# Patient Record
Sex: MALE | Race: WHITE | NOT HISPANIC OR LATINO | Employment: OTHER | URBAN - METROPOLITAN AREA
[De-identification: names, ages, dates, MRNs, and addresses within clinical notes are randomized per-mention and may not be internally consistent; named-entity substitution may affect disease eponyms.]

---

## 2017-01-02 ENCOUNTER — GENERIC CONVERSION - ENCOUNTER (OUTPATIENT)
Dept: OTHER | Facility: OTHER | Age: 72
End: 2017-01-02

## 2017-01-03 ENCOUNTER — ALLSCRIPTS OFFICE VISIT (OUTPATIENT)
Dept: OTHER | Facility: OTHER | Age: 72
End: 2017-01-03

## 2017-01-03 ENCOUNTER — GENERIC CONVERSION - ENCOUNTER (OUTPATIENT)
Dept: OTHER | Facility: OTHER | Age: 72
End: 2017-01-03

## 2017-01-05 ENCOUNTER — APPOINTMENT (OUTPATIENT)
Dept: PHYSICAL THERAPY | Facility: CLINIC | Age: 72
End: 2017-01-05
Payer: MEDICARE

## 2017-01-05 ENCOUNTER — TRANSCRIBE ORDERS (OUTPATIENT)
Dept: LAB | Facility: CLINIC | Age: 72
End: 2017-01-05

## 2017-01-05 ENCOUNTER — APPOINTMENT (OUTPATIENT)
Dept: LAB | Facility: CLINIC | Age: 72
End: 2017-01-05
Payer: MEDICARE

## 2017-01-05 ENCOUNTER — ALLSCRIPTS OFFICE VISIT (OUTPATIENT)
Dept: OTHER | Facility: OTHER | Age: 72
End: 2017-01-05

## 2017-01-05 DIAGNOSIS — E08.41 DIABETIC MONONEUROPATHY ASSOCIATED WITH DIABETES MELLITUS DUE TO UNDERLYING CONDITION (HCC): Primary | ICD-10-CM

## 2017-01-05 DIAGNOSIS — E08.41 DIABETIC MONONEUROPATHY ASSOCIATED WITH DIABETES MELLITUS DUE TO UNDERLYING CONDITION (HCC): ICD-10-CM

## 2017-01-05 LAB
ANION GAP SERPL CALCULATED.3IONS-SCNC: 7 MMOL/L (ref 4–13)
BUN SERPL-MCNC: 19 MG/DL (ref 5–25)
CALCIUM SERPL-MCNC: 9 MG/DL (ref 8.3–10.1)
CHLORIDE SERPL-SCNC: 104 MMOL/L (ref 100–108)
CO2 SERPL-SCNC: 30 MMOL/L (ref 21–32)
CREAT SERPL-MCNC: 1.03 MG/DL (ref 0.6–1.3)
CREAT UR-MCNC: 153 MG/DL
ERYTHROCYTE [DISTWIDTH] IN BLOOD BY AUTOMATED COUNT: 14.5 % (ref 11.6–15.1)
GFR SERPL CREATININE-BSD FRML MDRD: >60 ML/MIN/1.73SQ M
GLUCOSE SERPL-MCNC: 145 MG/DL (ref 65–140)
HCT VFR BLD AUTO: 38.3 % (ref 36.5–49.3)
HGB BLD-MCNC: 12.3 G/DL (ref 12–17)
MCH RBC QN AUTO: 28.4 PG (ref 26.8–34.3)
MCHC RBC AUTO-ENTMCNC: 32.1 G/DL (ref 31.4–37.4)
MCV RBC AUTO: 89 FL (ref 82–98)
MICROALBUMIN UR-MCNC: 18.3 MG/L (ref 0–20)
MICROALBUMIN/CREAT 24H UR: 12 MG/G CREATININE (ref 0–30)
PLATELET # BLD AUTO: 196 THOUSANDS/UL (ref 149–390)
PMV BLD AUTO: 11.1 FL (ref 8.9–12.7)
POTASSIUM SERPL-SCNC: 3.9 MMOL/L (ref 3.5–5.3)
RBC # BLD AUTO: 4.33 MILLION/UL (ref 3.88–5.62)
SODIUM SERPL-SCNC: 141 MMOL/L (ref 136–145)
T4 FREE SERPL-MCNC: 1.2 NG/DL (ref 0.76–1.46)
TSH SERPL DL<=0.05 MIU/L-ACNC: 2.7 UIU/ML (ref 0.36–3.74)
WBC # BLD AUTO: 9.38 THOUSAND/UL (ref 4.31–10.16)

## 2017-01-05 PROCEDURE — 84439 ASSAY OF FREE THYROXINE: CPT

## 2017-01-05 PROCEDURE — 36415 COLL VENOUS BLD VENIPUNCTURE: CPT

## 2017-01-05 PROCEDURE — 85027 COMPLETE CBC AUTOMATED: CPT

## 2017-01-05 PROCEDURE — 82570 ASSAY OF URINE CREATININE: CPT

## 2017-01-05 PROCEDURE — 80048 BASIC METABOLIC PNL TOTAL CA: CPT

## 2017-01-05 PROCEDURE — 84443 ASSAY THYROID STIM HORMONE: CPT

## 2017-01-05 PROCEDURE — 82043 UR ALBUMIN QUANTITATIVE: CPT

## 2017-01-06 ENCOUNTER — APPOINTMENT (OUTPATIENT)
Dept: PHYSICAL THERAPY | Facility: CLINIC | Age: 72
End: 2017-01-06
Payer: MEDICARE

## 2017-01-06 PROCEDURE — 97110 THERAPEUTIC EXERCISES: CPT

## 2017-01-06 PROCEDURE — 97140 MANUAL THERAPY 1/> REGIONS: CPT

## 2017-01-10 ENCOUNTER — APPOINTMENT (OUTPATIENT)
Dept: PHYSICAL THERAPY | Facility: CLINIC | Age: 72
End: 2017-01-10
Payer: MEDICARE

## 2017-01-10 PROCEDURE — 97140 MANUAL THERAPY 1/> REGIONS: CPT

## 2017-01-10 PROCEDURE — 97110 THERAPEUTIC EXERCISES: CPT

## 2017-01-12 ENCOUNTER — APPOINTMENT (OUTPATIENT)
Dept: PHYSICAL THERAPY | Facility: CLINIC | Age: 72
End: 2017-01-12
Payer: MEDICARE

## 2017-01-12 PROCEDURE — 97140 MANUAL THERAPY 1/> REGIONS: CPT

## 2017-01-12 PROCEDURE — 97110 THERAPEUTIC EXERCISES: CPT

## 2017-01-13 ENCOUNTER — APPOINTMENT (OUTPATIENT)
Dept: PHYSICAL THERAPY | Facility: CLINIC | Age: 72
End: 2017-01-13
Payer: MEDICARE

## 2017-01-16 ENCOUNTER — APPOINTMENT (OUTPATIENT)
Dept: PHYSICAL THERAPY | Facility: CLINIC | Age: 72
End: 2017-01-16
Payer: MEDICARE

## 2017-01-16 PROCEDURE — 97140 MANUAL THERAPY 1/> REGIONS: CPT

## 2017-01-16 PROCEDURE — 97110 THERAPEUTIC EXERCISES: CPT

## 2017-01-18 ENCOUNTER — APPOINTMENT (OUTPATIENT)
Dept: PHYSICAL THERAPY | Facility: CLINIC | Age: 72
End: 2017-01-18
Payer: MEDICARE

## 2017-01-18 PROCEDURE — 97140 MANUAL THERAPY 1/> REGIONS: CPT

## 2017-01-23 ENCOUNTER — APPOINTMENT (OUTPATIENT)
Dept: PHYSICAL THERAPY | Facility: CLINIC | Age: 72
End: 2017-01-23
Payer: MEDICARE

## 2017-01-23 PROCEDURE — G8985 CARRY GOAL STATUS: HCPCS

## 2017-01-23 PROCEDURE — 97110 THERAPEUTIC EXERCISES: CPT

## 2017-01-23 PROCEDURE — G8984 CARRY CURRENT STATUS: HCPCS

## 2017-01-23 PROCEDURE — 97140 MANUAL THERAPY 1/> REGIONS: CPT

## 2017-01-25 ENCOUNTER — ANESTHESIA EVENT (OUTPATIENT)
Dept: GASTROENTEROLOGY | Facility: AMBULARY SURGERY CENTER | Age: 72
End: 2017-01-25
Payer: MEDICARE

## 2017-01-25 RX ORDER — AMIODARONE HYDROCHLORIDE 100 MG/1
100 TABLET ORAL EVERY MORNING
Status: ON HOLD | COMMUNITY
End: 2017-03-24

## 2017-01-25 RX ORDER — ATORVASTATIN CALCIUM 20 MG/1
20 TABLET, FILM COATED ORAL EVERY EVENING
COMMUNITY
End: 2018-03-15 | Stop reason: SDUPTHER

## 2017-01-25 RX ORDER — AMLODIPINE BESYLATE 10 MG/1
10 TABLET ORAL EVERY MORNING
COMMUNITY
End: 2019-06-28 | Stop reason: ALTCHOICE

## 2017-01-25 RX ORDER — DIPHENOXYLATE HYDROCHLORIDE AND ATROPINE SULFATE 2.5; .025 MG/1; MG/1
1 TABLET ORAL EVERY MORNING
COMMUNITY
End: 2022-02-21

## 2017-01-25 RX ORDER — TAMSULOSIN HYDROCHLORIDE 0.4 MG/1
0.4 CAPSULE ORAL DAILY
COMMUNITY
End: 2021-10-02

## 2017-01-25 RX ORDER — LOSARTAN POTASSIUM 50 MG/1
50 TABLET ORAL EVERY MORNING
COMMUNITY
End: 2018-10-31 | Stop reason: SDUPTHER

## 2017-01-25 RX ORDER — CARVEDILOL 25 MG/1
12.5 TABLET ORAL 2 TIMES DAILY
COMMUNITY

## 2017-01-25 RX ORDER — HYDROCHLOROTHIAZIDE 25 MG/1
25 TABLET ORAL EVERY MORNING
Status: ON HOLD | COMMUNITY
End: 2017-03-24

## 2017-01-26 ENCOUNTER — ANESTHESIA (OUTPATIENT)
Dept: GASTROENTEROLOGY | Facility: AMBULARY SURGERY CENTER | Age: 72
End: 2017-01-26
Payer: MEDICARE

## 2017-01-26 ENCOUNTER — HOSPITAL ENCOUNTER (OUTPATIENT)
Facility: AMBULARY SURGERY CENTER | Age: 72
Setting detail: OUTPATIENT SURGERY
Discharge: HOME | End: 2017-01-26
Attending: INTERNAL MEDICINE | Admitting: INTERNAL MEDICINE
Payer: MEDICARE

## 2017-01-26 VITALS
HEIGHT: 70 IN | DIASTOLIC BLOOD PRESSURE: 57 MMHG | TEMPERATURE: 99.2 F | WEIGHT: 235 LBS | BODY MASS INDEX: 33.64 KG/M2 | RESPIRATION RATE: 18 BRPM | SYSTOLIC BLOOD PRESSURE: 122 MMHG | HEART RATE: 68 BPM | OXYGEN SATURATION: 98 %

## 2017-01-26 DIAGNOSIS — R19.7 DIARRHEA: ICD-10-CM

## 2017-01-26 DIAGNOSIS — K21.9 GASTRO-ESOPHAGEAL REFLUX DISEASE WITHOUT ESOPHAGITIS: ICD-10-CM

## 2017-01-26 DIAGNOSIS — R10.13 EPIGASTRIC PAIN: ICD-10-CM

## 2017-01-26 LAB — GLUCOSE SERPL-MCNC: 156 MG/DL (ref 65–140)

## 2017-01-26 PROCEDURE — 82948 REAGENT STRIP/BLOOD GLUCOSE: CPT

## 2017-01-26 PROCEDURE — 88305 TISSUE EXAM BY PATHOLOGIST: CPT | Performed by: INTERNAL MEDICINE

## 2017-01-26 RX ORDER — PROPOFOL 10 MG/ML
INJECTION, EMULSION INTRAVENOUS AS NEEDED
Status: DISCONTINUED | OUTPATIENT
Start: 2017-01-26 | End: 2017-01-26 | Stop reason: SURG

## 2017-01-26 RX ORDER — SODIUM CHLORIDE, SODIUM LACTATE, POTASSIUM CHLORIDE, CALCIUM CHLORIDE 600; 310; 30; 20 MG/100ML; MG/100ML; MG/100ML; MG/100ML
125 INJECTION, SOLUTION INTRAVENOUS CONTINUOUS
Status: DISCONTINUED | OUTPATIENT
Start: 2017-01-26 | End: 2017-01-26 | Stop reason: HOSPADM

## 2017-01-26 RX ADMIN — LIDOCAINE HYDROCHLORIDE 60 MG: 20 INJECTION, SOLUTION INTRAVENOUS at 10:24

## 2017-01-26 RX ADMIN — SODIUM CHLORIDE, SODIUM LACTATE, POTASSIUM CHLORIDE, AND CALCIUM CHLORIDE 125 ML/HR: .6; .31; .03; .02 INJECTION, SOLUTION INTRAVENOUS at 09:58

## 2017-01-26 RX ADMIN — PROPOFOL 100 MG: 10 INJECTION, EMULSION INTRAVENOUS at 10:30

## 2017-01-26 RX ADMIN — PROPOFOL 180 MG: 10 INJECTION, EMULSION INTRAVENOUS at 10:24

## 2017-01-26 RX ADMIN — PROPOFOL 100 MG: 10 INJECTION, EMULSION INTRAVENOUS at 10:36

## 2017-01-30 ENCOUNTER — ALLSCRIPTS OFFICE VISIT (OUTPATIENT)
Dept: OTHER | Facility: OTHER | Age: 72
End: 2017-01-30

## 2017-02-16 ENCOUNTER — GENERIC CONVERSION - ENCOUNTER (OUTPATIENT)
Dept: OTHER | Facility: OTHER | Age: 72
End: 2017-02-16

## 2017-02-20 ENCOUNTER — ALLSCRIPTS OFFICE VISIT (OUTPATIENT)
Dept: OTHER | Facility: OTHER | Age: 72
End: 2017-02-20

## 2017-02-23 ENCOUNTER — ALLSCRIPTS OFFICE VISIT (OUTPATIENT)
Dept: OTHER | Facility: OTHER | Age: 72
End: 2017-02-23

## 2017-02-23 DIAGNOSIS — K43.0 INCISIONAL HERNIA, WITH OBSTRUCTION, WITHOUT GANGRENE: ICD-10-CM

## 2017-02-23 DIAGNOSIS — E11.40 TYPE 2 DIABETES MELLITUS WITH DIABETIC NEUROPATHY (HCC): ICD-10-CM

## 2017-02-28 ENCOUNTER — ALLSCRIPTS OFFICE VISIT (OUTPATIENT)
Dept: OTHER | Facility: OTHER | Age: 72
End: 2017-02-28

## 2017-03-07 ENCOUNTER — APPOINTMENT (OUTPATIENT)
Dept: LAB | Facility: CLINIC | Age: 72
End: 2017-03-07
Payer: MEDICARE

## 2017-03-07 ENCOUNTER — TRANSCRIBE ORDERS (OUTPATIENT)
Dept: LAB | Facility: CLINIC | Age: 72
End: 2017-03-07

## 2017-03-07 DIAGNOSIS — I48.0 PAROXYSMAL ATRIAL FIBRILLATION (HCC): Primary | ICD-10-CM

## 2017-03-07 LAB
ALBUMIN SERPL BCP-MCNC: 3.7 G/DL (ref 3.5–5)
ALP SERPL-CCNC: 64 U/L (ref 46–116)
ALT SERPL W P-5'-P-CCNC: 46 U/L (ref 12–78)
ANION GAP SERPL CALCULATED.3IONS-SCNC: 8 MMOL/L (ref 4–13)
AST SERPL W P-5'-P-CCNC: 11 U/L (ref 5–45)
BASOPHILS # BLD AUTO: 0.03 THOUSANDS/ΜL (ref 0–0.1)
BASOPHILS NFR BLD AUTO: 0 % (ref 0–1)
BILIRUB SERPL-MCNC: 0.44 MG/DL (ref 0.2–1)
BUN SERPL-MCNC: 23 MG/DL (ref 5–25)
CALCIUM SERPL-MCNC: 9 MG/DL (ref 8.3–10.1)
CHLORIDE SERPL-SCNC: 102 MMOL/L (ref 100–108)
CO2 SERPL-SCNC: 28 MMOL/L (ref 21–32)
CREAT SERPL-MCNC: 1.05 MG/DL (ref 0.6–1.3)
EOSINOPHIL # BLD AUTO: 0.29 THOUSAND/ΜL (ref 0–0.61)
EOSINOPHIL NFR BLD AUTO: 4 % (ref 0–6)
ERYTHROCYTE [DISTWIDTH] IN BLOOD BY AUTOMATED COUNT: 14.4 % (ref 11.6–15.1)
GFR SERPL CREATININE-BSD FRML MDRD: >60 ML/MIN/1.73SQ M
GLUCOSE SERPL-MCNC: 204 MG/DL (ref 65–140)
HCT VFR BLD AUTO: 37.8 % (ref 36.5–49.3)
HGB BLD-MCNC: 12.4 G/DL (ref 12–17)
INR PPP: 1.08 (ref 0.86–1.16)
LYMPHOCYTES # BLD AUTO: 1.62 THOUSANDS/ΜL (ref 0.6–4.47)
LYMPHOCYTES NFR BLD AUTO: 22 % (ref 14–44)
MAGNESIUM SERPL-MCNC: 2.3 MG/DL (ref 1.6–2.6)
MCH RBC QN AUTO: 28.9 PG (ref 26.8–34.3)
MCHC RBC AUTO-ENTMCNC: 32.8 G/DL (ref 31.4–37.4)
MCV RBC AUTO: 88 FL (ref 82–98)
MONOCYTES # BLD AUTO: 0.54 THOUSAND/ΜL (ref 0.17–1.22)
MONOCYTES NFR BLD AUTO: 7 % (ref 4–12)
NEUTROPHILS # BLD AUTO: 4.99 THOUSANDS/ΜL (ref 1.85–7.62)
NEUTS SEG NFR BLD AUTO: 67 % (ref 43–75)
NRBC BLD AUTO-RTO: 0 /100 WBCS
PLATELET # BLD AUTO: 213 THOUSANDS/UL (ref 149–390)
PMV BLD AUTO: 10.8 FL (ref 8.9–12.7)
POTASSIUM SERPL-SCNC: 4 MMOL/L (ref 3.5–5.3)
PROT SERPL-MCNC: 7.5 G/DL (ref 6.4–8.2)
PROTHROMBIN TIME: 14.1 SECONDS (ref 12–14.3)
RBC # BLD AUTO: 4.29 MILLION/UL (ref 3.88–5.62)
SODIUM SERPL-SCNC: 138 MMOL/L (ref 136–145)
TSH SERPL DL<=0.05 MIU/L-ACNC: 2.07 UIU/ML (ref 0.36–3.74)
WBC # BLD AUTO: 7.49 THOUSAND/UL (ref 4.31–10.16)

## 2017-03-07 PROCEDURE — 80053 COMPREHEN METABOLIC PANEL: CPT

## 2017-03-07 PROCEDURE — 83735 ASSAY OF MAGNESIUM: CPT

## 2017-03-07 PROCEDURE — 84443 ASSAY THYROID STIM HORMONE: CPT

## 2017-03-07 PROCEDURE — 36415 COLL VENOUS BLD VENIPUNCTURE: CPT

## 2017-03-07 PROCEDURE — 85025 COMPLETE CBC W/AUTO DIFF WBC: CPT

## 2017-03-07 PROCEDURE — 85610 PROTHROMBIN TIME: CPT

## 2017-03-16 ENCOUNTER — ALLSCRIPTS OFFICE VISIT (OUTPATIENT)
Dept: OTHER | Facility: OTHER | Age: 72
End: 2017-03-16

## 2017-03-20 ENCOUNTER — ALLSCRIPTS OFFICE VISIT (OUTPATIENT)
Dept: OTHER | Facility: OTHER | Age: 72
End: 2017-03-20

## 2017-03-20 RX ORDER — DOFETILIDE 0.5 MG/1
500 CAPSULE ORAL 2 TIMES DAILY
COMMUNITY
End: 2018-12-20 | Stop reason: ALTCHOICE

## 2017-03-20 RX ORDER — FLUTICASONE PROPIONATE 50 MCG
1 SPRAY, SUSPENSION (ML) NASAL 2 TIMES DAILY
COMMUNITY
End: 2018-07-02 | Stop reason: SDUPTHER

## 2017-03-21 ENCOUNTER — GENERIC CONVERSION - ENCOUNTER (OUTPATIENT)
Dept: OTHER | Facility: OTHER | Age: 72
End: 2017-03-21

## 2017-03-23 ENCOUNTER — ANESTHESIA EVENT (OUTPATIENT)
Dept: PERIOP | Facility: HOSPITAL | Age: 72
End: 2017-03-23
Payer: MEDICARE

## 2017-03-24 ENCOUNTER — ANESTHESIA (OUTPATIENT)
Dept: PERIOP | Facility: HOSPITAL | Age: 72
End: 2017-03-24
Payer: MEDICARE

## 2017-03-24 ENCOUNTER — HOSPITAL ENCOUNTER (OUTPATIENT)
Facility: HOSPITAL | Age: 72
Setting detail: OUTPATIENT SURGERY
Discharge: HOME | End: 2017-03-24
Attending: SPECIALIST | Admitting: SPECIALIST
Payer: MEDICARE

## 2017-03-24 VITALS
SYSTOLIC BLOOD PRESSURE: 135 MMHG | WEIGHT: 238 LBS | TEMPERATURE: 98.3 F | RESPIRATION RATE: 18 BRPM | OXYGEN SATURATION: 95 % | DIASTOLIC BLOOD PRESSURE: 61 MMHG | HEART RATE: 58 BPM | HEIGHT: 71 IN | BODY MASS INDEX: 33.32 KG/M2

## 2017-03-24 DIAGNOSIS — K43.0 INCISIONAL HERNIA, WITH OBSTRUCTION, WITHOUT GANGRENE: ICD-10-CM

## 2017-03-24 LAB — GLUCOSE SERPL-MCNC: 151 MG/DL (ref 65–140)

## 2017-03-24 PROCEDURE — 82948 REAGENT STRIP/BLOOD GLUCOSE: CPT

## 2017-03-24 PROCEDURE — 88302 TISSUE EXAM BY PATHOLOGIST: CPT | Performed by: SPECIALIST

## 2017-03-24 PROCEDURE — C1781 MESH (IMPLANTABLE): HCPCS | Performed by: SPECIALIST

## 2017-03-24 DEVICE — BARD VENTRALEX HERNIA PATCH, 4.3 CM (1.7"), SMALL CIRCLE WITH STRAP
Type: IMPLANTABLE DEVICE | Site: UMBILICAL | Status: FUNCTIONAL
Brand: VENTRALEX

## 2017-03-24 RX ORDER — KETOROLAC TROMETHAMINE 30 MG/ML
INJECTION, SOLUTION INTRAMUSCULAR; INTRAVENOUS AS NEEDED
Status: DISCONTINUED | OUTPATIENT
Start: 2017-03-24 | End: 2017-03-24 | Stop reason: SURG

## 2017-03-24 RX ORDER — FENTANYL CITRATE/PF 50 MCG/ML
50 SYRINGE (ML) INJECTION
Status: DISCONTINUED | OUTPATIENT
Start: 2017-03-24 | End: 2017-03-24 | Stop reason: HOSPADM

## 2017-03-24 RX ORDER — MIDAZOLAM HYDROCHLORIDE 1 MG/ML
INJECTION INTRAMUSCULAR; INTRAVENOUS AS NEEDED
Status: DISCONTINUED | OUTPATIENT
Start: 2017-03-24 | End: 2017-03-24 | Stop reason: SURG

## 2017-03-24 RX ORDER — BUPIVACAINE HYDROCHLORIDE 5 MG/ML
INJECTION, SOLUTION PERINEURAL AS NEEDED
Status: DISCONTINUED | OUTPATIENT
Start: 2017-03-24 | End: 2017-03-24 | Stop reason: HOSPADM

## 2017-03-24 RX ORDER — OXYCODONE HYDROCHLORIDE AND ACETAMINOPHEN 5; 325 MG/1; MG/1
1 TABLET ORAL EVERY 4 HOURS PRN
Status: DISCONTINUED | OUTPATIENT
Start: 2017-03-24 | End: 2017-03-24 | Stop reason: HOSPADM

## 2017-03-24 RX ORDER — MAGNESIUM HYDROXIDE 1200 MG/15ML
LIQUID ORAL AS NEEDED
Status: DISCONTINUED | OUTPATIENT
Start: 2017-03-24 | End: 2017-03-24 | Stop reason: HOSPADM

## 2017-03-24 RX ORDER — PROPOFOL 10 MG/ML
INJECTION, EMULSION INTRAVENOUS AS NEEDED
Status: DISCONTINUED | OUTPATIENT
Start: 2017-03-24 | End: 2017-03-24 | Stop reason: SURG

## 2017-03-24 RX ORDER — SODIUM CHLORIDE, SODIUM LACTATE, POTASSIUM CHLORIDE, CALCIUM CHLORIDE 600; 310; 30; 20 MG/100ML; MG/100ML; MG/100ML; MG/100ML
50 INJECTION, SOLUTION INTRAVENOUS CONTINUOUS
Status: DISCONTINUED | OUTPATIENT
Start: 2017-03-24 | End: 2017-03-24 | Stop reason: HOSPADM

## 2017-03-24 RX ORDER — FENTANYL CITRATE 50 UG/ML
INJECTION, SOLUTION INTRAMUSCULAR; INTRAVENOUS AS NEEDED
Status: DISCONTINUED | OUTPATIENT
Start: 2017-03-24 | End: 2017-03-24 | Stop reason: SURG

## 2017-03-24 RX ORDER — HEPARIN SODIUM 5000 [USP'U]/ML
5000 INJECTION, SOLUTION INTRAVENOUS; SUBCUTANEOUS
Status: DISCONTINUED | OUTPATIENT
Start: 2017-03-24 | End: 2017-03-24 | Stop reason: HOSPADM

## 2017-03-24 RX ORDER — ONDANSETRON 2 MG/ML
INJECTION INTRAMUSCULAR; INTRAVENOUS AS NEEDED
Status: DISCONTINUED | OUTPATIENT
Start: 2017-03-24 | End: 2017-03-24 | Stop reason: SURG

## 2017-03-24 RX ORDER — EPHEDRINE SULFATE 50 MG/ML
INJECTION, SOLUTION INTRAVENOUS AS NEEDED
Status: DISCONTINUED | OUTPATIENT
Start: 2017-03-24 | End: 2017-03-24 | Stop reason: SURG

## 2017-03-24 RX ORDER — OXYCODONE HYDROCHLORIDE AND ACETAMINOPHEN 5; 325 MG/1; MG/1
1 TABLET ORAL EVERY 4 HOURS PRN
Qty: 40 TABLET | Refills: 0 | Status: SHIPPED | OUTPATIENT
Start: 2017-03-24 | End: 2017-04-03

## 2017-03-24 RX ORDER — METOCLOPRAMIDE HYDROCHLORIDE 5 MG/ML
INJECTION INTRAMUSCULAR; INTRAVENOUS AS NEEDED
Status: DISCONTINUED | OUTPATIENT
Start: 2017-03-24 | End: 2017-03-24 | Stop reason: SURG

## 2017-03-24 RX ADMIN — DEXAMETHASONE SODIUM PHOSPHATE 4 MG: 10 INJECTION INTRAMUSCULAR; INTRAVENOUS at 08:13

## 2017-03-24 RX ADMIN — ONDANSETRON 4 MG: 2 INJECTION INTRAMUSCULAR; INTRAVENOUS at 08:42

## 2017-03-24 RX ADMIN — KETOROLAC TROMETHAMINE 30 MG: 30 INJECTION, SOLUTION INTRAMUSCULAR at 08:42

## 2017-03-24 RX ADMIN — LIDOCAINE HYDROCHLORIDE 100 MG: 20 INJECTION, SOLUTION INTRAVENOUS at 08:08

## 2017-03-24 RX ADMIN — FENTANYL CITRATE 50 MCG: 50 INJECTION, SOLUTION INTRAMUSCULAR; INTRAVENOUS at 08:20

## 2017-03-24 RX ADMIN — SODIUM CHLORIDE, SODIUM LACTATE, POTASSIUM CHLORIDE, AND CALCIUM CHLORIDE 50 ML/HR: .6; .31; .03; .02 INJECTION, SOLUTION INTRAVENOUS at 07:22

## 2017-03-24 RX ADMIN — HEPARIN SODIUM 5000 UNITS: 5000 INJECTION, SOLUTION INTRAVENOUS; SUBCUTANEOUS at 08:00

## 2017-03-24 RX ADMIN — METOCLOPRAMIDE HYDROCHLORIDE 10 MG: 5 INJECTION INTRAMUSCULAR; INTRAVENOUS at 08:12

## 2017-03-24 RX ADMIN — FENTANYL CITRATE 50 MCG: 50 INJECTION, SOLUTION INTRAMUSCULAR; INTRAVENOUS at 08:12

## 2017-03-24 RX ADMIN — CEFAZOLIN SODIUM 1000 MG: 1 SOLUTION INTRAVENOUS at 08:09

## 2017-03-24 RX ADMIN — MIDAZOLAM HYDROCHLORIDE 2 MG: 1 INJECTION, SOLUTION INTRAMUSCULAR; INTRAVENOUS at 07:58

## 2017-03-24 RX ADMIN — PROPOFOL 200 MG: 10 INJECTION, EMULSION INTRAVENOUS at 08:08

## 2017-03-24 RX ADMIN — EPHEDRINE SULFATE 5 MG: 50 INJECTION, SOLUTION INTRAMUSCULAR; INTRAVENOUS; SUBCUTANEOUS at 08:33

## 2017-04-05 ENCOUNTER — ALLSCRIPTS OFFICE VISIT (OUTPATIENT)
Dept: OTHER | Facility: OTHER | Age: 72
End: 2017-04-05

## 2017-04-06 ENCOUNTER — ALLSCRIPTS OFFICE VISIT (OUTPATIENT)
Dept: OTHER | Facility: OTHER | Age: 72
End: 2017-04-06

## 2017-05-18 ENCOUNTER — HOSPITAL ENCOUNTER (EMERGENCY)
Facility: HOSPITAL | Age: 72
Discharge: HOME | End: 2017-05-18
Admitting: EMERGENCY MEDICINE
Payer: MEDICARE

## 2017-05-18 ENCOUNTER — APPOINTMENT (EMERGENCY)
Dept: RADIOLOGY | Facility: HOSPITAL | Age: 72
End: 2017-05-18
Payer: MEDICARE

## 2017-05-18 VITALS
HEART RATE: 68 BPM | SYSTOLIC BLOOD PRESSURE: 144 MMHG | DIASTOLIC BLOOD PRESSURE: 81 MMHG | HEIGHT: 71 IN | RESPIRATION RATE: 18 BRPM | WEIGHT: 235 LBS | TEMPERATURE: 98.4 F | OXYGEN SATURATION: 96 % | BODY MASS INDEX: 32.9 KG/M2

## 2017-05-18 DIAGNOSIS — R19.7 DIARRHEA: Primary | ICD-10-CM

## 2017-05-18 LAB
ALBUMIN SERPL BCP-MCNC: 3.7 G/DL (ref 3.5–5)
ALP SERPL-CCNC: 94 U/L (ref 46–116)
ALT SERPL W P-5'-P-CCNC: 45 U/L (ref 12–78)
ANION GAP SERPL CALCULATED.3IONS-SCNC: 9 MMOL/L (ref 4–13)
APTT PPP: 31 SECONDS (ref 23–35)
AST SERPL W P-5'-P-CCNC: 30 U/L (ref 5–45)
BASOPHILS # BLD AUTO: 0 THOUSANDS/ΜL (ref 0–0.1)
BASOPHILS NFR BLD AUTO: 0 % (ref 0–1)
BILIRUB SERPL-MCNC: 0.6 MG/DL (ref 0.2–1)
BUN SERPL-MCNC: 18 MG/DL (ref 5–25)
CALCIUM SERPL-MCNC: 8.7 MG/DL (ref 8.3–10.1)
CHLORIDE SERPL-SCNC: 104 MMOL/L (ref 100–108)
CO2 SERPL-SCNC: 28 MMOL/L (ref 21–32)
CREAT SERPL-MCNC: 1.01 MG/DL (ref 0.6–1.3)
EOSINOPHIL # BLD AUTO: 1.4 THOUSAND/ΜL (ref 0–0.61)
EOSINOPHIL NFR BLD AUTO: 11 % (ref 0–6)
ERYTHROCYTE [DISTWIDTH] IN BLOOD BY AUTOMATED COUNT: 14 % (ref 11.6–15.1)
GFR SERPL CREATININE-BSD FRML MDRD: >60 ML/MIN/1.73SQ M
GLUCOSE SERPL-MCNC: 119 MG/DL (ref 65–140)
HCT VFR BLD AUTO: 40 % (ref 42–52)
HGB BLD-MCNC: 13 G/DL (ref 14–18)
INR PPP: 1.01 (ref 0.86–1.16)
LIPASE SERPL-CCNC: 97 U/L (ref 73–393)
LYMPHOCYTES # BLD AUTO: 1.3 THOUSANDS/ΜL (ref 0.6–4.47)
LYMPHOCYTES NFR BLD AUTO: 10 % (ref 14–44)
MCH RBC QN AUTO: 28.2 PG (ref 27–31)
MCHC RBC AUTO-ENTMCNC: 32.5 G/DL (ref 31.4–37.4)
MCV RBC AUTO: 87 FL (ref 82–98)
MONOCYTES # BLD AUTO: 0.6 THOUSAND/ΜL (ref 0.17–1.22)
MONOCYTES NFR BLD AUTO: 4 % (ref 4–12)
NEUTROPHILS # BLD AUTO: 9.4 THOUSANDS/ΜL (ref 1.85–7.62)
NEUTS SEG NFR BLD AUTO: 74 % (ref 43–75)
NRBC BLD AUTO-RTO: 0 /100 WBCS
PLATELET # BLD AUTO: 215 THOUSANDS/UL (ref 130–400)
PLATELET BLD QL SMEAR: ADEQUATE
PMV BLD AUTO: 8.4 FL (ref 8.9–12.7)
POTASSIUM SERPL-SCNC: 4.1 MMOL/L (ref 3.5–5.3)
PROT SERPL-MCNC: 7.5 G/DL (ref 6.4–8.2)
PROTHROMBIN TIME: 10.6 SECONDS (ref 9.4–11.7)
RBC # BLD AUTO: 4.61 MILLION/UL (ref 4.7–6.1)
SODIUM SERPL-SCNC: 141 MMOL/L (ref 136–145)
WBC # BLD AUTO: 12.7 THOUSAND/UL (ref 4.8–10.8)

## 2017-05-18 PROCEDURE — 80053 COMPREHEN METABOLIC PANEL: CPT | Performed by: PHYSICIAN ASSISTANT

## 2017-05-18 PROCEDURE — 36415 COLL VENOUS BLD VENIPUNCTURE: CPT | Performed by: PHYSICIAN ASSISTANT

## 2017-05-18 PROCEDURE — 85610 PROTHROMBIN TIME: CPT | Performed by: PHYSICIAN ASSISTANT

## 2017-05-18 PROCEDURE — 99284 EMERGENCY DEPT VISIT MOD MDM: CPT

## 2017-05-18 PROCEDURE — 96361 HYDRATE IV INFUSION ADD-ON: CPT

## 2017-05-18 PROCEDURE — 96374 THER/PROPH/DIAG INJ IV PUSH: CPT

## 2017-05-18 PROCEDURE — 85730 THROMBOPLASTIN TIME PARTIAL: CPT | Performed by: PHYSICIAN ASSISTANT

## 2017-05-18 PROCEDURE — 74177 CT ABD & PELVIS W/CONTRAST: CPT

## 2017-05-18 PROCEDURE — 83690 ASSAY OF LIPASE: CPT | Performed by: PHYSICIAN ASSISTANT

## 2017-05-18 PROCEDURE — 85025 COMPLETE CBC W/AUTO DIFF WBC: CPT | Performed by: PHYSICIAN ASSISTANT

## 2017-05-18 RX ORDER — ONDANSETRON 2 MG/ML
4 INJECTION INTRAMUSCULAR; INTRAVENOUS ONCE
Status: COMPLETED | OUTPATIENT
Start: 2017-05-18 | End: 2017-05-18

## 2017-05-18 RX ORDER — METOCLOPRAMIDE 10 MG/1
10 TABLET ORAL 4 TIMES DAILY
Qty: 20 TABLET | Refills: 0 | Status: SHIPPED | OUTPATIENT
Start: 2017-05-18 | End: 2017-05-23

## 2017-05-18 RX ADMIN — ONDANSETRON 4 MG: 2 INJECTION INTRAMUSCULAR; INTRAVENOUS at 17:14

## 2017-05-18 RX ADMIN — SODIUM CHLORIDE 1000 ML: 0.9 INJECTION, SOLUTION INTRAVENOUS at 17:14

## 2017-05-18 RX ADMIN — IOHEXOL 100 ML: 350 INJECTION, SOLUTION INTRAVENOUS at 18:16

## 2017-05-19 ENCOUNTER — GENERIC CONVERSION - ENCOUNTER (OUTPATIENT)
Dept: OTHER | Facility: OTHER | Age: 72
End: 2017-05-19

## 2017-05-19 ENCOUNTER — HOSPITAL ENCOUNTER (EMERGENCY)
Facility: HOSPITAL | Age: 72
Discharge: HOME | End: 2017-05-19
Attending: EMERGENCY MEDICINE | Admitting: EMERGENCY MEDICINE
Payer: MEDICARE

## 2017-05-19 VITALS
SYSTOLIC BLOOD PRESSURE: 150 MMHG | WEIGHT: 235 LBS | BODY MASS INDEX: 32.78 KG/M2 | RESPIRATION RATE: 18 BRPM | OXYGEN SATURATION: 96 % | DIASTOLIC BLOOD PRESSURE: 79 MMHG | TEMPERATURE: 98.4 F | HEART RATE: 70 BPM

## 2017-05-19 DIAGNOSIS — R10.9 ABDOMINAL DISCOMFORT: ICD-10-CM

## 2017-05-19 DIAGNOSIS — R19.7 DIARRHEA: Primary | ICD-10-CM

## 2017-05-19 PROCEDURE — 99284 EMERGENCY DEPT VISIT MOD MDM: CPT

## 2017-05-19 PROCEDURE — 87493 C DIFF AMPLIFIED PROBE: CPT | Performed by: EMERGENCY MEDICINE

## 2017-05-19 RX ORDER — SIMETHICONE 80 MG
160 TABLET,CHEWABLE ORAL 2 TIMES DAILY
Qty: 15 TABLET | Refills: 0 | Status: SHIPPED | OUTPATIENT
Start: 2017-05-19 | End: 2017-06-25

## 2017-05-19 RX ORDER — SIMETHICONE 80 MG
160 TABLET,CHEWABLE ORAL ONCE
Status: COMPLETED | OUTPATIENT
Start: 2017-05-19 | End: 2017-05-19

## 2017-05-19 RX ADMIN — SIMETHICONE CHEW TAB 80 MG 160 MG: 80 TABLET ORAL at 00:53

## 2017-05-20 LAB — C DIFF TOX GENS STL QL NAA+PROBE: NORMAL

## 2017-05-26 ENCOUNTER — ALLSCRIPTS OFFICE VISIT (OUTPATIENT)
Dept: OTHER | Facility: OTHER | Age: 72
End: 2017-05-26

## 2017-05-30 ENCOUNTER — GENERIC CONVERSION - ENCOUNTER (OUTPATIENT)
Dept: OTHER | Facility: OTHER | Age: 72
End: 2017-05-30

## 2017-05-31 ENCOUNTER — ALLSCRIPTS OFFICE VISIT (OUTPATIENT)
Dept: OTHER | Facility: OTHER | Age: 72
End: 2017-05-31

## 2017-06-25 ENCOUNTER — HOSPITAL ENCOUNTER (EMERGENCY)
Facility: HOSPITAL | Age: 72
Discharge: HOME | End: 2017-06-25
Attending: EMERGENCY MEDICINE | Admitting: EMERGENCY MEDICINE
Payer: MEDICARE

## 2017-06-25 VITALS
HEART RATE: 69 BPM | WEIGHT: 238 LBS | SYSTOLIC BLOOD PRESSURE: 159 MMHG | TEMPERATURE: 97.6 F | DIASTOLIC BLOOD PRESSURE: 91 MMHG | OXYGEN SATURATION: 97 % | BODY MASS INDEX: 33.32 KG/M2 | HEIGHT: 71 IN | RESPIRATION RATE: 18 BRPM

## 2017-06-25 DIAGNOSIS — B02.9 SHINGLES: Primary | ICD-10-CM

## 2017-06-25 DIAGNOSIS — H60.90 OTITIS EXTERNA: ICD-10-CM

## 2017-06-25 PROCEDURE — 99282 EMERGENCY DEPT VISIT SF MDM: CPT

## 2017-06-25 RX ORDER — VALACYCLOVIR HYDROCHLORIDE 1 G/1
1000 TABLET, FILM COATED ORAL 3 TIMES DAILY
Qty: 21 TABLET | Refills: 0 | Status: SHIPPED | OUTPATIENT
Start: 2017-06-25 | End: 2019-01-30 | Stop reason: ALTCHOICE

## 2017-06-25 RX ORDER — OXYCODONE HYDROCHLORIDE AND ACETAMINOPHEN 5; 325 MG/1; MG/1
1 TABLET ORAL ONCE
Status: DISCONTINUED | OUTPATIENT
Start: 2017-06-25 | End: 2017-06-25 | Stop reason: HOSPADM

## 2017-06-25 RX ORDER — AMOXICILLIN AND CLAVULANATE POTASSIUM 875; 125 MG/1; MG/1
1 TABLET, FILM COATED ORAL EVERY 12 HOURS
Qty: 14 TABLET | Refills: 0 | Status: SHIPPED | OUTPATIENT
Start: 2017-06-25 | End: 2017-07-02

## 2017-06-25 RX ORDER — OXYCODONE HYDROCHLORIDE AND ACETAMINOPHEN 5; 325 MG/1; MG/1
1 TABLET ORAL EVERY 4 HOURS PRN
Qty: 20 TABLET | Refills: 0 | Status: SHIPPED | OUTPATIENT
Start: 2017-06-25 | End: 2017-06-30

## 2017-06-28 ENCOUNTER — ALLSCRIPTS OFFICE VISIT (OUTPATIENT)
Dept: OTHER | Facility: OTHER | Age: 72
End: 2017-06-28

## 2017-07-03 ENCOUNTER — HOSPITAL ENCOUNTER (OUTPATIENT)
Dept: RADIOLOGY | Facility: HOSPITAL | Age: 72
Discharge: HOME | End: 2017-07-03
Attending: INTERNAL MEDICINE
Payer: MEDICARE

## 2017-07-03 ENCOUNTER — TRANSCRIBE ORDERS (OUTPATIENT)
Dept: ADMINISTRATIVE | Facility: HOSPITAL | Age: 72
End: 2017-07-03

## 2017-07-03 ENCOUNTER — ALLSCRIPTS OFFICE VISIT (OUTPATIENT)
Dept: OTHER | Facility: OTHER | Age: 72
End: 2017-07-03

## 2017-07-03 DIAGNOSIS — B02.8 OTITIS EXTERNA DUE TO HERPES ZOSTER: ICD-10-CM

## 2017-07-03 DIAGNOSIS — R51.9 FACIAL PAIN: ICD-10-CM

## 2017-07-03 DIAGNOSIS — G54.6 PHANTOM PAIN (HCC): Primary | ICD-10-CM

## 2017-07-03 DIAGNOSIS — R42 DIZZINESS AND GIDDINESS: ICD-10-CM

## 2017-07-03 PROCEDURE — 70450 CT HEAD/BRAIN W/O DYE: CPT

## 2017-07-05 ENCOUNTER — GENERIC CONVERSION - ENCOUNTER (OUTPATIENT)
Dept: OTHER | Facility: OTHER | Age: 72
End: 2017-07-05

## 2017-07-24 ENCOUNTER — ALLSCRIPTS OFFICE VISIT (OUTPATIENT)
Dept: OTHER | Facility: OTHER | Age: 72
End: 2017-07-24

## 2017-07-28 ENCOUNTER — APPOINTMENT (OUTPATIENT)
Dept: AUDIOLOGY | Facility: CLINIC | Age: 72
End: 2017-07-28
Payer: MEDICARE

## 2017-07-28 PROCEDURE — 92567 TYMPANOMETRY: CPT | Performed by: AUDIOLOGIST

## 2017-07-28 PROCEDURE — 92557 COMPREHENSIVE HEARING TEST: CPT | Performed by: AUDIOLOGIST

## 2017-08-03 ENCOUNTER — APPOINTMENT (OUTPATIENT)
Dept: LAB | Facility: CLINIC | Age: 72
End: 2017-08-03
Payer: MEDICARE

## 2017-08-03 ENCOUNTER — TRANSCRIBE ORDERS (OUTPATIENT)
Dept: LAB | Facility: CLINIC | Age: 72
End: 2017-08-03

## 2017-08-03 DIAGNOSIS — E13.40 DIABETIC NEUROPATHY ASSOCIATED WITH OTHER SPECIFIED DIABETES MELLITUS: Primary | ICD-10-CM

## 2017-08-03 LAB
ANION GAP SERPL CALCULATED.3IONS-SCNC: 5 MMOL/L (ref 4–13)
BUN SERPL-MCNC: 17 MG/DL (ref 5–25)
CALCIUM SERPL-MCNC: 9.2 MG/DL (ref 8.3–10.1)
CHLORIDE SERPL-SCNC: 102 MMOL/L (ref 100–108)
CO2 SERPL-SCNC: 30 MMOL/L (ref 21–32)
CREAT SERPL-MCNC: 1 MG/DL (ref 0.6–1.3)
EST. AVERAGE GLUCOSE BLD GHB EST-MCNC: 157 MG/DL
GFR SERPL CREATININE-BSD FRML MDRD: 75 ML/MIN/1.73SQ M
GLUCOSE SERPL-MCNC: 194 MG/DL (ref 65–140)
HBA1C MFR BLD: 7.1 % (ref 4.2–6.3)
POTASSIUM SERPL-SCNC: 4.2 MMOL/L (ref 3.5–5.3)
SODIUM SERPL-SCNC: 137 MMOL/L (ref 136–145)
T4 FREE SERPL-MCNC: 1.15 NG/DL (ref 0.76–1.46)
TSH SERPL DL<=0.05 MIU/L-ACNC: 1.55 UIU/ML (ref 0.36–3.74)

## 2017-08-03 PROCEDURE — 36415 COLL VENOUS BLD VENIPUNCTURE: CPT

## 2017-08-03 PROCEDURE — 80048 BASIC METABOLIC PNL TOTAL CA: CPT

## 2017-08-03 PROCEDURE — 84443 ASSAY THYROID STIM HORMONE: CPT

## 2017-08-03 PROCEDURE — 83036 HEMOGLOBIN GLYCOSYLATED A1C: CPT

## 2017-08-03 PROCEDURE — 84439 ASSAY OF FREE THYROXINE: CPT

## 2017-08-07 ENCOUNTER — ALLSCRIPTS OFFICE VISIT (OUTPATIENT)
Dept: OTHER | Facility: OTHER | Age: 72
End: 2017-08-07

## 2017-12-07 ENCOUNTER — ALLSCRIPTS OFFICE VISIT (OUTPATIENT)
Dept: OTHER | Facility: OTHER | Age: 72
End: 2017-12-07

## 2017-12-08 NOTE — PROGRESS NOTES
Assessment    1  Encounter for preventive health examination (V70 0) (Z00 00)   2  Body mass index 35 0-35 9, adult (V85 35) (Z68 35)   3  Headache (784 0) (R51)    Plan  Benign essential hypertension    · From  Losartan Potassium 50 MG Oral Tablet Take 1 tablet daily To LosartanPotassium 50 MG Oral Tablet Take 1 and 1/2 tablet daily  Encounter for special screening examination for genitourinary disorder    · *VB - Urinary Incontinence Screen (Dx Z13 89 Screen for UI); Status:Resulted - RequiresVerification,Retrospective By Protocol Authorization;   Done: [id number] 08:46AM    Discussion/Summary    Suspect headache may be musculoskeletal vs  due to salt intake  will increase his losartan to 1 1/2 tablets daily and call with any side effects of dizziness, etc local measures to neck  up if not improving  Chief Complaint  Pt c/o head aches for the past days  er/cma  History of Present Illness  HPI: Has been told in the past by endocrinologist that he is salt sensitive  he feels when he eats salt his bp goes up and he gets a headache has been as high as 150's  does have a history of neck DJD and this is up and down  Trying to stretch and use his ice  Review of Systems   Constitutional: no fever or chills, feels well, no tiredness, no recent weight loss or weight gain  Musculoskeletal: as noted in HPI  Neurological: no complaints of headache, no confusion, no numbness or tingling, no dizziness or fainting  Active Problems    1  Allergic rhinitis (477 9) (J30 9)   2  Anemia (285 9) (D64 9)   3  Atherosclerosis of arteries of extremities (440 20) (I70 209)   4  Atrial fibrillation (427 31) (I48 91)   5  Benign essential hypertension (401 1) (I10)   6  Benign prostatic hypertrophy without urinary obstruction (600 00) (N40 0)   7  Body mass index 35 0-35 9, adult (V85 35) (Z68 35)   8  Cervical spondylosis (721 0) (M47 812)   9  Cervical strain (847 0) (S16 1XXA)   10   Chronic kidney disease (CKD), stage II (mild) (585 2) (N18 2)   11  Chronic pain syndrome (338 4) (G89 4)   12  Chronic rhinitis (472 0) (J31 0)   13  Colon, diverticulosis (562 10) (K57 30)   14  Cystic Kidney Disease Bilaterally (753 10)   15  Diabetes mellitus with chronic kidney disease (250 40,585 9) (E11 22)   16  Diabetes mellitus with neuropathy (250 60,357 2) (E11 40)   17  Diabetic neuropathy (250 60,357 2) (E11 40)   18  Diaphragmatic hernia (553 3) (K44 9)   19  Encounter for special screening examination for genitourinary disorder (V81 6) (Z13 89)   20  Esophageal reflux (530 81) (K21 9)   21  Headache (784 0) (R51)   22  Hyperlipidemia (272 4) (E78 5)   23  Meralgia paresthetica (355 1) (G57 10)   24  Need for vaccination for H flu type B with pedvaxHIB (V03 81) (Z23)   25  Obstructive sleep apnea (327 23) (G47 33)   26  Organic impotence (607 84) (N52 9)   27  Osteoarthritis of neck (721 0) (M47 812)   28  Overweight (278 02) (E66 3)   29  Screening for thyroid disorder (V77 0) (Z13 29)   30  Shoulder bursitis, left (726 10) (M75 52)   31  Solitary pulmonary nodule (793 11) (R91 1)   32  Special screening examination for neoplasm of prostate (V76 44) (Z12 5)   33  Strain of right trapezius muscle (840 8) (S46 811A)   34  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)   35  Urge incontinence of urine (788 31) (N39 41)    Past Medical History  1  History of Acute low back pain (724 2) (M54 5)   2  Acute upper respiratory infection (465 9) (J06 9)   3  Anemia (285 9) (D64 9)   4  Cough (786 2) (R05)   5  History of Cough (786 2) (R05)   6  History of Flu vaccine need (V04 81) (Z23)   7  H/O ventral hernia (V12 79) (Z87 19)   8  History of Herpes zoster with complication (972 1) (L01 0)   9  History of acute bronchitis (V12 69) (Z87 09)   10  History of diverticulitis of colon (V12 79) (Z87 19)   11  History of Incisional hernia, incarcerated (552 21) (K43 0)   12  Pyogenic granuloma (686 1) (L98 0)   13   Subconjunctival hemorrhage, unspecified laterality (372 80) (H11 30)  Active Problems And Past Medical History Reviewed: The active problems and past medical history were reviewed and updated today  Family History  Mother    1  Family history of Colon cancer  Father    2  Family history of epilepsy (V17 2) (Z82 0)  Sister    3  Family history of disseminated malignant neoplasm (V16 9) (Z80 9)  Family History Reviewed: The family history was reviewed and updated today  Social History   · Denied: History of Alcohol use   · Denied: History of Drug use   · Former smoker (V15 82) (O69 283)   · Never a smoker   · Single   · Denied: History of Tobacco use  The social history was reviewed and updated today  The social history was reviewed and is unchanged  Surgical History    1  History of Catheter Ablation Atrial Fibrillation   2  History of Cholecystectomy Laparoscopic   3  History of Hernia Repair  Surgical History Reviewed: The surgical history was reviewed and updated today  Current Meds   1  AmLODIPine Besylate 10 MG Oral Tablet; TAKE 1 TABLET DAILY; Therapy: (Recorded:19Vsl2808) to Recorded   2  Atorvastatin Calcium 20 MG Oral Tablet; Take 1 tablet daily; Therapy: 07Apr2006 to (Last Rx:20Mar2017)  Requested for: 20Mar2017 Ordered   3  Carvedilol 25 MG Oral Tablet; Take 1 tablet twice daily; Therapy: (Recorded:92Ktb4795) to Recorded   4  Eliquis 5 MG Oral Tablet; 1 PO BID Recorded   5  Flomax 0 4 MG Oral Capsule; 1 Every Day; Therapy: 31VCL2592 to  Requested for: 22XGP8196 Recorded   6  Janumet  MG Oral Tablet; 2 tablets at night; Therapy: (Recorded:11Tec7601) to Recorded   7  Losartan Potassium 50 MG Oral Tablet; Take 1 tablet daily; Therapy: (Recorded:65Aqa3360) to Recorded   8  Multivitamins TABS; 1 pack Every Day; Therapy: 44EBI4983 to  Requested for: 60MYS4691 Recorded   9  NexIUM 40 MG Oral Capsule Delayed Release; Take 1 capsule daily as needed Recorded   10  Tikosyn 500 MCG Oral Capsule; TAKE 1 CAPSULE TWICE DAILY;   Therapy: (Recorded:87Dzd4290) to Recorded    The medication list was reviewed and updated today  Allergies  1  DEMEROL   2  Morphine Sulfate TABS   3  Codeine Sulfate TABS   4  Morphine Derivatives    Vitals   Recorded: 16WBW4348 08:38AM   Temperature 98 1 F   Heart Rate 72   Respiration 20   Systolic 572   Diastolic 62   Height 5 ft 10 in   Weight 244 lb    BMI Calculated 35 01   BSA Calculated 2 27       Physical Exam   Constitutional  General appearance: No acute distress, well appearing and well nourished  Ears, Nose, Mouth, and Throat  External inspection of ears and nose: Normal    Otoscopic examination: Tympanic membrance translucent with normal light reflex  Canals patent without erythema  Nasal mucosa, septum, and turbinates: Normal without edema or erythema  Oropharynx: Normal with no erythema, edema, exudate or lesions  Pulmonary  Auscultation of lungs: Clear to auscultation, equal breath sounds bilaterally, no wheezes, no rales, no rhonci  Cardiovascular  Auscultation of heart: Normal rate and rhythm, normal S1 and S2, without murmurs  Musculoskeletal  Gait and station: Normal    Digits and nails: Normal without clubbing or cyanosis  Inspection/palpation of joints, bones, and muscles: Abnormal  -- (neck musculature with spasm bilaterally, decreased ROM all planes  Chilo Julian )  Neurologic  Cranial nerves: Cranial nerves 2-12 intact  Reflexes: 2+ and symmetric  Sensation: No sensory loss  Results/Data  PHQ-2 Adult Depression Screening 62Mke3204 08:46AM User, [employer]     Test Name Result Flag Reference   PHQ-2 Adult Depression Score 0       Over the last two weeks, how often have you been bothered by any of the following problems?  Little interest or pleasure in doing things: Not at all - 0 Feeling down, depressed, or hopeless: Not at all - 0   PHQ-2 Adult Depression Screening Negative       Falls Risk Assessment (Dx Z13 89 Screen for Neurologic Disorder) 51TQA3749 08:46AM User, [employer]     Test Name Result Flag Reference   Falls Risk      Falls with injury in the past year     *VB - Urinary Incontinence Screen (Dx Z13 89 Screen for UI) 70QVQ9358 08:46AM Lugene Budd     Test Name Result Flag Reference   Urinary Incontinence Assessment 07IUD4398                       Signatures   Electronically signed by : JOEY Sams ; Dec  7 2017  9:14AM EST                       (Author)

## 2018-01-09 NOTE — RESULT NOTES
Verified Results  (1) CBC/PLT/DIFF 25Oct2016 10:23AM Efrain Avila Order Number: [id number]     Test Name Result Flag Reference   WBC COUNT 8 36 Thousand/uL  4 31-10 16   RBC COUNT 4 45 Million/uL  3 88-5 62   HEMOGLOBIN 12 7 g/dL  12 0-17 0   HEMATOCRIT 38 7 %  36 5-49 3   MCV 87 fL  82-98   MCH 28 5 pg  26 8-34 3   MCHC 32 8 g/dL  31 4-37 4   RDW 14 5 %  11 6-15 1   MPV 11 1 fL  8 9-12 7   PLATELET COUNT 274 Thousands/uL  149-390   nRBC AUTOMATED 0 /100 WBCs     NEUTROPHILS RELATIVE PERCENT 71 %  43-75   LYMPHOCYTES RELATIVE PERCENT 19 %  14-44   MONOCYTES RELATIVE PERCENT 8 %  4-12   EOSINOPHILS RELATIVE PERCENT 2 %  0-6   BASOPHILS RELATIVE PERCENT 0 %  0-1   NEUTROPHILS ABSOLUTE COUNT 5 83 Thousands/?L  1 85-7 62   LYMPHOCYTES ABSOLUTE COUNT 1 61 Thousands/?L  0 60-4 47   MONOCYTES ABSOLUTE COUNT 0 67 Thousand/?L  0 17-1 22   EOSINOPHILS ABSOLUTE COUNT 0 20 Thousand/?L  0 00-0 61   BASOPHILS ABSOLUTE COUNT 0 03 Thousands/?L  0 00-0 10   - Patient Instructions: This bloodwork is non-fasting  Please drink two glasses of water morning of bloodwork  - Patient Instructions: This bloodwork is non-fasting  Please drink two glasses of water morning of bloodwork  (1) COMPREHENSIVE METABOLIC PANEL 58TKW0225 11:90BR Efrain Avila Order Number: [id number]     Test Name Result Flag Reference   GLUCOSE,RANDM 139 mg/dL     If the patient is fasting, the ADA then defines impaired fasting glucose as > 100 mg/dL and diabetes as > or equal to 123 mg/dL     SODIUM 139 mmol/L  136-145   POTASSIUM 4 0 mmol/L  3 5-5 3   CHLORIDE 103 mmol/L  100-108   CARBON DIOXIDE 30 mmol/L  21-32   ANION GAP (CALC) 6 mmol/L  4-13   BLOOD UREA NITROGEN 19 mg/dL  5-25   CREATININE 1 07 mg/dL  0 60-1 30   Standardized to IDMS reference method   CALCIUM 8 7 mg/dL  8 3-10 1   BILI, TOTAL 0 57 mg/dL  0 20-1 00   ALK PHOSPHATAS 64 U/L     ALT (SGPT) 45 U/L  12-78   AST(SGOT) 18 U/L  5-45   ALBUMIN 3 9 g/dL  3 5-5 0   TOTAL PROTEIN 7 7 g/dL  6 4-8 2   eGFR Non-African American      >60 0 ml/min/1 73sq m   - Patient Instructions: This is a fasting blood test  Water,black tea or black  coffee only after 9:00pm the night before test Drink 2 glasses of water the morning of test   National Kidney Disease Education Program recommendations are as follows:  GFR calculation is accurate only with a steady state creatinine  Chronic Kidney disease less than 60 ml/min/1 73 sq  meters  Kidney failure less than 15 ml/min/1 73 sq  meters  (1) LIPID PANEL, FASTING 25Oct2016 10:23AM mobli Order Number: [id number]     Test Name Result Flag Reference   CHOLESTEROL 107 mg/dL     HDL,DIRECT 45 mg/dL  40-60   Specimen collection should occur prior to Metamizole administration due to the potential for falsely depressed results  LDL CHOLESTEROL CALCULATED 47 mg/dL  0-100   - Patient Instructions: This is a fasting blood test  Water,black tea or black  coffee only after 9:00pm the night before test   Drink 2 glasses of water the morning of test     - Patient Instructions: This is a fasting blood test  Water,black tea or black  coffee only after 9:00pm the night before test Drink 2 glasses of water the morning of test   Triglyceride:         Normal              <150 mg/dl       Borderline High    150-199 mg/dl       High               200-499 mg/dl       Very High          >499 mg/dl  Cholesterol:         Desirable        <200 mg/dl      Borderline High  200-239 mg/dl      High             >239 mg/dl  HDL Cholesterol:        High    >59 mg/dL      Low     <41 mg/dL  LDL CALCULATED:    This screening LDL is a calculated result  It does not have the accuracy of the Direct Measured LDL in the monitoring of patients with hyperlipidemia and/or statin therapy  Direct Measure LDL (QKD338) must be ordered separately in these patients     TRIGLYCERIDES 77 mg/dL  <=150   Specimen collection should occur prior to N-Acetylcysteine or Metamizole administration due to the potential for falsely depressed results  (1) HEMOGLOBIN A1C 25Oct2016 10:23AM [facility] Order Number: [id number]     Test Name Result Flag Reference   HEMOGLOBIN A1C 7 1 % H 4 2-6 3   EST  AVG   GLUCOSE 157 mg/dl         Discussion/Summary   Your bloodwork looks good  - Dr Norman Rein

## 2018-01-10 NOTE — MISCELLANEOUS
Message   Recorded as Task   Date: 05/18/2017 01:56 PM, Created By: Elder Edwards   Task Name: Follow Up   Assigned To: Chandana Delgado   Regarding Patient: Rama James, Status: Active   CommentVicrichelle Wells - 18 May 2017 1:56 PM     TASK CREATED  Caller: Self; Other; [phone] (Home)  Mili Warner is calling to See Dr Tracey Coelho  He woke up with gas, diahrrea, his stomach feels twisted  We are booked  I am having Levora Harish triage   ANDREW COOK  [facility] - 18 May 2017 2:02 PM     TASK REASSIGNED: [facility]  spoke with pt, he states he went to the bathroom 6 times today and it was diahreea, he has gas, feels nauseous, and his stomach is hard  He does not report a fever or feeling warm, and does not report blood in his stool   Per Dr Vani Mendes because pt has hx of colon problems to go to the ER  ac/cma   Chandana Delgado - 19 May 2017 12:36 AM     TASK EDITED  agree        Signatures   Electronically signed by : Lata Fong DO; May 19 2017 12:36AM EST                       (Author)

## 2018-01-11 NOTE — MISCELLANEOUS
Provider Comments  Provider Comments:   called patient about no show appointment for the flu clinic  Spoke to his wife, she stated that he called to cancel this morning  She rescheduled him for another upcoming date for the flu shot        Signatures   Electronically signed by : JOEY Pyle ; Oct 12 2016  9:52AM EST                       (Author)

## 2018-01-11 NOTE — RESULT NOTES
Verified Results  * CT HEAD WO CONTRAST 81PKP2466 02:50PM Lukas Benoit     Test Name Result Flag Reference   CT HEAD WO CONTRAST (Report)     CT BRAIN - WITHOUT CONTRAST     INDICATION: Dizziness  Otitis externa on the right  COMPARISON: None  TECHNIQUE: CT examination of the brain was performed  In addition to axial images, coronal reformatted images were created and submitted for interpretation  Radiation dose length product (DLP) for this visit: 1258  35 mGy-cm   This examination, like all CT scans performed in the [facility], was performed utilizing techniques to minimize radiation dose exposure, including the use of    iterative reconstruction and automated exposure control  IMAGE QUALITY: Diagnostic  FINDINGS:      PARENCHYMA: No intracranial mass, mass effect or midline shift  No CT signs of acute infarction  There is no parenchymal hemorrhage  Microangiopathic changes  VENTRICLES AND EXTRA-AXIAL SPACES: Prominent consistent with atrophy  Bilateral colloid cysts in the occipital horns  VISUALIZED ORBITS AND PARANASAL SINUSES: Ethmoid sinusitis  CALVARIUM AND EXTRACRANIAL SOFT TISSUES: Soft tissue swelling about the external ear on the right  IMPRESSION:     No acute intracranial abnormality         Workstation performed: [id number]     Signed by:   Ashley Jolly MD   7/5/17

## 2018-01-13 VITALS
HEART RATE: 78 BPM | HEIGHT: 70 IN | WEIGHT: 236 LBS | SYSTOLIC BLOOD PRESSURE: 130 MMHG | TEMPERATURE: 98.7 F | DIASTOLIC BLOOD PRESSURE: 78 MMHG | RESPIRATION RATE: 18 BRPM | BODY MASS INDEX: 33.79 KG/M2

## 2018-01-13 VITALS
SYSTOLIC BLOOD PRESSURE: 122 MMHG | RESPIRATION RATE: 18 BRPM | BODY MASS INDEX: 34.07 KG/M2 | DIASTOLIC BLOOD PRESSURE: 72 MMHG | WEIGHT: 238 LBS | HEART RATE: 72 BPM | TEMPERATURE: 98 F | HEIGHT: 70 IN

## 2018-01-13 VITALS
HEART RATE: 64 BPM | WEIGHT: 249 LBS | OXYGEN SATURATION: 97 % | BODY MASS INDEX: 35.65 KG/M2 | DIASTOLIC BLOOD PRESSURE: 70 MMHG | SYSTOLIC BLOOD PRESSURE: 131 MMHG | HEIGHT: 70 IN | TEMPERATURE: 98 F

## 2018-01-13 VITALS
HEART RATE: 74 BPM | RESPIRATION RATE: 17 BRPM | DIASTOLIC BLOOD PRESSURE: 80 MMHG | BODY MASS INDEX: 33.64 KG/M2 | SYSTOLIC BLOOD PRESSURE: 126 MMHG | HEIGHT: 70 IN | WEIGHT: 235 LBS

## 2018-01-13 VITALS
BODY MASS INDEX: 35.07 KG/M2 | WEIGHT: 245 LBS | HEIGHT: 70 IN | RESPIRATION RATE: 20 BRPM | DIASTOLIC BLOOD PRESSURE: 76 MMHG | SYSTOLIC BLOOD PRESSURE: 112 MMHG | TEMPERATURE: 97.8 F | HEART RATE: 72 BPM

## 2018-01-13 VITALS
WEIGHT: 245 LBS | HEIGHT: 70 IN | RESPIRATION RATE: 16 BRPM | HEART RATE: 71 BPM | DIASTOLIC BLOOD PRESSURE: 71 MMHG | SYSTOLIC BLOOD PRESSURE: 118 MMHG | BODY MASS INDEX: 35.07 KG/M2

## 2018-01-13 VITALS
SYSTOLIC BLOOD PRESSURE: 126 MMHG | BODY MASS INDEX: 33.5 KG/M2 | RESPIRATION RATE: 16 BRPM | WEIGHT: 234 LBS | HEART RATE: 84 BPM | DIASTOLIC BLOOD PRESSURE: 78 MMHG | HEIGHT: 70 IN | TEMPERATURE: 98.2 F

## 2018-01-13 VITALS — BODY MASS INDEX: 34.65 KG/M2 | HEIGHT: 70 IN | WEIGHT: 242 LBS

## 2018-01-13 VITALS
BODY MASS INDEX: 34.68 KG/M2 | WEIGHT: 242.25 LBS | SYSTOLIC BLOOD PRESSURE: 118 MMHG | TEMPERATURE: 98.1 F | DIASTOLIC BLOOD PRESSURE: 68 MMHG | OXYGEN SATURATION: 97 % | HEART RATE: 63 BPM | HEIGHT: 70 IN

## 2018-01-14 VITALS
TEMPERATURE: 97.5 F | RESPIRATION RATE: 15 BRPM | HEART RATE: 60 BPM | WEIGHT: 245.5 LBS | BODY MASS INDEX: 35.15 KG/M2 | SYSTOLIC BLOOD PRESSURE: 114 MMHG | HEIGHT: 70 IN | DIASTOLIC BLOOD PRESSURE: 64 MMHG

## 2018-01-14 VITALS
RESPIRATION RATE: 16 BRPM | DIASTOLIC BLOOD PRESSURE: 73 MMHG | HEART RATE: 62 BPM | BODY MASS INDEX: 35.5 KG/M2 | WEIGHT: 248 LBS | SYSTOLIC BLOOD PRESSURE: 134 MMHG | HEIGHT: 70 IN

## 2018-01-14 VITALS
BODY MASS INDEX: 33.64 KG/M2 | SYSTOLIC BLOOD PRESSURE: 128 MMHG | HEIGHT: 70 IN | HEART RATE: 80 BPM | WEIGHT: 235 LBS | DIASTOLIC BLOOD PRESSURE: 80 MMHG

## 2018-01-14 VITALS
WEIGHT: 242 LBS | BODY MASS INDEX: 34.65 KG/M2 | HEART RATE: 72 BPM | RESPIRATION RATE: 16 BRPM | HEIGHT: 70 IN | SYSTOLIC BLOOD PRESSURE: 116 MMHG | DIASTOLIC BLOOD PRESSURE: 74 MMHG | TEMPERATURE: 98.2 F

## 2018-01-14 NOTE — CONSULTS
Chief Complaint  Chief Complaint Free Text Note Form: New patient here for MWM consult; patient has know sleep apnea, uses a C-PAP nightly for approximately 15 years  History of Present Illness  Free Text HPI: Obesity-  Severity: Christian  Onset: 20+ years  Modifiers: Has tried overeaters anonymous in the past but did not find this helpful  Most she's been able to lose is 10-12 pounds with diet modification  Sweets are his weakness-and he feels"addicted" to these-? Transfer addiction  Associated Symptoms: Comorbid conditions  Past Medical History    1  Acquired flat foot (734) (M21 40)   2  Acute upper respiratory infection (465 9) (J06 9)   3  Anemia (285 9) (D64 9)   4  Cough (786 2) (R05)   5  History of Cough (786 2) (R05)   6  History of Flu vaccine need (V04 81) (Z23)   7  History of acute bronchitis (V12 69) (Z87 09)   8  Pyogenic granuloma (686 1) (L98 0)   9  Subconjunctival hemorrhage, unspecified laterality (372 72) (H11 30)  Active Problems And Past Medical History Reviewed: The active problems and past medical history were reviewed and updated today  Assessment    1  Weight gain (783 1) (R63 5)   2  Diabetes mellitus with chronic kidney disease (250 40,585 9) (E11 22)   3  Benign essential hypertension (401 1) (I10)   4  Esophageal reflux (530 81) (K21 9)   5  Hyperlipidemia (272 4) (E78 5)   6  Obstructive sleep apnea (327 23) (G47 33)    Discussion/Summary  Discussion Summary:   69 yo male w/ diabetes, hyperlipidemia, hypertension, atrial fibrillation, ROBER, GERD and obesity here to pursue medical weight management to improve weight and health      Obesity class 1:  -discussed options of conservative vs SHALA program +/- meal replacement vs bariatric surgery-given extensive abdominal surgery included perforated diverticulitis, hernia repairs with mesh may be better candidate for sleeve gastrectomy  -initial focus of 5-10% weight loss over 3-6 mos for improved health  -screening labs-completed and reviewed  Within acceptable limits    Hypertension: Stable  -Continue with carvedilol, hydrochlorothiazide, losartan    Atrial fibrillation:  -On amiodarone and Eliquis  -Status post catheter ablation  -Due for inpatient adjustment of antiarrhythmic in February    Diabetes: Improved  -A1c 7 1% at goal given his age and comorbid conditions as risks of hypoglycemia outweigh risk of intensive glycemic control  -Continue with janumet    GERD:  -On PPI  -May improve with weight loss and dietary changes    Hyperlipidemia: Stable  -Continue with statin    ROBER:  -Encouraged continued use of C Pap    Patient is unsure on how he would like to proceed  We'll discuss his options with his wife as well as attend our bariatric surgery informational seminar  He will then call our office to see how he would like to proceed  Patient's Capacity to Self-Care: Patient is able to Self-Care  Bariatric Medicine Diagnostic Constellation:   Patient Discussion: 45 minute visit, greater than half of the time was spent on counseling  Counseling spent on different weight loss options offered at [place] [address] [facility] including bariatric surgery and nonsurgical programs  Discussed the benefits of bariatric surgery with regards to weight loss and improvement in comorbid conditions compared to nonsurgical interventions  Counseled patient on our intensive lifestyle intervention program versus conservative program    Understands and agrees with treatment plan: The treatment plan was reviewed with the patient/guardian  The patient/guardian understands and agrees with the treatment plan   Self Referrals:   Self Referrals: No      Review of Systems  Focused-Male:   Constitutional: no fever  ENT: no sore throat  Cardiovascular: no chest pain  Respiratory: no shortness of breath  Gastrointestinal: no abdominal pain  Genitourinary: no dysuria  Musculoskeletal: arthralgias  Integumentary: no rashes  Neurological: no headache  Other Symptoms: Psych: Denies depression/anxiety  Active Problems    1  Acquired flat foot (734) (M21 40)   2  Acute bronchitis (466 0) (J20 9)   3  Acute upper respiratory infection (465 9) (J06 9)   4  Adult body mass index 35 0-35 9 (V85 35) (Z68 35)   5  Allergic dermatitis (692 9) (L23 9)   6  Allergic rhinitis (477 9) (J30 9)   7  Anemia (285 9) (D64 9)   8  Atherosclerosis of arteries of extremities (440 20) (I70 209)   9  Atrial fibrillation (427 31) (I48 91)   10  Benign essential hypertension (401 1) (I10)   11  Benign prostatic hypertrophy without urinary obstruction (600 00) (N40 0)   12  Callus (700) (L84)   13  Chronic kidney disease (CKD), stage II (mild) (585 2) (N18 2)   14  Chronic rhinitis (472 0) (J31 0)   15  Colon, diverticulosis (562 10) (K57 30)   16  Cystic Kidney Disease Bilaterally (753 10)   17  Diabetes mellitus with chronic kidney disease (250 40,585 9) (E11 22)   18  Diabetes mellitus with neuropathy (250 60,357 2) (E11 40)   19  Diabetic neuropathy (250 60,357 2) (E11 40)   20  Diaphragmatic hernia (553 3) (K44 9)   21  Diverticulitis of colon (562 11) (K57 32)   22  Esophageal reflux (530 81) (K21 9)   23  Flu vaccine need (V04 81) (Z23)   24  History of esophagogastroduodenoscopy (V15 29) (Z98 89)   25  Hyperlipidemia (272 4) (E78 5)   26  Incisional hernia with obstruction but no gangrene (552 21) (K43 0)   27  Incisional hernia, incarcerated (552 21) (K43 0)   28  Insect bite, initial encounter (919 4,E906 4) (W57 XXXA)   29  Meralgia paresthetica (355 1) (G57 10)   30  Neck pain (723 1) (M54 2)   31  Need for pneumococcal vaccine (V03 82) (Z23)   32  Need for prophylactic measure (V07 9) (Z41 8)   33  Need for vaccination for H flu type B with pedvaxHIB (V03 81) (Z23)   34  Obstructive sleep apnea (327 23) (G47 33)   35  Organic impotence (607 84) (N52 9)   36  Overweight (278 02) (E66 3)   37   Pain of foot, unspecified laterality (729 5) (M79 673)   38  Screening for thyroid disorder (V77 0) (Z13 29)   39  Shoulder bursitis, left (726 10) (M75 52)   40  Solitary pulmonary nodule (793 11) (R91 1)   41  Special screening examination for neoplasm of prostate (V76 44) (Z12 5)   42  Strain of right trapezius muscle (840 8) (S46 811A)   43  Tick bite (919 4,E906 4) (W57 XXXA)   44  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)   45  Urge incontinence of urine (788 31) (N39 41)    Surgical History    1  History of Catheter Ablation Atrial Fibrillation   2  History of Cholecystectomy Laparoscopic   3  History of Hernia Repair  Surgical History Reviewed: The surgical history was reviewed and updated today  Family History  Mother    1  Family history of Colon cancer  Father    2  Family history of epilepsy (V17 2) (Z82 0)  Sister    3  Family history of disseminated malignant neoplasm (V16 9) (Z80 9)  Family History Reviewed: The family history was reviewed and updated today  Social History    · Denied: History of Alcohol use   · Denied: History of Drug use   · Former smoker (V15 82) (G38 053)   · Never a smoker   · Single   · Denied: History of Tobacco use  Social History Reviewed: The social history was reviewed and updated today  Current Meds   1  Amiodarone HCl - 100 MG Oral Tablet; TAKE 1 TABLET DAILY AS DIRECTED; Therapy: 83GZW9165 to (Evaluate:67Uac9460) Recorded   2  AmLODIPine Besylate 10 MG Oral Tablet; Therapy: (SNAHEUCV:75WTZ3289) to Recorded   3  Atorvastatin Calcium 20 MG Oral Tablet; Take 1 tablet daily; Therapy: 43BXS4411 to (Last Rx:27Mar2016)  Requested for: 27Mar2016 Ordered   4  Carvedilol 25 MG Oral Tablet; Take 1 tablet twice daily; Therapy: (Recorded:54Qge3354) to Recorded   5  Cialis TABS; Therapy: (WANTOMJL:42TVL5194) to Recorded   6  Eliquis 5 MG Oral Tablet; 1 PO BID Recorded   7  Flomax 0 4 MG Oral Capsule; 1 Every Day; Therapy: 24USS7550 to  Requested for: 22ZLF6158 Recorded   8   HydroCHLOROthiazide 25 MG Oral Tablet; Take 1 tablet daily; Therapy: (Recorded:38Ulq2086) to Recorded   9  HydrOXYzine HCl - 25 MG Oral Tablet; TAKE 1 TABLET THREE TIMES A DAY AS NEEDED; Therapy: 89TAZ6164 to (Last Rx:16Mar2016)  Requested for: 85GGW2134 Ordered   10  Janumet  MG Oral Tablet; 2 tablets at night; Therapy: (Recorded:05Vfs0910) to Recorded   11  Losartan Potassium 50 MG Oral Tablet; Take 1 tablet daily; Therapy: (Recorded:56Yxv2522) to Recorded   12  Multivitamins TABS; 1 pack Every Day; Therapy: 54IPS9438 to  Requested for: 97JCL0634 Recorded   13  NexIUM 40 MG Oral Capsule Delayed Release; Therapy: (SQIKKISL:43JSN8199) to Recorded   14  Zostavax 60287 UNT/0 65ML Subcutaneous Solution Reconstituted; INJECT 0 65  ML    Subcutaneous; Therapy: 56WPZ4017 to (Last Rx:55Jhf2722) Ordered   15  Zostavax 86552 UNT/0 65ML Subcutaneous Solution Reconstituted; INJECT 0 65  ML    Subcutaneous; Therapy: 79HXE6468 to (Last Rx:31Oct2016)  Requested for: 31Oct2016 Ordered  Medication List Reviewed: The medication list was reviewed and updated today  Allergies    1  DEMEROL   2  Morphine Sulfate TABS   3  Codeine Sulfate TABS   4   Morphine Derivatives    Vitals  Vital Signs    Recorded: 94RSO8167 10:22AM   Temperature 97 5 F    Heart Rate 60    Respiration 15    Systolic 512    Diastolic 64    Height 5 ft 10 in    Weight 245 lb 8 0 oz    BMI Calculated 35 23    BSA Calculated 2 28    Waist Circumference  48   Neck Circumference  19 75     Future Appointments    Date/Time Provider Specialty Site   01/05/2017 09:45 AM Janett Crouch DPM Podiat[facility]     Signatures   Electronically signed by : JOEY Hannah ; Jhonatan  3 2017 12:56PM EST                       (Author)

## 2018-01-15 VITALS
HEART RATE: 68 BPM | HEIGHT: 70 IN | BODY MASS INDEX: 35.07 KG/M2 | DIASTOLIC BLOOD PRESSURE: 75 MMHG | SYSTOLIC BLOOD PRESSURE: 125 MMHG | RESPIRATION RATE: 17 BRPM | WEIGHT: 245 LBS

## 2018-01-15 VITALS
HEART RATE: 56 BPM | DIASTOLIC BLOOD PRESSURE: 68 MMHG | OXYGEN SATURATION: 96 % | WEIGHT: 248.25 LBS | TEMPERATURE: 97.9 F | SYSTOLIC BLOOD PRESSURE: 126 MMHG | BODY MASS INDEX: 35.54 KG/M2 | HEIGHT: 70 IN

## 2018-01-22 VITALS
SYSTOLIC BLOOD PRESSURE: 130 MMHG | TEMPERATURE: 98.1 F | WEIGHT: 244 LBS | RESPIRATION RATE: 20 BRPM | DIASTOLIC BLOOD PRESSURE: 62 MMHG | HEART RATE: 72 BPM | HEIGHT: 70 IN | BODY MASS INDEX: 34.93 KG/M2

## 2018-01-23 NOTE — PROGRESS NOTES
Assessment    1  Encounter for preventive health examination (V70 0) (Z00 00)   2  Body mass index 35 0-35 9, adult (V85 35) (Z68 35)   3  Headache (784 0) (R51)    Plan  Benign essential hypertension    · From  Losartan Potassium 50 MG Oral Tablet Take 1 tablet daily To Losartan  Potassium 50 MG Oral Tablet Take 1 and 1/2 tablet daily  Encounter for special screening examination for genitourinary disorder    · *VB - Urinary Incontinence Screen (Dx Z13 89 Screen for UI); Status:Resulted - Requires  Verification,Retrospective By Protocol Authorization;   Done: 45QNC6412 08:46AM    Discussion/Summary    PPP given  Impression: Initial Annual Wellness Visit, with preventive exam as well as age and risk appropriate counseling completed  Cardiovascular screening and counseling: the risks and benefits of screening were discussed, screening is current, counseling was given on maintaining a healthy diet, counseling was given on maintaining a healthy weight, counseling was given on ways to improve cholesterol and counseling was given on ways to improve blood pressure  Diabetes screening and counseling: the risks and benefits of screening were discussed and screening is current  Colorectal cancer screening and counseling: screening is current and counseling was given on ways to eat a high fiber diet  Prostate cancer screening and counseling: screening not indicated  Osteoporosis screening and counseling: screening not indicated  Abdominal aortic aneurysm screening and counseling: screening not indicated  Glaucoma screening and counseling: screening is current  HIV screening and counseling: screening not indicated   Immunizations: influenza vaccine is up to date this year, the lifetime pneumococcal vaccine has been completed, hepatitis B vaccination series is not indicated at this time due to the patient's low risk of navin the disease, the patient declines the Zostavax vaccine and the patient declines the Td vaccine  Advance Directive Planning: not complete, declines information  Patient Discussion: plan discussed with the patient, follow-up visit needed in one year  Advance Directives  Advance Directive [facility]:   The patient is not in agreement to receive the Advance Care Planning service    NO - Patient does not have an advance health care directive  History of Present Illness  The patient is being seen for the initial annual wellness visit  Medicare Screening and Risk Factors   Hospitalizations: no previous hospitalizations  Medicare Screening Tests Risk Questions   Drug and Alcohol Use: The patient is a former cigarette smoker  The patient reports never drinking alcohol  Alcohol concern:   The patient has no concerns about alcohol abuse  He has never used illicit drugs  Diet and Physical Activity: Current diet includes unhealthy food choices and frequent junk food  He exercises infrequently  Exercise: walking 20 minutes per day  Mood Disorder and Cognitive Impairment Screening: PHQ-9 Depression Scale   Over the past 2 weeks, how often have you been bothered by the following problems? 1 ) Little interest or pleasure in doing things? Not at all    2 ) Feeling down, depressed or hopeless? Not at all  TOTAL SCORE: 0    How difficult have these problems made it for you to do your work, take care of things at home, or get along with people? Not at all  He denies feeling down, depressed, or hopeless over the past two weeks  He denies feeling little interest or pleasure in doing things over the past two weeks  Cognitive impairment screening: denies difficulty learning/retaining new information, difficulty handling complex tasks, difficulty with reasoning, denies difficulty with spatial ability and orientation, denies difficulty with language and denies difficulty with behavior  Functional Ability/Level of Safety: Hearing is slightly decreased  He reports hearing difficulties   He does not use a hearing aid  The patient is currently able to drive without limitations  Activities of daily living details: does not need help using the phone, no transportation help needed, does not need help shopping, no meal preparation help needed, does not need help doing housework, does not need help doing laundry, does not need help managing medications and does not need help managing money  Fall risk factors: The patient fell 0 times in the past 12 months  Injury History: no alcohol use, no mobility impairment, no sedative use, no urinary incontinence and no previous fall  Home safety risk factors:  household clutter, but no unfamiliar surroundings, no loose rugs, no poor household lighting, no uneven floors, grab bars in the bathroom and handrails on the stairs  Advance Directives: Advance directives: no living will, no durable power of  for health care directives and no advance directives  Co-Managers and Medical Equipment/Suppliers: See Patient Care Team     The patient currently has no urinary incontinence symptoms  Patient Care Team    Care Team Member Role Specialty Office Number   Carlos Devoid M JOSE RAFAEL  Referring Family Medicine [phone]   Jeb Major MD Specialist Endocrinology [phone]   Andre SALCIDO DPM Specialist Podiatry [phone]   Elvira Borja MD Specialist Gastroenterology Adult [phone]   [facility] Specialist Internal Medicine [phone]   [facility] Specialist Allergy/Immunology [phone]   Carlee MANTILLA  Specialist Bariatric Medicine [phone]   Terrel Sandifer MD Specialist General Surgery [phone]   Janice Petros  M D  Specialist Pain Management [phone]     Active Problems    1  Allergic rhinitis (477 9) (J30 9)   2  Anemia (285 9) (D64 9)   3  Atherosclerosis of arteries of extremities (440 20) (I70 209)   4  Atrial fibrillation (427 31) (I48 91)   5  Benign essential hypertension (401 1) (I10)   6  Benign prostatic hypertrophy without urinary obstruction (600 00) (N40 0)   7  Body mass index 35 0-35 9, adult (V85 35) (Z68 35)   8  Cervical spondylosis (721 0) (M47 812)   9  Cervical strain (847 0) (S16 1XXA)   10  Chronic kidney disease (CKD), stage II (mild) (585 2) (N18 2)   11  Chronic pain syndrome (338 4) (G89 4)   12  Chronic rhinitis (472 0) (J31 0)   13  Colon, diverticulosis (562 10) (K57 30)   14  Cystic Kidney Disease Bilaterally (753 10)   15  Diabetes mellitus with chronic kidney disease (250 40,585 9) (E11 22)   16  Diabetes mellitus with neuropathy (250 60,357 2) (E11 40)   17  Diabetic neuropathy (250 60,357 2) (E11 40)   18  Diaphragmatic hernia (553 3) (K44 9)   19  Encounter for special screening examination for genitourinary disorder (V81 6) (Z13 89)   20  Esophageal reflux (530 81) (K21 9)   21  Headache (784 0) (R51)   22  Hyperlipidemia (272 4) (E78 5)   23  Meralgia paresthetica (355 1) (G57 10)   24  Need for vaccination for H flu type B with pedvaxHIB (V03 81) (Z23)   25  Obstructive sleep apnea (327 23) (G47 33)   26  Organic impotence (607 84) (N52 9)   27  Osteoarthritis of neck (721 0) (M47 812)   28  Overweight (278 02) (E66 3)   29  Screening for thyroid disorder (V77 0) (Z13 29)   30  Shoulder bursitis, left (726 10) (M75 52)   31  Solitary pulmonary nodule (793 11) (R91 1)   32  Special screening examination for neoplasm of prostate (V76 44) (Z12 5)   33  Strain of right trapezius muscle (840 8) (S46 811A)   34  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)   35  Urge incontinence of urine (788 31) (N39 41)    Past Medical History    1  History of Acute low back pain (724 2) (M54 5)   2  Acute upper respiratory infection (465 9) (J06 9)   3  Anemia (285 9) (D64 9)   4  Cough (786 2) (R05)   5  History of Cough (786 2) (R05)   6  History of Flu vaccine need (V04 81) (Z23)   7  H/O ventral hernia (V12 79) (Z87 19)   8  History of Herpes zoster with complication (399 0) (Q57 1)   9   History of acute bronchitis (V12 69) (Z87 09)   10  History of diverticulitis of colon (V12 79) (Z87 19)   11  History of Incisional hernia, incarcerated (552 21) (K43 0)   12  Pyogenic granuloma (686 1) (L98 0)   13  Subconjunctival hemorrhage, unspecified laterality (372 72) (H11 30)    The active problems and past medical history were reviewed and updated today  Surgical History    1  History of Catheter Ablation Atrial Fibrillation   2  History of Cholecystectomy Laparoscopic   3  History of Hernia Repair    The surgical history was reviewed and updated today  Family History  Mother    1  Family history of Colon cancer  Father    2  Family history of epilepsy (V17 2) (Z82 0)  Sister    3  Family history of disseminated malignant neoplasm (V16 9) (Z80 9)    The family history was reviewed and updated today  Social History    · Denied: History of Alcohol use   · Denied: History of Drug use   · Former smoker (V15 82) (O95 862)   · Never a smoker   · Single   · Denied: History of Tobacco use  The social history was reviewed and updated today  The social history was reviewed and is unchanged  Current Meds   1  AmLODIPine Besylate 10 MG Oral Tablet; TAKE 1 TABLET DAILY; Therapy: (Recorded:71Art3307) to Recorded   2  Atorvastatin Calcium 20 MG Oral Tablet; Take 1 tablet daily; Therapy: 07Apr2006 to (Last Rx:20Mar2017)  Requested for: 20Mar2017 Ordered   3  Carvedilol 25 MG Oral Tablet; Take 1 tablet twice daily; Therapy: (Recorded:32Dfy8469) to Recorded   4  Eliquis 5 MG Oral Tablet; 1 PO BID Recorded   5  Flomax 0 4 MG Oral Capsule; 1 Every Day; Therapy: 89NWE5892 to  Requested for: 89ECI7159 Recorded   6  Janumet  MG Oral Tablet; 2 tablets at night; Therapy: (Recorded:48Znp2289) to Recorded   7  Losartan Potassium 50 MG Oral Tablet; Take 1 tablet daily; Therapy: (Recorded:49Gwz2533) to Recorded   8  Multivitamins TABS; 1 pack Every Day;    Therapy: 95KML5536 to  Requested for: 61HXN3311 Recorded   9  NexIUM 40 MG Oral Capsule Delayed Release; Take 1 capsule daily as needed   Recorded   10  Tikosyn 500 MCG Oral Capsule; TAKE 1 CAPSULE TWICE DAILY; Therapy: (Recorded:31Lxf0373) to Recorded    The medication list was reviewed and updated today  Allergies    1  DEMEROL   2  Morphine Sulfate TABS   3  Codeine Sulfate TABS   4  Morphine Derivatives    Immunizations  Influenza --- Pegge Dopp: 20-Nov-2001; Series2: 28-Oct-2002; Series3: 09-Oct-2003; Series4:  17-Feb-2005; Series5: 15-Nov-2005; Series6: 94-YDS-8765Sqrnwxr John: 18-Oct-2007; Series8:  23-Oct-2008; Lise Hammed: [id number]-Okv-0171Ufikvptyx Lonnie: 05-Nov-2009; WUFLHA85: 28-Oct-2010; ZLGGGX58:  28-Sep-2011; HLJODM48: 77[facility]-5736; CCKRYH83: 09-Sep-2013; KJTMKS47: 09-Oct-2014; RJGMOU31:  21-Oct-2015; SGPMWB33: 13-Oct-2016; KIXVVM01: 07-Sep-2017   PCV --- Series1: 21-Oct-2015   PPSV --- Series1: 10-Jhonatan-2013     Vitals  Signs   Recorded: 56YRE8263 08:38AM   Temperature: 98 1 F  Heart Rate: 72  Respiration: 20  Systolic: 992  Diastolic: 62  Height: 5 ft 10 in  Weight: 244 lb   BMI Calculated: 35 01  BSA Calculated: 2 27    Results/Data  PHQ-2 Adult Depression Screening 39FMO6625 08:46AM User, Philrealestatess     Test Name Result Flag Reference   PHQ-2 Adult Depression Score 0     Over the last two weeks, how often have you been bothered by any of the following problems?   Little interest or pleasure in doing things: Not at all - 0  Feeling down, depressed, or hopeless: Not at all - 0   PHQ-2 Adult Depression Screening Negative       Falls Risk Assessment (Dx Z13 89 Screen for Neurologic Disorder) 84DEW7929 08:46AM User, Ahs     Test Name Result Flag Reference   Falls Risk      Falls with injury in the past year     *VB - Urinary Incontinence Screen (Dx Z13 89 Screen for UI) 73YWR9144 08:46AM Reather Middleton     Test Name Result Flag Reference   Urinary Incontinence Assessment [id number]                     Signatures   Electronically signed by : JOEY Strong ; Dec  7 2017  9:11AM EST                       (Author)

## 2018-02-05 ENCOUNTER — OFFICE VISIT (OUTPATIENT)
Dept: FAMILY MEDICINE CLINIC | Facility: CLINIC | Age: 73
End: 2018-02-05
Payer: MEDICARE

## 2018-02-05 VITALS
TEMPERATURE: 98.5 F | BODY MASS INDEX: 35.36 KG/M2 | SYSTOLIC BLOOD PRESSURE: 120 MMHG | HEIGHT: 70 IN | WEIGHT: 247 LBS | DIASTOLIC BLOOD PRESSURE: 76 MMHG | RESPIRATION RATE: 18 BRPM | HEART RATE: 72 BPM

## 2018-02-05 DIAGNOSIS — J06.9 UPPER RESPIRATORY TRACT INFECTION, UNSPECIFIED TYPE: Primary | ICD-10-CM

## 2018-02-05 PROCEDURE — 99213 OFFICE O/P EST LOW 20 MIN: CPT | Performed by: INTERNAL MEDICINE

## 2018-02-05 RX ORDER — AMOXICILLIN 875 MG/1
875 TABLET, COATED ORAL 2 TIMES DAILY
Qty: 20 TABLET | Refills: 0 | Status: SHIPPED | OUTPATIENT
Start: 2018-02-05 | End: 2018-02-14 | Stop reason: ALTCHOICE

## 2018-02-05 RX ORDER — HYDROCODONE POLISTIREX AND CHLORPHENIRAMINE POLISTIREX 10; 8 MG/5ML; MG/5ML
5 SUSPENSION, EXTENDED RELEASE ORAL EVERY 12 HOURS PRN
Qty: 120 ML | Refills: 0 | Status: SHIPPED | OUTPATIENT
Start: 2018-02-05 | End: 2018-04-09 | Stop reason: SDUPTHER

## 2018-02-05 NOTE — ASSESSMENT & PLAN NOTE
Doubt flu, appears to be more sinus infection  Treatment with antibiotics and follow up if not improving

## 2018-02-05 NOTE — PROGRESS NOTES
Subjective:      Patient ID: Margi Rojas  is a 68 y o  male  Chief Complaint   Patient presents with    Cold Like Symptoms     sinus congestion       Here with acute illness  URI    This is a new problem  The current episode started in the past 7 days  The problem has been unchanged  There has been no fever  Associated symptoms include chest pain, congestion, coughing and a sore throat  He has tried acetaminophen for the symptoms  The treatment provided mild relief  The following portions of the patient's history were reviewed and updated as appropriate: allergies, current medications, past family history, past medical history, past social history, past surgical history and problem list     Review of Systems   Constitutional: Negative  Negative for fatigue  HENT: Positive for congestion and sore throat  Respiratory: Positive for cough  Cardiovascular: Positive for chest pain           Current Outpatient Prescriptions   Medication Sig Dispense Refill    amLODIPine (NORVASC) 10 mg tablet Take 10 mg by mouth every morning      apixaban (ELIQUIS) 5 mg Take 5 mg by mouth 2 (two) times a day      atorvastatin (LIPITOR) 20 mg tablet Take 20 mg by mouth every evening      carvedilol (COREG) 25 mg tablet Take 25 mg by mouth 2 (two) times a day      dofetilide (TIKOSYN) 500 mcg capsule Take 500 mcg by mouth 2 (two) times a day      esomeprazole (NexIUM) 20 mg capsule Take 40 mg by mouth daily at bedtime        fluticasone (FLONASE) 50 mcg/act nasal spray 1 spray into each nostril 2 (two) times a day      losartan (COZAAR) 50 mg tablet Take 50 mg by mouth every morning      multivitamin (THERAGRAN) TABS Take 1 tablet by mouth every morning      sitaGLIPtin-metFORMIN (JANUMET)  MG per tablet Take 2 tablets by mouth every evening Takes 2 tabs evening dose=100-2000mg        tamsulosin (FLOMAX) 0 4 mg Take 0 4 mg by mouth daily with dinner      valACYclovir (VALTREX) 1,000 mg tablet Take 1 tablet by mouth 3 (three) times a day for 7 days 21 tablet 0     No current facility-administered medications for this visit  Objective:    /76   Pulse 72   Temp 98 5 °F (36 9 °C)   Resp 18   Ht 5' 9 5" (1 765 m)   Wt 112 kg (247 lb)   BMI 35 95 kg/m²        Physical Exam   Constitutional: He appears well-developed and well-nourished  HENT:   Head: Normocephalic and atraumatic  Right Ear: Tympanic membrane is retracted  Left Ear: Tympanic membrane is retracted  Mouth/Throat: Posterior oropharyngeal erythema present  No posterior oropharyngeal edema  Nasal mucosae erythematous   Eyes: Conjunctivae are normal    Neck: Neck supple  Cardiovascular: Normal rate, regular rhythm, normal heart sounds and intact distal pulses  Pulmonary/Chest: Breath sounds normal  He has no wheezes  He has no rales  Lymphadenopathy:     He has no cervical adenopathy  Assessment/Plan:    No problem-specific Assessment & Plan notes found for this encounter  Diagnoses and all orders for this visit:    Upper respiratory tract infection, unspecified type          There are no Patient Instructions on file for this visit  No Follow-up on file         Celia Cui MD

## 2018-02-08 ENCOUNTER — HOSPITAL ENCOUNTER (OUTPATIENT)
Dept: RADIOLOGY | Facility: HOSPITAL | Age: 73
Discharge: HOME | End: 2018-02-08
Attending: FAMILY MEDICINE
Payer: MEDICARE

## 2018-02-08 ENCOUNTER — OFFICE VISIT (OUTPATIENT)
Dept: FAMILY MEDICINE CLINIC | Facility: CLINIC | Age: 73
End: 2018-02-08
Payer: MEDICARE

## 2018-02-08 ENCOUNTER — TRANSCRIBE ORDERS (OUTPATIENT)
Dept: ADMINISTRATIVE | Facility: HOSPITAL | Age: 73
End: 2018-02-08

## 2018-02-08 VITALS
SYSTOLIC BLOOD PRESSURE: 122 MMHG | TEMPERATURE: 98.6 F | DIASTOLIC BLOOD PRESSURE: 72 MMHG | WEIGHT: 240 LBS | HEIGHT: 70 IN | RESPIRATION RATE: 20 BRPM | BODY MASS INDEX: 34.36 KG/M2 | HEART RATE: 72 BPM

## 2018-02-08 DIAGNOSIS — J06.9 UPPER RESPIRATORY TRACT INFECTION, UNSPECIFIED TYPE: Primary | ICD-10-CM

## 2018-02-08 DIAGNOSIS — J06.9 ACUTE RESPIRATORY DISEASE: Primary | ICD-10-CM

## 2018-02-08 DIAGNOSIS — J06.9 UPPER RESPIRATORY TRACT INFECTION, UNSPECIFIED TYPE: ICD-10-CM

## 2018-02-08 PROCEDURE — 99213 OFFICE O/P EST LOW 20 MIN: CPT | Performed by: FAMILY MEDICINE

## 2018-02-08 PROCEDURE — 71046 X-RAY EXAM CHEST 2 VIEWS: CPT

## 2018-02-08 RX ORDER — BENZONATATE 200 MG/1
200 CAPSULE ORAL 3 TIMES DAILY PRN
Qty: 30 CAPSULE | Refills: 0 | Status: SHIPPED | OUTPATIENT
Start: 2018-02-08 | End: 2018-02-18

## 2018-02-08 NOTE — PROGRESS NOTES
Assessment/Plan:    No problem-specific Assessment & Plan notes found for this encounter  Diagnoses and all orders for this visit:    Upper respiratory tract infection, unspecified type  -     benzonatate (TESSALON) 200 MG capsule; Take 1 capsule (200 mg total) by mouth 3 (three) times a day as needed for cough for up to 10 days  -     XR chest pa & lateral; Future          Patient Instructions   Cold Symptoms   AMBULATORY CARE:   Cold symptoms  include sneezing, dry throat, a stuffy nose, headache, watery eyes, and a cough  Your cough may be dry, or you may cough up mucus  You may also have muscle aches, joint pain, and tiredness  Rarely, you may have a fever  Cold symptoms occur from inflammation in your upper respiratory system caused by a virus  Most colds go away without treatment  Seek care immediately if:   · You have increased tiredness and weakness  · You are unable to eat  · Your heart is beating much faster than usual for you  · You see white spots in the back of your throat and your neck is swollen and sore to the touch  · You see pinpoint or larger reddish-purple dots on your skin  Contact your healthcare provider if:   · You have a fever higher than 102°F (38 9°C)  · You have new or worsening shortness of breath  · You have thick nasal drainage for more than 2 days  · Your symptoms do not improve or get worse within 5 days  · You have questions or concerns about your condition or care  Treatment for cold symptoms  may include NSAIDS to decrease muscle aches and fever  Cold medicines may also be given to decrease coughing, nasal stuffiness, sneezing, and a runny nose  Manage your cold symptoms: The following may help relieve cold symptoms, such as a dry throat and congestion:  · Gargle with mouthwash or warm salt water as directed  · Suck on throat lozenges or hard candy  · Use a cold or warm vaporizer or humidifier to ease your breathing       · Rest for at least 2 days and then as needed to decrease tiredness and weakness  · Use petroleum based jelly around your nostrils to decrease irritation from blowing your nose  · Drink plenty of liquids  Liquids will help thin and loosen thick mucus so you can cough it up  Liquids will also keep you hydrated  Ask your healthcare provider which liquids are best for you and how much to drink each day  Prevent the spread of germs  by washing your hands often  You can spread your cold germs to others for at least 3 days after your symptoms start  Do not share items, such as eating utensils  Cover your nose and mouth when you cough or sneeze using the crook of your elbow instead of your hands  Throw used tissues in the garbage  Do not smoke:  Smoking may worsen your symptoms and increase the length of time you feel sick  Talk with your healthcare provider if you need help to stop smoking  Follow up with your healthcare provider as directed:  Write down your questions so you remember to ask them during your visits  © 2017 [facility] Information is for End User's use only and may not be sold, redistributed or otherwise used for commercial purposes  All illustrations and images included in CareNotes® are the copyrighted property of [employer]  or [address]  The above information is an  only  It is not intended as medical advice for individual conditions or treatments  Talk to your doctor, nurse or pharmacist before following any medical regimen to see if it is safe and effective for you  Return if symptoms worsen or fail to improve  Subjective:      Patient ID: Derek Machado  is a 68 y o  male  Chief Complaint   Patient presents with    Cough     harsh     Nasal Congestion    Headache    Sore Throat     All started 1 week ago was put on Antibiotics seems not to be helping        Pt was here on Monday was placed on amoxicillin and cough meds    Pt states yesterday he could not stop coughing almost went to the ed  Shower helped  Honey did not , cough drops did not help      Cough   This is a new problem  The current episode started 1 to 4 weeks ago  The problem has been gradually worsening  The problem occurs constantly  The cough is non-productive  Associated symptoms include chest pain, ear congestion, a fever, headaches, nasal congestion and a sore throat  Pertinent negatives include no chills, ear pain, heartburn, hemoptysis, myalgias, rhinorrhea, shortness of breath or sweats  Headache    Associated symptoms include coughing, a fever and a sore throat  Pertinent negatives include no ear pain or rhinorrhea  Sore Throat    Associated symptoms include coughing and headaches  Pertinent negatives include no ear pain or shortness of breath  The following portions of the patient's history were reviewed and updated as appropriate: allergies, current medications, past family history, past medical history, past social history, past surgical history and problem list     Review of Systems   Constitutional: Positive for fever  Negative for chills  HENT: Positive for sore throat  Negative for ear pain and rhinorrhea  Respiratory: Positive for cough  Negative for hemoptysis and shortness of breath  Cardiovascular: Positive for chest pain  Gastrointestinal: Negative for heartburn  Musculoskeletal: Negative for myalgias  Neurological: Positive for headaches           Current Outpatient Prescriptions   Medication Sig Dispense Refill    amLODIPine (NORVASC) 10 mg tablet Take 10 mg by mouth every morning      amoxicillin (AMOXIL) 875 mg tablet Take 1 tablet (875 mg total) by mouth 2 (two) times a day for 10 days 20 tablet 0    apixaban (ELIQUIS) 5 mg Take 5 mg by mouth 2 (two) times a day      atorvastatin (LIPITOR) 20 mg tablet Take 20 mg by mouth every evening      carvedilol (COREG) 25 mg tablet Take 25 mg by mouth 2 (two) times a day      dofetilide (TIKOSYN) 500 mcg capsule Take 500 mcg by mouth 2 (two) times a day      esomeprazole (NexIUM) 20 mg capsule Take 40 mg by mouth daily at bedtime        fluticasone (FLONASE) 50 mcg/act nasal spray 1 spray into each nostril 2 (two) times a day      hydrocodone-chlorpheniramine polistirex (TUSSIONEX) 10-8 mg/5 mL ER suspension Take 5 mL by mouth every 12 (twelve) hours as needed for cough Max Daily Amount: 10 mL 120 mL 0    losartan (COZAAR) 50 mg tablet Take 50 mg by mouth every morning      multivitamin (THERAGRAN) TABS Take 1 tablet by mouth every morning      sitaGLIPtin-metFORMIN (JANUMET)  MG per tablet Take 2 tablets by mouth every evening Takes 2 tabs evening dose=100-2000mg        tamsulosin (FLOMAX) 0 4 mg Take 0 4 mg by mouth daily with dinner      benzonatate (TESSALON) 200 MG capsule Take 1 capsule (200 mg total) by mouth 3 (three) times a day as needed for cough for up to 10 days 30 capsule 0    valACYclovir (VALTREX) 1,000 mg tablet Take 1 tablet by mouth 3 (three) times a day for 7 days 21 tablet 0     No current facility-administered medications for this visit  Objective:    /72   Pulse 72   Temp 98 6 °F (37 °C)   Resp 20   Ht 5' 9 5" (1 765 m)   Wt 109 kg (240 lb)   BMI 34 93 kg/m²        Physical Exam   Constitutional: He appears well-developed and well-nourished  No distress  HENT:   Head: Normocephalic  Right Ear: Tympanic membrane is bulging  Left Ear: Tympanic membrane is bulging  Nose: Mucosal edema present  Mouth/Throat:       Eyes: Pupils are equal, round, and reactive to light  Neck: Normal range of motion  Cardiovascular: Normal rate, regular rhythm and normal heart sounds  No murmur heard  Pulmonary/Chest: Effort normal  No respiratory distress  He has no wheezes  He has no rales  He exhibits no tenderness  Abdominal: Soft  Skin: He is not diaphoretic  Nursing note and vitals reviewed               Erwin Harrington DO

## 2018-02-08 NOTE — PATIENT INSTRUCTIONS
Cold Symptoms   AMBULATORY CARE:   Cold symptoms  include sneezing, dry throat, a stuffy nose, headache, watery eyes, and a cough  Your cough may be dry, or you may cough up mucus  You may also have muscle aches, joint pain, and tiredness  Rarely, you may have a fever  Cold symptoms occur from inflammation in your upper respiratory system caused by a virus  Most colds go away without treatment  Seek care immediately if:   · You have increased tiredness and weakness  · You are unable to eat  · Your heart is beating much faster than usual for you  · You see white spots in the back of your throat and your neck is swollen and sore to the touch  · You see pinpoint or larger reddish-purple dots on your skin  Contact your healthcare provider if:   · You have a fever higher than 102°F (38 9°C)  · You have new or worsening shortness of breath  · You have thick nasal drainage for more than 2 days  · Your symptoms do not improve or get worse within 5 days  · You have questions or concerns about your condition or care  Treatment for cold symptoms  may include NSAIDS to decrease muscle aches and fever  Cold medicines may also be given to decrease coughing, nasal stuffiness, sneezing, and a runny nose  Manage your cold symptoms: The following may help relieve cold symptoms, such as a dry throat and congestion:  · Gargle with mouthwash or warm salt water as directed  · Suck on throat lozenges or hard candy  · Use a cold or warm vaporizer or humidifier to ease your breathing  · Rest for at least 2 days and then as needed to decrease tiredness and weakness  · Use petroleum based jelly around your nostrils to decrease irritation from blowing your nose  · Drink plenty of liquids  Liquids will help thin and loosen thick mucus so you can cough it up  Liquids will also keep you hydrated   Ask your healthcare provider which liquids are best for you and how much to drink each day   Prevent the spread of germs  by washing your hands often  You can spread your cold germs to others for at least 3 days after your symptoms start  Do not share items, such as eating utensils  Cover your nose and mouth when you cough or sneeze using the crook of your elbow instead of your hands  Throw used tissues in the garbage  Do not smoke:  Smoking may worsen your symptoms and increase the length of time you feel sick  Talk with your healthcare provider if you need help to stop smoking  Follow up with your healthcare provider as directed:  Write down your questions so you remember to ask them during your visits  © 2017 [facility] Information is for End User's use only and may not be sold, redistributed or otherwise used for commercial purposes  All illustrations and images included in CareNotes® are the copyrighted property of [employer] [facility] [employer]  or [employer]  The above information is an  only  It is not intended as medical advice for individual conditions or treatments  Talk to your doctor, nurse or pharmacist before following any medical regimen to see if it is safe and effective for you

## 2018-02-14 ENCOUNTER — TELEPHONE (OUTPATIENT)
Dept: FAMILY MEDICINE CLINIC | Facility: CLINIC | Age: 73
End: 2018-02-14

## 2018-02-14 DIAGNOSIS — J06.9 UPPER RESPIRATORY TRACT INFECTION, UNSPECIFIED TYPE: Primary | ICD-10-CM

## 2018-02-14 RX ORDER — AMOXICILLIN AND CLAVULANATE POTASSIUM 875; 125 MG/1; MG/1
1 TABLET, FILM COATED ORAL EVERY 12 HOURS SCHEDULED
Qty: 20 TABLET | Refills: 0 | Status: SHIPPED | OUTPATIENT
Start: 2018-02-14 | End: 2018-02-24

## 2018-02-14 NOTE — TELEPHONE ENCOUNTER
I escribed augmentin, which is stronger  If he is still not improving with this he will need to come back in for re-evaluation

## 2018-02-14 NOTE — PROGRESS NOTES
I escribed augmentin, which is stronger  If he is still not improving he will need to come back in for appointment

## 2018-02-14 NOTE — TELEPHONE ENCOUNTER
Zach Núñezromi was seen for congestion and cough and was prescribed medication (amox and cough meds)  He wants to know if he can get more antibiotics because he is still sick, not any better      He saw Dr Reyna Fox and Dr Kalyani Richardson he said    Hardeep Reddy

## 2018-02-26 ENCOUNTER — OFFICE VISIT (OUTPATIENT)
Dept: FAMILY MEDICINE CLINIC | Facility: CLINIC | Age: 73
End: 2018-02-26
Payer: MEDICARE

## 2018-02-26 VITALS
HEIGHT: 70 IN | BODY MASS INDEX: 35.93 KG/M2 | RESPIRATION RATE: 16 BRPM | SYSTOLIC BLOOD PRESSURE: 136 MMHG | TEMPERATURE: 97.9 F | HEART RATE: 84 BPM | DIASTOLIC BLOOD PRESSURE: 80 MMHG | WEIGHT: 251 LBS

## 2018-02-26 DIAGNOSIS — R05.9 COUGH: Primary | ICD-10-CM

## 2018-02-26 PROCEDURE — 99213 OFFICE O/P EST LOW 20 MIN: CPT | Performed by: INTERNAL MEDICINE

## 2018-02-26 RX ORDER — BENZONATATE 200 MG/1
200 CAPSULE ORAL 3 TIMES DAILY PRN
Qty: 30 CAPSULE | Refills: 0 | Status: SHIPPED | OUTPATIENT
Start: 2018-02-26 | End: 2018-08-24 | Stop reason: HOSPADM

## 2018-02-26 NOTE — PROGRESS NOTES
Subjective:      Patient ID: Mahsa Canseco  is a 68 y o  male  Chief Complaint   Patient presents with    Cough     persistent       He is here for continued cough, it is periodic  Has had for 3 weeks or so  It is a dry cough  Would like refill on his tessalon  Feels well otherwise  No congestion, dyspnea, wheeze  No fever  The following portions of the patient's history were reviewed and updated as appropriate: allergies, current medications, past family history, past medical history, past social history, past surgical history and problem list     Review of Systems   Constitutional: Negative  HENT: Negative  Respiratory: Positive for cough  Cardiovascular: Negative            Current Outpatient Prescriptions   Medication Sig Dispense Refill    amLODIPine (NORVASC) 10 mg tablet Take 10 mg by mouth every morning      apixaban (ELIQUIS) 5 mg Take 5 mg by mouth 2 (two) times a day      atorvastatin (LIPITOR) 20 mg tablet Take 20 mg by mouth every evening      carvedilol (COREG) 25 mg tablet Take 25 mg by mouth 2 (two) times a day      dofetilide (TIKOSYN) 500 mcg capsule Take 500 mcg by mouth 2 (two) times a day      esomeprazole (NexIUM) 20 mg capsule Take 40 mg by mouth daily at bedtime        fluticasone (FLONASE) 50 mcg/act nasal spray 1 spray into each nostril 2 (two) times a day      hydrocodone-chlorpheniramine polistirex (TUSSIONEX) 10-8 mg/5 mL ER suspension Take 5 mL by mouth every 12 (twelve) hours as needed for cough Max Daily Amount: 10 mL 120 mL 0    losartan (COZAAR) 50 mg tablet Take 50 mg by mouth every morning      multivitamin (THERAGRAN) TABS Take 1 tablet by mouth every morning      sitaGLIPtin-metFORMIN (JANUMET)  MG per tablet Take 2 tablets by mouth every evening Takes 2 tabs evening dose=100-2000mg        tamsulosin (FLOMAX) 0 4 mg Take 0 4 mg by mouth daily with dinner      benzonatate (TESSALON) 200 MG capsule Take 1 capsule (200 mg total) by mouth 3 (three) times a day as needed for cough 30 capsule 0    valACYclovir (VALTREX) 1,000 mg tablet Take 1 tablet by mouth 3 (three) times a day for 7 days 21 tablet 0     No current facility-administered medications for this visit  Objective:    /80   Pulse 84   Temp 97 9 °F (36 6 °C)   Resp 16   Ht 5' 9 5" (1 765 m)   Wt 114 kg (251 lb)   BMI 36 53 kg/m²        Physical Exam   Constitutional: He appears well-developed and well-nourished  HENT:   Head: Normocephalic and atraumatic  Right Ear: External ear normal    Left Ear: External ear normal    Eyes: Conjunctivae and EOM are normal    Neck: Neck supple  No JVD present  Cardiovascular: Normal rate, regular rhythm, normal heart sounds and intact distal pulses  Pulmonary/Chest: Effort normal and breath sounds normal  He has no wheezes  He has no rales  Assessment/Plan:    Cough  He has had 2 courses of antibiotics with improvement of symptoms  He continues with humidifier, will add tessalon  Recent CXR was reviewed and this is normal   Follow up if not continuing to improve  Diagnoses and all orders for this visit:    Cough  -     benzonatate (TESSALON) 200 MG capsule; Take 1 capsule (200 mg total) by mouth 3 (three) times a day as needed for cough          Return if symptoms worsen or fail to improve         Jatin Kimball MD

## 2018-02-26 NOTE — ASSESSMENT & PLAN NOTE
He has had 2 courses of antibiotics with improvement of symptoms  He continues with humidifier, will add tessalon  Recent CXR was reviewed and this is normal   Follow up if not continuing to improve

## 2018-03-15 DIAGNOSIS — E78.2 MIXED HYPERLIPIDEMIA: Primary | ICD-10-CM

## 2018-03-15 PROBLEM — R05.9 COUGH: Status: RESOLVED | Noted: 2018-02-26 | Resolved: 2018-03-15

## 2018-03-15 RX ORDER — ATORVASTATIN CALCIUM 20 MG/1
TABLET, FILM COATED ORAL
Qty: 90 TABLET | Refills: 3 | Status: SHIPPED | OUTPATIENT
Start: 2018-03-15 | End: 2019-08-19 | Stop reason: SDUPTHER

## 2018-03-19 ENCOUNTER — TRANSCRIBE ORDERS (OUTPATIENT)
Dept: ADMINISTRATIVE | Facility: HOSPITAL | Age: 73
End: 2018-03-19

## 2018-03-19 ENCOUNTER — APPOINTMENT (OUTPATIENT)
Dept: LAB | Facility: HOSPITAL | Age: 73
End: 2018-03-19
Payer: MEDICARE

## 2018-03-19 DIAGNOSIS — E10.49 OTHER DIABETIC NEUROLOGICAL COMPLICATION ASSOCIATED WITH TYPE 1 DIABETES MELLITUS (HCC): Primary | ICD-10-CM

## 2018-03-19 DIAGNOSIS — E10.49 OTHER DIABETIC NEUROLOGICAL COMPLICATION ASSOCIATED WITH TYPE 1 DIABETES MELLITUS (HCC): ICD-10-CM

## 2018-03-19 LAB
ANION GAP SERPL CALCULATED.3IONS-SCNC: 10 MMOL/L (ref 4–13)
BUN SERPL-MCNC: 16 MG/DL (ref 5–25)
CALCIUM SERPL-MCNC: 8.8 MG/DL (ref 8.3–10.1)
CHLORIDE SERPL-SCNC: 103 MMOL/L (ref 100–108)
CO2 SERPL-SCNC: 26 MMOL/L (ref 21–32)
CREAT SERPL-MCNC: 1.06 MG/DL (ref 0.6–1.3)
CREAT UR-MCNC: 47.7 MG/DL
EST. AVERAGE GLUCOSE BLD GHB EST-MCNC: 180 MG/DL
GFR SERPL CREATININE-BSD FRML MDRD: 69 ML/MIN/1.73SQ M
GLUCOSE SERPL-MCNC: 178 MG/DL (ref 65–140)
HBA1C MFR BLD: 7.9 % (ref 4.2–6.3)
MICROALBUMIN UR-MCNC: <5 MG/L (ref 0–20)
MICROALBUMIN/CREAT 24H UR: <10 MG/G CREATININE (ref 0–30)
POTASSIUM SERPL-SCNC: 4.1 MMOL/L (ref 3.5–5.3)
SODIUM SERPL-SCNC: 139 MMOL/L (ref 136–145)
VIT B12 SERPL-MCNC: 648 PG/ML (ref 100–900)

## 2018-03-19 PROCEDURE — 82570 ASSAY OF URINE CREATININE: CPT | Performed by: INTERNAL MEDICINE

## 2018-03-19 PROCEDURE — 82043 UR ALBUMIN QUANTITATIVE: CPT | Performed by: INTERNAL MEDICINE

## 2018-03-19 PROCEDURE — 80048 BASIC METABOLIC PNL TOTAL CA: CPT

## 2018-03-19 PROCEDURE — 82607 VITAMIN B-12: CPT

## 2018-03-19 PROCEDURE — 36415 COLL VENOUS BLD VENIPUNCTURE: CPT

## 2018-03-19 PROCEDURE — 83036 HEMOGLOBIN GLYCOSYLATED A1C: CPT

## 2018-04-09 ENCOUNTER — OFFICE VISIT (OUTPATIENT)
Dept: FAMILY MEDICINE CLINIC | Facility: CLINIC | Age: 73
End: 2018-04-09
Payer: MEDICARE

## 2018-04-09 VITALS
WEIGHT: 247 LBS | HEIGHT: 70 IN | DIASTOLIC BLOOD PRESSURE: 76 MMHG | BODY MASS INDEX: 35.36 KG/M2 | SYSTOLIC BLOOD PRESSURE: 144 MMHG | TEMPERATURE: 98.6 F | RESPIRATION RATE: 16 BRPM | HEART RATE: 72 BPM

## 2018-04-09 DIAGNOSIS — J01.90 ACUTE NON-RECURRENT SINUSITIS, UNSPECIFIED LOCATION: Primary | ICD-10-CM

## 2018-04-09 DIAGNOSIS — J06.9 UPPER RESPIRATORY TRACT INFECTION, UNSPECIFIED TYPE: ICD-10-CM

## 2018-04-09 PROCEDURE — 99213 OFFICE O/P EST LOW 20 MIN: CPT | Performed by: INTERNAL MEDICINE

## 2018-04-09 RX ORDER — AMOXICILLIN 875 MG/1
875 TABLET, COATED ORAL 2 TIMES DAILY
Qty: 20 TABLET | Refills: 0 | Status: SHIPPED | OUTPATIENT
Start: 2018-04-09 | End: 2018-04-19

## 2018-04-09 RX ORDER — HYDROCODONE POLISTIREX AND CHLORPHENIRAMINE POLISTIREX 10; 8 MG/5ML; MG/5ML
5 SUSPENSION, EXTENDED RELEASE ORAL EVERY 12 HOURS PRN
Qty: 120 ML | Refills: 0 | Status: SHIPPED | OUTPATIENT
Start: 2018-04-09 | End: 2018-08-24 | Stop reason: HOSPADM

## 2018-04-09 NOTE — PROGRESS NOTES
Subjective:      Patient ID: Aviva Hanson  is a 68 y o  male  Chief Complaint   Patient presents with    Cold Like Symptoms    Cough       Has been sick for 3-4 days with cough, congestion, sore throat  Denies fever  Cough is non productive  Taking mucinex DM without relief  No shortness of breath or wheeze  The following portions of the patient's history were reviewed and updated as appropriate: allergies, current medications, past family history, past medical history, past social history, past surgical history and problem list     Review of Systems   Constitutional: Negative  Negative for fever  HENT: Positive for congestion and sore throat  Respiratory: Positive for cough  Negative for shortness of breath and wheezing  Cardiovascular: Negative            Current Outpatient Prescriptions   Medication Sig Dispense Refill    amLODIPine (NORVASC) 10 mg tablet Take 10 mg by mouth every morning      apixaban (ELIQUIS) 5 mg Take 5 mg by mouth 2 (two) times a day      atorvastatin (LIPITOR) 20 mg tablet TAKE 1 TABLET DAILY 90 tablet 3    carvedilol (COREG) 25 mg tablet Take 25 mg by mouth 2 (two) times a day      dofetilide (TIKOSYN) 500 mcg capsule Take 500 mcg by mouth 2 (two) times a day      esomeprazole (NexIUM) 20 mg capsule Take 40 mg by mouth daily at bedtime        fluticasone (FLONASE) 50 mcg/act nasal spray 1 spray into each nostril 2 (two) times a day      losartan (COZAAR) 50 mg tablet Take 50 mg by mouth every morning      multivitamin (THERAGRAN) TABS Take 1 tablet by mouth every morning      sitaGLIPtin-metFORMIN (JANUMET)  MG per tablet Take 2 tablets by mouth every evening Takes 2 tabs evening dose=100-2000mg        tamsulosin (FLOMAX) 0 4 mg Take 0 4 mg by mouth daily with dinner      amoxicillin (AMOXIL) 875 mg tablet Take 1 tablet (875 mg total) by mouth 2 (two) times a day for 10 days 20 tablet 0    benzonatate (TESSALON) 200 MG capsule Take 1 capsule (200 mg total) by mouth 3 (three) times a day as needed for cough 30 capsule 0    hydrocodone-chlorpheniramine polistirex (TUSSIONEX) 10-8 mg/5 mL ER suspension Take 5 mL by mouth every 12 (twelve) hours as needed for cough Max Daily Amount: 10 mL 120 mL 0    valACYclovir (VALTREX) 1,000 mg tablet Take 1 tablet by mouth 3 (three) times a day for 7 days 21 tablet 0     No current facility-administered medications for this visit  Objective:    /76   Pulse 72   Temp 98 6 °F (37 °C)   Resp 16   Ht 5' 9 5" (1 765 m)   Wt 112 kg (247 lb)   BMI 35 95 kg/m²        Physical Exam   Constitutional: He appears well-developed and well-nourished  HENT:   Right Ear: Tympanic membrane is retracted  Left Ear: Tympanic membrane is retracted  Nose: Mucosal edema and rhinorrhea present  Mouth/Throat: Oropharynx is clear and moist    Eyes: Conjunctivae are normal    Neck: Neck supple  No JVD present  No thyromegaly present  Cardiovascular: Normal rate, regular rhythm, normal heart sounds and intact distal pulses  Exam reveals no gallop and no friction rub  No murmur heard  Pulmonary/Chest: Effort normal and breath sounds normal  He has no wheezes  He has no rales  Abdominal: Soft  Bowel sounds are normal  He exhibits no distension  There is no tenderness  Musculoskeletal: He exhibits no edema  Assessment/Plan:    No problem-specific Assessment & Plan notes found for this encounter  Diagnoses and all orders for this visit:    Acute non-recurrent sinusitis, unspecified location  Comments:  Advised continued flonase, saline NS, antibiotics as above  Follow up if not improving  Orders:  -     amoxicillin (AMOXIL) 875 mg tablet; Take 1 tablet (875 mg total) by mouth 2 (two) times a day for 10 days    Upper respiratory tract infection, unspecified type  -     hydrocodone-chlorpheniramine polistirex (TUSSIONEX) 10-8 mg/5 mL ER suspension;  Take 5 mL by mouth every 12 (twelve) hours as needed for cough Max Daily Amount: 10 mL          Return if symptoms worsen or fail to improve         Yaw Vogel MD

## 2018-04-09 NOTE — PATIENT INSTRUCTIONS
Rhinosinusitis   WHAT YOU NEED TO KNOW:   Rhinosinusitis (RS) is inflammation of your nose and sinuses  It commonly begins as a virus, often as a common cold  Viruses usually last 7 to 10 days and do not need treatment  When the virus does not get better on its own, you may have bacterial RS  This means that bacteria have begun to grow inside your sinuses  Acute RS lasts less than 4 weeks  Chronic RS lasts 12 weeks or more  Recurrent RS is when you have 4 or more episodes of RS in one year  DISCHARGE INSTRUCTIONS:   Return to the emergency department if:   · Your eye and eyelid are red, swollen, and painful  · You cannot open your eye  · You have double vision or you cannot see  · Your eyeball bulges out or you cannot move your eye  · You are more sleepy than normal or you notice changes in your ability to think, move, or talk  · You have a stiff neck, a fever, or a bad headache  · You have swelling of your forehead or scalp  Contact your healthcare provider if:   · Your symptoms are worse or do not improve after 3 to 5 days of treatment  · You have questions or concerns about your condition or care  Medicines: You may need any of the following:  · Acetaminophen  decreases pain and fever  It is available without a doctor's order  Ask how much to take and how often to take it  Follow directions  Acetaminophen can cause liver damage if not taken correctly  · NSAIDs , such as ibuprofen, help decrease swelling, pain, and fever  This medicine is available with or without a doctor's order  NSAIDs can cause stomach bleeding or kidney problems in certain people  If you take blood thinner medicine, always ask your healthcare provider if NSAIDs are safe for you  Always read the medicine label and follow directions  · Nasal steroid sprays  decrease inflammation in your nose and sinuses  · Decongestants  reduce swelling and drain mucus in the nose and sinuses   They may help you breathe easier  · Antihistamines  dry mucus in the nose and relieve sneezing  · Antibiotics  treat a bacterial infection and may be needed if your symptoms do not improve or they get worse  · Take your medicine as directed  Contact your healthcare provider if you think your medicine is not helping or if you have side effects  Tell him or her if you are allergic to any medicine  Keep a list of the medicines, vitamins, and herbs you take  Include the amounts, and when and why you take them  Bring the list or the pill bottles to follow-up visits  Carry your medicine list with you in case of an emergency  Self-care:   · Rinse your sinuses  Use a sinus rinse device to rinse your nasal passages with a saline (salt water) solution  This will help thin the mucus in your nose and rinse away pollen and dirt  It will also help reduce swelling so you can breathe normally  Ask your healthcare provider how often to do this  · Breathe in steam   Heat a bowl of water until you see steam  Lean over the bowl and make a tent over your head with a large towel  Breathe deeply for about 20 minutes  Be careful not to get too close to the steam or burn yourself  Do this 3 times a day  You can also breathe deeply when you take a hot shower  · Sleep with your head elevated  Place an extra pillow under your head before you go to sleep to help your sinuses drain  · Drink liquids as directed  Ask your healthcare provider how much liquid to drink each day and which liquids are best for you  Liquids will thin the mucus in your nose and help it drain  Avoid drinks that contain alcohol or caffeine  · Do not smoke, and avoid secondhand smoke  Nicotine and other chemicals in cigarettes and cigars can make your symptoms worse  Ask your healthcare provider for information if you currently smoke and need help to quit  E-cigarettes or smokeless tobacco still contain nicotine   Talk to your healthcare provider before you use these products  Follow up with your healthcare provider as directed: Follow up if your symptoms are worse or not better after 3 to 5 days of treatment  Write down your questions so you remember to ask them during your visits  © 2017 [address] [employer] Information is for End User's use only and may not be sold, redistributed or otherwise used for commercial purposes  All illustrations and images included in CareNotes® are the copyrighted property of A D [employer] M [employer]  or [employer]ille [employer]ne  The above information is an  only  It is not intended as medical advice for individual conditions or treatments  Talk to your doctor, nurse or pharmacist before following any medical regimen to see if it is safe and effective for you

## 2018-04-20 ENCOUNTER — TRANSCRIBE ORDERS (OUTPATIENT)
Dept: ADMINISTRATIVE | Facility: HOSPITAL | Age: 73
End: 2018-04-20

## 2018-04-20 ENCOUNTER — APPOINTMENT (OUTPATIENT)
Dept: LAB | Facility: HOSPITAL | Age: 73
End: 2018-04-20
Payer: MEDICARE

## 2018-04-20 DIAGNOSIS — I48.91 ATRIAL FIBRILLATION, UNSPECIFIED TYPE (HCC): Primary | ICD-10-CM

## 2018-04-20 LAB
ANION GAP SERPL CALCULATED.3IONS-SCNC: 6 MMOL/L (ref 4–13)
BUN SERPL-MCNC: 20 MG/DL (ref 5–25)
CALCIUM SERPL-MCNC: 8.9 MG/DL (ref 8.3–10.1)
CHLORIDE SERPL-SCNC: 100 MMOL/L (ref 100–108)
CO2 SERPL-SCNC: 30 MMOL/L (ref 21–32)
CREAT SERPL-MCNC: 1.05 MG/DL (ref 0.6–1.3)
GFR SERPL CREATININE-BSD FRML MDRD: 70 ML/MIN/1.73SQ M
GLUCOSE SERPL-MCNC: 192 MG/DL (ref 65–140)
POTASSIUM SERPL-SCNC: 3.7 MMOL/L (ref 3.5–5.3)
SODIUM SERPL-SCNC: 136 MMOL/L (ref 136–145)

## 2018-04-20 PROCEDURE — 36415 COLL VENOUS BLD VENIPUNCTURE: CPT | Performed by: INTERNAL MEDICINE

## 2018-04-20 PROCEDURE — 80048 BASIC METABOLIC PNL TOTAL CA: CPT | Performed by: INTERNAL MEDICINE

## 2018-07-02 ENCOUNTER — OFFICE VISIT (OUTPATIENT)
Dept: FAMILY MEDICINE CLINIC | Facility: CLINIC | Age: 73
End: 2018-07-02
Payer: MEDICARE

## 2018-07-02 VITALS
BODY MASS INDEX: 33.93 KG/M2 | HEIGHT: 70 IN | WEIGHT: 237 LBS | SYSTOLIC BLOOD PRESSURE: 130 MMHG | DIASTOLIC BLOOD PRESSURE: 84 MMHG | HEART RATE: 100 BPM | RESPIRATION RATE: 20 BRPM | TEMPERATURE: 98 F

## 2018-07-02 DIAGNOSIS — J30.1 SEASONAL ALLERGIC RHINITIS DUE TO POLLEN: Primary | ICD-10-CM

## 2018-07-02 PROBLEM — I77.810 AORTIC ROOT DILATION (HCC): Status: ACTIVE | Noted: 2018-04-13

## 2018-07-02 PROBLEM — J06.9 UPPER RESPIRATORY TRACT INFECTION: Status: RESOLVED | Noted: 2018-02-05 | Resolved: 2018-07-02

## 2018-07-02 PROCEDURE — 99213 OFFICE O/P EST LOW 20 MIN: CPT | Performed by: INTERNAL MEDICINE

## 2018-07-02 RX ORDER — FLUTICASONE PROPIONATE 50 MCG
1 SPRAY, SUSPENSION (ML) NASAL 2 TIMES DAILY
Qty: 16 G | Refills: 5 | Status: SHIPPED | OUTPATIENT
Start: 2018-07-02 | End: 2020-02-19 | Stop reason: ALTCHOICE

## 2018-07-02 RX ORDER — TADALAFIL 5 MG
TABLET ORAL
COMMUNITY
Start: 2018-06-01 | End: 2018-12-20 | Stop reason: ALTCHOICE

## 2018-07-02 RX ORDER — DULAGLUTIDE 0.75 MG/.5ML
INJECTION, SOLUTION SUBCUTANEOUS
COMMUNITY
Start: 2018-06-29 | End: 2018-12-20 | Stop reason: ALTCHOICE

## 2018-07-02 RX ORDER — BLOOD-GLUCOSE METER
KIT MISCELLANEOUS
COMMUNITY
Start: 2018-06-13

## 2018-07-02 RX ORDER — LEVOCETIRIZINE DIHYDROCHLORIDE 5 MG/1
5 TABLET, FILM COATED ORAL EVERY EVENING
Qty: 90 TABLET | Refills: 3 | Status: SHIPPED | OUTPATIENT
Start: 2018-07-02 | End: 2019-01-30 | Stop reason: ALTCHOICE

## 2018-07-02 RX ORDER — LANCETS 28 GAUGE
EACH MISCELLANEOUS
COMMUNITY
Start: 2018-06-13

## 2018-07-02 NOTE — PROGRESS NOTES
Subjective:      Patient ID: Jacquelyn Ruiz  is a 68 y o  male  Chief Complaint   Patient presents with    Congestion     pt sts started Thursday, non productive cough, sore throat, dry eyes, drhlpn       Here for evaluation of sore throat and cough for 4 days  Has low appetite, congestion, eyes are burning  No fever  Feels run down and tired  The following portions of the patient's history were reviewed and updated as appropriate: allergies, current medications, past family history, past medical history, past social history, past surgical history and problem list     Review of Systems   Constitutional: Positive for fatigue  HENT: Positive for congestion and sore throat  Respiratory: Positive for cough  Cardiovascular: Negative            Current Outpatient Prescriptions   Medication Sig Dispense Refill    amLODIPine (NORVASC) 10 mg tablet Take 10 mg by mouth every morning      apixaban (ELIQUIS) 5 mg Take 5 mg by mouth 2 (two) times a day      atorvastatin (LIPITOR) 20 mg tablet TAKE 1 TABLET DAILY 90 tablet 3    benzonatate (TESSALON) 200 MG capsule Take 1 capsule (200 mg total) by mouth 3 (three) times a day as needed for cough 30 capsule 0    carvedilol (COREG) 25 mg tablet Take 25 mg by mouth 2 (two) times a day      dofetilide (TIKOSYN) 500 mcg capsule Take 500 mcg by mouth 2 (two) times a day      esomeprazole (NexIUM) 20 mg capsule Take 40 mg by mouth daily at bedtime        fluticasone (FLONASE) 50 mcg/act nasal spray 1 spray into each nostril 2 (two) times a day 16 g 5    FREESTYLE LITE test strip       Lancets (FREESTYLE) lancets       losartan (COZAAR) 50 mg tablet Take 50 mg by mouth every morning      metFORMIN (GLUCOPHAGE) 1000 MG tablet       multivitamin (THERAGRAN) TABS Take 1 tablet by mouth every morning      tamsulosin (FLOMAX) 0 4 mg Take 0 4 mg by mouth daily with dinner      TRULICITY 9 53 GH/4 3BB SOPN       CIALIS 5 MG tablet       hydrocodone-chlorpheniramine polistirex (TUSSIONEX) 10-8 mg/5 mL ER suspension Take 5 mL by mouth every 12 (twelve) hours as needed for cough Max Daily Amount: 10 mL 120 mL 0    levocetirizine (XYZAL) 5 MG tablet Take 1 tablet (5 mg total) by mouth every evening 90 tablet 3    sitaGLIPtin-metFORMIN (JANUMET)  MG per tablet Take 2 tablets by mouth every evening Takes 2 tabs evening dose=100-2000mg        valACYclovir (VALTREX) 1,000 mg tablet Take 1 tablet by mouth 3 (three) times a day for 7 days 21 tablet 0     No current facility-administered medications for this visit  Objective:    /84   Pulse 100   Temp 98 °F (36 7 °C)   Resp 20   Ht 5' 9 5" (1 765 m)   Wt 108 kg (237 lb)   BMI 34 50 kg/m²        Physical Exam   Constitutional: He appears well-developed and well-nourished  HENT:   Right Ear: Tympanic membrane normal    Left Ear: Tympanic membrane normal    Nose: Mucosal edema present  Nasal mucosae pale and boggy  Eyes: Conjunctivae are normal    "Allergic shiners" with lower lid edema bilaterally   Cardiovascular: Normal rate, regular rhythm, normal heart sounds and intact distal pulses  Exam reveals no gallop and no friction rub  No murmur heard  Pulmonary/Chest: Effort normal and breath sounds normal  He has no wheezes  He has no rales  Assessment/Plan:    Seasonal allergic rhinitis due to pollen  Advised continued flonase and add xyzal prn  Follow up if not improving  Diagnoses and all orders for this visit:    Seasonal allergic rhinitis due to pollen  -     levocetirizine (XYZAL) 5 MG tablet;  Take 1 tablet (5 mg total) by mouth every evening  -     fluticasone (FLONASE) 50 mcg/act nasal spray; 1 spray into each nostril 2 (two) times a day    Other orders  -     TRULICITY 4 83 YY/2 2QC SOPN;   -     FREESTYLE LITE test strip;   -     Lancets (FREESTYLE) lancets;   -     metFORMIN (GLUCOPHAGE) 1000 MG tablet;   -     CIALIS 5 MG tablet; Return if symptoms worsen or fail to improve         Ro Freire MD

## 2018-07-27 ENCOUNTER — APPOINTMENT (OUTPATIENT)
Dept: LAB | Facility: CLINIC | Age: 73
End: 2018-07-27
Payer: MEDICARE

## 2018-07-27 ENCOUNTER — TRANSCRIBE ORDERS (OUTPATIENT)
Dept: LAB | Facility: CLINIC | Age: 73
End: 2018-07-27

## 2018-07-27 DIAGNOSIS — E11.65 UNCONTROLLED TYPE 2 DIABETES MELLITUS WITH COMPLICATION, UNSPECIFIED WHETHER LONG TERM INSULIN USE: Primary | ICD-10-CM

## 2018-07-27 DIAGNOSIS — E11.8 UNCONTROLLED TYPE 2 DIABETES MELLITUS WITH COMPLICATION, UNSPECIFIED WHETHER LONG TERM INSULIN USE: Primary | ICD-10-CM

## 2018-07-27 LAB
ANION GAP SERPL CALCULATED.3IONS-SCNC: 3 MMOL/L (ref 4–13)
BUN SERPL-MCNC: 16 MG/DL (ref 5–25)
CALCIUM SERPL-MCNC: 8.5 MG/DL (ref 8.3–10.1)
CHLORIDE SERPL-SCNC: 109 MMOL/L (ref 100–108)
CO2 SERPL-SCNC: 28 MMOL/L (ref 21–32)
CREAT SERPL-MCNC: 0.88 MG/DL (ref 0.6–1.3)
CREAT UR-MCNC: 154 MG/DL
EST. AVERAGE GLUCOSE BLD GHB EST-MCNC: 163 MG/DL
GFR SERPL CREATININE-BSD FRML MDRD: 85 ML/MIN/1.73SQ M
GLUCOSE P FAST SERPL-MCNC: 159 MG/DL (ref 65–99)
HBA1C MFR BLD: 7.3 % (ref 4.2–6.3)
MICROALBUMIN UR-MCNC: 8.5 MG/L (ref 0–20)
MICROALBUMIN/CREAT 24H UR: 6 MG/G CREATININE (ref 0–30)
POTASSIUM SERPL-SCNC: 3.8 MMOL/L (ref 3.5–5.3)
SODIUM SERPL-SCNC: 140 MMOL/L (ref 136–145)

## 2018-07-27 PROCEDURE — 82043 UR ALBUMIN QUANTITATIVE: CPT

## 2018-07-27 PROCEDURE — 80048 BASIC METABOLIC PNL TOTAL CA: CPT

## 2018-07-27 PROCEDURE — 83036 HEMOGLOBIN GLYCOSYLATED A1C: CPT

## 2018-07-27 PROCEDURE — 36415 COLL VENOUS BLD VENIPUNCTURE: CPT

## 2018-07-27 PROCEDURE — 82570 ASSAY OF URINE CREATININE: CPT

## 2018-08-17 ENCOUNTER — OFFICE VISIT (OUTPATIENT)
Dept: PULMONOLOGY | Facility: MEDICAL CENTER | Age: 73
End: 2018-08-17
Payer: MEDICARE

## 2018-08-17 VITALS
WEIGHT: 242 LBS | TEMPERATURE: 98.2 F | RESPIRATION RATE: 16 BRPM | HEART RATE: 80 BPM | SYSTOLIC BLOOD PRESSURE: 130 MMHG | HEIGHT: 70 IN | DIASTOLIC BLOOD PRESSURE: 80 MMHG | BODY MASS INDEX: 34.65 KG/M2 | OXYGEN SATURATION: 97 %

## 2018-08-17 DIAGNOSIS — G47.33 OBSTRUCTIVE SLEEP APNEA: Primary | ICD-10-CM

## 2018-08-17 PROCEDURE — 99203 OFFICE O/P NEW LOW 30 MIN: CPT | Performed by: INTERNAL MEDICINE

## 2018-08-17 RX ORDER — LOTEPREDNOL ETABONATE 2 MG/ML
SUSPENSION/ DROPS OPHTHALMIC
COMMUNITY
Start: 2018-08-06 | End: 2018-12-20 | Stop reason: ALTCHOICE

## 2018-08-17 RX ORDER — CYCLOSPORINE 0.5 MG/ML
1 EMULSION OPHTHALMIC EVERY 12 HOURS
COMMUNITY
Start: 2018-08-06

## 2018-08-17 NOTE — ASSESSMENT & PLAN NOTE
Will obtain compliance download from [facility]   Assess AHI  Supplies are reordered for him today

## 2018-08-17 NOTE — PROGRESS NOTES
Assessment/Plan:       Problem List Items Addressed This Visit        Respiratory    Obstructive sleep apnea - Primary     Will obtain compliance download from [facility]   Assess AHI  Supplies are reordered for him today  Relevant Orders    PAP DME Resupply/Reorder          Follow-up in 1 year  All questions are answered to the patient's satisfaction and understanding  He verbalizes understanding  He is encouraged to call with any further questions or concerns  Portions of the record may have been created with voice recognition software  Occasional wrong word or "sound a like" substitutions may have occurred due to the inherent limitations of voice recognition software  Read the chart carefully and recognize, using context, where substitutions have occurred  Electronically Signed by Juliano Leonardo MD    ______________________________________________________________________    Chief Complaint:   Chief Complaint   Patient presents with   [address]     former patient of Dr Yoselin Marie        Patient ID: Mindy Orosco is a 68 y o  y o  male has a past medical history of Anemia (10/23/2008); Atrial fibrillation ([facility] [address] ); BPH (benign prostatic hypertrophy); Diabetes mellitus ([facility] [address] ); GERD (gastroesophageal reflux disease); Herpes zoster with complication; Hiatal hernia; Hyperlipidemia; Hypertension; Incisional hernia; Irregular heart beat; Pyogenic granuloma (09/13/2006); Carlos (subconjunctival hemorrhage), unspecified laterality (09/12/2005); and Ventral hernia  8/17/2018  Patient presents for initial visit for ROBER  He has been using it for 20 years  Just got a new machine  Diagnosed due to witnessed apneas  Time to bed is around 11-12 am  It takes him about 15-20 min  Once asleep he wakes up 2 times to use the restroom  Occasionally naps during the day  [place] out of bed once- punching- less than a year ago  He denies any daytime sleepiness  [place] Sleepiness Scale is 7/24  He uses nasal pillows   No issues with the machine  No shortness of breath or cough  Had spontaneous pneumothorax-1976    Occupational/Exposure history: retired  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, spray painting etc    Travel history: no recent travel    Review of Systems   Constitutional: Negative for activity change and fatigue  Respiratory: Negative for shortness of breath  Cardiovascular: Negative for chest pain and leg swelling  Social history: He reports that he quit smoking about 33 years ago  He has a 20 00 pack-year smoking history  He has never used smokeless tobacco  He reports that he does not drink alcohol or use drugs  Past surgical history:   Past Surgical History:   Procedure Laterality Date    APPENDECTOMY      ATRIAL ABLATION SURGERY      2014    ATRIAL ABLATION SURGERY      catheter ablation atrial fibrillation / last assessed 2/17/16     CHOLECYSTECTOMY      lap    COLON SURGERY      Hartmans procedure    COLON SURGERY      reversal 6 weeks later    COLONOSCOPY N/A 1/26/2017    Procedure: COLONOSCOPY;  Surgeon: Colby Deutsch MD;  Location: [facility] GI [facility]; Service:     ESOPHAGOGASTRODUODENOSCOPY N/A 1/26/2017    Procedure: ESOPHAGOGASTRODUODENOSCOPY (EGD); Surgeon: Colby Deutsch MD;  Location: [facility] GI LAB;   Service:     EYE SURGERY Bilateral     lid repair    HERNIA REPAIR Bilateral     inguinal 2014 & 2013   [facility]  2011    INCISIONAL HERNIA REPAIR N/A 3/24/2017    Procedure: REPAIR OF INCARCERATED INCISIONAL HERNIA WITH MESH, Lysis of Adhesions, Partial Omentectomy;  Surgeon: Laura Pina MD;  Location: [facility];  Service:    Guyy Spencer TONSILLECTOMY       Family history:   Family History   Problem Relation Age of Onset    Heart disease Mother     Cancer Mother     Colon cancer Mother     Early death Father     Seizures Father     Cancer Sister         disseminated        Immunization History   Administered Date(s) Administered    H1N1, All Formulations 11/05/2009    Influenza Split High Dose Preservative Free IM 09/09/2013, 10/09/2014, 10/21/2015, 10/13/2016, 09/07/2017    Influenza TIV (IM) 11/20/2001, 10/28/2002, 10/09/2003, 02/17/2005, 11/15/2005, 11/29/2006, 10/18/2007, 10/23/2008, 10/21/2009, 10/28/2010, 09/28/2011, 11/15/2012    Pneumococcal Conjugate 13-Valent 10/21/2015    Pneumococcal Polysaccharide PPV23 01/10/2013     Current Outpatient Prescriptions   Medication Sig Dispense Refill    ALREX 0 2 % SUSP       amLODIPine (NORVASC) 10 mg tablet Take 10 mg by mouth every morning      apixaban (ELIQUIS) 5 mg Take 5 mg by mouth 2 (two) times a day      atorvastatin (LIPITOR) 20 mg tablet TAKE 1 TABLET DAILY 90 tablet 3    carvedilol (COREG) 25 mg tablet Take 25 mg by mouth 2 (two) times a day      CIALIS 5 MG tablet       dofetilide (TIKOSYN) 500 mcg capsule Take 500 mcg by mouth 2 (two) times a day      esomeprazole (NexIUM) 20 mg capsule Take 40 mg by mouth daily at bedtime        fluticasone (FLONASE) 50 mcg/act nasal spray 1 spray into each nostril 2 (two) times a day 16 g 5    FREESTYLE LITE test strip       Lancets (FREESTYLE) lancets       levocetirizine (XYZAL) 5 MG tablet Take 1 tablet (5 mg total) by mouth every evening 90 tablet 3    losartan (COZAAR) 50 mg tablet Take 50 mg by mouth every morning      multivitamin (THERAGRAN) TABS Take 1 tablet by mouth every morning      RESTASIS 0 05 % ophthalmic emulsion       sitaGLIPtin-metFORMIN (JANUMET)  MG per tablet Take 2 tablets by mouth every evening Takes 2 tabs evening dose=100-2000mg        tamsulosin (FLOMAX) 0 4 mg Take 0 4 mg by mouth daily with dinner      TOBRADEX ophthalmic ointment       benzonatate (TESSALON) 200 MG capsule Take 1 capsule (200 mg total) by mouth 3 (three) times a day as needed for cough (Patient not taking: Reported on 8/17/2018 ) 30 capsule 0    Dulaglutide 0 75 MG/0 5ML SOPN Inject 80 Units under the skin      hydrocodone-chlorpheniramine polistirex (TUSSIONEX) 10-8 mg/5 mL ER suspension Take 5 mL by mouth every 12 (twelve) hours as needed for cough Max Daily Amount: 10 mL (Patient not taking: Reported on 8/17/2018 ) 120 mL 0    metFORMIN (GLUCOPHAGE) 1000 MG tablet       TRULICITY 9 02 FX/4 0KA SOPN       valACYclovir (VALTREX) 1,000 mg tablet Take 1 tablet by mouth 3 (three) times a day for 7 days 21 tablet 0     No current facility-administered medications for this visit  Allergies: Codeine; Demerol [meperidine]; Morphine; and Morphine and related    Objective:  Vitals:    08/17/18 1047   BP: 130/80   BP Location: Right arm   Patient Position: Sitting   Cuff Size: Standard   Pulse: 80   Resp: 16   Temp: 98 2 °F (36 8 °C)   TempSrc: Tympanic   SpO2: 97%   Weight: 110 kg (242 lb)   Height: 5' 10" (1 778 m)   Oxygen Therapy  SpO2: 97 %    Wt Readings from Last 3 Encounters:   08/17/18 110 kg (242 lb)   07/02/18 108 kg (237 lb)   04/09/18 112 kg (247 lb)     Body mass index is 34 72 kg/m²  Physical Exam   Constitutional: He is oriented to person, place, and time  He appears well-developed and well-nourished  No distress  HENT:   Head: Normocephalic and atraumatic  Mouth/Throat: Oropharynx is clear and moist  No oropharyngeal exudate  Mallampati 4   Eyes: EOM are normal  Pupils are equal, round, and reactive to light  Neck: Normal range of motion  Neck supple  Cardiovascular: Normal rate and regular rhythm  No murmur heard  Pulmonary/Chest: Effort normal and breath sounds normal  No respiratory distress  He has no wheezes  He has no rales  He exhibits no tenderness  Abdominal: Soft  Bowel sounds are normal  He exhibits no distension  There is no tenderness  Musculoskeletal: Normal range of motion  He exhibits no edema  Neurological: He is alert and oriented to person, place, and time  No cranial nerve deficit  Skin: Skin is warm and dry  He is not diaphoretic  Psychiatric: He has a normal mood and affect   His behavior is normal    Vitals reviewed

## 2018-08-24 ENCOUNTER — OFFICE VISIT (OUTPATIENT)
Dept: OBGYN CLINIC | Facility: CLINIC | Age: 73
End: 2018-08-24
Payer: MEDICARE

## 2018-08-24 ENCOUNTER — APPOINTMENT (OUTPATIENT)
Dept: RADIOLOGY | Facility: CLINIC | Age: 73
End: 2018-08-24
Payer: MEDICARE

## 2018-08-24 VITALS
WEIGHT: 245.6 LBS | DIASTOLIC BLOOD PRESSURE: 80 MMHG | SYSTOLIC BLOOD PRESSURE: 147 MMHG | HEART RATE: 76 BPM | HEIGHT: 71 IN | BODY MASS INDEX: 34.38 KG/M2

## 2018-08-24 DIAGNOSIS — M19.011 PRIMARY OSTEOARTHRITIS OF RIGHT SHOULDER: Primary | ICD-10-CM

## 2018-08-24 DIAGNOSIS — M25.511 RIGHT SHOULDER PAIN, UNSPECIFIED CHRONICITY: ICD-10-CM

## 2018-08-24 PROCEDURE — 99213 OFFICE O/P EST LOW 20 MIN: CPT | Performed by: ORTHOPAEDIC SURGERY

## 2018-08-24 PROCEDURE — 73030 X-RAY EXAM OF SHOULDER: CPT

## 2018-08-24 NOTE — PATIENT INSTRUCTIONS
Exercises for Internal and External Shoulder Rotation   WHAT YOU NEED TO KNOW:   Exercises for internal shoulder rotation work the muscles in your chest and front of your shoulder  Exercises for external shoulder rotation work the muscles in the back of your shoulder and upper back  DISCHARGE INSTRUCTIONS:   Contact your healthcare provider if:   · You have sharp or worsening pain during exercise or at rest     · You have questions or concerns about your shoulder exercises  Before you exercise:  Warm up and stretch before you exercise  Walk or ride a stationary bike for 5 to 10 minutes to help you warm up  Stretching helps increase range of motion  It may also decrease muscle soreness and help prevent another injury  Your healthcare provider will tell you which of the following stretches to do:  · Crossover arm stretch:  Relax your shoulders  Hold your upper arm with the opposite hand  Pull your arm across your chest until you feel a stretch  Hold the stretch for 30 seconds  Return to the starting position  · Shoulder flexion stretch:  Stand facing a wall  Slowly walk your fingers up the wall until you feel a stretch  Hold the stretch for 30 seconds  Return to the starting position  · Sleeper stretch:  Lie on your injured side on a firm, flat surface  Bend the elbow of your injured arm 90° with your hand facing up  Use your arm that is not injured to slowly push your injured arm down  Stop when you feel a stretch at the back of your injured shoulder  Hold the stretch for 30 seconds  Slowly return to the starting position  How to exercise with a weight:  Your healthcare provider will tell you how much weight to exercise with  · Shoulder internal rotation:  Sit in a chair  Place a rolled up towel between your elbow and your side  Bend your elbow to 90°  Gently squeeze the towel with your elbow to prevent it from falling out  Hold the weight with your thumb pointing up   Slowly move the weight across your chest  Stop when your hand reaches your opposite arm  Hold this position for as many seconds as directed  Slowly return to the starting position  · Shoulder external rotation:  Lie on your side with your injured shoulder facing up  Bend your elbow 90°  Place a rolled up towel between your elbow and your side  Hold a weight in your hand  Gently squeeze the towel with your elbow to prevent it from falling out  Slowly rotate your arm outward, but keep your elbow bent  Stop when you feel a stretch  Hold this position for 30 seconds or as directed  Slowly return to the starting position  How to exercise with an exercise band:   · Shoulder internal rotation:  Tie one end of the exercise band to a heavy, secure object  Sit in a chair  Place a rolled up towel between your elbow and your side  Bend your elbow to 90°  Gently squeeze the towel with your elbow to prevent it from falling out  Slowly pull the band across your chest  Stop when your hand reaches your opposite arm  Hold this position for as many seconds as directed  Slowly return to the starting position  · Shoulder external rotation:  Hold one end of the exercise band on the side that is not injured  Place a rolled up towel between your elbow and your side  Bend your elbow 90°  Squeeze the towel with your elbow  Grab the end of the band and slowly turn your arm outward, but keep your elbow bent  Stop when you feel a stretch  Hold this position for 30 seconds or as directed  Slowly return to the starting position  Follow up with your physical therapist as directed:  Write down your questions so you remember to ask them at your visits  © 2017 [employer] [employer] Information is for End User's use only and may not be sold, redistributed or otherwise used for commercial purposes  All illustrations and images included in CareNotes® are the copyrighted property of [employer]  or Neville Bourne    The above information is an  only  It is not intended as medical advice for individual conditions or treatments  Talk to your doctor, nurse or pharmacist before following any medical regimen to see if it is safe and effective for you  Exercises for Shoulder Abduction and Adduction   AMBULATORY CARE:   Shoulder abduction and adduction exercises  work the muscles at the back of your shoulder and your upper back  Before you exercise:  Warm up and stretch before you exercise  Walk or ride a stationary bike for 5 to 10 minutes to help you warm up  Stretching helps increase range of motion  It may also decrease muscle soreness and help prevent another injury  Your healthcare provider will tell you which of the following stretches to do:  · Crossover arm stretch:  Relax your shoulders  Hold your upper arm with the opposite hand  Pull your arm across your chest until you feel a stretch  Hold the stretch for 30 seconds  Return to the starting position  · Shoulder flexion stretch:  Stand facing a wall  Slowly walk your fingers up the wall until you feel a stretch  Hold the stretch for 30 seconds  Return to the starting position  · Sleeper stretch:  Lie on your injured side on a firm, flat surface  Bend the elbow of your injured arm 90° with your hand facing up  Use your arm that is not injured to slowly push your injured arm down  Stop when you feel a stretch at the back of your injured shoulder  Hold the stretch for 30 seconds  Slowly return to the starting position  Contact your healthcare provider if:   · You have sharp or worsening pain during exercise or at rest     · You have questions or concerns about your shoulder exercises  How to exercise with a weight:  Your healthcare provider will tell you how much weight to use  · Shoulder abduction:  Stand and hold a weight in your hand with your palm facing your body  Slowly raise your arm to the side with your thumb pointing up   Then raise your arm over your head as far as you can without pain  Hold this position for as long as directed  Do not raise your arm over your head unless your healthcare provider says it is okay  · Shoulder adduction:  Lie on your back on a firm surface  Extend your arm out to a "T " Bend your elbow so your forearm in the air  Hold a weight in your hand  Slowly raise your arm toward the ceiling and straighten your elbow  Hold this position for as long as directed  Slowly return to the starting position  How to exercise with an exercise band:   · Shoulder abduction:  Wrap the exercise band around a heavy, stable object near your foot  Grab the band with the hand of your injured shoulder  Keep your arm straight  Slowly raise your arm to the side with your thumb pointing up  Then, slowly pull the band over your head as far as you can without pain  Do not raise your arm over your head unless your healthcare provider says it is okay  Do not let your shoulder shrug  Hold this position for as long as directed  Slowly return to the starting position  · Shoulder adduction:  Wrap the exercise band around a heavy, stable object  Stand and face away from where the band is anchored  Hold each end of the band in both hands with your elbows bent  Your elbows should not be behind your body  Keep your arms parallel to the floor and slowly straighten your elbows  Hold this position for as long as directed  Slowly return to the starting position  Follow up with your physical therapist as directed:  Write down your questions so you remember to ask them at your visits  © 2017 [address] [employer] Information is for End User's use only and may not be sold, redistributed or otherwise used for commercial purposes  All illustrations and images included in CareNotes® are the copyrighted property of [email or web address]  or Neville Bourne  The above information is an  only  It is not intended as medical advice for individual conditions or treatments   Talk to your doctor, nurse or pharmacist before following any medical regimen to see if it is safe and effective for you

## 2018-08-24 NOTE — PROGRESS NOTES
Assessment/Plan:  1  Primary osteoarthritis of right shoulder     2  Right shoulder pain, unspecified chronicity  XR shoulder 2+ vw right       Scribe Attestation    I,:   Sherwin Vance am acting as a scribe while in the presence of the attending physician :        I,:   Edgard Carrillo MD personally performed the services described in this documentation    as scribed in my presence :              Upon examination and review of x-rays today Renay Mason does demonstrate signs and symptoms consistent with a flare up of glenohumeral osteoarthritis of the right shoulder  He does demonstrate osteophyte formation at the inferior aspect of the humeral head, as well as moderate to severe joint line narrowing of the glenohumeral joint  However, Renay Mason does demonstrate great range of motion as well as normal strength with the rotator cuff  I did offer a cortisone injection today however he is relatively pain-free today and has demonstrates great range of motion  I do feel at this point he may benefit from conservative measures such as a home exercise program to help strengthen his rotator cuff  I provided him with a green Thera-Band and home exercises of internal rotation, external rotation, abduction, and abduction exercises  He may do these 2 to 3 times a day  I did note to Renay Mason that should he have another flare up he may simply call my office to set up appointment for cortisone injection  Until then Renay Mason may follow up with me on as-needed basis  Subjective:   Virgilio Moore  is a 68 y o  male who presents with right shoulder pain  He states that his pain started a few weeks ago while golfing  He states that he is experiencing intermittent and mild to moderate aching about the anterior aspect of his right shoulder  He states that the pain occasionally radiates distally into the anterior aspect of his upper arm    Pain is exacerbated with overhead activity such as reaching up into cabinets, and is better at rest  He does note previous shoulder pain in the past however has been symptom free for past few years  Today denies any distal paresthesias      Review of Systems   Constitutional: Positive for unexpected weight change  HENT: Negative  Eyes: Positive for redness  Respiratory: Negative  Cardiovascular: Negative  Gastrointestinal: Negative  Endocrine:        Frequent urination   Genitourinary: Negative  Musculoskeletal: Positive for arthralgias and myalgias  Skin: Negative  Allergic/Immunologic: Negative  Neurological: Negative  Hematological: Negative  Psychiatric/Behavioral: Negative  Past Medical History:   Diagnosis Date    Anemia 10/23/2008    last assessed 9/9/13     Atrial fibrillation (HCC)     BPH (benign prostatic hypertrophy)     Diabetes mellitus ([facility] [address] )     GERD (gastroesophageal reflux disease)     Herpes zoster with complication     last assessed 7/3/17     Hiatal hernia     Hyperlipidemia     Hypertension     Incisional hernia     Irregular heart beat     Pyogenic granuloma 09/13/2006    last assessed 9/13/06    Carlos (subconjunctival hemorrhage), unspecified laterality 09/12/2005    last assessed 9/12/05    Ventral hernia     last assessed  4/6/17        Past Surgical History:   Procedure Laterality Date    APPENDECTOMY      ATRIAL ABLATION SURGERY      2014    ATRIAL ABLATION SURGERY      catheter ablation atrial fibrillation / last assessed 2/17/16     CHOLECYSTECTOMY      lap    COLON SURGERY      Hartmans procedure    COLON SURGERY      reversal 6 weeks later    COLONOSCOPY N/A 1/26/2017    Procedure: COLONOSCOPY;  Surgeon: Jamaal Loera MD;  Location: [facility] GI LAB; Service:     ESOPHAGOGASTRODUODENOSCOPY N/A 1/26/2017    Procedure: ESOPHAGOGASTRODUODENOSCOPY (EGD); Surgeon: Jamaal Loera MD;  Location: [facility] GI [facility];   Service:     EYE SURGERY Bilateral     lid repair    HERNIA REPAIR Bilateral     inguinal 2014 & 2013   [address] HERNIA REPAIR  2011    INCISIONAL HERNIA REPAIR N/A 3/24/2017    Procedure: REPAIR OF INCARCERATED INCISIONAL HERNIA WITH MESH, Lysis of Adhesions, Partial Omentectomy;  Surgeon: Stephani Johnson MD;  Location: [facility];  Service:     TONSILLECTOMY         Family History   Problem Relation Age of Onset    Heart disease Mother     Cancer Mother     Colon cancer Mother     Early death Father     Seizures Father     Cancer Sister         disseminated        Social History     Occupational History    Not on file       Social History Main Topics    Smoking status: Former Smoker     Packs/day: 1 00     Years: 20 00     Quit date: 1975    Smokeless tobacco: Never Used    Alcohol use No    Drug use: No    Sexual activity: Not on file         Current Outpatient Prescriptions:     amLODIPine (NORVASC) 10 mg tablet, Take 10 mg by mouth every morning, Disp: , Rfl:     apixaban (ELIQUIS) 5 mg, Take 5 mg by mouth 2 (two) times a day, Disp: , Rfl:     atorvastatin (LIPITOR) 20 mg tablet, TAKE 1 TABLET DAILY, Disp: 90 tablet, Rfl: 3    carvedilol (COREG) 25 mg tablet, Take 25 mg by mouth 2 (two) times a day, Disp: , Rfl:     dofetilide (TIKOSYN) 500 mcg capsule, Take 500 mcg by mouth 2 (two) times a day, Disp: , Rfl:     esomeprazole (NexIUM) 20 mg capsule, Take 40 mg by mouth daily at bedtime  , Disp: , Rfl:     fluticasone (FLONASE) 50 mcg/act nasal spray, 1 spray into each nostril 2 (two) times a day, Disp: 16 g, Rfl: 5    losartan (COZAAR) 50 mg tablet, Take 50 mg by mouth every morning, Disp: , Rfl:     multivitamin (THERAGRAN) TABS, Take 1 tablet by mouth every morning, Disp: , Rfl:     RESTASIS 0 05 % ophthalmic emulsion, , Disp: , Rfl:     sitaGLIPtin-metFORMIN (JANUMET)  MG per tablet, Take 2 tablets by mouth every evening Takes 2 tabs evening dose=100-2000mg  , Disp: , Rfl:     tamsulosin (FLOMAX) 0 4 mg, Take 0 4 mg by mouth daily with dinner, Disp: , Rfl:     ALREX 0 2 % SUSP, , Disp: , Rfl:   CIALIS 5 MG tablet, , Disp: , Rfl:     Dulaglutide 0 75 MG/0 5ML SOPN, Inject 80 Units under the skin, Disp: , Rfl:     FREESTYLE LITE test strip, , Disp: , Rfl:     Lancets (FREESTYLE) lancets, , Disp: , Rfl:     levocetirizine (XYZAL) 5 MG tablet, Take 1 tablet (5 mg total) by mouth every evening (Patient not taking: Reported on 8/24/2018 ), Disp: 90 tablet, Rfl: 3    metFORMIN (GLUCOPHAGE) 1000 MG tablet, , Disp: , Rfl:     TOBRADEX ophthalmic ointment, , Disp: , Rfl:     TRULICITY 8 33 IF/0 7XP SOPN, , Disp: , Rfl:     valACYclovir (VALTREX) 1,000 mg tablet, Take 1 tablet by mouth 3 (three) times a day for 7 days, Disp: 21 tablet, Rfl: 0    Allergies   Allergen Reactions    Codeine GI Intolerance and Other (See Comments)     Reaction Date: 27Dec2004; Annotation - [id number]: '' CRAZY ''  vomiting  headache    Demerol [Meperidine] GI Intolerance, Nausea Only, Vomiting and Other (See Comments)     vomiting  Reaction Date: 27Dec2004;   headache    Morphine GI Intolerance, Nausea Only and Vomiting     Reaction Date: 23Feb2012;     Morphine And Related        Objective:  Vitals:    08/24/18 0819   BP: 147/80   Pulse: 76       Right Shoulder Exam     Tenderness   The patient is experiencing tenderness in the biceps tendon  Range of Motion   Active Abduction: 150   Passive Abduction: 150   Extension: 50   Forward Flexion: 160   External Rotation: 60   Internal Rotation 0 degrees: L2     Muscle Strength   Abduction: 5/5   Internal Rotation: 5/5   External Rotation: 5/5   Supraspinatus: 5/5   Subscapularis: 5/5   Biceps: 5/5     Tests   Drop Arm: negative  Hawkin's test: negative  Impingement: negative    Other   Erythema: absent  Scars: absent  Sensation: normal  Pulse: present    Comments:  Empty can test:  Negative  Speed's test:  Negative            Physical Exam   Constitutional: He is oriented to person, place, and time  He appears well-developed and well-nourished     HENT:   Head: Normocephalic and atraumatic  Eyes: Conjunctivae are normal    Neck: Normal range of motion  Cardiovascular: Normal rate  Pulmonary/Chest: Effort normal    Neurological: He is alert and oriented to person, place, and time  Skin: Skin is warm and dry  Psychiatric: He has a normal mood and affect  His behavior is normal  Judgment and thought content normal    Nursing note and vitals reviewed  I have personally reviewed pertinent films in PACS and my interpretation is as follows:  X-rays of the right shoulder demonstrates moderate to severe glenohumeral osteoarthritis  Osteophyte formation at the inferior aspect of the humeral head

## 2018-10-08 ENCOUNTER — IMMUNIZATION (OUTPATIENT)
Dept: FAMILY MEDICINE CLINIC | Facility: CLINIC | Age: 73
End: 2018-10-08
Payer: MEDICARE

## 2018-10-08 DIAGNOSIS — Z23 NEEDS FLU SHOT: Primary | ICD-10-CM

## 2018-10-08 PROCEDURE — G0008 ADMIN INFLUENZA VIRUS VAC: HCPCS

## 2018-10-08 PROCEDURE — 90662 IIV NO PRSV INCREASED AG IM: CPT

## 2018-10-31 DIAGNOSIS — I10 BENIGN ESSENTIAL HYPERTENSION: Primary | ICD-10-CM

## 2018-10-31 RX ORDER — LOSARTAN POTASSIUM 50 MG/1
75 TABLET ORAL EVERY MORNING
Qty: 45 TABLET | Refills: 0 | Status: SHIPPED | OUTPATIENT
Start: 2018-10-31 | End: 2018-11-01 | Stop reason: SDUPTHER

## 2018-10-31 RX ORDER — LOSARTAN POTASSIUM 50 MG/1
75 TABLET ORAL EVERY MORNING
Qty: 90 TABLET | Refills: 3 | Status: SHIPPED | OUTPATIENT
Start: 2018-10-31 | End: 2018-10-31 | Stop reason: SDUPTHER

## 2018-10-31 NOTE — TELEPHONE ENCOUNTER
Patient left voicemail on Rx hotline [facility] and [facility] be sent to local [facility], currently out of medication  Is now taking 1 5 pills so ran out faster

## 2018-11-01 DIAGNOSIS — I10 BENIGN ESSENTIAL HYPERTENSION: ICD-10-CM

## 2018-11-01 RX ORDER — LOSARTAN POTASSIUM 50 MG/1
75 TABLET ORAL EVERY MORNING
Qty: 45 TABLET | Refills: 0 | Status: SHIPPED | OUTPATIENT
Start: 2018-11-01 | End: 2020-02-05

## 2018-11-12 ENCOUNTER — OFFICE VISIT (OUTPATIENT)
Dept: FAMILY MEDICINE CLINIC | Facility: CLINIC | Age: 73
End: 2018-11-12
Payer: MEDICARE

## 2018-11-12 DIAGNOSIS — J01.90 ACUTE NON-RECURRENT SINUSITIS, UNSPECIFIED LOCATION: Primary | ICD-10-CM

## 2018-11-12 PROCEDURE — 99213 OFFICE O/P EST LOW 20 MIN: CPT | Performed by: INTERNAL MEDICINE

## 2018-11-12 RX ORDER — FUROSEMIDE 20 MG/1
20 TABLET ORAL DAILY
COMMUNITY
Start: 2018-11-09 | End: 2021-01-31 | Stop reason: ALTCHOICE

## 2018-11-12 RX ORDER — PIOGLITAZONEHYDROCHLORIDE 30 MG/1
TABLET ORAL
COMMUNITY
Start: 2018-11-05 | End: 2018-12-20 | Stop reason: ALTCHOICE

## 2018-11-12 RX ORDER — HYDROCODONE POLISTIREX AND CHLORPHENIRAMINE POLISTIREX 10; 8 MG/5ML; MG/5ML
5 SUSPENSION, EXTENDED RELEASE ORAL EVERY 12 HOURS PRN
Qty: 120 ML | Refills: 0 | Status: SHIPPED | OUTPATIENT
Start: 2018-11-12 | End: 2019-05-20

## 2018-11-12 RX ORDER — AMOXICILLIN 875 MG/1
875 TABLET, COATED ORAL 2 TIMES DAILY
Qty: 20 TABLET | Refills: 0 | Status: SHIPPED | OUTPATIENT
Start: 2018-11-12 | End: 2018-11-22

## 2018-11-12 NOTE — PROGRESS NOTES
Subjective:      Patient ID: Rommel Chase  is a 68 y o  male  Chief Complaint   Patient presents with    Cough     prcma    chest congestion    Sore Throat       He went into afib about a week and a half ago  Saw cardiologist and was also retaining fluids, had a medication adjustment  Now sick for about 3-4 days with congestion, non productive cough, malaise  He had some dyspnea but cardiologist started lasix for 3 days and now this is better  No fever  No wheeze  The following portions of the patient's history were reviewed and updated as appropriate: allergies, current medications, past family history, past medical history, past social history, past surgical history and problem list     Review of Systems   Constitutional: Negative  HENT: Positive for congestion and postnasal drip  Respiratory: Positive for cough  Negative for shortness of breath and wheezing  Cardiovascular: Negative            Current Outpatient Prescriptions   Medication Sig Dispense Refill    amLODIPine (NORVASC) 10 mg tablet Take 10 mg by mouth every morning      apixaban (ELIQUIS) 5 mg Take 5 mg by mouth 2 (two) times a day      atorvastatin (LIPITOR) 20 mg tablet TAKE 1 TABLET DAILY 90 tablet 3    carvedilol (COREG) 25 mg tablet Take 25 mg by mouth 2 (two) times a day      dronedarone (MULTAQ) 400 mg tablet Take 400 mg by mouth      esomeprazole (NexIUM) 20 mg capsule Take 40 mg by mouth daily at bedtime        fluticasone (FLONASE) 50 mcg/act nasal spray 1 spray into each nostril 2 (two) times a day 16 g 5    FREESTYLE LITE test strip       furosemide (LASIX) 20 mg tablet Take 20 mg by mouth      Lancets (FREESTYLE) lancets       losartan (COZAAR) 50 mg tablet Take 1 5 tablets (75 mg total) by mouth every morning 45 tablet 0    multivitamin (THERAGRAN) TABS Take 1 tablet by mouth every morning      pioglitazone (ACTOS) 30 mg tablet       RESTASIS 0 05 % ophthalmic emulsion       sitaGLIPtin-metFORMIN (JANUMET)  MG per tablet Take 2 tablets by mouth every evening Takes 2 tabs evening dose=100-2000mg        tamsulosin (FLOMAX) 0 4 mg Take 0 4 mg by mouth daily with dinner      TOBRADEX ophthalmic ointment       ALREX 0 2 % SUSP       amoxicillin (AMOXIL) 875 mg tablet Take 1 tablet (875 mg total) by mouth 2 (two) times a day for 10 days 20 tablet 0    apixaban (ELIQUIS) 5 mg TAKE 1 TABLET TWICE A DAY      CIALIS 5 MG tablet       dofetilide (TIKOSYN) 500 mcg capsule Take 500 mcg by mouth 2 (two) times a day      Dulaglutide 0 75 MG/0 5ML SOPN Inject 80 Units under the skin      hydrocodone-chlorpheniramine polistirex (TUSSIONEX) 10-8 mg/5 mL ER suspension Take 5 mL by mouth every 12 (twelve) hours as needed for cough Max Daily Amount: 10 mL 120 mL 0    levocetirizine (XYZAL) 5 MG tablet Take 1 tablet (5 mg total) by mouth every evening (Patient not taking: Reported on 8/24/2018 ) 90 tablet 3    metFORMIN (GLUCOPHAGE) 1000 MG tablet       TRULICITY 6 19 MS/2 8QA SOPN       valACYclovir (VALTREX) 1,000 mg tablet Take 1 tablet by mouth 3 (three) times a day for 7 days (Patient not taking: Reported on 11/12/2018 ) 21 tablet 0     No current facility-administered medications for this visit  Objective: There were no vitals taken for this visit  Physical Exam   Constitutional: He appears well-developed and well-nourished  HENT:   Right Ear: Tympanic membrane is retracted  Left Ear: Tympanic membrane is retracted  Nose: Mucosal edema present  Nasal mucosae erythematous   Eyes: Conjunctivae are normal    Neck: Neck supple  No JVD present  No thyromegaly present  Cardiovascular: Normal rate, regular rhythm, normal heart sounds and intact distal pulses  Exam reveals no gallop and no friction rub  No murmur heard  Pulmonary/Chest: Effort normal and breath sounds normal  He has no wheezes  He has no rales  Abdominal: Soft   Bowel sounds are normal  He exhibits no distension  There is no tenderness  Musculoskeletal: He exhibits no edema  Assessment/Plan:    No problem-specific Assessment & Plan notes found for this encounter  Diagnoses and all orders for this visit:    Acute non-recurrent sinusitis, unspecified location  Comments:  Increase fluids, symptomatic care was discussed  Follow up if not improving  Orders:  -     hydrocodone-chlorpheniramine polistirex (TUSSIONEX) 10-8 mg/5 mL ER suspension; Take 5 mL by mouth every 12 (twelve) hours as needed for cough Max Daily Amount: 10 mL  -     amoxicillin (AMOXIL) 875 mg tablet; Take 1 tablet (875 mg total) by mouth 2 (two) times a day for 10 days    Other orders  -     furosemide (LASIX) 20 mg tablet; Take 20 mg by mouth  -     dronedarone (MULTAQ) 400 mg tablet; Take 400 mg by mouth  -     pioglitazone (ACTOS) 30 mg tablet; No Follow-up on file         Ginger Swanson MD

## 2018-11-20 ENCOUNTER — APPOINTMENT (OUTPATIENT)
Dept: LAB | Facility: CLINIC | Age: 73
End: 2018-11-20
Payer: MEDICARE

## 2018-11-20 ENCOUNTER — TRANSCRIBE ORDERS (OUTPATIENT)
Dept: LAB | Facility: CLINIC | Age: 73
End: 2018-11-20

## 2018-11-20 DIAGNOSIS — I48.0 PAROXYSMAL ATRIAL FIBRILLATION (HCC): Primary | ICD-10-CM

## 2018-11-20 DIAGNOSIS — I48.19 PERSISTENT ATRIAL FIBRILLATION (HCC): ICD-10-CM

## 2018-11-20 PROCEDURE — 80048 BASIC METABOLIC PNL TOTAL CA: CPT

## 2018-11-20 PROCEDURE — 36415 COLL VENOUS BLD VENIPUNCTURE: CPT

## 2018-11-21 LAB
ANION GAP SERPL CALCULATED.3IONS-SCNC: 4 MMOL/L (ref 4–13)
BUN SERPL-MCNC: 23 MG/DL (ref 5–25)
CALCIUM SERPL-MCNC: 9.5 MG/DL (ref 8.3–10.1)
CHLORIDE SERPL-SCNC: 104 MMOL/L (ref 100–108)
CO2 SERPL-SCNC: 25 MMOL/L (ref 21–32)
CREAT SERPL-MCNC: 1.29 MG/DL (ref 0.6–1.3)
GFR SERPL CREATININE-BSD FRML MDRD: 55 ML/MIN/1.73SQ M
GLUCOSE SERPL-MCNC: 222 MG/DL (ref 65–140)
POTASSIUM SERPL-SCNC: 4.2 MMOL/L (ref 3.5–5.3)
SODIUM SERPL-SCNC: 133 MMOL/L (ref 136–145)

## 2018-11-30 ENCOUNTER — APPOINTMENT (OUTPATIENT)
Dept: LAB | Facility: CLINIC | Age: 73
End: 2018-11-30
Payer: MEDICARE

## 2018-11-30 LAB
ALBUMIN SERPL BCP-MCNC: 3.6 G/DL (ref 3.5–5)
ALP SERPL-CCNC: 62 U/L (ref 46–116)
ALT SERPL W P-5'-P-CCNC: 35 U/L (ref 12–78)
ANION GAP SERPL CALCULATED.3IONS-SCNC: 8 MMOL/L (ref 4–13)
AST SERPL W P-5'-P-CCNC: 11 U/L (ref 5–45)
BASOPHILS # BLD AUTO: 0.04 THOUSANDS/ΜL (ref 0–0.1)
BASOPHILS NFR BLD AUTO: 0 % (ref 0–1)
BILIRUB SERPL-MCNC: 0.45 MG/DL (ref 0.2–1)
BUN SERPL-MCNC: 27 MG/DL (ref 5–25)
CALCIUM SERPL-MCNC: 8.7 MG/DL (ref 8.3–10.1)
CHLORIDE SERPL-SCNC: 108 MMOL/L (ref 100–108)
CO2 SERPL-SCNC: 25 MMOL/L (ref 21–32)
CREAT SERPL-MCNC: 1.24 MG/DL (ref 0.6–1.3)
EOSINOPHIL # BLD AUTO: 0.14 THOUSAND/ΜL (ref 0–0.61)
EOSINOPHIL NFR BLD AUTO: 1 % (ref 0–6)
ERYTHROCYTE [DISTWIDTH] IN BLOOD BY AUTOMATED COUNT: 13.6 % (ref 11.6–15.1)
GFR SERPL CREATININE-BSD FRML MDRD: 57 ML/MIN/1.73SQ M
GLUCOSE P FAST SERPL-MCNC: 160 MG/DL (ref 65–99)
HCT VFR BLD AUTO: 39.5 % (ref 36.5–49.3)
HGB BLD-MCNC: 12.4 G/DL (ref 12–17)
IMM GRANULOCYTES # BLD AUTO: 0.03 THOUSAND/UL (ref 0–0.2)
IMM GRANULOCYTES NFR BLD AUTO: 0 % (ref 0–2)
INR PPP: 1.15 (ref 0.86–1.17)
LYMPHOCYTES # BLD AUTO: 1.79 THOUSANDS/ΜL (ref 0.6–4.47)
LYMPHOCYTES NFR BLD AUTO: 15 % (ref 14–44)
MAGNESIUM SERPL-MCNC: 2.4 MG/DL (ref 1.6–2.6)
MCH RBC QN AUTO: 27.9 PG (ref 26.8–34.3)
MCHC RBC AUTO-ENTMCNC: 31.4 G/DL (ref 31.4–37.4)
MCV RBC AUTO: 89 FL (ref 82–98)
MONOCYTES # BLD AUTO: 0.56 THOUSAND/ΜL (ref 0.17–1.22)
MONOCYTES NFR BLD AUTO: 5 % (ref 4–12)
NEUTROPHILS # BLD AUTO: 9.32 THOUSANDS/ΜL (ref 1.85–7.62)
NEUTS SEG NFR BLD AUTO: 79 % (ref 43–75)
NRBC BLD AUTO-RTO: 0 /100 WBCS
PLATELET # BLD AUTO: 254 THOUSANDS/UL (ref 149–390)
PMV BLD AUTO: 11 FL (ref 8.9–12.7)
POTASSIUM SERPL-SCNC: 4.3 MMOL/L (ref 3.5–5.3)
PROT SERPL-MCNC: 7.6 G/DL (ref 6.4–8.2)
PROTHROMBIN TIME: 14.8 SECONDS (ref 11.8–14.2)
RBC # BLD AUTO: 4.44 MILLION/UL (ref 3.88–5.62)
SODIUM SERPL-SCNC: 141 MMOL/L (ref 136–145)
WBC # BLD AUTO: 11.88 THOUSAND/UL (ref 4.31–10.16)

## 2018-11-30 PROCEDURE — 36415 COLL VENOUS BLD VENIPUNCTURE: CPT

## 2018-11-30 PROCEDURE — 83735 ASSAY OF MAGNESIUM: CPT

## 2018-11-30 PROCEDURE — 85610 PROTHROMBIN TIME: CPT

## 2018-11-30 PROCEDURE — 85025 COMPLETE CBC W/AUTO DIFF WBC: CPT

## 2018-11-30 PROCEDURE — 80053 COMPREHEN METABOLIC PANEL: CPT

## 2018-12-07 ENCOUNTER — TRANSCRIBE ORDERS (OUTPATIENT)
Dept: ADMINISTRATIVE | Facility: HOSPITAL | Age: 73
End: 2018-12-07

## 2018-12-07 DIAGNOSIS — H57.11 PAIN IN RIGHT EYE: ICD-10-CM

## 2018-12-07 DIAGNOSIS — H05.20 EXOPHTHALMUS: Primary | ICD-10-CM

## 2018-12-20 ENCOUNTER — OFFICE VISIT (OUTPATIENT)
Dept: FAMILY MEDICINE CLINIC | Facility: CLINIC | Age: 73
End: 2018-12-20
Payer: MEDICARE

## 2018-12-20 VITALS
DIASTOLIC BLOOD PRESSURE: 68 MMHG | HEART RATE: 80 BPM | BODY MASS INDEX: 33.88 KG/M2 | SYSTOLIC BLOOD PRESSURE: 110 MMHG | WEIGHT: 242 LBS | RESPIRATION RATE: 16 BRPM | TEMPERATURE: 98.1 F | HEIGHT: 71 IN

## 2018-12-20 DIAGNOSIS — S90.511A ABRASION OF RIGHT ANKLE, INITIAL ENCOUNTER: Primary | ICD-10-CM

## 2018-12-20 PROCEDURE — 99213 OFFICE O/P EST LOW 20 MIN: CPT | Performed by: NURSE PRACTITIONER

## 2018-12-20 RX ORDER — POLYETHYLENE GLYCOL 400 AND PROPYLENE GLYCOL 4; 3 MG/ML; MG/ML
SOLUTION/ DROPS OPHTHALMIC
COMMUNITY

## 2018-12-20 NOTE — PATIENT INSTRUCTIONS
Apply ointment twice a day and cover with band aid  Supportive care discussed and advised  Follow up for no improvement and worsening of conditions  Patient advised and educated when to see immediate medical care

## 2018-12-20 NOTE — PROGRESS NOTES
Assessment/Plan:  Will follow up for any worsening and no improvement as has diabetes  Diagnoses and all orders for this visit:    Abrasion of right ankle, initial encounter  -     mupirocin (BACTROBAN) 2 % ointment; Apply topically 2 (two) times a day    BMI 33 0-33 9,adult    Other orders  -     polyethylene glycol-propylene glycol (SYSTANE) 0 4-0 3 %; every 3 (three) hours as needed            Patient Instructions:  Apply ointment twice a day and cover with band aid  Supportive care discussed and advised  Follow up for no improvement and worsening of conditions  Patient advised and educated when to see immediate medical care  Return if symptoms worsen or fail to improve  Subjective:      Patient ID: Herlinda Vale  is a 68 y o  male  Chief Complaint   Patient presents with    open area     right ankle area       HPI  Patient stated felt itchy at the right ankle area on 12/18 and skin was dry and scratched it  Stated that scratched it open and was sore around it  Stated that soreness around is resolved but still skin is open and using neosporin  Denies fever, chills and activity restrictions  The following portions of the patient's history were reviewed and updated as appropriate: allergies, current medications, past family history, past medical history, past social history, past surgical history and problem list       Review of Systems   Constitutional: Negative  Respiratory: Negative  Cardiovascular: Negative  Skin:        As noted in HPI       Neurological: Negative  Objective:    History   Smoking Status    Former Smoker    Packs/day: 1 00    Years: 20 00    Quit date: 1975   Smokeless Tobacco    Never Used       Allergies: Allergies   Allergen Reactions    Codeine GI Intolerance and Other (See Comments)     Reaction Date: 27Dec2004;  Annotation - [id number]: '' CRAZY ''  vomiting  headache    Demerol [Meperidine] GI Intolerance, Nausea Only, Vomiting and Other (See Comments)     vomiting  Reaction Date: 27Dec2004;   headache    Morphine GI Intolerance, Nausea Only and Vomiting     Reaction Date: 23Feb2012;     Morphine And Related        Vitals:  /68   Pulse 80   Temp 98 1 °F (36 7 °C)   Resp 16   Ht 5' 11" (1 803 m)   Wt 110 kg (242 lb)   BMI 33 75 kg/m²     Current Outpatient Prescriptions   Medication Sig Dispense Refill    amLODIPine (NORVASC) 10 mg tablet Take 10 mg by mouth every morning      apixaban (ELIQUIS) 5 mg Take 5 mg by mouth 2 (two) times a day      atorvastatin (LIPITOR) 20 mg tablet TAKE 1 TABLET DAILY 90 tablet 3    carvedilol (COREG) 25 mg tablet Take 25 mg by mouth 2 (two) times a day      dronedarone (MULTAQ) 400 mg tablet Take 400 mg by mouth      esomeprazole (NexIUM) 20 mg capsule Take 40 mg by mouth daily at bedtime        fluticasone (FLONASE) 50 mcg/act nasal spray 1 spray into each nostril 2 (two) times a day 16 g 5    FREESTYLE LITE test strip       furosemide (LASIX) 20 mg tablet Take 20 mg by mouth      Lancets (FREESTYLE) lancets       losartan (COZAAR) 50 mg tablet Take 1 5 tablets (75 mg total) by mouth every morning 45 tablet 0    multivitamin (THERAGRAN) TABS Take 1 tablet by mouth every morning      polyethylene glycol-propylene glycol (SYSTANE) 0 4-0 3 % every 3 (three) hours as needed      RESTASIS 0 05 % ophthalmic emulsion       sitaGLIPtin-metFORMIN (JANUMET)  MG per tablet Take 2 tablets by mouth every evening Takes 2 tabs evening dose=100-2000mg        tamsulosin (FLOMAX) 0 4 mg Take 0 4 mg by mouth daily with dinner      hydrocodone-chlorpheniramine polistirex (TUSSIONEX) 10-8 mg/5 mL ER suspension Take 5 mL by mouth every 12 (twelve) hours as needed for cough Max Daily Amount: 10 mL (Patient not taking: Reported on 12/20/2018 ) 120 mL 0    levocetirizine (XYZAL) 5 MG tablet Take 1 tablet (5 mg total) by mouth every evening (Patient not taking: Reported on 12/20/2018 ) 90 tablet 3    mupirocin (BACTROBAN) 2 % ointment Apply topically 2 (two) times a day 22 g 0    valACYclovir (VALTREX) 1,000 mg tablet Take 1 tablet by mouth 3 (three) times a day for 7 days (Patient not taking: Reported on 11/12/2018 ) 21 tablet 0     No current facility-administered medications for this visit  Physical Exam   Constitutional: He is oriented to person, place, and time  He appears well-developed and well-nourished  Cardiovascular: Normal rate, regular rhythm and normal heart sounds  Pulmonary/Chest: Effort normal and breath sounds normal    Musculoskeletal: He exhibits edema (chronic edema of both legs)  Neurological: He is alert and oriented to person, place, and time  Skin:   Right ankle with skin abrasion about 1 25 cm long with irregular edges and longer wider area is 1 cm  No induration and tunneling noted  Skin abrasion area is clean, dry  No tenderness around noted  Bilateral legs with chronic edema and discomfort at edema areas  Psychiatric: He has a normal mood and affect   His behavior is normal  Judgment and thought content normal                        SANDRITA Mayes

## 2018-12-26 ENCOUNTER — HOSPITAL ENCOUNTER (OUTPATIENT)
Dept: RADIOLOGY | Facility: HOSPITAL | Age: 73
Discharge: HOME | End: 2018-12-26
Attending: OPHTHALMOLOGY
Payer: MEDICARE

## 2018-12-26 DIAGNOSIS — H05.20 EXOPHTHALMUS: ICD-10-CM

## 2018-12-26 DIAGNOSIS — H57.11 PAIN IN RIGHT EYE: ICD-10-CM

## 2018-12-26 PROCEDURE — 70540 MRI ORBIT/FACE/NECK W/O DYE: CPT

## 2019-01-17 ENCOUNTER — OFFICE VISIT (OUTPATIENT)
Dept: FAMILY MEDICINE CLINIC | Facility: CLINIC | Age: 74
End: 2019-01-17
Payer: MEDICARE

## 2019-01-17 VITALS
WEIGHT: 243 LBS | HEART RATE: 76 BPM | RESPIRATION RATE: 20 BRPM | DIASTOLIC BLOOD PRESSURE: 62 MMHG | TEMPERATURE: 98.5 F | BODY MASS INDEX: 34.02 KG/M2 | SYSTOLIC BLOOD PRESSURE: 102 MMHG | HEIGHT: 71 IN

## 2019-01-17 DIAGNOSIS — R60.9 EDEMA, UNSPECIFIED TYPE: ICD-10-CM

## 2019-01-17 DIAGNOSIS — R22.0 FACIAL SWELLING: Primary | ICD-10-CM

## 2019-01-17 PROCEDURE — 99213 OFFICE O/P EST LOW 20 MIN: CPT | Performed by: INTERNAL MEDICINE

## 2019-01-17 NOTE — PROGRESS NOTES
Subjective:      Patient ID: Margi Roberts  is a 76 y o  male  Chief Complaint   Patient presents with    Retaining Water     Pt had eye surgery tuesday morning, eye swollen bilateral retention [id number]       Had a mole removed from R eye 2 days ago  Today went back and changed the bandage  Having a lot of swelling  Using ice packs, but swelling is worse  Has worsening swelling LEs bilaterally as well, does not check daily weights  Denies orthopnea or PND  Takes lasix daily but his cardiologist (who is moving to [place]) usually with double the dose for 3 days if he gets swelling  The following portions of the patient's history were reviewed and updated as appropriate: allergies, current medications, past family history, past medical history, past social history, past surgical history and problem list     Review of Systems   Constitutional: Negative  Respiratory: Negative  Cardiovascular: Positive for leg swelling           Current Outpatient Prescriptions   Medication Sig Dispense Refill    amLODIPine (NORVASC) 10 mg tablet Take 10 mg by mouth every morning      apixaban (ELIQUIS) 5 mg Take 5 mg by mouth 2 (two) times a day      atorvastatin (LIPITOR) 20 mg tablet TAKE 1 TABLET DAILY 90 tablet 3    carvedilol (COREG) 25 mg tablet Take 25 mg by mouth 2 (two) times a day      dronedarone (MULTAQ) 400 mg tablet Take 400 mg by mouth      esomeprazole (NexIUM) 20 mg capsule Take 40 mg by mouth daily at bedtime        fluticasone (FLONASE) 50 mcg/act nasal spray 1 spray into each nostril 2 (two) times a day 16 g 5    FREESTYLE LITE test strip       furosemide (LASIX) 20 mg tablet Take 20 mg by mouth      Lancets (FREESTYLE) lancets       losartan (COZAAR) 50 mg tablet Take 1 5 tablets (75 mg total) by mouth every morning 45 tablet 0    multivitamin (THERAGRAN) TABS Take 1 tablet by mouth every morning      mupirocin (BACTROBAN) 2 % ointment Apply topically 2 (two) times a day 22 g 0  polyethylene glycol-propylene glycol (SYSTANE) 0 4-0 3 % every 3 (three) hours as needed      RESTASIS 0 05 % ophthalmic emulsion       sitaGLIPtin-metFORMIN (JANUMET)  MG per tablet Take 2 tablets by mouth every evening Takes 2 tabs evening dose=100-2000mg        tamsulosin (FLOMAX) 0 4 mg Take 0 4 mg by mouth daily with dinner      hydrocodone-chlorpheniramine polistirex (TUSSIONEX) 10-8 mg/5 mL ER suspension Take 5 mL by mouth every 12 (twelve) hours as needed for cough Max Daily Amount: 10 mL (Patient not taking: Reported on 12/20/2018 ) 120 mL 0    levocetirizine (XYZAL) 5 MG tablet Take 1 tablet (5 mg total) by mouth every evening (Patient not taking: Reported on 12/20/2018 ) 90 tablet 3    valACYclovir (VALTREX) 1,000 mg tablet Take 1 tablet by mouth 3 (three) times a day for 7 days (Patient not taking: Reported on 11/12/2018 ) 21 tablet 0     No current facility-administered medications for this visit  Objective:    /62   Pulse 76   Temp 98 5 °F (36 9 °C)   Resp 20   Ht 5' 11" (1 803 m)   Wt 110 kg (243 lb)   BMI 33 89 kg/m²        Physical Exam   Constitutional: He appears well-developed and well-nourished  Eyes: Conjunctivae are normal    Neck: Neck supple  No JVD present  No thyromegaly present  Cardiovascular: Normal rate, regular rhythm, normal heart sounds and intact distal pulses  Exam reveals no gallop and no friction rub  No murmur heard  Pulmonary/Chest: Effort normal and breath sounds normal  He has no wheezes  He has no rales  Abdominal: Soft  Bowel sounds are normal  He exhibits no distension  There is no tenderness  Musculoskeletal: He exhibits edema  Bilateral 2+ pitting edema   Skin:   R upper and lower lids with 3+edema, no erythema         Assessment/Plan:    No problem-specific Assessment & Plan notes found for this encounter         Diagnoses and all orders for this visit:    Facial swelling  Comments:  Suspect from recent surgery, advised continued cold packs at least QID  Edema, unspecified type  Comments:  Advised daily weights, advised to change furosemide to BID x 3 days  Call if not improving  He is working on getting a new cardiologist           No Follow-up on file         Cristina Cummings MD

## 2019-01-30 ENCOUNTER — OFFICE VISIT (OUTPATIENT)
Dept: FAMILY MEDICINE CLINIC | Facility: CLINIC | Age: 74
End: 2019-01-30
Payer: MEDICARE

## 2019-01-30 VITALS
SYSTOLIC BLOOD PRESSURE: 136 MMHG | TEMPERATURE: 98 F | RESPIRATION RATE: 16 BRPM | HEART RATE: 84 BPM | HEIGHT: 71 IN | WEIGHT: 244 LBS | BODY MASS INDEX: 34.16 KG/M2 | DIASTOLIC BLOOD PRESSURE: 82 MMHG

## 2019-01-30 DIAGNOSIS — L30.9 DERMATITIS: ICD-10-CM

## 2019-01-30 DIAGNOSIS — R60.9 EDEMA, UNSPECIFIED TYPE: Primary | ICD-10-CM

## 2019-01-30 PROCEDURE — 99213 OFFICE O/P EST LOW 20 MIN: CPT | Performed by: INTERNAL MEDICINE

## 2019-01-30 NOTE — PROGRESS NOTES
Subjective:      Patient ID: Jasmin Artist  is a 76 y o  male  Chief Complaint   Patient presents with    itching     hands,feet, lower legs--lj       Started with itchy rash where he is holding water, started in the shins and now behind legs and in hands  He is also using a different soap and was not sure if this was a problem  He does not see much but is very itchy  Had done 3 days of increased furosemide last week but did not want to do this again without advice  No dyspnea, orthopnea, PND  The following portions of the patient's history were reviewed and updated as appropriate: allergies, current medications, past family history, past medical history, past social history, past surgical history and problem list     Review of Systems   Constitutional: Negative  Respiratory: Negative  Cardiovascular: Positive for leg swelling           Current Outpatient Prescriptions   Medication Sig Dispense Refill    amLODIPine (NORVASC) 10 mg tablet Take 10 mg by mouth every morning      apixaban (ELIQUIS) 5 mg Take 5 mg by mouth 2 (two) times a day      atorvastatin (LIPITOR) 20 mg tablet TAKE 1 TABLET DAILY 90 tablet 3    carvedilol (COREG) 25 mg tablet Take 25 mg by mouth 2 (two) times a day      dronedarone (MULTAQ) 400 mg tablet Take 400 mg by mouth      esomeprazole (NexIUM) 20 mg capsule Take 40 mg by mouth daily at bedtime        fluticasone (FLONASE) 50 mcg/act nasal spray 1 spray into each nostril 2 (two) times a day 16 g 5    FREESTYLE LITE test strip       furosemide (LASIX) 20 mg tablet Take 20 mg by mouth      hydrocodone-chlorpheniramine polistirex (TUSSIONEX) 10-8 mg/5 mL ER suspension Take 5 mL by mouth every 12 (twelve) hours as needed for cough Max Daily Amount: 10 mL 120 mL 0    Lancets (FREESTYLE) lancets       losartan (COZAAR) 50 mg tablet Take 1 5 tablets (75 mg total) by mouth every morning 45 tablet 0    multivitamin (THERAGRAN) TABS Take 1 tablet by mouth every morning      mupirocin (BACTROBAN) 2 % ointment Apply topically 2 (two) times a day 22 g 0    polyethylene glycol-propylene glycol (SYSTANE) 0 4-0 3 % every 3 (three) hours as needed      RESTASIS 0 05 % ophthalmic emulsion       sitaGLIPtin-metFORMIN (JANUMET)  MG per tablet Take 2 tablets by mouth every evening Takes 2 tabs evening dose=100-2000mg        tamsulosin (FLOMAX) 0 4 mg Take 0 4 mg by mouth daily with dinner      hydrocortisone (WESTCORT) 0 2 % cream Apply topically 2 (two) times a day 45 g 0     No current facility-administered medications for this visit  Objective:    /82   Pulse 84   Temp 98 °F (36 7 °C)   Resp 16   Ht 5' 11" (1 803 m)   Wt 111 kg (244 lb)   BMI 34 03 kg/m²        Physical Exam   Constitutional: He appears well-developed and well-nourished  Eyes: Conjunctivae are normal    Neck: Neck supple  No JVD present  Cardiovascular: Normal rate, regular rhythm, normal heart sounds and intact distal pulses  Exam reveals no gallop and no friction rub  No murmur heard  Pulmonary/Chest: Effort normal and breath sounds normal  He has no wheezes  He has no rales  Abdominal: Soft  Bowel sounds are normal  He exhibits no distension  There is no tenderness  Musculoskeletal: He exhibits edema  2+ pitting edema  Skin:   No rash seen, some dry skin hands  Assessment/Plan:    No problem-specific Assessment & Plan notes found for this encounter  Diagnoses and all orders for this visit:    Edema, unspecified type  Comments:  Advised support hose OTC, on in AM and off at bedtime  If ineffective we can try short course of increased furosemide  Steroid cream as above  Orders:  -     hydrocortisone (WESTCORT) 0 2 % cream; Apply topically 2 (two) times a day    Dermatitis  Comments:  Steroid cream given for legs, advised moisturizer for hands and go back to original soaps    Orders:  -     hydrocortisone (WESTCORT) 0 2 % cream; Apply topically 2 (two) times a day          Return in about 4 months (around 5/30/2019)         Ingrid Sanchez MD

## 2019-02-07 ENCOUNTER — TRANSCRIBE ORDERS (OUTPATIENT)
Dept: LAB | Facility: CLINIC | Age: 74
End: 2019-02-07

## 2019-02-07 ENCOUNTER — APPOINTMENT (OUTPATIENT)
Dept: LAB | Facility: CLINIC | Age: 74
End: 2019-02-07
Payer: MEDICARE

## 2019-02-07 DIAGNOSIS — E11.649 UNCONTROLLED TYPE 2 DIABETES MELLITUS WITH HYPOGLYCEMIA, UNSPECIFIED HYPOGLYCEMIA COMA STATUS (HCC): Primary | ICD-10-CM

## 2019-02-07 LAB
ANION GAP SERPL CALCULATED.3IONS-SCNC: 7 MMOL/L (ref 4–13)
BUN SERPL-MCNC: 18 MG/DL (ref 5–25)
CALCIUM SERPL-MCNC: 8.8 MG/DL (ref 8.3–10.1)
CHLORIDE SERPL-SCNC: 108 MMOL/L (ref 100–108)
CO2 SERPL-SCNC: 27 MMOL/L (ref 21–32)
CREAT SERPL-MCNC: 0.93 MG/DL (ref 0.6–1.3)
EST. AVERAGE GLUCOSE BLD GHB EST-MCNC: 157 MG/DL
GFR SERPL CREATININE-BSD FRML MDRD: 81 ML/MIN/1.73SQ M
GLUCOSE SERPL-MCNC: 145 MG/DL (ref 65–140)
HBA1C MFR BLD: 7.1 % (ref 4.2–6.3)
POTASSIUM SERPL-SCNC: 3.9 MMOL/L (ref 3.5–5.3)
SODIUM SERPL-SCNC: 142 MMOL/L (ref 136–145)

## 2019-02-07 PROCEDURE — 83036 HEMOGLOBIN GLYCOSYLATED A1C: CPT

## 2019-02-07 PROCEDURE — 36415 COLL VENOUS BLD VENIPUNCTURE: CPT

## 2019-02-07 PROCEDURE — 80048 BASIC METABOLIC PNL TOTAL CA: CPT

## 2019-04-04 ENCOUNTER — APPOINTMENT (OUTPATIENT)
Dept: LAB | Facility: CLINIC | Age: 74
End: 2019-04-04
Payer: MEDICARE

## 2019-04-04 ENCOUNTER — TRANSCRIBE ORDERS (OUTPATIENT)
Dept: LAB | Facility: CLINIC | Age: 74
End: 2019-04-04

## 2019-04-04 DIAGNOSIS — M54.2 CERVICALGIA: ICD-10-CM

## 2019-04-04 DIAGNOSIS — M31.6 GIANT CELL ARTERITIS (HCC): ICD-10-CM

## 2019-04-04 DIAGNOSIS — D33.3 BENIGN NEOPLASM OF CRANIAL NERVES (HCC): ICD-10-CM

## 2019-04-04 DIAGNOSIS — M54.81 OCCIPITAL NEURALGIA, UNSPECIFIED LATERALITY: Primary | ICD-10-CM

## 2019-04-04 LAB
CRP SERPL QL: <3 MG/L
ERYTHROCYTE [SEDIMENTATION RATE] IN BLOOD: 13 MM/HOUR (ref 0–10)

## 2019-04-04 PROCEDURE — 85652 RBC SED RATE AUTOMATED: CPT

## 2019-04-04 PROCEDURE — 86140 C-REACTIVE PROTEIN: CPT

## 2019-04-04 PROCEDURE — 36415 COLL VENOUS BLD VENIPUNCTURE: CPT

## 2019-04-15 ENCOUNTER — TELEPHONE (OUTPATIENT)
Dept: FAMILY MEDICINE CLINIC | Facility: CLINIC | Age: 74
End: 2019-04-15

## 2019-04-17 RX ORDER — AMIODARONE HYDROCHLORIDE 200 MG/1
200 TABLET ORAL
COMMUNITY
Start: 2019-02-05

## 2019-04-22 ENCOUNTER — OFFICE VISIT (OUTPATIENT)
Dept: FAMILY MEDICINE CLINIC | Facility: CLINIC | Age: 74
End: 2019-04-22
Payer: MEDICARE

## 2019-04-22 ENCOUNTER — APPOINTMENT (OUTPATIENT)
Dept: LAB | Facility: CLINIC | Age: 74
End: 2019-04-22
Payer: MEDICARE

## 2019-04-22 VITALS
DIASTOLIC BLOOD PRESSURE: 78 MMHG | BODY MASS INDEX: 34.44 KG/M2 | WEIGHT: 246 LBS | TEMPERATURE: 97.6 F | SYSTOLIC BLOOD PRESSURE: 144 MMHG | RESPIRATION RATE: 16 BRPM | HEIGHT: 71 IN | HEART RATE: 84 BPM

## 2019-04-22 DIAGNOSIS — R51.9 NONINTRACTABLE EPISODIC HEADACHE, UNSPECIFIED HEADACHE TYPE: Primary | ICD-10-CM

## 2019-04-22 DIAGNOSIS — R51.9 NONINTRACTABLE EPISODIC HEADACHE, UNSPECIFIED HEADACHE TYPE: ICD-10-CM

## 2019-04-22 LAB
CRP SERPL QL: <3 MG/L
ERYTHROCYTE [SEDIMENTATION RATE] IN BLOOD: 13 MM/HOUR (ref 0–10)

## 2019-04-22 PROCEDURE — 85652 RBC SED RATE AUTOMATED: CPT

## 2019-04-22 PROCEDURE — 36415 COLL VENOUS BLD VENIPUNCTURE: CPT

## 2019-04-22 PROCEDURE — 99213 OFFICE O/P EST LOW 20 MIN: CPT | Performed by: INTERNAL MEDICINE

## 2019-04-22 PROCEDURE — 86140 C-REACTIVE PROTEIN: CPT

## 2019-04-23 ENCOUNTER — TELEPHONE (OUTPATIENT)
Dept: FAMILY MEDICINE CLINIC | Facility: CLINIC | Age: 74
End: 2019-04-23

## 2019-04-26 ENCOUNTER — APPOINTMENT (OUTPATIENT)
Dept: RADIOLOGY | Facility: CLINIC | Age: 74
End: 2019-04-26
Payer: MEDICARE

## 2019-04-26 ENCOUNTER — TRANSCRIBE ORDERS (OUTPATIENT)
Dept: URGENT CARE | Facility: CLINIC | Age: 74
End: 2019-04-26

## 2019-04-26 DIAGNOSIS — M26.602 LEFT TEMPOROMANDIBULAR JOINT DISORDER, UNSPECIFIED: ICD-10-CM

## 2019-04-26 DIAGNOSIS — M26.602 LEFT TEMPOROMANDIBULAR JOINT DISORDER, UNSPECIFIED: Primary | ICD-10-CM

## 2019-04-26 PROCEDURE — 70330 X-RAY EXAM OF JAW JOINTS: CPT

## 2019-05-02 LAB
LEFT EYE DIABETIC RETINOPATHY: NORMAL
RIGHT EYE DIABETIC RETINOPATHY: NORMAL

## 2019-05-13 ENCOUNTER — TRANSCRIBE ORDERS (OUTPATIENT)
Dept: ADMINISTRATIVE | Facility: HOSPITAL | Age: 74
End: 2019-05-13

## 2019-05-13 DIAGNOSIS — M26.629 TMJ PAIN DYSFUNCTION SYNDROME: Primary | ICD-10-CM

## 2019-05-20 ENCOUNTER — OFFICE VISIT (OUTPATIENT)
Dept: URGENT CARE | Facility: CLINIC | Age: 74
End: 2019-05-20
Payer: MEDICARE

## 2019-05-20 VITALS
OXYGEN SATURATION: 97 % | DIASTOLIC BLOOD PRESSURE: 70 MMHG | WEIGHT: 240 LBS | HEART RATE: 64 BPM | BODY MASS INDEX: 33.6 KG/M2 | RESPIRATION RATE: 18 BRPM | HEIGHT: 71 IN | TEMPERATURE: 98 F | SYSTOLIC BLOOD PRESSURE: 130 MMHG

## 2019-05-20 DIAGNOSIS — S30.861A TICK BITE OF ABDOMEN, INITIAL ENCOUNTER: Primary | ICD-10-CM
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
## 2019-05-20 PROCEDURE — 10120 INC&RMVL FB SUBQ TISS SMPL: CPT | Performed by: PHYSICIAN ASSISTANT

## 2019-05-20 PROCEDURE — 99212 OFFICE O/P EST SF 10 MIN: CPT | Performed by: PHYSICIAN ASSISTANT

## 2019-05-20 RX ORDER — CELECOXIB 200 MG/1
200 CAPSULE ORAL 2 TIMES DAILY
COMMUNITY
Start: 2019-05-08 | End: 2020-11-02 | Stop reason: ALTCHOICE

## 2019-05-20 RX ORDER — BLOOD-GLUCOSE METER
KIT MISCELLANEOUS
COMMUNITY
Start: 2019-05-01

## 2019-06-10 DIAGNOSIS — I10 BENIGN ESSENTIAL HYPERTENSION: ICD-10-CM

## 2019-06-10 RX ORDER — LOSARTAN POTASSIUM 50 MG/1
TABLET ORAL
Qty: 90 TABLET | Refills: 3 | Status: SHIPPED | OUTPATIENT
Start: 2019-06-10 | End: 2019-11-13 | Stop reason: SDUPTHER

## 2019-06-28 ENCOUNTER — OFFICE VISIT (OUTPATIENT)
Dept: BARIATRICS | Facility: CLINIC | Age: 74
End: 2019-06-28
Payer: MEDICARE

## 2019-06-28 VITALS
DIASTOLIC BLOOD PRESSURE: 82 MMHG | WEIGHT: 238.6 LBS | SYSTOLIC BLOOD PRESSURE: 140 MMHG | BODY MASS INDEX: 34.16 KG/M2 | TEMPERATURE: 98.6 F | RESPIRATION RATE: 16 BRPM | HEART RATE: 67 BPM | HEIGHT: 70 IN

## 2019-06-28 DIAGNOSIS — I10 HYPERTENSION, BENIGN: ICD-10-CM

## 2019-06-28 DIAGNOSIS — N18.30 TYPE 2 DIABETES MELLITUS WITH STAGE 3 CHRONIC KIDNEY DISEASE, WITHOUT LONG-TERM CURRENT USE OF INSULIN (HCC): ICD-10-CM

## 2019-06-28 DIAGNOSIS — E11.22 TYPE 2 DIABETES MELLITUS WITH STAGE 3 CHRONIC KIDNEY DISEASE, WITHOUT LONG-TERM CURRENT USE OF INSULIN (HCC): ICD-10-CM

## 2019-06-28 DIAGNOSIS — G47.33 OBSTRUCTIVE SLEEP APNEA: ICD-10-CM

## 2019-06-28 DIAGNOSIS — R63.5 ABNORMAL WEIGHT GAIN: Primary | ICD-10-CM

## 2019-06-28 DIAGNOSIS — K21.9 GASTROESOPHAGEAL REFLUX DISEASE, ESOPHAGITIS PRESENCE NOT SPECIFIED: ICD-10-CM

## 2019-06-28 DIAGNOSIS — E78.5 HYPERLIPIDEMIA: ICD-10-CM

## 2019-06-28 DIAGNOSIS — I48.91 ATRIAL FIBRILLATION, UNSPECIFIED TYPE (HCC): ICD-10-CM

## 2019-06-28 DIAGNOSIS — E66.9 CLASS 1 OBESITY: ICD-10-CM

## 2019-06-28 PROBLEM — E66.811 CLASS 1 OBESITY: Status: ACTIVE | Noted: 2019-06-28

## 2019-06-28 PROCEDURE — 99204 OFFICE O/P NEW MOD 45 MIN: CPT | Performed by: PHYSICIAN ASSISTANT

## 2019-06-28 RX ORDER — AMLODIPINE BESYLATE 5 MG/1
10 TABLET ORAL DAILY
COMMUNITY
Start: 2019-06-15 | End: 2020-02-19 | Stop reason: SDUPTHER

## 2019-08-19 DIAGNOSIS — E78.2 MIXED HYPERLIPIDEMIA: ICD-10-CM

## 2019-08-19 RX ORDER — ATORVASTATIN CALCIUM 20 MG/1
TABLET, FILM COATED ORAL
Qty: 90 TABLET | Refills: 4 | Status: SHIPPED | OUTPATIENT
Start: 2019-08-19 | End: 2020-08-24

## 2019-10-17 ENCOUNTER — TELEPHONE (OUTPATIENT)
Dept: FAMILY MEDICINE CLINIC | Facility: CLINIC | Age: 74
End: 2019-10-17

## 2019-10-31 ENCOUNTER — IMMUNIZATIONS (OUTPATIENT)
Dept: FAMILY MEDICINE CLINIC | Facility: CLINIC | Age: 74
End: 2019-10-31
Payer: MEDICARE

## 2019-10-31 DIAGNOSIS — Z23 FLU VACCINE NEED: Primary | ICD-10-CM

## 2019-10-31 LAB — HBA1C MFR BLD HPLC: 6.5 %

## 2019-10-31 PROCEDURE — 90662 IIV NO PRSV INCREASED AG IM: CPT

## 2019-10-31 PROCEDURE — G0008 ADMIN INFLUENZA VIRUS VAC: HCPCS

## 2019-11-12 ENCOUNTER — APPOINTMENT (OUTPATIENT)
Dept: LAB | Facility: CLINIC | Age: 74
End: 2019-11-12
Payer: MEDICARE

## 2019-11-12 ENCOUNTER — TRANSCRIBE ORDERS (OUTPATIENT)
Dept: LAB | Facility: CLINIC | Age: 74
End: 2019-11-12

## 2019-11-12 DIAGNOSIS — Z79.899 ENCOUNTER FOR LONG-TERM (CURRENT) USE OF OTHER MEDICATIONS: ICD-10-CM

## 2019-11-12 DIAGNOSIS — E11.649 UNCONTROLLED TYPE 2 DIABETES MELLITUS WITH HYPOGLYCEMIA, UNSPECIFIED HYPOGLYCEMIA COMA STATUS (HCC): Primary | ICD-10-CM

## 2019-11-12 DIAGNOSIS — E11.649 UNCONTROLLED TYPE 2 DIABETES MELLITUS WITH HYPOGLYCEMIA, UNSPECIFIED HYPOGLYCEMIA COMA STATUS (HCC): ICD-10-CM

## 2019-11-12 LAB
ANION GAP SERPL CALCULATED.3IONS-SCNC: 7 MMOL/L (ref 4–13)
BUN SERPL-MCNC: 25 MG/DL (ref 5–25)
CALCIUM SERPL-MCNC: 9 MG/DL (ref 8.3–10.1)
CHLORIDE SERPL-SCNC: 110 MMOL/L (ref 100–108)
CO2 SERPL-SCNC: 25 MMOL/L (ref 21–32)
CREAT SERPL-MCNC: 1.05 MG/DL (ref 0.6–1.3)
CREAT UR-MCNC: 196 MG/DL
GFR SERPL CREATININE-BSD FRML MDRD: 70 ML/MIN/1.73SQ M
GLUCOSE P FAST SERPL-MCNC: 133 MG/DL (ref 65–99)
MICROALBUMIN UR-MCNC: 15.5 MG/L (ref 0–20)
MICROALBUMIN/CREAT 24H UR: 8 MG/G CREATININE (ref 0–30)
POTASSIUM SERPL-SCNC: 3.7 MMOL/L (ref 3.5–5.3)
SODIUM SERPL-SCNC: 142 MMOL/L (ref 136–145)
T4 FREE SERPL-MCNC: 1.17 NG/DL (ref 0.76–1.46)
TSH SERPL DL<=0.05 MIU/L-ACNC: 2.36 UIU/ML (ref 0.36–3.74)
VIT B12 SERPL-MCNC: 506 PG/ML (ref 100–900)

## 2019-11-12 PROCEDURE — 84439 ASSAY OF FREE THYROXINE: CPT

## 2019-11-12 PROCEDURE — 82043 UR ALBUMIN QUANTITATIVE: CPT

## 2019-11-12 PROCEDURE — 84443 ASSAY THYROID STIM HORMONE: CPT

## 2019-11-12 PROCEDURE — 80048 BASIC METABOLIC PNL TOTAL CA: CPT

## 2019-11-12 PROCEDURE — 82570 ASSAY OF URINE CREATININE: CPT

## 2019-11-12 PROCEDURE — 36415 COLL VENOUS BLD VENIPUNCTURE: CPT

## 2019-11-12 PROCEDURE — 82607 VITAMIN B-12: CPT

## 2019-11-13 ENCOUNTER — APPOINTMENT (OUTPATIENT)
Dept: RADIOLOGY | Facility: CLINIC | Age: 74
End: 2019-11-13
Payer: MEDICARE

## 2019-11-13 ENCOUNTER — OFFICE VISIT (OUTPATIENT)
Dept: OBGYN CLINIC | Facility: CLINIC | Age: 74
End: 2019-11-13
Payer: MEDICARE

## 2019-11-13 VITALS
SYSTOLIC BLOOD PRESSURE: 124 MMHG | BODY MASS INDEX: 33.46 KG/M2 | WEIGHT: 239 LBS | HEART RATE: 60 BPM | HEIGHT: 71 IN | DIASTOLIC BLOOD PRESSURE: 70 MMHG

## 2019-11-13 DIAGNOSIS — M25.512 LEFT SHOULDER PAIN, UNSPECIFIED CHRONICITY: ICD-10-CM

## 2019-11-13 DIAGNOSIS — M19.012 PRIMARY OSTEOARTHRITIS OF LEFT SHOULDER: ICD-10-CM

## 2019-11-13 DIAGNOSIS — M75.82 ROTATOR CUFF TENDINITIS, LEFT: Primary | ICD-10-CM

## 2019-11-13 PROCEDURE — 73030 X-RAY EXAM OF SHOULDER: CPT

## 2019-11-13 PROCEDURE — 99214 OFFICE O/P EST MOD 30 MIN: CPT | Performed by: ORTHOPAEDIC SURGERY

## 2019-11-13 NOTE — PATIENT INSTRUCTIONS
Exercises for Internal and External Shoulder Rotation   WHAT YOU NEED TO KNOW:   Exercises for internal shoulder rotation work the muscles in your chest and front of your shoulder  Exercises for external shoulder rotation work the muscles in the back of your shoulder and upper back  DISCHARGE INSTRUCTIONS:   Contact your healthcare provider if:   · You have sharp or worsening pain during exercise or at rest     · You have questions or concerns about your shoulder exercises  Before you exercise:  Warm up and stretch before you exercise  Walk or ride a stationary bike for 5 to 10 minutes to help you warm up  Stretching helps increase range of motion  It may also decrease muscle soreness and help prevent another injury  Your healthcare provider will tell you which of the following stretches to do:  · Crossover arm stretch:  Relax your shoulders  Hold your upper arm with the opposite hand  Pull your arm across your chest until you feel a stretch  Hold the stretch for 30 seconds  Return to the starting position  · Shoulder flexion stretch:  Stand facing a wall  Slowly walk your fingers up the wall until you feel a stretch  Hold the stretch for 30 seconds  Return to the starting position  · Sleeper stretch:  Lie on your injured side on a firm, flat surface  Bend the elbow of your injured arm 90° with your hand facing up  Use your arm that is not injured to slowly push your injured arm down  Stop when you feel a stretch at the back of your injured shoulder  Hold the stretch for 30 seconds  Slowly return to the starting position  How to exercise with a weight:  Your healthcare provider will tell you how much weight to exercise with  · Shoulder internal rotation:  Sit in a chair  Place a rolled up towel between your elbow and your side  Bend your elbow to 90°  Gently squeeze the towel with your elbow to prevent it from falling out  Hold the weight with your thumb pointing up   Slowly move the weight across your chest  Stop when your hand reaches your opposite arm  Hold this position for as many seconds as directed  Slowly return to the starting position  · Shoulder external rotation:  Lie on your side with your injured shoulder facing up  Bend your elbow 90°  Place a rolled up towel between your elbow and your side  Hold a weight in your hand  Gently squeeze the towel with your elbow to prevent it from falling out  Slowly rotate your arm outward, but keep your elbow bent  Stop when you feel a stretch  Hold this position for 30 seconds or as directed  Slowly return to the starting position  How to exercise with an exercise band:   · Shoulder internal rotation:  Tie one end of the exercise band to a heavy, secure object  Sit in a chair  Place a rolled up towel between your elbow and your side  Bend your elbow to 90°  Gently squeeze the towel with your elbow to prevent it from falling out  Slowly pull the band across your chest  Stop when your hand reaches your opposite arm  Hold this position for as many seconds as directed  Slowly return to the starting position  · Shoulder external rotation:  Hold one end of the exercise band on the side that is not injured  Place a rolled up towel between your elbow and your side  Bend your elbow 90°  Squeeze the towel with your elbow  Grab the end of the band and slowly turn your arm outward, but keep your elbow bent  Stop when you feel a stretch  Hold this position for 30 seconds or as directed  Slowly return to the starting position  Follow up with your physical therapist as directed:  Write down your questions so you remember to ask them at your visits  © 2017 [employer] Information is for End User's use only and may not be sold, redistributed or otherwise used for commercial purposes  All illustrations and images included in CareNotes® are the copyrighted property of [employer]  or [employer]    The above information is an  only  It is not intended as medical advice for individual conditions or treatments  Talk to your doctor, nurse or pharmacist before following any medical regimen to see if it is safe and effective for you  Exercises for Shoulder Abduction and Adduction   WHAT YOU NEED TO KNOW:   Shoulder abduction and adduction exercises work the muscles at the back of your shoulder and your upper back  DISCHARGE INSTRUCTIONS:   Contact your healthcare provider if:   · You have sharp or worsening pain during exercise or at rest     · You have questions or concerns about your shoulder exercises  Before you exercise:  Warm up and stretch before you exercise  Walk or ride a stationary bike for 5 to 10 minutes to help you warm up  Stretching helps increase range of motion  It may also decrease muscle soreness and help prevent another injury  Your healthcare provider will tell you which of the following stretches to do:  · Crossover arm stretch:  Relax your shoulders  Hold your upper arm with the opposite hand  Pull your arm across your chest until you feel a stretch  Hold the stretch for 30 seconds  Return to the starting position  · Shoulder flexion stretch:  Stand facing a wall  Slowly walk your fingers up the wall until you feel a stretch  Hold the stretch for 30 seconds  Return to the starting position  · Sleeper stretch:  Lie on your injured side on a firm, flat surface  Bend the elbow of your injured arm 90° with your hand facing up  Use your arm that is not injured to slowly push your injured arm down  Stop when you feel a stretch at the back of your injured shoulder  Hold the stretch for 30 seconds  Slowly return to the starting position  How to exercise with a weight:  Your healthcare provider will tell you how much weight to use  · Shoulder abduction:  Stand and hold a weight in your hand with your palm facing your body  Slowly raise your arm to the side with your thumb pointing up   Then raise your arm over your head as far as you can without pain  Hold this position for as long as directed  Do not raise your arm over your head unless your healthcare provider says it is okay  · Shoulder adduction:  Lie on your back on a firm surface  Extend your arm out to a "T " Bend your elbow so your forearm in the air  Hold a weight in your hand  Slowly raise your arm toward the ceiling and straighten your elbow  Hold this position for as long as directed  Slowly return to the starting position  How to exercise with an exercise band:   · Shoulder abduction:  Wrap the exercise band around a heavy, stable object near your foot  Grab the band with the hand of your injured shoulder  Keep your arm straight  Slowly raise your arm to the side with your thumb pointing up  Then, slowly pull the band over your head as far as you can without pain  Do not raise your arm over your head unless your healthcare provider says it is okay  Do not let your shoulder shrug  Hold this position for as long as directed  Slowly return to the starting position  · Shoulder adduction:  Wrap the exercise band around a heavy, stable object  Stand and face away from where the band is anchored  Hold each end of the band in both hands with your elbows bent  Your elbows should not be behind your body  Keep your arms parallel to the floor and slowly straighten your elbows  Hold this position for as long as directed  Slowly return to the starting position  Follow up with your physical therapist as directed:  Write down your questions so you remember to ask them at your visits  © 2017 [employer] Information is for End User's use only and may not be sold, redistributed or otherwise used for commercial purposes  All illustrations and images included in CareNotes® are the copyrighted property of [employer]  or [employer]ille Bourne  The above information is an  only   It is not intended as medical advice for individual conditions or treatments  Talk to your doctor, nurse or pharmacist before following any medical regimen to see if it is safe and effective for you

## 2019-11-13 NOTE — PROGRESS NOTES
Assessment/Plan:  1  Rotator cuff tendinitis, left  XR shoulder 2+ vw left   2  Primary osteoarthritis of left shoulder         Scribe Attestation    I,:   Mark Bar am acting as a scribe while in the presence of the attending physician :        I,:   Gretchen Patel MD personally performed the services described in this documentation    as scribed in my presence :              Chris Gr upon examination and review the x-rays of his left shoulder does demonstrate signs and symptoms consistent with rotator cuff tendinitis, and mild to moderate osteoarthritis of the glenohumeral joint  He does have some weakness to strength testing of the supraspinatus however has a negative drop-arm test and has good strength with external rotation  He does also fully abduct his shoulder  Given the findings on clinical exam as well as the x-rays, I would like Chris Gr to do home exercises to strengthen the rotator cuff of his shoulder  My  will instruct him on proper execution of these exercises today  He may also use ice and Tylenol intermittently as needed  Chris Gr and his wife verbalized understanding to all the information provided to him today and had no further questions  Chris Gr may follow up with me on an as-needed basis  Subjective:   Sridevi Diallo  is a 76 y o  male who presents to the office today for initial evaluation of his left shoulder  He states that he has been experiencing intermittent and moderate to severe sharp pain about the anterior lateral aspect of her shoulder over the past week  He states that yesterday his pain was much worse however did take Tylenol which did substantially reduces painful symptoms he notes that he was unable to abduct or forward flex his shoulder due to pain  He denies a traumatic event resulting in his painful symptoms however notes that he is trying to the strain his dog and was doing corrections to the dog with constant extension of the shoulder    He denies experiencing any traumatic injuries during any those correction such as a pop, click, or snap  He states he does experience intermittent paresthesias into the wrist and hand however is on sure if it is simply pain he is experiencing  He denies specific weakness into the wrist or hand  Review of Systems   Constitutional: Negative for chills, fever and unexpected weight change  HENT: Negative for hearing loss, nosebleeds and sore throat  Eyes: Positive for visual disturbance  Negative for pain and redness  Respiratory: Negative for cough, shortness of breath and wheezing  Cardiovascular: Negative for chest pain, palpitations and leg swelling  Gastrointestinal: Negative for abdominal pain, nausea and vomiting  Endocrine: Positive for polyuria  Negative for polyphagia  Genitourinary: Negative for dysuria and hematuria  Musculoskeletal: Positive for arthralgias and myalgias  See HPI   Skin: Negative for rash and wound  Neurological: Negative for dizziness, numbness and headaches  Psychiatric/Behavioral: Negative for decreased concentration and suicidal ideas  The patient is nervous/anxious            Past Medical History:   Diagnosis Date    Allergic rhinitis     Anemia 10/23/2008    last assessed 9/9/13     Anxiety     Atrial fibrillation (HCC)     Bleeding     BPH (benign prostatic hypertrophy)     Diabetes mellitus ([facility] [address] )     Diarrhea     Diverticulitis     Eating disorder     GERD (gastroesophageal reflux disease)     Herpes zoster with complication     last assessed 7/3/17     Hiatal hernia     Hyperlipidemia     Hypertension     IBS (irritable bowel syndrome)     Incisional hernia     Increased urinary frequency     Irregular heart beat     Pyogenic granuloma 09/13/2006    last assessed 9/13/06    Carlos (subconjunctival hemorrhage), unspecified laterality 09/12/2005    last assessed 9/12/05    Sleep apnea     TMJ (dislocation of temporomandibular joint)     Ventral hernia     last assessed  17        Past Surgical History:   Procedure Laterality Date    APPENDECTOMY      ATRIAL ABLATION SURGERY      2014    ATRIAL ABLATION SURGERY      catheter ablation atrial fibrillation / last assessed 16     CHOLECYSTECTOMY      lap    COLON SURGERY      Hartmans procedure    COLON SURGERY      reversal 6 weeks later    COLONOSCOPY N/A 2017    Procedure: COLONOSCOPY;  Surgeon: Miah Caicedo MD;  Location: [place] [address] GI LAB; Service:     ESOPHAGOGASTRODUODENOSCOPY N/A 2017    Procedure: ESOPHAGOGASTRODUODENOSCOPY (EGD); Surgeon: Miah Caicedo MD;  Location: [place] [address] GI LAB;   Service:     EYE SURGERY Bilateral     lid repair    HERNIA REPAIR Bilateral     inguinal  &    [facility]      INCISIONAL HERNIA REPAIR N/A 3/24/2017    Procedure: REPAIR OF INCARCERATED INCISIONAL HERNIA WITH MESH, Lysis of Adhesions, Partial Omentectomy;  Surgeon: Javi Parra MD;  Location: [facility];  Service:    Veronica Rank EXCISION      SKIN CANCER EXCISION      On nose    TONSILLECTOMY         Family History   Problem Relation Age of Onset    Heart disease Mother     Cancer Mother     Colon cancer Mother     Dementia Mother     Early death Father     Seizures Father     Cancer Sister         bone    Heart disease Sister     Diabetes Sister     Lupus Sister     Heart disease Brother     Cancer Brother         kidney    Diabetes Brother     Stroke Neg Hx        Social History     Occupational History    Not on file   Tobacco Use    Smoking status: Former Smoker     Packs/day: 1 00     Years: 20 00     Pack years: 20 00     Types: Cigarettes     Last attempt to quit:      Years since quittin 8    Smokeless tobacco: Never Used   Substance and Sexual Activity    Alcohol use: No    Drug use: No    Sexual activity: Not on file         Current Outpatient Medications:     amiodarone 200 mg tablet, 1 pill daily, Disp: , Rfl:   amLODIPine (NORVASC) 5 mg tablet, Take 10 mg by mouth daily , Disp: , Rfl:     apixaban (ELIQUIS) 5 mg, Take 5 mg by mouth 2 (two) times a day, Disp: , Rfl:     atorvastatin (LIPITOR) 20 mg tablet, TAKE 1 TABLET DAILY, Disp: 90 tablet, Rfl: 4    Blood Glucose Monitoring Suppl (FREESTYLE LITE) GEOVANNY, , Disp: , Rfl:     carvedilol (COREG) 25 mg tablet, Take 25 mg by mouth 2 (two) times a day, Disp: , Rfl:     esomeprazole (NexIUM) 20 mg capsule, Take 20 mg by mouth daily at bedtime , Disp: , Rfl:     FREESTYLE LITE test strip, , Disp: , Rfl:     furosemide (LASIX) 20 mg tablet, Take 20 mg by mouth, Disp: , Rfl:     Lancets (FREESTYLE) lancets, , Disp: , Rfl:     losartan (COZAAR) 50 mg tablet, Take 1 5 tablets (75 mg total) by mouth every morning, Disp: 45 tablet, Rfl: 0    Multiple Vitamins-Minerals (MARIEL MULTI MEN PO), Take 1 tablet by mouth daily, Disp: , Rfl:     polyethylene glycol-propylene glycol (SYSTANE) 0 4-0 3 %, every 3 (three) hours as needed, Disp: , Rfl:     RESTASIS 0 05 % ophthalmic emulsion, Administer 1 drop to both eyes every 12 (twelve) hours , Disp: , Rfl:     sitaGLIPtin-metFORMIN (JANUMET)  MG per tablet, Take 2 tablets by mouth every evening Takes 2 tabs evening dose=100-2000mg  , Disp: , Rfl:     tamsulosin (FLOMAX) 0 4 mg, Take 0 4 mg by mouth as needed , Disp: , Rfl:     celecoxib (CeleBREX) 200 mg capsule, Take 200 mg by mouth 2 (two) times a day , Disp: , Rfl:     fluticasone (FLONASE) 50 mcg/act nasal spray, 1 spray into each nostril 2 (two) times a day (Patient not taking: Reported on 11/13/2019), Disp: 16 g, Rfl: 5    multivitamin (THERAGRAN) TABS, Take 1 tablet by mouth every morning, Disp: , Rfl:     Allergies   Allergen Reactions    Codeine Other (See Comments) and GI Intolerance     Reaction Date: 27Dec2004;  Annotation - [id number]: '' CRAZY ''  vomiting  headache    Demerol [Meperidine] Nausea Only, Other (See Comments), GI Intolerance and Vomiting vomiting  Reaction Date: 27Dec2004;   headache    Morphine Nausea Only, GI Intolerance and Vomiting     Reaction Date: 23Feb2012;        Objective:  Vitals:    11/13/19 1444   BP: 124/70   Pulse: 60       Left Shoulder Exam     Tenderness   The patient is experiencing tenderness in the biceps tendon  Range of Motion   Active abduction: 160   Passive abduction: 160   External rotation: 70   Forward flexion: 180   Internal rotation 0 degrees: L3     Muscle Strength   Abduction: 4/5   Internal rotation: 5/5   External rotation: 5/5   Biceps: 5/5     Tests   Cross arm: negative  Drop arm: negative    Other   Erythema: absent  Scars: absent  Sensation: normal  Pulse: present             Physical Exam   Constitutional: He is oriented to person, place, and time  He appears well-developed and well-nourished  HENT:   Head: Normocephalic and atraumatic  Eyes: Conjunctivae are normal  Right eye exhibits no discharge  Left eye exhibits no discharge  Neck: Normal range of motion  Neck supple  Cardiovascular: Normal rate and intact distal pulses  Pulmonary/Chest: Effort normal  No respiratory distress  Musculoskeletal:   As noted in HPI   Neurological: He is alert and oriented to person, place, and time  Skin: Skin is warm and dry  Psychiatric: He has a normal mood and affect  His behavior is normal  Judgment and thought content normal    Vitals reviewed  I have personally reviewed pertinent films in PACS and my interpretation is as follows:    X-rays of the left shoulder demonstrates moderate to severe osteoarthritis at the acromioclavicular joint  There is mild-to-moderate osteoarthritis demonstrate into the glenohumeral joint  No acute fractures demonstrated

## 2019-12-04 ENCOUNTER — APPOINTMENT (OUTPATIENT)
Dept: RADIOLOGY | Facility: CLINIC | Age: 74
End: 2019-12-04
Payer: MEDICARE

## 2019-12-04 ENCOUNTER — TRANSCRIBE ORDERS (OUTPATIENT)
Dept: URGENT CARE | Facility: CLINIC | Age: 74
End: 2019-12-04

## 2019-12-04 ENCOUNTER — OFFICE VISIT (OUTPATIENT)
Dept: URGENT CARE | Facility: CLINIC | Age: 74
End: 2019-12-04
Payer: MEDICARE

## 2019-12-04 VITALS
RESPIRATION RATE: 15 BRPM | HEIGHT: 61 IN | BODY MASS INDEX: 45.12 KG/M2 | TEMPERATURE: 99 F | SYSTOLIC BLOOD PRESSURE: 142 MMHG | HEART RATE: 62 BPM | DIASTOLIC BLOOD PRESSURE: 71 MMHG | OXYGEN SATURATION: 97 % | WEIGHT: 239 LBS

## 2019-12-04 DIAGNOSIS — S69.91XA INJURY OF RIGHT WRIST, INITIAL ENCOUNTER: Primary | ICD-10-CM

## 2019-12-04 DIAGNOSIS — S63.601A SPRAIN OF RIGHT THUMB, UNSPECIFIED SITE OF FINGER, INITIAL ENCOUNTER: Primary | ICD-10-CM

## 2019-12-04 DIAGNOSIS — S69.91XA INJURY OF RIGHT WRIST, INITIAL ENCOUNTER: ICD-10-CM

## 2019-12-04 PROCEDURE — 99213 OFFICE O/P EST LOW 20 MIN: CPT | Performed by: PHYSICIAN ASSISTANT

## 2019-12-04 PROCEDURE — 73130 X-RAY EXAM OF HAND: CPT

## 2019-12-04 RX ORDER — TADALAFIL 5 MG/1
5 TABLET ORAL DAILY PRN
COMMUNITY
Start: 2019-11-17 | End: 2020-11-02 | Stop reason: ALTCHOICE

## 2019-12-04 NOTE — PATIENT INSTRUCTIONS
Finger Sprain   WHAT YOU NEED TO KNOW:   A finger sprain happens when ligaments in your finger or thumb are stretched or torn  Ligaments are the tough tissues that connect bones  Ligaments allow your hands to grasp and pinch  DISCHARGE INSTRUCTIONS:   Return to the emergency department if:   · The skin on your injured finger looks bluish or pale (less color than normal)  · You have new weakness or numbness in your finger or thumb  It may tingle or burn  · You have a splint that you cannot adjust and it feels too tight  Contact your healthcare provider if:   · You have new or increased swelling or pain in your finger  · You have new or increased stiffness when you move your injured finger  · You have questions or concerns about your injury or treatment  Medicines:   · Pain medicine  may be given  Do not wait until the pain is severe before taking your medicine  · Take your medicine as directed  Call your healthcare provider if you think your medicines are not helping or if you have side effects  Tell him if you take vitamins, herbs, or any other medicines  Keep a written list of your medicines  Include the amounts, and when and why you take them  Bring the list or the pill bottles to follow-up visits  Care for your finger:   · Rest  your finger for at least 48 hours  Do not do activities that cause pain  Return to normal activities as directed  · Apply ice  on your finger to help decrease pain and swelling  Put crushed ice in a plastic bag and cover it with a towel  Put the ice on your injured finger or thumb every hour for 15 to 20 minutes at a time  You may need to ice the area at least 4 to 8 times each day  Ice your finger for as many days as directed  · Elevate your finger  above the level of your heart as often as you can  This will help decrease swelling and pain  You can elevate your hand by resting it on a pillow  · Use a splint or compression as directed    Compression (tight hold) helps support your finger or thumb as it heals  Tape your injured finger to the finger beside it  Severe sprains may be treated with a splint  A splint prevents your finger from moving while it heals  Ask how long you must wear the splint or tape, and how to apply them  · Do exercises as directed  You may be given gentle exercises to begin in a few days  Exercises can help decrease stiffness in your finger or thumb  Exercises also help decrease pain and swelling and improve the movement of your finger or thumb  Check with your healthcare provider before you return to your normal activities or sports  Follow up with your healthcare provider as directed:  Write down any questions you may have to ask at your follow up visits  © 2017 [address] [employer] Information is for End User's use only and may not be sold, redistributed or otherwise used for commercial purposes  All illustrations and images included in CareNotes® are the copyrighted property of A D [employer] M [employer] Inc  or [employer]  The above information is an  only  It is not intended as medical advice for individual conditions or treatments  Talk to your doctor, nurse or pharmacist before following any medical regimen to see if it is safe and effective for you

## 2019-12-04 NOTE — PROGRESS NOTES
[facility]        NAME: Billy Machuca  is a 76 y o  male  : 1945    MRN: [id number]  DATE: 2019  TIME: 8:56 AM    Assessment and Plan   Sprain of right thumb, unspecified site of finger, initial encounter [M52 161N]  1  Sprain of right thumb, unspecified site of finger, initial encounter  Orthopedic injury treatment         Patient Instructions     Discussed condition with pt  XR of right hand is negative for fracture or dislocation  I suspect a sprain of the right thumb  Gave him thumb spica splint to wear with activity, should otherwise keep out and gently stretch intermittently as well as ice, Tylenol  Should be reevaluated if no significant improvement within 1-2 wks  Follow up with PCP in 3-5 days  Proceed to  ER if symptoms worsen  Chief Complaint     Chief Complaint   Patient presents with    Hand Injury     Pt reports of right hand pain from a fall occurred this morning  Pt denies any LOC and denies any other injury         History of Present Illness       Pt presents after sustaining a fall on ice this morning with injury to the right hand  He reports pain at the base of the thumb with decreased ROM but denies loss of sensation  Denies any other injuries  Has not done anything specifically to treat it as of yet  Denies open wounds  Review of Systems   Review of Systems   Constitutional: Negative  Respiratory: Negative  Cardiovascular: Negative  Gastrointestinal: Negative  Genitourinary: Negative  Musculoskeletal:        Right hand injury S/P fall    Skin: Negative            Current Medications       Current Outpatient Medications:     amiodarone 200 mg tablet, 1 pill daily, Disp: , Rfl:     amLODIPine (NORVASC) 5 mg tablet, Take 10 mg by mouth daily , Disp: , Rfl:     apixaban (ELIQUIS) 5 mg, Take 5 mg by mouth 2 (two) times a day, Disp: , Rfl:     atorvastatin (LIPITOR) 20 mg tablet, TAKE 1 TABLET DAILY, Disp: 90 tablet, Rfl: 4    Blood Glucose Monitoring Suppl (FREESTYLE LITE) GEOVANNY, , Disp: , Rfl:     carvedilol (COREG) 25 mg tablet, Take 25 mg by mouth 2 (two) times a day, Disp: , Rfl:     celecoxib (CeleBREX) 200 mg capsule, Take 200 mg by mouth 2 (two) times a day , Disp: , Rfl:     esomeprazole (NexIUM) 20 mg capsule, Take 20 mg by mouth daily at bedtime , Disp: , Rfl:     fluticasone (FLONASE) 50 mcg/act nasal spray, 1 spray into each nostril 2 (two) times a day (Patient not taking: Reported on 11/13/2019), Disp: 16 g, Rfl: 5    FREESTYLE LITE test strip, , Disp: , Rfl:     furosemide (LASIX) 20 mg tablet, Take 20 mg by mouth, Disp: , Rfl:     Lancets (FREESTYLE) lancets, , Disp: , Rfl:     losartan (COZAAR) 50 mg tablet, Take 1 5 tablets (75 mg total) by mouth every morning, Disp: 45 tablet, Rfl: 0    Multiple Vitamins-Minerals (MARIEL MULTI MEN PO), Take 1 tablet by mouth daily, Disp: , Rfl:     multivitamin (THERAGRAN) TABS, Take 1 tablet by mouth every morning, Disp: , Rfl:     polyethylene glycol-propylene glycol (SYSTANE) 0 4-0 3 %, every 3 (three) hours as needed, Disp: , Rfl:     RESTASIS 0 05 % ophthalmic emulsion, Administer 1 drop to both eyes every 12 (twelve) hours , Disp: , Rfl:     sitaGLIPtin-metFORMIN (JANUMET)  MG per tablet, Take 2 tablets by mouth every evening Takes 2 tabs evening dose=100-2000mg  , Disp: , Rfl:     tadalafil (CIALIS) 5 MG tablet, , Disp: , Rfl:     tamsulosin (FLOMAX) 0 4 mg, Take 0 4 mg by mouth as needed , Disp: , Rfl:     Current Allergies     Allergies as of 12/04/2019 - Reviewed 12/04/2019   Allergen Reaction Noted    Codeine Other (See Comments) and GI Intolerance 12/27/2004    Demerol [meperidine] Nausea Only, Other (See Comments), GI Intolerance, and Vomiting 12/27/2004    Morphine Nausea Only, GI Intolerance, and Vomiting 02/23/2012            The following portions of the patient's history were reviewed and updated as appropriate: allergies, current medications, past family history, past medical history, past social history, past surgical history and problem list      Past Medical History:   Diagnosis Date    Allergic rhinitis     Anemia 10/23/2008    last assessed 9/9/13     Anxiety     Atrial fibrillation (HCC)     Bleeding     BPH (benign prostatic hypertrophy)     Diabetes mellitus ([address] )     Diarrhea     Diverticulitis     Eating disorder     GERD (gastroesophageal reflux disease)     Herpes zoster with complication     last assessed 7/3/17     Hiatal hernia     Hyperlipidemia     Hypertension     IBS (irritable bowel syndrome)     Incisional hernia     Increased urinary frequency     Irregular heart beat     Pyogenic granuloma 09/13/2006    last assessed 9/13/06    Carlos (subconjunctival hemorrhage), unspecified laterality 09/12/2005    last assessed 9/12/05    Sleep apnea     TMJ (dislocation of temporomandibular joint)     Ventral hernia     last assessed  4/6/17        Past Surgical History:   Procedure Laterality Date    APPENDECTOMY      ATRIAL ABLATION SURGERY      2014    ATRIAL ABLATION SURGERY      catheter ablation atrial fibrillation / last assessed 2/17/16     CHOLECYSTECTOMY      lap    COLON SURGERY      Hartmans procedure    COLON SURGERY      reversal 6 weeks later    COLONOSCOPY N/A 1/26/2017    Procedure: COLONOSCOPY;  Surgeon: Cordell Muniz MD;  Location: [place] [address] GI LAB; Service:     ESOPHAGOGASTRODUODENOSCOPY N/A 1/26/2017    Procedure: ESOPHAGOGASTRODUODENOSCOPY (EGD); Surgeon: Cordell Muniz MD;  Location: [place] [address] GI LAB;   Service:     EYE SURGERY Bilateral     lid repair    HERNIA REPAIR Bilateral     inguinal 2014 & 2013   [facility]  2011    INCISIONAL HERNIA REPAIR N/A 3/24/2017    Procedure: REPAIR OF INCARCERATED INCISIONAL HERNIA WITH MESH, Lysis of Adhesions, Partial Omentectomy;  Surgeon: Katherine Mclaughlin MD;  Location: [address] [place] [address];  Service:    [facility] NEUROBLASTOMA EXCISION      SKIN CANCER EXCISION      On nose    TONSILLECTOMY         Family History   Problem Relation Age of Onset    Heart disease Mother     Cancer Mother     Colon cancer Mother     Dementia Mother     Early death Father     Seizures Father    Phong Flower Cancer Sister         bone    Heart disease Sister     Diabetes Sister     Lupus Sister     Heart disease Brother     Cancer Brother         kidney    Diabetes Brother     Stroke Neg Hx          Medications have been verified  Objective   /71   Pulse 62   Temp 99 °F (37 2 °C)   Resp 15   Ht 5' 1" (1 549 m)   Wt 108 kg (239 lb)   SpO2 97%   BMI 45 16 kg/m²        Physical Exam     Physical Exam   Constitutional: He is oriented to person, place, and time  He appears well-developed and well-nourished  No distress  Musculoskeletal:   TTP at the base of the right thumb [facility], over the thenar eminence, as well as MCP joint worsened with AROM which is slightly decreased but pt able to oppose and circumduct  Mild STS but no ecchymosis or other deformity noted  Neurological: He is alert and oriented to person, place, and time  No sensory deficit  Psychiatric: He has a normal mood and affect  Vitals reviewed  Orthopedic injury treatment  Date/Time: 12/4/2019 1:31 PM  Performed by: Rosalba Velasquez PA-C  Authorized by: Rosalba Velasquez PA-C     Patient Location:  Clinic  Other Assisting Provider: No    Verbal consent obtained?: Yes    Written consent obtained?: No    Risks and benefits: Risks, benefits and alternatives were discussed    Consent given by:  Patient  Patient states understanding of procedure being performed: Yes    Injury location:  Hand  Location details:  Right hand  Injury type: Soft tissue  Neurovascular status: Neurovascularly intact    Distal perfusion: normal    Neurological function: normal    Range of motion: reduced    Immobilization:  Splint  Splint type:  Thumb spica  Supplies used: DME dispensed    Neurovascular status: Neurovascularly intact Distal perfusion: normal    Neurological function: normal    Range of motion: unchanged    Patient tolerance:  Patient tolerated the procedure well with no immediate complications

## 2020-01-06 ENCOUNTER — TRANSCRIBE ORDERS (OUTPATIENT)
Dept: LAB | Facility: CLINIC | Age: 75
End: 2020-01-06

## 2020-01-06 ENCOUNTER — APPOINTMENT (OUTPATIENT)
Dept: LAB | Facility: CLINIC | Age: 75
End: 2020-01-06
Payer: MEDICARE

## 2020-01-06 DIAGNOSIS — I48.0 PAF (PAROXYSMAL ATRIAL FIBRILLATION) (HCC): Primary | ICD-10-CM

## 2020-01-06 DIAGNOSIS — I48.0 PAF (PAROXYSMAL ATRIAL FIBRILLATION) (HCC): ICD-10-CM

## 2020-01-06 LAB
ALBUMIN SERPL BCP-MCNC: 3.6 G/DL (ref 3.5–5)
ALP SERPL-CCNC: 66 U/L (ref 46–116)
ALT SERPL W P-5'-P-CCNC: 35 U/L (ref 12–78)
ANION GAP SERPL CALCULATED.3IONS-SCNC: 3 MMOL/L (ref 4–13)
AST SERPL W P-5'-P-CCNC: 12 U/L (ref 5–45)
BILIRUB SERPL-MCNC: 0.3 MG/DL (ref 0.2–1)
BUN SERPL-MCNC: 17 MG/DL (ref 5–25)
CALCIUM SERPL-MCNC: 9.1 MG/DL (ref 8.3–10.1)
CHLORIDE SERPL-SCNC: 107 MMOL/L (ref 100–108)
CO2 SERPL-SCNC: 31 MMOL/L (ref 21–32)
CREAT SERPL-MCNC: 1.09 MG/DL (ref 0.6–1.3)
GFR SERPL CREATININE-BSD FRML MDRD: 67 ML/MIN/1.73SQ M
GLUCOSE SERPL-MCNC: 144 MG/DL (ref 65–140)
POTASSIUM SERPL-SCNC: 4 MMOL/L (ref 3.5–5.3)
PROT SERPL-MCNC: 7.5 G/DL (ref 6.4–8.2)
SODIUM SERPL-SCNC: 141 MMOL/L (ref 136–145)

## 2020-01-06 PROCEDURE — 80053 COMPREHEN METABOLIC PANEL: CPT

## 2020-01-06 PROCEDURE — 36415 COLL VENOUS BLD VENIPUNCTURE: CPT

## 2020-02-05 DIAGNOSIS — I10 BENIGN ESSENTIAL HYPERTENSION: ICD-10-CM

## 2020-02-05 RX ORDER — LOSARTAN POTASSIUM 50 MG/1
TABLET ORAL
Qty: 135 TABLET | Refills: 3 | Status: SHIPPED | OUTPATIENT
Start: 2020-02-05 | End: 2020-11-02 | Stop reason: ALTCHOICE

## 2020-02-10 NOTE — TELEPHONE ENCOUNTER
Left message with patients wife, she will have him call us back to scheduled AWV Pt desires to speak to provider in regards to progesterone only OCPs  Pt stated she has been taking them for 2 months and would like to change the Rx to something that will not cause her bleeding. Per pt she has not seen neuro since she was a child.

## 2020-02-15 NOTE — PROGRESS NOTES
Assessment/Plan:    1  Hypertension, benign  Assessment & Plan:  Well controlled and will continue current medications  Refills were sent to the office  Orders:  -     amLODIPine (NORVASC) 5 mg tablet; Take 1 tablet (5 mg total) by mouth daily    2  Type 2 diabetes mellitus with stage 3 chronic kidney disease, without long-term current use of insulin [facility])  Assessment & Plan:    Lab Results   Component Value Date    HGBA1C 6 5 10/31/2019     This is managed by endocrinology and has been well controlled  Up to date with foot and eye exams, and advised on the need for good care  Continue physical activity and dietary efforts  3  Atrial fibrillation, unspecified type [facility])  Assessment & Plan: This is managed by cardiology and he continues his eliquis and amiodarone  4  Mixed hyperlipidemia  Assessment & Plan:  Well controlled on atorvastatin and will continue  5  Diabetic polyneuropathy associated with type 2 diabetes mellitus [facility])  Assessment & Plan:    Lab Results   Component Value Date    HGBA1C 6 5 10/31/2019     Patient denies current symptoms  6  Benign essential hypertension    7  Gastroesophageal reflux disease, esophagitis presence not specified  -     esomeprazole (NexIUM) 20 mg capsule; Take 1 capsule (20 mg total) by mouth daily at bedtime          There are no Patient Instructions on file for this visit  Return in about 6 months (around 8/19/2020) for 1/2 hour follow up and AWV  Subjective:      Patient ID: Aziza Mahan  is a 76 y o  male  Chief Complaint   Patient presents with    Follow-up     medication review [facility]       Here for a follow up and BP check  He follows with endocrinology and cardiology at [facility]  Glucose has been well controlled per patient  Continues on cardiac medications and has no chest pain, dyspnea, edema, palpitations  He is walking the dog about a mile and a half a day and is trying to stay active          The following portions of the patient's history were reviewed and updated as appropriate: allergies, current medications, past family history, past medical history, past social history, past surgical history and problem list     Review of Systems   Constitutional: Negative  Respiratory: Negative  Cardiovascular: Negative  Current Outpatient Medications   Medication Sig Dispense Refill    amiodarone 200 mg tablet 1 pill daily      amLODIPine (NORVASC) 5 mg tablet Take 1 tablet (5 mg total) by mouth daily 90 tablet 3    apixaban (ELIQUIS) 5 mg Take 5 mg by mouth 2 (two) times a day      atorvastatin (LIPITOR) 20 mg tablet TAKE 1 TABLET DAILY 90 tablet 4    Blood Glucose Monitoring Suppl (FREESTYLE LITE) GEOVANNY       carvedilol (COREG) 25 mg tablet Take 25 mg by mouth 2 (two) times a day      esomeprazole (NexIUM) 20 mg capsule Take 1 capsule (20 mg total) by mouth daily at bedtime 90 capsule 3    FREESTYLE LITE test strip       furosemide (LASIX) 20 mg tablet Take 20 mg by mouth      Lancets (FREESTYLE) lancets       losartan (COZAAR) 50 mg tablet TAKE ONE AND ONE-HALF TABLETS EVERY MORNING 135 tablet 3    multivitamin (THERAGRAN) TABS Take 1 tablet by mouth every morning      polyethylene glycol-propylene glycol (SYSTANE) 0 4-0 3 % every 3 (three) hours as needed      RESTASIS 0 05 % ophthalmic emulsion Administer 1 drop to both eyes every 12 (twelve) hours       sitaGLIPtin-metFORMIN (JANUMET)  MG per tablet Take 2 tablets by mouth every evening Takes 2 tabs evening dose=100-2000mg        tadalafil (CIALIS) 5 MG tablet       tamsulosin (FLOMAX) 0 4 mg Take 0 4 mg by mouth as needed       ALREX 0 2 % SUSP       celecoxib (CeleBREX) 200 mg capsule Take 200 mg by mouth 2 (two) times a day        No current facility-administered medications for this visit          Objective:    /68   Pulse 65   Temp 98 2 °F (36 8 °C)   Resp 18   Ht 5' 11" (1 803 m)   Wt 109 kg (240 lb)   SpO2 98%   BMI 33 47 kg/m² Physical Exam   Constitutional: He appears well-developed and well-nourished  Eyes: Conjunctivae are normal    Neck: Neck supple  No JVD present  No thyromegaly present  Cardiovascular: Normal rate, regular rhythm, normal heart sounds and intact distal pulses  Exam reveals no gallop and no friction rub  Pulses are no weak pulses  No murmur heard  Pulses:       Dorsalis pedis pulses are 2+ on the right side, and 2+ on the left side  Pulmonary/Chest: Effort normal and breath sounds normal  He has no wheezes  He has no rales  Abdominal: Soft  Bowel sounds are normal  He exhibits no distension  There is no tenderness  Musculoskeletal: He exhibits no edema  Feet:   Right Foot:   Skin Integrity: Negative for ulcer, skin breakdown, erythema, warmth, callus or dry skin  Left Foot:   Skin Integrity: Negative for ulcer, skin breakdown, erythema, warmth, callus or dry skin  Patient's shoes and socks removed  Right Foot/Ankle   Right Foot Inspection  Skin Exam: skin normal and skin intact no dry skin, no warmth, no callus, no erythema, no maceration, no abnormal color, no pre-ulcer, no ulcer and no callus                          Toe Exam: ROM and strength within normal limits  Sensory       Monofilament testing: intact  Vascular  Capillary refills: < 3 seconds  The right DP pulse is 2+  Left Foot/Ankle  Left Foot Inspection  Skin Exam: skin normal and skin intactno dry skin, no warmth, no erythema, no maceration, normal color, no pre-ulcer, no ulcer and no callus                         Toe Exam: ROM and strength within normal limits                   Sensory       Monofilament: intact  Vascular  Capillary refills: < 3 seconds  The left DP pulse is 2+  Assign Risk Category:  No deformity present; No loss of protective sensation;  No weak pulses       Risk: 0           Lise Chandler MD

## 2020-02-19 ENCOUNTER — TELEPHONE (OUTPATIENT)
Dept: ADMINISTRATIVE | Facility: OTHER | Age: 75
End: 2020-02-19

## 2020-02-19 ENCOUNTER — OFFICE VISIT (OUTPATIENT)
Dept: FAMILY MEDICINE CLINIC | Facility: CLINIC | Age: 75
End: 2020-02-19
Payer: MEDICARE

## 2020-02-19 VITALS
DIASTOLIC BLOOD PRESSURE: 68 MMHG | SYSTOLIC BLOOD PRESSURE: 126 MMHG | HEIGHT: 71 IN | WEIGHT: 240 LBS | OXYGEN SATURATION: 98 % | RESPIRATION RATE: 18 BRPM | TEMPERATURE: 98.2 F | BODY MASS INDEX: 33.6 KG/M2 | HEART RATE: 65 BPM

## 2020-02-19 DIAGNOSIS — I10 BENIGN ESSENTIAL HYPERTENSION: ICD-10-CM

## 2020-02-19 DIAGNOSIS — E11.22 TYPE 2 DIABETES MELLITUS WITH STAGE 3 CHRONIC KIDNEY DISEASE, WITHOUT LONG-TERM CURRENT USE OF INSULIN (HCC): ICD-10-CM

## 2020-02-19 DIAGNOSIS — N18.30 TYPE 2 DIABETES MELLITUS WITH STAGE 3 CHRONIC KIDNEY DISEASE, WITHOUT LONG-TERM CURRENT USE OF INSULIN (HCC): ICD-10-CM

## 2020-02-19 DIAGNOSIS — E78.2 MIXED HYPERLIPIDEMIA: ICD-10-CM

## 2020-02-19 DIAGNOSIS — E11.42 DIABETIC POLYNEUROPATHY ASSOCIATED WITH TYPE 2 DIABETES MELLITUS (HCC): ICD-10-CM

## 2020-02-19 DIAGNOSIS — K21.9 GASTROESOPHAGEAL REFLUX DISEASE, ESOPHAGITIS PRESENCE NOT SPECIFIED: ICD-10-CM

## 2020-02-19 DIAGNOSIS — I48.91 ATRIAL FIBRILLATION, UNSPECIFIED TYPE (HCC): ICD-10-CM

## 2020-02-19 DIAGNOSIS — I10 HYPERTENSION, BENIGN: Primary | ICD-10-CM

## 2020-02-19 PROCEDURE — 99213 OFFICE O/P EST LOW 20 MIN: CPT | Performed by: INTERNAL MEDICINE

## 2020-02-19 PROCEDURE — 1160F RVW MEDS BY RX/DR IN RCRD: CPT | Performed by: INTERNAL MEDICINE

## 2020-02-19 PROCEDURE — 3008F BODY MASS INDEX DOCD: CPT | Performed by: INTERNAL MEDICINE

## 2020-02-19 PROCEDURE — 1036F TOBACCO NON-USER: CPT | Performed by: INTERNAL MEDICINE

## 2020-02-19 PROCEDURE — 3078F DIAST BP <80 MM HG: CPT | Performed by: INTERNAL MEDICINE

## 2020-02-19 PROCEDURE — 3074F SYST BP LT 130 MM HG: CPT | Performed by: INTERNAL MEDICINE

## 2020-02-19 PROCEDURE — 4040F PNEUMOC VAC/ADMIN/RCVD: CPT | Performed by: INTERNAL MEDICINE

## 2020-02-19 PROCEDURE — 3066F NEPHROPATHY DOC TX: CPT | Performed by: INTERNAL MEDICINE

## 2020-02-19 PROCEDURE — 4010F ACE/ARB THERAPY RXD/TAKEN: CPT | Performed by: INTERNAL MEDICINE

## 2020-02-19 RX ORDER — AMLODIPINE BESYLATE 5 MG/1
5 TABLET ORAL DAILY
Qty: 90 TABLET | Refills: 3 | Status: SHIPPED | OUTPATIENT
Start: 2020-02-19 | End: 2020-08-24

## 2020-02-19 RX ORDER — LOTEPREDNOL ETABONATE 2 MG/ML
SUSPENSION/ DROPS OPHTHALMIC
COMMUNITY
Start: 2020-01-20 | End: 2021-01-31 | Stop reason: ALTCHOICE

## 2020-02-19 NOTE — LETTER
Diabetic Eye Exam Form    Date Requested: 20  Patient: Channing Bingham  Patient : 1945   Referring Provider: Mary Garza MD    Dilated Retinal Exam, Optomap-Iris Exam, or Fundus Photography Done         Yes ([place] one above)         No     Date of Diabetic Eye Exam ______________________________  Left Eye      Exam did show retinopathy    Exam did not show retinopathy         Mild       Moderate       None       Proliferative       Severe     Right Eye     Exam did show retinopathy    Exam did not show retinopathy         Mild       Moderate       None       Proliferative       Severe     Comments __________________________________________________________    Practice Providing Exam ______________________________________________    Exam Performed By (print name) _______________________________________      Provider Signature ___________________________________________________      These reports are needed for  compliance    Please fax this completed form and a copy of the Diabetic Eye Exam report to our office located at [place] [address] as soon as possible to [phone] marlena Estes: Phone [phone]    We thank you for your assistance in treating our mutual patient

## 2020-02-19 NOTE — LETTER
Diabetic Eye Exam Form    Date Requested: 20  Patient: Fern Postal  Patient : 1945   Referring Provider: Frank Gutierrez MD    Dilated Retinal Exam, Optomap-Iris Exam, or Fundus Photography Done         Yes ([place] one above)         No     Date of Diabetic Eye Exam ______________________________  Left Eye      Exam did show retinopathy    Exam did not show retinopathy         Mild       Moderate       None       Proliferative       Severe     Right Eye     Exam did show retinopathy    Exam did not show retinopathy         Mild       Moderate       None       Proliferative       Severe     Comments __________________________________________________________    Practice Providing Exam ______________________________________________    Exam Performed By (print name) _______________________________________      Provider Signature ___________________________________________________      These reports are needed for  compliance    Please fax this completed form and a copy of the Diabetic Eye Exam report to our office located at [place] [address] as soon as possible to [phone] marlena Menard: Phone [phone]    We thank you for your assistance in treating our mutual patient

## 2020-02-19 NOTE — TELEPHONE ENCOUNTER
----- Message from Jonas Ramirez MD sent at 2/19/2020 10:59 AM EST -----  Please call Dr Elba Chong for eye exam done one month ago; also goes to Dr Escoabr Shah

## 2020-02-19 NOTE — ASSESSMENT & PLAN NOTE
Lab Results   Component Value Date    HGBA1C 6 5 10/31/2019     This is managed by endocrinology and has been well controlled  Up to date with foot and eye exams, and advised on the need for good care  Continue physical activity and dietary efforts

## 2020-02-19 NOTE — TELEPHONE ENCOUNTER
Upon review of the In Basket request and the patient's chart, initial outreach has been made via fax, please see Contacts section for details  A second outreach attempt will be made within 3 business days      Thank you  Gigi Dumont

## 2020-02-19 NOTE — TELEPHONE ENCOUNTER
Upon review of the In Basket request we were able to locate, review, and update the patient chart as requested for Diabetic Eye Exam     Any additional questions or concerns should be emailed to the Practice Liaisons via [email or web address] email, please do not reply via In Basket      Thank you  Irina Laurent

## 2020-05-30 ENCOUNTER — OFFICE VISIT (OUTPATIENT)
Dept: URGENT CARE | Facility: CLINIC | Age: 75
End: 2020-05-30
Payer: MEDICARE

## 2020-05-30 VITALS
HEIGHT: 71 IN | TEMPERATURE: 97.9 F | OXYGEN SATURATION: 99 % | DIASTOLIC BLOOD PRESSURE: 70 MMHG | HEART RATE: 61 BPM | BODY MASS INDEX: 33.04 KG/M2 | RESPIRATION RATE: 18 BRPM | SYSTOLIC BLOOD PRESSURE: 135 MMHG | WEIGHT: 236 LBS

## 2020-05-30 DIAGNOSIS — M54.32 SCIATICA OF LEFT SIDE: Primary | ICD-10-CM

## 2020-05-30 PROCEDURE — 1036F TOBACCO NON-USER: CPT | Performed by: NURSE PRACTITIONER

## 2020-05-30 PROCEDURE — 3066F NEPHROPATHY DOC TX: CPT | Performed by: NURSE PRACTITIONER

## 2020-05-30 PROCEDURE — 1160F RVW MEDS BY RX/DR IN RCRD: CPT | Performed by: NURSE PRACTITIONER

## 2020-05-30 PROCEDURE — 2022F DILAT RTA XM EVC RTNOPTHY: CPT | Performed by: NURSE PRACTITIONER

## 2020-05-30 PROCEDURE — 3078F DIAST BP <80 MM HG: CPT | Performed by: NURSE PRACTITIONER

## 2020-05-30 PROCEDURE — 99213 OFFICE O/P EST LOW 20 MIN: CPT | Performed by: NURSE PRACTITIONER

## 2020-05-30 PROCEDURE — 3075F SYST BP GE 130 - 139MM HG: CPT | Performed by: NURSE PRACTITIONER

## 2020-05-30 PROCEDURE — 4040F PNEUMOC VAC/ADMIN/RCVD: CPT | Performed by: NURSE PRACTITIONER

## 2020-05-30 RX ORDER — BACLOFEN 10 MG/1
10 TABLET ORAL 3 TIMES DAILY
Qty: 15 TABLET | Refills: 0 | Status: SHIPPED | OUTPATIENT
Start: 2020-05-30 | End: 2020-06-11 | Stop reason: SDUPTHER

## 2020-06-07 ENCOUNTER — TELEPHONE (OUTPATIENT)
Dept: URGENT CARE | Facility: CLINIC | Age: 75
End: 2020-06-07

## 2020-06-11 ENCOUNTER — OFFICE VISIT (OUTPATIENT)
Dept: OBGYN CLINIC | Facility: CLINIC | Age: 75
End: 2020-06-11
Payer: MEDICARE

## 2020-06-11 VITALS
BODY MASS INDEX: 32.2 KG/M2 | SYSTOLIC BLOOD PRESSURE: 148 MMHG | HEART RATE: 60 BPM | HEIGHT: 71 IN | TEMPERATURE: 97.5 F | DIASTOLIC BLOOD PRESSURE: 70 MMHG | WEIGHT: 230 LBS

## 2020-06-11 DIAGNOSIS — M76.32 IT BAND SYNDROME, LEFT: Primary | ICD-10-CM

## 2020-06-11 DIAGNOSIS — M54.32 SCIATICA OF LEFT SIDE: ICD-10-CM

## 2020-06-11 PROCEDURE — 3077F SYST BP >= 140 MM HG: CPT | Performed by: ORTHOPAEDIC SURGERY

## 2020-06-11 PROCEDURE — 1036F TOBACCO NON-USER: CPT | Performed by: ORTHOPAEDIC SURGERY

## 2020-06-11 PROCEDURE — 1160F RVW MEDS BY RX/DR IN RCRD: CPT | Performed by: ORTHOPAEDIC SURGERY

## 2020-06-11 PROCEDURE — 99214 OFFICE O/P EST MOD 30 MIN: CPT | Performed by: ORTHOPAEDIC SURGERY

## 2020-06-11 PROCEDURE — 3078F DIAST BP <80 MM HG: CPT | Performed by: ORTHOPAEDIC SURGERY

## 2020-06-11 PROCEDURE — 3008F BODY MASS INDEX DOCD: CPT | Performed by: ORTHOPAEDIC SURGERY

## 2020-06-11 PROCEDURE — 2022F DILAT RTA XM EVC RTNOPTHY: CPT | Performed by: ORTHOPAEDIC SURGERY

## 2020-06-11 PROCEDURE — 4040F PNEUMOC VAC/ADMIN/RCVD: CPT | Performed by: ORTHOPAEDIC SURGERY

## 2020-06-11 PROCEDURE — 3066F NEPHROPATHY DOC TX: CPT | Performed by: ORTHOPAEDIC SURGERY

## 2020-06-11 RX ORDER — CYCLOBENZAPRINE HCL 10 MG
10 TABLET ORAL 3 TIMES DAILY PRN
Qty: 20 TABLET | Refills: 0 | Status: CANCELLED | OUTPATIENT
Start: 2020-06-11

## 2020-06-11 RX ORDER — BACLOFEN 10 MG/1
10 TABLET ORAL 3 TIMES DAILY
Qty: 20 TABLET | Refills: 0 | Status: SHIPPED | OUTPATIENT
Start: 2020-06-11 | End: 2020-11-02 | Stop reason: ALTCHOICE

## 2020-06-18 ENCOUNTER — APPOINTMENT (OUTPATIENT)
Dept: LAB | Facility: CLINIC | Age: 75
End: 2020-06-18
Payer: MEDICARE

## 2020-06-18 ENCOUNTER — TRANSCRIBE ORDERS (OUTPATIENT)
Dept: LAB | Facility: CLINIC | Age: 75
End: 2020-06-18

## 2020-06-18 DIAGNOSIS — I48.19 ATRIAL FIBRILLATION, PERSISTENT (HCC): ICD-10-CM

## 2020-06-18 DIAGNOSIS — I48.19 ATRIAL FIBRILLATION, PERSISTENT (HCC): Primary | ICD-10-CM

## 2020-06-18 LAB
ALBUMIN SERPL BCP-MCNC: 3.6 G/DL (ref 3.5–5)
ALP SERPL-CCNC: 59 U/L (ref 46–116)
ALT SERPL W P-5'-P-CCNC: 38 U/L (ref 12–78)
ANION GAP SERPL CALCULATED.3IONS-SCNC: 5 MMOL/L (ref 4–13)
AST SERPL W P-5'-P-CCNC: 17 U/L (ref 5–45)
BILIRUB SERPL-MCNC: 0.46 MG/DL (ref 0.2–1)
BUN SERPL-MCNC: 21 MG/DL (ref 5–25)
CALCIUM SERPL-MCNC: 8.9 MG/DL (ref 8.3–10.1)
CHLORIDE SERPL-SCNC: 107 MMOL/L (ref 100–108)
CO2 SERPL-SCNC: 27 MMOL/L (ref 21–32)
CREAT SERPL-MCNC: 1.11 MG/DL (ref 0.6–1.3)
GFR SERPL CREATININE-BSD FRML MDRD: 65 ML/MIN/1.73SQ M
GLUCOSE P FAST SERPL-MCNC: 149 MG/DL (ref 65–99)
POTASSIUM SERPL-SCNC: 4.1 MMOL/L (ref 3.5–5.3)
PROT SERPL-MCNC: 7.3 G/DL (ref 6.4–8.2)
SODIUM SERPL-SCNC: 139 MMOL/L (ref 136–145)
TSH SERPL DL<=0.05 MIU/L-ACNC: 2.57 UIU/ML (ref 0.36–3.74)

## 2020-06-18 PROCEDURE — 80053 COMPREHEN METABOLIC PANEL: CPT

## 2020-06-18 PROCEDURE — 36415 COLL VENOUS BLD VENIPUNCTURE: CPT

## 2020-06-18 PROCEDURE — 84443 ASSAY THYROID STIM HORMONE: CPT

## 2020-08-20 NOTE — PROGRESS NOTES
Assessment/Plan:    1  Medicare annual wellness visit, subsequent    2  Type 2 diabetes mellitus with stage 3 chronic kidney disease, without long-term current use of insulin [facility])  Assessment & Plan:    Lab Results   Component Value Date    HGBA1C 6 5 10/31/2019     This has been well controlled and is managed by endocrinology  Continue sitagliptin metformin at current dose  Advised on the need for good foot and eye care  3  Diabetic polyneuropathy associated with type 2 diabetes mellitus [facility])  Assessment & Plan:    Lab Results   Component Value Date    HGBA1C 6 5 10/31/2019     He denies current symptoms and was advised on the need for good foot care  4  Hypertension, benign  Assessment & Plan: This is currently well controlled  He is now off amlodipine and will continue his other medications per cardiology  He is doing better symptomatically and has no further edema  5  Mixed hyperlipidemia  Assessment & Plan:  Well controlled on atorvastatin and he is up to date with bloodwork  Discussed need for good foot and eye care  6  Atrial fibrillation, unspecified type [facility])  Assessment & Plan:  Currently is rate controlled and heart rate is regular  Continue amlodipine and eliquis per cardiology  He has had no recent bleeding  7  BMI 33 0-33 9,adult        BMI Counseling: Body mass index is 33 29 kg/m²  The BMI is above normal  Nutrition recommendations include reducing portion sizes  Exercise recommendations include moderate aerobic physical activity for 150 minutes/week  Patient Instructions       Medicare Preventive Visit Patient Instructions  Thank you for completing your Welcome to Medicare Visit or Medicare Annual Wellness Visit today  Your next wellness visit will be due in one year (8/24/2021)  The screening/preventive services that you may require over the next 5-10 years are detailed below  Some tests may not apply to you based off risk factors and/or age   Screening tests ordered at today's visit but not completed yet may show as past due  Also, please note that scanned in results may not display below  Preventive Screenings:  Service Recommendations Previous Testing/Comments   Colorectal Cancer Screening  · Colonoscopy    · Fecal Occult Blood Test (FOBT)/Fecal Immunochemical Test (FIT)  · Fecal DNA/Cologuard Test  · Flexible Sigmoidoscopy Age: 54-65 years old   Colonoscopy: every 10 years (May be performed more frequently if at higher risk)  OR  FOBT/FIT: every 1 year  OR  Cologuard: every 3 years  OR  Sigmoidoscopy: every 5 years  Screening may be recommended earlier than age 48 if at higher risk for colorectal cancer  Also, an individualized decision between you and your healthcare provider will decide whether screening between the ages of 74-80 would be appropriate  Colonoscopy: 01/26/2017  FOBT/FIT: Not on file  Cologuard: Not on file  Sigmoidoscopy: Not on file    Screening Current     Prostate Cancer Screening Individualized decision between patient and health care provider in men between ages of 53-78   Medicare will cover every 12 months beginning on the day after your 50th birthday PSA: No results in last 5 years     Screening Not Indicated     Hepatitis C Screening Once for adults born between [birth year] and 1965  More frequently in patients at high risk for Hepatitis C Hep C Antibody: Not on file       Diabetes Screening 1-2 times per year if you're at risk for diabetes or have pre-diabetes Fasting glucose: 149 mg/dL   A1C: 6 5    Screening Not Indicated  History Diabetes   Cholesterol Screening Once every 5 years if you don't have a lipid disorder  May order more often based on risk factors  Lipid panel: 10/25/2016    Screening Not Indicated  History Lipid Disorder      Other Preventive Screenings Covered by Medicare:  1  Abdominal Aortic Aneurysm (AAA) Screening: covered once if your at risk   You're considered to be at risk if you have a family history of AAA or a male between the age of 73-68 who smoking at least 100 cigarettes in your lifetime  2  Lung Cancer Screening: covers low dose CT scan once per year if you meet all of the following conditions: (1) Age 50-69; (2) No signs or symptoms of lung cancer; (3) Current smoker or have quit smoking within the last 15 years; (4) You have a tobacco smoking history of at least 30 pack years (packs per day x number of years you smoked); (5) You get a written order from a healthcare provider  3  Glaucoma Screening: covered annually if you're considered high risk: (1) You have diabetes OR (2) Family history of glaucoma OR (3)  aged 48 and older OR (3)  American aged 72 and older  3  Osteoporosis Screening: covered every 2 years if you meet one of the following conditions: (1) Have a vertebral abnormality; (2) On glucocorticoid therapy for more than 3 months; (3) Have primary hyperparathyroidism; (4) On osteoporosis medications and need to assess response to drug therapy  5  HIV Screening: covered annually if you're between the age of 12-76  Also covered annually if you are younger than 13 and older than 72 with risk factors for HIV infection  For pregnant patients, it is covered up to 3 times per pregnancy  Immunizations:  Immunization Recommendations   Influenza Vaccine Annual influenza vaccination during flu season is recommended for all persons aged >= 6 months who do not have contraindications   Pneumococcal Vaccine (Prevnar and Pneumovax)  * Prevnar = PCV13  * Pneumovax = PPSV23 Adults 25-60 years old: 1-3 doses may be recommended based on certain risk factors  Adults 72 years old: Prevnar (PCV13) vaccine recommended followed by Pneumovax (PPSV23) vaccine  If already received PPSV23 since turning 65, then PCV13 recommended at least one year after PPSV23 dose     Hepatitis B Vaccine 3 dose series if at intermediate or high risk (ex: diabetes, end stage renal disease, liver disease)   Tetanus (Td) Vaccine - COST NOT COVERED BY MEDICARE PART B Following completion of primary series, a booster dose should be given every 10 years to maintain immunity against tetanus  Td may also be given as tetanus wound prophylaxis  Tdap Vaccine - COST NOT COVERED BY MEDICARE PART B Recommended at least once for all adults  For pregnant patients, recommended with each pregnancy  Shingles Vaccine (Shingrix) - COST NOT COVERED BY MEDICARE PART B  2 shot series recommended in those aged 48 and above     Health Maintenance Due:      Topic Date Due    Hepatitis C Screening  1945     Immunizations Due:      Topic Date Due    DTaP,Tdap,and Td Vaccines (1 - Tdap) 01/09/1966    Influenza Vaccine  07/01/2020     Advance Directives   What are advance directives? Advance directives are legal documents that state your wishes and plans for medical care  These plans are made ahead of time in case you lose your ability to make decisions for yourself  Advance directives can apply to any medical decision, such as the treatments you want, and if you want to donate organs  What are the types of advance directives? There are many types of advance directives, and each state has rules about how to use them  You may choose a combination of any of the following:  · Living will: This is a written record of the treatment you want  You can also choose which treatments you do not want, which to limit, and which to stop at a certain time  This includes surgery, medicine, IV fluid, and tube feedings  · Durable power of  for healthcare [facility]): This is a written record that states who you want to make healthcare choices for you when you are unable to make them for yourself  This person, called a proxy, is usually a family member or a friend  You may choose more than 1 proxy  · Do not resuscitate (DNR) order:  A DNR order is used in case your heart stops beating or you stop breathing   It is a request not to have certain forms of treatment, such as CPR  A DNR order may be included in other types of advance directives  · Medical directive: This covers the care that you want if you are in a coma, near death, or unable to make decisions for yourself  You can list the treatments you want for each condition  Treatment may include pain medicine, surgery, blood transfusions, dialysis, IV or tube feedings, and a ventilator (breathing machine)  · Values history: This document has questions about your views, beliefs, and how you feel and think about life  This information can help others choose the care that you would choose  Why are advance directives important? An advance directive helps you control your care  Although spoken wishes may be used, it is better to have your wishes written down  Spoken wishes can be misunderstood, or not followed  Treatments may be given even if you do not want them  An advance directive may make it easier for your family to make difficult choices about your care  Fall Prevention    Fall prevention  includes ways to make your home and other areas safer  It also includes ways you can move more carefully to prevent a fall  Health conditions that cause changes in your blood pressure, vision, or muscle strength and coordination may increase your risk for falls  Medicines may also increase your risk for falls if they make you dizzy, weak, or sleepy  Fall prevention tips:   · Stand or sit up slowly  · Use assistive devices as directed  · Wear shoes that fit well and have soles that   · Wear a personal alarm  · Stay active  · Manage your medical conditions  Home Safety Tips:  · Add items to prevent falls in the bathroom  · Keep paths clear  · Install bright lights in your home  · Keep items you use often on shelves within reach  · Paint or place reflective tape on the edges of your stairs      Weight Management   Why it is important to manage your weight:  Being overweight increases your risk of health conditions such as heart disease, high blood pressure, type 2 diabetes, and certain types of cancer  It can also increase your risk for osteoarthritis, sleep apnea, and other respiratory problems  Aim for a slow, steady weight loss  Even a small amount of weight loss can lower your risk of health problems  How to lose weight safely:  A safe and healthy way to lose weight is to eat fewer calories and get regular exercise  You can lose up about 1 pound a week by decreasing the number of calories you eat by 500 calories each day  Healthy meal plan for weight management:  A healthy meal plan includes a variety of foods, contains fewer calories, and helps you stay healthy  A healthy meal plan includes the following:  · Eat whole-grain foods more often  A healthy meal plan should contain fiber  Fiber is the part of grains, fruits, and vegetables that is not broken down by your body  Whole-grain foods are healthy and provide extra fiber in your diet  Some examples of whole-grain foods are whole-wheat breads and pastas, oatmeal, brown rice, and bulgur  · Eat a variety of vegetables every day  Include dark, leafy greens such as spinach, kale, bernie greens, and mustard greens  Eat yellow and orange vegetables such as carrots, sweet potatoes, and winter squash  · Eat a variety of fruits every day  Choose fresh or canned fruit (canned in its own juice or light syrup) instead of juice  Fruit juice has very little or no fiber  · Eat low-fat dairy foods  Drink fat-free (skim) milk or 1% milk  Eat fat-free yogurt and low-fat cottage cheese  Try low-fat cheeses such as mozzarella and other reduced-fat cheeses  · Choose meat and other protein foods that are low in fat  Choose beans or other legumes such as split peas or lentils  Choose fish, skinless poultry (chicken or turkey), or lean cuts of red meat (beef or pork)  Before you cook meat or poultry, cut off any visible fat     · Use less fat and oil   Try baking foods instead of frying them  Add less fat, such as margarine, sour cream, regular salad dressing and mayonnaise to foods  Eat fewer high-fat foods  Some examples of high-fat foods include french fries, doughnuts, ice cream, and cakes  · Eat fewer sweets  Limit foods and drinks that are high in sugar  This includes candy, cookies, regular soda, and sweetened drinks  Exercise:  Exercise at least 30 minutes per day on most days of the week  Some examples of exercise include walking, biking, dancing, and swimming  You can also fit in more physical activity by taking the stairs instead of the elevator or parking farther away from stores  Ask your healthcare provider about the best exercise plan for you  © Copyright [employer] 2018 Information is for End User's use only and may not be sold, redistributed or otherwise used for commercial purposes  All illustrations and images included in CareNotes® are the copyrighted property of [employer]  or [employer] in about 6 months (around 2/24/2021)  Subjective:      Patient ID: Guillaume Leavitt  is a 76 y o  male  Chief Complaint   Patient presents with    Medicare Wellness Visit     AWV [facility] LPN       Here for follow up of multiple issues, as well as AWV  He is feeling well and has been exercising regularly, has been walking 2 miles a day with the dog  Cardiologist changed bp meds due to edema and bp is good today  He denies chest pain, palpitations, cough, fever  Continues on his amiodarone for afib and has not had any symptomatic episodes  He continues to see Dr Loretta Steele for his diabetes and states this has been well controlled    He is trying to eat a healthy diet and states he is up to date with eye exam           The following portions of the patient's history were reviewed and updated as appropriate: allergies, current medications, past family history, past medical history, past social history, past surgical history and problem list     Review of Systems   Constitutional: Negative  Respiratory: Negative  Cardiovascular: Negative  Endocrine: Negative            Current Outpatient Medications   Medication Sig Dispense Refill    amiodarone 200 mg tablet 1 pill daily      amoxicillin (AMOXIL) 500 mg capsule 3 (three) times a day       apixaban (ELIQUIS) 5 mg Take 5 mg by mouth 2 (two) times a day      atorvastatin (LIPITOR) 20 mg tablet TAKE 1 TABLET DAILY 90 tablet 3    baclofen 10 mg tablet Take 1 tablet (10 mg total) by mouth 3 (three) times a day 20 tablet 0    Blood Glucose Monitoring Suppl (FREESTYLE LITE) GEOVANNY       carvedilol (COREG) 25 mg tablet Take 25 mg by mouth 2 (two) times a day      esomeprazole (NexIUM) 20 mg capsule Take 1 capsule (20 mg total) by mouth daily at bedtime 90 capsule 3    FREESTYLE LITE test strip       furosemide (LASIX) 20 mg tablet Take 20 mg by mouth daily       hydrochlorothiazide (HYDRODIURIL) 25 mg tablet Take 25 mg by mouth daily      Lancets (FREESTYLE) lancets       losartan (COZAAR) 50 mg tablet TAKE ONE AND ONE-HALF TABLETS EVERY MORNING (Patient taking differently: Take by mouth 2 (two) times a day ) 135 tablet 3    multivitamin (THERAGRAN) TABS Take 1 tablet by mouth every morning      polyethylene glycol-propylene glycol (SYSTANE) 0 4-0 3 % every 3 (three) hours as needed      RESTASIS 0 05 % ophthalmic emulsion Administer 1 drop to both eyes every 12 (twelve) hours       sitaGLIPtin-metFORMIN (JANUMET)  MG per tablet Take 2 tablets by mouth every evening Takes 2 tabs evening dose=100-2000mg        tadalafil (CIALIS) 5 MG tablet Take 5 mg by mouth daily as needed       tamsulosin (FLOMAX) 0 4 mg Take 0 4 mg by mouth every other day       traMADol (ULTRAM) 50 mg tablet every 6 (six) hours as needed       ALREX 0 2 % SUSP       Aspirin Buf,CaCarb-MgCarb-MgO, 81 MG TABS Take by mouth      celecoxib (CeleBREX) 200 mg capsule Take 200 mg by mouth 2 (two) times a day       losartan (COZAAR) 100 MG tablet        No current facility-administered medications for this visit  Objective:    /64   Pulse 68   Temp 97 7 °F (36 5 °C)   Resp 20   Ht 5' 10" (1 778 m) Comment: with shoes- he refused to remove his sneakers  Wt 105 kg (232 lb)   BMI 33 29 kg/m²        Physical Exam  Constitutional:       Appearance: He is well-developed  He is obese  HENT:      Head: Normocephalic and atraumatic  Eyes:      Conjunctiva/sclera: Conjunctivae normal    Neck:      Musculoskeletal: Neck supple  Thyroid: No thyromegaly  Vascular: No JVD  Cardiovascular:      Rate and Rhythm: Normal rate and regular rhythm  Heart sounds: Normal heart sounds  No murmur  No friction rub  No gallop  Pulmonary:      Effort: Pulmonary effort is normal       Breath sounds: Normal breath sounds  No wheezing or rales  Abdominal:      General: Bowel sounds are normal  There is no distension  Palpations: Abdomen is soft  Tenderness: There is no abdominal tenderness  Neurological:      Mental Status: He is alert                  Ginger Swanson MD

## 2020-08-24 ENCOUNTER — OFFICE VISIT (OUTPATIENT)
Dept: FAMILY MEDICINE CLINIC | Facility: CLINIC | Age: 75
End: 2020-08-24
Payer: MEDICARE

## 2020-08-24 VITALS
SYSTOLIC BLOOD PRESSURE: 124 MMHG | TEMPERATURE: 97.7 F | RESPIRATION RATE: 20 BRPM | HEIGHT: 70 IN | HEART RATE: 68 BPM | WEIGHT: 232 LBS | DIASTOLIC BLOOD PRESSURE: 64 MMHG | BODY MASS INDEX: 33.21 KG/M2

## 2020-08-24 DIAGNOSIS — N18.30 TYPE 2 DIABETES MELLITUS WITH STAGE 3 CHRONIC KIDNEY DISEASE, WITHOUT LONG-TERM CURRENT USE OF INSULIN (HCC): ICD-10-CM

## 2020-08-24 DIAGNOSIS — I10 HYPERTENSION, BENIGN: ICD-10-CM

## 2020-08-24 DIAGNOSIS — E78.2 MIXED HYPERLIPIDEMIA: ICD-10-CM

## 2020-08-24 DIAGNOSIS — E11.42 DIABETIC POLYNEUROPATHY ASSOCIATED WITH TYPE 2 DIABETES MELLITUS (HCC): ICD-10-CM

## 2020-08-24 DIAGNOSIS — Z00.00 MEDICARE ANNUAL WELLNESS VISIT, SUBSEQUENT: Primary | ICD-10-CM

## 2020-08-24 DIAGNOSIS — I48.91 ATRIAL FIBRILLATION, UNSPECIFIED TYPE (HCC): ICD-10-CM

## 2020-08-24 DIAGNOSIS — E11.22 TYPE 2 DIABETES MELLITUS WITH STAGE 3 CHRONIC KIDNEY DISEASE, WITHOUT LONG-TERM CURRENT USE OF INSULIN (HCC): ICD-10-CM

## 2020-08-24 PROCEDURE — 1036F TOBACCO NON-USER: CPT | Performed by: INTERNAL MEDICINE

## 2020-08-24 PROCEDURE — 2022F DILAT RTA XM EVC RTNOPTHY: CPT | Performed by: INTERNAL MEDICINE

## 2020-08-24 PROCEDURE — 1170F FXNL STATUS ASSESSED: CPT | Performed by: INTERNAL MEDICINE

## 2020-08-24 PROCEDURE — 3074F SYST BP LT 130 MM HG: CPT | Performed by: INTERNAL MEDICINE

## 2020-08-24 PROCEDURE — G0439 PPPS, SUBSEQ VISIT: HCPCS | Performed by: INTERNAL MEDICINE

## 2020-08-24 PROCEDURE — 3078F DIAST BP <80 MM HG: CPT | Performed by: INTERNAL MEDICINE

## 2020-08-24 PROCEDURE — 3008F BODY MASS INDEX DOCD: CPT | Performed by: INTERNAL MEDICINE

## 2020-08-24 PROCEDURE — 1160F RVW MEDS BY RX/DR IN RCRD: CPT | Performed by: INTERNAL MEDICINE

## 2020-08-24 PROCEDURE — 4040F PNEUMOC VAC/ADMIN/RCVD: CPT | Performed by: INTERNAL MEDICINE

## 2020-08-24 PROCEDURE — 3066F NEPHROPATHY DOC TX: CPT | Performed by: INTERNAL MEDICINE

## 2020-08-24 PROCEDURE — 99214 OFFICE O/P EST MOD 30 MIN: CPT | Performed by: INTERNAL MEDICINE

## 2020-08-24 PROCEDURE — 1125F AMNT PAIN NOTED PAIN PRSNT: CPT | Performed by: INTERNAL MEDICINE

## 2020-08-24 PROCEDURE — 4010F ACE/ARB THERAPY RXD/TAKEN: CPT | Performed by: INTERNAL MEDICINE

## 2020-08-24 RX ORDER — ATORVASTATIN CALCIUM 20 MG/1
TABLET, FILM COATED ORAL
Qty: 90 TABLET | Refills: 3 | Status: SHIPPED | OUTPATIENT
Start: 2020-08-24 | End: 2021-08-19

## 2020-08-24 RX ORDER — HYDROCHLOROTHIAZIDE 25 MG/1
25 TABLET ORAL DAILY
COMMUNITY
Start: 2020-07-30 | End: 2020-11-02 | Stop reason: ALTCHOICE

## 2020-08-24 RX ORDER — LOSARTAN POTASSIUM 100 MG/1
100 TABLET ORAL EVERY MORNING
COMMUNITY
Start: 2020-08-20

## 2020-08-24 RX ORDER — AMOXICILLIN 500 MG/1
CAPSULE ORAL 3 TIMES DAILY
COMMUNITY
Start: 2020-08-17 | End: 2020-11-02 | Stop reason: ALTCHOICE

## 2020-08-24 RX ORDER — TRAMADOL HYDROCHLORIDE 50 MG/1
TABLET ORAL EVERY 6 HOURS PRN
COMMUNITY
Start: 2020-08-18 | End: 2020-11-02 | Stop reason: SDUPTHER

## 2020-08-24 NOTE — ASSESSMENT & PLAN NOTE
Well controlled on atorvastatin and he is up to date with bloodwork  Discussed need for good foot and eye care

## 2020-08-24 NOTE — ASSESSMENT & PLAN NOTE
Lab Results   Component Value Date    HGBA1C 6 5 10/31/2019     This has been well controlled and is managed by endocrinology  Continue sitagliptin metformin at current dose  Advised on the need for good foot and eye care

## 2020-08-24 NOTE — PATIENT INSTRUCTIONS
Medicare Preventive Visit Patient Instructions  Thank you for completing your Welcome to Medicare Visit or Medicare Annual Wellness Visit today  Your next wellness visit will be due in one year (8/24/2021)  The screening/preventive services that you may require over the next 5-10 years are detailed below  Some tests may not apply to you based off risk factors and/or age  Screening tests ordered at today's visit but not completed yet may show as past due  Also, please note that scanned in results may not display below  Preventive Screenings:  Service Recommendations Previous Testing/Comments   Colorectal Cancer Screening  · Colonoscopy    · Fecal Occult Blood Test (FOBT)/Fecal Immunochemical Test (FIT)  · Fecal DNA/Cologuard Test  · Flexible Sigmoidoscopy Age: 54-65 years old   Colonoscopy: every 10 years (May be performed more frequently if at higher risk)  OR  FOBT/FIT: every 1 year  OR  Cologuard: every 3 years  OR  Sigmoidoscopy: every 5 years  Screening may be recommended earlier than age 48 if at higher risk for colorectal cancer  Also, an individualized decision between you and your healthcare provider will decide whether screening between the ages of 74-80 would be appropriate   Colonoscopy: 01/26/2017  FOBT/FIT: Not on file  Cologuard: Not on file  Sigmoidoscopy: Not on file    Screening Current     Prostate Cancer Screening Individualized decision between patient and health care provider in men between ages of 53-78   Medicare will cover every 12 months beginning on the day after your 50th birthday PSA: No results in last 5 years     Screening Not Indicated     Hepatitis C Screening Once for adults born between [birth year] and 1965  More frequently in patients at high risk for Hepatitis C Hep C Antibody: Not on file       Diabetes Screening 1-2 times per year if you're at risk for diabetes or have pre-diabetes Fasting glucose: 149 mg/dL   A1C: 6 5    Screening Not Indicated  History Diabetes   Cholesterol Screening Once every 5 years if you don't have a lipid disorder  May order more often based on risk factors  Lipid panel: 10/25/2016    Screening Not Indicated  History Lipid Disorder      Other Preventive Screenings Covered by Medicare:  1  Abdominal Aortic Aneurysm (AAA) Screening: covered once if your at risk  You're considered to be at risk if you have a family history of AAA or a male between the age of 73-68 who smoking at least 100 cigarettes in your lifetime  2  Lung Cancer Screening: covers low dose CT scan once per year if you meet all of the following conditions: (1) Age 50-69; (2) No signs or symptoms of lung cancer; (3) Current smoker or have quit smoking within the last 15 years; (4) You have a tobacco smoking history of at least 30 pack years (packs per day x number of years you smoked); (5) You get a written order from a healthcare provider  3  Glaucoma Screening: covered annually if you're considered high risk: (1) You have diabetes OR (2) Family history of glaucoma OR (3)  aged 48 and older OR (3)  American aged 72 and older  3  Osteoporosis Screening: covered every 2 years if you meet one of the following conditions: (1) Have a vertebral abnormality; (2) On glucocorticoid therapy for more than 3 months; (3) Have primary hyperparathyroidism; (4) On osteoporosis medications and need to assess response to drug therapy  5  HIV Screening: covered annually if you're between the age of 12-76  Also covered annually if you are younger than 13 and older than 72 with risk factors for HIV infection  For pregnant patients, it is covered up to 3 times per pregnancy      Immunizations:  Immunization Recommendations   Influenza Vaccine Annual influenza vaccination during flu season is recommended for all persons aged >= 6 months who do not have contraindications   Pneumococcal Vaccine (Prevnar and Pneumovax)  * Prevnar = PCV13  * Pneumovax = PPSV23 Adults 25-60 years old: 1-3 doses may be recommended based on certain risk factors  Adults 72 years old: Prevnar (PCV13) vaccine recommended followed by Pneumovax (PPSV23) vaccine  If already received PPSV23 since turning 65, then PCV13 recommended at least one year after PPSV23 dose  Hepatitis B Vaccine 3 dose series if at intermediate or high risk (ex: diabetes, end stage renal disease, liver disease)   Tetanus (Td) Vaccine - COST NOT COVERED BY MEDICARE PART B Following completion of primary series, a booster dose should be given every 10 years to maintain immunity against tetanus  Td may also be given as tetanus wound prophylaxis  Tdap Vaccine - COST NOT COVERED BY MEDICARE PART B Recommended at least once for all adults  For pregnant patients, recommended with each pregnancy  Shingles Vaccine (Shingrix) - COST NOT COVERED BY MEDICARE PART B  2 shot series recommended in those aged 48 and above     Health Maintenance Due:      Topic Date Due    Hepatitis C Screening  1945     Immunizations Due:      Topic Date Due    DTaP,Tdap,and Td Vaccines (1 - Tdap) 01/09/1966    Influenza Vaccine  07/01/2020     Advance Directives   What are advance directives? Advance directives are legal documents that state your wishes and plans for medical care  These plans are made ahead of time in case you lose your ability to make decisions for yourself  Advance directives can apply to any medical decision, such as the treatments you want, and if you want to donate organs  What are the types of advance directives? There are many types of advance directives, and each state has rules about how to use them  You may choose a combination of any of the following:  · Living will: This is a written record of the treatment you want  You can also choose which treatments you do not want, which to limit, and which to stop at a certain time  This includes surgery, medicine, IV fluid, and tube feedings  · Durable power of  for healthcare [place] [facility]):   This is a written record that states who you want to make healthcare choices for you when you are unable to make them for yourself  This person, called a proxy, is usually a family member or a friend  You may choose more than 1 proxy  · Do not resuscitate (DNR) order:  A DNR order is used in case your heart stops beating or you stop breathing  It is a request not to have certain forms of treatment, such as CPR  A DNR order may be included in other types of advance directives  · Medical directive: This covers the care that you want if you are in a coma, near death, or unable to make decisions for yourself  You can list the treatments you want for each condition  Treatment may include pain medicine, surgery, blood transfusions, dialysis, IV or tube feedings, and a ventilator (breathing machine)  · Values history: This document has questions about your views, beliefs, and how you feel and think about life  This information can help others choose the care that you would choose  Why are advance directives important? An advance directive helps you control your care  Although spoken wishes may be used, it is better to have your wishes written down  Spoken wishes can be misunderstood, or not followed  Treatments may be given even if you do not want them  An advance directive may make it easier for your family to make difficult choices about your care  Fall Prevention    Fall prevention  includes ways to make your home and other areas safer  It also includes ways you can move more carefully to prevent a fall  Health conditions that cause changes in your blood pressure, vision, or muscle strength and coordination may increase your risk for falls  Medicines may also increase your risk for falls if they make you dizzy, weak, or sleepy  Fall prevention tips:   · Stand or sit up slowly  · Use assistive devices as directed  · Wear shoes that fit well and have soles that   · Wear a personal alarm      · Stay active  · Manage your medical conditions  Home Safety Tips:  · Add items to prevent falls in the bathroom  · Keep paths clear  · Install bright lights in your home  · Keep items you use often on shelves within reach  · Paint or place reflective tape on the edges of your stairs  Weight Management   Why it is important to manage your weight:  Being overweight increases your risk of health conditions such as heart disease, high blood pressure, type 2 diabetes, and certain types of cancer  It can also increase your risk for osteoarthritis, sleep apnea, and other respiratory problems  Aim for a slow, steady weight loss  Even a small amount of weight loss can lower your risk of health problems  How to lose weight safely:  A safe and healthy way to lose weight is to eat fewer calories and get regular exercise  You can lose up about 1 pound a week by decreasing the number of calories you eat by 500 calories each day  Healthy meal plan for weight management:  A healthy meal plan includes a variety of foods, contains fewer calories, and helps you stay healthy  A healthy meal plan includes the following:  · Eat whole-grain foods more often  A healthy meal plan should contain fiber  Fiber is the part of grains, fruits, and vegetables that is not broken down by your body  Whole-grain foods are healthy and provide extra fiber in your diet  Some examples of whole-grain foods are whole-wheat breads and pastas, oatmeal, brown rice, and bulgur  · Eat a variety of vegetables every day  Include dark, leafy greens such as spinach, kale, bernie greens, and mustard greens  Eat yellow and orange vegetables such as carrots, sweet potatoes, and winter squash  · Eat a variety of fruits every day  Choose fresh or canned fruit (canned in its own juice or light syrup) instead of juice  Fruit juice has very little or no fiber  · Eat low-fat dairy foods  Drink fat-free (skim) milk or 1% milk   Eat fat-free yogurt and low-fat cottage cheese  Try low-fat cheeses such as mozzarella and other reduced-fat cheeses  · Choose meat and other protein foods that are low in fat  Choose beans or other legumes such as split peas or lentils  Choose fish, skinless poultry (chicken or turkey), or lean cuts of red meat (beef or pork)  Before you cook meat or poultry, cut off any visible fat  · Use less fat and oil  Try baking foods instead of frying them  Add less fat, such as margarine, sour cream, regular salad dressing and mayonnaise to foods  Eat fewer high-fat foods  Some examples of high-fat foods include french fries, doughnuts, ice cream, and cakes  · Eat fewer sweets  Limit foods and drinks that are high in sugar  This includes candy, cookies, regular soda, and sweetened drinks  Exercise:  Exercise at least 30 minutes per day on most days of the week  Some examples of exercise include walking, biking, dancing, and swimming  You can also fit in more physical activity by taking the stairs instead of the elevator or parking farther away from stores  Ask your healthcare provider about the best exercise plan for you  © Copyright [employer] 2018 Information is for End User's use only and may not be sold, redistributed or otherwise used for commercial purposes   All illustrations and images included in CareNotes® are the copyrighted property of A D A M , Inc  or [address] [place] [address] [place]

## 2020-08-24 NOTE — ASSESSMENT & PLAN NOTE
This is currently well controlled  He is now off amlodipine and will continue his other medications per cardiology  He is doing better symptomatically and has no further edema

## 2020-08-24 NOTE — ASSESSMENT & PLAN NOTE
Currently is rate controlled and heart rate is regular  Continue amlodipine and eliquis per cardiology  He has had no recent bleeding

## 2020-08-24 NOTE — ASSESSMENT & PLAN NOTE
Lab Results   Component Value Date    HGBA1C 6 5 10/31/2019     He denies current symptoms and was advised on the need for good foot care

## 2020-08-24 NOTE — PROGRESS NOTES
Assessment and Plan:     Problem List Items Addressed This Visit     None           Preventive health issues were discussed with patient, and age appropriate screening tests were ordered as noted in patient's After Visit Summary  Personalized health advice and appropriate referrals for health education or preventive services given if needed, as noted in patient's After Visit Summary       History of Present Illness:     Patient presents for Medicare Annual Wellness visit    Patient Care Team:  Yoselin Singh MD as PCP - General  MD Robbie Pérez MD Nickey Singer, MD Cyntha Butler, MD Leeann Dare, DPM Kathrin Chiquito, MD Kathrin Purl, MD Melda Capers, MD Nickey Singer, MD as Endoscopist     Problem List:     Patient Active Problem List   Diagnosis    Atrial fibrillation ([facility] [place] [address] [facility]    Hypertension, benign    Benign prostatic hyperplasia without urinary obstruction    Chronic kidney disease (CKD), stage II (mild)    Chronic rhinitis    Colon, diverticulosis    Diabetes mellitus with chronic kidney disease ([facility] [place] [address] [facility]    Diabetic neuropathy ([facility] [address] [facility]    Esophageal reflux    Mixed hyperlipidemia    Obstructive sleep apnea    [facility]    Seasonal allergic rhinitis due to pollen    Class 1 obesity      Past Medical and Surgical History:     Past Medical History:   Diagnosis Date    Allergic rhinitis     Anemia 10/23/2008    last assessed 9/9/13     Anxiety     Atrial fibrillation ([facility] [address] [facility]     Bleeding     BPH (benign prostatic hypertrophy)     Diabetes mellitus ([facility] [address] [facility]     Diarrhea     Diverticulitis     Eating disorder     GERD (gastroesophageal reflux disease)     Herpes zoster with complication     last assessed 7/3/17     Hiatal hernia     Hyperlipidemia     Hypertension     IBS (irritable bowel syndrome)     Incisional hernia     Increased urinary frequency     Irregular heart beat     Pyogenic granuloma 09/13/2006    last assessed 9/13/06    Carlos (subconjunctival hemorrhage), unspecified laterality 09/12/2005    last assessed 9/12/05    Sleep apnea     TMJ (dislocation of temporomandibular joint)     Ventral hernia     last assessed  4/6/17      Past Surgical History:   Procedure Laterality Date    APPENDECTOMY      ATRIAL ABLATION SURGERY      2014    ATRIAL ABLATION SURGERY      catheter ablation atrial fibrillation / last assessed 2/17/16     CHOLECYSTECTOMY      lap    COLON SURGERY      Hartmans procedure    COLON SURGERY      reversal 6 weeks later    COLONOSCOPY N/A 1/26/2017    Procedure: COLONOSCOPY;  Surgeon: Niyah Shaikh MD;  Location: [facility]; Service:     ESOPHAGOGASTRODUODENOSCOPY N/A 1/26/2017    Procedure: ESOPHAGOGASTRODUODENOSCOPY (EGD); Surgeon: Niyah Shaikh MD;  Location: [facility];   Service:     EYE SURGERY Bilateral     lid repair    HERNIA REPAIR Bilateral     inguinal 2014 & 2013    INCISIONAL HERNIA REPAIR  2011    INCISIONAL HERNIA REPAIR N/A 3/24/2017    Procedure: REPAIR OF INCARCERATED INCISIONAL HERNIA WITH MESH, Lysis of Adhesions, Partial Omentectomy;  Surgeon: Sophia Rojas MD;  Location: [facility];  Service:    [place] EXCISION      SKIN CANCER EXCISION      On nose    TONSILLECTOMY        Family History:     Family History   Problem Relation Age of Onset    Heart disease Mother     Cancer Mother     Colon cancer Mother     Dementia Mother     Early death Father     Seizures Father    Connie  Cancer Sister         bone    Heart disease Sister     Diabetes Sister     Lupus Sister     Heart disease Brother     Cancer Brother         kidney    Diabetes Brother     Stroke Neg Hx       Social History:        Social History     Socioeconomic History    Marital status: /Civil Union     Spouse name: None    Number of children: None    Years of education: None    Highest education level: None   Occupational History    None   Social Needs    Financial resource strain: None    Food insecurity Worry: None     Inability: None    Transportation needs     Medical: None     Non-medical: None   Tobacco Use    Smoking status: Former Smoker     Packs/day: 1 00     Years: 20 00     Pack years: 20 00     Types: Cigarettes     Last attempt to quit: 1975     Years since quittin 6    Smokeless tobacco: Never Used   Substance and Sexual Activity    Alcohol use: No    Drug use: No    Sexual activity: None   Lifestyle    Physical activity     Days per week: None     Minutes per session: None    Stress: None   Relationships    Social connections     Talks on phone: None     Gets together: None     Attends Nondenominational service: None     Active member of club or organization: None     Attends meetings of clubs or organizations: None     Relationship status: None    Intimate partner violence     Fear of current or ex partner: None     Emotionally abused: None     Physically abused: None     Forced sexual activity: None   Other Topics Concern    None   Social History Narrative    Single per allscript       Medications and Allergies:     Current Outpatient Medications   Medication Sig Dispense Refill    amiodarone 200 mg tablet 1 pill daily      amoxicillin (AMOXIL) 500 mg capsule 3 (three) times a day       apixaban (ELIQUIS) 5 mg Take 5 mg by mouth 2 (two) times a day      atorvastatin (LIPITOR) 20 mg tablet TAKE 1 TABLET DAILY 90 tablet 3    baclofen 10 mg tablet Take 1 tablet (10 mg total) by mouth 3 (three) times a day 20 tablet 0    Blood Glucose Monitoring Suppl (FREESTYLE LITE) GEOVANNY       carvedilol (COREG) 25 mg tablet Take 25 mg by mouth 2 (two) times a day      esomeprazole (NexIUM) 20 mg capsule Take 1 capsule (20 mg total) by mouth daily at bedtime 90 capsule 3    FREESTYLE LITE test strip       furosemide (LASIX) 20 mg tablet Take 20 mg by mouth daily       hydrochlorothiazide (HYDRODIURIL) 25 mg tablet Take 25 mg by mouth daily      Lancets (FREESTYLE) lancets       losartan (COZAAR) 50 mg tablet TAKE ONE AND ONE-HALF TABLETS EVERY MORNING (Patient taking differently: Take by mouth 2 (two) times a day ) 135 tablet 3    multivitamin (THERAGRAN) TABS Take 1 tablet by mouth every morning      polyethylene glycol-propylene glycol (SYSTANE) 0 4-0 3 % every 3 (three) hours as needed      RESTASIS 0 05 % ophthalmic emulsion Administer 1 drop to both eyes every 12 (twelve) hours       sitaGLIPtin-metFORMIN (JANUMET)  MG per tablet Take 2 tablets by mouth every evening Takes 2 tabs evening dose=100-2000mg        tadalafil (CIALIS) 5 MG tablet Take 5 mg by mouth daily as needed       tamsulosin (FLOMAX) 0 4 mg Take 0 4 mg by mouth every other day       traMADol (ULTRAM) 50 mg tablet every 6 (six) hours as needed       ALREX 0 2 % SUSP       amLODIPine (NORVASC) 5 mg tablet Take 1 tablet (5 mg total) by mouth daily (Patient not taking: Reported on 8/24/2020) 90 tablet 3    Aspirin Buf,CaCarb-MgCarb-MgO, 81 MG TABS Take by mouth      celecoxib (CeleBREX) 200 mg capsule Take 200 mg by mouth 2 (two) times a day       losartan (COZAAR) 100 MG tablet        No current facility-administered medications for this visit  Allergies   Allergen Reactions    Codeine Other (See Comments) and GI Intolerance     Reaction Date: 27Dec2004;  Annotation - [id number]: '' CRAZY ''  vomiting  headache    Demerol [Meperidine] Nausea Only, Other (See Comments), GI Intolerance and Vomiting     vomiting  Reaction Date: 27Dec2004;   headache    Morphine Nausea Only, GI Intolerance and Vomiting     Reaction Date: 23Feb2012;       Immunizations:     Immunization History   Administered Date(s) Administered    H1N1, All Formulations 11/05/2009    Influenza Split High Dose Preservative Free IM 09/09/2013, 10/09/2014, 10/21/2015, 10/13/2016, 09/07/2017    Influenza TIV (IM) 11/20/2001, 10/28/2002, 10/09/2003, 02/17/2005, 11/15/2005, 11/29/2006, 10/18/2007, 10/23/2008, 10/21/2009, 10/28/2010, 09/28/2011, 11/15/2012    Influenza, high dose seasonal 0 7 mL 10/08/2018, 10/31/2019    Pneumococcal Conjugate 13-Valent 10/21/2015    Pneumococcal Polysaccharide PPV23 01/10/2013      Health Maintenance:         Topic Date Due    Hepatitis C Screening  1945         Topic Date Due    DTaP,Tdap,and Td Vaccines (1 - Tdap) 01/09/1966    Influenza Vaccine  07/01/2020      Medicare Health Risk Assessment:     /64   Pulse 68   Temp 97 7 °F (36 5 °C)   Resp 20   Ht 5' 10" (1 778 m) Comment: with shoes- he refused to remove his sneakers  Wt 105 kg (232 lb)   BMI 33 29 kg/m²      Jose Alberto Rivas is here for his Subsequent Wellness visit  Health Risk Assessment:   Patient rates overall health as good  Patient feels that their physical health rating is same  Eyesight was rated as slightly worse  Hearing was rated as slightly worse  Patient feels that their emotional and mental health rating is same  Pain experienced in the last 7 days has been a lot  Patient's pain rating has been 7/10  Patient states that he has experienced no weight loss or gain in last 6 months  Depression Screening:   PHQ-2 Score: 1      Fall Risk Screening: In the past year, patient has experienced: history of falling in past year    Number of falls: 2 or more  Injured during fall?: No    Feels unsteady when standing or walking?: No    Worried about falling?: Yes      Home Safety:  Patient does not have trouble with stairs inside or outside of their home  Patient has working smoke alarms and has working carbon monoxide detector  Home safety hazards include: none  Nutrition:   Current diet is Diabetic  Medications:   Patient is currently taking over-the-counter supplements  OTC medications include: see medication list  Patient is able to manage medications       Activities of Daily Living (ADLs)/Instrumental Activities of Daily Living (IADLs):   Walk and transfer into and out of bed and chair?: Yes  Dress and groom yourself?: Yes Bathe or shower yourself?: Yes    Feed yourself? Yes  Do your laundry/housekeeping?: Yes  Manage your money, pay your bills and track your expenses?: Yes  Make your own meals?: Yes    Do your own shopping?: Yes    Previous Hospitalizations:   Any hospitalizations or ED visits within the last 12 months?: No      Advance Care Planning:   Living will: No    Durable POA for healthcare: No    Advanced directive: No      Comments: Declines information  Cognitive Screening:   Provider or family/friend/caregiver concerned regarding cognition?: No    PREVENTIVE SCREENINGS      Cardiovascular Screening:    General: Screening Not Indicated and History Lipid Disorder      Diabetes Screening:     General: Screening Not Indicated and History Diabetes      Colorectal Cancer Screening:     General: Screening Current      Prostate Cancer Screening:    General: Screening Not Indicated      Osteoporosis Screening:    General: Screening Not Indicated      Abdominal Aortic Aneurysm (AAA) Screening:    Risk factors include: age between 73-67 yo and tobacco use        Lung Cancer Screening:     General: Screening Not Indicated      Hepatitis C Screening:    General: Risks and Benefits Discussed    Other Counseling Topics:   Alcohol use counseling, car/seat belt/driving safety, skin self-exam, sunscreen and calcium and vitamin D intake and regular weightbearing exercise         Lina Garg MD

## 2020-10-13 ENCOUNTER — TRANSCRIBE ORDERS (OUTPATIENT)
Dept: LAB | Facility: CLINIC | Age: 75
End: 2020-10-13

## 2020-10-13 ENCOUNTER — APPOINTMENT (OUTPATIENT)
Dept: LAB | Facility: CLINIC | Age: 75
End: 2020-10-13
Payer: MEDICARE

## 2020-10-13 DIAGNOSIS — E11.00 TYPE II DIABETES MELLITUS WITH HYPEROSMOLARITY, UNCONTROLLED (HCC): Primary | ICD-10-CM

## 2020-10-13 DIAGNOSIS — E11.65 TYPE II DIABETES MELLITUS WITH HYPEROSMOLARITY, UNCONTROLLED (HCC): Primary | ICD-10-CM

## 2020-10-13 LAB
ANION GAP SERPL CALCULATED.3IONS-SCNC: 5 MMOL/L (ref 4–13)
BUN SERPL-MCNC: 19 MG/DL (ref 5–25)
CALCIUM SERPL-MCNC: 8.5 MG/DL (ref 8.3–10.1)
CHLORIDE SERPL-SCNC: 106 MMOL/L (ref 100–108)
CO2 SERPL-SCNC: 29 MMOL/L (ref 21–32)
CREAT SERPL-MCNC: 1.02 MG/DL (ref 0.6–1.3)
CREAT UR-MCNC: 57.2 MG/DL
EST. AVERAGE GLUCOSE BLD GHB EST-MCNC: 143 MG/DL
GFR SERPL CREATININE-BSD FRML MDRD: 72 ML/MIN/1.73SQ M
GLUCOSE P FAST SERPL-MCNC: 149 MG/DL (ref 65–99)
HBA1C MFR BLD: 6.6 %
MICROALBUMIN UR-MCNC: <5 MG/L (ref 0–20)
MICROALBUMIN/CREAT 24H UR: <9 MG/G CREATININE (ref 0–30)
POTASSIUM SERPL-SCNC: 4.2 MMOL/L (ref 3.5–5.3)
SODIUM SERPL-SCNC: 140 MMOL/L (ref 136–145)
T4 FREE SERPL-MCNC: 1.19 NG/DL (ref 0.76–1.46)
TSH SERPL DL<=0.05 MIU/L-ACNC: 2.19 UIU/ML (ref 0.36–3.74)

## 2020-10-13 PROCEDURE — 84439 ASSAY OF FREE THYROXINE: CPT

## 2020-10-13 PROCEDURE — 83036 HEMOGLOBIN GLYCOSYLATED A1C: CPT

## 2020-10-13 PROCEDURE — 82043 UR ALBUMIN QUANTITATIVE: CPT

## 2020-10-13 PROCEDURE — 84443 ASSAY THYROID STIM HORMONE: CPT

## 2020-10-13 PROCEDURE — 36415 COLL VENOUS BLD VENIPUNCTURE: CPT

## 2020-10-13 PROCEDURE — 80048 BASIC METABOLIC PNL TOTAL CA: CPT

## 2020-10-13 PROCEDURE — 82570 ASSAY OF URINE CREATININE: CPT

## 2020-10-31 ENCOUNTER — APPOINTMENT (EMERGENCY)
Dept: RADIOLOGY | Facility: HOSPITAL | Age: 75
End: 2020-10-31
Payer: MEDICARE

## 2020-10-31 ENCOUNTER — HOSPITAL ENCOUNTER (EMERGENCY)
Facility: HOSPITAL | Age: 75
Discharge: HOME | End: 2020-10-31
Attending: EMERGENCY MEDICINE | Admitting: EMERGENCY MEDICINE
Payer: MEDICARE

## 2020-10-31 VITALS
DIASTOLIC BLOOD PRESSURE: 70 MMHG | RESPIRATION RATE: 18 BRPM | OXYGEN SATURATION: 97 % | SYSTOLIC BLOOD PRESSURE: 145 MMHG | HEART RATE: 65 BPM | BODY MASS INDEX: 33.72 KG/M2 | WEIGHT: 235 LBS | TEMPERATURE: 97.8 F
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
## 2020-10-31 DIAGNOSIS — S49.91XA INJURY OF RIGHT SHOULDER, INITIAL ENCOUNTER: ICD-10-CM

## 2020-10-31 PROCEDURE — 99284 EMERGENCY DEPT VISIT MOD MDM: CPT | Performed by: EMERGENCY MEDICINE

## 2020-10-31 PROCEDURE — 99283 EMERGENCY DEPT VISIT LOW MDM: CPT

## 2020-10-31 PROCEDURE — 73030 X-RAY EXAM OF SHOULDER: CPT

## 2020-10-31 RX ORDER — TRAMADOL HYDROCHLORIDE 50 MG/1
50 TABLET ORAL ONCE
Status: COMPLETED | OUTPATIENT
Start: 2020-10-31 | End: 2020-10-31

## 2020-10-31 RX ORDER — TRAMADOL HYDROCHLORIDE 50 MG/1
TABLET ORAL
Qty: 12 TABLET | Refills: 0 | Status: SHIPPED | OUTPATIENT
Start: 2020-10-31 | End: 2021-08-12

## 2020-10-31 RX ADMIN — TRAMADOL HYDROCHLORIDE 50 MG: 50 TABLET, FILM COATED ORAL at 17:44

## 2020-11-02 ENCOUNTER — OFFICE VISIT (OUTPATIENT)
Dept: OBGYN CLINIC | Facility: CLINIC | Age: 75
End: 2020-11-02
Payer: MEDICARE

## 2020-11-02 VITALS
HEIGHT: 70 IN | HEART RATE: 63 BPM | BODY MASS INDEX: 34.04 KG/M2 | DIASTOLIC BLOOD PRESSURE: 66 MMHG | SYSTOLIC BLOOD PRESSURE: 125 MMHG | WEIGHT: 237.8 LBS
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
## 2020-11-02 DIAGNOSIS — S43.401A SPRAIN OF RIGHT SHOULDER, UNSPECIFIED SHOULDER SPRAIN TYPE, INITIAL ENCOUNTER: Primary | ICD-10-CM

## 2020-11-02 PROCEDURE — 99214 OFFICE O/P EST MOD 30 MIN: CPT | Performed by: ORTHOPAEDIC SURGERY

## 2020-11-02 RX ORDER — AMLODIPINE BESYLATE 5 MG/1
5 TABLET ORAL DAILY
COMMUNITY
Start: 2020-09-08 | End: 2021-09-08

## 2020-11-13 ENCOUNTER — TELEPHONE (OUTPATIENT)
Dept: OBGYN CLINIC | Facility: HOSPITAL | Age: 75
End: 2020-11-13

## 2020-11-13 DIAGNOSIS — S43.401A SPRAIN OF RIGHT SHOULDER, UNSPECIFIED SHOULDER SPRAIN TYPE, INITIAL ENCOUNTER: Primary | ICD-10-CM

## 2020-11-30 ENCOUNTER — EVALUATION (OUTPATIENT)
Dept: PHYSICAL THERAPY | Facility: CLINIC | Age: 75
End: 2020-11-30
Payer: MEDICARE

## 2020-11-30 DIAGNOSIS — S43.401A SPRAIN OF RIGHT SHOULDER, UNSPECIFIED SHOULDER SPRAIN TYPE, INITIAL ENCOUNTER: Primary | ICD-10-CM

## 2020-11-30 PROCEDURE — 97161 PT EVAL LOW COMPLEX 20 MIN: CPT | Performed by: PHYSICAL THERAPIST

## 2020-12-09 ENCOUNTER — OFFICE VISIT (OUTPATIENT)
Dept: PHYSICAL THERAPY | Facility: CLINIC | Age: 75
End: 2020-12-09
Payer: MEDICARE

## 2020-12-09 DIAGNOSIS — S43.401A SPRAIN OF RIGHT SHOULDER, UNSPECIFIED SHOULDER SPRAIN TYPE, INITIAL ENCOUNTER: Primary | ICD-10-CM

## 2020-12-09 PROCEDURE — 97110 THERAPEUTIC EXERCISES: CPT | Performed by: PHYSICAL THERAPIST

## 2020-12-09 PROCEDURE — 97140 MANUAL THERAPY 1/> REGIONS: CPT | Performed by: PHYSICAL THERAPIST

## 2020-12-15 ENCOUNTER — OFFICE VISIT (OUTPATIENT)
Dept: PHYSICAL THERAPY | Facility: CLINIC | Age: 75
End: 2020-12-15
Payer: MEDICARE

## 2020-12-15 DIAGNOSIS — S43.401A SPRAIN OF RIGHT SHOULDER, UNSPECIFIED SHOULDER SPRAIN TYPE, INITIAL ENCOUNTER: Primary | ICD-10-CM

## 2020-12-15 PROCEDURE — 97110 THERAPEUTIC EXERCISES: CPT | Performed by: PHYSICAL THERAPIST

## 2020-12-15 PROCEDURE — 97140 MANUAL THERAPY 1/> REGIONS: CPT | Performed by: PHYSICAL THERAPIST

## 2020-12-21 ENCOUNTER — APPOINTMENT (OUTPATIENT)
Dept: PHYSICAL THERAPY | Facility: CLINIC | Age: 75
End: 2020-12-21
Payer: MEDICARE

## 2020-12-28 ENCOUNTER — APPOINTMENT (OUTPATIENT)
Dept: PHYSICAL THERAPY | Facility: CLINIC | Age: 75
End: 2020-12-28
Payer: MEDICARE

## 2020-12-29 ENCOUNTER — OFFICE VISIT (OUTPATIENT)
Dept: FAMILY MEDICINE CLINIC | Facility: CLINIC | Age: 75
End: 2020-12-29
Payer: MEDICARE

## 2020-12-29 ENCOUNTER — OFFICE VISIT (OUTPATIENT)
Dept: PHYSICAL THERAPY | Facility: CLINIC | Age: 75
End: 2020-12-29
Payer: MEDICARE

## 2020-12-29 VITALS
BODY MASS INDEX: 34.22 KG/M2 | RESPIRATION RATE: 16 BRPM | TEMPERATURE: 98.5 F | WEIGHT: 239 LBS | DIASTOLIC BLOOD PRESSURE: 86 MMHG | HEIGHT: 70 IN | HEART RATE: 76 BPM | SYSTOLIC BLOOD PRESSURE: 142 MMHG

## 2020-12-29 DIAGNOSIS — N18.30 TYPE 2 DIABETES MELLITUS WITH STAGE 3 CHRONIC KIDNEY DISEASE, WITHOUT LONG-TERM CURRENT USE OF INSULIN, UNSPECIFIED WHETHER STAGE 3A OR 3B CKD (HCC): ICD-10-CM

## 2020-12-29 DIAGNOSIS — K08.9 DENTAL DISORDER: Primary | ICD-10-CM

## 2020-12-29 DIAGNOSIS — I48.91 ATRIAL FIBRILLATION, UNSPECIFIED TYPE (HCC): ICD-10-CM

## 2020-12-29 DIAGNOSIS — E11.22 TYPE 2 DIABETES MELLITUS WITH STAGE 3 CHRONIC KIDNEY DISEASE, WITHOUT LONG-TERM CURRENT USE OF INSULIN, UNSPECIFIED WHETHER STAGE 3A OR 3B CKD (HCC): ICD-10-CM

## 2020-12-29 DIAGNOSIS — E78.2 MIXED HYPERLIPIDEMIA: ICD-10-CM

## 2020-12-29 DIAGNOSIS — S43.401A SPRAIN OF RIGHT SHOULDER, UNSPECIFIED SHOULDER SPRAIN TYPE, INITIAL ENCOUNTER: Primary | ICD-10-CM

## 2020-12-29 PROBLEM — R06.00 DYSPNEA ON EXERTION: Status: ACTIVE | Noted: 2020-10-14

## 2020-12-29 PROBLEM — R06.09 DYSPNEA ON EXERTION: Status: ACTIVE | Noted: 2020-10-14

## 2020-12-29 PROCEDURE — 97110 THERAPEUTIC EXERCISES: CPT | Performed by: PHYSICAL THERAPIST

## 2020-12-29 PROCEDURE — 99214 OFFICE O/P EST MOD 30 MIN: CPT | Performed by: NURSE PRACTITIONER

## 2020-12-29 PROCEDURE — 97140 MANUAL THERAPY 1/> REGIONS: CPT | Performed by: PHYSICAL THERAPIST

## 2020-12-29 RX ORDER — CIPROFLOXACIN 250 MG/1
TABLET, FILM COATED ORAL
COMMUNITY
Start: 2020-12-24 | End: 2021-01-31 | Stop reason: ALTCHOICE

## 2020-12-29 RX ORDER — AMOXICILLIN 500 MG/1
500 CAPSULE ORAL EVERY 8 HOURS SCHEDULED
Qty: 30 CAPSULE | Refills: 0 | Status: SHIPPED | OUTPATIENT
Start: 2020-12-29 | End: 2021-01-08

## 2021-01-01 ENCOUNTER — OFFICE VISIT (OUTPATIENT)
Dept: URGENT CARE | Facility: CLINIC | Age: 76
End: 2021-01-01
Payer: MEDICARE

## 2021-01-01 VITALS
DIASTOLIC BLOOD PRESSURE: 66 MMHG | HEIGHT: 70 IN | WEIGHT: 239 LBS | RESPIRATION RATE: 16 BRPM | OXYGEN SATURATION: 100 % | SYSTOLIC BLOOD PRESSURE: 136 MMHG | TEMPERATURE: 97.8 F | BODY MASS INDEX: 34.22 KG/M2 | HEART RATE: 59 BPM

## 2021-01-01 DIAGNOSIS — Z01.30 BLOOD PRESSURE CHECK: Primary | ICD-10-CM

## 2021-01-01 PROCEDURE — 99213 OFFICE O/P EST LOW 20 MIN: CPT | Performed by: PHYSICIAN ASSISTANT

## 2021-01-01 NOTE — PROGRESS NOTES
[facility]        NAME: Claudene Roy  is a 76 y o  male  : 1945    MRN: [id number]  DATE: 2021  TIME: 1:51 PM    Assessment and Plan   Blood pressure check [Z01 30]  1  Blood pressure check       Reviewed with patient that blood pressure is normal in office  Advised on checking blood pressure once daily, after taking medications, in seated position, when he is not anxious  His arm cuff in office was used and when compared to office reading was approximately 10 mmHg higher in both systolic and diastolic readings  States wrist cuff is typically higher than this and will thus stop using  Discussed that elevations are likely coming from increased salt intake and poor dietary management, increased stress due to family situations, and decreased activity  Lifestyle modifications reviewed  He is to f/u with PCP in coming week for recheck  Pt verbalized understanding and agreement with this plan  All questions answered  Precautions given  Patient Instructions     Check blood pressure once daily, at the same time daily, after taking blood pressure medication  Best to check after sitting for 15 minutes and at your least stressful point of day  Follow dietary restrictions, and avoid excessive salt and processed foods  Restart activity  Practice stress relieving measures  Complete antibiotic for dental infection  Follow up with your family doctor in 3-5 days  Proceed to the ER if you become symptomatic or if symptoms worsen  Chief Complaint     Chief Complaint   Patient presents with    Hypertension     Pt requesting BP check due to elevated BP at home  Pt denies any dizziness, SOB or chest pain  History of Present Illness   77 y/o male presenting for blood pressure check  Reports elevated blood pressure over the past week ranging from 130-190/80-90s   Received a new BP cuff for [employer] that is a wrist cuff but has been checking this against his arm cuff, noting he frequently gets very different readings only minutes apart  States he is checking blood pressure when he first awakens, before taking blood pressure and then checking multiple times throughout the day  States he is simply checking it frequently because it has been high, but denies any symptoms to provoke checking it  He reports he has been less active over the last few weeks due to cold weather, has been eating poorly due to holidays, and has been more stressed due to sister being intubated in ICU with COVID 19 and other family stressors  He is currently on day 3 of amoxicillin for dental infection, started by dentist  No complaints regarding this  No headaches, dizziness, chest pain, palpitations, abd pains, N/V  Denies numbness, tingling, or weakness  No recent fevers, chills, sweats, URI sx or cough  No sick contacts  No recent travel  Review of Systems   Review of Systems   Constitutional: Negative for chills, diaphoresis, fatigue and fever  HENT: Negative for congestion, ear pain, rhinorrhea and sore throat  Respiratory: Negative for cough, shortness of breath and wheezing  Cardiovascular: Negative for chest pain, palpitations and leg swelling  Gastrointestinal: Negative for abdominal pain, diarrhea, nausea and vomiting  Musculoskeletal: Negative for myalgias  Neurological: Negative for dizziness, weakness, numbness and headaches       Current Medications       Current Outpatient Medications:     ALREX 0 2 % SUSP, , Disp: , Rfl:     amiodarone 200 mg tablet, 1 pill daily, Disp: , Rfl:     amLODIPine (NORVASC) 5 mg tablet, Take 5 mg by mouth daily, Disp: , Rfl:     amoxicillin (AMOXIL) 500 mg capsule, Take 1 capsule (500 mg total) by mouth every 8 (eight) hours for 10 days, Disp: 30 capsule, Rfl: 0    apixaban (ELIQUIS) 5 mg, Take 5 mg by mouth 2 (two) times a day, Disp: , Rfl:     atorvastatin (LIPITOR) 20 mg tablet, TAKE 1 TABLET DAILY, Disp: 90 tablet, Rfl: 3    Blood Glucose Monitoring Suppl (FREESTYLE LITE) GEOVANNY, , Disp: , Rfl:     carvedilol (COREG) 25 mg tablet, Take 25 mg by mouth 2 (two) times a day, Disp: , Rfl:     ciprofloxacin (CIPRO) 250 mg tablet, , Disp: , Rfl:     esomeprazole (NexIUM) 20 mg capsule, Take 1 capsule (20 mg total) by mouth daily at bedtime, Disp: 90 capsule, Rfl: 3    FREESTYLE LITE test strip, , Disp: , Rfl:     furosemide (LASIX) 20 mg tablet, Take 20 mg by mouth daily , Disp: , Rfl:     Lancets (FREESTYLE) lancets, , Disp: , Rfl:     losartan (COZAAR) 100 MG tablet, , Disp: , Rfl:     multivitamin (THERAGRAN) TABS, Take 1 tablet by mouth every morning, Disp: , Rfl:     polyethylene glycol-propylene glycol (SYSTANE) 0 4-0 3 %, every 3 (three) hours as needed, Disp: , Rfl:     RESTASIS 0 05 % ophthalmic emulsion, Administer 1 drop to both eyes every 12 (twelve) hours , Disp: , Rfl:     sitaGLIPtin (JANUVIA) 100 mg tablet, Take 100 mg by mouth daily, Disp: , Rfl:     tamsulosin (FLOMAX) 0 4 mg, Take 0 4 mg by mouth every other day , Disp: , Rfl:     traMADol (ULTRAM) 50 mg tablet, 1-2 po q 6 hours prn pain (Patient not taking: Reported on 12/29/2020), Disp: 12 tablet, Rfl: 0    Current Allergies     Allergies as of 01/01/2021 - Reviewed 01/01/2021   Allergen Reaction Noted    Codeine Other (See Comments) and GI Intolerance 12/27/2004    Demerol [meperidine] Nausea Only, Other (See Comments), GI Intolerance, and Vomiting 12/27/2004    Morphine Nausea Only, GI Intolerance, and Vomiting 02/23/2012            The following portions of the patient's history were reviewed and updated as appropriate: allergies, current medications, past family history, past medical history, past social history, past surgical history and problem list      Past Medical History:   Diagnosis Date    Allergic rhinitis     Anemia 10/23/2008    last assessed 9/9/13     Anxiety     Atrial fibrillation (HCC)     Bleeding     BPH (benign prostatic hypertrophy)     Diabetes mellitus ([facility] [address] )     Diarrhea     Diverticulitis     Eating disorder     GERD (gastroesophageal reflux disease)     Herpes zoster with complication     last assessed 7/3/17     Hiatal hernia     Hyperlipidemia     Hypertension     IBS (irritable bowel syndrome)     Incisional hernia     Increased urinary frequency     Irregular heart beat     Pyogenic granuloma 09/13/2006    last assessed 9/13/06    Carlos (subconjunctival hemorrhage), unspecified laterality 09/12/2005    last assessed 9/12/05    Sleep apnea     TMJ (dislocation of temporomandibular joint)     Ventral hernia     last assessed  4/6/17        Past Surgical History:   Procedure Laterality Date    APPENDECTOMY      ATRIAL ABLATION SURGERY      2014    ATRIAL ABLATION SURGERY      catheter ablation atrial fibrillation / last assessed 2/17/16     CHOLECYSTECTOMY      lap    COLON SURGERY      Hartmans procedure    COLON SURGERY      reversal 6 weeks later    COLONOSCOPY N/A 1/26/2017    Procedure: COLONOSCOPY;  Surgeon: Geremias Jordan MD;  Location: [place] [address] [facility]; Service:     ESOPHAGOGASTRODUODENOSCOPY N/A 1/26/2017    Procedure: ESOPHAGOGASTRODUODENOSCOPY (EGD); Surgeon: Geremias Jordan MD;  Location: [place] [address] [facility];   Service:     EYE SURGERY Bilateral     lid repair    HERNIA REPAIR Bilateral     inguinal 2014 & 2013    INCISIONAL HERNIA REPAIR  2011    INCISIONAL HERNIA REPAIR N/A 3/24/2017    Procedure: REPAIR OF INCARCERATED INCISIONAL HERNIA WITH MESH, Lysis of Adhesions, Partial Omentectomy;  Surgeon: Ellie Arauz MD;  Location: [facility];  Service:    [facility] NEUROBLASTOMA EXCISION      SKIN CANCER EXCISION      On nose    TONSILLECTOMY         Family History   Problem Relation Age of Onset    Heart disease Mother     Cancer Mother     Colon cancer Mother     Dementia Mother     Early death Father     Seizures Father     Cancer Sister         bone    Heart disease Sister     Diabetes Sister     Lupus Sister    [facility] Heart disease Brother     Cancer Brother         kidney    Diabetes Brother     Stroke Neg Hx          Medications have been verified  Objective   /66   Pulse 59   Temp 97 8 °F (36 6 °C)   Resp 16   Ht 5' 10" (1 778 m)   Wt 108 kg (239 lb)   SpO2 100%   BMI 34 29 kg/m²   No LMP for male patient  Physical Exam     Physical Exam  Vitals signs and nursing note reviewed  Constitutional:       General: He is not in acute distress  Appearance: He is well-developed  He is not ill-appearing or diaphoretic  HENT:      Head: Normocephalic and atraumatic  Eyes:      General: Lids are normal          Right eye: No discharge  Left eye: No discharge  Conjunctiva/sclera: Conjunctivae normal    Cardiovascular:      Rate and Rhythm: Normal rate and regular rhythm  Heart sounds: Normal heart sounds  Heart sounds not distant  Pulmonary:      Effort: Pulmonary effort is normal  No respiratory distress  Breath sounds: Normal breath sounds  No stridor  No decreased breath sounds, wheezing, rhonchi or rales  Musculoskeletal:      Right lower leg: No edema  Left lower leg: No edema  Skin:     General: Skin is warm and dry  Coloration: Skin is not pale  Findings: No erythema or rash  Neurological:      Mental Status: He is alert  He is not disoriented  Cranial Nerves: No cranial nerve deficit  Motor: No abnormal muscle tone  Coordination: Coordination normal       Gait: Gait normal    Psychiatric:         Behavior: Behavior normal  Behavior is cooperative  Thought Content:  Thought content normal          Judgment: Judgment normal

## 2021-01-08 ENCOUNTER — OFFICE VISIT (OUTPATIENT)
Dept: PHYSICAL THERAPY | Facility: CLINIC | Age: 76
End: 2021-01-08
Payer: MEDICARE

## 2021-01-08 DIAGNOSIS — S43.401A SPRAIN OF RIGHT SHOULDER, UNSPECIFIED SHOULDER SPRAIN TYPE, INITIAL ENCOUNTER: Primary | ICD-10-CM

## 2021-01-08 PROCEDURE — 97110 THERAPEUTIC EXERCISES: CPT | Performed by: PHYSICAL THERAPIST

## 2021-01-08 PROCEDURE — 97140 MANUAL THERAPY 1/> REGIONS: CPT | Performed by: PHYSICAL THERAPIST

## 2021-01-08 NOTE — PROGRESS NOTES
Daily Note     Today's date: 2021  Patient name: Elaina Hope  : 1945  MRN: [id number]  Referring provider: Katie Durbin MD  Dx:   Encounter Diagnosis     ICD-10-CM    1  Sprain of right shoulder, unspecified shoulder sprain type, initial encounter  S43 401A                   Subjective:   He reports overall improvement  Mild anterior shoulder pain today  Objective: See treatment diary below      Assessment: Tolerated treatment well  Patient would benefit from continued PT  He was taught pec stretch in doorway  He agreed to add to his HEP at a mild intensity      Plan: Continue per plan of care  Progress treatment as tolerated         Precautions:  Afib, Diabetes, Hypertension, LATEX ALLERGY ,   TE x 1 , MT x 1      Specialty Daily Treatment Diary       Manuals 11/30/20 12/9/20 12/15/20 12/29/20 1/08/21   Visit # 1 2 3 4 5 - FOTO   STM UT, pec group  5' 5' 5' 5'   PROM R shld  5' 5' 5' 5'   ST joint mobs                Warm-up        NuStep  10' 10' 10' 10'   Neuro Re-Ed        Scap squeeze 20 30 30 30 30                           Ther Ex        TB row  30   GTB 30   BTB 30   BTB 30    BTB   TB ext  30   GTB 30   GTB 30   BTB 30    BTB   Overhead w/ ball   20   4# 20   4# 20    4#   Pulleys  30 30 30 20   S/L rotation  20 20 20 20   Wand flex 20 20 20 20 20   ER AROM   20 20   YTB 20   YTB   Corner pec stretch     10 x 10 sec           Ther Activity                                Modalities        CP  deferred --- MH to L TMJ  5' deferred

## 2021-01-11 ENCOUNTER — OFFICE VISIT (OUTPATIENT)
Dept: PHYSICAL THERAPY | Facility: CLINIC | Age: 76
End: 2021-01-11
Payer: MEDICARE

## 2021-01-11 DIAGNOSIS — S43.401A SPRAIN OF RIGHT SHOULDER, UNSPECIFIED SHOULDER SPRAIN TYPE, INITIAL ENCOUNTER: Primary | ICD-10-CM

## 2021-01-11 PROCEDURE — 97140 MANUAL THERAPY 1/> REGIONS: CPT | Performed by: PHYSICAL THERAPIST

## 2021-01-11 PROCEDURE — 97110 THERAPEUTIC EXERCISES: CPT | Performed by: PHYSICAL THERAPIST

## 2021-01-11 NOTE — PROGRESS NOTES
Daily Note     Today's date: 2021  Patient name: Beto Gilmore  : 1945  MRN: [id number]  Referring provider: Deloris Jarrell MD  Dx:   Encounter Diagnosis     ICD-10-CM    1  Sprain of right shoulder, unspecified shoulder sprain type, initial encounter  S43 401A                   Subjective:   He reports 5/10 pain      Objective: See treatment diary below      Assessment: Tolerated treatment well  Patient would benefit from continued PT  He had no pain leaving session and notes improvement following STM and stretching      Plan: Continue per plan of care  Progress treatment as tolerated         Precautions:  Afib, Diabetes, Hypertension, LATEX ALLERGY ,   TE x 1 , MT x 1      Specialty Daily Treatment Diary       Manuals 21       Visit # 6       STM UT, pec group 5'       PROM R shld 5'       ST joint mobs                Warm-up        NuStep 10'       Neuro Re-Ed        Scap squeeze 20                               Ther Ex        TB row 30   BkTB       TB ext 30   BkTB       Overhead w/ ball 30   4#       Pulleys 30       S/L rotation        Wand flex 20       ER AROM 20   YTB       Corner pec stretch 10 x 10 sec               Ther Activity                                Modalities        CP MH  5 min

## 2021-01-19 ENCOUNTER — OFFICE VISIT (OUTPATIENT)
Dept: PHYSICAL THERAPY | Facility: CLINIC | Age: 76
End: 2021-01-19
Payer: MEDICARE

## 2021-01-19 DIAGNOSIS — S43.401A SPRAIN OF RIGHT SHOULDER, UNSPECIFIED SHOULDER SPRAIN TYPE, INITIAL ENCOUNTER: Primary | ICD-10-CM

## 2021-01-19 PROCEDURE — 97110 THERAPEUTIC EXERCISES: CPT | Performed by: PHYSICAL THERAPIST

## 2021-01-19 PROCEDURE — 97140 MANUAL THERAPY 1/> REGIONS: CPT | Performed by: PHYSICAL THERAPIST

## 2021-01-19 NOTE — PROGRESS NOTES
Daily Note     Today's date: 2021  Patient name: Claudene Roy  : 1945  MRN: [id number]  Referring provider: Trevor Rodriguez MD  Dx:   Encounter Diagnosis     ICD-10-CM    1  Sprain of right shoulder, unspecified shoulder sprain type, initial encounter  S43 401A                   Subjective:   He reports 5/10 anterior right shoulder pain      Objective: See treatment diary below      Assessment: Tolerated treatment well  Patient would benefit from continued PT  Right pec group tightness and TTP persist       Plan: Continue per plan of care  Progress treatment as tolerated         Precautions:  Afib, Diabetes, Hypertension, LATEX ALLERGY ,   TE x 1 , MT x 1      Specialty Daily Treatment Diary       Manuals 21      Visit # 6 7      STM UT, pec group 5' 5'      PROM R shld 5' 5'      ST joint mobs                Warm-up        NuStep 10' 10'      Neuro Re-Ed        Scap squeeze 20 20                              Ther Ex        TB row 30   BkTB 30   BkTB      TB ext 30   BkTB 30   BkTB      Overhead w/ ball 30   4# 30   8#      Pulleys 30 30      S/L rotation        Wand flex 20 20      ER AROM 20   YTB 20   YTB      Corner pec stretch 10 x 10 sec 10 x 10"      Lat pull down  20   12 5#              Ther Activity                                Modalities        CP MH  5 min 5'  MH

## 2021-01-20 LAB
LEFT EYE DIABETIC RETINOPATHY: NORMAL
RIGHT EYE DIABETIC RETINOPATHY: NORMAL

## 2021-01-26 ENCOUNTER — OFFICE VISIT (OUTPATIENT)
Dept: PHYSICAL THERAPY | Facility: CLINIC | Age: 76
End: 2021-01-26
Payer: MEDICARE

## 2021-01-26 DIAGNOSIS — S43.401A SPRAIN OF RIGHT SHOULDER, UNSPECIFIED SHOULDER SPRAIN TYPE, INITIAL ENCOUNTER: Primary | ICD-10-CM

## 2021-01-26 PROCEDURE — 97110 THERAPEUTIC EXERCISES: CPT | Performed by: PHYSICAL THERAPIST

## 2021-01-26 PROCEDURE — 97140 MANUAL THERAPY 1/> REGIONS: CPT | Performed by: PHYSICAL THERAPIST

## 2021-01-26 NOTE — PROGRESS NOTES
Daily Note     Today's date: 2021  Patient name: Erendira Atwood  : 1945  MRN: [id number]  Referring provider: Monica Mullen MD  Dx:   Encounter Diagnosis     ICD-10-CM    1  Sprain of right shoulder, unspecified shoulder sprain type, initial encounter  S43 401A                   Subjective:   He reports 2/10 or 3/10 anterior right shoulder pain  He feels he tightens up in between visits  Objective: See treatment diary below      Assessment: Tolerated treatment well  Patient would benefit from continued PT   John Poon agreed to perform wand flexion and pec stretching in the doorway each day to  Maintain mobility gained during session  Plan: Continue per plan of care  Progress treatment as tolerated         Precautions:  Afib, Diabetes, Hypertension, LATEX ALLERGY ,   TE x 1 , MT x 1      Specialty Daily Treatment Diary       Manuals 21     Visit # 6 7 8     STM UT, pec group 5' 5' 5'     PROM R shld 5' 5' 5'     ST joint mobs                Warm-up        NuStep 10' 10' 10'     Neuro Re-Ed        Scap squeeze 20 20 20                             Ther Ex        TB row 30   BkTB 30   BkTB 30   BkTB     TB ext 30   BkTB 30   BkTB 30   BkTB     Overhead w/ ball 30   4# 30   8# 30   8"     Pulleys 30 30 30     S/L rotation        Wand flex 20 20 20     ER AROM 20   YTB 20   YTB 20   YTB  ER/IR     Corner pec stretch 10 x 10 sec 10 x 10" 10 x 10"     Lat pull down  20   12 5# --             Ther Activity                                Modalities        CP MH  5 min 5'  MH 10'

## 2021-01-31 ENCOUNTER — HOSPITAL ENCOUNTER (EMERGENCY)
Facility: HOSPITAL | Age: 76
Discharge: HOME | End: 2021-01-31
Attending: EMERGENCY MEDICINE | Admitting: EMERGENCY MEDICINE
Payer: MEDICARE

## 2021-01-31 ENCOUNTER — OFFICE VISIT (OUTPATIENT)
Dept: URGENT CARE | Facility: CLINIC | Age: 76
End: 2021-01-31
Payer: MEDICARE

## 2021-01-31 ENCOUNTER — APPOINTMENT (EMERGENCY)
Dept: RADIOLOGY | Facility: HOSPITAL | Age: 76
End: 2021-01-31
Payer: MEDICARE

## 2021-01-31 VITALS
OXYGEN SATURATION: 98 % | RESPIRATION RATE: 18 BRPM | DIASTOLIC BLOOD PRESSURE: 79 MMHG | HEART RATE: 69 BPM | TEMPERATURE: 98.5 F | SYSTOLIC BLOOD PRESSURE: 164 MMHG

## 2021-01-31 VITALS
RESPIRATION RATE: 16 BRPM | SYSTOLIC BLOOD PRESSURE: 138 MMHG | WEIGHT: 237 LBS | OXYGEN SATURATION: 99 % | DIASTOLIC BLOOD PRESSURE: 76 MMHG | HEART RATE: 63 BPM | BODY MASS INDEX: 34.01 KG/M2 | TEMPERATURE: 97.7 F

## 2021-01-31 DIAGNOSIS — R06.00 DYSPNEA, UNSPECIFIED TYPE: Primary | ICD-10-CM

## 2021-01-31 DIAGNOSIS — R06.02 SOB (SHORTNESS OF BREATH): Primary | ICD-10-CM

## 2021-01-31 LAB
ALBUMIN SERPL BCP-MCNC: 3.7 G/DL (ref 3.5–5)
ALP SERPL-CCNC: 71 U/L (ref 46–116)
ALT SERPL W P-5'-P-CCNC: 54 U/L (ref 12–78)
ANION GAP SERPL CALCULATED.3IONS-SCNC: 7 MMOL/L (ref 4–13)
APTT PPP: 35 SECONDS (ref 23–37)
AST SERPL W P-5'-P-CCNC: 16 U/L (ref 5–45)
BACTERIA UR QL AUTO: ABNORMAL /HPF
BASOPHILS # BLD AUTO: 0.04 THOUSANDS/ΜL (ref 0–0.1)
BASOPHILS NFR BLD AUTO: 0 % (ref 0–1)
BILIRUB SERPL-MCNC: 0.62 MG/DL (ref 0.2–1)
BILIRUB UR QL STRIP: NEGATIVE
BUN SERPL-MCNC: 19 MG/DL (ref 5–25)
CALCIUM SERPL-MCNC: 8.7 MG/DL (ref 8.3–10.1)
CHLORIDE SERPL-SCNC: 103 MMOL/L (ref 100–108)
CLARITY UR: ABNORMAL
CO2 SERPL-SCNC: 29 MMOL/L (ref 21–32)
COLOR UR: YELLOW
CREAT SERPL-MCNC: 1.1 MG/DL (ref 0.6–1.3)
EOSINOPHIL # BLD AUTO: 0.13 THOUSAND/ΜL (ref 0–0.61)
EOSINOPHIL NFR BLD AUTO: 1 % (ref 0–6)
ERYTHROCYTE [DISTWIDTH] IN BLOOD BY AUTOMATED COUNT: 13.9 % (ref 11.6–15.1)
FLUAV RNA RESP QL NAA+PROBE: NEGATIVE
FLUBV RNA RESP QL NAA+PROBE: NEGATIVE
GFR SERPL CREATININE-BSD FRML MDRD: 65 ML/MIN/1.73SQ M
GLUCOSE SERPL-MCNC: 199 MG/DL (ref 65–140)
GLUCOSE UR STRIP-MCNC: ABNORMAL MG/DL
HCT VFR BLD AUTO: 40 % (ref 36.5–49.3)
HGB BLD-MCNC: 12.9 G/DL (ref 12–17)
HGB UR QL STRIP.AUTO: NEGATIVE
IMM GRANULOCYTES # BLD AUTO: 0.06 THOUSAND/UL (ref 0–0.2)
IMM GRANULOCYTES NFR BLD AUTO: 1 % (ref 0–2)
INR PPP: 1.16 (ref 0.84–1.19)
KETONES UR STRIP-MCNC: NEGATIVE MG/DL
LEUKOCYTE ESTERASE UR QL STRIP: ABNORMAL
LYMPHOCYTES # BLD AUTO: 2.71 THOUSANDS/ΜL (ref 0.6–4.47)
LYMPHOCYTES NFR BLD AUTO: 28 % (ref 14–44)
MCH RBC QN AUTO: 29.1 PG (ref 26.8–34.3)
MCHC RBC AUTO-ENTMCNC: 32.3 G/DL (ref 31.4–37.4)
MCV RBC AUTO: 90 FL (ref 82–98)
MONOCYTES # BLD AUTO: 0.56 THOUSAND/ΜL (ref 0.17–1.22)
MONOCYTES NFR BLD AUTO: 6 % (ref 4–12)
NEUTROPHILS # BLD AUTO: 6.32 THOUSANDS/ΜL (ref 1.85–7.62)
NEUTS SEG NFR BLD AUTO: 64 % (ref 43–75)
NITRITE UR QL STRIP: NEGATIVE
NON-SQ EPI CELLS URNS QL MICRO: ABNORMAL /HPF
NRBC BLD AUTO-RTO: 0 /100 WBCS
NT-PROBNP SERPL-MCNC: 190 PG/ML
PH UR STRIP.AUTO: 6 [PH]
PLATELET # BLD AUTO: 288 THOUSANDS/UL (ref 149–390)
PMV BLD AUTO: 12.6 FL (ref 8.9–12.7)
POTASSIUM SERPL-SCNC: 3.7 MMOL/L (ref 3.5–5.3)
PROT SERPL-MCNC: 7.5 G/DL (ref 6.4–8.2)
PROT UR STRIP-MCNC: NEGATIVE MG/DL
PROTHROMBIN TIME: 14.9 SECONDS (ref 11.6–14.5)
RBC # BLD AUTO: 4.44 MILLION/UL (ref 3.88–5.62)
RBC #/AREA URNS AUTO: ABNORMAL /HPF
RSV RNA RESP QL NAA+PROBE: NEGATIVE
SARS-COV-2 RNA RESP QL NAA+PROBE: NEGATIVE
SODIUM SERPL-SCNC: 139 MMOL/L (ref 136–145)
SP GR UR STRIP.AUTO: 1.02 (ref 1–1.03)
TROPONIN I SERPL-MCNC: <0.02 NG/ML
UROBILINOGEN UR QL STRIP.AUTO: 0.2 E.U./DL
WBC # BLD AUTO: 9.82 THOUSAND/UL (ref 4.31–10.16)
WBC #/AREA URNS AUTO: ABNORMAL /HPF

## 2021-01-31 PROCEDURE — 85610 PROTHROMBIN TIME: CPT | Performed by: PHYSICIAN ASSISTANT

## 2021-01-31 PROCEDURE — 81001 URINALYSIS AUTO W/SCOPE: CPT | Performed by: PHYSICIAN ASSISTANT

## 2021-01-31 PROCEDURE — 99213 OFFICE O/P EST LOW 20 MIN: CPT | Performed by: FAMILY MEDICINE

## 2021-01-31 PROCEDURE — 87186 SC STD MICRODIL/AGAR DIL: CPT | Performed by: PHYSICIAN ASSISTANT

## 2021-01-31 PROCEDURE — 99285 EMERGENCY DEPT VISIT HI MDM: CPT

## 2021-01-31 PROCEDURE — 36415 COLL VENOUS BLD VENIPUNCTURE: CPT | Performed by: PHYSICIAN ASSISTANT

## 2021-01-31 PROCEDURE — 71045 X-RAY EXAM CHEST 1 VIEW: CPT

## 2021-01-31 PROCEDURE — 99285 EMERGENCY DEPT VISIT HI MDM: CPT | Performed by: PHYSICIAN ASSISTANT

## 2021-01-31 PROCEDURE — 87086 URINE CULTURE/COLONY COUNT: CPT | Performed by: PHYSICIAN ASSISTANT

## 2021-01-31 PROCEDURE — 83880 ASSAY OF NATRIURETIC PEPTIDE: CPT | Performed by: PHYSICIAN ASSISTANT

## 2021-01-31 PROCEDURE — 87077 CULTURE AEROBIC IDENTIFY: CPT | Performed by: PHYSICIAN ASSISTANT

## 2021-01-31 PROCEDURE — 80053 COMPREHEN METABOLIC PANEL: CPT | Performed by: PHYSICIAN ASSISTANT

## 2021-01-31 PROCEDURE — 0241U HB NFCT DS VIR RESP RNA 4 TRGT: CPT | Performed by: PHYSICIAN ASSISTANT

## 2021-01-31 PROCEDURE — 85730 THROMBOPLASTIN TIME PARTIAL: CPT | Performed by: PHYSICIAN ASSISTANT

## 2021-01-31 PROCEDURE — 85025 COMPLETE CBC W/AUTO DIFF WBC: CPT | Performed by: PHYSICIAN ASSISTANT

## 2021-01-31 PROCEDURE — 84484 ASSAY OF TROPONIN QUANT: CPT | Performed by: PHYSICIAN ASSISTANT

## 2021-01-31 RX ORDER — FUROSEMIDE 20 MG/1
20 TABLET ORAL DAILY
COMMUNITY
Start: 2021-01-26 | End: 2021-11-03

## 2021-01-31 RX ORDER — PIOGLITAZONEHYDROCHLORIDE 15 MG/1
TABLET ORAL
COMMUNITY
Start: 2021-01-02 | End: 2021-11-03

## 2021-01-31 NOTE — ED PROVIDER NOTES
History  Chief Complaint   Patient presents with    Shortness of Breath     pt c/o "mild SOB on and off w/ lightheadedness for a year, worsening over the past couple weeks" denies CP      63-year-old male presents the emergency department with complaints of mild shortness of breath with intermittent lightheadedness  States that he has had symptoms over the past year with the have been worse over the past several weeks  Most notable on 2021 after getting some upsetting news that his sister in [place] had passed away from [facility]  Patient states that this was most noticeable at rest but has no associated chest pain  He has had some swelling in his lower extremities and he is compliant with his Lasix  Intermittently increase his dosing based on his doctor's reccomendations due to swelling to      History provided by:  Patient   used: No    Shortness of Breath  Severity:  Unable to specify  Onset quality:  Gradual  Timing:  Intermittent  Progression:  Waxing and waning  Chronicity:  New  Context: not activity    Relieved by:  Nothing  Ineffective treatments:  Diuretics  Associated symptoms: cough    Associated symptoms: no abdominal pain, no chest pain, no claudication, no diaphoresis, no ear pain, no fever, no headaches, no hemoptysis, no neck pain, no PND, no rash, no sore throat, no sputum production, no syncope, no swollen glands, no vomiting and no wheezing        Prior to Admission Medications   Prescriptions Last Dose Informant Patient Reported? Taking?    Blood Glucose Monitoring Suppl (FREESTYLE LITE) GEOVANNY   Yes No   FREESTYLE LITE test strip  Self Yes No   Lancets (FREESTYLE) lancets  Self Yes No   RESTASIS 0 05 % ophthalmic emulsion  Self Yes No   Sig: Administer 1 drop to both eyes every 12 (twelve) hours    amLODIPine (NORVASC) 5 mg tablet   Yes No   Sig: Take 5 mg by mouth daily   amiodarone 200 mg tablet  Self Yes No   Si pill daily   apixaban (ELIQUIS) 5 mg  Self Yes No   Sig: Take 5 mg by mouth 2 (two) times a day   atorvastatin (LIPITOR) 20 mg tablet   No No   Sig: TAKE 1 TABLET DAILY   carvedilol (COREG) 25 mg tablet  Self Yes No   Sig: Take 25 mg by mouth 2 (two) times a day   esomeprazole (NexIUM) 20 mg capsule   No No   Sig: Take 1 capsule (20 mg total) by mouth daily at bedtime   furosemide (LASIX) 20 mg tablet   Yes No   Sig: Take 20 mg by mouth daily   losartan (COZAAR) 100 MG tablet   Yes No   multivitamin (THERAGRAN) TABS  Self Yes No   Sig: Take 1 tablet by mouth every morning   pioglitazone (ACTOS) 15 mg tablet   Yes No   polyethylene glycol-propylene glycol (SYSTANE) 0 4-0 3 %  Self Yes No   Sig: every 3 (three) hours as needed   sitaGLIPtin (JANUVIA) 100 mg tablet   Yes No   Sig: Take 100 mg by mouth daily   tamsulosin (FLOMAX) 0 4 mg  Self Yes No   Sig: Take 0 4 mg by mouth every other day    traMADol (ULTRAM) 50 mg tablet   No No   Si-2 po q 6 hours prn pain      Facility-Administered Medications: None       Past Medical History:   Diagnosis Date    Allergic rhinitis     Anemia 10/23/2008    last assessed 13     Anxiety     Atrial fibrillation (HCC)     Bleeding     BPH (benign prostatic hypertrophy)     Diabetes mellitus (HCC)     Diarrhea     Diverticulitis     Eating disorder     GERD (gastroesophageal reflux disease)     Herpes zoster with complication     last assessed 7/3/17     Hiatal hernia     Hyperlipidemia     Hypertension     IBS (irritable bowel syndrome)     Incisional hernia     Increased urinary frequency     Irregular heart beat     Pyogenic granuloma 2006    last assessed 06    Carlos (subconjunctival hemorrhage), unspecified laterality 2005    last assessed 05    Sleep apnea     TMJ (dislocation of temporomandibular joint)     Ventral hernia     last assessed  17        Past Surgical History:   Procedure Laterality Date    APPENDECTOMY      ATRIAL ABLATION SURGERY          ATRIAL ABLATION SURGERY      catheter ablation atrial fibrillation / last assessed 16     CHOLECYSTECTOMY      lap    COLON SURGERY      Hartmans procedure    COLON SURGERY      reversal 6 weeks later    COLONOSCOPY N/A 2017    Procedure: COLONOSCOPY;  Surgeon: Vivian Kaplan MD;  Location: [facility]; Service:     ESOPHAGOGASTRODUODENOSCOPY N/A 2017    Procedure: ESOPHAGOGASTRODUODENOSCOPY (EGD); Surgeon: Vivian Kaplan MD;  Location: [facility]; Service:     EYE SURGERY Bilateral     lid repair    HERNIA REPAIR Bilateral     inguinal  &     INCISIONAL HERNIA REPAIR      INCISIONAL HERNIA REPAIR N/A 3/24/2017    Procedure: REPAIR OF INCARCERATED INCISIONAL HERNIA WITH MESH, Lysis of Adhesions, Partial Omentectomy;  Surgeon: José Miguel Kamara MD;  Location: [facility];  Service:    Kathie Whatley EXCISION      SKIN CANCER EXCISION      On nose    TONSILLECTOMY         Family History   Problem Relation Age of Onset    Heart disease Mother     Cancer Mother     Colon cancer Mother     Dementia Mother     Early death Father     Seizures Father    Andres Pila Cancer Sister         bone    Heart disease Sister     Diabetes Sister     Lupus Sister     Heart disease Brother     Cancer Brother         kidney    Diabetes Brother     Stroke Neg Hx      I have reviewed and agree with the history as documented  E-Cigarette/Vaping    E-Cigarette Use Never User      E-Cigarette/Vaping Substances     Social History     Tobacco Use    Smoking status: Former Smoker     Packs/day: 1 00     Years: 20 00     Pack years: 20 00     Types: Cigarettes     Quit date:      Years since quittin 1    Smokeless tobacco: Never Used   Substance Use Topics    Alcohol use: No    Drug use: No       Review of Systems   Constitutional: Negative for activity change, appetite change, chills, diaphoresis and fever     HENT: Negative for congestion, dental problem, drooling, ear discharge, ear pain, mouth sores, nosebleeds, rhinorrhea, sore throat and trouble swallowing  Eyes: Negative for pain, discharge and itching  Respiratory: Positive for cough and shortness of breath  Negative for hemoptysis, sputum production, chest tightness and wheezing  Cardiovascular: Positive for leg swelling  Negative for chest pain, palpitations, claudication, syncope and PND  Gastrointestinal: Negative for abdominal pain, blood in stool, constipation, diarrhea, nausea and vomiting  Endocrine: Negative for cold intolerance and heat intolerance  Genitourinary: Negative for difficulty urinating, dysuria, flank pain, frequency and urgency  Musculoskeletal: Negative for neck pain  Skin: Negative for rash and wound  Allergic/Immunologic: Negative for food allergies and immunocompromised state  Neurological: Negative for dizziness, seizures, syncope, weakness, numbness and headaches  Psychiatric/Behavioral: Negative for agitation, behavioral problems and confusion  Physical Exam  Physical Exam  Vitals signs and nursing note reviewed  Constitutional:       General: He is not in acute distress  Appearance: He is not diaphoretic  HENT:      Head: Normocephalic and atraumatic  Right Ear: External ear normal       Left Ear: External ear normal    Eyes:      Conjunctiva/sclera: Conjunctivae normal    Neck:      Vascular: No JVD  Trachea: No tracheal deviation  Cardiovascular:      Rate and Rhythm: Normal rate and regular rhythm  Heart sounds: Normal heart sounds  No murmur  No friction rub  No gallop  Pulmonary:      Effort: No respiratory distress  Breath sounds: No decreased breath sounds, wheezing or rales  Chest:      Chest wall: No tenderness  Abdominal:      General: Bowel sounds are normal  There is no distension  Palpations: Abdomen is soft  Tenderness: There is no abdominal tenderness  There is no guarding  Musculoskeletal: Normal range of motion  General: No tenderness or deformity  Right lower le+ Edema present  Left lower le+ Edema present  Lymphadenopathy:      Cervical: No cervical adenopathy  Skin:     General: Skin is warm and dry  Findings: No erythema or rash  Neurological:      Mental Status: He is alert and oriented to person, place, and time  Psychiatric:         Mood and Affect: Mood normal          Behavior: Behavior normal          Vital Signs  ED Triage Vitals [21 1200]   Temperature Pulse Respirations Blood Pressure SpO2   98 5 °F (36 9 °C) 69 18 164/79 98 %      Temp Source Heart Rate Source Patient Position - Orthostatic VS BP Location FiO2 (%)   Oral Monitor -- -- --      Pain Score       --           Vitals:    21 1200   BP: 164/79   Pulse: 69         Visual Acuity      ED Medications  Medications - No data to display    Diagnostic Studies  Results Reviewed     Procedure Component Value Units Date/Time    Troponin I [[id number]]  (Normal) Collected: 21 1218    Lab Status: Final result Specimen: Blood from Arm, Right Updated: 21 1319     Troponin I <0 02 ng/mL     [facility]/B, [facility] [[id number]]  (Normal) Collected: 21 1226    Lab Status: Final result Specimen: Nares from Nasopharyngeal Swab Updated: 21 1315     SARS-CoV-2 Negative     INFLUENZA A PCR Negative     INFLUENZA B PCR Negative     RSV PCR Negative    Narrative: This test has been authorized by FDA under an EUA (Emergency Use Assay) for use by authorized laboratories  Clinical caution and judgement should be used with the interpretation of these results with consideration of the clinical impression and other laboratory testing  Testing reported as "Positive" or "Negative" has been proven to be accurate according to standard laboratory validation requirements  All testing is performed with control materials showing appropriate reactivity at standard intervals      Wesley Mitts [[id number]] (Abnormal) Collected: 01/31/21 1226    Lab Status: Final result Specimen: Blood from Arm, Left Updated: 01/31/21 1314     Protime 14 9 seconds      INR 1 16    APTT [[id number]]  (Normal) Collected: 01/31/21 1226    Lab Status: Final result Specimen: Blood from Arm, Left Updated: 01/31/21 1314     PTT 35 seconds     Urine Microscopic [[id number]]  (Abnormal) Collected: 01/31/21 1231    Lab Status: Final result Specimen: Urine, Clean Catch Updated: 01/31/21 1303     RBC, UA 0-1 /hpf      WBC, UA 10-20 /hpf      Epithelial Cells Occasional /hpf      Bacteria, UA Occasional /hpf     Urine culture [[id number]] Collected: 01/31/21 1231    Lab Status:  In process Specimen: Urine, Clean Catch Updated: 01/31/21 1303    NT-BNP PRO [[id number]]  (Normal) Collected: 01/31/21 1226    Lab Status: Final result Specimen: Blood from Arm, Left Updated: 01/31/21 1300     NT-proBNP 190 pg/mL     Comprehensive metabolic panel [[id number]]  (Abnormal) Collected: 01/31/21 1226    Lab Status: Final result Specimen: Blood from Arm, Left Updated: 01/31/21 1300     Sodium 139 mmol/L      Potassium 3 7 mmol/L      Chloride 103 mmol/L      CO2 29 mmol/L      ANION GAP 7 mmol/L      BUN 19 mg/dL      Creatinine 1 10 mg/dL      Glucose 199 mg/dL      Calcium 8 7 mg/dL      AST 16 U/L      ALT 54 U/L      Alkaline Phosphatase 71 U/L      Total Protein 7 5 g/dL      Albumin 3 7 g/dL      Total Bilirubin 0 62 mg/dL      eGFR 65 ml/min/1 73sq m     Narrative:      Ava guidelines for Chronic Kidney Disease (CKD):     Stage 1 with normal or high GFR (GFR > 90 mL/min/1 73 square meters)    Stage 2 Mild CKD (GFR = 60-89 mL/min/1 73 square meters)    Stage 3A Moderate CKD (GFR = 45-59 mL/min/1 73 square meters)    Stage 3B Moderate CKD (GFR = 30-44 mL/min/1 73 square meters)    Stage 4 Severe CKD (GFR = 15-29 mL/min/1 73 square meters)    Stage 5 End Stage CKD (GFR <15 mL/min/1 73 square meters)  Note: GFR calculation is accurate only with a steady state creatinine    UA w Reflex to Microscopic w Reflex to Culture [[id number]]  (Abnormal) Collected: 01/31/21 1231    Lab Status: Final result Specimen: Urine, Clean Catch Updated: 01/31/21 1245     Color, UA Yellow     Clarity, UA Slightly Cloudy     Specific [place], UA 1 020     pH, UA 6 0     Leukocytes, UA Small     Nitrite, UA Negative     Protein, UA Negative mg/dl      Glucose,  (1/4%) mg/dl      Ketones, UA Negative mg/dl      Urobilinogen, UA 0 2 E U /dl      Bilirubin, UA Negative     Blood, UA Negative    CBC and differential [[id number]] Collected: 01/31/21 1218    Lab Status: Final result Specimen: Blood from Arm, Right Updated: 01/31/21 1239     WBC 9 82 Thousand/uL      RBC 4 44 Million/uL      Hemoglobin 12 9 g/dL      Hematocrit 40 0 %      MCV 90 fL      MCH 29 1 pg      MCHC 32 3 g/dL      RDW 13 9 %      MPV 12 6 fL      Platelets 231 Thousands/uL      nRBC 0 /100 WBCs      Neutrophils Relative 64 %      Immat GRANS % 1 %      Lymphocytes Relative 28 %      Monocytes Relative 6 %      Eosinophils Relative 1 %      Basophils Relative 0 %      Neutrophils Absolute 6 32 Thousands/µL      Immature Grans Absolute 0 06 Thousand/uL      Lymphocytes Absolute 2 71 Thousands/µL      Monocytes Absolute 0 56 Thousand/µL      Eosinophils Absolute 0 13 Thousand/µL      Basophils Absolute 0 04 Thousands/µL                  XR chest 1 view portable   ED Interpretation by Dilma Prieto PA-C (01/31 1304)   No focal consolidation                  Procedures  ECG 12 Lead Documentation Only    Date/Time: 1/31/2021 12:21 PM  Performed by: Dilma Prieto PA-C  Authorized by: Dilma Prieto PA-C     Indications / Diagnosis:  SOB  ECG reviewed by me, the ED Provider: yes    Patient location:  ED  Previous ECG:     Previous ECG:  Compared to current    Comparison ECG info:  1/31/21    Similarity:  No change  Interpretation:     Interpretation: abnormal    Rate:     ECG rate:  68 ECG rate assessment: normal    Rhythm:     Rhythm: sinus rhythm    Ectopy:     Ectopy: none    QRS:     QRS intervals: Wide  Conduction:     Conduction: abnormal      Abnormal conduction: complete RBBB    ST segments:     ST segments:  Normal  T waves:     T waves: normal               ED Course  ED Course as of Jan 31 1418   Sun Jan 31, 2021   1417 Discussed test results with patient  Initially discussed distal options  Offered admission for dyspnea although this is not exertional   Additionally discussed options for discharge home  Patient states that he would prefer to be discharged home and follow-up with his cardiologist at [facility]  I feel this is reasonable  He will return to the emergency department for any chest pain                                SBIRT 22yo+      Most Recent Value   SBIRT (22 yo +)   In order to provide better care to our patients, we are screening all of our patients for alcohol and drug use  Would it be okay to ask you these screening questions?   Unable to answer at this time Filed at: 01/31/2021 1230                    MDM  Number of Diagnoses or Management Options  Dyspnea, unspecified type:   Diagnosis management comments: Differential diagnosis includes but not limited to:  Upper respiratory infection, bronchitis, pneumonia, COVID, acute coronary syndrome, congestive heart failure, hypoxia         Amount and/or Complexity of Data Reviewed  Clinical lab tests: ordered and reviewed  Tests in the radiology section of CPT®: ordered and reviewed  Independent visualization of images, tracings, or specimens: yes        Disposition  Final diagnoses:   Dyspnea, unspecified type     Time reflects when diagnosis was documented in both MDM as applicable and the Disposition within this note     Time User Action Codes Description Comment    1/31/2021  2:18 PM Darline Lopez Add [R06 00] Dyspnea, unspecified type       ED Disposition     ED Disposition Condition Date/Time Comment Discharge Stable Sun Jan 31, 2021  2:18 PM Lakeisha Aleman  discharge to home/self care  Follow-up Information     Follow up With Specialties Details Why Αμαλίας MD [facility], [place] [phone]            Patient's Medications   Discharge Prescriptions    No medications on file     No discharge procedures on file      PDMP Review     None          ED Provider  Electronically Signed by           Elis Hall PA-C  01/31/21 4573

## 2021-01-31 NOTE — PROGRESS NOTES
[facility]        NAME: Sridevi Diallo  is a 68 y o  male  : 1945    MRN: [id number]  DATE: 2021  TIME: 10:27 AM    Assessment and Plan   SOB (shortness of breath) [R06 02]  1  SOB (shortness of breath)  ECG 12 lead    Transfer to other facility         Patient Instructions     Patient Instructions   Patient experiencing shortness of breath in intermittent episodes for the past year  Feels it was worse yesterday associated with lightheadedness  No chest pain or palpitations  EKG performed in office shows no significant change from previous EKG in the chart from 2017  Patient has been referred to the ER for further evaluation and treatment  He has chosen to go to [facility] and will be driving himself  We have notified the ER  Follow up with PCP in 3-5 days  Proceed to  ER if symptoms worsen  Chief Complaint     Chief Complaint   Patient presents with    Shortness of Breath     pt states that he has a Hx of breathing difficulty; comes and goes         History of Present Illness       Patient has been experiencing intermittent episodes of shortness of breath for the past 1 year  He feels it is related to his Amiodarone for the Atrial Fibrillation  He was scheduled to see his cardiologist for the shortness of breath on  but cancelled the appt because he was experiencing GI upset from IBS  He states the SOB mainly comes on with rest, lasts for a few hours, and resolves spontaneously  He states he has these episodes daily  He has not attempted any treatment for the symptoms  He states he presents to urgent care today because he feels his episode of the shortness of breath felt worse yesterday and was associated with feelings of lightheadedness which is new for him  He denies any syncopal episodes  He denies any chest pain or palpitations  He has Atrial Fibrillation, Hypertension, Diabetes, ROBER, and CKD   He claims to be compliant with his medications and treatment plans  No fever/chills  No headache or body aches  No cold/URI symptoms  No cough or chest congestion  He has chronic LE swelling which he attributes to the Amlodipine and states is at baseline  No dizziness or vision changes  No slurred speech  No numbness/tingling or weakness  He denies any recent travel  No known exposure to anyone with COVID-19  He states he is scheduled to receive his first dose of the COVID-19 vaccine this week  Patient is awake, alert, oriented, in no apparent distress, able to speak in complete sentences and provide a full history  Review of Systems   Review of Systems   Constitutional: Negative  HENT: Negative  Eyes: Negative  Respiratory:        As noted in HPI   Cardiovascular: Negative  Gastrointestinal: Negative  Musculoskeletal: Negative  Skin: Negative  Neurological: Negative      Hematological:        On Eliquis         Current Medications       Current Outpatient Medications:     amiodarone 200 mg tablet, 1 pill daily, Disp: , Rfl:     amLODIPine (NORVASC) 5 mg tablet, Take 5 mg by mouth daily, Disp: , Rfl:     apixaban (ELIQUIS) 5 mg, Take 5 mg by mouth 2 (two) times a day, Disp: , Rfl:     atorvastatin (LIPITOR) 20 mg tablet, TAKE 1 TABLET DAILY, Disp: 90 tablet, Rfl: 3    Blood Glucose Monitoring Suppl (FREESTYLE LITE) GEOVANNY, , Disp: , Rfl:     carvedilol (COREG) 25 mg tablet, Take 25 mg by mouth 2 (two) times a day, Disp: , Rfl:     FREESTYLE LITE test strip, , Disp: , Rfl:     furosemide (LASIX) 20 mg tablet, Take 20 mg by mouth daily, Disp: , Rfl:     Lancets (FREESTYLE) lancets, , Disp: , Rfl:     losartan (COZAAR) 100 MG tablet, , Disp: , Rfl:     multivitamin (THERAGRAN) TABS, Take 1 tablet by mouth every morning, Disp: , Rfl:     pioglitazone (ACTOS) 15 mg tablet, , Disp: , Rfl:     polyethylene glycol-propylene glycol (SYSTANE) 0 4-0 3 %, every 3 (three) hours as needed, Disp: , Rfl:     RESTASIS 0 05 % ophthalmic emulsion, Administer 1 drop to both eyes every 12 (twelve) hours , Disp: , Rfl:     sitaGLIPtin (JANUVIA) 100 mg tablet, Take 100 mg by mouth daily, Disp: , Rfl:     tamsulosin (FLOMAX) 0 4 mg, Take 0 4 mg by mouth every other day , Disp: , Rfl:     traMADol (ULTRAM) 50 mg tablet, 1-2 po q 6 hours prn pain, Disp: 12 tablet, Rfl: 0    esomeprazole (NexIUM) 20 mg capsule, Take 1 capsule (20 mg total) by mouth daily at bedtime, Disp: 90 capsule, Rfl: 3    Current Allergies     Allergies as of 01/31/2021 - Reviewed 01/31/2021   Allergen Reaction Noted    Codeine Other (See Comments) and GI Intolerance 12/27/2004    Demerol [meperidine] Nausea Only, Other (See Comments), GI Intolerance, and Vomiting 12/27/2004    Morphine Nausea Only, GI Intolerance, and Vomiting 02/23/2012            The following portions of the patient's history were reviewed and updated as appropriate: allergies, current medications, past family history, past medical history, past social history, past surgical history and problem list      Past Medical History:   Diagnosis Date    Allergic rhinitis     Anemia 10/23/2008    last assessed 9/9/13     Anxiety     Atrial fibrillation (HCC)     Bleeding     BPH (benign prostatic hypertrophy)     Diabetes mellitus ([facility] [address] )     Diarrhea     Diverticulitis     Eating disorder     GERD (gastroesophageal reflux disease)     Herpes zoster with complication     last assessed 7/3/17     Hiatal hernia     Hyperlipidemia     Hypertension     IBS (irritable bowel syndrome)     Incisional hernia     Increased urinary frequency     Irregular heart beat     Pyogenic granuloma 09/13/2006    last assessed 9/13/06    Carlos (subconjunctival hemorrhage), unspecified laterality 09/12/2005    last assessed 9/12/05    Sleep apnea     TMJ (dislocation of temporomandibular joint)     Ventral hernia     last assessed  4/6/17        Past Surgical History:   Procedure Laterality Date    APPENDECTOMY      ATRIAL ABLATION SURGERY      2014    ATRIAL ABLATION SURGERY      catheter ablation atrial fibrillation / last assessed 2/17/16     CHOLECYSTECTOMY      lap    COLON SURGERY      Hartmans procedure    COLON SURGERY      reversal 6 weeks later    COLONOSCOPY N/A 1/26/2017    Procedure: COLONOSCOPY;  Surgeon: Soy Gibson MD;  Location: [place] [address] GI LAB; Service:     ESOPHAGOGASTRODUODENOSCOPY N/A 1/26/2017    Procedure: ESOPHAGOGASTRODUODENOSCOPY (EGD); Surgeon: Soy Gibson MD;  Location: [place] [address] GI LAB; Service:     EYE SURGERY Bilateral     lid repair    HERNIA REPAIR Bilateral     inguinal 2014 & 2013    INCISIONAL HERNIA REPAIR  2011    INCISIONAL HERNIA REPAIR N/A 3/24/2017    Procedure: REPAIR OF INCARCERATED INCISIONAL HERNIA WITH MESH, Lysis of Adhesions, Partial Omentectomy;  Surgeon: Maddie Barnett MD;  Location: [facility];  Service:    Phong Flower NEUROBLASTOMA EXCISION      SKIN CANCER EXCISION      On nose    TONSILLECTOMY         Family History   Problem Relation Age of Onset    Heart disease Mother     Cancer Mother     Colon cancer Mother     Dementia Mother     Early death Father     Seizures Father    Phong Flower Cancer Sister         bone    Heart disease Sister     Diabetes Sister     Lupus Sister     Heart disease Brother     Cancer Brother         kidney    Diabetes Brother     Stroke Neg Hx          Medications have been verified  Objective   /76   Pulse 63   Temp 97 7 °F (36 5 °C)   Resp 16   Wt 108 kg (237 lb)   SpO2 99%   BMI 34 01 kg/m²   No LMP for male patient  Physical Exam     Physical Exam  Vitals signs and nursing note reviewed  Constitutional:       General: He is awake  He is not in acute distress  Appearance: Normal appearance  He is well-developed, well-groomed and normal weight  He is not ill-appearing, toxic-appearing or diaphoretic  HENT:      Head: Normocephalic and atraumatic        Right Ear: Tympanic membrane, ear canal and external ear normal       Left Ear: Tympanic membrane, ear canal and external ear normal       Nose: Nose normal       Mouth/Throat:      Lips: Pink  Mouth: Mucous membranes are moist       Pharynx: Oropharynx is clear  Uvula midline  Cardiovascular:      Rate and Rhythm: Normal rate and regular rhythm  Pulses: Normal pulses  Heart sounds: Normal heart sounds  Pulmonary:      Effort: Pulmonary effort is normal  No tachypnea, accessory muscle usage or respiratory distress  Breath sounds: Normal breath sounds and air entry  Abdominal:      General: There is no distension  Palpations: Abdomen is soft  There is no pulsatile mass  Tenderness: There is no abdominal tenderness  There is no guarding or rebound  Musculoskeletal:      Right lower leg: No edema  Left lower leg: No edema  Skin:     General: Skin is warm and dry  Coloration: Skin is not pale  Neurological:      Mental Status: He is alert and oriented to person, place, and time  Mental status is at baseline  Psychiatric:         Attention and Perception: Attention and perception normal          Mood and Affect: Mood and affect normal          Speech: Speech normal          Behavior: Behavior normal  Behavior is cooperative  Thought Content:  Thought content normal          Cognition and Memory: Cognition and memory normal          Judgment: Judgment normal

## 2021-01-31 NOTE — PATIENT INSTRUCTIONS
Patient experiencing shortness of breath in intermittent episodes for the past year  Feels it was worse yesterday associated with lightheadedness  No chest pain or palpitations  EKG performed in office shows no significant change from previous EKG in the chart from 2017  Patient has been referred to the ER for further evaluation and treatment  He has chosen to go to [facility] and will be driving himself  We have notified the ER

## 2021-02-01 LAB
ATRIAL RATE: 63 BPM
ATRIAL RATE: 63 BPM
ATRIAL RATE: 65 BPM
P AXIS: 59 DEGREES
P AXIS: 61 DEGREES
P AXIS: 61 DEGREES
PR INTERVAL: 176 MS
PR INTERVAL: 192 MS
PR INTERVAL: 192 MS
QRS AXIS: -61 DEGREES
QRS AXIS: -62 DEGREES
QRS AXIS: -62 DEGREES
QRSD INTERVAL: 140 MS
QRSD INTERVAL: 140 MS
QRSD INTERVAL: 150 MS
QT INTERVAL: 474 MS
QT INTERVAL: 474 MS
QT INTERVAL: 478 MS
QTC INTERVAL: 485 MS
QTC INTERVAL: 485 MS
QTC INTERVAL: 497 MS
T WAVE AXIS: -11 DEGREES
T WAVE AXIS: -24 DEGREES
T WAVE AXIS: -24 DEGREES
VENTRICULAR RATE: 63 BPM
VENTRICULAR RATE: 63 BPM
VENTRICULAR RATE: 65 BPM

## 2021-02-01 PROCEDURE — 93010 ELECTROCARDIOGRAM REPORT: CPT | Performed by: INTERNAL MEDICINE

## 2021-02-02 ENCOUNTER — APPOINTMENT (OUTPATIENT)
Dept: PHYSICAL THERAPY | Facility: CLINIC | Age: 76
End: 2021-02-02
Payer: MEDICARE

## 2021-02-02 LAB
ATRIAL RATE: 68 BPM
P AXIS: 92 DEGREES
PR INTERVAL: 168 MS
QRS AXIS: -50 DEGREES
QRSD INTERVAL: 152 MS
QT INTERVAL: 444 MS
QTC INTERVAL: 472 MS
T WAVE AXIS: 25 DEGREES
VENTRICULAR RATE: 68 BPM

## 2021-02-02 PROCEDURE — 93010 ELECTROCARDIOGRAM REPORT: CPT | Performed by: INTERNAL MEDICINE

## 2021-02-03 ENCOUNTER — IMMUNIZATIONS (OUTPATIENT)
Dept: FAMILY MEDICINE CLINIC | Facility: HOSPITAL | Age: 76
End: 2021-02-03

## 2021-02-03 ENCOUNTER — TELEPHONE (OUTPATIENT)
Dept: FAMILY MEDICINE CLINIC | Facility: CLINIC | Age: 76
End: 2021-02-03

## 2021-02-03 ENCOUNTER — VBI (OUTPATIENT)
Dept: FAMILY MEDICINE CLINIC | Facility: CLINIC | Age: 76
End: 2021-02-03

## 2021-02-03 DIAGNOSIS — Z23 ENCOUNTER FOR IMMUNIZATION: Primary | ICD-10-CM

## 2021-02-03 LAB
BACTERIA UR CULT: ABNORMAL
BACTERIA UR CULT: ABNORMAL

## 2021-02-03 PROCEDURE — 0001A SARS-COV-2 / COVID-19 MRNA VACCINE (PFIZER-BIONTECH) 30 MCG: CPT

## 2021-02-03 PROCEDURE — 91300 SARS-COV-2 / COVID-19 MRNA VACCINE (PFIZER-BIONTECH) 30 MCG: CPT

## 2021-02-03 NOTE — TELEPHONE ENCOUNTER
----- Message from Samia Gudino MA sent at 2/3/2021 10:30 AM EST -----  Regarding: Covid-19 vaccine appointment confirmation  Would someone be able to check that patient is confirmed for his COVID-19 vaccine tonight? It does show in his appointments  He is concerned because he was unable to get to his keypad when the confirmation call came  Thank you  Patient is going to appointment regardless

## 2021-02-03 NOTE — TELEPHONE ENCOUNTER
[facility]  ED Visit Information     Ed visit date: 01/31/2021  Diagnosis Description: Dyspnea, unspecified type  In Network? Yes Sh[facility] [place]  Discharge status: Home  Discharged with meds ? No  Number of ED visits to date: 1  ED Severity:NA     Outreach Information    Outreach successful: Yes 1  Date letter mailed:NA  Date Finalized:02/03/2021    Care Coordination    Follow up appointment with pcp: no no  Transportation issues ? NA    Value Base Outreach    S/W patient who states he is ok  Patient concerned about his Covid-19 vaccine appointment tonight, received phone confirmation call and he was unable to get to his keypad to confirm  Sending message to [address] staff to make sure his appointment is confirmed, patient is going to appointment regardless

## 2021-02-04 ENCOUNTER — OFFICE VISIT (OUTPATIENT)
Dept: PHYSICAL THERAPY | Facility: CLINIC | Age: 76
End: 2021-02-04
Payer: MEDICARE

## 2021-02-04 DIAGNOSIS — S43.401A SPRAIN OF RIGHT SHOULDER, UNSPECIFIED SHOULDER SPRAIN TYPE, INITIAL ENCOUNTER: Primary | ICD-10-CM

## 2021-02-04 PROCEDURE — 97140 MANUAL THERAPY 1/> REGIONS: CPT | Performed by: PHYSICAL THERAPIST

## 2021-02-04 PROCEDURE — 97110 THERAPEUTIC EXERCISES: CPT | Performed by: PHYSICAL THERAPIST

## 2021-02-04 NOTE — PROGRESS NOTES
Daily Note     Today's date: 2021  Patient name: Tawana Valladares  : 1945  MRN: [id number]  Referring provider: Kinsey Ferrari MD  Dx:   Encounter Diagnosis     ICD-10-CM    1  Sprain of right shoulder, unspecified shoulder sprain type, initial encounter  S43 401A                   Subjective:   He reports some soreness right shld from shoveling  Objective: See treatment diary below      Assessment: Tolerated treatment well  Patient would benefit from continued PT    Cozedat Mcqueen has full motion right shoulder during PROM  Plan: Continue per plan of care  Progress treatment as tolerated         Precautions:  Afib, Diabetes, Hypertension, LATEX ALLERGY ,   TE x 1 , MT x 1      Specialty Daily Treatment Diary       Manuals 21    Visit # 6 7 8 9    STM UT, pec group 5' 5' 5' 5'    PROM R shld 5' 5' 5' 5'    ST joint mobs                Warm-up        NuStep 10' 10' 10' 10'    Neuro Re-Ed        Scap squeeze 20 20 20 20                            Ther Ex        TB row 30   BkTB 30   BkTB 30   BkTB 30   BkTB    TB ext 30   BkTB 30   BkTB 30   BkTB 30   BkTB    Overhead w/ ball 30   4# 30   8# 30   8" 30   8"    Pulleys 30 30 30 30    S/L rotation        Wand flex 20 20 20 20    ER AROM 20   YTB 20   YTB 20   YTB  ER/IR 20   YTB IR/ER    Corner pec stretch 10 x 10 sec 10 x 10" 10 x 10" 10 x 10"    Lat pull down  20   12 5# -- --            Ther Activity                                Modalities        [facility]  5 min 5'   10'  10'

## 2021-02-04 NOTE — RESULT ENCOUNTER NOTE
Patient called at [phone]  Name and  used as positive patient identifiers  Macrobid 100mg PO BID for 7 days called into [facility] in [facility]

## 2021-02-11 ENCOUNTER — OFFICE VISIT (OUTPATIENT)
Dept: PHYSICAL THERAPY | Facility: CLINIC | Age: 76
End: 2021-02-11
Payer: MEDICARE

## 2021-02-11 DIAGNOSIS — S43.401A SPRAIN OF RIGHT SHOULDER, UNSPECIFIED SHOULDER SPRAIN TYPE, INITIAL ENCOUNTER: Primary | ICD-10-CM

## 2021-02-11 PROCEDURE — 97110 THERAPEUTIC EXERCISES: CPT | Performed by: PHYSICAL THERAPIST

## 2021-02-11 PROCEDURE — 97140 MANUAL THERAPY 1/> REGIONS: CPT | Performed by: PHYSICAL THERAPIST

## 2021-02-11 NOTE — PROGRESS NOTES
Assessment/Plan:    1  Type 2 diabetes mellitus with stage 3 chronic kidney disease, without long-term current use of insulin, unspecified whether stage 3a or 3b CKD ([facility] [address] )  Assessment & Plan:    Lab Results   Component Value Date    HGBA1C 6 6 (H) 10/13/2020     Managed by endocrinologist   Discussed need for good foot and eye control  2  Diabetic polyneuropathy associated with type 2 diabetes mellitus [facility])  Assessment & Plan:    Lab Results   Component Value Date    HGBA1C 6 6 (H) 10/13/2020     Follows with podiatrist and denies issues at this time  3  Aortic root dilation (HCC)  Assessment & Plan:  Followed by cardiology  4  Atrial fibrillation, persistent (HCC)    5  Stage 2 chronic kidney disease  Assessment & Plan:  Lab Results   Component Value Date    EGFR 65 01/31/2021    EGFR 72 10/13/2020    EGFR 65 06/18/2020    CREATININE 1 10 01/31/2021    CREATININE 1 02 10/13/2020    CREATININE 1 11 06/18/2020     Stable and gets labwork done through his endocrinologist       6  Hypertension, benign  Assessment & Plan: This is well controlled  He will continue current antihypertensives as ordered  Discussed need for exercise and weight loss  7  Atrial fibrillation, unspecified type [facility])  Assessment & Plan:  Managed by cardiology  8  BMI 32 0-32 9,adult  BMI Counseling: Body mass index is 32 86 kg/m²  The BMI is above normal  Nutrition recommendations include reducing portion sizes and decreasing overall calorie intake  Exercise recommendations include moderate aerobic physical activity for 150 minutes/week  There are no Patient Instructions on file for this visit  Return in about 6 months (around 8/15/2021) for 1/2 hour follow up and AWV  Subjective:      Patient ID: Starla Boyle  is a 68 y o  male  Chief Complaint   Patient presents with    Follow-up     and to review ct scan lung  ss,lpn    Diabetes       Her for a follow up visit      He lost his younger sister with COVID after a 7 week hospitalization  He was in the ER for weakness and dyspnea and was told he had a UTI, this is improved after antibiotics  DM control has not been good  Sees endocrinologist later today  Medications had to be changed due to side effects but he has not been able to get glucose below 200  No hypoglycemic episodes  Walks daily for exercise  The following portions of the patient's history were reviewed and updated as appropriate: allergies, current medications, past family history, past medical history, past social history, past surgical history and problem list     Review of Systems   Constitutional: Negative  Respiratory: Negative  Cardiovascular: Negative  Endocrine: Negative            Current Outpatient Medications   Medication Sig Dispense Refill    amiodarone 200 mg tablet 1 pill daily      amLODIPine (NORVASC) 5 mg tablet Take 5 mg by mouth daily      apixaban (ELIQUIS) 5 mg Take 5 mg by mouth 2 (two) times a day      atorvastatin (LIPITOR) 20 mg tablet TAKE 1 TABLET DAILY 90 tablet 3    Blood Glucose Monitoring Suppl (FREESTYLE LITE) GEOVANNY       carvedilol (COREG) 25 mg tablet Take 25 mg by mouth 2 (two) times a day      esomeprazole (NexIUM) 20 mg capsule Take 1 capsule (20 mg total) by mouth daily at bedtime 90 capsule 3    FREESTYLE LITE test strip       furosemide (LASIX) 20 mg tablet Take 20 mg by mouth daily      Lancets (FREESTYLE) lancets       losartan (COZAAR) 100 MG tablet       multivitamin (THERAGRAN) TABS Take 1 tablet by mouth every morning      pioglitazone (ACTOS) 15 mg tablet       polyethylene glycol-propylene glycol (SYSTANE) 0 4-0 3 % every 3 (three) hours as needed      RESTASIS 0 05 % ophthalmic emulsion Administer 1 drop to both eyes every 12 (twelve) hours       sitaGLIPtin (JANUVIA) 100 mg tablet Take 100 mg by mouth daily      tamsulosin (FLOMAX) 0 4 mg Take 0 4 mg by mouth every other day       traMADol (ULTRAM) 50 mg tablet 1-2 po q 6 hours prn pain 12 tablet 0     No current facility-administered medications for this visit  Objective:    /80   Pulse 64   Temp 97 9 °F (36 6 °C)   Resp 18   Ht 5' 10" (1 778 m)   Wt 104 kg (229 lb) Comment: per patient refused to be weighed  BMI 32 86 kg/m²        Physical Exam  Constitutional:       Appearance: Normal appearance  He is well-developed  Eyes:      Conjunctiva/sclera: Conjunctivae normal    Neck:      Musculoskeletal: Neck supple  Thyroid: No thyromegaly  Vascular: No JVD  Cardiovascular:      Rate and Rhythm: Normal rate and regular rhythm  Pulses: no weak pulses          Dorsalis pedis pulses are 2+ on the right side and 2+ on the left side  Heart sounds: Normal heart sounds  No murmur  No friction rub  No gallop  Pulmonary:      Effort: Pulmonary effort is normal       Breath sounds: Normal breath sounds  No wheezing or rales  Abdominal:      General: Bowel sounds are normal  There is no distension  Palpations: Abdomen is soft  Tenderness: There is no abdominal tenderness  Feet:      Right foot:      Skin integrity: No ulcer, skin breakdown, erythema, warmth, callus or dry skin  Left foot:      Skin integrity: No ulcer, skin breakdown, erythema, warmth, callus or dry skin  Neurological:      Mental Status: He is alert  Patient's shoes and socks removed  Right Foot/Ankle   Right Foot Inspection  Skin Exam: skin normal and skin intact no dry skin, no warmth, no callus, no erythema, no maceration, no abnormal color, no pre-ulcer, no ulcer and no callus                          Toe Exam: ROM and strength within normal limits  Sensory       Monofilament testing: intact  Vascular  Capillary refills: < 3 seconds  The right DP pulse is 2+       Left Foot/Ankle  Left Foot Inspection  Skin Exam: skin normal and skin intactno dry skin, no warmth, no erythema, no maceration, normal color, no pre-ulcer, no ulcer and no callus                         Toe Exam: ROM and strength within normal limits                   Sensory       Monofilament: intact  Vascular  Capillary refills: < 3 seconds  The left DP pulse is 2+  Assign Risk Category:  No deformity present; No loss of protective sensation;  No weak pulses       Risk: 0           Frank Gutierrez MD

## 2021-02-11 NOTE — PROGRESS NOTES
Daily Note     Today's date: 2021  Patient name: Erendira Atwood  : 1945  MRN: [id number]  Referring provider: Monica Mullen MD  Dx:   Encounter Diagnosis     ICD-10-CM    1  Sprain of right shoulder, unspecified shoulder sprain type, initial encounter  S43 401A                   Subjective:   He reports no shoulder pain but having C-S pain today  Objective: See treatment diary below      Assessment: Tolerated treatment well  Patient would benefit from continued PT  Paraspinals and Left levator tightness today which responded well to stretches taught to patient  He agreed to attempt chin tucks and UT stretch for HEP      Plan: Continue per plan of care  Progress treatment as tolerated         Precautions:  Afib, Diabetes, Hypertension, LATEX ALLERGY ,   TE x 1 , MT x 1      Specialty Daily Treatment Diary       Manuals 21   Visit # 6 7 8 9 10   STM UT, pec group 5' 5' 5' 5' 5'   PROM R shld 5' 5' 5' 5' 5'   ST joint mobs                Warm-up        NuStep 10' 10' 10' 10' 10'   Neuro Re-Ed        Scap squeeze 20 20 20 20 20   RTB        C-S stretches 4'                   Ther Ex        TB row 30   BkTB 30   BkTB 30   BkTB 30   BkTB 30   BkTB   TB ext 30   BkTB 30   BkTB 30   BkTB 30   BkTB 30   BkTB   Overhead w/ ball 30   4# 30   8# 30   8" 30   8" 30   8#   Pulleys 30 30 30 30 30   S/L rotation     20   Wand flex 20 20 20 20 20   ER AROM 20   YTB 20   YTB 20   YTB  ER/IR 20   YTB IR/ER 20   YTB  IR/ER   Corner pec stretch 10 x 10 sec 10 x 10" 10 x 10" 10 x 10" 10 x 10"   Lat pull down  20   12 5# -- --            Ther Activity                                Modalities        CP MH  5 min 5'  MH 10'  10'   10 min

## 2021-02-15 ENCOUNTER — OFFICE VISIT (OUTPATIENT)
Dept: FAMILY MEDICINE CLINIC | Facility: CLINIC | Age: 76
End: 2021-02-15
Payer: MEDICARE

## 2021-02-15 VITALS
HEIGHT: 70 IN | DIASTOLIC BLOOD PRESSURE: 80 MMHG | WEIGHT: 229 LBS | HEART RATE: 64 BPM | RESPIRATION RATE: 18 BRPM | TEMPERATURE: 97.9 F | SYSTOLIC BLOOD PRESSURE: 144 MMHG | BODY MASS INDEX: 32.78 KG/M2

## 2021-02-15 DIAGNOSIS — E11.22 TYPE 2 DIABETES MELLITUS WITH STAGE 3 CHRONIC KIDNEY DISEASE, WITHOUT LONG-TERM CURRENT USE OF INSULIN, UNSPECIFIED WHETHER STAGE 3A OR 3B CKD (HCC): Primary | ICD-10-CM

## 2021-02-15 DIAGNOSIS — N18.2 STAGE 2 CHRONIC KIDNEY DISEASE: ICD-10-CM

## 2021-02-15 DIAGNOSIS — I48.19 ATRIAL FIBRILLATION, PERSISTENT (HCC): ICD-10-CM

## 2021-02-15 DIAGNOSIS — N18.30 TYPE 2 DIABETES MELLITUS WITH STAGE 3 CHRONIC KIDNEY DISEASE, WITHOUT LONG-TERM CURRENT USE OF INSULIN, UNSPECIFIED WHETHER STAGE 3A OR 3B CKD (HCC): Primary | ICD-10-CM

## 2021-02-15 DIAGNOSIS — E11.42 DIABETIC POLYNEUROPATHY ASSOCIATED WITH TYPE 2 DIABETES MELLITUS (HCC): ICD-10-CM

## 2021-02-15 DIAGNOSIS — I48.91 ATRIAL FIBRILLATION, UNSPECIFIED TYPE (HCC): ICD-10-CM

## 2021-02-15 DIAGNOSIS — I77.810 AORTIC ROOT DILATION (HCC): ICD-10-CM

## 2021-02-15 DIAGNOSIS — I10 HYPERTENSION, BENIGN: ICD-10-CM

## 2021-02-15 PROCEDURE — 99213 OFFICE O/P EST LOW 20 MIN: CPT | Performed by: INTERNAL MEDICINE

## 2021-02-15 NOTE — ASSESSMENT & PLAN NOTE
Lab Results   Component Value Date    HGBA1C 6 6 (H) 10/13/2020     Follows with podiatrist and denies issues at this time

## 2021-02-15 NOTE — ASSESSMENT & PLAN NOTE
Lab Results   Component Value Date    HGBA1C 6 6 (H) 10/13/2020     Managed by endocrinologist   Discussed need for good foot and eye control  normal...

## 2021-02-15 NOTE — ASSESSMENT & PLAN NOTE
Lab Results   Component Value Date    EGFR 65 01/31/2021    EGFR 72 10/13/2020    EGFR 65 06/18/2020    CREATININE 1 10 01/31/2021    CREATININE 1 02 10/13/2020    CREATININE 1 11 06/18/2020     Stable and gets labwork done through his endocrinologist

## 2021-02-15 NOTE — ASSESSMENT & PLAN NOTE
This is well controlled  He will continue current antihypertensives as ordered  Discussed need for exercise and weight loss

## 2021-02-16 ENCOUNTER — OFFICE VISIT (OUTPATIENT)
Dept: PHYSICAL THERAPY | Facility: CLINIC | Age: 76
End: 2021-02-16
Payer: MEDICARE

## 2021-02-16 DIAGNOSIS — S43.401A SPRAIN OF RIGHT SHOULDER, UNSPECIFIED SHOULDER SPRAIN TYPE, INITIAL ENCOUNTER: Primary | ICD-10-CM

## 2021-02-16 PROCEDURE — 97110 THERAPEUTIC EXERCISES: CPT | Performed by: PHYSICAL THERAPIST

## 2021-02-16 PROCEDURE — 97140 MANUAL THERAPY 1/> REGIONS: CPT | Performed by: PHYSICAL THERAPIST

## 2021-02-16 NOTE — PROGRESS NOTES
Daily Note     Today's date: 2021  Patient name: Sridevi Diallo  : 1945  MRN: [id number]  Referring provider: Marilyn Lemus MD  Dx:   Encounter Diagnosis     ICD-10-CM    1  Sprain of right shoulder, unspecified shoulder sprain type, initial encounter  S43 401A                   Subjective:   He reports that his right shoulder feels good lately  He still has some right sided C-S discomfort though  Objective: See treatment diary below      Assessment: Tolerated treatment well  Patient would benefit from continued PT  Right paraspinal tighness today which loosened well with STM  Taught patient C-S AROM [place] for HEP      Plan: Continue per plan of care  Progress treatment as tolerated         Precautions:  Afib, Diabetes, Hypertension, LATEX ALLERGY ,   TE x 1 , MT x 1      Specialty Daily Treatment Diary       Manuals 21       Visit # 11       STM UT, pec group 5'       PROM R shld 5'       ST joint mobs                Warm-up        NuStep 10'       Neuro Re-Ed        Scap squeeze 20       C-S AROM 10                       Ther Ex        TB row 30   BkTB       TB ext 30   BkTB       Overhead w/ ball 30   4#  Curl to press       Pulleys 30       S/L rotation        Wand flex 20       ER /IR TB 20   GTB       Corner pec stretch 10 x 10 sec                       Ther Activity                                Modalities        MH C-S  5 min

## 2021-02-19 ENCOUNTER — LAB (OUTPATIENT)
Dept: LAB | Facility: CLINIC | Age: 76
End: 2021-02-19
Payer: MEDICARE

## 2021-02-19 ENCOUNTER — TRANSCRIBE ORDERS (OUTPATIENT)
Dept: LAB | Facility: CLINIC | Age: 76
End: 2021-02-19

## 2021-02-19 DIAGNOSIS — N13.8 BPH WITH OBSTRUCTION/LOWER URINARY TRACT SYMPTOMS: ICD-10-CM

## 2021-02-19 DIAGNOSIS — N13.8 NODULAR PROSTATE WITH URINARY OBSTRUCTION: ICD-10-CM

## 2021-02-19 DIAGNOSIS — N40.1 BPH WITH OBSTRUCTION/LOWER URINARY TRACT SYMPTOMS: ICD-10-CM

## 2021-02-19 DIAGNOSIS — N40.3 NODULAR PROSTATE WITH URINARY OBSTRUCTION: ICD-10-CM

## 2021-02-19 DIAGNOSIS — Z12.5 SPECIAL SCREENING FOR MALIGNANT NEOPLASM OF PROSTATE: Primary | ICD-10-CM

## 2021-02-19 LAB — PSA SERPL-MCNC: 8.1 NG/ML (ref 0–4)

## 2021-02-19 PROCEDURE — G0103 PSA SCREENING: HCPCS

## 2021-02-19 PROCEDURE — 36415 COLL VENOUS BLD VENIPUNCTURE: CPT

## 2021-02-23 ENCOUNTER — OFFICE VISIT (OUTPATIENT)
Dept: PHYSICAL THERAPY | Facility: CLINIC | Age: 76
End: 2021-02-23
Payer: MEDICARE

## 2021-02-23 DIAGNOSIS — S43.401A SPRAIN OF RIGHT SHOULDER, UNSPECIFIED SHOULDER SPRAIN TYPE, INITIAL ENCOUNTER: Primary | ICD-10-CM

## 2021-02-23 PROCEDURE — 97110 THERAPEUTIC EXERCISES: CPT | Performed by: PHYSICAL THERAPIST

## 2021-02-23 PROCEDURE — 97140 MANUAL THERAPY 1/> REGIONS: CPT | Performed by: PHYSICAL THERAPIST

## 2021-02-23 NOTE — PROGRESS NOTES
Daily Note     Today's date: 2021  Patient name: Tonie Gordillo  : 1945  MRN: [id number]  Referring provider: Darrius Hidalgo MD  Dx:   Encounter Diagnosis     ICD-10-CM    1  Sprain of right shoulder, unspecified shoulder sprain type, initial encounter  S43 401A                   Subjective:   He reports that his right shoulder is feeling much better overall  He shoveled snow yesterday and soreness in right shoulder and arm following  Objective: See treatment diary below      Assessment: Tolerated treatment well  Patient would benefit from continued PT  Heavy activity causing symptoms and patient would benefit from upgrades to program to improve strength prior to discharge  Plan: Continue per plan of care  Progress treatment as tolerated         Precautions:  Afib, Diabetes, Hypertension, LATEX ALLERGY ,   TE x 1 , MT x 1      Specialty Daily Treatment Diary       Manuals 21      Visit # 11 12      [facility] [place], pec group 5' 5'      PROM R shld 5' 5'      ST joint mobs                Warm-up        NuStep 10' 10'      Neuro Re-Ed        Scap squeeze 20 --      C-S AROM 10 10                      Ther Ex        TB row 30   BkTB 30   BkTB      TB ext 30   BkTB 30   BkTB      Overhead w/ ball 30   4#  Curl to press 30  4# ball curl to press      Pulleys 30 30      Wand flex 20 30      ER /IR TB 20   GTB 20   GTB      Corner pec stretch 10 x 10 sec 10 x 10 "      Chest press  20   3#      LPD                        Ther Activity                Modalities        MH C-S  5 min 10 min R shld

## 2021-02-24 ENCOUNTER — IMMUNIZATIONS (OUTPATIENT)
Dept: FAMILY MEDICINE CLINIC | Facility: HOSPITAL | Age: 76
End: 2021-02-24

## 2021-02-24 DIAGNOSIS — Z23 ENCOUNTER FOR IMMUNIZATION: Primary | ICD-10-CM

## 2021-02-24 PROCEDURE — 91300 SARS-COV-2 / COVID-19 MRNA VACCINE (PFIZER-BIONTECH) 30 MCG: CPT

## 2021-02-24 PROCEDURE — 0002A SARS-COV-2 / COVID-19 MRNA VACCINE (PFIZER-BIONTECH) 30 MCG: CPT

## 2021-03-02 ENCOUNTER — OFFICE VISIT (OUTPATIENT)
Dept: PHYSICAL THERAPY | Facility: CLINIC | Age: 76
End: 2021-03-02
Payer: MEDICARE

## 2021-03-02 DIAGNOSIS — S43.401A SPRAIN OF RIGHT SHOULDER, UNSPECIFIED SHOULDER SPRAIN TYPE, INITIAL ENCOUNTER: Primary | ICD-10-CM

## 2021-03-02 PROCEDURE — 97140 MANUAL THERAPY 1/> REGIONS: CPT | Performed by: PHYSICAL THERAPIST

## 2021-03-02 PROCEDURE — 97110 THERAPEUTIC EXERCISES: CPT | Performed by: PHYSICAL THERAPIST

## 2021-03-02 NOTE — PROGRESS NOTES
PT Discharge    Patient has done well with PT  He feels 90% better overall  Patient has achieved all goals at this time and will continue to perform HEP independently  D/C at this time

## 2021-03-02 NOTE — PROGRESS NOTES
Daily Note     Today's date: 3/2/2021  Patient name: Carline Pate  : 1945  MRN: [id number]  Referring provider: Ron Mishra MD  Dx:   Encounter Diagnosis     ICD-10-CM    1  Sprain of right shoulder, unspecified shoulder sprain type, initial encounter  S43 401A                   Subjective:   He reports 90% improvement overall  No pain  Objective: See treatment diary below      Assessment: He has achieved all goals    He has full ROM and functional strength right shoulder      Plan:  Discharge at this time     Precautions:  Afib, Diabetes, Hypertension, LATEX ALLERGY ,   TE x 1 , MT x 1      Specialty Daily Treatment Diary       Manuals 2/16/21 2/23/21 3/2/21     Visit # 11 12 13     [facility] [place], pec group 5' 5' 5'     PROM R shld 5' 5' 5'     ST joint mobs                Warm-up        NuStep 10' 10' 10'     Neuro Re-Ed        Scap squeeze 20 --      C-S AROM 10 10 10                     Ther Ex        TB row 30   BkTB 30   BkTB 30   BkTB     TB ext 30   BkTB 30   BkTB 30   BkTB     Overhead w/ ball 30   4#  Curl to press 30  4# ball curl to press 30  4# ball     Pulleys 30 30 30     Wand flex 20 30 30     ER /IR TB 20   GTB 20   GTB 20   GTB     Corner pec stretch 10 x 10 sec 10 x 10 " 10     Chest press  20   3# 20  3#     LPD   --                     Ther Activity                Modalities        MH C-S  5 min 10 min R shld 10 min

## 2021-03-09 ENCOUNTER — APPOINTMENT (OUTPATIENT)
Dept: PHYSICAL THERAPY | Facility: CLINIC | Age: 76
End: 2021-03-09
Payer: MEDICARE

## 2021-03-22 ENCOUNTER — OFFICE VISIT (OUTPATIENT)
Dept: PULMONOLOGY | Facility: MEDICAL CENTER | Age: 76
End: 2021-03-22
Payer: MEDICARE

## 2021-03-22 VITALS
HEIGHT: 70 IN | RESPIRATION RATE: 12 BRPM | WEIGHT: 238 LBS | HEART RATE: 62 BPM | OXYGEN SATURATION: 97 % | BODY MASS INDEX: 34.07 KG/M2 | TEMPERATURE: 98 F | SYSTOLIC BLOOD PRESSURE: 122 MMHG | DIASTOLIC BLOOD PRESSURE: 82 MMHG

## 2021-03-22 DIAGNOSIS — I48.91 ATRIAL FIBRILLATION, UNSPECIFIED TYPE (HCC): ICD-10-CM

## 2021-03-22 DIAGNOSIS — R06.00 DYSPNEA ON EXERTION: Primary | ICD-10-CM

## 2021-03-22 DIAGNOSIS — E66.09 CLASS 1 OBESITY DUE TO EXCESS CALORIES WITHOUT SERIOUS COMORBIDITY WITH BODY MASS INDEX (BMI) OF 34.0 TO 34.9 IN ADULT: ICD-10-CM

## 2021-03-22 DIAGNOSIS — G47.33 OBSTRUCTIVE SLEEP APNEA: ICD-10-CM

## 2021-03-22 PROBLEM — E66.811 CLASS 1 OBESITY DUE TO EXCESS CALORIES WITHOUT SERIOUS COMORBIDITY WITH BODY MASS INDEX (BMI) OF 34.0 TO 34.9 IN ADULT: Status: ACTIVE | Noted: 2021-03-22

## 2021-03-22 PROCEDURE — 99214 OFFICE O/P EST MOD 30 MIN: CPT | Performed by: INTERNAL MEDICINE

## 2021-03-22 RX ORDER — SITAGLIPTIN AND METFORMIN HYDROCHLORIDE 100; 1000 MG/1; MG/1
TABLET, FILM COATED, EXTENDED RELEASE ORAL
COMMUNITY
Start: 2021-02-15 | End: 2021-08-12

## 2021-03-22 NOTE — PROGRESS NOTES
Assessment/Plan        Problem List Items Addressed This Visit        Respiratory    Obstructive sleep apnea       Severe obstructive sleep apnea with good compliance to CPAP therapy  His last CPAP machine was issued October 26, 2015 so he will not be eligible for a new machine till October 2022  He has a auto dream station machine through [facility]    He does use a nasal cushion type mask  I reviewed compliance data from past 30 days and he used to CPAP for an average of 7 hours per night  This resulted in AHI of 0 8 which is satisfactory  His present CPAP setting is 10 cm water  Continue CPAP at 10 cm water  Cardiovascular and Mediastinum    Atrial fibrillation ([address] )      He does have atrial fibrillation and is on Eliquis 5 mg twice a day is also on low-dose amiodarone 200 mg daily  Other    Dyspnea on exertion - Primary       Does have some mild exertional dyspnea  Previous chest x-ray done earlier this year showed no evidence of amiodarone pneumonitis  He quit smoking 1975 and smoked 1 pack per day for 20 years  I did order complete PFT  Relevant Orders    Complete PFT with post bronchodilator    Class 1 obesity due to excess calories without serious comorbidity with body mass index (BMI) of 34 0 to 34 9 in adult      I did encourage him to lose weight                 CC:    Here for follow-up of obstructive sleep apnea  Does have occasional mild exertional shortness of breath      HPI      Marley Robbins presents for follow-up of his severe obstructive sleep apnea  He has been compliant using CPAP which is set at 10 centimeters water  Not have any excessive daytime somnolence  He does get some mild shortness of breath sometimes  He does have a [place] spaniel dog that he walks about a mile twice a day and does okay on his walks  Occasion have some exertional dyspnea  No wheezing or cough    He did have a CT scan of his chest done at [facility] on 20  I reviewed  The report and that was stable ascending aortic ectasia measuring up to 4 3 cm  No lymphadenopathy and no   Lung infiltrates  There is some mild emphysematous changes  There is also stable 3 mm nodule in the left lower lobe     Not having any palpitations at present time  Does have history of atrial fibrillation and presently is on amiodarone 200 mg daily  He has history of paroxysmal atrial fibrillation  He had been on different antiarrhythmics in the past and failed  He had been on flecainide and take a sent  He was placed on Multaq also but got rash from this and had been discontinued  He is tolerating amiodarone which is a 200 mg dose  He does follow with cardiologist Cortney Gomez  From [facility] so she eats  He has had previous EPS and ablation procedure in the past       Martin Rubinstein does have diabetes mellitus type 2, hypertension and severe ROBER  Initial diagnostic study done and  showed overall AHI of 83 9 with oxygen zoraida of 82%  He did have chest x-ray done on  of this year when he was having some mild shortness of breath  This was normal   No sign of any amiodarone pneumonitis  Patient has had some anxiety as his sister  of COVID late last year  She was overweight and had other health issues  Blood work from  shows hemoglobin to be 12 9 and creatinine to be 1  1      he also has history of spontaneous pneumothorax in   He quit smoking in  and smoked 1 pack per day for 20 years  Does have history of colectomy due to diverticular disease  Echocardiogram from 8776 showed LV systolic function to be normal   He also had normal right ventricular size and function  There is evidence of mild diastolic cardiac dysfunction          Past Medical History:   Diagnosis Date    Allergic rhinitis     Anemia 10/23/2008    last assessed 13     Anxiety     [address] fibrillation [address] [address]     Bleeding     BPH (benign prostatic hypertrophy)     Diabetes mellitus ([facility] [address] [facility]     Diarrhea     Diverticulitis     Eating disorder     GERD (gastroesophageal reflux disease)     Herpes zoster with complication     last assessed 7/3/17     Hiatal hernia     Hyperlipidemia     Hypertension     IBS (irritable bowel syndrome)     Incisional hernia     Increased urinary frequency     Irregular heart beat     Pyogenic granuloma 09/13/2006    last assessed 9/13/06    Carlos (subconjunctival hemorrhage), unspecified laterality 09/12/2005    last assessed 9/12/05    Sleep apnea     TMJ (dislocation of temporomandibular joint)     Ventral hernia     last assessed  4/6/17        Past Surgical History:   Procedure Laterality Date    APPENDECTOMY      ATRIAL ABLATION SURGERY      2014    ATRIAL ABLATION SURGERY      catheter ablation atrial fibrillation / last assessed 2/17/16     CHOLECYSTECTOMY      lap    COLON SURGERY      Hartmans procedure    COLON SURGERY      reversal 6 weeks later    COLONOSCOPY N/A 1/26/2017    Procedure: COLONOSCOPY;  Surgeon: Geermias Jordan MD;  Location: [facility] GI LAB; Service:     ESOPHAGOGASTRODUODENOSCOPY N/A 1/26/2017    Procedure: ESOPHAGOGASTRODUODENOSCOPY (EGD); Surgeon: Geremias Jordan MD;  Location: [facility] GI LAB;   Service:     EYE SURGERY Bilateral     lid repair    HERNIA REPAIR Bilateral     inguinal 2014 & 2013 [id number] [facility]    INCISIONAL HERNIA REPAIR N/A 3/24/2017    Procedure: REPAIR OF INCARCERATED INCISIONAL HERNIA WITH MESH, Lysis of Adhesions, Partial Omentectomy;  Surgeon: Ellie Arauz MD;  Location: [address] [place] [address];  Service:    [facility] NEUROBLASTOMA EXCISION      SKIN CANCER EXCISION      On nose    TONSILLECTOMY           Current Outpatient Medications:     amiodarone 200 mg tablet, 1 pill daily, Disp: , Rfl:     amLODIPine (NORVASC) 5 mg tablet, Take 5 mg by mouth daily, Disp: , Rfl:     apixaban (ELIQUIS) 5 mg, Take 5 mg by mouth 2 (two) times a day, Disp: , Rfl:     atorvastatin (LIPITOR) 20 mg tablet, TAKE 1 TABLET DAILY, Disp: 90 tablet, Rfl: 3    Blood Glucose Monitoring Suppl (FREESTYLE LITE) GEOVANNY, , Disp: , Rfl:     carvedilol (COREG) 25 mg tablet, Take 25 mg by mouth 2 (two) times a day, Disp: , Rfl:     esomeprazole (NexIUM) 20 mg capsule, Take 1 capsule (20 mg total) by mouth daily at bedtime, Disp: 90 capsule, Rfl: 3    FREESTYLE LITE test strip, , Disp: , Rfl:     furosemide (LASIX) 20 mg tablet, Take 20 mg by mouth daily, Disp: , Rfl:     Janumet -1000 MG TB24, , Disp: , Rfl:     Lancets (FREESTYLE) lancets, , Disp: , Rfl:     losartan (COZAAR) 100 MG tablet, , Disp: , Rfl:     multivitamin (THERAGRAN) TABS, Take 1 tablet by mouth every morning, Disp: , Rfl:     pioglitazone (ACTOS) 15 mg tablet, , Disp: , Rfl:     polyethylene glycol-propylene glycol (SYSTANE) 0 4-0 3 %, every 3 (three) hours as needed, Disp: , Rfl:     RESTASIS 0 05 % ophthalmic emulsion, Administer 1 drop to both eyes every 12 (twelve) hours , Disp: , Rfl:     sitaGLIPtin (JANUVIA) 100 mg tablet, Take 100 mg by mouth daily, Disp: , Rfl:     tamsulosin (FLOMAX) 0 4 mg, Take 0 4 mg by mouth every other day , Disp: , Rfl:     traMADol (ULTRAM) 50 mg tablet, 1-2 po q 6 hours prn pain (Patient not taking: Reported on 3/22/2021), Disp: 12 tablet, Rfl: 0    Allergies   Allergen Reactions    Codeine Other (See Comments) and GI Intolerance     Reaction Date: 2004;  Annotation - [id number]: '' CRAZY ''  vomiting  headache    Demerol [Meperidine] Nausea Only, Other (See Comments), GI Intolerance and Vomiting     vomiting  Reaction Date: 2004;   headache    Morphine Nausea Only, GI Intolerance and Vomiting     Reaction Date: 2012;        Social History     Tobacco Use    Smoking status: Former Smoker     Packs/day:      Years:      Pack years:      Types: Cigarettes     Quit date:      Years since quittin 2    Smokeless tobacco: Never Used   Substance Use Topics    Alcohol use: No         Family History   Problem Relation Age of Onset    Heart disease Mother     Cancer Mother     Colon cancer Mother    [de-identified] Dementia Mother     Early death Father     Seizures Father    [de-identified] Cancer Sister         bone    Heart disease Sister     Diabetes Sister     Lupus Sister     Heart disease Brother     Cancer Brother         kidney    Diabetes Brother     Stroke Neg Hx        Review of Systems   Constitutional: Negative for chills, fever and unexpected weight change  HENT: Negative for congestion, rhinorrhea and sore throat  Eyes: Negative for discharge and redness  Respiratory:        Has occasional mild exertional shortness of breath   Cardiovascular: Negative for chest pain, palpitations and leg swelling  Gastrointestinal: Negative for abdominal distention, abdominal pain and nausea  Endocrine: Negative for polydipsia and polyphagia  Genitourinary: Negative for dysuria  Musculoskeletal: Negative for joint swelling and myalgias  Skin: Negative for rash  Neurological: Negative for light-headedness  Psychiatric/Behavioral: Negative for confusion and decreased concentration  Vitals:    03/22/21 0925   BP: 122/82   Pulse: 62   Resp: 12   Temp: 98 °F (36 7 °C)   SpO2: 97%           Physical Exam  Vitals signs reviewed  Constitutional:       General: He is not in acute distress  Appearance: He is well-developed  HENT:      Head: Normocephalic  Nose: Nose normal       Mouth/Throat:      Mouth: Mucous membranes are moist       Pharynx: Oropharynx is clear  No oropharyngeal exudate  Comments: Mallampati score is 3  Eyes:      Conjunctiva/sclera: Conjunctivae normal       Pupils: Pupils are equal, round, and reactive to light  Neck:      Musculoskeletal: Neck supple  No neck rigidity  Cardiovascular:      Rate and Rhythm: Normal rate and regular rhythm        Heart sounds: Normal heart sounds  Pulmonary:      Effort: Pulmonary effort is normal    Abdominal:      General: There is no distension  Palpations: Abdomen is soft  Tenderness: There is no abdominal tenderness  Musculoskeletal:      Comments: No edema, cyanosis or clubbing   Lymphadenopathy:      Cervical: No cervical adenopathy  Skin:     General: Skin is warm and dry  Neurological:      Mental Status: He is alert and oriented to person, place, and time  Psychiatric:         Mood and Affect: Mood normal          Behavior: Behavior normal          Thought Content:  Thought content normal

## 2021-03-23 NOTE — ASSESSMENT & PLAN NOTE
He does have atrial fibrillation and is on Eliquis 5 mg twice a day is also on low-dose amiodarone 200 mg daily

## 2021-03-23 NOTE — ASSESSMENT & PLAN NOTE
Severe obstructive sleep apnea with good compliance to CPAP therapy  His last CPAP machine was issued October 26, 2015 so he will not be eligible for a new machine till October 2022  He has a auto dream station machine through [facility]    He does use a nasal cushion type mask  I reviewed compliance data from past 30 days and he used to CPAP for an average of 7 hours per night  This resulted in AHI of 0 8 which is satisfactory  His present CPAP setting is 10 cm water  Continue CPAP at 10 cm water

## 2021-03-23 NOTE — ASSESSMENT & PLAN NOTE
Does have some mild exertional dyspnea  Previous chest x-ray done earlier this year showed no evidence of amiodarone pneumonitis  He quit smoking 1975 and smoked 1 pack per day for 20 years  I did order complete PFT

## 2021-04-20 ENCOUNTER — HOSPITAL ENCOUNTER (OUTPATIENT)
Dept: PULMONOLOGY | Facility: HOSPITAL | Age: 76
Discharge: HOME | End: 2021-04-20
Attending: INTERNAL MEDICINE
Payer: MEDICARE

## 2021-04-20 DIAGNOSIS — R06.00 DYSPNEA ON EXERTION: ICD-10-CM

## 2021-04-20 PROCEDURE — 94726 PLETHYSMOGRAPHY LUNG VOLUMES: CPT | Performed by: INTERNAL MEDICINE

## 2021-04-20 PROCEDURE — 94060 EVALUATION OF WHEEZING: CPT | Performed by: INTERNAL MEDICINE

## 2021-04-20 PROCEDURE — 94760 N-INVAS EAR/PLS OXIMETRY 1: CPT

## 2021-04-20 PROCEDURE — 94060 EVALUATION OF WHEEZING: CPT

## 2021-04-20 PROCEDURE — 94726 PLETHYSMOGRAPHY LUNG VOLUMES: CPT

## 2021-04-20 PROCEDURE — 94729 DIFFUSING CAPACITY: CPT

## 2021-04-20 PROCEDURE — 94729 DIFFUSING CAPACITY: CPT | Performed by: INTERNAL MEDICINE

## 2021-04-20 RX ORDER — ALBUTEROL SULFATE 2.5 MG/3ML
2.5 SOLUTION RESPIRATORY (INHALATION) ONCE
Status: COMPLETED | OUTPATIENT
Start: 2021-04-20 | End: 2021-04-20

## 2021-04-20 RX ADMIN — ALBUTEROL SULFATE 2.5 MG: 2.5 SOLUTION RESPIRATORY (INHALATION) at 16:48

## 2021-04-25 DIAGNOSIS — K21.9 GASTROESOPHAGEAL REFLUX DISEASE: ICD-10-CM

## 2021-05-17 ENCOUNTER — TRANSCRIBE ORDERS (OUTPATIENT)
Dept: LAB | Facility: CLINIC | Age: 76
End: 2021-05-17

## 2021-05-17 ENCOUNTER — APPOINTMENT (OUTPATIENT)
Dept: LAB | Facility: CLINIC | Age: 76
End: 2021-05-17
Payer: MEDICARE

## 2021-05-17 DIAGNOSIS — E11.65 TYPE II DIABETES MELLITUS WITH HYPEROSMOLARITY, UNCONTROLLED (HCC): Primary | ICD-10-CM

## 2021-05-17 DIAGNOSIS — E11.00 TYPE II DIABETES MELLITUS WITH HYPEROSMOLARITY, UNCONTROLLED (HCC): ICD-10-CM

## 2021-05-17 DIAGNOSIS — E11.00 TYPE II DIABETES MELLITUS WITH HYPEROSMOLARITY, UNCONTROLLED (HCC): Primary | ICD-10-CM

## 2021-05-17 DIAGNOSIS — E11.65 TYPE II DIABETES MELLITUS WITH HYPEROSMOLARITY, UNCONTROLLED (HCC): ICD-10-CM

## 2021-05-17 LAB
ANION GAP SERPL CALCULATED.3IONS-SCNC: 6 MMOL/L (ref 4–13)
BUN SERPL-MCNC: 21 MG/DL (ref 5–25)
CALCIUM SERPL-MCNC: 8.8 MG/DL (ref 8.3–10.1)
CHLORIDE SERPL-SCNC: 110 MMOL/L (ref 100–108)
CO2 SERPL-SCNC: 26 MMOL/L (ref 21–32)
CREAT SERPL-MCNC: 0.95 MG/DL (ref 0.6–1.3)
CREAT UR-MCNC: 162 MG/DL
EST. AVERAGE GLUCOSE BLD GHB EST-MCNC: 137 MG/DL
GFR SERPL CREATININE-BSD FRML MDRD: 77 ML/MIN/1.73SQ M
GLUCOSE P FAST SERPL-MCNC: 108 MG/DL (ref 65–99)
HBA1C MFR BLD: 6.4 %
MICROALBUMIN UR-MCNC: 10.5 MG/L (ref 0–20)
MICROALBUMIN/CREAT 24H UR: 6 MG/G CREATININE (ref 0–30)
POTASSIUM SERPL-SCNC: 4 MMOL/L (ref 3.5–5.3)
SODIUM SERPL-SCNC: 142 MMOL/L (ref 136–145)
T4 FREE SERPL-MCNC: 1.22 NG/DL (ref 0.76–1.46)
TSH SERPL DL<=0.05 MIU/L-ACNC: 3.19 UIU/ML (ref 0.36–3.74)

## 2021-05-17 PROCEDURE — 83036 HEMOGLOBIN GLYCOSYLATED A1C: CPT

## 2021-05-17 PROCEDURE — 82570 ASSAY OF URINE CREATININE: CPT

## 2021-05-17 PROCEDURE — 82043 UR ALBUMIN QUANTITATIVE: CPT

## 2021-05-17 PROCEDURE — 80048 BASIC METABOLIC PNL TOTAL CA: CPT

## 2021-05-17 PROCEDURE — 84439 ASSAY OF FREE THYROXINE: CPT

## 2021-05-17 PROCEDURE — 84443 ASSAY THYROID STIM HORMONE: CPT

## 2021-05-17 PROCEDURE — 36415 COLL VENOUS BLD VENIPUNCTURE: CPT

## 2021-07-27 ENCOUNTER — APPOINTMENT (EMERGENCY)
Dept: RADIOLOGY | Facility: HOSPITAL | Age: 76
End: 2021-07-27
Payer: MEDICARE

## 2021-07-27 ENCOUNTER — HOSPITAL ENCOUNTER (EMERGENCY)
Facility: HOSPITAL | Age: 76
Discharge: HOME | End: 2021-07-27
Attending: EMERGENCY MEDICINE | Admitting: EMERGENCY MEDICINE
Payer: MEDICARE

## 2021-07-27 ENCOUNTER — APPOINTMENT (OUTPATIENT)
Dept: LAB | Facility: CLINIC | Age: 76
End: 2021-07-27
Payer: MEDICARE

## 2021-07-27 VITALS
OXYGEN SATURATION: 99 % | HEART RATE: 64 BPM | DIASTOLIC BLOOD PRESSURE: 74 MMHG | WEIGHT: 234 LBS | RESPIRATION RATE: 23 BRPM | TEMPERATURE: 98.1 F | BODY MASS INDEX: 33.58 KG/M2 | SYSTOLIC BLOOD PRESSURE: 158 MMHG
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
## 2021-07-27 DIAGNOSIS — R97.20 ELEVATED PSA: Primary | ICD-10-CM

## 2021-07-27 DIAGNOSIS — N39.0 UTI (URINARY TRACT INFECTION): ICD-10-CM

## 2021-07-27 LAB
ALBUMIN SERPL BCP-MCNC: 3.6 G/DL (ref 3.5–5)
ALP SERPL-CCNC: 66 U/L (ref 46–116)
ALT SERPL W P-5'-P-CCNC: 61 U/L (ref 12–78)
ANION GAP SERPL CALCULATED.3IONS-SCNC: 8 MMOL/L (ref 4–13)
APTT PPP: 33 SECONDS (ref 23–37)
AST SERPL W P-5'-P-CCNC: 38 U/L (ref 5–45)
BACTERIA UR QL AUTO: ABNORMAL /HPF
BASOPHILS # BLD AUTO: 0.06 THOUSANDS/ΜL (ref 0–0.1)
BASOPHILS NFR BLD AUTO: 1 % (ref 0–1)
BILIRUB SERPL-MCNC: 0.65 MG/DL (ref 0.2–1)
BILIRUB UR QL STRIP: NEGATIVE
BUN SERPL-MCNC: 18 MG/DL (ref 5–25)
CALCIUM SERPL-MCNC: 8.4 MG/DL (ref 8.3–10.1)
CHLORIDE SERPL-SCNC: 103 MMOL/L (ref 100–108)
CLARITY UR: ABNORMAL
CO2 SERPL-SCNC: 29 MMOL/L (ref 21–32)
COLOR UR: ABNORMAL
CREAT SERPL-MCNC: 1.03 MG/DL (ref 0.6–1.3)
EOSINOPHIL # BLD AUTO: 0.24 THOUSAND/ΜL (ref 0–0.61)
EOSINOPHIL NFR BLD AUTO: 2 % (ref 0–6)
ERYTHROCYTE [DISTWIDTH] IN BLOOD BY AUTOMATED COUNT: 14.6 % (ref 11.6–15.1)
GFR SERPL CREATININE-BSD FRML MDRD: 70 ML/MIN/1.73SQ M
GLUCOSE SERPL-MCNC: 163 MG/DL (ref 65–140)
GLUCOSE UR STRIP-MCNC: NEGATIVE MG/DL
HCT VFR BLD AUTO: 37 % (ref 36.5–49.3)
HGB BLD-MCNC: 12 G/DL (ref 12–17)
HGB UR QL STRIP.AUTO: NEGATIVE
IMM GRANULOCYTES # BLD AUTO: 0.03 THOUSAND/UL (ref 0–0.2)
IMM GRANULOCYTES NFR BLD AUTO: 0 % (ref 0–2)
INR PPP: 1.26 (ref 0.84–1.19)
KETONES UR STRIP-MCNC: NEGATIVE MG/DL
LEUKOCYTE ESTERASE UR QL STRIP: ABNORMAL
LIPASE SERPL-CCNC: 84 U/L (ref 73–393)
LYMPHOCYTES # BLD AUTO: 3.39 THOUSANDS/ΜL (ref 0.6–4.47)
LYMPHOCYTES NFR BLD AUTO: 33 % (ref 14–44)
MCH RBC QN AUTO: 29.1 PG (ref 26.8–34.3)
MCHC RBC AUTO-ENTMCNC: 32.4 G/DL (ref 31.4–37.4)
MCV RBC AUTO: 90 FL (ref 82–98)
MONOCYTES # BLD AUTO: 0.74 THOUSAND/ΜL (ref 0.17–1.22)
MONOCYTES NFR BLD AUTO: 7 % (ref 4–12)
NEUTROPHILS # BLD AUTO: 5.75 THOUSANDS/ΜL (ref 1.85–7.62)
NEUTS SEG NFR BLD AUTO: 57 % (ref 43–75)
NITRITE UR QL STRIP: NEGATIVE
NON-SQ EPI CELLS URNS QL MICRO: ABNORMAL /HPF
NRBC BLD AUTO-RTO: 0 /100 WBCS
PH UR STRIP.AUTO: 6 [PH]
PLATELET # BLD AUTO: 282 THOUSANDS/UL (ref 149–390)
PMV BLD AUTO: 11.7 FL (ref 8.9–12.7)
POTASSIUM SERPL-SCNC: 4.3 MMOL/L (ref 3.5–5.3)
PROT SERPL-MCNC: 7.6 G/DL (ref 6.4–8.2)
PROT UR STRIP-MCNC: NEGATIVE MG/DL
PROTHROMBIN TIME: 15.7 SECONDS (ref 11.6–14.5)
PSA SERPL-MCNC: 4.2 NG/ML (ref 0–4)
RBC # BLD AUTO: 4.13 MILLION/UL (ref 3.88–5.62)
RBC #/AREA URNS AUTO: ABNORMAL /HPF
SODIUM SERPL-SCNC: 140 MMOL/L (ref 136–145)
SP GR UR STRIP.AUTO: 1.01 (ref 1–1.03)
TROPONIN I SERPL-MCNC: <0.02 NG/ML
UROBILINOGEN UR QL STRIP.AUTO: 0.2 E.U./DL
WBC # BLD AUTO: 10.21 THOUSAND/UL (ref 4.31–10.16)
WBC #/AREA URNS AUTO: ABNORMAL /HPF

## 2021-07-27 PROCEDURE — 83690 ASSAY OF LIPASE: CPT | Performed by: EMERGENCY MEDICINE

## 2021-07-27 PROCEDURE — G1004 CDSM NDSC: HCPCS

## 2021-07-27 PROCEDURE — 81001 URINALYSIS AUTO W/SCOPE: CPT | Performed by: EMERGENCY MEDICINE

## 2021-07-27 PROCEDURE — 84153 ASSAY OF PSA TOTAL: CPT

## 2021-07-27 PROCEDURE — 93005 ELECTROCARDIOGRAM TRACING: CPT

## 2021-07-27 PROCEDURE — 36415 COLL VENOUS BLD VENIPUNCTURE: CPT | Performed by: EMERGENCY MEDICINE

## 2021-07-27 PROCEDURE — 85730 THROMBOPLASTIN TIME PARTIAL: CPT | Performed by: EMERGENCY MEDICINE

## 2021-07-27 PROCEDURE — 70450 CT HEAD/BRAIN W/O DYE: CPT

## 2021-07-27 PROCEDURE — 96360 HYDRATION IV INFUSION INIT: CPT

## 2021-07-27 PROCEDURE — 85025 COMPLETE CBC W/AUTO DIFF WBC: CPT | Performed by: EMERGENCY MEDICINE

## 2021-07-27 PROCEDURE — 85610 PROTHROMBIN TIME: CPT | Performed by: EMERGENCY MEDICINE

## 2021-07-27 PROCEDURE — 84484 ASSAY OF TROPONIN QUANT: CPT | Performed by: EMERGENCY MEDICINE

## 2021-07-27 PROCEDURE — 99284 EMERGENCY DEPT VISIT MOD MDM: CPT

## 2021-07-27 PROCEDURE — 99284 EMERGENCY DEPT VISIT MOD MDM: CPT | Performed by: EMERGENCY MEDICINE

## 2021-07-27 PROCEDURE — 80053 COMPREHEN METABOLIC PANEL: CPT | Performed by: EMERGENCY MEDICINE

## 2021-07-27 RX ORDER — CEPHALEXIN 500 MG/1
500 CAPSULE ORAL ONCE
Status: COMPLETED | OUTPATIENT
Start: 2021-07-27 | End: 2021-07-27

## 2021-07-27 RX ORDER — CEPHALEXIN 500 MG/1
500 CAPSULE ORAL EVERY 6 HOURS SCHEDULED
Qty: 28 CAPSULE | Refills: 0 | Status: SHIPPED | OUTPATIENT
Start: 2021-07-27 | End: 2021-08-03

## 2021-07-27 RX ADMIN — SODIUM CHLORIDE 500 ML: 0.9 INJECTION, SOLUTION INTRAVENOUS at 20:15

## 2021-07-27 RX ADMIN — CEPHALEXIN 500 MG: 500 CAPSULE ORAL at 22:24

## 2021-07-28 ENCOUNTER — CONSULT (OUTPATIENT)
Dept: SURGERY | Facility: CLINIC | Age: 76
End: 2021-07-28
Payer: MEDICARE

## 2021-07-28 VITALS
HEIGHT: 70 IN | WEIGHT: 247 LBS | SYSTOLIC BLOOD PRESSURE: 144 MMHG | HEART RATE: 58 BPM | BODY MASS INDEX: 35.36 KG/M2 | DIASTOLIC BLOOD PRESSURE: 82 MMHG | TEMPERATURE: 97.6 F

## 2021-07-28 DIAGNOSIS — E11.22 DIABETES MELLITUS WITH CHRONIC KIDNEY DISEASE (HCC): ICD-10-CM

## 2021-07-28 DIAGNOSIS — I48.91 ATRIAL FIBRILLATION (HCC): ICD-10-CM

## 2021-07-28 DIAGNOSIS — N40.0 BENIGN PROSTATIC HYPERPLASIA WITHOUT URINARY OBSTRUCTION: ICD-10-CM

## 2021-07-28 DIAGNOSIS — N18.2 STAGE 2 CHRONIC KIDNEY DISEASE: ICD-10-CM

## 2021-07-28 DIAGNOSIS — E66.09 CLASS 1 OBESITY DUE TO EXCESS CALORIES WITHOUT SERIOUS COMORBIDITY WITH BODY MASS INDEX (BMI) OF 34.0 TO 34.9 IN ADULT: ICD-10-CM

## 2021-07-28 DIAGNOSIS — R10.32 LEFT GROIN PAIN: Primary | ICD-10-CM

## 2021-07-28 DIAGNOSIS — G47.33 OBSTRUCTIVE SLEEP APNEA: ICD-10-CM

## 2021-07-28 DIAGNOSIS — K57.30 COLON, DIVERTICULOSIS: ICD-10-CM

## 2021-07-28 LAB
ATRIAL RATE: 64 BPM
P AXIS: 53 DEGREES
PR INTERVAL: 182 MS
QRS AXIS: -63 DEGREES
QRSD INTERVAL: 158 MS
QT INTERVAL: 492 MS
QTC INTERVAL: 507 MS
T WAVE AXIS: -9 DEGREES
VENTRICULAR RATE: 64 BPM

## 2021-07-28 PROCEDURE — 93010 ELECTROCARDIOGRAM REPORT: CPT | Performed by: INTERNAL MEDICINE

## 2021-07-28 PROCEDURE — 99204 OFFICE O/P NEW MOD 45 MIN: CPT | Performed by: SPECIALIST

## 2021-07-28 RX ORDER — SEMAGLUTIDE 1.34 MG/ML
INJECTION, SOLUTION SUBCUTANEOUS
COMMUNITY
Start: 2021-07-20

## 2021-07-28 NOTE — ED PROVIDER NOTES
History  Chief Complaint   Patient presents with   Nahumnya Vogel if he tripped or fell  States possible LOC  Patient struck head, on Eliquis  14-year-old male states he was walking [place] behind [place] in [place] and tripped and fell to the ground  States when he did that he banged his side of his head  Has couple lumps on his head  No fevers chills questionable loss of consciousness  He is awake alert now does take Eliquis so stated that he want to come in to get checked  He has no other injuries noted anywhere in his body  He denies having syncope  History provided by:  Patient   used: No        Prior to Admission Medications   Prescriptions Last Dose Informant Patient Reported? Taking?    Blood Glucose Monitoring Suppl (FREESTYLE LITE) GEOVANNY   Yes No   FREESTYLE LITE test strip  Self Yes No   Janumet -1000 MG TB24   Yes No   Lancets (FREESTYLE) lancets  Self Yes No   RESTASIS 0 05 % ophthalmic emulsion  Self Yes No   Sig: Administer 1 drop to both eyes every 12 (twelve) hours    amLODIPine (NORVASC) 5 mg tablet   Yes No   Sig: Take 5 mg by mouth daily   amiodarone 200 mg tablet  Self Yes No   Si pill daily   apixaban (ELIQUIS) 5 mg  Self Yes No   Sig: Take 5 mg by mouth 2 (two) times a day   atorvastatin (LIPITOR) 20 mg tablet   No No   Sig: TAKE 1 TABLET DAILY   carvedilol (COREG) 25 mg tablet  Self Yes No   Sig: Take 25 mg by mouth 2 (two) times a day   esomeprazole (NexIUM) 20 mg capsule   No No   Sig: TAKE ONE CAPSULE BY MOUTH EVERY DAY AT BEDTIME   furosemide (LASIX) 20 mg tablet   Yes No   Sig: Take 20 mg by mouth daily   losartan (COZAAR) 100 MG tablet   Yes No   multivitamin (THERAGRAN) TABS  Self Yes No   Sig: Take 1 tablet by mouth every morning   pioglitazone (ACTOS) 15 mg tablet   Yes No   polyethylene glycol-propylene glycol (SYSTANE) 0 4-0 3 %  Self Yes No   Sig: every 3 (three) hours as needed   sitaGLIPtin (JANUVIA) 100 mg tablet   Yes No   Sig: Take 100 mg by mouth daily   tamsulosin (FLOMAX) 0 4 mg  Self Yes No   Sig: Take 0 4 mg by mouth every other day    traMADol (ULTRAM) 50 mg tablet   No No   Si-2 po q 6 hours prn pain   Patient not taking: Reported on 3/22/2021      Facility-Administered Medications: None       Past Medical History:   Diagnosis Date    Allergic rhinitis     Anemia 10/23/2008    last assessed 13     Anxiety     Atrial fibrillation (HCC)     Bleeding     BPH (benign prostatic hypertrophy)     Diabetes mellitus ([facility] [address] )     Diarrhea     Diverticulitis     Eating disorder     GERD (gastroesophageal reflux disease)     Herpes zoster with complication     last assessed 7/3/17     Hiatal hernia     Hyperlipidemia     Hypertension     IBS (irritable bowel syndrome)     Incisional hernia     Increased urinary frequency     Irregular heart beat     Pyogenic granuloma 2006    last assessed 06    Carlos (subconjunctival hemorrhage), unspecified laterality 2005    last assessed 05    Sleep apnea     TMJ (dislocation of temporomandibular joint)     Ventral hernia     last assessed  17        Past Surgical History:   Procedure Laterality Date    APPENDECTOMY      ATRIAL ABLATION SURGERY          ATRIAL ABLATION SURGERY      catheter ablation atrial fibrillation / last assessed 16     CHOLECYSTECTOMY      lap    COLON SURGERY      Hartmans procedure    COLON SURGERY      reversal 6 weeks later    COLONOSCOPY N/A 2017    Procedure: COLONOSCOPY;  Surgeon: Ismael Crouch MD;  Location: [facility]; Service:     ESOPHAGOGASTRODUODENOSCOPY N/A 2017    Procedure: ESOPHAGOGASTRODUODENOSCOPY (EGD); Surgeon: Ismael Crouch MD;  Location: [facility];   Service:     EYE SURGERY Bilateral     lid repair    HERNIA REPAIR Bilateral     inguinal  &    [facility]      INCISIONAL HERNIA REPAIR N/A 3/24/2017    Procedure: REPAIR OF INCARCERATED INCISIONAL HERNIA WITH MESH, Lysis of Adhesions, Partial Omentectomy;  Surgeon: Anh Pretty MD;  Location: [facility];  Service:    Fred Roles NEUROBLASTOMA EXCISION      SKIN CANCER EXCISION      On nose    TONSILLECTOMY         Family History   Problem Relation Age of Onset    Heart disease Mother     Cancer Mother     Colon cancer Mother     Dementia Mother     Early death Father     Seizures Father    Fred Roles Cancer Sister         bone    Heart disease Sister     Diabetes Sister     Lupus Sister     Heart disease Brother     Cancer Brother         kidney    Diabetes Brother     Stroke Neg Hx      I have reviewed and agree with the history as documented  E-Cigarette/Vaping    E-Cigarette Use Never User      E-Cigarette/Vaping Substances     Social History     Tobacco Use    Smoking status: Former Smoker     Packs/day: 1 00     Years: 20 00     Pack years: 20 00     Types: Cigarettes     Quit date:      Years since quittin 6    Smokeless tobacco: Never Used   Vaping Use    Vaping Use: Never used   Substance Use Topics    Alcohol use: No    Drug use: No       Review of Systems   Constitutional: Negative for activity change, chills, diaphoresis and fever  HENT: Negative for congestion, ear pain, nosebleeds, sore throat, trouble swallowing and voice change  Eyes: Negative for pain, discharge and redness  Respiratory: Negative for apnea, cough, choking, shortness of breath, wheezing and stridor  Cardiovascular: Negative for chest pain and palpitations  Gastrointestinal: Negative for abdominal distention, abdominal pain, constipation, diarrhea, nausea and vomiting  Endocrine: Negative for polydipsia  Genitourinary: Negative for difficulty urinating, dysuria, flank pain, frequency, hematuria and urgency  Musculoskeletal: Negative for back pain, gait problem, joint swelling, myalgias, neck pain and neck stiffness  Skin: Negative for pallor and rash     Neurological: Positive for headaches  Negative for dizziness, tremors, syncope, speech difficulty, weakness and numbness  Hematological: Negative for adenopathy  Psychiatric/Behavioral: Negative for confusion, hallucinations, self-injury and suicidal ideas  The patient is not nervous/anxious  Physical Exam  Physical Exam  Vitals and nursing note reviewed  Constitutional:       General: He is not in acute distress  Appearance: He is well-developed  He is not diaphoretic  HENT:      Head: Normocephalic and atraumatic  Right Ear: External ear normal       Left Ear: External ear normal       Nose: Nose normal    Eyes:      Conjunctiva/sclera: Conjunctivae normal       Pupils: Pupils are equal, round, and reactive to light  Cardiovascular:      Rate and Rhythm: Normal rate and regular rhythm  Heart sounds: Normal heart sounds  Pulmonary:      Effort: Pulmonary effort is normal       Breath sounds: Normal breath sounds  Abdominal:      General: Bowel sounds are normal       Palpations: Abdomen is soft  Musculoskeletal:         General: Normal range of motion  Cervical back: Normal range of motion and neck supple  Skin:     General: Skin is warm and dry  Neurological:      Mental Status: He is alert and oriented to person, place, and time  Deep Tendon Reflexes: Reflexes are normal and symmetric  Psychiatric:         Behavior: Behavior is cooperative           Vital Signs  ED Triage Vitals [07/27/21 1950]   Temperature Pulse Respirations Blood Pressure SpO2   98 1 °F (36 7 °C) 63 18 (!) 178/83 98 %      Temp Source Heart Rate Source Patient Position - Orthostatic VS BP Location FiO2 (%)   Tympanic Monitor Sitting Left arm --      Pain Score       5           Vitals:    07/27/21 1950 07/27/21 2045 07/27/21 2055 07/27/21 2100   BP: (!) 178/83  158/74 158/74   Pulse: 63 64 64 64   Patient Position - Orthostatic VS: Sitting  Lying          Visual Acuity  Visual Acuity      Most Recent Value   L Pupil Size (mm)  3   R Pupil Size (mm)  3          ED Medications  Medications   sodium chloride 0 9 % bolus 500 mL (0 mL Intravenous Stopped 7/27/21 2130)   cephalexin (KEFLEX) capsule 500 mg (500 mg Oral Given 7/27/21 2224)       Diagnostic Studies  Results Reviewed     Procedure Component Value Units Date/Time    Urine Microscopic [[id number]]  (Abnormal) Collected: 07/27/21 2048    Lab Status: Final result Specimen: Urine, Clean Catch Updated: 07/27/21 2125     RBC, UA None Seen /hpf      WBC, UA 30-50 /hpf      Epithelial Cells None Seen /hpf      Bacteria, UA Moderate /hpf     UA (URINE) with reflex to Scope [[id number]]  (Abnormal) Collected: 07/27/21 2048    Lab Status: Final result Specimen: Urine, Clean Catch Updated: 07/27/21 2109     Color, UA Light Yellow     Clarity, UA Slightly Cloudy     Specific Gravity, UA 1 015     pH, UA 6 0     Leukocytes, UA Moderate     Nitrite, UA Negative     Protein, UA Negative mg/dl      Glucose, UA Negative mg/dl      Ketones, UA Negative mg/dl      Urobilinogen, UA 0 2 E U /dl      Bilirubin, UA Negative     Blood, UA Negative    Troponin I [[id number]]  (Normal) Collected: 07/27/21 2015    Lab Status: Final result Specimen: Blood from Arm, Left Updated: 07/27/21 2100     Troponin I <0 02 ng/mL     Protime-INR [[id number]]  (Abnormal) Collected: 07/27/21 2015    Lab Status: Final result Specimen: Blood from Arm, Left Updated: 07/27/21 2041     Protime 15 7 seconds      INR 1 26    APTT [[id number]]  (Normal) Collected: 07/27/21 2015    Lab Status: Final result Specimen: Blood from Arm, Left Updated: 07/27/21 2041     PTT 33 seconds     Comprehensive metabolic panel [[id number]]  (Abnormal) Collected: 07/27/21 2015    Lab Status: Final result Specimen: Blood from Arm, Left Updated: 07/27/21 2038     Sodium 140 mmol/L      Potassium 4 3 mmol/L      Chloride 103 mmol/L      CO2 29 mmol/L      ANION GAP 8 mmol/L      BUN 18 mg/dL      Creatinine 1 03 mg/dL      Glucose 163 mg/dL Calcium 8 4 mg/dL      AST 38 U/L      ALT 61 U/L      Alkaline Phosphatase 66 U/L      Total Protein 7 6 g/dL      Albumin 3 6 g/dL      Total Bilirubin 0 65 mg/dL      eGFR 70 ml/min/1 73sq m     Narrative:      [place] guidelines for Chronic Kidney Disease (CKD):     Stage 1 with normal or high GFR (GFR > 90 mL/min/1 73 square meters)    Stage 2 Mild CKD (GFR = 60-89 mL/min/1 73 square meters)    Stage 3A Moderate CKD (GFR = 45-59 mL/min/1 73 square meters)    Stage 3B Moderate CKD (GFR = 30-44 mL/min/1 73 square meters)    Stage 4 Severe CKD (GFR = 15-29 mL/min/1 73 square meters)    Stage 5 End Stage CKD (GFR <15 mL/min/1 73 square meters)  Note: GFR calculation is accurate only with a steady state creatinine    Lipase [[id number]]  (Normal) Collected: 07/27/21 2015    Lab Status: Final result Specimen: Blood from Arm, Left Updated: 07/27/21 2038     Lipase 84 u/L     CBC and differential [[id number]]  (Abnormal) Collected: 07/27/21 2015    Lab Status: Final result Specimen: Blood from Arm, Left Updated: 07/27/21 2020     WBC 10 21 Thousand/uL      RBC 4 13 Million/uL      Hemoglobin 12 0 g/dL      Hematocrit 37 0 %      MCV 90 fL      MCH 29 1 pg      MCHC 32 4 g/dL      RDW 14 6 %      MPV 11 7 fL      Platelets 201 Thousands/uL      nRBC 0 /100 WBCs      Neutrophils Relative 57 %      Immat GRANS % 0 %      Lymphocytes Relative 33 %      Monocytes Relative 7 %      Eosinophils Relative 2 %      Basophils Relative 1 %      Neutrophils Absolute 5 75 Thousands/µL      Immature Grans Absolute 0 03 Thousand/uL      Lymphocytes Absolute 3 39 Thousands/µL      Monocytes Absolute 0 74 Thousand/µL      Eosinophils Absolute 0 24 Thousand/µL      Basophils Absolute 0 06 Thousands/µL                  CT head without contrast   Final Result by Radha Crawley DO (07/27 2042)      No acute intracranial abnormality  Stable microangiopathic changes within the brain  Workstation performed: [id number]                    Procedures  Procedures         ED Course                             SBIRT 22yo+      Most Recent Value   SBIRT (25 yo +)   In order to provide better care to our patients, we are screening all of our patients for alcohol and drug use  Would it be okay to ask you these screening questions? No Filed at: 07/27/2021 2111                    MDM    Disposition  Final diagnoses:   Fall, initial encounter   UTI (urinary tract infection)     Time reflects when diagnosis was documented in both MDM as applicable and the Disposition within this note     Time User Action Codes Description Comment    7/27/2021  9:56 PM [place] Aver Add [N03  Laverna Nakayama Fall, initial encounter     7/27/2021  9:57 PM Oly Melissa E Add [N39 [id number]] UTI (urinary tract infection)       ED Disposition     ED Disposition Condition Date/Time Comment    Discharge Stable Tue Jul 27, 2021  9:56 PM Caffie Prom  discharge to home/self care              Follow-up Information     Follow up With Specialties Details Why Contact Info    Nara Perry MD Internal Medicine, Family Medicine Schedule an appointment as soon as possible for a visit  As needed [address] [place] [address]  [phone]            Discharge Medication List as of 7/27/2021  9:58 PM      START taking these medications    Details   cephalexin (KEFLEX) 500 mg capsule Take 1 capsule (500 mg total) by mouth every 6 (six) hours for 7 days, Starting Tue 7/27/2021, Until Tue 8/3/2021, Normal         CONTINUE these medications which have NOT CHANGED    Details   amiodarone 200 mg tablet 1 pill daily, Historical Med      amLODIPine (NORVASC) 5 mg tablet Take 5 mg by mouth daily, Starting Tue 9/8/2020, Until Wed 9/8/2021, Historical Med      apixaban (ELIQUIS) 5 mg Take 5 mg by mouth 2 (two) times a day, Historical Med      atorvastatin (LIPITOR) 20 mg tablet TAKE 1 TABLET DAILY, Normal      Blood Glucose Monitoring Suppl (FREESTYLE LITE) GEOVANNY Starting Wed 5/1/2019, Historical Med      carvedilol (COREG) 25 mg tablet Take 25 mg by mouth 2 (two) times a day, Historical Med      esomeprazole (NexIUM) 20 mg capsule TAKE ONE CAPSULE BY MOUTH EVERY DAY AT BEDTIME, Normal      FREESTYLE LITE test strip Historical Med      furosemide (LASIX) 20 mg tablet Take 20 mg by mouth daily, Starting Tue 1/26/2021, Until Wed 1/26/2022, Historical Med      Janumet -1000 MG TB24 Starting Mon 2/15/2021, Historical Med      Lancets (FREESTYLE) lancets Historical Med      losartan (COZAAR) 100 MG tablet Starting Thu 8/20/2020, Historical Med      multivitamin (THERAGRAN) TABS Take 1 tablet by mouth every morning, Historical Med      pioglitazone (ACTOS) 15 mg tablet Starting Sat 1/2/2021, Historical Med      polyethylene glycol-propylene glycol (SYSTANE) 0 4-0 3 % every 3 (three) hours as needed, Historical Med      RESTASIS 0 05 % ophthalmic emulsion Administer 1 drop to both eyes every 12 (twelve) hours , Starting Mon 8/6/2018, Historical Med      sitaGLIPtin (JANUVIA) 100 mg tablet Take 100 mg by mouth daily, Historical Med      tamsulosin (FLOMAX) 0 4 mg Take 0 4 mg by mouth every other day , Historical Med      traMADol (ULTRAM) 50 mg tablet 1-2 po q 6 hours prn pain, Print           No discharge procedures on file      PDMP Review     None          ED Provider  Electronically Signed by           Andi Cuba DO  07/28/21 5672

## 2021-07-28 NOTE — PROGRESS NOTES
History and Physical Examination - General Surgery   [facility]  Kris Silva  68 y o  male MRN: [id number]  Unit/Bed#:  Encounter: [id number] PCP: Messi Johnson MD    History of Present Illness   Chief Complaint:   Left groin and testicular pain for over 1 year    HPI:  Kris Silva  is a 68 y o  male who presents to office with  History of a bilateral inguinal hernia repair 5 years ago had multiple ventral hernias and umbilical hernia repairs by Dr Eliazar Roldan MD in past     patient is the here for this the left groin and testicular plain which bothers him and he wants to check it out if there is a recurrence of hernia     patient is on medical anticoagulation with Eliquis for atrial fibrillation     yesterday patient fell and developed a small hematoma on the scalp was seen in the ER and since then patient does not have any headache nausea vomiting constipation fever chills or rigors    Historical Information   The following portions of the patient's history were reviewed and updated as appropriate  Past Medical History:   Diagnosis Date    Allergic rhinitis     Anemia 10/23/2008    last assessed 9/9/13     Anxiety     Atrial fibrillation ([address] )     Bleeding     BPH (benign prostatic hypertrophy)     Diabetes mellitus ([address] )     Diarrhea     Diverticulitis     Eating disorder     GERD (gastroesophageal reflux disease)     Herpes zoster with complication     last assessed 7/3/17     Hiatal hernia     Hyperlipidemia     Hypertension     IBS (irritable bowel syndrome)     Incisional hernia     Increased urinary frequency     Irregular heart beat     Pyogenic granuloma 09/13/2006    last assessed 9/13/06    Carlos (subconjunctival hemorrhage), unspecified laterality 09/12/2005    last assessed 9/12/05    Sleep apnea     TMJ (dislocation of temporomandibular joint)     Ventral hernia     last assessed  4/6/17      Past Surgical History:   Procedure Laterality Date    APPENDECTOMY      ATRIAL ABLATION SURGERY      2014    ATRIAL ABLATION SURGERY      catheter ablation atrial fibrillation / last assessed 16     CHOLECYSTECTOMY      lap    COLON SURGERY      Hartmans procedure    COLON SURGERY      reversal 6 weeks later    COLONOSCOPY N/A 2017    Procedure: COLONOSCOPY;  Surgeon: Orrie Mcardle, MD;  Location: [facility]; Service:     ESOPHAGOGASTRODUODENOSCOPY N/A 2017    Procedure: ESOPHAGOGASTRODUODENOSCOPY (EGD); Surgeon: Orrie Mcardle, MD;  Location: [facility]; Service:     EYE SURGERY Bilateral     lid repair    HERNIA REPAIR Bilateral     inguinal  &    [address]      INCISIONAL HERNIA REPAIR N/A 3/24/2017    Procedure: REPAIR OF INCARCERATED INCISIONAL HERNIA WITH MESH, Lysis of Adhesions, Partial Omentectomy;  Surgeon: Aylin Galloway MD;  Location: [facility];  Service:    Neetu Tom NEUROBLASTOMA EXCISION      SKIN CANCER EXCISION      On nose    TONSILLECTOMY       Social History   Social History     Substance and Sexual Activity   Alcohol Use No     Social History     Substance and Sexual Activity   Drug Use No     Social History     Tobacco Use   Smoking Status Former Smoker    Packs/day: 1 00    Years: 20 00    Pack years: 20 00    Types: Cigarettes    Quit date: 65    Years since quittin 6   Smokeless Tobacco Never Used     Family History:   Family History   Problem Relation Age of Onset    Heart disease Mother     Cancer Mother     Colon cancer Mother    Areta Emmer Dementia Mother     Early death Father     Seizures Father    Areta Emmer Cancer Sister         bone    Heart disease Sister     Diabetes Sister     Lupus Sister     Heart disease Brother     Cancer Brother         kidney    Diabetes Brother     Stroke Neg Hx        Meds/Allergies   Allergies   Allergen Reactions    Codeine Other (See Comments) and GI Intolerance     Reaction Date: 2004;  Annotation - [id number]: '' CRAZY ''  vomiting  headache  Demerol [Meperidine] Nausea Only, Other (See Comments), GI Intolerance and Vomiting     vomiting  Reaction Date: 27Dec2004;   headache    Morphine Nausea Only, GI Intolerance and Vomiting     Reaction Date: 23Feb2012;        Current Outpatient Medications:     amiodarone 200 mg tablet, 1 pill daily, Disp: , Rfl:     amLODIPine (NORVASC) 5 mg tablet, Take 5 mg by mouth daily, Disp: , Rfl:     apixaban (ELIQUIS) 5 mg, Take 5 mg by mouth 2 (two) times a day, Disp: , Rfl:     atorvastatin (LIPITOR) 20 mg tablet, TAKE 1 TABLET DAILY, Disp: 90 tablet, Rfl: 3    Blood Glucose Monitoring Suppl (FREESTYLE LITE) GEOVANNY, , Disp: , Rfl:     carvedilol (COREG) 25 mg tablet, Take 25 mg by mouth 2 (two) times a day, Disp: , Rfl:     cephalexin (KEFLEX) 500 mg capsule, Take 1 capsule (500 mg total) by mouth every 6 (six) hours for 7 days, Disp: 28 capsule, Rfl: 0    esomeprazole (NexIUM) 20 mg capsule, TAKE ONE CAPSULE BY MOUTH EVERY DAY AT BEDTIME, Disp: 90 capsule, Rfl: 3    FREESTYLE LITE test strip, , Disp: , Rfl:     furosemide (LASIX) 20 mg tablet, Take 20 mg by mouth daily, Disp: , Rfl:     Lancets (FREESTYLE) lancets, , Disp: , Rfl:     losartan (COZAAR) 100 MG tablet, , Disp: , Rfl:     multivitamin (THERAGRAN) TABS, Take 1 tablet by mouth every morning, Disp: , Rfl:     Ozempic, 0 25 or 0 5 MG/DOSE, 2 MG/1 5ML SOPN, , Disp: , Rfl:     pioglitazone (ACTOS) 15 mg tablet, , Disp: , Rfl:     polyethylene glycol-propylene glycol (SYSTANE) 0 4-0 3 %, every 3 (three) hours as needed, Disp: , Rfl:     RESTASIS 0 05 % ophthalmic emulsion, Administer 1 drop to both eyes every 12 (twelve) hours , Disp: , Rfl:     tamsulosin (FLOMAX) 0 4 mg, Take 0 4 mg by mouth every other day , Disp: , Rfl:     Janumet -1000 MG TB24, , Disp: , Rfl:     sitaGLIPtin (JANUVIA) 100 mg tablet, Take 100 mg by mouth daily (Patient not taking: Reported on 7/28/2021), Disp: , Rfl:     traMADol (ULTRAM) 50 mg tablet, 1-2 po q 6 hours prn pain (Patient not taking: Reported on 3/22/2021), Disp: 12 tablet, Rfl: 0  No current facility-administered medications for this visit  REVIEW OF SYSTEMS  Constitutional:  Denies fever or chills   Eyes:  Denies change in visual acuity   HENT:  Denies nasal congestion or sore throat   Respiratory:  Denies cough or shortness of breath   Cardiovascular:  Denies chest pain or edema   GI:  Denies abdominal pain, nausea, vomiting, bloody stools or diarrhea    left testicular tenderness and pain  :  Denies dysuria, frequency, difficulty in micturition and nocturia  Pelvis history of a early benign prostatic hypertrophy  Musculoskeletal:  Denies back pain or joint pain   Neurologic:  Denies headache, focal weakness or sensory changes  I fell yesterday and has a bump on my back of the head as was seen in the ER everything was okay  Endocrine:  Denies polyuria or polydipsia   has extensive history of diabetes mellitus   Lymphatic:  Denies swollen glands   Psychiatric:  Denies depression or anxiety     Objective   Current Vitals:   /82 (BP Location: Right arm, Patient Position: Sitting, Cuff Size: Large)   Pulse 58   Temp 97 6 °F (36 4 °C) (Core)   Ht 5' 10" (1 778 m)   Wt 112 kg (247 lb)   BMI 35 44 kg/m²   Body mass index is 35 44 kg/m²  PHYSICAL EXAMS  General:  Patient is not in acute distress, laying in the bed comfortably, awake, alert responding to commands,   HEENT:  Both pupils normal-size atraumatic, normocephalic, nonicteric  Neck:  JVP not raised  Trachea central  Respiratory:  normal Breath sounds clear to auscultation,  Cardiovascular:  S1-S2 normal without any murmur   GI:  Abdomen soft  Markedly protuberant multiple midline scar and groin scar from previous hernia surgery  Otherwise nontender   Liver and spleen normal size, no free fluid, hernial sites unremarkable without any cough impulse   both testicle in the scrotum left testicle is sensitive and slightly tender  Musculoskeletal:  No back pain  Integument:  No skin rashes or ulceration  Lymphatic:  No cervical or inguinal lymphadenopathy  Neurologic:  Patient is awake alert, responding to command, well-oriented to time and place and person moving all extremities ambulating well    Visit Diagnosis:   Diagnoses and all orders for this visit:    Left groin pain    Class 1 obesity due to excess calories without serious comorbidity with body mass index (BMI) of 34 0 to 34 9 in adult    Stage 2 chronic kidney disease    Benign prostatic hyperplasia without urinary obstruction    Atrial fibrillation ([facility] )    Obstructive sleep apnea    Diabetes mellitus with chronic kidney disease ([facility] )    Colon, diverticulosis    Other orders  -     Ozempic, 0 25 or 0 5 MG/DOSE, 2 MG/1 5ML SOPN       Plan of care was discussed with patient in detail    Pertinent labs reviewed  Pertinent images and available reads personally reviewed  Procedure: CT head without contrast    Result Date: 7/27/2021  Narrative: CT BRAIN - WITHOUT CONTRAST INDICATION:   Head trauma, mod-severe fall  COMPARISON:  July 3, 2017 TECHNIQUE:  CT examination of the brain was performed  In addition to axial images, sagittal and coronal 2D reformatted images were created and submitted for interpretation  Radiation dose length product (DLP) for this visit:  921 98 mGy-cm   This examination, like all CT scans performed in the [facility], was performed utilizing techniques to minimize radiation dose exposure, including the use of iterative  reconstruction and automated exposure control  IMAGE QUALITY:  Diagnostic  FINDINGS: PARENCHYMA: Decreased attenuation is noted in periventricular and subcortical white matter demonstrating an appearance that is statistically most likely to represent mild microangiopathic change  No CT signs of acute infarction  No intracranial mass, mass effect or midline shift  No acute parenchymal hemorrhage   VENTRICLES AND EXTRA-AXIAL SPACES:  Normal for the patient's age  VISUALIZED ORBITS AND PARANASAL SINUSES:  Unremarkable  CALVARIUM AND EXTRACRANIAL SOFT TISSUES:  Right parietal extracranial soft tissue swelling  Impression: No acute intracranial abnormality  Stable microangiopathic changes within the brain  Workstation performed: [id number]     Pertinent notes reviewed    Assessment/Plan   Assessment:   left groin pain and testicular pain possible recurrent hernia    Plan:    The risks, benefits, alternatives,and probabilities of success were discussed in detail with no guarantee made as to outcome  All questions were answered to the patient's satisfaction  ultrasound left groin and scrotum follow-up in 2 weeks after ultrasound     advised weight reduction     offer pain medication which patient refused is bearable    Counseling / Coordination of Care  Expained patient  in detailed about coordination of care  A description of the counseling / coordination of care:  I performed an interim history, pertinent images and labs, performed a physical examination to arrive at the plan delineated above with associated thought processes  MD Scooter Escamlila    Office   Tel  [phone]  Fax   [phone]

## 2021-08-02 ENCOUNTER — CONSULT (OUTPATIENT)
Dept: NEUROLOGY | Facility: CLINIC | Age: 76
End: 2021-08-02
Payer: MEDICARE

## 2021-08-02 VITALS
DIASTOLIC BLOOD PRESSURE: 80 MMHG | SYSTOLIC BLOOD PRESSURE: 140 MMHG | HEIGHT: 71 IN | WEIGHT: 226 LBS | HEART RATE: 66 BPM | BODY MASS INDEX: 31.64 KG/M2

## 2021-08-02 DIAGNOSIS — I48.91 ATRIAL FIBRILLATION (HCC): Primary | ICD-10-CM
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
## 2021-08-02 DIAGNOSIS — G44.309 POST-CONCUSSION HEADACHE: ICD-10-CM

## 2021-08-02 PROCEDURE — 99204 OFFICE O/P NEW MOD 45 MIN: CPT | Performed by: PSYCHIATRY & NEUROLOGY

## 2021-08-02 RX ORDER — TOPIRAMATE 50 MG/1
50 TABLET, FILM COATED ORAL
Qty: 30 TABLET | Refills: 3 | Status: SHIPPED | OUTPATIENT
Start: 2021-08-02 | End: 2021-09-13 | Stop reason: SDUPTHER

## 2021-08-02 RX ORDER — BUTALBITAL, ACETAMINOPHEN AND CAFFEINE 50; 325; 40 MG/1; MG/1; MG/1
1 TABLET ORAL DAILY PRN
Qty: 20 TABLET | Refills: 0 | Status: SHIPPED | OUTPATIENT
Start: 2021-08-02 | End: 2021-08-12

## 2021-08-02 NOTE — PROGRESS NOTES
Outpatient Neurology History and Physical  Laurann Kanner  [id number]  68 y o   1945          Consult: Yes    Chace Valdez MD      Chief Complaint   Patient presents with    Neurologic Problem           History Obtained from: patient     HPI:     Laurann Kanner  is a 69 yo M that presents for evaluation after a fall  Patient on 7/27/21 was walking in his backyard and giving water to plants  Patient denies tripping  He thinks he passed out  He struck his head but doesn't remember where  When he woke up, panicked and tried to find way to get up  He had no signs beforehand  He had been compliant with amiodarone and eliquis  Night of fall, patient did start getting headaches  He describes pain in bitemporal region  He had emesis  His ct brain was negative  He denies dizziness or loss of balance  He was found to have UTI  His EKG had revealed right and left bundle branch blocks  He had holter monitor on for 2 weeks  He has h/o schwannoma many years ago  He has remote h/o migraines and was on topamax       Past Medical History:   Diagnosis Date    Allergic rhinitis     Anemia 10/23/2008    last assessed 9/9/13     Anxiety     Atrial fibrillation (HCC)     Bleeding     BPH (benign prostatic hypertrophy)     Diabetes mellitus (HCC)     Diarrhea     Diverticulitis     Eating disorder     GERD (gastroesophageal reflux disease)     Herpes zoster with complication     last assessed 7/3/17     Hiatal hernia     Hyperlipidemia     Hypertension     IBS (irritable bowel syndrome)     Incisional hernia     Increased urinary frequency     Irregular heart beat     Pyogenic granuloma 09/13/2006    last assessed 9/13/06    Carlos (subconjunctival hemorrhage), unspecified laterality 09/12/2005    last assessed 9/12/05    Sleep apnea     TMJ (dislocation of temporomandibular joint)     Ventral hernia     last assessed  4/6/17                Current Outpatient Medications on File Prior to Visit Medication Sig Dispense Refill    amiodarone 200 mg tablet 1 pill daily      amLODIPine (NORVASC) 5 mg tablet Take 5 mg by mouth daily      apixaban (ELIQUIS) 5 mg Take 5 mg by mouth 2 (two) times a day      atorvastatin (LIPITOR) 20 mg tablet TAKE 1 TABLET DAILY 90 tablet 3    Blood Glucose Monitoring Suppl (FREESTYLE LITE) GEOVANNY       carvedilol (COREG) 25 mg tablet Take 12 5 mg by mouth 2 (two) times a day       cephalexin (KEFLEX) 500 mg capsule Take 1 capsule (500 mg total) by mouth every 6 (six) hours for 7 days 28 capsule 0    esomeprazole (NexIUM) 20 mg capsule TAKE ONE CAPSULE BY MOUTH EVERY DAY AT BEDTIME 90 capsule 3    FREESTYLE LITE test strip       furosemide (LASIX) 20 mg tablet Take 20 mg by mouth daily      Lancets (FREESTYLE) lancets       losartan (COZAAR) 100 MG tablet       multivitamin (THERAGRAN) TABS Take 1 tablet by mouth every morning      Ozempic, 0 25 or 0 5 MG/DOSE, 2 MG/1 5ML SOPN       pioglitazone (ACTOS) 15 mg tablet       polyethylene glycol-propylene glycol (SYSTANE) 0 4-0 3 % every 3 (three) hours as needed      RESTASIS 0 05 % ophthalmic emulsion Administer 1 drop to both eyes every 12 (twelve) hours       tamsulosin (FLOMAX) 0 4 mg Take 0 4 mg by mouth every other day       traMADol (ULTRAM) 50 mg tablet 1-2 po q 6 hours prn pain 12 tablet 0    Janumet -1000 MG TB24  (Patient not taking: Reported on 8/2/2021)      sitaGLIPtin (JANUVIA) 100 mg tablet Take 100 mg by mouth daily  (Patient not taking: Reported on 8/2/2021)       No current facility-administered medications on file prior to visit  Allergies   Allergen Reactions    Codeine Other (See Comments) and GI Intolerance     Reaction Date: 27Dec2004;  Annotation - [id number]: '' CRAZY ''  vomiting  headache    Demerol [Meperidine] Nausea Only, Other (See Comments), GI Intolerance and Vomiting     vomiting  Reaction Date: 27Dec2004;   headache    Morphine Nausea Only, GI Intolerance and Vomiting     Reaction Date: 2012;          Family History   Problem Relation Age of Onset    Heart disease Mother     Cancer Mother     Colon cancer Mother    [de-identified] Dementia Mother     Early death Father     Seizures Father    [de-identified] Cancer Sister         bone    Heart disease Sister     Diabetes Sister     Lupus Sister     Heart disease Brother     Cancer Brother         kidney    Diabetes Brother     Stroke Neg Hx                 Past Surgical History:   Procedure Laterality Date    APPENDECTOMY      ATRIAL ABLATION SURGERY          ATRIAL ABLATION SURGERY      catheter ablation atrial fibrillation / last assessed 16     CHOLECYSTECTOMY      lap    COLON SURGERY      Hartmans procedure    COLON SURGERY      reversal 6 weeks later    COLONOSCOPY N/A 2017    Procedure: COLONOSCOPY;  Surgeon: Leslie Blanca MD;  Location: [place] [address] GI LAB; Service:     ESOPHAGOGASTRODUODENOSCOPY N/A 2017    Procedure: ESOPHAGOGASTRODUODENOSCOPY (EGD); Surgeon: Leslie Blanca MD;  Location: [place] [address] GI LAB;   Service:     EYE SURGERY Bilateral     lid repair    HERNIA REPAIR Bilateral     inguinal  &    [facility]  2011    INCISIONAL HERNIA REPAIR N/A 3/24/2017    Procedure: REPAIR OF INCARCERATED INCISIONAL HERNIA WITH MESH, Lysis of Adhesions, Partial Omentectomy;  Surgeon: Anh Pretty MD;  Location: [address] [place] [address];  Service:    Fred Roles NEUROBLASTOMA EXCISION      SKIN CANCER EXCISION      On nose    TONSILLECTOMY             Social History     Socioeconomic History    Marital status: /Civil Union     Spouse name: Not on file    Number of children: Not on file    Years of education: Not on file    Highest education level: Not on file   Occupational History    Not on file   Tobacco Use    Smoking status: Former Smoker     Packs/day: 1 00     Years: 20 00     Pack years: 20 00     Types: Cigarettes     Quit date: 65     Years since quittin 6    Smokeless tobacco: Never Used   Vaping Use    Vaping Use: Never used   Substance and Sexual Activity    Alcohol use: No    Drug use: No    Sexual activity: Not on file   Other Topics Concern    Not on file   Social History Narrative    Single per allscript      Social Determinants of Health     Financial Resource Strain:     Difficulty of Paying Living Expenses:    Food Insecurity:     Worried About Running Out of Food in the Last Year:     [address] in the Last Year:    Transportation Needs:     Lack of Transportation (Medical):      Lack of Transportation (Non-Medical):    Physical Activity:     Days of Exercise per Week:     Minutes of Exercise per Session:    Stress:     Feeling of Stress :    Social Connections:     Frequency of Communication with Friends and Family:     Frequency of Social Gatherings with Friends and Family:     Attends Taoism Services:     Active Member of Clubs or Organizations:     Attends Club or Organization Meetings:     Marital Status:    Intimate Partner Violence:     Fear of Current or Ex-Partner:     Emotionally Abused:     Physically Abused:     Sexually Abused:        Review of Systems  Refer to positive review of systems in HPI  Constitutional- No fever  Eyes- No visual change  ENT- Hearing normal  CV- No chest pain  Resp- No Shortness of breath  GI- No diarrhea  - Bladder normal  MS- No Arthritis   Skin- No rash  Psych- No depression  Endo- No DM  Heme- No nodes    PHYSICAL EXAM:    Vitals:    08/02/21 1432   BP: 140/80   BP Location: Right arm   Patient Position: Sitting   Cuff Size: Adult   Pulse: 66   Weight: 103 kg (226 lb)   Height: 5' 11" (1 803 m)         Appearance: No Acute Distress  Ophthalmoscopic: Disc Flat, Normal fundus  Carotid/Heart/Peripheral Vascular: No Bruits, RRR  Orientation: Awake, Alert, and Oriented x 3  Mental status:  Memory: Registation 3/3 Recall 3/3  Attention: Normal  Knowledge: Appropriate  Language: No aphasia  Speech: No dysarthria  Cranial Nerves:  2 No Visual Defect on Confrontation; Pupils round, equal, reactive to light  3,4,6 Extraocular Movements Intact; no nystagmus  5 Facial Sensation Intact  7 No facial asymmetry  8 Intact hearing  9,10 Palate symmetric, normal gag  11 Good shoulder shrug  12 Tongue Midline  Gait: Stable, No ataxia, can perform tandem walking  Coordination: No ataxia with finger to nose testing and heel to shin testing  Sensory: Intact, Symmetric to Pinprick, Light Touch, Vibration, and Joint Position  Muscle Tone: Normal  Muscle exam  Arm Right Left Leg Right Left   Deltoid 5/5 5/5 Iliopsoas 5/5 5/5   Biceps 5/5 5/5 Quads 5/5 5/5   Triceps 5/5 5/5 Hamstrings 5/5 5/5   Wrist Extension 5/5 5/5 Ankle Dorsi Flexion 5/5 5/5   Wrist Flexion 5/5 5/5 Ankle Plantar Flexion 5/5 5/5   Interossei 5/5 5/5 Ankle Eversion 5/5 5/5   APB 5/5 5/5 Ankle Inversion 5/5 5/5       Reflexes   RJ BJ TJ KJ AJ Plantars Victor's   Right 2+ 2+ 2+ 2+ 2+ Downgoing Not present   Left 2+ 2+ 2+ 2+ 2+ Downgoing Not present           Personal review of         Ct brain neg  Assessment/Plan:     1  Atrial fibrillation ([address] )     2  Fall, sequela     3  Post-concussion headache  magnesium oxide (MAG-OX) 400 mg    topiramate (TOPAMAX) 50 MG tablet    butalbital-acetaminophen-caffeine (FIORICET,ESGIC) -40 mg per tablet         Patient has developed headaches post his injury  He doesn't have other symptoms  Discussed that with time and slowly returning to his routine, his headaches should resolve  Will start him on mag ox for prevention and topamax for the time being  Can't use elavil with his cardiac history  Will give fioricet as needed  Counseling Documentation:  The patient and/or patient's family were  counseled regarding diagnostic results  Instructions for management,risk factor reductions,prognosis of disease were discussed   Patient and family were educated regarding impressions,risks and benefits of treatment options,importance of compliance with treatment  Total time of encounter: 45 min  More than 50% of time was spent in counseling and coordination of care of patient  JOEY Cordova    [facility]  [address]  [address]

## 2021-08-05 ENCOUNTER — TELEPHONE (OUTPATIENT)
Dept: NEUROLOGY | Facility: CLINIC | Age: 76
End: 2021-08-05

## 2021-08-05 NOTE — TELEPHONE ENCOUNTER
PA initiated for Topiramate 50 mg tabs on CMM  KEY: [id number]  Awaiting Determination    Approval received, case [id number]  Approved 7/6/21-8/5/22    [facility] was called; prescription processed

## 2021-08-11 NOTE — PROGRESS NOTES
Assessment/Plan:    There are no diagnoses linked to this encounter  There are no Patient Instructions on file for this visit  No follow-ups on file  Subjective:      Patient ID: Monisha Thayer  is a 68 y o  male  No chief complaint on file  HPI    The following portions of the patient's history were reviewed and updated as appropriate: allergies, current medications, past family history, past medical history, past social history, past surgical history and problem list     Review of Systems   Constitutional: Negative  Respiratory: Negative  Cardiovascular: Negative  Endocrine: Negative            Current Outpatient Medications   Medication Sig Dispense Refill    amiodarone 200 mg tablet 1 pill daily      amLODIPine (NORVASC) 5 mg tablet Take 5 mg by mouth daily      apixaban (ELIQUIS) 5 mg Take 5 mg by mouth 2 (two) times a day      atorvastatin (LIPITOR) 20 mg tablet TAKE 1 TABLET DAILY 90 tablet 3    Blood Glucose Monitoring Suppl (FREESTYLE LITE) GEOVANNY       butalbital-acetaminophen-caffeine (FIORICET,ESGIC) -40 mg per tablet Take 1 tablet by mouth daily as needed for headaches 20 tablet 0    carvedilol (COREG) 25 mg tablet Take 12 5 mg by mouth 2 (two) times a day       esomeprazole (NexIUM) 20 mg capsule TAKE ONE CAPSULE BY MOUTH EVERY DAY AT BEDTIME 90 capsule 3    FREESTYLE LITE test strip       furosemide (LASIX) 20 mg tablet Take 20 mg by mouth daily      Janumet -1000 MG TB24  (Patient not taking: Reported on 8/2/2021)      Lancets (FREESTYLE) lancets       losartan (COZAAR) 100 MG tablet       magnesium oxide (MAG-OX) 400 mg Take 1 tablet (400 mg total) by mouth daily 30 tablet 3    multivitamin (THERAGRAN) TABS Take 1 tablet by mouth every morning      Ozempic, 0 25 or 0 5 MG/DOSE, 2 MG/1 5ML SOPN       pioglitazone (ACTOS) 15 mg tablet       polyethylene glycol-propylene glycol (SYSTANE) 0 4-0 3 % every 3 (three) hours as needed      RESTASIS 0 05 % ophthalmic emulsion Administer 1 drop to both eyes every 12 (twelve) hours       sitaGLIPtin (JANUVIA) 100 mg tablet Take 100 mg by mouth daily  (Patient not taking: Reported on 8/2/2021)      tamsulosin (FLOMAX) 0 4 mg Take 0 4 mg by mouth every other day       topiramate (TOPAMAX) 50 MG tablet Take 1 tablet (50 mg total) by mouth daily at bedtime 30 tablet 3    traMADol (ULTRAM) 50 mg tablet 1-2 po q 6 hours prn pain 12 tablet 0     No current facility-administered medications for this visit  Objective: There were no vitals taken for this visit         Physical Exam           Zoë Truong MD

## 2021-08-12 ENCOUNTER — OFFICE VISIT (OUTPATIENT)
Dept: FAMILY MEDICINE CLINIC | Facility: CLINIC | Age: 76
End: 2021-08-12
Payer: MEDICARE

## 2021-08-12 ENCOUNTER — RA CDI HCC (OUTPATIENT)
Dept: OTHER | Facility: HOSPITAL | Age: 76
End: 2021-08-12

## 2021-08-12 VITALS
SYSTOLIC BLOOD PRESSURE: 132 MMHG | TEMPERATURE: 97.2 F | HEART RATE: 64 BPM | BODY MASS INDEX: 31.67 KG/M2 | HEIGHT: 71 IN | DIASTOLIC BLOOD PRESSURE: 72 MMHG | WEIGHT: 226.2 LBS | RESPIRATION RATE: 18 BRPM

## 2021-08-12 DIAGNOSIS — E11.22 TYPE 2 DIABETES MELLITUS WITH STAGE 3 CHRONIC KIDNEY DISEASE, WITHOUT LONG-TERM CURRENT USE OF INSULIN, UNSPECIFIED WHETHER STAGE 3A OR 3B CKD (HCC): ICD-10-CM

## 2021-08-12 DIAGNOSIS — N18.30 TYPE 2 DIABETES MELLITUS WITH STAGE 3 CHRONIC KIDNEY DISEASE, WITHOUT LONG-TERM CURRENT USE OF INSULIN, UNSPECIFIED WHETHER STAGE 3A OR 3B CKD (HCC): ICD-10-CM

## 2021-08-12 DIAGNOSIS — R05.9 COUGH: ICD-10-CM

## 2021-08-12 DIAGNOSIS — N39.0 URINARY TRACT INFECTION WITHOUT HEMATURIA, SITE UNSPECIFIED: ICD-10-CM

## 2021-08-12 DIAGNOSIS — R55 SYNCOPE, UNSPECIFIED SYNCOPE TYPE: Primary | ICD-10-CM

## 2021-08-12 PROBLEM — E66.811 CLASS 1 OBESITY DUE TO EXCESS CALORIES WITHOUT SERIOUS COMORBIDITY WITH BODY MASS INDEX (BMI) OF 34.0 TO 34.9 IN ADULT: Status: RESOLVED | Noted: 2021-03-22 | Resolved: 2021-08-12

## 2021-08-12 PROBLEM — E66.09 CLASS 1 OBESITY DUE TO EXCESS CALORIES WITHOUT SERIOUS COMORBIDITY WITH BODY MASS INDEX (BMI) OF 34.0 TO 34.9 IN ADULT: Status: RESOLVED | Noted: 2021-03-22 | Resolved: 2021-08-12

## 2021-08-12 PROCEDURE — 99213 OFFICE O/P EST LOW 20 MIN: CPT | Performed by: INTERNAL MEDICINE

## 2021-08-12 RX ORDER — FLUTICASONE PROPIONATE 50 MCG
2 SPRAY, SUSPENSION (ML) NASAL DAILY
Qty: 18 G | Refills: 5 | Status: SHIPPED | OUTPATIENT
Start: 2021-08-12 | End: 2022-02-21 | Stop reason: SDUPTHER

## 2021-08-12 RX ORDER — BENZONATATE 200 MG/1
200 CAPSULE ORAL 3 TIMES DAILY PRN
Qty: 20 CAPSULE | Refills: 0 | Status: SHIPPED | OUTPATIENT
Start: 2021-08-12 | End: 2021-08-18

## 2021-08-12 NOTE — ASSESSMENT & PLAN NOTE
He is undergoing heart monitoring to r/o arrythmia or heart block  Advised ER for any recurrent symptoms

## 2021-08-12 NOTE — ASSESSMENT & PLAN NOTE
Lab Results   Component Value Date    HGBA1C 6 4 (H) 05/17/2021     He continues to see endocrinology and glucose is well controlled

## 2021-08-12 NOTE — PROGRESS NOTES
[facility]  coding opportunities       Chart reviewed, no opportunity found: CHART REVIEWED, NO OPPORTUNITY FOUND                        Patients insurance company: [place]

## 2021-08-12 NOTE — PROGRESS NOTES
Assessment/Plan:    1  Syncope, unspecified syncope type  Assessment & Plan:  He is undergoing heart monitoring to r/o arrythmia or heart block  Advised ER for any recurrent symptoms  2  Cough  -     benzonatate (TESSALON) 200 MG capsule; Take 1 capsule (200 mg total) by mouth 3 (three) times a day as needed for cough  -     fluticasone (FLONASE) 50 mcg/act nasal spray; 2 sprays into each nostril daily    3  Urinary tract infection without hematuria, site unspecified  -     UA (URINE) with reflex to Scope    4  Type 2 diabetes mellitus with stage 3 chronic kidney disease, without long-term current use of insulin, unspecified whether stage 3a or 3b CKD ([facility] )  Assessment & Plan:    Lab Results   Component Value Date    HGBA1C 6 4 (H) 05/17/2021     He continues to see endocrinology and glucose is well controlled  5  BMI 31 0-31 9,adult          There are no Patient Instructions on file for this visit  Return in about 4 months (around 12/12/2021) for AWV  Subjective:      Patient ID: Monisha Thayer  is a 68 y o  male  Chief Complaint   Patient presents with    Nasal Congestion    Cough    Fall     7/27/21       He passed out at his house 7/27 and fell, had head injury, went to the ER  They discharged him but he was uncomfortable that nothing was done about the syncope and called his cardiologist    Now on a heart monitor through his cardiologist, she is concerned he has complete heart block  Has a cough, congestion mostly at night for 7-10 days  Denies fever or post nasal drip  Using flonase  Needs a refill  Was also diagnosed with a UTI and is asking for a repeat UA  The following portions of the patient's history were reviewed and updated as appropriate: allergies, current medications, past family history, past medical history, past social history, past surgical history and problem list     Review of Systems   Constitutional: Negative  Negative for fatigue and fever     HENT: Positive for postnasal drip  Negative for congestion and sore throat  Respiratory: Positive for cough  Negative for shortness of breath and wheezing  Cardiovascular: Negative  Genitourinary: Negative  Neurological: Positive for syncope  Current Outpatient Medications   Medication Sig Dispense Refill    amiodarone 200 mg tablet 1 pill daily      amLODIPine (NORVASC) 5 mg tablet Take 5 mg by mouth daily      apixaban (ELIQUIS) 5 mg Take 5 mg by mouth 2 (two) times a day      atorvastatin (LIPITOR) 20 mg tablet TAKE 1 TABLET DAILY 90 tablet 3    Blood Glucose Monitoring Suppl (FREESTYLE LITE) GEOVANNY       carvedilol (COREG) 25 mg tablet Take 12 5 mg by mouth 2 (two) times a day       esomeprazole (NexIUM) 20 mg capsule TAKE ONE CAPSULE BY MOUTH EVERY DAY AT BEDTIME 90 capsule 3    FREESTYLE LITE test strip       furosemide (LASIX) 20 mg tablet Take 20 mg by mouth daily      Lancets (FREESTYLE) lancets       losartan (COZAAR) 100 MG tablet       multivitamin (THERAGRAN) TABS Take 1 tablet by mouth every morning      Ozempic, 0 25 or 0 5 MG/DOSE, 2 MG/1 5ML SOPN       pioglitazone (ACTOS) 15 mg tablet       polyethylene glycol-propylene glycol (SYSTANE) 0 4-0 3 % every 3 (three) hours as needed      RESTASIS 0 05 % ophthalmic emulsion Administer 1 drop to both eyes every 12 (twelve) hours       tamsulosin (FLOMAX) 0 4 mg Take 0 4 mg by mouth every other day       topiramate (TOPAMAX) 50 MG tablet Take 1 tablet (50 mg total) by mouth daily at bedtime 30 tablet 3    benzonatate (TESSALON) 200 MG capsule Take 1 capsule (200 mg total) by mouth 3 (three) times a day as needed for cough 20 capsule 0    fluticasone (FLONASE) 50 mcg/act nasal spray 2 sprays into each nostril daily 18 g 5     No current facility-administered medications for this visit         Objective:    /72   Pulse 64   Temp (!) 97 2 °F (36 2 °C)   Resp 18   Ht 5' 11" (1 803 m)   Wt 103 kg (226 lb 3 2 oz)   BMI 31 55 kg/m²        Physical Exam           Julian Kumar MD

## 2021-08-17 ENCOUNTER — APPOINTMENT (OUTPATIENT)
Dept: LAB | Facility: CLINIC | Age: 76
End: 2021-08-17
Payer: MEDICARE

## 2021-08-17 LAB
BACTERIA UR QL AUTO: ABNORMAL /HPF
BILIRUB UR QL STRIP: NEGATIVE
CAOX CRY URNS QL MICRO: ABNORMAL /HPF
CLARITY UR: ABNORMAL
COLOR UR: YELLOW
GLUCOSE UR STRIP-MCNC: NEGATIVE MG/DL
HGB UR QL STRIP.AUTO: ABNORMAL
KETONES UR STRIP-MCNC: NEGATIVE MG/DL
LEUKOCYTE ESTERASE UR QL STRIP: ABNORMAL
NITRITE UR QL STRIP: NEGATIVE
NON-SQ EPI CELLS URNS QL MICRO: ABNORMAL /HPF
PH UR STRIP.AUTO: 6.5 [PH]
PROT UR STRIP-MCNC: ABNORMAL MG/DL
RBC #/AREA URNS AUTO: ABNORMAL /HPF
SP GR UR STRIP.AUTO: 1.02 (ref 1–1.03)
UROBILINOGEN UR QL STRIP.AUTO: 0.2 E.U./DL
WBC #/AREA URNS AUTO: ABNORMAL /HPF

## 2021-08-17 PROCEDURE — 81001 URINALYSIS AUTO W/SCOPE: CPT | Performed by: INTERNAL MEDICINE

## 2021-08-18 ENCOUNTER — OFFICE VISIT (OUTPATIENT)
Dept: GASTROENTEROLOGY | Facility: CLINIC | Age: 76
End: 2021-08-18
Payer: MEDICARE

## 2021-08-18 ENCOUNTER — OFFICE VISIT (OUTPATIENT)
Dept: FAMILY MEDICINE CLINIC | Facility: CLINIC | Age: 76
End: 2021-08-18
Payer: MEDICARE

## 2021-08-18 ENCOUNTER — TELEPHONE (OUTPATIENT)
Dept: ADMINISTRATIVE | Facility: OTHER | Age: 76
End: 2021-08-18

## 2021-08-18 ENCOUNTER — TELEPHONE (OUTPATIENT)
Dept: FAMILY MEDICINE CLINIC | Facility: CLINIC | Age: 76
End: 2021-08-18

## 2021-08-18 VITALS
BODY MASS INDEX: 31.92 KG/M2 | DIASTOLIC BLOOD PRESSURE: 81 MMHG | SYSTOLIC BLOOD PRESSURE: 134 MMHG | HEART RATE: 62 BPM | WEIGHT: 223 LBS | HEIGHT: 70 IN

## 2021-08-18 VITALS
BODY MASS INDEX: 31.21 KG/M2 | HEIGHT: 70 IN | HEART RATE: 64 BPM | OXYGEN SATURATION: 98 % | RESPIRATION RATE: 16 BRPM | WEIGHT: 218 LBS | TEMPERATURE: 99.5 F | SYSTOLIC BLOOD PRESSURE: 136 MMHG | DIASTOLIC BLOOD PRESSURE: 84 MMHG

## 2021-08-18 DIAGNOSIS — Z00.00 MEDICARE ANNUAL WELLNESS VISIT, SUBSEQUENT: Primary | ICD-10-CM

## 2021-08-18 DIAGNOSIS — K21.9 GASTROESOPHAGEAL REFLUX DISEASE WITHOUT ESOPHAGITIS: ICD-10-CM

## 2021-08-18 DIAGNOSIS — K57.30 COLON, DIVERTICULOSIS: Primary | ICD-10-CM

## 2021-08-18 DIAGNOSIS — J40 BRONCHITIS: ICD-10-CM

## 2021-08-18 DIAGNOSIS — I48.91 ATRIAL FIBRILLATION, UNSPECIFIED TYPE (HCC): ICD-10-CM

## 2021-08-18 DIAGNOSIS — Z13.31 POSITIVE DEPRESSION SCREENING: ICD-10-CM

## 2021-08-18 DIAGNOSIS — K58.2 IRRITABLE BOWEL SYNDROME WITH BOTH CONSTIPATION AND DIARRHEA: ICD-10-CM

## 2021-08-18 PROCEDURE — G0439 PPPS, SUBSEQ VISIT: HCPCS | Performed by: INTERNAL MEDICINE

## 2021-08-18 PROCEDURE — 1123F ACP DISCUSS/DSCN MKR DOCD: CPT | Performed by: INTERNAL MEDICINE

## 2021-08-18 PROCEDURE — 99213 OFFICE O/P EST LOW 20 MIN: CPT | Performed by: INTERNAL MEDICINE

## 2021-08-18 PROCEDURE — 99214 OFFICE O/P EST MOD 30 MIN: CPT | Performed by: INTERNAL MEDICINE

## 2021-08-18 RX ORDER — AMOXICILLIN 875 MG/1
875 TABLET, COATED ORAL 2 TIMES DAILY
Qty: 20 TABLET | Refills: 0 | Status: SHIPPED | OUTPATIENT
Start: 2021-08-18 | End: 2021-08-28

## 2021-08-18 NOTE — PROGRESS NOTES
Assessment and Plan:     Problem List Items Addressed This Visit     None           Preventive health issues were discussed with patient, and age appropriate screening tests were ordered as noted in patient's After Visit Summary  Personalized health advice and appropriate referrals for health education or preventive services given if needed, as noted in patient's After Visit Summary       History of Present Illness:     Patient presents for Medicare Annual Wellness visit    Patient Care Team:  Francine Poe MD as PCP - General  Antone Hood, MD Licia Keener, MD Chantal Lodge, MD Mendel Revels, MD Broderick Remak, MD Francis Cherry MD Meridee Hang, MD Chantal Lodge, MD as Endoscopist     Problem List:     Patient Active Problem List   Diagnosis    [facility]rial fibrillation ([facility] )    Hypertension, benign    Benign prostatic hyperplasia without urinary obstruction    Chronic kidney disease (CKD), stage II (mild)    Chronic rhinitis    Colon, diverticulosis    Diabetes mellitus with chronic kidney disease ([facility] )    Diabetic neuropathy ([facility] )    Esophageal reflux    Mixed hyperlipidemia    Obstructive sleep apnea    Aortic root dilation ([facility] )    Seasonal allergic rhinitis due to pollen    Class 1 obesity    Dyspnea on exertion    SOB (shortness of breath)    Stage 2 chronic kidney disease    Left groin pain    Syncope      Past Medical and Surgical History:     Past Medical History:   Diagnosis Date    Allergic rhinitis     Anemia 10/23/2008    last assessed 9/9/13     Anxiety     Atrial fibrillation ([facility] )     Bleeding     BPH (benign prostatic hypertrophy)     Diabetes mellitus ([facility] )     Diarrhea     Diverticulitis     Eating disorder     GERD (gastroesophageal reflux disease)     Herpes zoster with complication     last assessed 7/3/17     Hiatal hernia     Hyperlipidemia     Hypertension     IBS (irritable bowel syndrome)     Incisional hernia     Increased urinary frequency     Irregular heart beat     Pyogenic granuloma 09/13/2006    last assessed 9/13/06    Carlos (subconjunctival hemorrhage), unspecified laterality 09/12/2005    last assessed 9/12/05    Sleep apnea     TMJ (dislocation of temporomandibular joint)     Ventral hernia     last assessed  4/6/17      Past Surgical History:   Procedure Laterality Date    APPENDECTOMY      ATRIAL ABLATION SURGERY      2014    ATRIAL ABLATION SURGERY      catheter ablation atrial fibrillation / last assessed 2/17/16     CHOLECYSTECTOMY      lap    COLON SURGERY      Hartmans procedure    COLON SURGERY      reversal 6 weeks later    COLONOSCOPY N/A 1/26/2017    Procedure: COLONOSCOPY;  Surgeon: Patrica Hewitt MD;  Location: [place] [address] GI LAB; Service:     ESOPHAGOGASTRODUODENOSCOPY N/A 1/26/2017    Procedure: ESOPHAGOGASTRODUODENOSCOPY (EGD); Surgeon: Patrica Hewitt MD;  Location: [place] [address] GI LAB;   Service:     EYE SURGERY Bilateral     lid repair    HERNIA REPAIR Bilateral     inguinal 2014 & 2013    INCISIONAL HERNIA REPAIR  2011    INCISIONAL HERNIA REPAIR N/A 3/24/2017    Procedure: REPAIR OF INCARCERATED INCISIONAL HERNIA WITH MESH, Lysis of Adhesions, Partial Omentectomy;  Surgeon: Charly Hagen MD;  Location: [facility];  Service:    Sandra Helms EXCISION      SKIN CANCER EXCISION      On nose    TONSILLECTOMY        Family History:     Family History   Problem Relation Age of Onset    Heart disease Mother     Cancer Mother     Colon cancer Mother     Dementia Mother     Early death Father     Seizures Father    Jose Hero Cancer Sister         bone    Heart disease Sister     Diabetes Sister     Lupus Sister     Heart disease Brother     Cancer Brother         kidney    Diabetes Brother     Stroke Neg Hx       Social History:     Social History     Socioeconomic History    Marital status: /Civil Union     Spouse name: None    Number of children: None    Years of education: None    Highest education level: None   Occupational History    None   Tobacco Use    Smoking status: Former Smoker     Packs/day: 1 00     Years: 20 00     Pack years: 20 00     Types: Cigarettes     Quit date:      Years since quittin 6    Smokeless tobacco: Never Used   Vaping Use    Vaping Use: Never used   Substance and Sexual Activity    Alcohol use: No    Drug use: No    Sexual activity: None   Other Topics Concern    None   Social History Narrative    Single per allscript      Social Determinants of Health     Financial Resource Strain:     Difficulty of Paying Living Expenses:    Food Insecurity:     Worried About Running Out of Food in the Last Year:     Ran Out of Food in the Last Year:    Transportation Needs:     Lack of Transportation (Medical):      Lack of Transportation (Non-Medical):    Physical Activity:     Days of Exercise per Week:     Minutes of Exercise per Session:    Stress:     Feeling of Stress :    Social Connections:     Frequency of Communication with Friends and Family:     Frequency of Social Gatherings with Friends and Family:     Attends Rastafari Services:     Active Member of Clubs or Organizations:     Attends Club or Organization Meetings:     Marital Status:    Intimate Partner Violence:     Fear of Current or Ex-Partner:     Emotionally Abused:     Physically Abused:     Sexually Abused:       Medications and Allergies:     Current Outpatient Medications   Medication Sig Dispense Refill    amiodarone 200 mg tablet 1 pill daily      amLODIPine (NORVASC) 5 mg tablet Take 5 mg by mouth daily      apixaban (ELIQUIS) 5 mg Take 5 mg by mouth 2 (two) times a day      atorvastatin (LIPITOR) 20 mg tablet TAKE 1 TABLET DAILY 90 tablet 3    Blood Glucose Monitoring Suppl (FREESTYLE LITE) GEOVANNY       carvedilol (COREG) 25 mg tablet Take 12 5 mg by mouth 2 (two) times a day       esomeprazole (NexIUM) 20 mg capsule TAKE ONE CAPSULE BY MOUTH EVERY DAY AT BEDTIME 90 capsule 3  fluticasone (FLONASE) 50 mcg/act nasal spray 2 sprays into each nostril daily 18 g 5    FREESTYLE LITE test strip       furosemide (LASIX) 20 mg tablet Take 20 mg by mouth daily      Lancets (FREESTYLE) lancets       losartan (COZAAR) 100 MG tablet       multivitamin (THERAGRAN) TABS Take 1 tablet by mouth every morning      Ozempic, 0 25 or 0 5 MG/DOSE, 2 MG/1 5ML SOPN       pioglitazone (ACTOS) 15 mg tablet       polyethylene glycol-propylene glycol (SYSTANE) 0 4-0 3 % every 3 (three) hours as needed      RESTASIS 0 05 % ophthalmic emulsion Administer 1 drop to both eyes every 12 (twelve) hours       tamsulosin (FLOMAX) 0 4 mg Take 0 4 mg by mouth every other day       topiramate (TOPAMAX) 50 MG tablet Take 1 tablet (50 mg total) by mouth daily at bedtime 30 tablet 3    benzonatate (TESSALON) 200 MG capsule Take 1 capsule (200 mg total) by mouth 3 (three) times a day as needed for cough (Patient not taking: Reported on 8/18/2021) 20 capsule 0     No current facility-administered medications for this visit  Allergies   Allergen Reactions    Codeine Other (See Comments) and GI Intolerance     Reaction Date: 27Dec2004;  [facility] - [id number]: '' CRAZY ''  vomiting  headache    Demerol [Meperidine] Nausea Only, Other (See Comments), GI Intolerance and Vomiting     vomiting  Reaction Date: 27Dec2004;   headache    Morphine Nausea Only, GI Intolerance and Vomiting     Reaction Date: 23Feb2012;       Immunizations:     Immunization History   Administered Date(s) Administered    H1N1, All Formulations 11/05/2009    INFLUENZA 09/29/2020    Influenza Split High Dose Preservative Free IM 09/09/2013, 10/09/2014, 10/21/2015, 10/13/2016, 09/07/2017    Influenza, high dose seasonal 0 7 mL 10/08/2018, 10/31/2019    Influenza, seasonal, injectable 11/20/2001, 10/28/2002, 10/09/2003, 02/17/2005, 11/15/2005, 11/29/2006, 10/18/2007, 10/23/2008, 10/21/2009, 10/28/2010, 09/28/2011, 11/15/2012    Pneumococcal Conjugate 13-Valent 10/21/2015    Pneumococcal Polysaccharide PPV23 01/10/2013    SARS-CoV-2 / COVID-19 mRNA IM (Siine) 02/03/2021, 02/24/2021    influenza, injectable, quadrivalent 09/29/2020      Health Maintenance:         Topic Date Due    Hepatitis C Screening  Never done         Topic Date Due    DTaP,Tdap,and Td Vaccines (1 - Tdap) Never done    Influenza Vaccine (1) 09/01/2021      Medicare Health Risk Assessment:     /84   Pulse 64   Temp 99 5 °F (37 5 °C)   Resp 16   Ht 5' 10" (1 778 m)   Wt 98 9 kg (218 lb)   SpO2 98%   BMI 31 28 kg/m²      Jean Evans is here for his Subsequent Wellness visit  Health Risk Assessment:   Patient rates overall health as fair  Patient feels that their physical health rating is slightly worse  Patient is satisfied with their life  Eyesight was rated as slightly worse  Hearing was rated as slightly worse  Patient feels that their emotional and mental health rating is same  Patients states they are sometimes angry  Patient states they are sometimes unusually tired/fatigued  Pain experienced in the last 7 days has been none  Patient states that he has experienced no weight loss or gain in last 6 months  Depression Screening:   PHQ-2 Score: 3      Fall Risk Screening: In the past year, patient has experienced: history of falling in past year    Number of falls: 1  Injured during fall?: Yes    Feels unsteady when standing or walking?: No    Worried about falling?: Yes      Home Safety:  Patient does not have trouble with stairs inside or outside of their home  Patient has working smoke alarms and has working carbon monoxide detector  Home safety hazards include: none  Nutrition:   Current diet is Regular  Medications:   Patient is currently taking over-the-counter supplements  OTC medications include: see medication list  Patient is able to manage medications       Activities of Daily Living (ADLs)/Instrumental Activities of Daily Living (IADLs):   Walk and transfer into and out of bed and chair?: Yes  Dress and groom yourself?: Yes    Bathe or shower yourself?: Yes    Feed yourself? Yes  Do your laundry/housekeeping?: Yes  Manage your money, pay your bills and track your expenses?: Yes  Make your own meals?: Yes    Do your own shopping?: Yes    Previous Hospitalizations:   Any hospitalizations or ED visits within the last 12 months?: Yes    How many hospitalizations have you had in the last year?: 1-2    Advance Care Planning:   Living will: No      Comments: Declines information  Cognitive Screening:   Provider or family/friend/caregiver concerned regarding cognition?: No    PREVENTIVE SCREENINGS      Cardiovascular Screening:    General: Screening Not Indicated and History Lipid Disorder      Diabetes Screening:     General: Screening Not Indicated and History Diabetes      Colorectal Cancer Screening:     General: Screening Not Indicated      Prostate Cancer Screening:    General: Screening Not Indicated      Osteoporosis Screening:    General: Screening Not Indicated      Abdominal Aortic Aneurysm (AAA) Screening:    Risk factors include: tobacco use        General: Screening Not Indicated      Lung Cancer Screening:     General: Screening Not Indicated      Hepatitis C Screening:    General: Risks and Benefits Discussed    Screening, Brief Intervention, and Referral to Treatment (SBIRT)    Screening  Typical number of drinks in a day: 0  Typical number of drinks in a week: 0  Interpretation: Low risk drinking behavior      AUDIT-C Screenin) How often did you have a drink containing alcohol in the past year? never  2) How many drinks did you have on a typical day when you were drinking in the past year? 0  3) How often did you have 6 or more drinks on one occasion in the past year? never    AUDIT-C Score: 0  Interpretation: Score 0-3 (male): Negative screen for alcohol misuse    Single Item Drug Screening:  How often have you used an illegal drug (including marijuana) or a prescription medication for non-medical reasons in the past year? never    Single Item Drug Screen Score: 0  Interpretation: Negative screen for possible drug use disorder    Brief Intervention  Alcohol & drug use screenings were reviewed  No concerns regarding substance use disorder identified  Other Counseling Topics:   Car/seat belt/driving safety, skin self-exam, sunscreen and calcium and vitamin D intake and regular weightbearing exercise         Taurus Mendenhall MD

## 2021-08-18 NOTE — LETTER
Diabetic Eye Exam Form    Date Requested: 21  Patient: Dionne Marin  Patient : 1945   Referring Provider: Brittny Miller MD    Dilated Retinal Exam, Optomap-Iris Exam, or Fundus Photography Done         Yes (Robinson one above)         No     Date of Diabetic Eye Exam ______________________________  Left Eye      Exam did show retinopathy    Exam did not show retinopathy         Mild       Moderate       None       Proliferative       Severe     Right Eye     Exam did show retinopathy    Exam did not show retinopathy         Mild       Moderate       None       Proliferative       Severe     Comments __________________________________________________________    Practice Providing Exam ______________________________________________    Exam Performed By (print name) _______________________________________      Provider Signature ___________________________________________________      These reports are needed for  compliance    Please fax this completed form and a copy of the Diabetic Eye Exam report to our office located at [place] [address] as soon as possible to [phone] marlena Us: Phone [phone]    We thank you for your assistance in treating our mutual patient

## 2021-08-18 NOTE — PROGRESS NOTES
[facility] [address] [facility] - Outpatient Follow-up Note  Monisha Thayer  68 y o  male MRN: [id number]  Encounter: [id number]          ASSESSMENT AND PLAN:      1  Colon, diverticulosis    2  Gastroesophageal reflux disease without esophagitis    3  At[facility], [facility] ([facility] )    4  BMI 32 0-32 9,adult    5  Irritable bowel syndrome with both constipation and diarrhea      History of periods of diarrhea followed by constipation, takes Imodium, advised him to cut back on the dose of Imodium, increase fluid and fiber in the diet to manage the bowels better  Multiple other medical problems as listed  No clinical evidence of acute abdomen ileus obstruction   ______________________________________________________________________    SUBJECTIVE:    Patient has these episodes of diarrhea, takes Imodium, then develops constipation, denies any blood in stools, occasional cramping gassy feeling, appetite is fair weight stable, denies dysphagia coughing choking spells nocturnal reflux regurgitation bronchitis pneumonias  Denies any fever chills rash, had recent episode of syncope, bumped his head, on Eliquis, undergoing cardiac evaluation being evaluated for possible pacemaker  Diet medications more than 10 systems reviewed      REVIEW OF SYSTEMS IS OTHERWISE NEGATIVE        Historical Information   Past Medical History:   Diagnosis Date    Allergic rhinitis     Anemia 10/23/2008    last assessed 9/9/13     Anxiety     Atrial fibrillation (HCC)     Bleeding     BPH (benign prostatic hypertrophy)     Diabetes mellitus (HCC)     Diarrhea     Diverticulitis     Eating disorder     GERD (gastroesophageal reflux disease)     Herpes zoster with complication     last assessed 7/3/17     Hiatal hernia     Hyperlipidemia     Hypertension     IBS (irritable bowel syndrome)     Incisional hernia     Increased urinary frequency     Irregular heart beat     Pyogenic granuloma 09/13/2006 last assessed 06    Carlos (subconjunctival hemorrhage), unspecified laterality 2005    last assessed 05    Sleep apnea     TMJ (dislocation of temporomandibular joint)     Ventral hernia     last assessed  17      Past Surgical History:   Procedure Laterality Date    APPENDECTOMY      ATRIAL ABLATION SURGERY          ATRIAL ABLATION SURGERY      catheter ablation atrial fibrillation / last assessed 16     CHOLECYSTECTOMY      lap    COLON SURGERY      Hartmans procedure    COLON SURGERY      reversal 6 weeks later    COLONOSCOPY N/A 2017    Procedure: COLONOSCOPY;  Surgeon: Loreto Munoz MD;  Location: [facility] GI LAB; Service:     ESOPHAGOGASTRODUODENOSCOPY N/A 2017    Procedure: ESOPHAGOGASTRODUODENOSCOPY (EGD); Surgeon: Loreto Munoz MD;  Location: [facility] GI [facility];   Service:     EYE SURGERY Bilateral     lid repair    HERNIA REPAIR Bilateral     inguinal  &    [facility]      INCISIONAL HERNIA REPAIR N/A 3/24/2017    Procedure: REPAIR OF INCARCERATED INCISIONAL HERNIA WITH MESH, Lysis of Adhesions, Partial Omentectomy;  Surgeon: Blank Aguayo MD;  Location[facility];  Service:    Gisel Agarwal NEUROBLASTOMA EXCISION      SKIN CANCER EXCISION      On nose    TONSILLECTOMY       Social History   Social History     Substance and Sexual Activity   Alcohol Use No     Social History     Substance and Sexual Activity   Drug Use No     Social History     Tobacco Use   Smoking Status Former Smoker    Packs/day: 1 00    Years: 20 00    Pack years: 20 00    Types: Cigarettes    Quit date: 65    Years since quittin 6   Smokeless Tobacco Never Used     Family History   Problem Relation Age of Onset    Heart disease Mother     Cancer Mother     Colon cancer Mother     Dementia Mother     Early death Father     Seizures Father    Gisel Any Cancer Sister         bone    Heart disease Sister     Diabetes Sister     Lupus Sister     Heart disease Brother     Cancer Brother         kidney    Diabetes Brother     Stroke Neg Hx        Meds/Allergies       Current Outpatient Medications:     amiodarone 200 mg tablet    amLODIPine (NORVASC) 5 mg tablet    amoxicillin (AMOXIL) 875 mg tablet    apixaban (ELIQUIS) 5 mg    atorvastatin (LIPITOR) 20 mg tablet    Blood Glucose Monitoring Suppl (FREESTYLE LITE) GEOVANNY    carvedilol (COREG) 25 mg tablet    esomeprazole (NexIUM) 20 mg capsule    fluticasone (FLONASE) 50 mcg/act nasal spray    FREESTYLE LITE test strip    furosemide (LASIX) 20 mg tablet    Lancets (FREESTYLE) lancets    losartan (COZAAR) 100 MG tablet    multivitamin (THERAGRAN) TABS    Ozempic, 0 25 or 0 5 MG/DOSE, 2 MG/1 5ML SOPN    pioglitazone (ACTOS) 15 mg tablet    polyethylene glycol-propylene glycol (SYSTANE) 0 4-0 3 %    RESTASIS 0 05 % ophthalmic emulsion    tamsulosin (FLOMAX) 0 4 mg    topiramate (TOPAMAX) 50 MG tablet    Allergies   Allergen Reactions    Codeine Other (See Comments) and GI Intolerance     Reaction Date: 27Dec2004; Annotation - [id number]: '' CRAZY ''  vomiting  headache    Demerol [Meperidine] Nausea Only, Other (See Comments), GI Intolerance and Vomiting     vomiting  Reaction Date: 27Dec2004;   headache    Morphine Nausea Only, GI Intolerance and Vomiting     Reaction Date: 23Feb2012;            Objective     Blood pressure 134/81, pulse 62, height 5' 10" (1 778 m), weight 101 kg (223 lb)  Body mass index is 32 kg/m²  PHYSICAL EXAM:      General Appearance:   Alert, cooperative, no distress   HEENT:   Normocephalic, atraumatic, anicteric  Neck:  Supple, symmetrical, trachea midline   Lungs:   Clear to auscultation bilaterally; no rales, rhonchi or wheezing; respirations unlabored    Heart[de-identified]   Regular rate and rhythm; no murmur     Abdomen:   Soft, non-tender, non-distended; normal bowel sounds; no masses, no organomegaly    Genitalia:   Deferred    Rectal:   Deferred    Extremities:  No cyanosis, clubbing or edema    Skin:  No jaundice, rashes, or lesions    Lymph nodes:  No palpable cervical lymphadenopathy        Lab Results:   No visits with results within 1 Day(s) from this visit  Latest known visit with results is:   Office Visit on 08/12/2021   Component Date Value    Color, UA 08/17/2021 Yellow     Clarity, UA 08/17/2021 Cloudy     Specific Gravity, UA 08/17/2021 1 017     pH, UA 08/17/2021 6 5     Leukocytes, UA 08/17/2021 Large*    Nitrite, UA 08/17/2021 Negative     Protein, UA 08/17/2021 Trace*    Glucose, UA 08/17/2021 Negative     Ketones, UA 08/17/2021 Negative     Urobilinogen, UA 08/17/2021 0 2     Bilirubin, UA 08/17/2021 Negative     Blood, UA 08/17/2021 Small*    RBC, UA 08/17/2021 2-4     WBC, UA 08/17/2021 Innumerable*    Epithelial Cells 08/17/2021 None Seen     Bacteria, UA 08/17/2021 Occasional     Ca Oxalate Dolly, UA 08/17/2021 Occasional*         Radiology Results:   CT head without contrast    Result Date: 7/27/2021  Narrative: CT BRAIN - WITHOUT CONTRAST INDICATION:   Head trauma, mod-severe fall  COMPARISON:  July 3, 2017 TECHNIQUE:  CT examination of the brain was performed  In addition to axial images, sagittal and coronal 2D reformatted images were created and submitted for interpretation  Radiation dose length product (DLP) for this visit:  921 98 mGy-cm   This examination, like all CT scans performed in the [facility], was performed utilizing techniques to minimize radiation dose exposure, including the use of iterative  reconstruction and automated exposure control  IMAGE QUALITY:  Diagnostic  FINDINGS: PARENCHYMA: Decreased attenuation is noted in periventricular and subcortical white matter demonstrating an appearance that is statistically most likely to represent mild microangiopathic change  No CT signs of acute infarction  No intracranial mass, mass effect or midline shift  No acute parenchymal hemorrhage   VENTRICLES AND EXTRA-AXIAL SPACES:  Normal for the patient's age  VISUALIZED ORBITS AND PARANASAL SINUSES:  Unremarkable  CALVARIUM AND EXTRACRANIAL SOFT TISSUES:  Right parietal extracranial soft tissue swelling  Impression: No acute intracranial abnormality  Stable microangiopathic changes within the brain   Workstation performed: [id number]

## 2021-08-18 NOTE — TELEPHONE ENCOUNTER
----- Message from Taurus Mendenhall MD sent at 8/12/2021  2:28 PM EDT -----  Please call Dr Dorina Briseno for recent eye exam c

## 2021-08-18 NOTE — TELEPHONE ENCOUNTER
----- Message from Tevin Walton MD sent at 8/18/2021  9:16 AM EDT -----  Please call Dr Maggi Singh for recent eye exam

## 2021-08-18 NOTE — ASSESSMENT & PLAN NOTE
Depression Screening Follow-up Plan: Patient's depression screening was positive with a PHQ-2 score of 3  Their PHQ-9 score was 6  Clinically patient does not have depression  No treatment is required

## 2021-08-18 NOTE — TELEPHONE ENCOUNTER
Upon review of the In Basket request and the patient's chart, initial outreach has been made via fax, please see Contacts section for details       Thank you  Karen Mckinney, [address] [phone] Fax [phone]

## 2021-08-18 NOTE — TELEPHONE ENCOUNTER
Pt checked out of his apt with us today and wanted to confirm an apt with us in December for his AWV, I told him we didn't have him on the schedule for that day but I could schedule it and he got agitated and said he scheduled it at his last apt with us just last week  I remember  Because I was the one who checked him out, her instructions at that visit were to return around 12/12/21 for an awv he did not want to schedule at that time and would call back, he wrote the approx date on his after visit summary so I believe he was confused but he wouldn't listen to me try to explain and offering an apt   He stormed out and said "forget it"

## 2021-08-18 NOTE — PROGRESS NOTES
Assessment/Plan:    1  Medicare annual wellness visit, subsequent    2  Bronchitis  Comments:  Symptomatic relief discussed, follow up if not improving  Orders:  -     amoxicillin (AMOXIL) 875 mg tablet; Take 1 tablet (875 mg total) by mouth 2 (two) times a day for 10 days    3  Positive depression screening  Assessment & Plan:  Depression Screening Follow-up Plan: Patient's depression screening was positive with a PHQ-2 score of 3  Their PHQ-9 score was 6  Clinically patient does not have depression  No treatment is required  4  BMI 31 0-31 9,adult          Patient Instructions       Medicare Preventive Visit Patient Instructions  Thank you for completing your Welcome to Medicare Visit or Medicare Annual Wellness Visit today  Your next wellness visit will be due in one year (8/19/2022)  The screening/preventive services that you may require over the next 5-10 years are detailed below  Some tests may not apply to you based off risk factors and/or age  Screening tests ordered at today's visit but not completed yet may show as past due  Also, please note that scanned in results may not display below  Preventive Screenings:  Service Recommendations Previous Testing/Comments   Colorectal Cancer Screening  · Colonoscopy    · Fecal Occult Blood Test (FOBT)/Fecal Immunochemical Test (FIT)  · Fecal DNA/Cologuard Test  · Flexible Sigmoidoscopy Age: 54-65 years old   Colonoscopy: every 10 years (May be performed more frequently if at higher risk)  OR  FOBT/FIT: every 1 year  OR  Cologuard: every 3 years  OR  Sigmoidoscopy: every 5 years  Screening may be recommended earlier than age 48 if at higher risk for colorectal cancer  Also, an individualized decision between you and your healthcare provider will decide whether screening between the ages of 74-80 would be appropriate   Colonoscopy: 01/26/2017  FOBT/FIT: Not on file  Cologuard: Not on file  Sigmoidoscopy: Not on file          Prostate Cancer Screening Individualized decision between patient and health care provider in men between ages of 53-78   Medicare will cover every 12 months beginning on the day after your 50th birthday PSA: 4 2 ng/mL     Screening Not Indicated     Hepatitis C Screening Once for adults born between 1945 and 1965  More frequently in patients at high risk for Hepatitis C Hep C Antibody: Not on file        Diabetes Screening 1-2 times per year if you're at risk for diabetes or have pre-diabetes Fasting glucose: 108 mg/dL   A1C: 6 4 %    Screening Not Indicated  History Diabetes   Cholesterol Screening Once every 5 years if you don't have a lipid disorder  May order more often based on risk factors  Lipid panel: 10/25/2016    Screening Not Indicated  History Lipid Disorder      Other Preventive Screenings Covered by Medicare:  1  Abdominal Aortic Aneurysm (AAA) Screening: covered once if your at risk  You're considered to be at risk if you have a family history of AAA or a male between the age of 73-68 who smoking at least 100 cigarettes in your lifetime  2  Lung Cancer Screening: covers low dose CT scan once per year if you meet all of the following conditions: (1) Age 50-69; (2) No signs or symptoms of lung cancer; (3) Current smoker or have quit smoking within the last 15 years; (4) You have a tobacco smoking history of at least 30 pack years (packs per day x number of years you smoked); (5) You get a written order from a healthcare provider  3  Glaucoma Screening: covered annually if you're considered high risk: (1) You have diabetes OR (2) Family history of glaucoma OR (3)  aged 48 and older OR (3)  American aged 72 and older  3   Osteoporosis Screening: covered every 2 years if you meet one of the following conditions: (1) Have a vertebral abnormality; (2) On glucocorticoid therapy for more than 3 months; (3) Have primary hyperparathyroidism; (4) On osteoporosis medications and need to assess response to drug therapy  5  HIV Screening: covered annually if you're between the age of 12-76  Also covered annually if you are younger than 13 and older than 72 with risk factors for HIV infection  For pregnant patients, it is covered up to 3 times per pregnancy  Immunizations:  Immunization Recommendations   Influenza Vaccine Annual influenza vaccination during flu season is recommended for all persons aged >= 6 months who do not have contraindications   Pneumococcal Vaccine (Prevnar and Pneumovax)  * Prevnar = PCV13  * Pneumovax = PPSV23 Adults 25-60 years old: 1-3 doses may be recommended based on certain risk factors  Adults 72 years old: Prevnar (PCV13) vaccine recommended followed by Pneumovax (PPSV23) vaccine  If already received PPSV23 since turning 65, then PCV13 recommended at least one year after PPSV23 dose  Hepatitis B Vaccine 3 dose series if at intermediate or high risk (ex: diabetes, end stage renal disease, liver disease)   Tetanus (Td) Vaccine - COST NOT COVERED BY MEDICARE PART B Following completion of primary series, a booster dose should be given every 10 years to maintain immunity against tetanus  Td may also be given as tetanus wound prophylaxis  Tdap Vaccine - COST NOT COVERED BY MEDICARE PART B Recommended at least once for all adults  For pregnant patients, recommended with each pregnancy  Shingles Vaccine (Shingrix) - COST NOT COVERED BY MEDICARE PART B  2 shot series recommended in those aged 48 and above     Health Maintenance Due:      Topic Date Due    Hepatitis C Screening  Never done     Immunizations Due:      Topic Date Due    DTaP,Tdap,and Td Vaccines (1 - Tdap) Never done    Influenza Vaccine (1) 09/01/2021     Advance Directives   What are advance directives? Advance directives are legal documents that state your wishes and plans for medical care  These plans are made ahead of time in case you lose your ability to make decisions for yourself   Advance directives can apply to any medical decision, such as the treatments you want, and if you want to donate organs  What are the types of advance directives? There are many types of advance directives, and each state has rules about how to use them  You may choose a combination of any of the following:  · Living will: This is a written record of the treatment you want  You can also choose which treatments you do not want, which to limit, and which to stop at a certain time  This includes surgery, medicine, IV fluid, and tube feedings  · Durable power of  for healthcare [facility]): This is a written record that states who you want to make healthcare choices for you when you are unable to make them for yourself  This person, called a proxy, is usually a family member or a friend  You may choose more than 1 proxy  · Do not resuscitate (DNR) order:  A DNR order is used in case your heart stops beating or you stop breathing  It is a request not to have certain forms of treatment, such as CPR  A DNR order may be included in other types of advance directives  · Medical directive: This covers the care that you want if you are in a coma, near death, or unable to make decisions for yourself  You can list the treatments you want for each condition  Treatment may include pain medicine, surgery, blood transfusions, dialysis, IV or tube feedings, and a ventilator (breathing machine)  · Values history: This document has questions about your views, beliefs, and how you feel and think about life  This information can help others choose the care that you would choose  Why are advance directives important? An advance directive helps you control your care  Although spoken wishes may be used, it is better to have your wishes written down  Spoken wishes can be misunderstood, or not followed  Treatments may be given even if you do not want them  An advance directive may make it easier for your family to make difficult choices about your care  Depression   Depression  is a medical condition that causes feelings of sadness or hopelessness that do not go away  Depression may cause you to lose interest in things you used to enjoy  These feelings may interfere with your daily life  Call your local emergency number (911 in the [address]) if:   · You think about harming yourself or someone else  · You have done something on purpose to hurt yourself  The following resources are available at any time to help you, if needed:   · [address] [facility]: 9-971.920.8526 (6-957-296-HOZN)   · Suicide Hotline: 5-653.273.9747 (0-748-LCNHPNZ)   · For a list of international numbers: https://save org/find-help/international-resources/  Treatment for depression may include medicine to relieve depression  Medicine is often used together with therapy  Therapy is a way for you to talk about your feelings and anything that may be causing depression  Therapy can be done alone or in a group  It may also be done with family members or a significant other  · Get regular physical activity  · Create a regular sleep schedule  · Eat a variety of healthy foods  · Do not drink alcohol or use drugs  Fall Prevention    Fall prevention  includes ways to make your home and other areas safer  It also includes ways you can move more carefully to prevent a fall  Health conditions that cause changes in your blood pressure, vision, or muscle strength and coordination may increase your risk for falls  Medicines may also increase your risk for falls if they make you dizzy, weak, or sleepy  Fall prevention tips:   · Stand or sit up slowly  · Use assistive devices as directed  · Wear shoes that fit well and have soles that   · Wear a personal alarm  · Stay active  · Manage your medical conditions  Home Safety Tips:  · Add items to prevent falls in the bathroom  · Keep paths clear  · Install bright lights in your home      · Keep items you use often on shelves within reach  · Paint or place reflective tape on the edges of your stairs  Weight Management   Why it is important to manage your weight:  Being overweight increases your risk of health conditions such as heart disease, high blood pressure, type 2 diabetes, and certain types of cancer  It can also increase your risk for osteoarthritis, sleep apnea, and other respiratory problems  Aim for a slow, steady weight loss  Even a small amount of weight loss can lower your risk of health problems  How to lose weight safely:  A safe and healthy way to lose weight is to eat fewer calories and get regular exercise  You can lose up about 1 pound a week by decreasing the number of calories you eat by 500 calories each day  Healthy meal plan for weight management:  A healthy meal plan includes a variety of foods, contains fewer calories, and helps you stay healthy  A healthy meal plan includes the following:  · Eat whole-grain foods more often  A healthy meal plan should contain fiber  Fiber is the part of grains, fruits, and vegetables that is not broken down by your body  Whole-grain foods are healthy and provide extra fiber in your diet  Some examples of whole-grain foods are whole-wheat breads and pastas, oatmeal, brown rice, and bulgur  · Eat a variety of vegetables every day  Include dark, leafy greens such as spinach, kale, bernie greens, and mustard greens  Eat yellow and orange vegetables such as carrots, sweet potatoes, and winter squash  · Eat a variety of fruits every day  Choose fresh or canned fruit (canned in its own juice or light syrup) instead of juice  Fruit juice has very little or no fiber  · Eat low-fat dairy foods  Drink fat-free (skim) milk or 1% milk  Eat fat-free yogurt and low-fat cottage cheese  Try low-fat cheeses such as mozzarella and other reduced-fat cheeses  · Choose meat and other protein foods that are low in fat    Choose beans or other legumes such as split peas or lentils  Choose fish, skinless poultry (chicken or turkey), or lean cuts of red meat (beef or pork)  Before you cook meat or poultry, cut off any visible fat  · Use less fat and oil  Try baking foods instead of frying them  Add less fat, such as margarine, sour cream, regular salad dressing and mayonnaise to foods  Eat fewer high-fat foods  Some examples of high-fat foods include french fries, doughnuts, ice cream, and cakes  · Eat fewer sweets  Limit foods and drinks that are high in sugar  This includes candy, cookies, regular soda, and sweetened drinks  Exercise:  Exercise at least 30 minutes per day on most days of the week  Some examples of exercise include walking, biking, dancing, and swimming  You can also fit in more physical activity by taking the stairs instead of the elevator or parking farther away from stores  Ask your healthcare provider about the best exercise plan for you  © Copyright [employer] 2018 Information is for End User's use only and may not be sold, redistributed or otherwise used for commercial purposes  All illustrations and images included in CareNotes® are the copyrighted property of [employer]  or [address]      Return in about 6 months (around 2/18/2022)  Subjective:      Patient ID: Gabriela Chandler  is a 68 y o  male  Chief Complaint   Patient presents with    Medicare Wellness Visit     [facility]       Here for AWV but has been having a cough for a couple of weeks  He tried otc's and then tessalon without relief  There is no fever but his chest feels congested and tight  No wheeze  Cough is non productive  The following portions of the patient's history were reviewed and updated as appropriate: allergies, current medications, past family history, past medical history, past social history, past surgical history and problem list     Review of Systems   Constitutional: Negative  Negative for fatigue and fever     Respiratory: Positive for cough  Negative for wheezing  Cardiovascular: Negative  Negative for chest pain, palpitations and leg swelling  Current Outpatient Medications   Medication Sig Dispense Refill    amiodarone 200 mg tablet 1 pill daily      amLODIPine (NORVASC) 5 mg tablet Take 5 mg by mouth daily      apixaban (ELIQUIS) 5 mg Take 5 mg by mouth 2 (two) times a day      atorvastatin (LIPITOR) 20 mg tablet TAKE 1 TABLET DAILY 90 tablet 3    Blood Glucose Monitoring Suppl (FREESTYLE LITE) GEOVANNY       carvedilol (COREG) 25 mg tablet Take 12 5 mg by mouth 2 (two) times a day       esomeprazole (NexIUM) 20 mg capsule TAKE ONE CAPSULE BY MOUTH EVERY DAY AT BEDTIME 90 capsule 3    fluticasone (FLONASE) 50 mcg/act nasal spray 2 sprays into each nostril daily 18 g 5    FREESTYLE LITE test strip       furosemide (LASIX) 20 mg tablet Take 20 mg by mouth daily      Lancets (FREESTYLE) lancets       losartan (COZAAR) 100 MG tablet       multivitamin (THERAGRAN) TABS Take 1 tablet by mouth every morning      Ozempic, 0 25 or 0 5 MG/DOSE, 2 MG/1 5ML SOPN       pioglitazone (ACTOS) 15 mg tablet       polyethylene glycol-propylene glycol (SYSTANE) 0 4-0 3 % every 3 (three) hours as needed      RESTASIS 0 05 % ophthalmic emulsion Administer 1 drop to both eyes every 12 (twelve) hours       tamsulosin (FLOMAX) 0 4 mg Take 0 4 mg by mouth every other day       topiramate (TOPAMAX) 50 MG tablet Take 1 tablet (50 mg total) by mouth daily at bedtime 30 tablet 3    amoxicillin (AMOXIL) 875 mg tablet Take 1 tablet (875 mg total) by mouth 2 (two) times a day for 10 days 20 tablet 0     No current facility-administered medications for this visit  Objective:    /84   Pulse 64   Temp 99 5 °F (37 5 °C)   Resp 16   Ht 5' 10" (1 778 m)   Wt 98 9 kg (218 lb)   SpO2 98%   BMI 31 28 kg/m²        Physical Exam  Constitutional:       Appearance: Normal appearance  He is well-developed     HENT:      Right Ear: Tympanic membrane is retracted  Left Ear: Tympanic membrane is retracted  Nose: No congestion  Mouth/Throat:      Mouth: Mucous membranes are moist    Eyes:      Conjunctiva/sclera: Conjunctivae normal    Neck:      Thyroid: No thyromegaly  Vascular: No JVD  Cardiovascular:      Rate and Rhythm: Normal rate and regular rhythm  Heart sounds: Normal heart sounds  No murmur heard  No friction rub  No gallop  Pulmonary:      Effort: Pulmonary effort is normal       Breath sounds: Normal breath sounds  No wheezing or rales  Abdominal:      General: Bowel sounds are normal  There is no distension  Palpations: Abdomen is soft  Tenderness: There is no abdominal tenderness  Musculoskeletal:      Cervical back: Neck supple  Neurological:      Mental Status: He is alert                  Francine Poe MD

## 2021-08-18 NOTE — TELEPHONE ENCOUNTER
Patient had his AWV done today  He will need a 6 month follow up as per Dr Madeline Lopez note from today  Return in about 6 months (around 2/18/2022)  LMOM for patient to call back  We can inform him of this     Gilda Olmedo MA

## 2021-08-18 NOTE — PATIENT INSTRUCTIONS
Medicare Preventive Visit Patient Instructions  Thank you for completing your Welcome to Medicare Visit or Medicare Annual Wellness Visit today  Your next wellness visit will be due in one year (8/19/2022)  The screening/preventive services that you may require over the next 5-10 years are detailed below  Some tests may not apply to you based off risk factors and/or age  Screening tests ordered at today's visit but not completed yet may show as past due  Also, please note that scanned in results may not display below  Preventive Screenings:  Service Recommendations Previous Testing/Comments   Colorectal Cancer Screening  · Colonoscopy    · Fecal Occult Blood Test (FOBT)/Fecal Immunochemical Test (FIT)  · Fecal DNA/Cologuard Test  · Flexible Sigmoidoscopy Age: 54-65 years old   Colonoscopy: every 10 years (May be performed more frequently if at higher risk)  OR  FOBT/FIT: every 1 year  OR  Cologuard: every 3 years  OR  Sigmoidoscopy: every 5 years  Screening may be recommended earlier than age 48 if at higher risk for colorectal cancer  Also, an individualized decision between you and your healthcare provider will decide whether screening between the ages of 74-80 would be appropriate   Colonoscopy: 01/26/2017  FOBT/FIT: Not on file  Cologuard: Not on file  Sigmoidoscopy: Not on file          Prostate Cancer Screening Individualized decision between patient and health care provider in men between ages of 53-78   Medicare will cover every 12 months beginning on the day after your 50th birthday PSA: 4 2 ng/mL     Screening Not Indicated     Hepatitis C Screening Once for adults born between 1945 and 1965  More frequently in patients at high risk for Hepatitis C Hep C Antibody: Not on file        Diabetes Screening 1-2 times per year if you're at risk for diabetes or have pre-diabetes Fasting glucose: 108 mg/dL   A1C: 6 4 %    Screening Not Indicated  History Diabetes   Cholesterol Screening Once every 5 years if you don't have a lipid disorder  May order more often based on risk factors  Lipid panel: 10/25/2016    Screening Not Indicated  History Lipid Disorder      Other Preventive Screenings Covered by Medicare:  1  Abdominal Aortic Aneurysm (AAA) Screening: covered once if your at risk  You're considered to be at risk if you have a family history of AAA or a male between the age of 73-68 who smoking at least 100 cigarettes in your lifetime  2  Lung Cancer Screening: covers low dose CT scan once per year if you meet all of the following conditions: (1) Age 50-69; (2) No signs or symptoms of lung cancer; (3) Current smoker or have quit smoking within the last 15 years; (4) You have a tobacco smoking history of at least 30 pack years (packs per day x number of years you smoked); (5) You get a written order from a healthcare provider  3  Glaucoma Screening: covered annually if you're considered high risk: (1) You have diabetes OR (2) Family history of glaucoma OR (3)  aged 48 and older OR (3)  American aged 72 and older  3  Osteoporosis Screening: covered every 2 years if you meet one of the following conditions: (1) Have a vertebral abnormality; (2) On glucocorticoid therapy for more than 3 months; (3) Have primary hyperparathyroidism; (4) On osteoporosis medications and need to assess response to drug therapy  5  HIV Screening: covered annually if you're between the age of 12-76  Also covered annually if you are younger than 13 and older than 72 with risk factors for HIV infection  For pregnant patients, it is covered up to 3 times per pregnancy      Immunizations:  Immunization Recommendations   Influenza Vaccine Annual influenza vaccination during flu season is recommended for all persons aged >= 6 months who do not have contraindications   Pneumococcal Vaccine (Prevnar and Pneumovax)  * Prevnar = PCV13  * Pneumovax = PPSV23 Adults 25-60 years old: 1-3 doses may be recommended based on certain risk factors  Adults 72 years old: Prevnar (PCV13) vaccine recommended followed by Pneumovax (PPSV23) vaccine  If already received PPSV23 since turning 65, then PCV13 recommended at least one year after PPSV23 dose  Hepatitis B Vaccine 3 dose series if at intermediate or high risk (ex: diabetes, end stage renal disease, liver disease)   Tetanus (Td) Vaccine - COST NOT COVERED BY MEDICARE PART B Following completion of primary series, a booster dose should be given every 10 years to maintain immunity against tetanus  Td may also be given as tetanus wound prophylaxis  Tdap Vaccine - COST NOT COVERED BY MEDICARE PART B Recommended at least once for all adults  For pregnant patients, recommended with each pregnancy  Shingles Vaccine (Shingrix) - COST NOT COVERED BY MEDICARE PART B  2 shot series recommended in those aged 48 and above     Health Maintenance Due:      Topic Date Due    Hepatitis C Screening  Never done     Immunizations Due:      Topic Date Due    DTaP,Tdap,and Td Vaccines (1 - Tdap) Never done    Influenza Vaccine (1) 09/01/2021     Advance Directives   What are advance directives? Advance directives are legal documents that state your wishes and plans for medical care  These plans are made ahead of time in case you lose your ability to make decisions for yourself  Advance directives can apply to any medical decision, such as the treatments you want, and if you want to donate organs  What are the types of advance directives? There are many types of advance directives, and each state has rules about how to use them  You may choose a combination of any of the following:  · Living will: This is a written record of the treatment you want  You can also choose which treatments you do not want, which to limit, and which to stop at a certain time  This includes surgery, medicine, IV fluid, and tube feedings  · Durable power of  for healthcare [facility]):   This is a written record that states who you want to make healthcare choices for you when you are unable to make them for yourself  This person, called a proxy, is usually a family member or a friend  You may choose more than 1 proxy  · Do not resuscitate (DNR) order:  A DNR order is used in case your heart stops beating or you stop breathing  It is a request not to have certain forms of treatment, such as CPR  A DNR order may be included in other types of advance directives  · Medical directive: This covers the care that you want if you are in a coma, near death, or unable to make decisions for yourself  You can list the treatments you want for each condition  Treatment may include pain medicine, surgery, blood transfusions, dialysis, IV or tube feedings, and a ventilator (breathing machine)  · Values history: This document has questions about your views, beliefs, and how you feel and think about life  This information can help others choose the care that you would choose  Why are advance directives important? An advance directive helps you control your care  Although spoken wishes may be used, it is better to have your wishes written down  Spoken wishes can be misunderstood, or not followed  Treatments may be given even if you do not want them  An advance directive may make it easier for your family to make difficult choices about your care  Depression   Depression  is a medical condition that causes feelings of sadness or hopelessness that do not go away  Depression may cause you to lose interest in things you used to enjoy  These feelings may interfere with your daily life  Call your local emergency number (911 in the [address] [place] [address],[address]) if:   · You think about harming yourself or someone else  · You have done something on purpose to hurt yourself    The following resources are available at any time to help you, if needed:   · [address] [facility]: 0-187.599.1609 (7-501-337-YAYS)   · Suicide Hotline: 4-645.343.5097 (0-274-AAYEVGN)   · For a list of international numbers: https://save org/find-help/international-resources/  Treatment for depression may include medicine to relieve depression  Medicine is often used together with therapy  Therapy is a way for you to talk about your feelings and anything that may be causing depression  Therapy can be done alone or in a group  It may also be done with family members or a significant other  · Get regular physical activity  · Create a regular sleep schedule  · Eat a variety of healthy foods  · Do not drink alcohol or use drugs  Fall Prevention    Fall prevention  includes ways to make your home and other areas safer  It also includes ways you can move more carefully to prevent a fall  Health conditions that cause changes in your blood pressure, vision, or muscle strength and coordination may increase your risk for falls  Medicines may also increase your risk for falls if they make you dizzy, weak, or sleepy  Fall prevention tips:   · Stand or sit up slowly  · Use assistive devices as directed  · Wear shoes that fit well and have soles that   · Wear a personal alarm  · Stay active  · Manage your medical conditions  Home Safety Tips:  · Add items to prevent falls in the bathroom  · Keep paths clear  · Install bright lights in your home  · Keep items you use often on shelves within reach  · Paint or place reflective tape on the edges of your stairs  Weight Management   Why it is important to manage your weight:  Being overweight increases your risk of health conditions such as heart disease, high blood pressure, type 2 diabetes, and certain types of cancer  It can also increase your risk for osteoarthritis, sleep apnea, and other respiratory problems  Aim for a slow, steady weight loss  Even a small amount of weight loss can lower your risk of health problems    How to lose weight safely:  A safe and healthy way to lose weight is to eat fewer calories and get regular exercise  You can lose up about 1 pound a week by decreasing the number of calories you eat by 500 calories each day  Healthy meal plan for weight management:  A healthy meal plan includes a variety of foods, contains fewer calories, and helps you stay healthy  A healthy meal plan includes the following:  · Eat whole-grain foods more often  A healthy meal plan should contain fiber  Fiber is the part of grains, fruits, and vegetables that is not broken down by your body  Whole-grain foods are healthy and provide extra fiber in your diet  Some examples of whole-grain foods are whole-wheat breads and pastas, oatmeal, brown rice, and bulgur  · Eat a variety of vegetables every day  Include dark, leafy greens such as spinach, kale, bernie greens, and mustard greens  Eat yellow and orange vegetables such as carrots, sweet potatoes, and winter squash  · Eat a variety of fruits every day  Choose fresh or canned fruit (canned in its own juice or light syrup) instead of juice  Fruit juice has very little or no fiber  · Eat low-fat dairy foods  Drink fat-free (skim) milk or 1% milk  Eat fat-free yogurt and low-fat cottage cheese  Try low-fat cheeses such as mozzarella and other reduced-fat cheeses  · Choose meat and other protein foods that are low in fat  Choose beans or other legumes such as split peas or lentils  Choose fish, skinless poultry (chicken or turkey), or lean cuts of red meat (beef or pork)  Before you cook meat or poultry, cut off any visible fat  · Use less fat and oil  Try baking foods instead of frying them  Add less fat, such as margarine, sour cream, regular salad dressing and mayonnaise to foods  Eat fewer high-fat foods  Some examples of high-fat foods include french fries, doughnuts, ice cream, and cakes  · Eat fewer sweets  Limit foods and drinks that are high in sugar  This includes candy, cookies, regular soda, and sweetened drinks    Exercise:  Exercise at least 30 minutes per day on most days of the week  Some examples of exercise include walking, biking, dancing, and swimming  You can also fit in more physical activity by taking the stairs instead of the elevator or parking farther away from stores  Ask your healthcare provider about the best exercise plan for you  © Copyright [employer] 2018 Information is for End User's use only and may not be sold, redistributed or otherwise used for commercial purposes   All illustrations and images included in CareNotes® are the copyrighted property of [employer]  or [address] [place] [address] [employer]

## 2021-08-19 DIAGNOSIS — E78.2 MIXED HYPERLIPIDEMIA: ICD-10-CM

## 2021-08-19 RX ORDER — ATORVASTATIN CALCIUM 20 MG/1
TABLET, FILM COATED ORAL
Qty: 90 TABLET | Refills: 3 | Status: SHIPPED | OUTPATIENT
Start: 2021-08-19

## 2021-08-19 NOTE — TELEPHONE ENCOUNTER
Upon review of the In Basket request we were able to locate, review, and update the patient chart as requested for Diabetic Eye Exam     Any additional questions or concerns should be emailed to the Practice Liaisons via [email or web address] email, please do not reply via In Basket      Thank you  Tomás Clark

## 2021-09-13 ENCOUNTER — OFFICE VISIT (OUTPATIENT)
Dept: NEUROLOGY | Facility: CLINIC | Age: 76
End: 2021-09-13
Payer: MEDICARE

## 2021-09-13 VITALS
HEIGHT: 70 IN | DIASTOLIC BLOOD PRESSURE: 87 MMHG | SYSTOLIC BLOOD PRESSURE: 149 MMHG | WEIGHT: 220 LBS | BODY MASS INDEX: 31.5 KG/M2 | HEART RATE: 64 BPM | TEMPERATURE: 97.2 F

## 2021-09-13 DIAGNOSIS — Z87.820 HISTORY OF CONCUSSION: ICD-10-CM

## 2021-09-13 DIAGNOSIS — E11.42 DIABETIC POLYNEUROPATHY ASSOCIATED WITH TYPE 2 DIABETES MELLITUS (HCC): Primary | ICD-10-CM

## 2021-09-13 DIAGNOSIS — G43.119 INTRACTABLE MIGRAINE WITH AURA WITHOUT STATUS MIGRAINOSUS: ICD-10-CM

## 2021-09-13 DIAGNOSIS — G44.309 POST-CONCUSSION HEADACHE: ICD-10-CM

## 2021-09-13 PROCEDURE — 99214 OFFICE O/P EST MOD 30 MIN: CPT | Performed by: PSYCHIATRY & NEUROLOGY

## 2021-09-13 RX ORDER — GABAPENTIN 300 MG/1
300 CAPSULE ORAL
Qty: 30 CAPSULE | Refills: 4 | Status: SHIPPED | OUTPATIENT
Start: 2021-09-13 | End: 2021-11-03

## 2021-09-13 RX ORDER — TOPIRAMATE 50 MG/1
50 TABLET, FILM COATED ORAL
Qty: 90 TABLET | Refills: 2 | Status: SHIPPED | OUTPATIENT
Start: 2021-09-13 | End: 2021-11-03

## 2021-09-13 NOTE — PATIENT INSTRUCTIONS
From over the counter, try topical options for neuropathy as noted below:  Blue EMU cream  4% lidocaine cream

## 2021-09-13 NOTE — PROGRESS NOTES
Patient ID: Adair Stein  is a 68 y o  male  Assessment/Plan:    No problem-specific Assessment & Plan notes found for this encounter  {Assess/PlanSmartLinks:36358}       Subjective:    HPI    {[facility] HPI texts:77343}    {Common ambulatory SmartLinks:79609}         Objective:    Blood pressure 149/87, pulse 64, temperature (!) 97 2 °F (36 2 °C), temperature source Tympanic, height 5' 10" (1 778 m), weight 99 8 kg (220 lb)  Physical Exam    Neurological Exam      ROS:    Review of Systems   Constitutional: Positive for appetite change  Negative for fever  HENT: Negative  Negative for hearing loss, tinnitus, trouble swallowing and voice change  Eyes: Negative  Negative for photophobia and pain  Respiratory: Positive for shortness of breath  Cardiovascular: Negative  Negative for palpitations  Gastrointestinal: Positive for nausea  Negative for vomiting  Endocrine: Negative  Negative for cold intolerance  Genitourinary: Positive for frequency  Negative for dysuria and urgency  Musculoskeletal: Negative  Negative for myalgias and neck pain  Skin: Negative  Negative for rash  Neurological: Positive for dizziness and light-headedness  Negative for tremors, seizures, syncope, facial asymmetry, weakness and headaches  Hematological: Negative  Does not bruise/bleed easily  Psychiatric/Behavioral: Negative  Negative for confusion, hallucinations and sleep disturbance

## 2021-09-13 NOTE — PROGRESS NOTES
Return NeuroOutpatient Note        Janette Momin  [id number]  34 y o   1945       Atrial Fibrillation        History obtained from:  Patient     HPI/Subjective:    Janette Momin  is a 69 yo M with PMH of fall presents as f/u  Per my previous history, patient on 7/27/21 was walking in his backyard and giving water to plants  Patient denies tripping  He thinks he passed out  He struck his head but doesn't remember where  When he woke up, panicked and tried to find way to get up  He had no signs beforehand  He had been compliant with amiodarone and eliquis  Night of fall, patient did start getting headaches  He describes pain in bitemporal region  He had emesis  His ct brain was negative  At last visit, he had reported a lot of headaches  We placed him on topamax and since he has been doing well  Says his headaches have disappeared  His fall may have caused mild concussion  Patient is having pacemaker placed next week        He has h/o schwannoma many years ago       Today he reports sxs of tingling, burning, pain in both feet  His Sxs started a year ago  He was tried on natural supplement by his podiatrist and both gave him side effects         Past Medical History:   Diagnosis Date    Allergic rhinitis     Anemia 10/23/2008    last assessed 9/9/13     Anxiety     Atrial fibrillation (HCC)     Bleeding     BPH (benign prostatic hypertrophy)     Diabetes mellitus ([address] )     Diarrhea     Diverticulitis     Eating disorder     GERD (gastroesophageal reflux disease)     Herpes zoster with complication     last assessed 7/3/17     Hiatal hernia     Hyperlipidemia     Hypertension     IBS (irritable bowel syndrome)     Incisional hernia     Increased urinary frequency     Irregular heart beat     Pyogenic granuloma 09/13/2006    last assessed 9/13/06    Carlos (subconjunctival hemorrhage), unspecified laterality 09/12/2005    last assessed 9/12/05    Sleep apnea     TMJ (dislocation of temporomandibular joint)     Ventral hernia     last assessed  17      Social History     Socioeconomic History    Marital status: /Civil Union     Spouse name: Not on file    Number of children: Not on file    Years of education: Not on file    Highest education level: Not on file   Occupational History    Not on file   Tobacco Use    Smoking status: Former Smoker     Packs/day: 1 00     Years: 20 00     Pack years: 20 00     Types: Cigarettes     Quit date:      Years since quittin 7    Smokeless tobacco: Never Used   Vaping Use    Vaping Use: Never used   Substance and Sexual Activity    Alcohol use: No    Drug use: No    Sexual activity: Not on file   Other Topics Concern    Not on file   Social History Narrative    Single per allscript      Social Determinants of Health     Financial Resource Strain:     Difficulty of Paying Living Expenses:    Food Insecurity:     Worried About Running Out of Food in the Last Year:     [address] in the Last Year:    Transportation Needs:     Lack of Transportation (Medical):      Lack of Transportation (Non-Medical):    Physical Activity:     Days of Exercise per Week:     Minutes of Exercise per Session:    Stress:     Feeling of Stress :    Social Connections:     Frequency of Communication with Friends and Family:     Frequency of Social Gatherings with Friends and Family:     Attends Mosque Services:     Active Member of Clubs or Organizations:     Attends Club or Organization Meetings:     Marital Status:    Intimate Partner Violence:     Fear of Current or Ex-Partner:     Emotionally Abused:     Physically Abused:     Sexually Abused:      Family History   Problem Relation Age of Onset    Heart disease Mother     Cancer Mother     Colon cancer Mother     Dementia Mother     Early death Father     Seizures Father     Cancer Sister         bone    Heart disease Sister     Diabetes Sister     Lupus Sister  Heart disease Brother     Cancer Brother         kidney    Diabetes Brother     Stroke Neg Hx      Allergies   Allergen Reactions    Codeine Other (See Comments) and GI Intolerance     Reaction Date: 27Dec2004; Annotation - [id number]: '' CRAZY ''  vomiting  headache    Demerol [Meperidine] Nausea Only, Other (See Comments), GI Intolerance and Vomiting     vomiting  Reaction Date: 27Dec2004;   headache    Morphine Nausea Only, GI Intolerance and Vomiting     Reaction Date: 23Feb2012;      Current Outpatient Medications on File Prior to Visit   Medication Sig Dispense Refill    amiodarone 200 mg tablet 1 pill daily      apixaban (ELIQUIS) 5 mg Take 5 mg by mouth 2 (two) times a day      atorvastatin (LIPITOR) 20 mg tablet TAKE 1 TABLET DAILY 90 tablet 3    Blood Glucose Monitoring Suppl (FREESTYLE LITE) GEOVANNY       carvedilol (COREG) 25 mg tablet Take 12 5 mg by mouth 2 (two) times a day       esomeprazole (NexIUM) 20 mg capsule TAKE ONE CAPSULE BY MOUTH EVERY DAY AT BEDTIME 90 capsule 3    fluticasone (FLONASE) 50 mcg/act nasal spray 2 sprays into each nostril daily 18 g 5    FREESTYLE LITE test strip       Lancets (FREESTYLE) lancets       losartan (COZAAR) 100 MG tablet       multivitamin (THERAGRAN) TABS Take 1 tablet by mouth every morning      Ozempic, 0 25 or 0 5 MG/DOSE, 2 MG/1 5ML SOPN 0 5 one per week      pioglitazone (ACTOS) 15 mg tablet       polyethylene glycol-propylene glycol (SYSTANE) 0 4-0 3 % every 3 (three) hours as needed      RESTASIS 0 05 % ophthalmic emulsion Administer 1 drop to both eyes every 12 (twelve) hours       tamsulosin (FLOMAX) 0 4 mg Take 0 4 mg by mouth daily       topiramate (TOPAMAX) 50 MG tablet Take 1 tablet (50 mg total) by mouth daily at bedtime 30 tablet 3    furosemide (LASIX) 20 mg tablet Take 20 mg by mouth daily (Patient not taking: Reported on 9/13/2021)       No current facility-administered medications on file prior to visit           Review of Systems   Refer to positive review of systems in HPI  Review of Systems    Constitutional- No fever  Eyes- No visual change  ENT- Hearing normal  CV- No chest pain  Resp- No Shortness of breath  GI- No diarrhea  - Bladder normal  MS- No Arthritis   Skin- No rash  Psych- No depression  Endo- No DM  Heme- No nodes    Vitals:    09/13/21 1050   BP: 149/87   BP Location: Left arm   Patient Position: Sitting   Cuff Size: Standard   Pulse: 64   Temp: (!) 97 2 °F (36 2 °C)   TempSrc: Tympanic   Weight: 99 8 kg (220 lb)   Height: 5' 10" (1 778 m)       PHYSICAL EXAM:  Appearance: No Acute Distress  Ophthalmoscopic: Disc Flat, Normal fundus  Mental status:  Orientation: Awake, Alert, and Orientedx3  Memory: Registation 3/3 Recall 3/3  Attention: normal  Knowledge: good  Language: No aphasia  Speech: No dysarthria  Cranial Nerves:  2 No Visual Defect on Confrontation, Pupils round, equal, reactive to light  3,4,6 Extraocular Movements Intact, no nystagmus  5 Facial Sensation Intact  7 No facial asymmetry  8 Intact hearing  9,10 Palate symmetric, normal gag  11 Good shoulder shrug  12 Tongue Midline  Gait: Stable  Coordination: No ataxia with finger to nose testing, and heel to shin  Sensory: decreased to pinprick, light touch upto mid shins, decreased  Vibration at toes  Muscle Tone: Normal              Muscle exam:  Arm Right Left Leg Right Left   Deltoid 5/5 5/5 Iliopsoas 5/5 5/5   Biceps 5/5 5/5 Quads 5/5 5/5   Triceps 5/5 5/5 Hamstrings 5/5 5/5   Wrist Extension 5/5 5/5 Ankle Dorsi Flexion 5/5 5/5   Wrist Flexion 5/5 5/5 Ankle Plantar Flexion 5/5 5/5   Interossei 5/5 5/5 Ankle Eversion 5/5 5/5   APB 5/5 5/5 Ankle Inversion 5/5 5/5       Reflexes   RJ BJ TJ KJ AJ Plantars Victor's   Right 2+ 2+ 2+ 2+ 2+ Downgoing Not present   Left 2+ 2+ 2+ 2+ 2+ Downgoing Not present     Personal review of  Labs:                  Diagnoses and all orders for this visit:      1   Diabetic polyneuropathy associated with type 2 diabetes mellitus (HCC)  gabapentin (NEURONTIN) 300 mg capsule   2  Intractable migraine with aura without status migrainosus     3  History of concussion         Patient is doing well in terms of headaches  Will resume topamax for now as it's low dose  As for his neuropathy, will start him on gabapentin 300mg qhs  Discussed some of topical otc options  He has maintained his HgbA1c <7  He was made aware to avoid excess sugars and salt intake                   Total time of encounter:  30 min  More than 50% of the time was used in counseling and/or coordination of care  Extent of counseling and/or coordination of care        MD [facility]  [address]  [address]  [phone]

## 2021-10-02 ENCOUNTER — OFFICE VISIT (OUTPATIENT)
Dept: FAMILY MEDICINE CLINIC | Facility: CLINIC | Age: 76
End: 2021-10-02
Payer: MEDICARE

## 2021-10-02 VITALS
HEIGHT: 70 IN | BODY MASS INDEX: 31.92 KG/M2 | WEIGHT: 223 LBS | SYSTOLIC BLOOD PRESSURE: 126 MMHG | TEMPERATURE: 98.4 F | DIASTOLIC BLOOD PRESSURE: 70 MMHG | HEART RATE: 71 BPM | RESPIRATION RATE: 14 BRPM | OXYGEN SATURATION: 99 %

## 2021-10-02 DIAGNOSIS — R10.84 GENERALIZED ABDOMINAL PAIN: Primary | ICD-10-CM

## 2021-10-02 DIAGNOSIS — K57.90 DIVERTICULOSIS: ICD-10-CM

## 2021-10-02 DIAGNOSIS — R19.09 ABDOMINAL MASS OF OTHER SITE: ICD-10-CM

## 2021-10-02 DIAGNOSIS — F33.9 DEPRESSION, RECURRENT (HCC): ICD-10-CM

## 2021-10-02 PROCEDURE — 99213 OFFICE O/P EST LOW 20 MIN: CPT | Performed by: FAMILY MEDICINE

## 2021-10-02 RX ORDER — AMOXICILLIN AND CLAVULANATE POTASSIUM 875; 125 MG/1; MG/1
1 TABLET, FILM COATED ORAL EVERY 12 HOURS SCHEDULED
Qty: 20 TABLET | Refills: 0 | Status: SHIPPED | OUTPATIENT
Start: 2021-10-02 | End: 2021-10-12

## 2021-10-02 RX ORDER — FINASTERIDE 5 MG/1
5 TABLET, FILM COATED ORAL DAILY
COMMUNITY
Start: 2021-09-14 | End: 2022-09-14

## 2021-10-11 ENCOUNTER — IMMUNIZATIONS (OUTPATIENT)
Dept: FAMILY MEDICINE CLINIC | Facility: CLINIC | Age: 76
End: 2021-10-11
Payer: MEDICARE

## 2021-10-11 DIAGNOSIS — Z23 NEED FOR INFLUENZA VACCINATION: Primary | ICD-10-CM

## 2021-10-11 PROCEDURE — G0008 ADMIN INFLUENZA VIRUS VAC: HCPCS

## 2021-10-11 PROCEDURE — 90662 IIV NO PRSV INCREASED AG IM: CPT

## 2021-10-20 LAB — HBA1C MFR BLD HPLC: 6.5 %

## 2021-10-22 ENCOUNTER — TELEPHONE (OUTPATIENT)
Dept: GASTROENTEROLOGY | Facility: CLINIC | Age: 76
End: 2021-10-22

## 2021-10-22 ENCOUNTER — PREP FOR PROCEDURE (OUTPATIENT)
Dept: GASTROENTEROLOGY | Facility: CLINIC | Age: 76
End: 2021-10-22

## 2021-10-22 DIAGNOSIS — K58.0 IRRITABLE BOWEL SYNDROME WITH DIARRHEA: Primary | ICD-10-CM

## 2021-10-22 DIAGNOSIS — K21.9 GASTROESOPHAGEAL REFLUX DISEASE, UNSPECIFIED WHETHER ESOPHAGITIS PRESENT: ICD-10-CM

## 2021-11-01 ENCOUNTER — TELEPHONE (OUTPATIENT)
Dept: PREADMISSION TESTING | Facility: HOSPITAL | Age: 76
End: 2021-11-01

## 2021-11-03 VITALS — WEIGHT: 215 LBS | HEIGHT: 70 IN | BODY MASS INDEX: 30.78 KG/M2

## 2021-11-03 RX ORDER — ACETAMINOPHEN 325 MG/1
650 TABLET ORAL EVERY 6 HOURS PRN
COMMUNITY

## 2021-11-03 RX ORDER — PIOGLITAZONE HCL AND METFORMIN HCL 850; 15 MG/1; MG/1
TABLET ORAL
COMMUNITY
Start: 2021-10-20

## 2021-11-06 ENCOUNTER — IMMUNIZATIONS (OUTPATIENT)
Dept: FAMILY MEDICINE CLINIC | Facility: HOSPITAL | Age: 76
End: 2021-11-06

## 2021-11-06 DIAGNOSIS — Z23 ENCOUNTER FOR IMMUNIZATION: Primary | ICD-10-CM

## 2021-11-06 PROCEDURE — 0001A COVID-19 PFIZER VACC 0.3 ML: CPT

## 2021-11-06 PROCEDURE — 91300 COVID-19 PFIZER VACC 0.3 ML: CPT

## 2021-11-09 ENCOUNTER — TELEPHONE (OUTPATIENT)
Dept: GASTROENTEROLOGY | Facility: CLINIC | Age: 76
End: 2021-11-09

## 2021-11-09 ENCOUNTER — TELEPHONE (OUTPATIENT)
Dept: PERIOP | Facility: HOSPITAL | Age: 76
End: 2021-11-09

## 2021-11-10 ENCOUNTER — HOSPITAL ENCOUNTER (OUTPATIENT)
Dept: GASTROENTEROLOGY | Facility: AMBULARY SURGERY CENTER | Age: 76
Setting detail: OUTPATIENT SURGERY
Discharge: HOME | End: 2021-11-10
Attending: INTERNAL MEDICINE | Admitting: INTERNAL MEDICINE
Payer: MEDICARE

## 2021-11-10 ENCOUNTER — ANESTHESIA EVENT (OUTPATIENT)
Dept: GASTROENTEROLOGY | Facility: AMBULARY SURGERY CENTER | Age: 76
End: 2021-11-10

## 2021-11-10 ENCOUNTER — ANESTHESIA (OUTPATIENT)
Dept: GASTROENTEROLOGY | Facility: AMBULARY SURGERY CENTER | Age: 76
End: 2021-11-10

## 2021-11-10 VITALS
RESPIRATION RATE: 18 BRPM | HEART RATE: 63 BPM | OXYGEN SATURATION: 99 % | DIASTOLIC BLOOD PRESSURE: 87 MMHG | TEMPERATURE: 97.1 F | SYSTOLIC BLOOD PRESSURE: 155 MMHG

## 2021-11-10 DIAGNOSIS — K58.0 IRRITABLE BOWEL SYNDROME WITH DIARRHEA: ICD-10-CM

## 2021-11-10 DIAGNOSIS — K21.9 GASTROESOPHAGEAL REFLUX DISEASE, UNSPECIFIED WHETHER ESOPHAGITIS PRESENT: ICD-10-CM

## 2021-11-10 PROCEDURE — 88305 TISSUE EXAM BY PATHOLOGIST: CPT | Performed by: PATHOLOGY

## 2021-11-10 PROCEDURE — 45380 COLONOSCOPY AND BIOPSY: CPT | Performed by: INTERNAL MEDICINE

## 2021-11-10 PROCEDURE — 43239 EGD BIOPSY SINGLE/MULTIPLE: CPT | Performed by: INTERNAL MEDICINE

## 2021-11-10 PROCEDURE — 45385 COLONOSCOPY W/LESION REMOVAL: CPT | Performed by: INTERNAL MEDICINE

## 2021-11-10 RX ORDER — PROPOFOL 10 MG/ML
INJECTION, EMULSION INTRAVENOUS CONTINUOUS PRN
Status: DISCONTINUED | OUTPATIENT
Start: 2021-11-10 | End: 2021-11-10

## 2021-11-10 RX ORDER — LIDOCAINE HYDROCHLORIDE 20 MG/ML
INJECTION, SOLUTION EPIDURAL; INFILTRATION; INTRACAUDAL; PERINEURAL AS NEEDED
Status: DISCONTINUED | OUTPATIENT
Start: 2021-11-10 | End: 2021-11-10

## 2021-11-10 RX ORDER — PROPOFOL 10 MG/ML
INJECTION, EMULSION INTRAVENOUS AS NEEDED
Status: DISCONTINUED | OUTPATIENT
Start: 2021-11-10 | End: 2021-11-10

## 2021-11-10 RX ADMIN — LIDOCAINE HYDROCHLORIDE 90 MG: 20 INJECTION, SOLUTION EPIDURAL; INFILTRATION; INTRACAUDAL; PERINEURAL at 10:22

## 2021-11-10 RX ADMIN — PROPOFOL 100 MCG/KG/MIN: 10 INJECTION, EMULSION INTRAVENOUS at 10:35

## 2021-11-10 RX ADMIN — PROPOFOL 20 MG: 10 INJECTION, EMULSION INTRAVENOUS at 10:35

## 2021-11-10 RX ADMIN — PROPOFOL 100 MG: 10 INJECTION, EMULSION INTRAVENOUS at 10:22

## 2021-11-10 RX ADMIN — PROPOFOL 30 MG: 10 INJECTION, EMULSION INTRAVENOUS at 10:26

## 2021-11-10 RX ADMIN — PROPOFOL 20 MG: 10 INJECTION, EMULSION INTRAVENOUS at 10:23

## 2021-11-10 RX ADMIN — PROPOFOL 30 MG: 10 INJECTION, EMULSION INTRAVENOUS at 10:30

## 2021-12-01 ENCOUNTER — TELEPHONE (OUTPATIENT)
Dept: GASTROENTEROLOGY | Facility: CLINIC | Age: 76
End: 2021-12-01

## 2021-12-01 DIAGNOSIS — K21.9 GASTROESOPHAGEAL REFLUX DISEASE WITHOUT ESOPHAGITIS: Primary | ICD-10-CM

## 2021-12-02 ENCOUNTER — TELEPHONE (OUTPATIENT)
Dept: GASTROENTEROLOGY | Facility: CLINIC | Age: 76
End: 2021-12-02

## 2021-12-02 RX ORDER — ESOMEPRAZOLE MAGNESIUM 40 MG/1
40 CAPSULE, DELAYED RELEASE ORAL DAILY
Qty: 30 CAPSULE | Refills: 3 | Status: SHIPPED | OUTPATIENT
Start: 2021-12-02 | End: 2022-03-10

## 2022-01-26 ENCOUNTER — OFFICE VISIT (OUTPATIENT)
Dept: PULMONOLOGY | Facility: MEDICAL CENTER | Age: 77
End: 2022-01-26
Payer: MEDICARE

## 2022-01-26 VITALS
HEART RATE: 76 BPM | WEIGHT: 214 LBS | TEMPERATURE: 98.4 F | DIASTOLIC BLOOD PRESSURE: 72 MMHG | BODY MASS INDEX: 29.96 KG/M2 | RESPIRATION RATE: 12 BRPM | HEIGHT: 71 IN | OXYGEN SATURATION: 98 % | SYSTOLIC BLOOD PRESSURE: 124 MMHG

## 2022-01-26 DIAGNOSIS — R06.00 DYSPNEA ON EXERTION: ICD-10-CM

## 2022-01-26 DIAGNOSIS — G47.33 OBSTRUCTIVE SLEEP APNEA: Primary | ICD-10-CM

## 2022-01-26 PROCEDURE — 99213 OFFICE O/P EST LOW 20 MIN: CPT | Performed by: INTERNAL MEDICINE

## 2022-01-26 RX ORDER — AMOXICILLIN 125 MG/1
125 TABLET, CHEWABLE ORAL DAILY
COMMUNITY
Start: 2022-01-21 | End: 2022-02-22

## 2022-01-26 RX ORDER — AMLODIPINE BESYLATE 5 MG/1
TABLET ORAL
COMMUNITY
Start: 2022-01-05

## 2022-01-26 RX ORDER — TAMSULOSIN HYDROCHLORIDE 0.4 MG/1
CAPSULE ORAL
COMMUNITY
Start: 2021-11-26

## 2022-01-26 NOTE — PROGRESS NOTES
Assessment/Plan        Problem List Items Addressed This Visit        Respiratory    Obstructive sleep apnea - Primary     Severe ROBER with good compliance to CPAP therapy  He set on CPAP pressure of 10    IV view compliance data for past 1 month and has been using CPAP on regular basis for over 7 hours per night  This resulted in AHI of 0 4  His auto dream station CPAP machine is on recall list   Hopefully he will get new CPAP machine in next couple months  If not he is eligible for a new CPAP machine from his DME this October 26, 2022 as this will be his 5 year renal   For now told to continue his older CPAP machine  He will contact us if he gets a new CPAP machine on recall from [facility]    He does use nasal pillows interface  DME is [facility]            Other    Dyspnea on exertion     He does have some mild exertional dyspnea at times  I did review PFT that was done April of 2021 with him  No evidence of any airflow obstruction lung volumes are normal   Diffusion capacity was only minimal decreased  No evidence to indicate amiodarone pneumonitis  Also chest x-ray done January of last year showed no sign of any interstitial lung disease                 Follow-up (discuss CPAP recall)      HPI     Regina Saldivar has severe ROBER and has auto dream CPAP machine which is on recall  He is not be eligible for new machine otherwise until October 22 of this juju  Uses a nasal cushion type mask  DME company is [employer]   His CPAP is set at 10 cm water  He did register his CPAP machine several months ago but is still waiting for replacement  His machine appears to be functioning properly  Does not use any type of sterilization machine    He also has atrial fibrillation and is on Eliquis 5 mg twice a day and low-dose amiodarone 200 mg daily  He did have history syncope in the past year and had pacemaker insertion  He quit smoking 1975 smoke 1 pack per day for 20 years      Regina Saldivar does have some mild shortness of breath activity at times  He did have complete PFT done on 04/20/2021 which I reviewed with him  Lung volumes are normal and diffusion capacity measurement was minimally decreased at 77% of predicted when corrected for hemoglobin  Past Medical History:   Diagnosis Date    Allergic rhinitis     Anemia 10/23/2008    last assessed 9/9/13     Anxiety     Atrial fibrillation (HCC)     Bleeding     BPH (benign prostatic hypertrophy)     Chronic kidney disease     CPAP (continuous positive airway pressure) dependence     Diabetes mellitus ([address] )     Diarrhea     Diverticulitis     Eating disorder     GERD (gastroesophageal reflux disease)     Herpes zoster with complication     last assessed 7/3/17     Hiatal hernia     Hyperlipidemia     Hypertension     IBS (irritable bowel syndrome)     Incisional hernia     Increased urinary frequency     Irregular heart beat     Pyogenic granuloma 09/13/2006    last assessed 9/13/06    Carlos (subconjunctival hemorrhage), unspecified laterality 09/12/2005    last assessed 9/12/05    Sleep apnea     TMJ (dislocation of temporomandibular joint)     UTI (urinary tract infection)     Ventral hernia     last assessed  4/6/17     Wears glasses        Past Surgical History:   Procedure Laterality Date    APPENDECTOMY      ATRIAL ABLATION SURGERY      2014    ATRIAL ABLATION SURGERY      catheter ablation atrial fibrillation / last assessed 2/17/16     CHOLECYSTECTOMY      lap    COLON SURGERY  1990    Hartmans procedure    COLON SURGERY      reversal 6 weeks later    COLONOSCOPY N/A 1/26/2017    Procedure: COLONOSCOPY;  Surgeon: Adilia Bañuelos MD;  Location: [facility] GI LAB; Service:     ESOPHAGOGASTRODUODENOSCOPY N/A 1/26/2017    Procedure: ESOPHAGOGASTRODUODENOSCOPY (EGD); Surgeon: Adilia Bañuelos MD;  Location: [facility] GI LAB;   Service:     EYE SURGERY Bilateral 2015    lid repair    HERNIA REPAIR Bilateral     inguinal 2014 & 2013   [facility]  2011    INCISIONAL HERNIA REPAIR N/A 3/24/2017    Procedure: REPAIR OF INCARCERATED INCISIONAL HERNIA WITH MESH, Lysis of Adhesions, Partial Omentectomy;  Surgeon: Neto Root MD;  Location: [address] [place] [address];  Service:    Brian Leggett / Nieves Espinal / Dimas Arias Left 10/04/2021    [id number]    NEUROBLASTOMA EXCISION      SKIN CANCER EXCISION      On nose    TONSILLECTOMY           Current Outpatient Medications:     acetaminophen (TYLENOL) 325 mg tablet, Take 650 mg by mouth every 6 (six) hours as needed, Disp: , Rfl:     amiodarone 200 mg tablet, 200 mg daily after dinner , Disp: , Rfl:     amLODIPine (NORVASC) 5 mg tablet, , Disp: , Rfl:     amoxicillin (AMOXIL) 125 MG chewable tablet, Chew 125 mg daily, Disp: , Rfl:     apixaban (ELIQUIS) 5 mg, Take 5 mg by mouth 2 (two) times a day To stop 2 days prior to the procedure, Disp: , Rfl:     atorvastatin (LIPITOR) 20 mg tablet, TAKE 1 TABLET DAILY, Disp: 90 tablet, Rfl: 3    bisacodyl (DULCOLAX) 5 mg EC tablet, Take 20 mg by mouth once, Disp: , Rfl:     Blood Glucose Monitoring Suppl (FREESTYLE LITE) GEOVANNY, , Disp: , Rfl:     carvedilol (COREG) 25 mg tablet, Take 12 5 mg by mouth 2 (two) times a day , Disp: , Rfl:     finasteride (PROSCAR) 5 mg tablet, Take 5 mg by mouth daily, Disp: , Rfl:     fluticasone (FLONASE) 50 mcg/act nasal spray, 2 sprays into each nostril daily (Patient taking differently: 2 sprays into each nostril as needed ), Disp: 18 g, Rfl: 5    FREESTYLE LITE test strip, , Disp: , Rfl:     Lancets (FREESTYLE) lancets, , Disp: , Rfl:     losartan (COZAAR) 100 MG tablet, 100 mg every morning , Disp: , Rfl:     multivitamin (THERAGRAN) TABS, Take 1 tablet by mouth every morning, Disp: , Rfl:     Ozempic, 0 25 or 0 5 MG/DOSE, 2 MG/1 5ML SOPN, 0 5 one per week-On Sunday, Disp: , Rfl:     pioglitazone-metFORMIN (ACTOPLUS MET)  MG per tablet, daily after dinner, Disp: , Rfl:    Polyethylene Glycol 3350 (MIRALAX PO), Take by mouth once, Disp: , Rfl:     polyethylene glycol-propylene glycol (Systane) 0 4-0 3 %, every 3 (three) hours as needed  , Disp: , Rfl:     RESTASIS 0 05 % ophthalmic emulsion, Administer 1 drop to both eyes every 12 (twelve) hours , Disp: , Rfl:     tamsulosin (FLOMAX) 0 4 mg, , Disp: , Rfl:     esomeprazole (NexIUM) 40 MG capsule, Take 1 capsule (40 mg total) by mouth daily, Disp: 30 capsule, Rfl: 3    Allergies   Allergen Reactions    Codeine Other (See Comments) and GI Intolerance     Reaction Date: 2004; Annotation - [id number]: '' CRAZY ''  vomiting  headache    Demerol [Meperidine] Nausea Only, Other (See Comments), GI Intolerance and Vomiting     vomiting  Reaction Date: 2004;   headache    Morphine Nausea Only, GI Intolerance and Vomiting     Reaction Date: 2012;        Social History     Tobacco Use    Smoking status: Former Smoker     Packs/day:      Years:      Pack years:      Types: Cigarettes     Quit date:      Years since quittin 0    Smokeless tobacco: Never Used   Substance Use Topics    Alcohol use: No     Comment: none in 35 yrs         Family History   Problem Relation Age of Onset    Heart disease Mother     Cancer Mother     Colon cancer Mother     Dementia Mother     Early death Father     Seizures Father    Patsy Nine Cancer Sister         bone    Heart disease Sister     Diabetes Sister     Lupus Sister     Heart disease Brother     Cancer Brother         kidney    Diabetes Brother     Other Sister         covid    Stroke Neg Hx        Review of Systems   Constitutional: Negative for chills, fever and unexpected weight change  HENT: Negative for congestion, rhinorrhea and sore throat  Eyes: Negative for discharge and redness  Respiratory:        Has some mild shortness of breath at times   Cardiovascular: Negative for chest pain, palpitations and leg swelling     Gastrointestinal: Negative for abdominal distention, abdominal pain and nausea  Endocrine: Negative for polydipsia and polyphagia  Genitourinary: Negative for dysuria  Musculoskeletal: Negative for joint swelling and myalgias  Skin: Negative for rash  Neurological: Negative for light-headedness  Psychiatric/Behavioral: Negative for decreased concentration  Vitals:    01/26/22 1155   BP: 124/72   Pulse: 76   Resp: 12   Temp: 98 4 °F (36 9 °C)   SpO2: 98%     Height 5 ft 10 in tall, weight 214 lb BMI 30 27      Physical Exam  Vitals reviewed  Constitutional:       General: He is not in acute distress  Appearance: Normal appearance  He is well-developed  HENT:      Head: Normocephalic  Right Ear: External ear normal       Left Ear: External ear normal       Nose: Nose normal       Mouth/Throat:      Pharynx: No oropharyngeal exudate  Eyes:      Conjunctiva/sclera: Conjunctivae normal       Pupils: Pupils are equal, round, and reactive to light  Cardiovascular:      Rate and Rhythm: Normal rate and regular rhythm  Heart sounds: Normal heart sounds  Pulmonary:      Effort: Pulmonary effort is normal       Comments: Lung sounds are clear  No wheezes, crackles, or rhonchi  Abdominal:      General: There is no distension  Palpations: Abdomen is soft  Tenderness: There is no abdominal tenderness  Musculoskeletal:      Cervical back: Neck supple  Comments: No edema, cyanosis, or clubbing   Lymphadenopathy:      Cervical: No cervical adenopathy  Skin:     General: Skin is warm and dry  Neurological:      General: No focal deficit present  Mental Status: He is alert and oriented to person, place, and time  Psychiatric:         Mood and Affect: Mood normal          Thought Content:  Thought content normal

## 2022-01-26 NOTE — PATIENT INSTRUCTIONS
You are due for new CPAP machine in October    Call in early October to have new machine ordered from [address]     If you get a new CPAP machine replacement from [facility] before then call us and we can set up appointment with Saira Jeronimo to check at your machine to make sure it is set up correctly    If he started having difficulty breathing over next few months call me and I will order chest x-ray    [facility] [phone]

## 2022-01-27 NOTE — ASSESSMENT & PLAN NOTE
Severe ROBER with good compliance to CPAP therapy  He set on CPAP pressure of 10    IV view compliance data for past 1 month and has been using CPAP on regular basis for over 7 hours per night  This resulted in AHI of 0 4  His auto dream station CPAP machine is on recall list   Hopefully he will get new CPAP machine in next couple months  If not he is eligible for a new CPAP machine from his DME this October 26, 2022 as this will be his 5 year renal   For now told to continue his older CPAP machine  He will contact us if he gets a new CPAP machine on recall from [facility]    He does use nasal pillows interface    DME is [facility]

## 2022-01-27 NOTE — ASSESSMENT & PLAN NOTE
He does have some mild exertional dyspnea at times  I did review PFT that was done April of 2021 with him  No evidence of any airflow obstruction lung volumes are normal   Diffusion capacity was only minimal decreased  No evidence to indicate amiodarone pneumonitis    Also chest x-ray done January of last year showed no sign of any interstitial lung disease

## 2022-02-17 ENCOUNTER — TELEPHONE (OUTPATIENT)
Dept: UROLOGY | Facility: AMBULATORY SURGERY CENTER | Age: 77
End: 2022-02-17

## 2022-02-17 NOTE — TELEPHONE ENCOUNTER
Patient is well established at [facility] urology, last visit being yesterday 2/17/22, is now requesting to be seen by [facility] for ASAP cysto for bladder stone and only wants MD     Per [facility] records  02/08/22  68 y o  male continues with the finasteride and tamsulosin  Notes currently doing better, no dysuria anymore  Never had blood  Feeling better  Assessment:    Recurrent Enterococcus UTI  UA currently clear and    Incomplete bladder emptying    BPH on tamsulosin and finasteride   4 3cm right lower pole exophytic cyst (Needs f/u CT)   DM that may also be contributing to his infections    Wants a male  to evaluate his prostate    Plan:  Patient Instructions   F/u cystoscopy Dr Olivier Loo (requests a male)   Low dose amoxil 125mg HS to help prevent infections  Continue the tamsulosin 0 4mg and finasteride 5mg   Low carb to help with his DM  CT Urogram to evaluate the exophytic cyst and reasons for his recent for infections  Continue the probiotics   BMP prior to CT scan     02/14/22  68 y o  male with recurrent UTI's, incomplete bladder emptying, BPH on finasteride/tamsulosin, and DM presetns for f/u to his CT urogram for a 4 3cm right lower pole exophytic cyst  PT notes continues with intermittent dysuria and takes the amoxil 125mg -250mg  Denies any fever or chills

## 2022-02-17 NOTE — PROGRESS NOTES
Assessment/Plan:    1  Type 2 diabetes mellitus with stage 3 chronic kidney disease, without long-term current use of insulin, unspecified whether stage 3a or 3b CKD ([facility] [address] )  Assessment & Plan:    Lab Results   Component Value Date    HGBA1C 6 4 (H) 05/17/2021     He states this has been well controlled and he continues to follow with Dr Qi Matamoros in [place]  We will call for recent lab results  Advised patient on the need for continued good foot and eye care  2  Diabetic polyneuropathy associated with type 2 diabetes mellitus [facility])  Assessment & Plan:    Lab Results   Component Value Date    HGBA1C 6 4 (H) 05/17/2021     He continues to follow with podiatry and denies current symptoms  3  Atrial fibrillation, unspecified type [facility])  Assessment & Plan:  He continues on coreg, apixaban, amiodarone through his cardiologist and denies recurrent symptoms  4  Hypertension, benign  Assessment & Plan:  Well controlled and will continue current medications as ordered  5  Aortic root dilation [facility])  Assessment & Plan:  Managed by cardiology  6  Mixed hyperlipidemia  Assessment & Plan:  Well controlled on atorvastatin at current dosage and will continue as ordered  Encouraged low fat diet and exercise  7  Benign prostatic hyperplasia without urinary obstruction  -     Ambulatory Referral to Urology; Future    8  Renal cyst  -     Ambulatory Referral to Urology; Future    9  Cough  -     fluticasone (FLONASE) 50 mcg/act nasal spray; 2 sprays into each nostril daily          There are no Patient Instructions on file for this visit  Return in about 6 months (around 8/21/2022) for 1/2 hour follow up and AWV  Subjective:      Patient ID: Patel Longo  is a 68 y o  male  Chief Complaint   Patient presents with    Follow-up     6 month f/u nm lpn       Here for a follow up visit  He continues to follow with his specialists  He sees Dr Qi Matamoros for his DM and cardiology at [facility]    He needs a new urologist   He has a bladder stone and has been told her has a spot on his kidney that needs follow up  He is upset that Dr Felicia De Jesus left the area  There is no chest pain or dyspnea  No hypoglycemia  States his last A1C was well controlled and he follows regularly with podiatry as well  The following portions of the patient's history were reviewed and updated as appropriate: allergies, current medications, past family history, past medical history, past social history, past surgical history and problem list     Review of Systems   Constitutional: Negative  Respiratory: Negative  Cardiovascular: Negative  Endocrine: Negative            Current Outpatient Medications   Medication Sig Dispense Refill    acetaminophen (TYLENOL) 325 mg tablet Take 650 mg by mouth every 6 (six) hours as needed      amiodarone 200 mg tablet 200 mg daily after dinner       amLODIPine (NORVASC) 5 mg tablet       amoxicillin (AMOXIL) 125 MG chewable tablet Chew 125 mg daily      apixaban (ELIQUIS) 5 mg Take 5 mg by mouth 2 (two) times a day To stop 2 days prior to the procedure      atorvastatin (LIPITOR) 20 mg tablet TAKE 1 TABLET DAILY 90 tablet 3    Blood Glucose Monitoring Suppl (FREESTYLE LITE) GEOVANNY       carvedilol (COREG) 25 mg tablet Take 12 5 mg by mouth 2 (two) times a day       esomeprazole (NexIUM) 40 MG capsule Take 1 capsule (40 mg total) by mouth daily 30 capsule 3    finasteride (PROSCAR) 5 mg tablet Take 5 mg by mouth daily      fluticasone (FLONASE) 50 mcg/act nasal spray 2 sprays into each nostril daily 48 g 3    FREESTYLE LITE test strip       Lancets (FREESTYLE) lancets       losartan (COZAAR) 100 MG tablet 100 mg every morning       Ozempic, 0 25 or 0 5 MG/DOSE, 2 MG/1 5ML SOPN 0 5 one per week-On Sunday      pioglitazone-metFORMIN (ACTOPLUS MET)  MG per tablet daily after dinner      polyethylene glycol-propylene glycol (Systane) 0 4-0 3 % every 3 (three) hours as needed        RESTASIS 0 05 % ophthalmic emulsion Administer 1 drop to both eyes every 12 (twelve) hours       tamsulosin (FLOMAX) 0 4 mg        No current facility-administered medications for this visit  Objective:    /82   Pulse 70   Temp 99 °F (37 2 °C)   Resp 17   Ht 5' 10 5" (1 791 m)   Wt 98 4 kg (217 lb) Comment: per patient  SpO2 98%   BMI 30 70 kg/m²        Physical Exam  Constitutional:       Appearance: He is well-developed  Eyes:      Conjunctiva/sclera: Conjunctivae normal    Neck:      Thyroid: No thyromegaly  Vascular: No JVD  Cardiovascular:      Rate and Rhythm: Normal rate and regular rhythm  Pulses: no weak pulses          Dorsalis pedis pulses are 2+ on the right side and 2+ on the left side  Heart sounds: Normal heart sounds  No murmur heard  No friction rub  No gallop  Pulmonary:      Effort: Pulmonary effort is normal       Breath sounds: Normal breath sounds  No wheezing or rales  Abdominal:      General: Bowel sounds are normal  There is no distension  Palpations: Abdomen is soft  Tenderness: There is no abdominal tenderness  Musculoskeletal:      Cervical back: Neck supple  Feet:      Right foot:      Skin integrity: No ulcer, skin breakdown, erythema, warmth, callus or dry skin  Left foot:      Skin integrity: No ulcer, skin breakdown, erythema, warmth, callus or dry skin  Patient's shoes and socks removed  Right Foot/Ankle   Right Foot Inspection  Skin Exam: skin normal and skin intact  No dry skin, no warmth, no callus, no erythema, no maceration, no abnormal color, no pre-ulcer, no ulcer and no callus  Toe Exam: ROM and strength within normal limits  Sensory   Monofilament testing: intact    Vascular  Capillary refills: < 3 seconds  The right DP pulse is 2+  Left Foot/Ankle  Left Foot Inspection  Skin Exam: skin normal and skin intact   No dry skin, no warmth, no erythema, no maceration, normal color, no pre-ulcer, no ulcer and no callus  Toe Exam: ROM and strength within normal limits  Sensory   Monofilament testing: intact    Vascular  Capillary refills: < 3 seconds  The left DP pulse is 2+       Assign Risk Category  No deformity present  No loss of protective sensation  No weak pulses  Risk: 0        Jagruti Foster MD

## 2022-02-17 NOTE — TELEPHONE ENCOUNTER
Reason for appointment/Complaint/Diagnosis : patient is calling to get appointment soon as possible  Stated he had a CT done and was told he had stone in bladder  He stated he needs to have cysto done and is requesting MD only  Does not want to see female PA  Insurance: Medicare and [facility]     History of Cancer? no  If yes, what kind?n/a  Previous urologist?   Dangelo                 Records requested/where? Care Everywhere    Outside testing/where? [facility]    Location Preference for office visit?  [place] or [place]

## 2022-02-21 ENCOUNTER — TELEPHONE (OUTPATIENT)
Dept: FAMILY MEDICINE CLINIC | Facility: CLINIC | Age: 77
End: 2022-02-21

## 2022-02-21 ENCOUNTER — OFFICE VISIT (OUTPATIENT)
Dept: FAMILY MEDICINE CLINIC | Facility: CLINIC | Age: 77
End: 2022-02-21
Payer: MEDICARE

## 2022-02-21 VITALS
HEART RATE: 70 BPM | TEMPERATURE: 99 F | WEIGHT: 217 LBS | HEIGHT: 71 IN | BODY MASS INDEX: 30.38 KG/M2 | RESPIRATION RATE: 17 BRPM | SYSTOLIC BLOOD PRESSURE: 128 MMHG | DIASTOLIC BLOOD PRESSURE: 82 MMHG | OXYGEN SATURATION: 98 %

## 2022-02-21 DIAGNOSIS — E11.22 TYPE 2 DIABETES MELLITUS WITH STAGE 3 CHRONIC KIDNEY DISEASE, WITHOUT LONG-TERM CURRENT USE OF INSULIN, UNSPECIFIED WHETHER STAGE 3A OR 3B CKD (HCC): Primary | ICD-10-CM

## 2022-02-21 DIAGNOSIS — N28.1 RENAL CYST: ICD-10-CM

## 2022-02-21 DIAGNOSIS — N40.0 BENIGN PROSTATIC HYPERPLASIA WITHOUT URINARY OBSTRUCTION: ICD-10-CM

## 2022-02-21 DIAGNOSIS — I48.91 ATRIAL FIBRILLATION, UNSPECIFIED TYPE (HCC): ICD-10-CM

## 2022-02-21 DIAGNOSIS — E78.2 MIXED HYPERLIPIDEMIA: ICD-10-CM

## 2022-02-21 DIAGNOSIS — E11.42 DIABETIC POLYNEUROPATHY ASSOCIATED WITH TYPE 2 DIABETES MELLITUS (HCC): ICD-10-CM

## 2022-02-21 DIAGNOSIS — I10 HYPERTENSION, BENIGN: ICD-10-CM

## 2022-02-21 DIAGNOSIS — R05.9 COUGH: ICD-10-CM

## 2022-02-21 DIAGNOSIS — N18.30 TYPE 2 DIABETES MELLITUS WITH STAGE 3 CHRONIC KIDNEY DISEASE, WITHOUT LONG-TERM CURRENT USE OF INSULIN, UNSPECIFIED WHETHER STAGE 3A OR 3B CKD (HCC): Primary | ICD-10-CM

## 2022-02-21 DIAGNOSIS — I77.810 AORTIC ROOT DILATION (HCC): ICD-10-CM

## 2022-02-21 PROBLEM — F33.9 DEPRESSION, RECURRENT (HCC): Status: RESOLVED | Noted: 2021-10-02 | Resolved: 2022-02-21

## 2022-02-21 PROCEDURE — 99214 OFFICE O/P EST MOD 30 MIN: CPT | Performed by: INTERNAL MEDICINE

## 2022-02-21 RX ORDER — FLUTICASONE PROPIONATE 50 MCG
2 SPRAY, SUSPENSION (ML) NASAL DAILY
Qty: 48 G | Refills: 3 | Status: SHIPPED | OUTPATIENT
Start: 2022-02-21

## 2022-02-21 NOTE — TELEPHONE ENCOUNTER
Per review of care everywhere, patient previously followed with Dr Maribel Carlos and his PA  Recent CT Urogram:  Impression:   1  Probable small calculi in the urinary bladder  Diffuse bladder wall   thickening likely secondary to cystitis  This could be inflammatory or   infectious  2  Prostatomegaly  3  Cystic lesion with slightly thickened wall demonstrating questionable   enhancement  If there is indeed enhancement, this would be a Bosniak type III   cystic lesion  However, MRI is recommended in 3 months to confirm that there is   indeed enhancement and to assess for internal septations  I would recommend patient obtain imaging on a disc and then office visit with MD for discussion of Bosniak 3 cystic lesion

## 2022-02-21 NOTE — TELEPHONE ENCOUNTER
heather wife called  She says the patient was seen today with Dr Vicky Dumont and would like top speak with Dr Vicky Dumont  Please call Nohemi Lara back     Oriana Evans MA

## 2022-02-21 NOTE — ASSESSMENT & PLAN NOTE
Lab Results   Component Value Date    HGBA1C 6 4 (H) 05/17/2021     He states this has been well controlled and he continues to follow with Dr Raul Sen in [place]  We will call for recent lab results  Advised patient on the need for continued good foot and eye care

## 2022-02-21 NOTE — ASSESSMENT & PLAN NOTE
Well controlled on atorvastatin at current dosage and will continue as ordered  Encouraged low fat diet and exercise

## 2022-02-21 NOTE — ASSESSMENT & PLAN NOTE
Lab Results   Component Value Date    HGBA1C 6 4 (H) 05/17/2021     He continues to follow with podiatry and denies current symptoms

## 2022-02-21 NOTE — TELEPHONE ENCOUNTER
Please get details  He had told me she wanted to discuss new diabetes medication and if that is the case she should make an appointment

## 2022-02-22 ENCOUNTER — OFFICE VISIT (OUTPATIENT)
Dept: UROLOGY | Facility: CLINIC | Age: 77
End: 2022-02-22
Payer: MEDICARE

## 2022-02-22 ENCOUNTER — TELEPHONE (OUTPATIENT)
Dept: ADMINISTRATIVE | Facility: OTHER | Age: 77
End: 2022-02-22

## 2022-02-22 VITALS
DIASTOLIC BLOOD PRESSURE: 88 MMHG | HEART RATE: 67 BPM | WEIGHT: 216 LBS | HEIGHT: 71 IN | SYSTOLIC BLOOD PRESSURE: 124 MMHG | BODY MASS INDEX: 30.24 KG/M2

## 2022-02-22 DIAGNOSIS — N28.1 KIDNEY CYST, ACQUIRED: Primary | ICD-10-CM

## 2022-02-22 DIAGNOSIS — N40.1 BENIGN PROSTATIC HYPERPLASIA WITH LOWER URINARY TRACT SYMPTOMS, SYMPTOM DETAILS UNSPECIFIED: ICD-10-CM

## 2022-02-22 LAB
POST-VOID RESIDUAL VOLUME, ML POC: 192 ML
SL AMB  POCT GLUCOSE, UA: NORMAL
SL AMB LEUKOCYTE ESTERASE,UA: NORMAL
SL AMB POCT BILIRUBIN,UA: NORMAL
SL AMB POCT BLOOD,UA: NORMAL
SL AMB POCT CLARITY,UA: CLEAR
SL AMB POCT COLOR,UA: YELLOW
SL AMB POCT KETONES,UA: NORMAL
SL AMB POCT NITRITE,UA: NORMAL
SL AMB POCT PH,UA: 6.5
SL AMB POCT SPECIFIC GRAVITY,UA: 1.01
SL AMB POCT URINE PROTEIN: NORMAL
SL AMB POCT UROBILINOGEN: 0.2

## 2022-02-22 PROCEDURE — 81002 URINALYSIS NONAUTO W/O SCOPE: CPT | Performed by: PHYSICIAN ASSISTANT

## 2022-02-22 PROCEDURE — 99203 OFFICE O/P NEW LOW 30 MIN: CPT | Performed by: PHYSICIAN ASSISTANT

## 2022-02-22 PROCEDURE — 51798 US URINE CAPACITY MEASURE: CPT | Performed by: PHYSICIAN ASSISTANT

## 2022-02-22 NOTE — TELEPHONE ENCOUNTER
Upon review of the In Basket request and the patient's chart, initial outreach has been made via fax, please see Contacts section for details       Thank you  Chelsea Escobedo

## 2022-02-22 NOTE — TELEPHONE ENCOUNTER
----- Message from Townsend Schlatter, MD sent at 2/21/2022 10:44 AM EST -----  Please call Dr Miguel Hagan for recent A1C and other labs

## 2022-02-22 NOTE — TELEPHONE ENCOUNTER
Made appt for end of march per triage  It appears patient was scheduled for today at 10 for difficulty voiding    Spoke with APs in office and will have patient keep his appt today and will follow up with him in the office

## 2022-02-22 NOTE — PROGRESS NOTES
UROLOGY CONSULTATION NOTE     Patient Identifiers: Harvey Clayton (MRN: [id number])  Service Requesting Consultation: Joselin Guaman MD  Service Providing Consultation:  Urology, Vishnu Bonilla PA-C  Consults  Date of Service: 2/22/2022    Reason for Consultation:   Incomplete bladder emptying, pyuria and renal cyst follow-up    History of Present Illness:     Harvey Clayton is a 68 y o  Male previously seen by Dr Xiang Ventura with history of recurrent UTIs, incomplete bladder emptying, BPH  He has been on finasteride and tamsulosin  He also has diabetes  He has had intermittent dysuria and takes amoxicillin 125-250 mg  Denies any fever chills or gross hematuria  He had a CT urogram showing a 4 3 cm right lower pole exophytic cyst   This was categorized as a Bosniak 3 cyst   Three month follow-up MRI was requested      Past Medical, Past Surgical History:     Past Medical History:   Diagnosis Date    Allergic rhinitis     Anemia 10/23/2008    last assessed 9/9/13     Anxiety     Atrial fibrillation (HCC)     Bleeding     BPH (benign prostatic hypertrophy)     Chronic kidney disease     CPAP (continuous positive airway pressure) dependence     Diabetes mellitus ([address] )     Diarrhea     Diverticulitis     Eating disorder     GERD (gastroesophageal reflux disease)     Herpes zoster with complication     last assessed 7/3/17     Hiatal hernia     Hyperlipidemia     Hypertension     IBS (irritable bowel syndrome)     Incisional hernia     Increased urinary frequency     Irregular heart beat     Pyogenic granuloma 09/13/2006    last assessed 9/13/06    Carlos (subconjunctival hemorrhage), unspecified laterality 09/12/2005    last assessed 9/12/05    Sleep apnea     TMJ (dislocation of temporomandibular joint)     UTI (urinary tract infection)     Ventral hernia     last assessed  4/6/17     Wears glasses    :    Past Surgical History:   Procedure Laterality Date    APPENDECTOMY      ATRIAL ABLATION SURGERY      2014    ATRIAL ABLATION SURGERY      catheter ablation atrial fibrillation / last assessed 2/17/16     CHOLECYSTECTOMY      lap    COLON SURGERY  1990    Hartmans procedure    COLON SURGERY      reversal 6 weeks later    COLONOSCOPY N/A 1/26/2017    Procedure: COLONOSCOPY;  Surgeon: Catia Meeks MD;  Location: [facility]; Service:     ESOPHAGOGASTRODUODENOSCOPY N/A 1/26/2017    Procedure: ESOPHAGOGASTRODUODENOSCOPY (EGD); Surgeon: Catia Meeks MD;  Location: [facility];   Service:     EYE SURGERY Bilateral 2015    lid repair    HERNIA REPAIR Bilateral     inguinal 2014 & 2013   [address]  2011    INCISIONAL HERNIA REPAIR N/A 3/24/2017    Procedure: REPAIR OF INCARCERATED INCISIONAL HERNIA WITH MESH, Lysis of Adhesions, Partial Omentectomy;  Surgeon: Tarsha Jaramillo MD;  Location: [facility];  Service:    Yeny Machado / Karyna Pacheco / Angela Machuca Left 10/04/2021    [id number]    NEUROBLASTOMA EXCISION      SKIN CANCER EXCISION      On nose    TONSILLECTOMY     :    Medications, Allergies:     Current Outpatient Medications:     acetaminophen (TYLENOL) 325 mg tablet, Take 650 mg by mouth every 6 (six) hours as needed, Disp: , Rfl:     amiodarone 200 mg tablet, 200 mg daily after dinner , Disp: , Rfl:     amLODIPine (NORVASC) 5 mg tablet, , Disp: , Rfl:     apixaban (ELIQUIS) 5 mg, Take 5 mg by mouth 2 (two) times a day To stop 2 days prior to the procedure, Disp: , Rfl:     atorvastatin (LIPITOR) 20 mg tablet, TAKE 1 TABLET DAILY, Disp: 90 tablet, Rfl: 3    Blood Glucose Monitoring Suppl (FREESTYLE LITE) GEOVANNY, , Disp: , Rfl:     carvedilol (COREG) 25 mg tablet, Take 12 5 mg by mouth 2 (two) times a day , Disp: , Rfl:     esomeprazole (NexIUM) 40 MG capsule, Take 1 capsule (40 mg total) by mouth daily, Disp: 30 capsule, Rfl: 3    finasteride (PROSCAR) 5 mg tablet, Take 5 mg by mouth daily, Disp: , Rfl:     fluticasone (FLONASE) 50 mcg/act nasal spray, 2 sprays into each nostril daily, Disp: 48 g, Rfl: 3    FREESTYLE LITE test strip, , Disp: , Rfl:     Lancets (FREESTYLE) lancets, , Disp: , Rfl:     losartan (COZAAR) 100 MG tablet, 100 mg every morning , Disp: , Rfl:     Ozempic, 0 25 or 0 5 MG/DOSE, 2 MG/1 5ML SOPN, 0 5 one per week-On , Disp: , Rfl:     pioglitazone-metFORMIN (ACTOPLUS MET)  MG per tablet, daily after dinner, Disp: , Rfl:     polyethylene glycol-propylene glycol (Systane) 0 4-0 3 %, every 3 (three) hours as needed  , Disp: , Rfl:     RESTASIS 0 05 % ophthalmic emulsion, Administer 1 drop to both eyes every 12 (twelve) hours , Disp: , Rfl:     tamsulosin (FLOMAX) 0 4 mg, , Disp: , Rfl:     Allergies: Allergies   Allergen Reactions    Codeine Other (See Comments) and GI Intolerance     Reaction Date: 2004;  Annotation - [id number]: '' CRAZY ''  vomiting  headache    Demerol [Meperidine] Nausea Only, Other (See Comments), GI Intolerance and Vomiting     vomiting  Reaction Date: 2004;   headache    Morphine Nausea Only, GI Intolerance and Vomiting     Reaction Date: 2012;    :    Social and Family History:   Social History:   Social History     Tobacco Use    Smoking status: Former Smoker     Packs/day: 1 00     Years: 20 00     Pack years: 20 00     Types: Cigarettes     Quit date:      Years since quittin 1    Smokeless tobacco: Never Used   Vaping Use    Vaping Use: Never used   Substance Use Topics    Alcohol use: No     Comment: none in 33 yrs    Drug use: No        Social History     Tobacco Use   Smoking Status Former Smoker    Packs/day: 1 00    Years: 20 00    Pack years: 20 00    Types: Cigarettes    Quit date: 5    Years since quittin 1   Smokeless Tobacco Never Used       Family History:  Family History   Problem Relation Age of Onset    Heart disease Mother     Cancer Mother     Colon cancer Mother     Dementia Mother     Early death Father     Seizures Father  Cancer Sister         bone    Heart disease Sister     Diabetes Sister     Lupus Sister     Heart disease Brother     Cancer Brother         kidney    Diabetes Brother     Other Sister         covid    Stroke Neg Hx    :     Review of Systems:     General: Fever, chills, or night sweats: negative  Cardiac: Negative for chest pain  Pulmonary: Negative for shortness of breath  Gastrointestinal: Abdominal pain negative  Nausea, vomiting, or diarrhea negative,  Genitourinary: See HPI above  Patient does not have hematuria  All other systems queried were negative  Physical Exam:   General: Patient is pleasant and in NAD  Awake and alert  There were no vitals taken for this visit  HEENT:  Conjunctiva are clear  Constitutional:  pleasant and cooperative     no apparent distress  Cardiac: Peripheral edema: negative  Pulmonary: Non-labored breathing  Abdomen: Soft, non-tender, non-distended  No surgical scars  No masses, tenderness, hernias noted  Genitourinary: Negative CVA tenderness, negative suprapubic tenderness  Extremities: moves all extremities  Neurological:CNII-XII intact  No numbness or tingling  Essentially non focal neurologic exam  Psychiatric:mood affect and behavior normal    (Male): Penis circumcised, phallus normal, meatus patent  Testicles descended into scrotum bilaterally without masses nor tenderness  No inguinal hernias bilaterally  JULIÁN: Prostate is enlarged at 45+ grams  Difficult to palpate the base  The prostate is not boggy  The prostate is not tender  No nodules noted          Labs:     Lab Results   Component Value Date    HGB 12 0 07/27/2021    HCT 37 0 07/27/2021    WBC 10 21 (H) 07/27/2021     07/27/2021   ]    Lab Results   Component Value Date    K 4 3 07/27/2021     07/27/2021    CO2 29 07/27/2021    BUN 18 07/27/2021    CREATININE 1 03 07/27/2021    CALCIUM 8 4 07/27/2021   ]    Imaging:   I personally reviewed the images and report of the following studies, and reviewed them with the patient:  Study: CT urogram   Impression:   1  Probable small calculi in the urinary bladder  Diffuse bladder wall   thickening likely secondary to cystitis  This could be inflammatory or   infectious  2  Prostatomegaly  3  Cystic lesion with slightly thickened wall demonstrating questionable   enhancement  If there is indeed enhancement, this would be a Bosniak type III   cystic lesion  However, MRI is recommended in 3 months to confirm that there is   indeed enhancement and to assess for internal septations       ASSESSMENT:     1  Right renal cyst 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

## 2022-02-22 NOTE — LETTER
Lab Result(s) Request Form: Hemoglobin A1c      Date Requested: 22  Patient: Noe Burns  Patient : 1945   Referring Provider: Michael Dowd, MD        Date of Lab Collection ______________________________       The above patient has informed us that they have completed their   most recent Hemoglobin A1c at your facility  Please complete   this form and attach all corresponding procedure reports/results  Comments __________________________________________________________  ____________________________________________________________________  ____________________________________________________________________  ____________________________________________________________________    Collecting/Resulting Facility  ___________________________________________  Form Completed By (print name) ________________________________________    Signature ___________________________________________________________      These reports are needed for  compliance  Please fax this completed form and a copy of the lab result(s)/ report(s) to our office located at [place] [address] as soon as possible to [phone] marlena Lamar Mow: Phone [phone]    We thank you for your assistance in treating our mutual patient

## 2022-02-22 NOTE — LETTER
Lab Result(s) Request Form: Hemoglobin A1c      Date Requested: 22  Patient: Bayron Porter  Patient : 1945   Referring Provider: Ingrid Sanchez MD        Date of Lab Collection ______________________________       The above patient has informed us that they have completed their   most recent Hemoglobin A1c at your facility  Please complete   this form and attach all corresponding procedure reports/results  Comments __________________________________________________________  ____________________________________________________________________  ____________________________________________________________________  ____________________________________________________________________    Collecting/Resulting Facility  ___________________________________________  Form Completed By (print name) ________________________________________    Signature ___________________________________________________________      These reports are needed for  compliance  Please fax this completed form and a copy of the lab result(s)/ report(s) to our office located at [place] [address] as soon as possible to [phone] marlena Lara: Phone [phone]    We thank you for your assistance in treating our mutual patient

## 2022-02-23 NOTE — TELEPHONE ENCOUNTER
Spoke to patients wife Taryn Luciano  She states she has some questions about herself, not Regina Saldivar  New encounter opened in her chart   NFWARREN Dominguez MA

## 2022-02-25 NOTE — TELEPHONE ENCOUNTER
As a follow-up, a second attempt has been made for outreach via fax, please see Contacts section for details      Thank you  Kristie Bradley

## 2022-03-02 NOTE — TELEPHONE ENCOUNTER
As a final attempt, a third outreach has been made via telephone call  Please see Contacts section for details  This encounter will be closed and completed by end of day  Should we receive the requested information because of previous outreach attempts, the requested patient's chart will be updated appropriately  Left message requesting medical record      Thank you  Juan Young

## 2022-03-02 NOTE — TELEPHONE ENCOUNTER
Upon review of the In Basket request we were able to locate, review, and update the patient chart as requested for Hemoglobin A1c  Any additional questions or concerns should be emailed to the Practice Liaisons via [email or web address] email, please do not reply via In Basket      Thank you  Kristie Bradley

## 2022-03-08 ENCOUNTER — APPOINTMENT (OUTPATIENT)
Dept: LAB | Facility: HOSPITAL | Age: 77
End: 2022-03-08
Payer: MEDICARE

## 2022-03-08 DIAGNOSIS — N28.1 KIDNEY CYST, ACQUIRED: ICD-10-CM

## 2022-03-08 LAB
ANION GAP SERPL CALCULATED.3IONS-SCNC: 9 MMOL/L (ref 4–13)
BUN SERPL-MCNC: 14 MG/DL (ref 5–25)
CALCIUM SERPL-MCNC: 8.4 MG/DL (ref 8.3–10.1)
CHLORIDE SERPL-SCNC: 104 MMOL/L (ref 100–108)
CO2 SERPL-SCNC: 28 MMOL/L (ref 21–32)
CREAT SERPL-MCNC: 1.01 MG/DL (ref 0.6–1.3)
GFR SERPL CREATININE-BSD FRML MDRD: 71 ML/MIN/1.73SQ M
GLUCOSE P FAST SERPL-MCNC: 128 MG/DL (ref 65–99)
POTASSIUM SERPL-SCNC: 3.7 MMOL/L (ref 3.5–5.3)
SODIUM SERPL-SCNC: 141 MMOL/L (ref 136–145)

## 2022-03-08 PROCEDURE — 80048 BASIC METABOLIC PNL TOTAL CA: CPT

## 2022-03-08 PROCEDURE — 36415 COLL VENOUS BLD VENIPUNCTURE: CPT

## 2022-03-10 ENCOUNTER — HOSPITAL ENCOUNTER (OUTPATIENT)
Dept: RADIOLOGY | Facility: HOSPITAL | Age: 77
Discharge: HOME | End: 2022-03-10
Payer: MEDICARE

## 2022-03-10 ENCOUNTER — HOSPITAL ENCOUNTER (OUTPATIENT)
Dept: RADIOLOGY | Facility: HOSPITAL | Age: 77
Discharge: HOME | End: 2022-03-10

## 2022-03-10 ENCOUNTER — OFFICE VISIT (OUTPATIENT)
Dept: NEUROLOGY | Facility: CLINIC | Age: 77
End: 2022-03-10
Payer: MEDICARE

## 2022-03-10 VITALS
HEIGHT: 71 IN | BODY MASS INDEX: 31.08 KG/M2 | WEIGHT: 222 LBS | TEMPERATURE: 97.3 F | SYSTOLIC BLOOD PRESSURE: 138 MMHG | DIASTOLIC BLOOD PRESSURE: 73 MMHG | HEART RATE: 64 BPM

## 2022-03-10 VITALS — HEART RATE: 80 BPM | OXYGEN SATURATION: 98 % | RESPIRATION RATE: 18 BRPM

## 2022-03-10 DIAGNOSIS — N28.1 KIDNEY CYST, ACQUIRED: ICD-10-CM

## 2022-03-10 DIAGNOSIS — E11.42 DIABETIC POLYNEUROPATHY ASSOCIATED WITH TYPE 2 DIABETES MELLITUS (HCC): Primary | ICD-10-CM

## 2022-03-10 DIAGNOSIS — Z86.018 HISTORY OF BENIGN SCHWANNOMA: ICD-10-CM

## 2022-03-10 DIAGNOSIS — G93.0 ARACHNOID CYST: ICD-10-CM

## 2022-03-10 PROCEDURE — A9585 GADOBUTROL INJECTION: HCPCS | Performed by: PHYSICIAN ASSISTANT

## 2022-03-10 PROCEDURE — G1004 CDSM NDSC: HCPCS

## 2022-03-10 PROCEDURE — 99214 OFFICE O/P EST MOD 30 MIN: CPT | Performed by: PSYCHIATRY & NEUROLOGY

## 2022-03-10 PROCEDURE — 74183 MRI ABD W/O CNTR FLWD CNTR: CPT

## 2022-03-10 RX ORDER — AMOXICILLIN 125 MG/1
TABLET, CHEWABLE ORAL
COMMUNITY
Start: 2022-02-27

## 2022-03-10 RX ADMIN — GADOBUTROL 10 ML: 604.72 INJECTION INTRAVENOUS at 14:16

## 2022-03-10 NOTE — PROGRESS NOTES
Return NeuroOutpatient Note        Josefina Ambriz  [id number]  68 y o   1945       diabetic polyneuropathy        History obtained from:  Patient     HPI/Subjective:  Josefina Ambriz  is a 69 yo M that presents as f/u for diabetic polyneuropathy  Per my previous history, patient on 7/27/21 was walking in his backyard and giving water to plants  Patient denies tripping  He thinks he passed out  He struck his head but doesn't remember where  When he woke up, panicked and tried to find way to get up  At [facility], his fall was thought to be cardiac  He has pacemaker in place now  Previously, patient had reported  tingling, burning, pain in both feet  His Sxs started a year ago  He was tried on natural supplement by his podiatrist and both gave him side effects  Patient was prescribed gabapentin but he's never started  He says that his symptoms are well controlled  Walking helps him  His last HgbA1c was 6 5  Patient used to complain of headaches  He was placed on topamax but hasn't been taking it for past 6 months  He has not had return of headaches  He is sensitive to salt  If he eats salt, he gets headache  He has h/o schwannoma many years ago  per 2018 MRI, he had 6mm in left perimesencephalic cistern along expected course of left trigeminal nerve   He is not symptomatic from it           Past Medical History:   Diagnosis Date    Allergic rhinitis     Anemia 10/23/2008    last assessed 9/9/13     Anxiety     Atrial fibrillation (HCC)     Bleeding     BPH (benign prostatic hypertrophy)     Chronic kidney disease     CPAP (continuous positive airway pressure) dependence     Diabetes mellitus ([facility] [address] )     Diarrhea     Diverticulitis     Eating disorder     GERD (gastroesophageal reflux disease)     Herpes zoster with complication     last assessed 7/3/17     Hiatal hernia     Hyperlipidemia     Hypertension     IBS (irritable bowel syndrome)     Incisional hernia     Increased urinary frequency     Irregular heart beat     Pyogenic granuloma 2006    last assessed 06    Carlos (subconjunctival hemorrhage), unspecified laterality 2005    last assessed 05    Sleep apnea     TMJ (dislocation of temporomandibular joint)     UTI (urinary tract infection)     Ventral hernia     last assessed  17     Wears glasses      Social History     Socioeconomic History    Marital status: /Civil Union     Spouse name: Not on file    Number of children: Not on file    Years of education: Not on file    Highest education level: Not on file   Occupational History    Not on file   Tobacco Use    Smoking status: Former Smoker     Packs/day: 1 00     Years: 20 00     Pack years: 20      Types: Cigarettes     Quit date:      Years since quittin 2    Smokeless tobacco: Never Used   Vaping Use    Vaping Use: Never used   Substance and Sexual Activity    Alcohol use: No     Comment: none in 33 yrs    Drug use: No    Sexual activity: Not on file   Other Topics Concern    Not on file   Social History Narrative    Single per allscript      Social Determinants of Health     Financial Resource Strain: Not on file   Food Insecurity: Not on file   Transportation Needs: Not on file   Physical Activity: Not on file   Stress: Not on file   Social Connections: Not on file   Intimate Partner Violence: Not on file   Housing Stability: Not on file     Family History   Problem Relation Age of Onset    Heart disease Mother     Cancer Mother     Colon cancer Mother     Dementia Mother     Early death Father     Seizures Father     Cancer Sister         bone    Heart disease Sister     Diabetes Sister     Lupus Sister     Heart disease Brother     Cancer Brother         kidney    Diabetes Brother     Other Sister         covid    Stroke Neg Hx      Allergies   Allergen Reactions    Codeine Other (See Comments) and GI Intolerance     Reaction Date: 2004; Annotation - [id number]: '' CRAZY ''  vomiting  headache    Demerol [Meperidine] Nausea Only, Other (See Comments), GI Intolerance and Vomiting     vomiting  Reaction Date: 27Dec2004;   headache    Morphine Nausea Only, GI Intolerance and Vomiting     Reaction Date: 23Feb2012;      Current Outpatient Medications on File Prior to Visit   Medication Sig Dispense Refill    acetaminophen (TYLENOL) 325 mg tablet Take 650 mg by mouth every 6 (six) hours as needed      amiodarone 200 mg tablet 200 mg daily after dinner       amLODIPine (NORVASC) 5 mg tablet       amoxicillin (AMOXIL) 125 MG chewable tablet       apixaban (ELIQUIS) 5 mg Take 5 mg by mouth 2 (two) times a day To stop 2 days prior to the procedure      atorvastatin (LIPITOR) 20 mg tablet TAKE 1 TABLET DAILY 90 tablet 3    Blood Glucose Monitoring Suppl (FREESTYLE LITE) GEOVANNY       carvedilol (COREG) 25 mg tablet Take 12 5 mg by mouth 2 (two) times a day       esomeprazole (NexIUM) 40 MG capsule Take 1 capsule (40 mg total) by mouth daily (Patient taking differently: Take 20 mg by mouth daily  ) 30 capsule 3    finasteride (PROSCAR) 5 mg tablet Take 5 mg by mouth daily      fluticasone (FLONASE) 50 mcg/act nasal spray 2 sprays into each nostril daily 48 g 3    FREESTYLE LITE test strip       Lancets (FREESTYLE) lancets       losartan (COZAAR) 100 MG tablet 100 mg every morning       Ozempic, 0 25 or 0 5 MG/DOSE, 2 MG/1 5ML SOPN 0 5 one per week-On Sunday      pioglitazone-metFORMIN (ACTOPLUS MET)  MG per tablet daily after dinner      polyethylene glycol-propylene glycol (Systane) 0 4-0 3 % every 3 (three) hours as needed        RESTASIS 0 05 % ophthalmic emulsion Administer 1 drop to both eyes every 12 (twelve) hours       tamsulosin (FLOMAX) 0 4 mg        No current facility-administered medications on file prior to visit  Review of Systems   Refer to positive review of systems in HPI     Review of Systems    Constitutional- No fever  Eyes- No visual change  ENT- Hearing normal  CV- No chest pain  Resp- No Shortness of breath  GI- No diarrhea  - Bladder normal  MS- No Arthritis   Skin- No rash  Psych- No depression  Endo- No DM  Heme- No nodes    Vitals:    03/10/22 1044   BP: 138/73   BP Location: Left arm   Patient Position: Sitting   Cuff Size: Standard   Pulse: 64   Temp: (!) 97 3 °F (36 3 °C)   TempSrc: Tympanic   Weight: 101 kg (222 lb)   Height: 5' 10 5" (1 791 m)       PHYSICAL EXAM:  Appearance: No Acute Distress  Ophthalmoscopic: Disc Flat, Normal fundus  Mental status:  Orientation: Awake, Alert, and Orientedx3  Memory: Registation 3/3 Recall 3/3  Attention: normal  Knowledge: good  Language: No aphasia  Speech: No dysarthria  Cranial Nerves:  2 No Visual Defect on Confrontation, Pupils round, equal, reactive to light  3,4,6 Extraocular Movements Intact, no nystagmus  5 Facial Sensation Intact  7 No facial asymmetry  8 Intact hearing  9,10 Palate symmetric, normal gag  11 Good shoulder shrug  12 Tongue Midline  Gait: Stable  Coordination: No ataxia with finger to nose testing, and heel to shin  Sensory: decreased to pin prick, light touch upto mid shins, decreased vibration at toes  Muscle Tone: Normal              Muscle exam:  Arm Right Left Leg Right Left   Deltoid 5/5 5/5 Iliopsoas 5/5 5/5   Biceps 5/5 5/5 Quads 5/5 5/5   Triceps 5/5 5/5 Hamstrings 5/5 5/5   Wrist Extension 5/5 5/5 Ankle Dorsi Flexion 5/5 5/5   Wrist Flexion 5/5 5/5 Ankle Plantar Flexion 5/5 5/5   Interossei 5/5 5/5 Ankle Eversion 5/5 5/5   APB 5/5 5/5 Ankle Inversion 5/5 5/5       Reflexes   RJ BJ TJ KJ AJ Plantars Victor's   Right 2+ 2+ 2+ 2+ 0 Downgoing Not present   Left 2+ 2+ 2+ 2+ 0 Downgoing Not present     Personal review of  Labs:                  Diagnoses and all orders for this visit:      1  Diabetic polyneuropathy associated with type 2 diabetes mellitus ([facility] [address] )     2  History of benign schwannoma  MRI brain with and without contrast   3  Arachnoid cyst  MRI brain with and without contrast         Patient is doing well in terms of his symptoms of neuropathy  Will f/u with MRI brain for h/o schwannoma to make sure it hasn't enlarged                 Total time of encounter:  30 min  More than 50% of the time was used in counseling and/or coordination of care  Extent of counseling and/or coordination of care        Yesi Francis MD  [address] [place] [address] [facility]  [address]  [address]  [phone]

## 2022-03-29 ENCOUNTER — APPOINTMENT (OUTPATIENT)
Dept: LAB | Facility: HOSPITAL | Age: 77
End: 2022-03-29
Payer: MEDICARE

## 2022-03-29 ENCOUNTER — TELEPHONE (OUTPATIENT)
Dept: UROLOGY | Facility: AMBULATORY SURGERY CENTER | Age: 77
End: 2022-03-29

## 2022-03-29 DIAGNOSIS — R30.0 DYSURIA: Primary | ICD-10-CM

## 2022-03-29 DIAGNOSIS — R39.9 UTI SYMPTOMS: ICD-10-CM

## 2022-03-29 LAB
BACTERIA UR QL AUTO: ABNORMAL /HPF
BILIRUB UR QL STRIP: NEGATIVE
CLARITY UR: CLEAR
COLOR UR: ABNORMAL
GLUCOSE UR STRIP-MCNC: NEGATIVE MG/DL
HGB UR QL STRIP.AUTO: NEGATIVE
KETONES UR STRIP-MCNC: ABNORMAL MG/DL
LEUKOCYTE ESTERASE UR QL STRIP: ABNORMAL
NITRITE UR QL STRIP: POSITIVE
NON-SQ EPI CELLS URNS QL MICRO: ABNORMAL /HPF
PH UR STRIP.AUTO: 5.5 [PH]
PROT UR STRIP-MCNC: NEGATIVE MG/DL
RBC #/AREA URNS AUTO: ABNORMAL /HPF
SP GR UR STRIP.AUTO: >=1.03 (ref 1–1.03)
UROBILINOGEN UR QL STRIP.AUTO: 0.2 E.U./DL
WBC #/AREA URNS AUTO: ABNORMAL /HPF

## 2022-03-29 PROCEDURE — 81001 URINALYSIS AUTO W/SCOPE: CPT

## 2022-03-29 PROCEDURE — 87077 CULTURE AEROBIC IDENTIFY: CPT

## 2022-03-29 PROCEDURE — 87086 URINE CULTURE/COLONY COUNT: CPT

## 2022-03-29 PROCEDURE — 87186 SC STD MICRODIL/AGAR DIL: CPT

## 2022-03-29 NOTE — TELEPHONE ENCOUNTER
PT under care of: Sissy Marcus/Dr Fuller      Pt last seen:  02/22/22     PT calling today because/symptoms are:  UTI    Painful when urinating and burning  He has a stone in his bladder and gets frequent UTI  He is scheduled for a procedure on  04/01/22    He indicated that he had taken some antibiotic last night and it seems a little bit better this morning         PT can be reached at: [phone]

## 2022-03-29 NOTE — TELEPHONE ENCOUNTER
Returned call to patient   Patient states he is having a lot of burning Patient states it "feels like when he urinated I am peeing  nails "   Frequency and urgency  Urine slightly cloudy  No fevers or chills  Patients state tired and achy   Patient states with symptoms he took yesterday  amoxicillin 125mg  Reported by patient that he did not take any today  Urine testing order placed in [facility]  Patient lives in [address]  Patient will get testing this morning  Advised to hydrate, monitor symtpoms,fever, chills  Contact our office with any changes  Sending message to provider  Advised patient we will contact him with additional recommendations from provider  Patient is scheduled for a cysto/ Trus on 4/1/22       Pharmacy confirmed as :     [facility] [address] [facility], [address] [place] [id number] [address] [facility]  [address] [facility] [address] [place] [address], [place] [address]   Phone:  [phone]  Fax:  [phone]

## 2022-03-30 ENCOUNTER — NURSE TRIAGE (OUTPATIENT)
Dept: OTHER | Facility: OTHER | Age: 77
End: 2022-03-30

## 2022-03-30 NOTE — TELEPHONE ENCOUNTER
Left message on voicemail stating an antibiotic was sent to pharmacy, however, we will monitor to ensure it is the appropriate one once urine C&S results    Any questions, please call the office

## 2022-03-30 NOTE — TELEPHONE ENCOUNTER
Patient called to follow up with his UA  He was informed his ABT was called into the pharmacy for him       Reason for Disposition   Health Information question, no triage required and triager able to answer question    Protocols used: INFORMATION ONLY CALL - NO TRIAGE-ADULT-

## 2022-03-31 PROBLEM — N28.1 COMPLEX RENAL CYST: Status: ACTIVE | Noted: 2022-03-31

## 2022-03-31 PROBLEM — N40.1 BENIGN LOCALIZED PROSTATIC HYPERPLASIA WITH LOWER URINARY TRACT SYMPTOMS (LUTS): Status: ACTIVE | Noted: 2022-03-31

## 2022-03-31 LAB — BACTERIA UR CULT: ABNORMAL

## 2022-03-31 NOTE — PROGRESS NOTES
Problem List Items Addressed This Visit        Genitourinary    Benign localized prostatic hyperplasia with lower urinary tract symptoms (LUTS) - Primary    Relevant Orders    US kidney and bladder with pvr    POCT Measure PVR (Completed)    Complex renal cyst            Discussion:    Dmitryzayda Minlenore and I had a productive consultation today  He is actively being treated for a urinary tract infection and does not want to have a cystoscopy and transrectal ultrasound today which is reasonable  In reviewing his cross-sectional imaging he does have some debris at the base the bladder and enlarged prostate  He does have an extensive history of abdominal surgery with abdominal mesh  Given this I would recommend a TURP or a staged TURP or a holmium laser enucleation of the prostate should he need an endoscopic procedure for his prostate (I suspect that he will need this)  Should he need a TURP we would use a bipolar generator and also recommend potential consideration of a staged TURP should we be having too much bleeding at the time of his resection  I have requested that his transrectal ultrasound and cystoscopy be scheduled with the 1st available urologist either with myself, Dr Imer Gonzalez, or Dr Lisa Oleary, the urologists that have experience with this procedure and that also see patients in this clinic  At the time of his cystoscopy when he returns I would recommend administration of ceftriaxone given his history of recurrent UTI  With regard to his right-sided renal cyst, this is a complex cyst which has decreased in size, it is pedunculated and hanging off of the lower pole the right kidney  Given that it has decreased in size I recommend following this at the current time given his overall state of health  I will check this again with an ultrasound in 6 months  He would like to avoid surgery for this if possible  All questions and concerns answered and addressed    He will return for completion of his endoscopic workup  I suspect he will need either a TURP or a staged TURP or holmium laser enucleation of the prostate in the future  Assessment and plan:       Please see problem oriented charting for the assessment plan of today's urological complaints    Radha Bautista MD      Chief Complaint     Chief Complaint   Patient presents with    Cystoscopy         History of Present Illness     Starla Boyle  is a 68 y o  man with uncontrolled lower urinary tract symptoms, already on dual medical therapy, also with recurrent UTI  Currently being treated for dysuria and symptoms of UTI with nitrofurantoin  No signs systemic sepsis  We had a long discussion regarding his complex renal cyst as well  This is decreasing in size  I recommend ongoing surveillance at the current time  I did speak with him about partial nephrectomy and the potential need for radical nephrectomy should this be elected by him    The following portions of the patient's history were reviewed and updated as appropriate: allergies, current medications, past family history, past medical history, past social history, past surgical history and problem list     Detailed Urologic History     - please refer to HPI    Review of Systems     Review of Systems   Constitutional: Negative  HENT: Negative  Eyes: Negative  Respiratory: Negative  Cardiovascular: Negative  Gastrointestinal: Negative  Endocrine: Negative  Genitourinary: Positive for difficulty urinating, dysuria, frequency and urgency  Musculoskeletal: Negative  Skin: Negative  Allergic/Immunologic: Negative  Neurological: Negative  Hematological: Negative  Psychiatric/Behavioral: Negative  Allergies     Allergies   Allergen Reactions    Codeine Other (See Comments) and GI Intolerance     Reaction Date: 27Dec2004;  Annotation - [id number]: '' CRAZY ''  vomiting  headache    Demerol [Meperidine] Nausea Only, Other (See Comments), GI Intolerance and Vomiting     vomiting  Reaction Date: 27Dec2004;   headache    Morphine Nausea Only, GI Intolerance and Vomiting     Reaction Date: 23Feb2012;        Physical Exam     Physical Exam  Vitals reviewed  Constitutional:       General: He is not in acute distress  Appearance: Normal appearance  He is not ill-appearing, toxic-appearing or diaphoretic  HENT:      Head: Normocephalic and atraumatic  Eyes:      General: No scleral icterus  Right eye: No discharge  Left eye: No discharge  Cardiovascular:      Pulses: Normal pulses  Pulmonary:      Effort: Pulmonary effort is normal    Abdominal:      General: There is no distension  Palpations: There is no mass  Musculoskeletal:         General: No swelling  Skin:     General: Skin is warm  Neurological:      General: No focal deficit present  Mental Status: He is alert and oriented to person, place, and time  Cranial Nerves: No cranial nerve deficit  Psychiatric:         Mood and Affect: Mood normal          Behavior: Behavior normal          Thought Content:  Thought content normal          Judgment: Judgment normal              Vital Signs  Vitals:    04/01/22 1055   BP: 128/70   BP Location: Left arm   Patient Position: Sitting   Cuff Size: Large   Pulse: 70   Resp: 16   SpO2: 98%   Weight: 97 1 kg (214 lb)   Height: 5' 10" (1 778 m)         Current Medications       Current Outpatient Medications:     acetaminophen (TYLENOL) 325 mg tablet, Take 650 mg by mouth every 6 (six) hours as needed, Disp: , Rfl:     amiodarone 200 mg tablet, 200 mg daily after dinner , Disp: , Rfl:     amLODIPine (NORVASC) 5 mg tablet, , Disp: , Rfl:     amoxicillin (AMOXIL) 125 MG chewable tablet, , Disp: , Rfl:     apixaban (ELIQUIS) 5 mg, Take 5 mg by mouth 2 (two) times a day To stop 2 days prior to the procedure, Disp: , Rfl:     atorvastatin (LIPITOR) 20 mg tablet, TAKE 1 TABLET DAILY, Disp: 90 tablet, Rfl: 3   Blood Glucose Monitoring Suppl (FREESTYLE LITE) GEOVANNY, , Disp: , Rfl:     carvedilol (COREG) 25 mg tablet, Take 12 5 mg by mouth 2 (two) times a day , Disp: , Rfl:     finasteride (PROSCAR) 5 mg tablet, Take 5 mg by mouth daily, Disp: , Rfl:     fluticasone (FLONASE) 50 mcg/act nasal spray, 2 sprays into each nostril daily, Disp: 48 g, Rfl: 3    FREESTYLE LITE test strip, , Disp: , Rfl:     Lancets (FREESTYLE) lancets, , Disp: , Rfl:     losartan (COZAAR) 100 MG tablet, 100 mg every morning , Disp: , Rfl:     nitrofurantoin (MACROBID) 100 mg capsule, Take 1 capsule (100 mg total) by mouth 2 (two) times a day for 5 days, Disp: 10 capsule, Rfl: 0    Ozempic, 0 25 or 0 5 MG/DOSE, 2 MG/1 5ML SOPN, 0 5 one per week-On Sunday, Disp: , Rfl:     pioglitazone-metFORMIN (ACTOPLUS MET)  MG per tablet, daily after dinner, Disp: , Rfl:     polyethylene glycol-propylene glycol (Systane) 0 4-0 3 %, every 3 (three) hours as needed  , Disp: , Rfl:     RESTASIS 0 05 % ophthalmic emulsion, Administer 1 drop to both eyes every 12 (twelve) hours , Disp: , Rfl:     tamsulosin (FLOMAX) 0 4 mg, , Disp: , Rfl:     esomeprazole (NexIUM) 40 MG capsule, Take 1 capsule (40 mg total) by mouth daily (Patient taking differently: Take 20 mg by mouth daily  ), Disp: 30 capsule, Rfl: 3      Active Problems     Patient Active Problem List   Diagnosis    Atrial fibrillation ([address] )    Hypertension, benign    Chronic kidney disease (CKD), stage II (mild)    Chronic rhinitis    Colon, diverticulosis    Diabetes mellitus with chronic kidney disease ([address] )    Diabetic neuropathy ([address] )    Esophageal reflux    Mixed hyperlipidemia    Obstructive sleep apnea    Aortic root dilation (HCC)    Seasonal allergic rhinitis due to pollen    Class 1 obesity    Dyspnea on exertion    SOB (shortness of breath)    Stage 2 chronic kidney disease    Left groin pain    Syncope    Positive depression screening    Benign localized prostatic hyperplasia with lower urinary tract symptoms (LUTS)    Complex renal cyst         Past Medical History     Past Medical History:   Diagnosis Date    Allergic rhinitis     Anemia 10/23/2008    last assessed 9/9/13     Anxiety     Atrial fibrillation (HCC)     Bleeding     Chronic kidney disease     CPAP (continuous positive airway pressure) dependence     Diabetes mellitus ([address] )     Diarrhea     Diverticulitis     Eating disorder     GERD (gastroesophageal reflux disease)     Herpes zoster with complication     last assessed 7/3/17     Hiatal hernia     Hyperlipidemia     Hypertension     IBS (irritable bowel syndrome)     Incisional hernia     Increased urinary frequency     Irregular heart beat     Pyogenic granuloma 09/13/2006    last assessed 9/13/06    Carlos (subconjunctival hemorrhage), unspecified laterality 09/12/2005    last assessed 9/12/05    Sleep apnea     TMJ (dislocation of temporomandibular joint)     Ventral hernia     last assessed  4/6/17     Wears glasses          Surgical History     Past Surgical History:   Procedure Laterality Date    APPENDECTOMY      ATRIAL ABLATION SURGERY      2014    ATRIAL ABLATION SURGERY      catheter ablation atrial fibrillation / last assessed 2/17/16     CHOLECYSTECTOMY      lap    COLON SURGERY  1990    Hartmans procedure    COLON SURGERY      reversal 6 weeks later    COLONOSCOPY N/A 1/26/2017    Procedure: COLONOSCOPY;  Surgeon: Debo Tyson MD;  Location: [place] [address] [facility]; Service:     ESOPHAGOGASTRODUODENOSCOPY N/A 1/26/2017    Procedure: ESOPHAGOGASTRODUODENOSCOPY (EGD); Surgeon: Debo Tyson MD;  Location: [place] [address] [facility];   Service:     EYE SURGERY Bilateral 2015    lid repair    HERNIA REPAIR Bilateral     inguinal 2014 & 2013   [facility]  2011    INCISIONAL HERNIA REPAIR N/A 3/24/2017    Procedure: REPAIR OF INCARCERATED INCISIONAL HERNIA WITH MESH, Lysis of Adhesions, Partial Omentectomy;  Surgeon: Ehsan Horn Pepito Bhatti MD;  Location: [address] [place] [address];  Service:    [employer] / Nilson Lin / Hortencia Chiu Left 10/04/2021    [id number]    NEUROBLASTOMA EXCISION      SKIN CANCER EXCISION      On nose    TONSILLECTOMY           Family History     Family History   Problem Relation Age of Onset    Heart disease Mother     Cancer Mother     Colon cancer Mother    Jennifer Rod Dementia Mother     Early death Father     Seizures Father    Jennifer [place] Cancer Sister         bone    Heart disease Sister     Diabetes Sister     Lupus Sister     Heart disease Brother     Cancer Brother         kidney    Diabetes Brother     Other Sister         covid    Stroke Neg Hx          Social History     Social History     Social History     Tobacco Use   Smoking Status Former Smoker    Packs/day: 1 00    Years: 20 00    Pack years: 20 00    Types: Cigarettes    Quit date: 5    Years since quittin 2   Smokeless Tobacco Never Used         Pertinent Lab Values     Lab Results   Component Value Date    CREATININE 1 01 2022       Lab Results   Component Value Date    PSA 4 2 (H) 2021    PSA 8 1 (H) 2021             Pertinent Imaging       The patient's images were reviewed by me personally and also in real time with them in the examination room using our PACS imaging system  The imaging findings are significant for a complex renal cyst, exophytic, over the medial aspect of the right kidney  Jaleel Skinner

## 2022-03-31 NOTE — PATIENT INSTRUCTIONS
Decision Aid for Benign Prostatic Hyperplasia   WHAT YOU NEED TO KNOW:   What do I need to know about decisions for benign prostatic hyperplasia (BPH)? You can work with your healthcare provider to make decisions about being screened or treated for BPH  Screening is a test done to find BPH early  Screening is different from diagnosis  Screening is used if you are at increased risk for BPH but do not have symptoms  This means management or treatment can start early  You can also help plan treatment if BPH is found with screening, or you develop it later on  Your treatment choices include nonsurgical options and surgery  Your healthcare provider may recommend nonsurgical treatments first  Learn about the benefits and risks of nonsurgical treatment and surgery so you can make an informed choice  What do I need to know about BPH?   · BPH is an enlarged prostate  The prostate is normally the size of a walnut and wraps around the urethra  An enlarged prostate will press on the urethra  This may cause problems with storing urine or emptying your bladder completely  · BPH is common in men older than 40 years  The risk increases with age  · Benign means it is not cancer  BPH is not a life-threatening condition, but it can cause problems with your daily activities  BPH usually gets worse over time  Left untreated, BPH can also lead to blood in your urine, bladder stones, or kidney failure  Am I a good candidate for BPH screening? Screening may be helpful for you if any of the following is true:  · You are 40 years or older  · You have a family history of BPH or other prostate problems  · You have a medical condition such as a heart disease or type 2 diabetes, or you are obese  · You do not get much physical activity  · You want to have treatment as early as possible if needed  · You take a beta-blocker medicine  How is screening done?   Your healthcare provider will ask about your medical history and family history of prostate problems  If you are at increased risk, your provider may use any of the following to check for BPH:  · The International Prostate Symptom Score  is a set of questions about your ability to urinate over the past month  You will be asked how often you have any of the following:     ? Urinating 8 or more times each day    ? A feeling of not fully emptying your bladder when you urinate    ? An urgent need to urinate that you could not put off, or urinating again within 2 hours    ? Being woken from sleep because you needed to urinate    ? Trouble starting your urine flow, or a need to push or strain to get it to start    ? Urine that stops and starts several times when you urinate    ? A weak urine stream, or dribbling after you urinate    · A digital rectal exam  is used to check the size of your prostate  Your healthcare provider will insert a gloved finger into your rectum  The provider will be able to feel your prostate  The exam may be repeated over time to check the prostate size  · A PSA test  is used to measure the amount of a protein made by your prostate gland  A blood sample is taken for this test  A high PSA level can increase your risk for more severe urination problems or the need for surgery  What are the benefits and risks of screening? Talk with your healthcare provider about the risks and benefits of screening:  · Benefits  include finding BPH early  This means you can make more decisions about treatments  The PSA test can also find prostate cancer early  Treatment of prostate cancer is more successful when it starts early  · Risks  include a false belief that you will not develop BPH if your screening result is negative  Even though your screening result is negative, you may still develop it later on  You may also need more tests if you have problems urinating but screening shows you do not have BPH      What questions should I ask my healthcare provider to help me make decisions about screening? · How high is my risk for BPH? · How often do I need to have screening? · Where is the screening done? · Do I need to do anything to get ready to have screening? What happens after I have screening? You will meet with your healthcare provider to go over the results of your screening  You may need more tests to diagnose anything that showed up on the screening test  Common tests include a urine test to check for an infection or a biopsy (tissue sample) to check for cancer  You may also need tests to measure the amount of urine left in your bladder after you urinate  The force of your urine flow may also be measured  You and your healthcare provider can talk about your treatment options  Together you can decide which treatment is right for you  How is BPH treated, and what are the benefits of treatment? · Watchful waiting  means you do not receive treatment right away  Your signs and symptoms will be monitored over time to see if they get worse  You may be asked to keep a record  The record will include when you urinate, how easy or difficult it was, and any changes in urination  You will bring the record to follow-up visits  Your healthcare provider may also recommend ways to improve your symptoms during watchful waiting  · Medicines  may be given to help your symptoms and to prevent BPH from getting worse  Medicines may help relax certain muscles to make it easier for you to urinate  You may also need medicine to make your prostate smaller or to relieve an overactive bladder  Medicines may start to relieve your symptoms quickly  Medicines can improve your quality of life  You may also be able to manage your symptoms without surgery if medicine keeps your BPH from getting worse  · A procedure  may be used to place a stent into your urethra to hold it open  A stent is a short, tiny mesh tube   A prostatic urethral lift, or UroLift, may be used to hold the prostate away from the urethra  This makes the urethra wider so urine can pass through more easily  · Surgery  may be used to relieve your symptoms if other treatments do not work  An ablation is surgery that uses a needle to destroy extra tissue that is causing your symptoms  A laser may instead be used to destroy the tissue  Your prostate may be heated  A tool that gives off heat is inserted into your urethra  Prostate tissue is destroyed, and no other tissues are damaged  Parts of your prostate may be removed during another type of surgery  What are the risks of BPH treatment? · Watchful waiting  may allow BPH to get worse and be more difficult to treat than at an early stage  If you have a high PSA level, you may need to have BPH treated right away  · Medicines  can cause certain side effects, such as erectile dysfunction (ED) or a lowered sex drive  You may also develop urinary retention (trouble starting your urine to flow)  Some medicines can cause hypotension (low blood pressure) when you stand, or dizziness  · Surgery  can damage tissue around your prostate  Surgery can also increase your risk for trouble urinating, incontinence (leaking), or urinary retention  You may bleed more than expected during surgery or develop an infection  You may also need to be treated again if the surgery you have does not relieve your symptoms  What questions should I ask my provider to help me make decisions about treatment? · How will I feel if I continue to have these symptoms for the rest of my life? · Which medicines may work best to treat my symptoms? · What other steps can I take to decrease my symptoms? · Will I have to take medicine for the rest of my life? · What side effects might happen with each medicine I can try? · Am I a good candidate for surgery? · Which surgery may work best to treat my symptoms? · Where is the surgery done?     · How long is recovery after surgery? · What are the possible side effects of surgery? CARE AGREEMENT:   You have the right to help plan your care  Learn about your health condition and how it may be treated  Discuss treatment options with your healthcare providers to decide what care you want to receive  You always have the right to refuse treatment  The above information is an  only  It is not intended as medical advice for individual conditions or treatments  Talk to your doctor, nurse or pharmacist before following any medical regimen to see if it is safe and effective for you  © Copyright [employer] 2022 Information is for End User's use only and may not be sold, redistributed or otherwise used for commercial purposes   All illustrations and images included in CareNotes® are the copyrighted property of A D A M , Inc  or [address] [place] [address]

## 2022-04-01 ENCOUNTER — PROCEDURE VISIT (OUTPATIENT)
Dept: UROLOGY | Facility: CLINIC | Age: 77
End: 2022-04-01
Payer: MEDICARE

## 2022-04-01 VITALS
WEIGHT: 214 LBS | SYSTOLIC BLOOD PRESSURE: 128 MMHG | HEIGHT: 70 IN | RESPIRATION RATE: 16 BRPM | HEART RATE: 70 BPM | DIASTOLIC BLOOD PRESSURE: 70 MMHG | BODY MASS INDEX: 30.64 KG/M2 | OXYGEN SATURATION: 98 %

## 2022-04-01 DIAGNOSIS — N40.1 BENIGN LOCALIZED PROSTATIC HYPERPLASIA WITH LOWER URINARY TRACT SYMPTOMS (LUTS): Primary | ICD-10-CM

## 2022-04-01 DIAGNOSIS — N28.1 COMPLEX RENAL CYST: ICD-10-CM

## 2022-04-01 LAB — POST-VOID RESIDUAL VOLUME, ML POC: 196 ML

## 2022-04-01 PROCEDURE — 51798 US URINE CAPACITY MEASURE: CPT | Performed by: UROLOGY

## 2022-04-01 PROCEDURE — 99214 OFFICE O/P EST MOD 30 MIN: CPT | Performed by: UROLOGY

## 2022-04-01 NOTE — TELEPHONE ENCOUNTER
TT with Kris Chacko NP, will advise patient to start tylenol and will have to proceed to ER or urgent care if fever increases  Called patient and recommended Vishnu advice and also advised that the antibiotic he's taking wouldn't cause these symptoms and to finish the antibiotic in its entirety  Patient said hes already taking tylenol every 4 hours, said he started taking two 500 mg tablets every 4 hours around his OV   Advised patient that he should really only take 1000 mg every 6-8 hours so he doesn't go over recommended amount  Patient verbalized he will present to ER or urgent care if temp increases

## 2022-04-01 NOTE — TELEPHONE ENCOUNTER
Pt under care of Patricia    Pt called and stated he is having chills aches and fever of 101  Pt and his wife took covid tests and they are both negative  He is just asking if this could be a side affect from the med he was perscriped        Pt call [phone] or [phone]

## 2022-04-06 ENCOUNTER — NURSE TRIAGE (OUTPATIENT)
Dept: OTHER | Facility: OTHER | Age: 77
End: 2022-04-06

## 2022-04-06 DIAGNOSIS — N39.0 RECURRENT UTI: Primary | ICD-10-CM

## 2022-04-06 NOTE — TELEPHONE ENCOUNTER
Last does was Sunday of nitrofurantoin   Patient states urine is a little cloudy  Was drinking diet ice tea yesterday   Will change to water today  Temp on Friday was 101 0   No temperature reported at this time  Patient was not able to have cystoscopy due to infection  Patient is currently taking finasteride  Urine testing orders placed in Epic  Advised patient to hydrate avoid bladder irritants, monitor urine, fever, chills report changes to our office  Patient also is requesting to cystoscopy sooner than reschedule date of June 8th 
Nitrofurantoin sent
Patient is on the wait list  If something sooner opens up we will give him a call 
Pt called in stating he started with painful burning with urination yesterday  Per pt he just finished taking Nitrofurantoin for a UTI and takes Amoxicillin 125mg dailys  Pt stated the burning is better today but still present  He reports having a fever on Friday but nothing since  Per pt he wants to be treated with another ABX right away and does not want to have to wait for a Urine Culture  Please advise 
Reason for Disposition   Patient wants to be seen    Answer Assessment - Initial Assessment Questions  1  SYMPTOM: "What's the main symptom you're concerned about?" (e g , frequency, incontinence)      Burning with urination again per pt    2  ONSET: "When did the  *No Answer*  start?"     Started yesterday afternoon  3  PAIN: "Is there any pain?" If Yes, ask: "How bad is it?" (Scale: 1-10; mild, moderate, severe)      Burning sensation   4  CAUSE: "What do you think is causing the symptoms?"      Freq  Urination   5  OTHER SYMPTOMS: "Do you have any other symptoms?" (e g , fever, flank pain, blood in urine, pain with urination)      Fevers, chills on Friday but nothing since      Protocols used: URINARY SYMPTOMS-ADULT-OH
Regarding: possible uti  ----- Message from [facility] sent at 4/6/2022  9:16 AM EDT -----  "I am experiencing symptoms of a UTI "
Spoke with patient to inform him of antibiotic being sent to pharmacy  Patient asks if he should still have urine testing performed today  Advised him no since he already had urine testing done from 3/29/22  Patient states he also had urine specimen taken on 4/1/22 when he was here for cysto, but it had to be canceled due to UTI  Advised patient there is no culture from 4/1  Patient also asking if he can have a cysto done sooner than June as that was the next one available  Informed him we will look at the schedule, but all providers are maxed out on procedures in the near future  Patient wishes for staff to look again and call him as all he needs is 4 hr notice 
Dr. Ferguson

## 2022-04-07 ENCOUNTER — HOSPITAL ENCOUNTER (OUTPATIENT)
Dept: MRI IMAGING | Facility: HOSPITAL | Age: 77
Discharge: HOME | End: 2022-04-07

## 2022-04-18 ENCOUNTER — APPOINTMENT (OUTPATIENT)
Dept: LAB | Facility: CLINIC | Age: 77
End: 2022-04-18
Payer: MEDICARE

## 2022-04-18 ENCOUNTER — TELEPHONE (OUTPATIENT)
Dept: OTHER | Facility: OTHER | Age: 77
End: 2022-04-18

## 2022-04-18 DIAGNOSIS — R30.0 DYSURIA: Primary | ICD-10-CM

## 2022-04-18 DIAGNOSIS — R30.0 DYSURIA: ICD-10-CM

## 2022-04-18 LAB
BACTERIA UR QL AUTO: ABNORMAL /HPF
BILIRUB UR QL STRIP: NEGATIVE
CLARITY UR: ABNORMAL
COLOR UR: YELLOW
GLUCOSE UR STRIP-MCNC: NEGATIVE MG/DL
HGB UR QL STRIP.AUTO: NEGATIVE
KETONES UR STRIP-MCNC: NEGATIVE MG/DL
LEUKOCYTE ESTERASE UR QL STRIP: ABNORMAL
MUCOUS THREADS UR QL AUTO: ABNORMAL
NITRITE UR QL STRIP: POSITIVE
NON-SQ EPI CELLS URNS QL MICRO: ABNORMAL /HPF
PH UR STRIP.AUTO: 6 [PH]
PROT UR STRIP-MCNC: ABNORMAL MG/DL
RBC #/AREA URNS AUTO: ABNORMAL /HPF
SP GR UR STRIP.AUTO: 1.02 (ref 1–1.03)
UROBILINOGEN UR STRIP-ACNC: <2 MG/DL
WBC #/AREA URNS AUTO: ABNORMAL /HPF

## 2022-04-18 PROCEDURE — 87077 CULTURE AEROBIC IDENTIFY: CPT

## 2022-04-18 PROCEDURE — 87186 SC STD MICRODIL/AGAR DIL: CPT

## 2022-04-18 PROCEDURE — 81001 URINALYSIS AUTO W/SCOPE: CPT

## 2022-04-18 PROCEDURE — 87086 URINE CULTURE/COLONY COUNT: CPT

## 2022-04-18 NOTE — TELEPHONE ENCOUNTER
Pts care is managed by [facility] office  Pt was last seen 4/1/22      Patient called with complaints of urinary frequency, urinary urgency, painful urination  Orders placed for UA C&S to rule out urinary tract infection  Office will follow up as results become available usually within 48-72 hours  Patient encouraged to hydrate well with water and avoid bladder irritants  Patient instructed to call back with worsening symptoms, fever, chills, blood in the urine or difficulty urinating  Pt will await urine test results  Pharmacy has been verified

## 2022-04-18 NOTE — TELEPHONE ENCOUNTER
Patient called today regarding he is having Symptoms of Urinary Retention, Burning, Feeling of  Frequency  Please call patient back  Thank You

## 2022-04-19 ENCOUNTER — TELEPHONE (OUTPATIENT)
Dept: UROLOGY | Facility: CLINIC | Age: 77
End: 2022-04-19

## 2022-04-19 NOTE — TELEPHONE ENCOUNTER
Spoke with patient and informed him of Augmentin being sent to pharmacy  Will monitor for urine culture results, and if he requires a different antibiotic, the office will inform him  Patient verbalized understanding

## 2022-04-19 NOTE — TELEPHONE ENCOUNTER
----- Message from Ash Patino PA-C sent at 4/19/2022  7:36 AM EDT -----  UA positive - augmentin x7 days sent to pharmacy   Please monitor culture for sensitivity

## 2022-04-20 LAB
BACTERIA UR CULT: ABNORMAL
BACTERIA UR CULT: ABNORMAL

## 2022-04-20 NOTE — TELEPHONE ENCOUNTER
Urine culture results dated 4/18/22 now available for review in pts epic chart  Please be advised   Thank you

## 2022-05-03 ENCOUNTER — HOSPITAL ENCOUNTER (OUTPATIENT)
Dept: MRI IMAGING | Facility: HOSPITAL | Age: 77
Discharge: HOME | End: 2022-05-03
Payer: MEDICARE

## 2022-05-03 DIAGNOSIS — G93.0 ARACHNOID CYST: ICD-10-CM

## 2022-05-03 DIAGNOSIS — Z86.018 HISTORY OF BENIGN SCHWANNOMA: ICD-10-CM

## 2022-05-03 PROCEDURE — G1004 CDSM NDSC: HCPCS

## 2022-05-03 PROCEDURE — 70553 MRI BRAIN STEM W/O & W/DYE: CPT

## 2022-05-03 PROCEDURE — A9585 GADOBUTROL INJECTION: HCPCS | Performed by: PSYCHIATRY & NEUROLOGY

## 2022-05-03 RX ADMIN — GADOBUTROL 9.5 ML: 604.72 INJECTION INTRAVENOUS at 10:21

## 2022-05-03 NOTE — NURSING NOTE
Device programmed remotely for MRI per Cardiology RNClementina  Patient continuously monitored by Radiology RN  Patient tolerated well  Device reprogrammed to prior settings per Cardiology RN  Vital signs stable throughout  No complaints per patient

## 2022-05-03 NOTE — NURSING NOTE
Remote device interrogation for MRI  Normal device function prior to MRI  Leads and device meet all requirements per policy for MRI  Device programmed AOO 85bpm  per Cardiology for MRI  Patient has no complaints  Vital signs monitored throughout by ELI Mason, Radiology RN  Normal device function post MRI  Device reprogrammed to prior settings per Cardiology

## 2022-05-05 ENCOUNTER — TELEPHONE (OUTPATIENT)
Dept: NEUROLOGY | Facility: CLINIC | Age: 77
End: 2022-05-05

## 2022-05-05 ENCOUNTER — TELEPHONE (OUTPATIENT)
Dept: UROLOGY | Facility: CLINIC | Age: 77
End: 2022-05-05

## 2022-05-05 NOTE — TELEPHONE ENCOUNTER
Please assist with r/s of cysto/TRUS on 6/8/22  This appt had to be bumped  Can be any MD per Dr Giles Resides check out at last visit

## 2022-05-05 NOTE — TELEPHONE ENCOUNTER
----- Message from Catrachita Grier MD sent at 5/4/2022  5:42 PM EDT -----  Patient's mri brain shows no new findings  Stable changes of small lesion and cyst noted since prior study

## 2022-05-05 NOTE — TELEPHONE ENCOUNTER
I called the patient and left a message to please return my call in regards to his MRI results  [place] back line given

## 2022-05-05 NOTE — TELEPHONE ENCOUNTER
The patient returned my call; explained MRI result  Patient verbalized understanding with no further questions

## 2022-05-09 ENCOUNTER — NURSE TRIAGE (OUTPATIENT)
Dept: OTHER | Facility: OTHER | Age: 77
End: 2022-05-09

## 2022-05-09 ENCOUNTER — APPOINTMENT (OUTPATIENT)
Dept: LAB | Facility: CLINIC | Age: 77
End: 2022-05-09
Payer: MEDICARE

## 2022-05-09 DIAGNOSIS — N39.0 RECURRENT UTI: ICD-10-CM

## 2022-05-09 DIAGNOSIS — N39.0 RECURRENT UTI: Primary | ICD-10-CM

## 2022-05-09 LAB
BACTERIA UR QL AUTO: ABNORMAL /HPF
BILIRUB UR QL STRIP: NEGATIVE
CLARITY UR: ABNORMAL
COLOR UR: YELLOW
GLUCOSE UR STRIP-MCNC: NEGATIVE MG/DL
HGB UR QL STRIP.AUTO: NEGATIVE
KETONES UR STRIP-MCNC: NEGATIVE MG/DL
LEUKOCYTE ESTERASE UR QL STRIP: ABNORMAL
NITRITE UR QL STRIP: NEGATIVE
NON-SQ EPI CELLS URNS QL MICRO: ABNORMAL /HPF
PH UR STRIP.AUTO: 6.5 [PH]
PROT UR STRIP-MCNC: NEGATIVE MG/DL
RBC #/AREA URNS AUTO: ABNORMAL /HPF
SP GR UR STRIP.AUTO: 1.01 (ref 1–1.03)
UROBILINOGEN UR STRIP-ACNC: <2 MG/DL
WBC #/AREA URNS AUTO: ABNORMAL /HPF
WBC CLUMPS # UR AUTO: PRESENT /UL

## 2022-05-09 PROCEDURE — 87077 CULTURE AEROBIC IDENTIFY: CPT

## 2022-05-09 PROCEDURE — 87186 SC STD MICRODIL/AGAR DIL: CPT

## 2022-05-09 PROCEDURE — 81001 URINALYSIS AUTO W/SCOPE: CPT

## 2022-05-09 PROCEDURE — 87086 URINE CULTURE/COLONY COUNT: CPT

## 2022-05-09 NOTE — TELEPHONE ENCOUNTER
Regarding: UTI  ----- Message from Erickson Tompkins sent at 5/9/2022 12:28 PM EDT -----  "I have another UTI   I need amoxicillin again "

## 2022-05-09 NOTE — TELEPHONE ENCOUNTER
Called and left detailed message per comm consent  Advised levaquin sent in per request for tx prior to urine results  Advised we would call back once urine culture results if abx needs to be changed  Final results do take up to 48-72 hours  Asking for a call back if any questions  Office to ensure levaquin appropriate

## 2022-05-09 NOTE — TELEPHONE ENCOUNTER
Feels like his UTI is back, asked if he would be able to get the same antibiotics called in while waiting for the culture  Sent orders for urine studies pt going this afternoon to provide sample  Reason for Disposition   Urinating more frequently than usual (i e , frequency)    Answer Assessment - Initial Assessment Questions  1  SYMPTOM: "What's the main symptom you're concerned about?" (e g , frequency, incontinence)      Burning and frequnecy  2  ONSET: "When did the  Symptoms start?"      Last night  3  PAIN: "Is there any pain?" If Yes, ask: "How bad is it?" (Scale: 1-10; mild, moderate, severe)      moderate  4   CAUSE: "What do you think is causing the symptoms?"      UTI  5  OTHER SYMPTOMS: "Do you have any other symptoms?" (e g , fever, flank pain, blood in urine, pain with urination)      Denies fever, or blood    Protocols used: URINARY SYMPTOMS-ADULT-OH

## 2022-05-11 LAB — BACTERIA UR CULT: ABNORMAL

## 2022-05-18 DIAGNOSIS — K21.9 GASTROESOPHAGEAL REFLUX DISEASE WITHOUT ESOPHAGITIS: Primary | ICD-10-CM

## 2022-05-18 NOTE — TELEPHONE ENCOUNTER
Dr Cat Estrada, this medication is not the correct dosage for the patient  Please refuse    Richardmila Le

## 2022-06-03 ENCOUNTER — OFFICE VISIT (OUTPATIENT)
Dept: FAMILY MEDICINE CLINIC | Facility: CLINIC | Age: 77
End: 2022-06-03
Payer: MEDICARE

## 2022-06-03 ENCOUNTER — APPOINTMENT (OUTPATIENT)
Dept: RADIOLOGY | Facility: CLINIC | Age: 77
End: 2022-06-03
Payer: MEDICARE

## 2022-06-03 VITALS
DIASTOLIC BLOOD PRESSURE: 72 MMHG | RESPIRATION RATE: 18 BRPM | WEIGHT: 210 LBS | SYSTOLIC BLOOD PRESSURE: 154 MMHG | HEART RATE: 63 BPM | OXYGEN SATURATION: 96 % | TEMPERATURE: 98.7 F | BODY MASS INDEX: 30.13 KG/M2

## 2022-06-03 DIAGNOSIS — I48.91 ATRIAL FIBRILLATION, UNSPECIFIED TYPE (HCC): ICD-10-CM

## 2022-06-03 DIAGNOSIS — G89.29 CHRONIC LEFT-SIDED THORACIC BACK PAIN: ICD-10-CM

## 2022-06-03 DIAGNOSIS — M54.6 CHRONIC LEFT-SIDED THORACIC BACK PAIN: ICD-10-CM

## 2022-06-03 DIAGNOSIS — I10 HYPERTENSION, BENIGN: ICD-10-CM

## 2022-06-03 DIAGNOSIS — M54.6 CHRONIC LEFT-SIDED THORACIC BACK PAIN: Primary | ICD-10-CM

## 2022-06-03 DIAGNOSIS — E78.2 MIXED HYPERLIPIDEMIA: ICD-10-CM

## 2022-06-03 DIAGNOSIS — G89.29 CHRONIC LEFT-SIDED THORACIC BACK PAIN: Primary | ICD-10-CM

## 2022-06-03 PROCEDURE — 72072 X-RAY EXAM THORAC SPINE 3VWS: CPT

## 2022-06-03 PROCEDURE — 99214 OFFICE O/P EST MOD 30 MIN: CPT | Performed by: NURSE PRACTITIONER

## 2022-06-03 RX ORDER — MINOCYCLINE HYDROCHLORIDE 50 MG/1
TABLET ORAL
COMMUNITY
Start: 2022-04-05 | End: 2023-06-01

## 2022-06-03 RX ORDER — PREDNISONE 10 MG/1
TABLET ORAL
Qty: 20 TABLET | Refills: 0 | Status: SHIPPED | OUTPATIENT
Start: 2022-06-03 | End: 2022-06-13

## 2022-06-03 NOTE — PROGRESS NOTES
Assessment/Plan:    1  Chronic left-sided thoracic back pain  -     XR spine thoracic 3 vw; Future; Expected date: 06/03/2022  -     predniSONE 10 mg tablet; 4 tabs x 2 days, 3 tabs x 2 days, 2 tabs x 2 days, 1 tab x 2 days  -     Ambulatory Referral to Physical Therapy; Future    2  Hypertension, benign  Comments:  stable with current regimen    3  Atrial fibrillation, unspecified type [facility])  Comments:  managed by cardiologist    4  Mixed hyperlipidemia  Comments:  complaint with statin          Patient Instructions: Take prednisone with food in morning and do not take any NSAID's while taking prednisone  Supportive care discussed and advised  Advised to RTO for any worsening and no improvement  Follow up for no improvement and worsening of conditions  Patient advised and educated when to see immediate medical care  Return if symptoms worsen or fail to improve  Future Appointments   Date Time Provider Jane Tse   6/6/2022 11:00 AM Trevor Sweet MD [facility]   8/22/2022 11:00 AM Denise Edwards MD [facility]   9/13/2022 11:00 AM Berhane Clayton MD [facility]           Subjective:      Patient ID: Glenna Mauricio  is a 68 y o  male  Chief Complaint   Patient presents with    Back Pain     Times months    sas/cma         Vitals:  /72   Pulse 63   Temp 98 7 °F (37 1 °C)   Resp 18   Wt 95 3 kg (210 lb) Comment: per patient    sas/cma  SpO2 96%   BMI 30 13 kg/m²     HPI  Patient stated that having left side mid back area pain on and off from more than 6 months  Stated that does golfing and makes it worse  Some certain  Other movements make it worse  Denies any chest pain, sob, headache and dizziness   Stated that it is a muscle there and bothers him on and off  Complaint with medications for chronic illnesses and tolerating it well        PHQ-2/9 Depression Screening    Little interest or pleasure in doing things: 1 - several days  Feeling down, depressed, or hopeless: 0 - not at all  PHQ-2 Score: 1  PHQ-2 Interpretation: Negative depression screen             The following portions of the patient's history were reviewed and updated as appropriate: allergies, current medications, past family history, past medical history, past social history, past surgical history and problem list       Review of Systems   Constitutional: Negative  HENT: Negative  Respiratory: Negative  Cardiovascular: Negative  Genitourinary: Negative  Musculoskeletal: Positive for back pain  Skin: Negative for rash  Neurological: Negative  Objective:    Social History     Tobacco Use   Smoking Status Former Smoker    Packs/day:     Years:     Pack years:     Types: Cigarettes    Quit date: Lisbeth Cyr Years since quittin 4   Smokeless Tobacco Never Used       Allergies: Allergies   Allergen Reactions    Codeine Other (See Comments) and GI Intolerance     Reaction Date: 2004;  Annotation - [id number]: '' CRAZY ''  vomiting  headache    Demerol [Meperidine] Nausea Only, Other (See Comments), GI Intolerance and Vomiting     vomiting  Reaction Date: 2004;   headache    Morphine Nausea Only, GI Intolerance and Vomiting     Reaction Date: 2012;          Current Outpatient Medications   Medication Sig Dispense Refill    acetaminophen (TYLENOL) 325 mg tablet Take 650 mg by mouth every 6 (six) hours as needed      amiodarone 200 mg tablet 200 mg daily after dinner       amLODIPine (NORVASC) 5 mg tablet       amoxicillin (AMOXIL) 125 MG chewable tablet       apixaban (ELIQUIS) 5 mg Take 5 mg by mouth 2 (two) times a day To stop 2 days prior to the procedure      atorvastatin (LIPITOR) 20 mg tablet TAKE 1 TABLET DAILY 90 tablet 3    Blood Glucose Monitoring Suppl (FREESTYLE LITE) GEOVANNY       carvedilol (COREG) 25 mg tablet Take 12 5 mg by mouth 2 (two) times a day       esomeprazole (NexIUM) 20 mg capsule TAKE ONE CAPSULE BY MOUTH EVERY DAY AT BEDTIME 90 capsule 3    fluticasone (FLONASE) 50 mcg/act nasal spray 2 sprays into each nostril daily 48 g 3    FREESTYLE LITE test strip       Lancets (FREESTYLE) lancets       losartan (COZAAR) 100 MG tablet 100 mg every morning       metroNIDAZOLE (METROCREAM) 0 75 % cream       minocycline (DYNACIN) 50 MG tablet       Ozempic, 0 25 or 0 5 MG/DOSE, 2 MG/1 5ML SOPN 0 5 one per week-On Sunday      pioglitazone-metFORMIN (ACTOPLUS MET)  MG per tablet daily after dinner      polyethylene glycol-propylene glycol (Systane) 0 4-0 3 % every 3 (three) hours as needed        predniSONE 10 mg tablet 4 tabs x 2 days, 3 tabs x 2 days, 2 tabs x 2 days, 1 tab x 2 days 20 tablet 0    RESTASIS 0 05 % ophthalmic emulsion Administer 1 drop to both eyes every 12 (twelve) hours       tamsulosin (FLOMAX) 0 4 mg       esomeprazole (NexIUM) 40 MG capsule Take 1 capsule (40 mg total) by mouth daily (Patient taking differently: Take 20 mg by mouth daily) 30 capsule 3    finasteride (PROSCAR) 5 mg tablet Take 5 mg by mouth daily (Patient not taking: Reported on 6/3/2022)       No current facility-administered medications for this visit  Physical Exam  Constitutional:       Appearance: Normal appearance  HENT:      Head: Normocephalic  Nose: Nose normal    Eyes:      Conjunctiva/sclera: Conjunctivae normal    Cardiovascular:      Rate and Rhythm: Normal rate and regular rhythm  Heart sounds: Normal heart sounds  Pulmonary:      Effort: Pulmonary effort is normal       Breath sounds: Normal breath sounds  Musculoskeletal:         General: Tenderness present  No swelling  Normal range of motion  Back:    Skin:     General: Skin is warm and dry  Findings: No rash  Neurological:      Mental Status: He is alert and oriented to person, place, and time     Psychiatric:         Mood and Affect: Mood normal          Behavior: Behavior normal          Thought Content: Thought content normal          Judgment: Judgment normal                      Oralee SANDRITA Barnard

## 2022-06-06 ENCOUNTER — TELEPHONE (OUTPATIENT)
Dept: ADMINISTRATIVE | Facility: OTHER | Age: 77
End: 2022-06-06

## 2022-06-06 ENCOUNTER — PROCEDURE VISIT (OUTPATIENT)
Dept: UROLOGY | Facility: CLINIC | Age: 77
End: 2022-06-06
Payer: MEDICARE

## 2022-06-06 VITALS
DIASTOLIC BLOOD PRESSURE: 85 MMHG | BODY MASS INDEX: 30.06 KG/M2 | OXYGEN SATURATION: 98 % | WEIGHT: 210 LBS | HEART RATE: 62 BPM | SYSTOLIC BLOOD PRESSURE: 150 MMHG | HEIGHT: 70 IN

## 2022-06-06 DIAGNOSIS — N28.1 COMPLEX RENAL CYST: ICD-10-CM

## 2022-06-06 DIAGNOSIS — N39.0 RECURRENT UTI: Primary | ICD-10-CM

## 2022-06-06 DIAGNOSIS — N40.1 BENIGN LOCALIZED PROSTATIC HYPERPLASIA WITH LOWER URINARY TRACT SYMPTOMS (LUTS): ICD-10-CM

## 2022-06-06 LAB — POST-VOID RESIDUAL VOLUME, ML POC: 205 ML

## 2022-06-06 PROCEDURE — 52000 CYSTOURETHROSCOPY: CPT | Performed by: UROLOGY

## 2022-06-06 PROCEDURE — 51798 US URINE CAPACITY MEASURE: CPT | Performed by: UROLOGY

## 2022-06-06 PROCEDURE — 96372 THER/PROPH/DIAG INJ SC/IM: CPT

## 2022-06-06 RX ORDER — CEFTRIAXONE 1 G/1
1000 INJECTION, POWDER, FOR SOLUTION INTRAMUSCULAR; INTRAVENOUS ONCE
Status: COMPLETED | OUTPATIENT
Start: 2022-06-06 | End: 2022-06-06

## 2022-06-06 RX ADMIN — CEFTRIAXONE 1000 MG: 1 INJECTION, POWDER, FOR SOLUTION INTRAMUSCULAR; INTRAVENOUS at 11:03

## 2022-06-06 NOTE — ASSESSMENT & PLAN NOTE
Patient with Bosniak 3 cyst off the right lower pole that has decreased in size therefore warrants observation  Plan for repeat imaging already in place

## 2022-06-06 NOTE — ASSESSMENT & PLAN NOTE
The patient has a very large prostate gland associated with lower urinary tract symptoms despite medical therapy with moderately elevated PVR  We discussed options for procedural intervention  Given the size of his gland these center on robotic simple prostatectomy versus HoLEP surgery versus prostate artery embolization  Discussed the risks and benefits of each  Robotic simple prostatectomy offers excellent functional outcomes requires entry into the abdomen and catheter for 2 weeks while the bladder is healing with cystogram to follow and has risks of bleeding urine leak bowel injury and usually temporary incontinence  HoLEP surgery requires special equipment and an experienced urologist and has risks of bleeding and fluid absorption and almost guaranteed incontinence for a few weeks to months  Prostate artery embolization is performed by Interventional Radiology through a minimally invasive approach through femoral vessels  This is a relatively new technology that has not been studied in a wide population but limited results show good efficacy but will take time for them to result as the prostate shrinks over time  We discussed these procedures will likely improve obstructive symptoms and may or may not improve irritative symptoms such as frequency and urgency  At the end of our discussion I recommended HoLEP but the patient said he would like to think about options and let us know if/how he wants to proceed

## 2022-06-06 NOTE — PROGRESS NOTES
Assessment/Plan:    Complex renal cyst  Patient with Bosniak 3 cyst off the right lower pole that has decreased in size therefore warrants observation  Plan for repeat imaging already in place  Recurrent UTI  Rocephin given for prophylaxis today  Benign localized prostatic hyperplasia with lower urinary tract symptoms (LUTS)  The patient has a very large prostate gland associated with lower urinary tract symptoms despite medical therapy with moderately elevated PVR  We discussed options for procedural intervention  Given the size of his gland these center on robotic simple prostatectomy versus HoLEP surgery versus prostate artery embolization  Discussed the risks and benefits of each  Robotic simple prostatectomy offers excellent functional outcomes requires entry into the abdomen and catheter for 2 weeks while the bladder is healing with cystogram to follow and has risks of bleeding urine leak bowel injury and usually temporary incontinence  HoLEP surgery requires special equipment and an experienced urologist and has risks of bleeding and fluid absorption and almost guaranteed incontinence for a few weeks to months  Prostate artery embolization is performed by Interventional Radiology through a minimally invasive approach through femoral vessels  This is a relatively new technology that has not been studied in a wide population but limited results show good efficacy but will take time for them to result as the prostate shrinks over time  We discussed these procedures will likely improve obstructive symptoms and may or may not improve irritative symptoms such as frequency and urgency  At the end of our discussion I recommended HoLEP but the patient said he would like to think about options and let us know if/how he wants to proceed  Subjective:      Patient ID: Rody Floyd  is a 68 y o  male      HPI     26-year-old male with history of recurrent UTI complex right renal cyst and bladder outlet obstruction  He has been previously seen by Dr Liss Negrete who referred him for discussion of HoLEP surgery  The patient is to be followed by Dr Mary Diaz for history of recurrent UTIs as well as incomplete emptying with BPH  He was on finasteride and Flomax  CT urogram showed a 4 3 cm right lower pole exophytic cyst, classified as Bosniak 3  Follow-up imaging a via MRI in March 2022 showed the cyst to be smaller in size and therefore the patient was recommend observation with plan for additional imaging via ultrasound in approximately November 2022  Uroflow showed a voided volume of 220 cc with a max flow of 9 and average flow 3 mL/second with PVR of 202 cc  Cytoscopy showed trabeculations with long prostrate with kissing lateral lobes and multiple small bladder stones  TRUS volume 88cc    Patient's abdominal surgical history significant for mesh placement  Past Surgical History:   Procedure Laterality Date    APPENDECTOMY      ATRIAL ABLATION SURGERY      2014    ATRIAL ABLATION SURGERY      catheter ablation atrial fibrillation / last assessed 2/17/16     CHOLECYSTECTOMY      lap    COLON SURGERY  1990    Hartmans procedure    COLON SURGERY      reversal 6 weeks later    COLONOSCOPY N/A 1/26/2017    Procedure: COLONOSCOPY;  Surgeon: Oniel Linares MD;  Location: [place] [address] GI LAB; Service:     ESOPHAGOGASTRODUODENOSCOPY N/A 1/26/2017    Procedure: ESOPHAGOGASTRODUODENOSCOPY (EGD); Surgeon: Oniel Linares MD;  Location: [place] [address] GI LAB;   Service:     EYE SURGERY Bilateral 2015    lid repair    HERNIA REPAIR Bilateral     inguinal 2014 & 2013   Chu Cortez  2011    INCISIONAL HERNIA REPAIR N/A 3/24/2017    Procedure: REPAIR OF INCARCERATED INCISIONAL HERNIA WITH MESH, Lysis of Adhesions, Partial Omentectomy;  Surgeon: Magno Mari MD;  Location: [address] [place] [address];  Service:    Johnny Hollins / Donta Jimenez / Juan M Morocho Left 10/04/2021    [id number]-[id number]  NEUROBLASTOMA EXCISION      SKIN CANCER EXCISION      On nose    TONSILLECTOMY          Past Medical History:   Diagnosis Date    Allergic rhinitis     Anemia 10/23/2008    last assessed 9/9/13     Anxiety     Atrial fibrillation (HCC)     Bleeding     Chronic kidney disease     CPAP (continuous positive airway pressure) dependence     Diabetes mellitus ([address] )     Diarrhea     Diverticulitis     Eating disorder     GERD (gastroesophageal reflux disease)     Herpes zoster with complication     last assessed 7/3/17     Hiatal hernia     Hyperlipidemia     Hypertension     IBS (irritable bowel syndrome)     Incisional hernia     Increased urinary frequency     Irregular heart beat     Pyogenic granuloma 09/13/2006    last assessed 9/13/06    Carlos (subconjunctival hemorrhage), unspecified laterality 09/12/2005    last assessed 9/12/05    Sleep apnea     TMJ (dislocation of temporomandibular joint)     Ventral hernia     last assessed  4/6/17     Wears glasses              Review of Systems   Constitutional: Negative for chills and fever  HENT: Negative for ear pain and sore throat  Eyes: Negative for pain and visual disturbance  Respiratory: Negative for cough and shortness of breath  Cardiovascular: Negative for chest pain and palpitations  Gastrointestinal: Negative for abdominal pain and vomiting  Genitourinary: Negative for dysuria and hematuria  Musculoskeletal: Negative for arthralgias and back pain  Skin: Negative for color change and rash  Neurological: Negative for seizures and syncope  All other systems reviewed and are negative  Objective:      /85 (BP Location: Left arm, Patient Position: Sitting, Cuff Size: Standard)   Pulse 62   Ht 5' 10" (1 778 m)   Wt 95 3 kg (210 lb)   SpO2 98%   BMI 30 13 kg/m²     Lab Results   Component Value Date    PSA 4 2 (H) 07/27/2021    PSA 8 1 (H) 02/19/2021            Physical Exam  Vitals reviewed  Constitutional:       Appearance: Normal appearance  He is normal weight  HENT:      Head: Normocephalic and atraumatic  Eyes:      Pupils: Pupils are equal, round, and reactive to light  Abdominal:      General: Abdomen is flat  Neurological:      General: No focal deficit present  Mental Status: He is alert and oriented to person, place, and time  Psychiatric:         Mood and Affect: Mood normal          Thought Content: Thought content normal               Cystoscopy     Date/Time 6/6/2022 4:23 PM     Performed by  Abi Etienne MD     Authorized by Abi Etienne MD      Universal Protocol:  Consent: Written consent obtained  Risks and benefits: risks, benefits and alternatives were discussed  Consent given by: patient  Time out: Immediately prior to procedure a "time out" was called to verify the correct patient, procedure, equipment, support staff and site/side marked as required  Patient understanding: patient states understanding of the procedure being performed  Patient consent: the patient's understanding of the procedure matches consent given  Procedure consent: procedure consent matches procedure scheduled  Patient identity confirmed: verbally with patient        Procedure Details:  Procedure type: cystoscopy    Patient tolerance: Patient tolerated the procedure well with no immediate complications    Additional Procedure Details: A time-out was performed identifying the correct patient site and procedure  A MA chaperone was in the room  A flexible cystoscope was introduced into the urethra  The pendulous urethra was normal   The prostatic urethra showed bilateral lobar hypertrophy without a median lobe but with prostatic extrusion  The bladder did not have any lesions concerning for malignancy  There were numerous small bladder calculi  There were moderate trabeculations and small diverticula  The ureteral orifices were not seen        Biopsy prostate     Date/Time 6/6/2022 4:24 PM     Performed by  Peewee Kumar MD     Authorized by Peewee Kumar MD      Universal Protocol   Consent: Written consent obtained  Consent given by: patient  Time out: Immediately prior to procedure a "time out" was called to verify the correct patient, procedure, equipment, support staff and site/side marked as required  Patient understanding: patient states understanding of the procedure being performed  Patient consent: the patient's understanding of the procedure matches consent given  Procedure consent: procedure consent matches procedure scheduled  Relevant documents: relevant documents present and verified  Patient identity confirmed: verbally with patient        Local anesthesia used: no     Anesthesia   Local anesthesia used: no     Sedation   Patient sedated: no        Specimen: no   Procedure Details   Procedure Notes: The patient was placed in left lateral decubitus position  A digital rectal examination was performed  The prostate did not have any nodules  An ultrasound probe was introduced into the rectum  The prostate was evaluated and measurements made showing the prostate to be  87 cc in size    Patient tolerance: patient tolerated the procedure well with no immediate complications             Orders  Orders Placed This Encounter   Procedures    Cystoscopy     This order was created via procedure documentation    Biopsy prostate     This order was created via procedure documentation    POCT Measure PVR

## 2022-06-06 NOTE — LETTER
Lab Result(s) Request Form: Hemoglobin A1c      Date Requested: 22  Patient: Juan Santiago  Patient : 1945   Referring Provider: Beth Ahumada MD        Date of Lab Collection ______________________________       The above patient has informed us that they have completed their   most recent Hemoglobin A1c at your facility  Please complete   this form and attach all corresponding procedure reports/results  Comments __________________________________________________________  ____________________________________________________________________  ____________________________________________________________________  ____________________________________________________________________    Collecting/Resulting Facility  ___________________________________________  Form Completed By (print name) ________________________________________    Signature ___________________________________________________________      These reports are needed for  compliance  Please fax this completed form and a copy of the lab result(s)/ report(s) to our office located at [place] [address] as soon as possible to [phone] marlena Zelaya: Phone [phone]    We thank you for your assistance in treating our mutual patient

## 2022-06-06 NOTE — LETTER
Lab Result(s) Request Form: Hemoglobin A1c      Date Requested: 22     2nd Request  Patient: Tawana Blaine  Thank you for reply, please   Patient : 1945     Send lab report  Referring Provider: Veronica Khanna MD        Date of Lab Collection _________3/23/2022_____________________       The above patient has informed us that they have completed their   most recent Hemoglobin A1c at your facility  Please complete   this form and attach all corresponding procedure reports/results  Comments __________________________________________________________  ____________________________________________________________________  ____________________________________________________________________  ____________________________________________________________________    Collecting/Resulting Facility  ___________________________________________  Form Completed By (print name) ________________________________________    Signature ___________________________________________________________      These reports are needed for  compliance  Please fax this completed form and a copy of the lab result(s)/ report(s) to our office located at [place] [address] as soon as possible to [phone] marlena Moore: Phone [phone]    We thank you for your assistance in treating our mutual patient

## 2022-06-06 NOTE — TELEPHONE ENCOUNTER
Upon review of the In Basket request and the patient's chart, initial outreach has been made via fax, please see Contacts section for details  Thank you  Tomás Garza MD [phone] Fax   [phone]

## 2022-06-06 NOTE — TELEPHONE ENCOUNTER
----- Message from [facility] sent at 6/3/2022  1:40 PM EDT -----  06/03/22 1:40 PM    Karyn, our patient Danni Carvajal  has had Hemoglobin A1c completed/performed  Please assist in updating the patient chart by making an External outreach to Dr Amanda Ariza facility located in [place], [place]  The date of service is with in a year      Thank you,  Radha Rosas  [facility]

## 2022-06-09 NOTE — TELEPHONE ENCOUNTER
As a follow-up, a second attempt has been made for outreach via fax, please see Contacts section for details  Thank you  Tomás Gibbs MD [phone] Fax   [phone]

## 2022-06-13 NOTE — TELEPHONE ENCOUNTER
As a final attempt, a third outreach has been made via telephone call  Please see Contacts section for details  This encounter will be closed and completed by end of day  Should we receive the requested information because of previous outreach attempts, the requested patient's chart will be updated appropriately       Thank you  Tomás Asher

## 2022-07-01 DIAGNOSIS — G43.909 MIGRAINE: Primary | ICD-10-CM

## 2022-07-01 RX ORDER — BUTALBITAL, ACETAMINOPHEN AND CAFFEINE 50; 325; 40 MG/1; MG/1; MG/1
TABLET ORAL
Qty: 20 TABLET | Refills: 0 | Status: SHIPPED | OUTPATIENT
Start: 2022-07-01 | End: 2022-08-22

## 2022-07-13 ENCOUNTER — OFFICE VISIT (OUTPATIENT)
Dept: OBGYN CLINIC | Facility: CLINIC | Age: 77
End: 2022-07-13
Payer: MEDICARE

## 2022-07-13 VITALS
SYSTOLIC BLOOD PRESSURE: 154 MMHG | BODY MASS INDEX: 30.06 KG/M2 | HEIGHT: 70 IN | DIASTOLIC BLOOD PRESSURE: 96 MMHG | HEART RATE: 61 BPM | WEIGHT: 210 LBS

## 2022-07-13 DIAGNOSIS — M54.6 ACUTE LEFT-SIDED THORACIC BACK PAIN: ICD-10-CM

## 2022-07-13 DIAGNOSIS — M75.51 ACUTE BURSITIS OF RIGHT SHOULDER: Primary | ICD-10-CM

## 2022-07-13 PROCEDURE — 99214 OFFICE O/P EST MOD 30 MIN: CPT | Performed by: ORTHOPAEDIC SURGERY

## 2022-07-13 RX ORDER — CYCLOBENZAPRINE HCL 10 MG
10 TABLET ORAL 3 TIMES DAILY PRN
Qty: 20 TABLET | Refills: 0 | Status: SHIPPED | OUTPATIENT
Start: 2022-07-13 | End: 2022-08-13

## 2022-07-13 NOTE — PROGRESS NOTES
Assessment/Plan:  1  Acute bursitis of right shoulder  Ambulatory referral to Physical Therapy   2  Acute left-sided thoracic back pain  Ambulatory referral to Physical Therapy    cyclobenzaprine (FLEXERIL) 10 mg tablet       Tree Course has ongoing thoracic back pain which seems muscular in nature  He certainly does have some degenerative changes in this area however his pain seems to be paraspinal in nature  I recommended physical therapy at this time  He also has right-sided shoulder pain consistent with subacromial impingement  I discussed with him possible physical therapy versus cortisone injection  He would like to avoid injections and begin with therapy on the shoulder as well  He will follow up after therapy if the pain persists or worsens  Subjective:   Kierra Jackson  is a 68 y o  male who presents to the office for increasing thoracic back pain  He has been experiencing discomfort in his back for the last 1-2 months  He denies any specific injury or trauma  He went to his PCP office and was sent for x-rays which showed mild arthritis  He was placed on oral steroids which he states he did not want to take due to his diabetes  He has not taking any medications other than use topical creams  He thinks that he needs physical therapy for this as this pain may have been exacerbated by physical lifting  He also has a history of intermittent discomfort over the anterior aspect of his right shoulder that worsens with overhead movement and improves with rest   He denies any numbness or tingling radiating pain down his arm  He did have x-rays of the shoulder 2 years ago which were unremarkable  Review of Systems   Constitutional: Negative for chills, fever and unexpected weight change  HENT: Negative for hearing loss, nosebleeds and sore throat  Eyes: Negative for pain, redness and visual disturbance  Respiratory: Negative for cough, shortness of breath and wheezing      Cardiovascular: Negative for chest pain, palpitations and leg swelling  Gastrointestinal: Negative for abdominal pain, nausea and vomiting  Endocrine: Negative for polyphagia and polyuria  Genitourinary: Negative for dysuria and hematuria  Musculoskeletal:        See HPI   Skin: Negative for rash and wound  Neurological: Negative for dizziness, numbness and headaches  Psychiatric/Behavioral: Negative for decreased concentration and suicidal ideas  The patient is not nervous/anxious  Past Medical History:   Diagnosis Date    Allergic rhinitis     Anemia 10/23/2008    last assessed 9/9/13     Anxiety     Atrial fibrillation (HCC)     Bleeding     Chronic kidney disease     CPAP (continuous positive airway pressure) dependence     Diabetes mellitus ([facility] [address] )     Diarrhea     Diverticulitis     Eating disorder     GERD (gastroesophageal reflux disease)     Herpes zoster with complication     last assessed 7/3/17     Hiatal hernia     Hyperlipidemia     Hypertension     IBS (irritable bowel syndrome)     Incisional hernia     Increased urinary frequency     Irregular heart beat     Pyogenic granuloma 09/13/2006    last assessed 9/13/06    Carlos (subconjunctival hemorrhage), unspecified laterality 09/12/2005    last assessed 9/12/05    Sleep apnea     TMJ (dislocation of temporomandibular joint)     Ventral hernia     last assessed  4/6/17     Wears glasses        Past Surgical History:   Procedure Laterality Date    APPENDECTOMY      ATRIAL ABLATION SURGERY      2014    ATRIAL ABLATION SURGERY      catheter ablation atrial fibrillation / last assessed 2/17/16     CHOLECYSTECTOMY      lap    COLON SURGERY  1990    Hartmans procedure    COLON SURGERY      reversal 6 weeks later    COLONOSCOPY N/A 1/26/2017    Procedure: COLONOSCOPY;  Surgeon: Hanane Julian MD;  Location: [facility] GI LAB;   Service:     ESOPHAGOGASTRODUODENOSCOPY N/A 1/26/2017    Procedure: ESOPHAGOGASTRODUODENOSCOPY (EGD); Surgeon: Padmini Mahan MD;  Location: [facility];   Service:     EYE SURGERY Bilateral 2015    lid repair    HERNIA REPAIR Bilateral     inguinal  &    [facility]  2011    INCISIONAL HERNIA REPAIR N/A 3/24/2017    Procedure: REPAIR OF INCARCERATED INCISIONAL HERNIA WITH MESH, Lysis of Adhesions, Partial Omentectomy;  Surgeon: Rambo Menchaca MD;  Location: [facility] OR;  Service:    Ethan Marinelli / Jose Josue / Marley Wynn Left 10/04/2021    [id number]    NEUROBLASTOMA EXCISION      SKIN CANCER EXCISION      On nose    TONSILLECTOMY         Family History   Problem Relation Age of Onset    Heart disease Mother     Cancer Mother     Colon cancer Mother     Dementia Mother     Early death Father     Seizures Father    [facility] Cancer Sister         bone    Heart disease Sister     Diabetes Sister     Lupus Sister     Heart disease Brother     Cancer Brother         kidney    Diabetes Brother     Other Sister         covid    Stroke Neg Hx        Social History     Occupational History    Not on file   Tobacco Use    Smoking status: Former Smoker     Packs/day: 1 00     Years: 20 00     Pack years: 20 00     Types: Cigarettes     Quit date:      Years since quittin 5    Smokeless tobacco: Never Used   Vaping Use    Vaping Use: Never used   Substance and Sexual Activity    Alcohol use: No     Comment: none in 33 yrs    Drug use: No    Sexual activity: Not on file         Current Outpatient Medications:     acetaminophen (TYLENOL) 325 mg tablet, Take 650 mg by mouth every 6 (six) hours as needed, Disp: , Rfl:     amiodarone 200 mg tablet, 200 mg daily after dinner , Disp: , Rfl:     amLODIPine (NORVASC) 5 mg tablet, , Disp: , Rfl:     amoxicillin (AMOXIL) 125 MG chewable tablet, , Disp: , Rfl:     apixaban (ELIQUIS) 5 mg, Take 5 mg by mouth 2 (two) times a day To stop 2 days prior to the procedure, Disp: , Rfl:     atorvastatin (LIPITOR) 20 mg tablet, TAKE 1 TABLET DAILY, Disp: 90 tablet, Rfl: 3    Blood Glucose Monitoring Suppl (FREESTYLE LITE) GEOVANNY, , Disp: , Rfl:     butalbital-acetaminophen-caffeine (Bac) -40 mg per tablet, May take 1 tab once daily prn  Do not exceed 3x/week , Disp: 20 tablet, Rfl: 0    carvedilol (COREG) 25 mg tablet, Take 12 5 mg by mouth 2 (two) times a day , Disp: , Rfl:     esomeprazole (NexIUM) 20 mg capsule, TAKE ONE CAPSULE BY MOUTH EVERY DAY AT BEDTIME, Disp: 90 capsule, Rfl: 3    finasteride (PROSCAR) 5 mg tablet, Take 5 mg by mouth daily, Disp: , Rfl:     fluticasone (FLONASE) 50 mcg/act nasal spray, 2 sprays into each nostril daily, Disp: 48 g, Rfl: 3    FREESTYLE LITE test strip, , Disp: , Rfl:     Lancets (FREESTYLE) lancets, , Disp: , Rfl:     losartan (COZAAR) 100 MG tablet, 100 mg every morning , Disp: , Rfl:     metroNIDAZOLE (METROCREAM) 0 75 % cream, , Disp: , Rfl:     minocycline (DYNACIN) 50 MG tablet, , Disp: , Rfl:     Ozempic, 0 25 or 0 5 MG/DOSE, 2 MG/1 5ML SOPN, 0 5 one per week-On Sunday, Disp: , Rfl:     pioglitazone-metFORMIN (ACTOPLUS MET)  MG per tablet, daily after dinner, Disp: , Rfl:     polyethylene glycol-propylene glycol (Systane) 0 4-0 3 %, every 3 (three) hours as needed  , Disp: , Rfl:     RESTASIS 0 05 % ophthalmic emulsion, Administer 1 drop to both eyes every 12 (twelve) hours , Disp: , Rfl:     tamsulosin (FLOMAX) 0 4 mg, , Disp: , Rfl:     esomeprazole (NexIUM) 40 MG capsule, Take 1 capsule (40 mg total) by mouth daily (Patient taking differently: Take 20 mg by mouth daily), Disp: 30 capsule, Rfl: 3    Allergies   Allergen Reactions    Codeine Other (See Comments) and GI Intolerance     Reaction Date: 27Dec2004;  Annotation - [id number]: '' CRAZY ''  vomiting  headache    Demerol [Meperidine] Nausea Only, Other (See Comments), GI Intolerance and Vomiting     vomiting  Reaction Date: 27Dec2004;   headache    Morphine Nausea Only, GI Intolerance and Vomiting Reaction Date: 23Feb2012;        Objective:  Vitals:    07/13/22 0957   BP: 154/96   Pulse: 61       Right Shoulder Exam     Tenderness   Right shoulder tenderness location: Tenderness to palpation over subacromial bursa  Range of Motion   Active abduction:  160 abnormal   Passive abduction:  160 abnormal   Extension: normal   External rotation: normal   Forward flexion:  170 abnormal   Internal rotation 0 degrees: normal     Muscle Strength   Abduction: 5/5   Internal rotation: 5/5   External rotation: 5/5   Supraspinatus: 5/5   Subscapularis: 5/5   Biceps: 5/5     Tests   Norton test: positive  Impingement: positive  Drop arm: negative    Other   Erythema: absent  Sensation: normal  Pulse: present            Physical Exam  Vitals and nursing note reviewed  Constitutional:       Appearance: He is well-developed  HENT:      Head: Normocephalic and atraumatic  Eyes:      General: No scleral icterus  Conjunctiva/sclera: Conjunctivae normal    Cardiovascular:      Rate and Rhythm: Normal rate  Pulmonary:      Effort: Pulmonary effort is normal  No respiratory distress  Musculoskeletal:      Cervical back: Normal range of motion and neck supple  Back:       Comments: Tenderness to palpation over left paraspinal musculature and latissimus Dorsi   Skin:     General: Skin is warm and dry  Neurological:      Mental Status: He is alert and oriented to person, place, and time  Psychiatric:         Behavior: Behavior normal          I have personally reviewed pertinent films in PACS and my interpretation is as follows:  X-rays of the right shoulder from 2020 demonstrate no significant osteoarthritis  No evidence of acute fracture    X-rays of the thoracic spine dated 6/3/2022 demonstrate mild degenerative changes no fracture

## 2022-07-20 ENCOUNTER — APPOINTMENT (OUTPATIENT)
Dept: LAB | Facility: CLINIC | Age: 77
End: 2022-07-20
Payer: MEDICARE

## 2022-07-22 ENCOUNTER — EVALUATION (OUTPATIENT)
Dept: PHYSICAL THERAPY | Facility: CLINIC | Age: 77
End: 2022-07-22
Payer: MEDICARE

## 2022-07-22 DIAGNOSIS — M75.51 ACUTE BURSITIS OF RIGHT SHOULDER: Primary | ICD-10-CM

## 2022-07-22 DIAGNOSIS — M54.6 ACUTE LEFT-SIDED THORACIC BACK PAIN: ICD-10-CM

## 2022-07-22 PROCEDURE — 97161 PT EVAL LOW COMPLEX 20 MIN: CPT | Performed by: PHYSICAL THERAPIST

## 2022-07-22 NOTE — PROGRESS NOTES
PT Evaluation     Today's date: 2022  Patient name: Michoacano Solis  : 1945  MRN: [id number]  Referring provider: DO Radha Berkowitz:   Encounter Diagnosis     ICD-10-CM    1  Acute bursitis of right shoulder  M75 51 Ambulatory referral to Physical Therapy   2  Acute left-sided thoracic back pain  M54 6 Ambulatory referral to Physical Therapy                  Assessment  Assessment details: Michoacano Solis  is a 68 y o  male who presents to physical therapy with pain, decreased UE range of motion, decreased UE strength, impaired function, decreased activity tolerance and poor posture  Patient's clinical presentation is consistent with their referring diagnosis of Acute bursitis of right shoulder and Acute left-sided thoracic back pain  The pt presents with functional limitations of ADLs, recreational activities, self-care and reaching  Pt would benefit from physical therapy services to address these limitations and maximize function  Pt was instructed and educated on good sitting posture today and demonstrates understanding  Impairments: abnormal muscle firing, abnormal muscle tone, abnormal or restricted ROM, activity intolerance, impaired physical strength, lacks appropriate home exercise program, pain with function, scapular dyskinesis, poor posture  and poor body mechanics  Understanding of Dx/Px/POC: good   Prognosis: good    Goals  Short term goals:  (3 weeks)  1  Patient will have pain level 2/10 right  shoulder at rest  2  Patient will report a 50% improvement in symptoms with ADL's  3  Patient will demonstrate appropriate scapulohumeral rhythm with reaching shoulder height and below  4  Patient will have improved right shoulder ROM by 20 degrees    Long term goals: (6 weeks)  1  Patient will report 85 % improvement improvement in symptoms with ADL's  2  Patient will have pain level 2/10 right shoulder with ADL's   3   Patient will demonstrate appropriate scapulohumeral rhythm with reaching overhead  4  Patient will be independent in a comprehensive home exercise program      Plan  Patient would benefit from: PT eval and skilled physical therapy  Planned modality interventions: cryotherapy and thermotherapy: hydrocollator packs  Planned therapy interventions: joint mobilization, manual therapy, massage, neuromuscular re-education, patient education, postural training, strengthening, stretching, therapeutic activities, ADL training, functional ROM exercises, home exercise program, abdominal trunk stabilization and therapeutic exercise  Frequency: 2x week  Duration in visits: 12  Duration in weeks: 6  Treatment plan discussed with: patient        Subjective Evaluation    History of Present Illness  Mechanism of injury: He notes anterior right shld pain for the last 9 months  The pain began insidiously  Left T-S pain began insidiously years ago but 6 months ago really increased  He followed up with Dr Toni Reyes  He was prescribed a muscle relaxer  He was then referred to PT  Pain  Current pain rating: 3  At best pain ratin  At worst pain ratin  Quality: sharp, dull ache and tight  Relieving factors: rest  Aggravating factors: walking, lifting and overhead activity    Social Support    Employment status: not working  Exercise history: walk the dog 2 miles    Patient Goals  Patient goals for therapy: decreased pain          Objective     Postural Observations  Seated posture: poor    Additional Postural Observation Details  Patient has slouched posture in sitting      Palpation     Right   Hypertonic in the pectoralis major, pectoralis minor and upper trapezius  Tenderness of the pectoralis major, pectoralis minor and upper trapezius       Cervical/Thoracic Screen   Thoracic range of motion within normal limits with the following exceptions: He has TTP left > right side T-S paraspinals    Passive Range of Motion   Left Shoulder   Normal passive range of motion    Right Shoulder Flexion: 165 degrees   Abduction: 161 degrees   External rotation 0°: 62 degrees   Internal rotation 0°: 77 degrees     Scapular Mobility     Right Shoulder   Scapular mobility: fair    Scapular Mobility beyond 90° FF   Scapular elevation: moderate    Strength/Myotome Testing     Left Shoulder     Planes of Motion   Flexion: 5   Extension: 5   Abduction: 5   Adduction: 5     Right Shoulder     Planes of Motion   Flexion: 4-   Extension: 4-   Abduction: 4   Adduction: 4              Eval/ Re-eval Auth #/ Referral # Total visits Start date  Expiration date Total active units  Total manual units  PT only or  PT+OT?   7/22/22                                   Date: 7/22/22          Total units authorized  Active:            Manual:           Total units remaining  Active:               Manual:                Date:           Total units authorized  Active:            Manual:           Total units remaining  Active:               Manual:               Precautions:  - Pacemaker, Hypertension, Afib    Specialty Daily Treatment Diary       Manuals 7/22/22       Visit # 1       STM UT, pec group        PROM shld        ST joint mobs        T-S rotation mobs        Warm-up        NuStep        Neuro Re-Ed        Posture correct        Scap squeeze        Cat camel                        Ther Ex        TB row        TB ext        Overhead w/ ball        Pulleys        Pec doorway stretch        S/L rotation        Wand flex                Ther Activity                                Modalities        CP

## 2022-07-25 ENCOUNTER — OFFICE VISIT (OUTPATIENT)
Dept: PHYSICAL THERAPY | Facility: CLINIC | Age: 77
End: 2022-07-25
Payer: MEDICARE

## 2022-07-25 DIAGNOSIS — M54.6 ACUTE LEFT-SIDED THORACIC BACK PAIN: ICD-10-CM

## 2022-07-25 DIAGNOSIS — M75.51 ACUTE BURSITIS OF RIGHT SHOULDER: Primary | ICD-10-CM

## 2022-07-25 PROCEDURE — 97110 THERAPEUTIC EXERCISES: CPT | Performed by: PHYSICAL THERAPIST

## 2022-07-25 PROCEDURE — 97140 MANUAL THERAPY 1/> REGIONS: CPT | Performed by: PHYSICAL THERAPIST

## 2022-07-25 NOTE — PROGRESS NOTES
Daily Note     Today's date: 2022  Patient name: Margi Rojas  : 1945  MRN: [id number]  Referring provider: Cesar Parry DO  Dx:   Encounter Diagnosis     ICD-10-CM    1  Acute bursitis of right shoulder  M75 51    2  Acute left-sided thoracic back pain  M54 6                   Subjective:  Right shld is sore today, no left T-S pain today  Objective: See treatment diary below      Assessment: Tolerated treatment well  Patient would benefit from continued PT  Focused manual treatment and exercise on improving right shld mobility today  He felt less pain at the end of the session  Plan: Continue per plan of care  Progress treatment as tolerated         Eval/ Re-eval Auth #/ Referral # Total visits Start date  Expiration date Total active units  Total manual units  PT only or  PT+OT?   22                                   Date: 22         Total units authorized  Active:            Manual:           Total units remaining  Active:               Manual:                Date:           Total units authorized  Active:            Manual:           Total units remaining  Active:               Manual:               Precautions:  - Pacemaker, Hypertension, Afib    Specialty Daily Treatment Diary       Manuals 22      Visit # 1 2      STM UT, pec group  5'      PROM shld  5'      ST joint mobs        T-S rotation mobs        Warm-up        NuStep  10 min      Neuro Re-Ed        Posture correct        Scap squeeze  20      Cat camel                        Ther Ex        TB row  20   GTB      TB ext  20   GTB      Overhead w/ ball  20      Pulleys  30      Pec doorway stretch  20  5" hold      S/L rotation        Wand flex  20              Ther Activity                                Modalities        CP  5 min

## 2022-07-28 ENCOUNTER — OFFICE VISIT (OUTPATIENT)
Dept: PHYSICAL THERAPY | Facility: CLINIC | Age: 77
End: 2022-07-28
Payer: MEDICARE

## 2022-07-28 DIAGNOSIS — M54.6 ACUTE LEFT-SIDED THORACIC BACK PAIN: Primary | ICD-10-CM

## 2022-07-28 DIAGNOSIS — M75.51 ACUTE BURSITIS OF RIGHT SHOULDER: ICD-10-CM

## 2022-07-28 PROCEDURE — 97110 THERAPEUTIC EXERCISES: CPT | Performed by: PHYSICAL THERAPIST

## 2022-07-28 PROCEDURE — 97140 MANUAL THERAPY 1/> REGIONS: CPT | Performed by: PHYSICAL THERAPIST

## 2022-07-28 NOTE — PROGRESS NOTES
Daily Note     Today's date: 2022  Patient name: Chris Aguilera  : 1945  MRN: [id number]  Referring provider: Reena Rust DO  Dx:   Encounter Diagnosis     ICD-10-CM    1  Acute left-sided thoracic back pain  M54 6    2  Acute bursitis of right shoulder  M75 51                   Subjective:  He reports playing golf yesterday and having T-S pain since  Pain is 5/10 today      Objective: See treatment diary below      Assessment: Tolerated treatment well  Patient would benefit from continued PT  He felt improvement following T-S rotation stretch with overpressure and STM to left sided paraspinals  Plan: Continue per plan of care  Progress treatment as tolerated         Eval/ Re-eval Auth #/ Referral # Total visits Start date  Expiration date Total active units  Total manual units  PT only or  PT+OT?   22                                   Date: 22        Total units authorized  Active:            Manual:           Total units remaining  Active:               Manual:                Date:           Total units authorized  Active:            Manual:           Total units remaining  Active:               Manual:               Precautions:  - Pacemaker, Hypertension, Afib    Specialty Daily Treatment Diary       Manuals 22     Visit # 1 2 3     STM UT, pec group  5' 4'     PROM shld  5' 3'     ST joint mobs        T-S rotation mobs   3'     Warm-up        NuStep  10 min 10'     Neuro Re-Ed        Posture correct        Scap squeeze  20      Cat camel   15     T-S ext over foam   20             Ther Ex        TB row  20   GTB 20   GTB     TB ext  20   GTB 20   GTB     Overhead w/ ball  20 20     Pulleys  30 30     Pec doorway stretch  20  5" hold 20     S/L rotation   T-S rotation seated  20x     Wand flex  20 20             Ther Activity                                Modalities        CP  5 min 5 min to right shld

## 2022-08-01 ENCOUNTER — OFFICE VISIT (OUTPATIENT)
Dept: PHYSICAL THERAPY | Facility: CLINIC | Age: 77
End: 2022-08-01
Payer: MEDICARE

## 2022-08-01 ENCOUNTER — TELEPHONE (OUTPATIENT)
Dept: OTHER | Facility: OTHER | Age: 77
End: 2022-08-01

## 2022-08-01 DIAGNOSIS — M54.6 ACUTE LEFT-SIDED THORACIC BACK PAIN: Primary | ICD-10-CM

## 2022-08-01 DIAGNOSIS — M75.51 ACUTE BURSITIS OF RIGHT SHOULDER: ICD-10-CM

## 2022-08-01 PROCEDURE — 97140 MANUAL THERAPY 1/> REGIONS: CPT | Performed by: PHYSICAL THERAPIST

## 2022-08-01 PROCEDURE — 97110 THERAPEUTIC EXERCISES: CPT | Performed by: PHYSICAL THERAPIST

## 2022-08-01 NOTE — TELEPHONE ENCOUNTER
Patient called and is asking if the office can give him a call to schedule an appointment, patient stated that he is looking for a doctor that specialize in IBS

## 2022-08-01 NOTE — PROGRESS NOTES
Daily Note     Today's date: 2022  Patient name: Rios Aiken  : 1945  MRN: [id number]  Referring provider: Jimbo Lewis DO  Dx:   Encounter Diagnosis     ICD-10-CM    1  Acute left-sided thoracic back pain  M54 6    2  Acute bursitis of right shoulder  M75 51                   Subjective:  He notes that his T-S pain resolved after the last visit  He feels pretty good today  Objective: See treatment diary below  Assessment: Tolerated treatment well  Patient would benefit from continued PT  Added resisted HAbd this session  He had weakness with this motion and was unable to retract fully against a red thera-band  Plan: Continue per plan of care  Progress treatment as tolerated         Eval/ Re-eval Auth #/ Referral # Total visits Start date  Expiration date Total active units  Total manual units  PT only or  PT+OT?   22                                   Date: 22       Total units authorized  Active:            Manual:           Total units remaining  Active:               Manual:                Date:           Total units authorized  Active:            Manual:           Total units remaining  Active:               Manual:               Precautions:  - Pacemaker, Hypertension, Afib    Specialty Daily Treatment Diary       Manuals 22    Visit # 1 2 3 4    STM UT, pec group  5' 4' 4'    PROM shld  5' 3' 3'    ST joint mobs        T-S rotation mobs   3' 3'    Warm-up        NuStep  10 min 10' 10'    Neuro Re-Ed        Posture correct        Scap squeeze  20  Habd  20  RTB    Cat camel   15     T-S ext over foam   20 20            Ther Ex        TB row  20   GTB 20   GTB 20   GTB    TB ext  20   GTB 20   GTB 20   GTB    Overhead w/ ball  20 20 20 w blue loop at wrists    Pulleys  30 30 30    Pec doorway stretch  20  5" hold 20 20    S/L rotation   T-S rotation seated  20x 20    Wand flex  20 20 20            Ther Activity Modalities        CP  5 min 5 min to right shld 5 min to R shld post

## 2022-08-05 ENCOUNTER — OFFICE VISIT (OUTPATIENT)
Dept: PHYSICAL THERAPY | Facility: CLINIC | Age: 77
End: 2022-08-05
Payer: MEDICARE

## 2022-08-05 DIAGNOSIS — M75.51 ACUTE BURSITIS OF RIGHT SHOULDER: ICD-10-CM

## 2022-08-05 DIAGNOSIS — M54.6 ACUTE LEFT-SIDED THORACIC BACK PAIN: Primary | ICD-10-CM

## 2022-08-05 PROCEDURE — 97110 THERAPEUTIC EXERCISES: CPT | Performed by: PHYSICAL THERAPIST

## 2022-08-05 NOTE — PROGRESS NOTES
Daily Note     Today's date: 2022  Patient name: Kierra Jackson  : 1945  MRN: [id number]  Referring provider: Ora Ortez DO  Dx:   Encounter Diagnosis     ICD-10-CM    1  Acute left-sided thoracic back pain  M54 6    2  Acute bursitis of right shoulder  M75 51                   Subjective:  Pain 1/10 or 2/10      Objective: See treatment diary below  Assessment: Tolerated treatment well  Patient would benefit from continued PT  Patient deferred on manual treatment due to being pain free following the exercises  Plan: Continue per plan of care  Progress treatment as tolerated         Eval/ Re-eval Auth #/ Referral # Total visits Start date  Expiration date Total active units  Total manual units  PT only or  PT+OT?   22                                   Date: 22      Total units authorized  Active:            Manual:           Total units remaining  Active:               Manual:                Date:           Total units authorized  Active:            Manual:           Total units remaining  Active:               Manual:               Precautions:  - Pacemaker, Hypertension, Afib    Specialty Daily Treatment Diary       Manuals 22   Visit # 1 2 3 4 5 - foto   STM UT, pec group  5' 4' 4' 4'   PROM shld  5' 3' 3' 3'   ST joint mobs        T-S rotation mobs   3' 3' 3'   Warm-up        NuStep  10 min 10' 10' 10'   Neuro Re-Ed        Posture correct        Scap squeeze  20  Habd  20  RTB 20   Cat camel   15     T-S ext over foam   20 20 20           Ther Ex        TB row  20   GTB 20   GTB 20   GTB 20   BTB   TB ext  20   GTB 20   GTB 20   GTB 20   BTB   Overhead w/ ball  20 20 20 w blue loop at wrists    Pulleys  30 30 30 30   Pec doorway stretch  20  5" hold 20 20 20   S/L rotation   T-S rotation seated  20x 20    Wand flex  20 20 20 20           Ther Activity                                Modalities        CP  5 min 5 min to right shld 5 min to R shld post 10 min

## 2022-08-11 ENCOUNTER — RA CDI HCC (OUTPATIENT)
Dept: OTHER | Facility: HOSPITAL | Age: 77
End: 2022-08-11

## 2022-08-11 NOTE — PROGRESS NOTES
[address]  coding opportunities       Chart reviewed, no opportunity found: CHART REVIEWED, NO OPPORTUNITY FOUND        Patients Insurance     Medicare Insurance: Medicare

## 2022-08-13 DIAGNOSIS — M54.6 ACUTE LEFT-SIDED THORACIC BACK PAIN: ICD-10-CM

## 2022-08-13 RX ORDER — CYCLOBENZAPRINE HCL 10 MG
TABLET ORAL
Qty: 20 TABLET | Refills: 0 | Status: SHIPPED | OUTPATIENT
Start: 2022-08-13

## 2022-08-15 ENCOUNTER — OFFICE VISIT (OUTPATIENT)
Dept: PHYSICAL THERAPY | Facility: CLINIC | Age: 77
End: 2022-08-15
Payer: MEDICARE

## 2022-08-15 ENCOUNTER — TELEPHONE (OUTPATIENT)
Dept: OBGYN CLINIC | Facility: HOSPITAL | Age: 77
End: 2022-08-15

## 2022-08-15 DIAGNOSIS — M54.6 ACUTE LEFT-SIDED THORACIC BACK PAIN: ICD-10-CM

## 2022-08-15 DIAGNOSIS — E78.2 MIXED HYPERLIPIDEMIA: ICD-10-CM

## 2022-08-15 DIAGNOSIS — M75.51 ACUTE BURSITIS OF RIGHT SHOULDER: Primary | ICD-10-CM

## 2022-08-15 PROCEDURE — 97110 THERAPEUTIC EXERCISES: CPT | Performed by: PHYSICAL THERAPIST

## 2022-08-15 PROCEDURE — 97140 MANUAL THERAPY 1/> REGIONS: CPT | Performed by: PHYSICAL THERAPIST

## 2022-08-15 NOTE — PROGRESS NOTES
Daily Note     Today's date: 8/15/2022  Patient name: Montserrat Snyder  : 1945  MRN: [id number]  Referring provider: Felicitas Swanson DO  Dx:   Encounter Diagnosis     ICD-10-CM    1  Acute bursitis of right shoulder  M75 51    2  Acute left-sided thoracic back pain  M54 6                   Subjective:  Pain continues in his right shld  He states he wants to make an appt with Dr Ge Hinojosa to discuss getting an injection  He notes that he has no T-S pain today  Objective: See treatment diary below  Assessment:  Taught patient pec stretch while lying supine  He agreed to perform 10 reps daily  Plan: Continue per plan of care  Progress treatment as tolerated         Eval/ Re-eval Auth #/ Referral # Total visits Start date  Expiration date Total active units  Total manual units  PT only or  PT+OT?   22                                   Date: 7/22/22 7/25 7/28 8/1 8/5 8/15     Total units authorized  Active:            Manual:           Total units remaining  Active:               Manual:                Date:           Total units authorized  Active:            Manual:           Total units remaining  Active:               Manual:               Precautions:  - Pacemaker, Hypertension, Afib    Specialty Daily Treatment Diary       Manuals 8/15/22       Visit # 6       STM UT, pec group 4'       PROM shld 3'       ST joint mobs        T-S rotation mobs 3'       Warm-up        NuStep 10'       Neuro Re-Ed        Posture correct        Scap squeeze        Cat camel        T-S ext over foam                Ther Ex        TB row 20   BkTB       TB ext 20   BkTB       Overhead w/ ball        Pulleys 30       Pec doorway stretch Supine pec stretch  10 x 10"       S/L rotation        Wand flex 30               Ther Activity                                Modalities        CP 5 min

## 2022-08-16 ENCOUNTER — OFFICE VISIT (OUTPATIENT)
Dept: FAMILY MEDICINE CLINIC | Facility: CLINIC | Age: 77
End: 2022-08-16
Payer: MEDICARE

## 2022-08-16 ENCOUNTER — APPOINTMENT (OUTPATIENT)
Dept: RADIOLOGY | Facility: CLINIC | Age: 77
End: 2022-08-16
Payer: MEDICARE

## 2022-08-16 ENCOUNTER — TELEPHONE (OUTPATIENT)
Dept: FAMILY MEDICINE CLINIC | Facility: CLINIC | Age: 77
End: 2022-08-16

## 2022-08-16 VITALS
HEART RATE: 65 BPM | OXYGEN SATURATION: 97 % | SYSTOLIC BLOOD PRESSURE: 158 MMHG | WEIGHT: 223 LBS | BODY MASS INDEX: 31.92 KG/M2 | TEMPERATURE: 99.1 F | DIASTOLIC BLOOD PRESSURE: 72 MMHG | HEIGHT: 70 IN | RESPIRATION RATE: 18 BRPM

## 2022-08-16 DIAGNOSIS — M79.671 FOOT PAIN, RIGHT: ICD-10-CM

## 2022-08-16 DIAGNOSIS — M79.671 FOOT PAIN, RIGHT: Primary | ICD-10-CM

## 2022-08-16 DIAGNOSIS — E11.42 DIABETIC POLYNEUROPATHY ASSOCIATED WITH TYPE 2 DIABETES MELLITUS (HCC): ICD-10-CM

## 2022-08-16 PROCEDURE — 73630 X-RAY EXAM OF FOOT: CPT

## 2022-08-16 PROCEDURE — 99213 OFFICE O/P EST LOW 20 MIN: CPT | Performed by: FAMILY MEDICINE

## 2022-08-16 RX ORDER — ATORVASTATIN CALCIUM 20 MG/1
TABLET, FILM COATED ORAL
Qty: 90 TABLET | Refills: 3 | Status: SHIPPED | OUTPATIENT
Start: 2022-08-16

## 2022-08-16 NOTE — PROGRESS NOTES
Assessment/Plan:    1  Foot pain, right  -     XR foot 3+ vw right; Future; Expected date: 08/16/2022  -     oxaprozin (DAYPRO) 600 MG tablet; Take 1 tablet (600 mg total) by mouth 2 (two) times a day as needed (Pain) For 14 days    2  Diabetic polyneuropathy associated with type 2 diabetes mellitus (HCC)  will limit the nsaids to 14 days given the history of kidney failure although kidney function is acceptable on last draw  There are no Patient Instructions on file for this visit  No follow-ups on file  Subjective:      Patient ID: Melani Howe  is a 68 y o  male  Chief Complaint   Patient presents with    Foot Injury     Right, stepping off a step                sas/cma       Pt sched for a " hurt foot"  States he stepped off a platform yesterday and he hyperflexed the rt foot at the ankle  Pain yesterday was 9/10  Today pain is a little sore  Did not hear any snaps or cracks or pops  Did not swell much  Pt states he iced  He has meds at home for his shoulder - muscle relaxors and he took a few of them  Pt does suffer from diabetic neuropathy      The following portions of the patient's history were reviewed and updated as appropriate: allergies, current medications, past family history, past medical history, past social history, past surgical history and problem list     Review of Systems   Musculoskeletal: Positive for arthralgias, joint swelling and myalgias           Current Outpatient Medications   Medication Sig Dispense Refill    acetaminophen (TYLENOL) 325 mg tablet Take 650 mg by mouth every 6 (six) hours as needed      amiodarone 200 mg tablet 200 mg daily after dinner       amLODIPine (NORVASC) 5 mg tablet       apixaban (ELIQUIS) 5 mg Take 5 mg by mouth 2 (two) times a day To stop 2 days prior to the procedure      atorvastatin (LIPITOR) 20 mg tablet TAKE 1 TABLET DAILY 90 tablet 3    Blood Glucose Monitoring Suppl (FREESTYLE LITE) GEOVANNY       butalbital-acetaminophen-caffeine (Bac) -40 mg per tablet May take 1 tab once daily prn  Do not exceed 3x/week  20 tablet 0    carvedilol (COREG) 25 mg tablet Take 12 5 mg by mouth 2 (two) times a day       cyclobenzaprine (FLEXERIL) 10 mg tablet TAKE ONE TABLET BY MOUTH THREE TIMES A DAY AS NEEDED FOR MUSCLE SPASMS (GENERIC FOR FLEXERIL) 20 tablet 0    esomeprazole (NexIUM) 20 mg capsule TAKE ONE CAPSULE BY MOUTH EVERY DAY AT BEDTIME 90 capsule 3    fluticasone (FLONASE) 50 mcg/act nasal spray 2 sprays into each nostril daily 48 g 3    FREESTYLE LITE test strip       Lancets (FREESTYLE) lancets       losartan (COZAAR) 100 MG tablet 100 mg every morning       metroNIDAZOLE (METROCREAM) 0 75 % cream       minocycline (DYNACIN) 50 MG tablet       oxaprozin (DAYPRO) 600 MG tablet Take 1 tablet (600 mg total) by mouth 2 (two) times a day as needed (Pain) For 14 days 60 tablet 0    pioglitazone-metFORMIN (ACTOPLUS MET)  MG per tablet daily after dinner      polyethylene glycol-propylene glycol (Systane) 0 4-0 3 % every 3 (three) hours as needed        RESTASIS 0 05 % ophthalmic emulsion Administer 1 drop to both eyes every 12 (twelve) hours       tamsulosin (FLOMAX) 0 4 mg       esomeprazole (NexIUM) 40 MG capsule Take 1 capsule (40 mg total) by mouth daily (Patient taking differently: Take 20 mg by mouth daily) 30 capsule 3    finasteride (PROSCAR) 5 mg tablet Take 5 mg by mouth daily (Patient not taking: Reported on 8/16/2022)      Ozempic, 0 25 or 0 5 MG/DOSE, 2 MG/1 5ML SOPN 0 5 one per week-On Sunday (Patient not taking: Reported on 8/16/2022)       No current facility-administered medications for this visit         Objective:    /72   Pulse 65   Temp 99 1 °F (37 3 °C)   Resp 18   Ht 5' 10" (1 778 m)   Wt 101 kg (223 lb)   SpO2 97%   BMI 32 00 kg/m²        Physical Exam  Musculoskeletal:      Comments: Mild swelling rt foot  More tender than I was expecting at heel and balls of feet                Ivana Newness, DO

## 2022-08-16 NOTE — TELEPHONE ENCOUNTER
Please call pt looks like he has sig arthritis in the foot  Could cause pain    With the nsaids this should calm down and he should do well, if not he should call office and let us know  Thank You

## 2022-08-19 ENCOUNTER — APPOINTMENT (OUTPATIENT)
Dept: PHYSICAL THERAPY | Facility: CLINIC | Age: 77
End: 2022-08-19
Payer: MEDICARE

## 2022-08-22 ENCOUNTER — OFFICE VISIT (OUTPATIENT)
Dept: FAMILY MEDICINE CLINIC | Facility: CLINIC | Age: 77
End: 2022-08-22
Payer: MEDICARE

## 2022-08-22 ENCOUNTER — OFFICE VISIT (OUTPATIENT)
Dept: PHYSICAL THERAPY | Facility: CLINIC | Age: 77
End: 2022-08-22
Payer: MEDICARE

## 2022-08-22 VITALS
RESPIRATION RATE: 16 BRPM | HEIGHT: 70 IN | SYSTOLIC BLOOD PRESSURE: 118 MMHG | DIASTOLIC BLOOD PRESSURE: 82 MMHG | BODY MASS INDEX: 32.93 KG/M2 | HEART RATE: 63 BPM | TEMPERATURE: 97.7 F | OXYGEN SATURATION: 99 % | WEIGHT: 230 LBS

## 2022-08-22 DIAGNOSIS — M54.6 ACUTE LEFT-SIDED THORACIC BACK PAIN: ICD-10-CM

## 2022-08-22 DIAGNOSIS — Z00.00 MEDICARE ANNUAL WELLNESS VISIT, SUBSEQUENT: Primary | ICD-10-CM

## 2022-08-22 DIAGNOSIS — M75.51 ACUTE BURSITIS OF RIGHT SHOULDER: Primary | ICD-10-CM

## 2022-08-22 DIAGNOSIS — N40.1 BENIGN LOCALIZED PROSTATIC HYPERPLASIA WITH LOWER URINARY TRACT SYMPTOMS (LUTS): ICD-10-CM

## 2022-08-22 DIAGNOSIS — E11.22 TYPE 2 DIABETES MELLITUS WITH STAGE 3 CHRONIC KIDNEY DISEASE, WITHOUT LONG-TERM CURRENT USE OF INSULIN, UNSPECIFIED WHETHER STAGE 3A OR 3B CKD (HCC): ICD-10-CM

## 2022-08-22 DIAGNOSIS — N18.30 TYPE 2 DIABETES MELLITUS WITH STAGE 3 CHRONIC KIDNEY DISEASE, WITHOUT LONG-TERM CURRENT USE OF INSULIN, UNSPECIFIED WHETHER STAGE 3A OR 3B CKD (HCC): ICD-10-CM

## 2022-08-22 PROCEDURE — 97110 THERAPEUTIC EXERCISES: CPT | Performed by: PHYSICAL THERAPIST

## 2022-08-22 PROCEDURE — 97140 MANUAL THERAPY 1/> REGIONS: CPT | Performed by: PHYSICAL THERAPIST

## 2022-08-22 PROCEDURE — G0439 PPPS, SUBSEQ VISIT: HCPCS | Performed by: INTERNAL MEDICINE

## 2022-08-22 NOTE — ASSESSMENT & PLAN NOTE
Lab Results   Component Value Date    HGBA1C 5 9 (H) 07/20/2022     Managed by endocrinology and is well controlled  Encouraged healthy diet and exercise, discussed need for good foot and eye care

## 2022-08-22 NOTE — PROGRESS NOTES
Assessment and Plan:     Problem List Items Addressed This Visit        Endocrine    Diabetes mellitus with chronic kidney disease ([facility] [address] )       Lab Results   Component Value Date    HGBA1C 5 9 (H) 07/20/2022     Managed by endocrinology and is well controlled  Encouraged healthy diet and exercise, discussed need for good foot and eye care  Genitourinary    Benign localized prostatic hyperplasia with lower urinary tract symptoms (LUTS)     Continues to be managed by urology and states he is up to date with PSA  Other Visit Diagnoses     Medicare annual wellness visit, subsequent    -  Primary           Preventive health issues were discussed with patient, and age appropriate screening tests were ordered as noted in patient's After Visit Summary  Personalized health advice and appropriate referrals for health education or preventive services given if needed, as noted in patient's After Visit Summary  History of Present Illness:     Patient presents for a Medicare Wellness Visit    HPI   Patient Care Team:  Benigno Larios MD as PCP - General  Neeta Jung MD (Inactive)  MD Ashlie Coy MD Marino Burow, MD Armenta Salle, MD Nicky Shahid MD Cricket Moan, MD Manuella Janus, MD as Endoscopist     Review of Systems:     Review of Systems   Constitutional: Negative  HENT: Negative  Eyes: Negative  Respiratory: Negative  Cardiovascular: Negative  Gastrointestinal: Negative  Endocrine: Negative  Genitourinary: Negative  Musculoskeletal: Negative  Skin: Negative  Allergic/Immunologic: Negative  Neurological: Negative  Hematological: Negative  Psychiatric/Behavioral: Negative           Problem List:     Patient Active Problem List   Diagnosis    [facility]    Hypertension, benign    Chronic kidney disease (CKD), stage II (mild)    Chronic rhinitis    Colon, diverticulosis    Diabetes mellitus with chronic kidney disease ([facility] )    Diabetic neuropathy (HCC)    Esophageal reflux    Mixed hyperlipidemia    Obstructive sleep apnea    Aortic root dilation (HCC)    Seasonal allergic rhinitis due to pollen    Class 1 obesity    Dyspnea on exertion    SOB (shortness of breath)    Stage 2 chronic kidney disease    Left groin pain    Syncope    Positive depression screening    Benign localized prostatic hyperplasia with lower urinary tract symptoms (LUTS)    Complex renal cyst    Recurrent UTI      Past Medical and Surgical History:     Past Medical History:   Diagnosis Date    Allergic rhinitis     Anemia 10/23/2008    last assessed 9/9/13     Anxiety     Atrial fibrillation (HCC)     Bleeding     Chronic kidney disease     CPAP (continuous positive airway pressure) dependence     [facility]     Diarrhea     Diverticulitis     Eating disorder     GERD (gastroesophageal reflux disease)     Herpes zoster with complication     last assessed 7/3/17     Hiatal hernia     Hyperlipidemia     Hypertension     IBS (irritable bowel syndrome)     Incisional hernia     Increased urinary frequency     Irregular heart beat     Pyogenic granuloma 09/13/2006    last assessed 9/13/06    Carlos (subconjunctival hemorrhage), unspecified laterality 09/12/2005    last assessed 9/12/05    Sleep apnea     TMJ (dislocation of temporomandibular joint)     Ventral hernia     last assessed  4/6/17     Wears glasses      Past Surgical History:   Procedure Laterality Date    APPENDECTOMY      ATRIAL ABLATION SURGERY      2014    ATRIAL ABLATION SURGERY      catheter ablation atrial fibrillation / last assessed 2/17/16     CHOLECYSTECTOMY      lap    COLON SURGERY  1990    Hartmans procedure    COLON SURGERY      reversal 6 weeks later    COLONOSCOPY N/A 1/26/2017    Procedure: COLONOSCOPY;  Surgeon: Marija Barksdale MD;  Location: [place] [address] GI LAB;   Service:     ESOPHAGOGASTRODUODENOSCOPY N/A 2017    Procedure: ESOPHAGOGASTRODUODENOSCOPY (EGD); Surgeon: Pat Chandler MD;  Location: [facility] GI LAB;   Service:     EYE SURGERY Bilateral 2015    lid repair    HERNIA REPAIR Bilateral     inguinal  &    [address]      INCISIONAL HERNIA REPAIR N/A 3/24/2017    Procedure: REPAIR OF INCARCERATED INCISIONAL HERNIA WITH MESH, Lysis of Adhesions, Partial Omentectomy;  Surgeon: Earle Phillip MD;  Location: [facility];  Service:    Gokul Keel / Mertom Monroy / Poncho Caulk Left 10/04/2021    medtr[employer]-M#[id number]    NEUROBLASTOMA EXCISION      SKIN CANCER EXCISION      On nose    TONSILLECTOMY        Family History:     Family History   Problem Relation Age of Onset    Heart disease Mother     Cancer Mother     Colon cancer Mother    Alicia [place] Dementia Mother     Early death Father     Seizures Father    Alicia [place] Cancer Sister         bone    Heart disease Sister     Diabetes Sister     Lupus Sister     Heart disease Brother     Cancer Brother         kidney    Diabetes Brother     Other Sister         covid    Stroke Neg Hx       Social History:     Social History     Socioeconomic History    Marital status: /Civil Union     Spouse name: None    Number of children: None    Years of education: None    Highest education level: None   Occupational History    None   Tobacco Use    Smoking status: Former Smoker     Packs/day: 1 00     Years: 20 00     Pack years: 20 00     Types: Cigarettes     Quit date:      Years since quittin 6    Smokeless tobacco: Never Used   Vaping Use    Vaping Use: Never used   Substance and Sexual Activity    Alcohol use: No     Comment: none in 33 yrs    Drug use: No    Sexual activity: None   Other Topics Concern    None   Social History Narrative    Single per allscript      Social Determinants of Health     Financial Resource Strain: Low Risk     Difficulty of Paying Living Expenses: Not very hard   Food Insecurity: Not on file   Transportation Needs: No Transportation Needs    Lack of Transportation (Medical): No    Lack of Transportation (Non-Medical):  No   Physical Activity: Not on file   Stress: Not on file   Social Connections: Not on file   Intimate Partner Violence: Not on file   Housing Stability: Not on file      Medications and Allergies:     Current Outpatient Medications   Medication Sig Dispense Refill    acetaminophen (TYLENOL) 325 mg tablet Take 650 mg by mouth every 6 (six) hours as needed      amiodarone 200 mg tablet 200 mg daily after dinner       amLODIPine (NORVASC) 5 mg tablet       apixaban (ELIQUIS) 5 mg Take 5 mg by mouth 2 (two) times a day To stop 2 days prior to the procedure      atorvastatin (LIPITOR) 20 mg tablet TAKE 1 TABLET DAILY 90 tablet 3    Blood Glucose Monitoring Suppl (FREESTYLE LITE) GEOVANNY       carvedilol (COREG) 25 mg tablet Take 12 5 mg by mouth 2 (two) times a day       cyclobenzaprine (FLEXERIL) 10 mg tablet TAKE ONE TABLET BY MOUTH THREE TIMES A DAY AS NEEDED FOR MUSCLE SPASMS (GENERIC FOR FLEXERIL) 20 tablet 0    esomeprazole (NexIUM) 20 mg capsule TAKE ONE CAPSULE BY MOUTH EVERY DAY AT BEDTIME 90 capsule 3    fluticasone (FLONASE) 50 mcg/act nasal spray 2 sprays into each nostril daily 48 g 3    FREESTYLE LITE test strip       Lancets (FREESTYLE) lancets       losartan (COZAAR) 100 MG tablet 100 mg every morning       metroNIDAZOLE (METROCREAM) 0 75 % cream       minocycline (DYNACIN) 50 MG tablet       oxaprozin (DAYPRO) 600 MG tablet Take 1 tablet (600 mg total) by mouth 2 (two) times a day as needed (Pain) For 14 days 60 tablet 0    pioglitazone-metFORMIN (ACTOPLUS MET)  MG per tablet daily after dinner      polyethylene glycol-propylene glycol (Systane) 0 4-0 3 % every 3 (three) hours as needed        RESTASIS 0 05 % ophthalmic emulsion Administer 1 drop to both eyes every 12 (twelve) hours       tamsulosin (FLOMAX) 0 4 mg        No current facility-administered medications for this visit  Allergies   Allergen Reactions    Codeine Other (See Comments) and GI Intolerance     Reaction Date: 27Dec2004; [facility] - [id number]: '' CRAZY ''  vomiting  headache    Demerol [Meperidine] Nausea Only, Other (See Comments), GI Intolerance and Vomiting     vomiting  Reaction Date: 27Dec2004;   headache    Morphine Nausea Only, GI Intolerance and Vomiting     Reaction Date: 23Feb2012;       Immunizations:     Immunization History   Administered Date(s) Administered    COVID-19 PFIZER VACCINE 0 3 ML IM 02/03/2021, 02/24/2021, 11/06/2021    H1N1, All Formulations 11/05/2009    INFLUENZA 09/29/2020    Influenza Split High Dose Preservative Free IM 09/09/2013, 10/09/2014, 10/21/2015, 10/13/2016, 09/07/2017    Influenza, high dose seasonal 0 7 mL 10/08/2018, 10/31/2019, 10/11/2021    Influenza, seasonal, injectable 11/20/2001, 10/28/2002, 10/09/2003, 02/17/2005, 11/15/2005, 11/29/2006, 10/18/2007, 10/23/2008, 10/21/2009, 10/28/2010, 09/28/2011, 11/15/2012    Pneumococcal Conjugate 13-Valent 10/21/2015    Pneumococcal Polysaccharide PPV23 01/10/2013    influenza, injectable, quadrivalent 09/29/2020      Health Maintenance:         Topic Date Due    Hepatitis C Screening  Never done         Topic Date Due    COVID-19 Vaccine (4 - Booster for Pfizer series) 03/06/2022    Influenza Vaccine (1) 09/01/2022      Medicare Screening Tests and Risk Assessments:     Ponce Messina is here for his Subsequent Wellness visit  Health Risk Assessment:   Patient rates overall health as fair  Patient feels that their physical health rating is slightly worse  Patient is satisfied with their life  Eyesight was rated as same  Hearing was rated as slightly worse  Patient feels that their emotional and mental health rating is slightly better  Patients states they are often angry  Patient states they are sometimes unusually tired/fatigued   Pain experienced in the last 7 days has been some  Patient's pain rating has been 5/10  Patient states that he has experienced weight loss or gain in last 6 months  Fall Risk Screening: In the past year, patient has experienced: no history of falling in past year      Home Safety:  Patient does not have trouble with stairs inside or outside of their home  Patient has working smoke alarms and has working carbon monoxide detector  Home safety hazards include: none  Nutrition:   Current diet is Regular  Medications:   Patient is currently taking over-the-counter supplements  OTC medications include: see medication list  Patient is able to manage medications  Activities of Daily Living (ADLs)/Instrumental Activities of Daily Living (IADLs):   Walk and transfer into and out of bed and chair?: Yes  Dress and groom yourself?: Yes    Bathe or shower yourself?: Yes    Feed yourself? Yes  Do your laundry/housekeeping?: Yes  Manage your money, pay your bills and track your expenses?: Yes  Make your own meals?: Yes    Do your own shopping?: Yes    Previous Hospitalizations:   Any hospitalizations or ED visits within the last 12 months?: No      Advance Care Planning:   Living will: No      Comments: Declines information      Cognitive Screening:   Provider or family/friend/caregiver concerned regarding cognition?: No    PREVENTIVE SCREENINGS      Cardiovascular Screening:    General: Screening Not Indicated and History Lipid Disorder      Diabetes Screening:     General: Screening Not Indicated and History Diabetes      Prostate Cancer Screening:    General: Screening Not Indicated      Osteoporosis Screening:    General: Screening Not Indicated      Abdominal Aortic Aneurysm (AAA) Screening:    Risk factors include: tobacco use        Lung Cancer Screening:     General: Screening Not Indicated      Hepatitis C Screening:    General: Risks and Benefits Discussed    Screening, Brief Intervention, and Referral to Treatment (SBIRT)    Screening  Typical number of drinks in a day: 0  Typical number of drinks in a week: 0  Interpretation: Low risk drinking behavior  AUDIT-C Screenin) How often did you have a drink containing alcohol in the past year? never  2) How many drinks did you have on a typical day when you were drinking in the past year? 0  3) How often did you have 6 or more drinks on one occasion in the past year? never    AUDIT-C Score: 0  Interpretation: Score 0-3 (male): Negative screen for alcohol misuse    Single Item Drug Screening:  How often have you used an illegal drug (including marijuana) or a prescription medication for non-medical reasons in the past year? never    Single Item Drug Screen Score: 0  Interpretation: Negative screen for possible drug use disorder    Brief Intervention  Alcohol & drug use screenings were reviewed  No concerns regarding substance use disorder identified  Other Counseling Topics:   Car/seat belt/driving safety, skin self-exam, sunscreen and regular weightbearing exercise  No exam data present     Physical Exam:     /82   Pulse 63   Temp 97 7 °F (36 5 °C)   Resp 16   Ht 5' 10" (1 778 m)   Wt 104 kg (230 lb)   SpO2 99%   BMI 33 00 kg/m²     Physical Exam  Vitals and nursing note reviewed  Constitutional:       Appearance: He is well-developed  HENT:      Head: Normocephalic and atraumatic  Eyes:      Conjunctiva/sclera: Conjunctivae normal    Cardiovascular:      Rate and Rhythm: Normal rate and regular rhythm  Heart sounds: No murmur heard  Pulmonary:      Effort: Pulmonary effort is normal  No respiratory distress  Breath sounds: Normal breath sounds  Abdominal:      Palpations: Abdomen is soft  Tenderness: There is no abdominal tenderness  Musculoskeletal:      Cervical back: Neck supple  Skin:     General: Skin is warm and dry  Neurological:      Mental Status: He is alert            Love Fraser MD

## 2022-08-22 NOTE — PROGRESS NOTES
Daily Note     Today's date: 2022  Patient name: Arya Landeros  : 1945  MRN: [id number]  Referring provider: Surendra Braswell DO  Dx:   Encounter Diagnosis     ICD-10-CM    1  Acute bursitis of right shoulder  M75 51    2  Acute left-sided thoracic back pain  M54 6                   Subjective:  No T-S pain today  Mild shld pain anterior right shld  Objective: See treatment diary below  Assessment:  Maria Luz Rankin feels improvement following MFR and stretches to pec group right shoulder  Plan: Continue per plan of care  Progress treatment as tolerated         Eval/ Re-eval Auth #/ Referral # Total visits Start date  Expiration date Total active units  Total manual units  PT only or  PT+OT?   22                                   Date: 7/22/22 7/25 7/28 8/1 8/5 8/15 8/22    Total units authorized  Active:            Manual:           Total units remaining  Active:               Manual:                Date:           Total units authorized  Active:            Manual:           Total units remaining  Active:               Manual:               Precautions:  - Pacemaker, Hypertension, Afib    Specialty Daily Treatment Diary       Manuals 8/15/22 8/22/22      Visit # 6 7      STM UT, pec group 4' 4'      PROM shld 3' 3'      ST joint mobs        T-S rotation mobs 3' 3'      Warm-up        NuStep 10' 10'      Neuro Re-Ed        Posture correct        Scap squeeze        Cat camel        T-S ext over foam                Ther Ex        TB row 20   BkTB 20   BkTB      TB ext 20   BkTB 20   BkTB      Overhead w/ ball  Wall slides 20x      Pulleys 30 30      Pec doorway stretch Supine pec stretch  10 x 10" Supine pec stretch  10 x 10"      S/L rotation        Wand flex 30 30              Ther Activity                                Modalities        CP 5 min 5 min

## 2022-08-22 NOTE — PATIENT INSTRUCTIONS
Medicare Preventive Visit Patient Instructions  Thank you for completing your Welcome to Medicare Visit or Medicare Annual Wellness Visit today  Your next wellness visit will be due in one year (8/23/2023)  The screening/preventive services that you may require over the next 5-10 years are detailed below  Some tests may not apply to you based off risk factors and/or age  Screening tests ordered at today's visit but not completed yet may show as past due  Also, please note that scanned in results may not display below  Preventive Screenings:  Service Recommendations Previous Testing/Comments   Colorectal Cancer Screening  · Colonoscopy    · Fecal Occult Blood Test (FOBT)/Fecal Immunochemical Test (FIT)  · Fecal DNA/Cologuard Test  · Flexible Sigmoidoscopy Age: 39-70 years old   Colonoscopy: every 10 years (May be performed more frequently if at higher risk)  OR  FOBT/FIT: every 1 year  OR  Cologuard: every 3 years  OR  Sigmoidoscopy: every 5 years  Screening may be recommended earlier than age 39 if at higher risk for colorectal cancer  Also, an individualized decision between you and your healthcare provider will decide whether screening between the ages of 74-80 would be appropriate   Colonoscopy: 11/10/2021  FOBT/FIT: Not on file  Cologuard: Not on file  Sigmoidoscopy: Not on file          Prostate Cancer Screening Individualized decision between patient and health care provider in men between ages of 53-78   Medicare will cover every 12 months beginning on the day after your 50th birthday PSA: 4 2 ng/mL     Screening Not Indicated     Hepatitis C Screening Once for adults born between 1945 and 1965  More frequently in patients at high risk for Hepatitis C Hep C Antibody: Not on file        Diabetes Screening 1-2 times per year if you're at risk for diabetes or have pre-diabetes Fasting glucose: 128 mg/dL (3/8/2022)  A1C: 5 9 % (7/20/2022)  Screening Not Indicated  History Diabetes   Cholesterol Screening Once every 5 years if you don't have a lipid disorder  May order more often based on risk factors  Lipid panel: Not on file  Screening Not Indicated  History Lipid Disorder      Other Preventive Screenings Covered by Medicare:  1  Abdominal Aortic Aneurysm (AAA) Screening: covered once if your at risk  You're considered to be at risk if you have a family history of AAA or a male between the age of 73-68 who smoking at least 100 cigarettes in your lifetime  2  Lung Cancer Screening: covers low dose CT scan once per year if you meet all of the following conditions: (1) Age 50-69; (2) No signs or symptoms of lung cancer; (3) Current smoker or have quit smoking within the last 15 years; (4) You have a tobacco smoking history of at least 20 pack years (packs per day x number of years you smoked); (5) You get a written order from a healthcare provider  3  Glaucoma Screening: covered annually if you're considered high risk: (1) You have diabetes OR (2) Family history of glaucoma OR (3)  aged 48 and older OR (3)  American aged 72 and older  3  Osteoporosis Screening: covered every 2 years if you meet one of the following conditions: (1) Have a vertebral abnormality; (2) On glucocorticoid therapy for more than 3 months; (3) Have primary hyperparathyroidism; (4) On osteoporosis medications and need to assess response to drug therapy  5  HIV Screening: covered annually if you're between the age of 12-76  Also covered annually if you are younger than 13 and older than 72 with risk factors for HIV infection  For pregnant patients, it is covered up to 3 times per pregnancy      Immunizations:  Immunization Recommendations   Influenza Vaccine Annual influenza vaccination during flu season is recommended for all persons aged >= 6 months who do not have contraindications   Pneumococcal Vaccine   * Pneumococcal conjugate vaccine = PCV13 (Prevnar 13), PCV15 (Vaxneuvance), PCV20 (Prevnar 20)  * Pneumococcal polysaccharide vaccine = PPSV23 (Pneumovax) Adults 2364 years old: 1-3 doses may be recommended based on certain risk factors  Adults 72 years old: 1-2 doses may be recommended based off what pneumonia vaccine you previously received   Hepatitis B Vaccine 3 dose series if at intermediate or high risk (ex: diabetes, end stage renal disease, liver disease)   Tetanus (Td) Vaccine - COST NOT COVERED BY MEDICARE PART B Following completion of primary series, a booster dose should be given every 10 years to maintain immunity against tetanus  Td may also be given as tetanus wound prophylaxis  Tdap Vaccine - COST NOT COVERED BY MEDICARE PART B Recommended at least once for all adults  For pregnant patients, recommended with each pregnancy  Shingles Vaccine (Shingrix) - COST NOT COVERED BY MEDICARE PART B  2 shot series recommended in those aged 48 and above     Health Maintenance Due:      Topic Date Due    Hepatitis C Screening  Never done     Immunizations Due:      Topic Date Due    COVID-19 Vaccine (4 - Booster for Pfizer series) 03/06/2022    Influenza Vaccine (1) 09/01/2022     Advance Directives   What are advance directives? Advance directives are legal documents that state your wishes and plans for medical care  These plans are made ahead of time in case you lose your ability to make decisions for yourself  Advance directives can apply to any medical decision, such as the treatments you want, and if you want to donate organs  What are the types of advance directives? There are many types of advance directives, and each state has rules about how to use them  You may choose a combination of any of the following:  · Living will: This is a written record of the treatment you want  You can also choose which treatments you do not want, which to limit, and which to stop at a certain time  This includes surgery, medicine, IV fluid, and tube feedings  · Durable power of  for healthcare [facility]):   This is a written record that states who you want to make healthcare choices for you when you are unable to make them for yourself  This person, called a proxy, is usually a family member or a friend  You may choose more than 1 proxy  · Do not resuscitate (DNR) order:  A DNR order is used in case your heart stops beating or you stop breathing  It is a request not to have certain forms of treatment, such as CPR  A DNR order may be included in other types of advance directives  · Medical directive: This covers the care that you want if you are in a coma, near death, or unable to make decisions for yourself  You can list the treatments you want for each condition  Treatment may include pain medicine, surgery, blood transfusions, dialysis, IV or tube feedings, and a ventilator (breathing machine)  · Values history: This document has questions about your views, beliefs, and how you feel and think about life  This information can help others choose the care that you would choose  Why are advance directives important? An advance directive helps you control your care  Although spoken wishes may be used, it is better to have your wishes written down  Spoken wishes can be misunderstood, or not followed  Treatments may be given even if you do not want them  An advance directive may make it easier for your family to make difficult choices about your care  Weight Management   Why it is important to manage your weight:  Being overweight increases your risk of health conditions such as heart disease, high blood pressure, type 2 diabetes, and certain types of cancer  It can also increase your risk for osteoarthritis, sleep apnea, and other respiratory problems  Aim for a slow, steady weight loss  Even a small amount of weight loss can lower your risk of health problems  How to lose weight safely:  A safe and healthy way to lose weight is to eat fewer calories and get regular exercise   You can lose up about 1 pound a week by decreasing the number of calories you eat by 500 calories each day  Healthy meal plan for weight management:  A healthy meal plan includes a variety of foods, contains fewer calories, and helps you stay healthy  A healthy meal plan includes the following:  · Eat whole-grain foods more often  A healthy meal plan should contain fiber  Fiber is the part of grains, fruits, and vegetables that is not broken down by your body  Whole-grain foods are healthy and provide extra fiber in your diet  Some examples of whole-grain foods are whole-wheat breads and pastas, oatmeal, brown rice, and bulgur  · Eat a variety of vegetables every day  Include dark, leafy greens such as spinach, kale, bernie greens, and mustard greens  Eat yellow and orange vegetables such as carrots, sweet potatoes, and winter squash  · Eat a variety of fruits every day  Choose fresh or canned fruit (canned in its own juice or light syrup) instead of juice  Fruit juice has very little or no fiber  · Eat low-fat dairy foods  Drink fat-free (skim) milk or 1% milk  Eat fat-free yogurt and low-fat cottage cheese  Try low-fat cheeses such as mozzarella and other reduced-fat cheeses  · Choose meat and other protein foods that are low in fat  Choose beans or other legumes such as split peas or lentils  Choose fish, skinless poultry (chicken or turkey), or lean cuts of red meat (beef or pork)  Before you cook meat or poultry, cut off any visible fat  · Use less fat and oil  Try baking foods instead of frying them  Add less fat, such as margarine, sour cream, regular salad dressing and mayonnaise to foods  Eat fewer high-fat foods  Some examples of high-fat foods include french fries, doughnuts, ice cream, and cakes  · Eat fewer sweets  Limit foods and drinks that are high in sugar  This includes candy, cookies, regular soda, and sweetened drinks  Exercise:  Exercise at least 30 minutes per day on most days of the week   Some examples of exercise include walking, biking, dancing, and swimming  You can also fit in more physical activity by taking the stairs instead of the elevator or parking farther away from stores  Ask your healthcare provider about the best exercise plan for you  © Copyright [employer] 2018 Information is for End User's use only and may not be sold, redistributed or otherwise used for commercial purposes   All illustrations and images included in CareNotes® are the copyrighted property of A D [employer] M , Inc  or [address] [place] [address]

## 2022-08-26 ENCOUNTER — OFFICE VISIT (OUTPATIENT)
Dept: PHYSICAL THERAPY | Facility: CLINIC | Age: 77
End: 2022-08-26
Payer: MEDICARE

## 2022-08-26 DIAGNOSIS — M54.6 ACUTE LEFT-SIDED THORACIC BACK PAIN: ICD-10-CM

## 2022-08-26 DIAGNOSIS — M75.51 ACUTE BURSITIS OF RIGHT SHOULDER: Primary | ICD-10-CM

## 2022-08-26 PROCEDURE — 97140 MANUAL THERAPY 1/> REGIONS: CPT | Performed by: PHYSICAL THERAPIST

## 2022-08-26 PROCEDURE — 97110 THERAPEUTIC EXERCISES: CPT | Performed by: PHYSICAL THERAPIST

## 2022-08-26 NOTE — PROGRESS NOTES
Daily Note     Today's date: 2022  Patient name: Letty Oleary  : 1945  MRN: [id number]  Referring provider: Bolivar Cavazos DO  Dx:   Encounter Diagnosis     ICD-10-CM    1  Acute bursitis of right shoulder  M75 51    2  Acute left-sided thoracic back pain  M54 6                   Subjective:  He reports stiffness today  Objective: See treatment diary below  Assessment:   His right UT is tight and tender to palpation today  Performed manual UT stretch which improved his symptoms  Plan: Continue per plan of care  Progress treatment as tolerated         Eval/ Re-eval Auth #/ Referral # Total visits Start date  Expiration date Total active units  Total manual units  PT only or  PT+OT?   22                                   Date: 7/22/22 7/25 7/28 8/1 8/5 8/15 8/22    Total units authorized  Active:            Manual:           Total units remaining  Active:               Manual:                Date:           Total units authorized  Active:            Manual:           Total units remaining  Active:               Manual:               Precautions:  - Pacemaker, Hypertension, Afib    Specialty Daily Treatment Diary       Manuals 8/15/22 8/22/22      Visit # 6 7      STM UT, pec group 4' 4'      PROM shld 3' 3'      ST joint mobs        T-S rotation mobs 3' 3'      Warm-up        NuStep 10' 10'      Neuro Re-Ed        Posture correct        Scap squeeze        Cat camel        T-S ext over foam                Ther Ex        TB row 20   BkTB 20   BkTB      TB ext 20   BkTB 20   BkTB      Overhead w/ ball  Wall slides 20x      Pulleys 30 30      Pec doorway stretch Supine pec stretch  10 x 10" Supine pec stretch  10 x 10"      S/L rotation        Wand flex 30 30              Ther Activity                                Modalities        CP 5 min 5 min

## 2022-08-29 ENCOUNTER — APPOINTMENT (OUTPATIENT)
Dept: PHYSICAL THERAPY | Facility: CLINIC | Age: 77
End: 2022-08-29
Payer: MEDICARE

## 2022-09-02 ENCOUNTER — OFFICE VISIT (OUTPATIENT)
Dept: PHYSICAL THERAPY | Facility: CLINIC | Age: 77
End: 2022-09-02
Payer: MEDICARE

## 2022-09-02 DIAGNOSIS — M75.51 ACUTE BURSITIS OF RIGHT SHOULDER: Primary | ICD-10-CM

## 2022-09-02 DIAGNOSIS — M54.6 ACUTE LEFT-SIDED THORACIC BACK PAIN: ICD-10-CM

## 2022-09-02 PROCEDURE — 97140 MANUAL THERAPY 1/> REGIONS: CPT | Performed by: PHYSICAL THERAPIST

## 2022-09-02 PROCEDURE — 97110 THERAPEUTIC EXERCISES: CPT | Performed by: PHYSICAL THERAPIST

## 2022-09-02 NOTE — PROGRESS NOTES
Daily Note     Today's date: 2022  Patient name: Patel Longo  : 1945  MRN: [id number]  Referring provider: Betty Uriarte DO  Dx:   Encounter Diagnosis     ICD-10-CM    1  Acute bursitis of right shoulder  M75 51    2  Acute left-sided thoracic back pain  M54 6                   Subjective:  Yocasta Sor has no significant complaints today      Objective: See treatment diary below  Assessment:   He had full flexion right shld this session during PROM  Added open books and he agreed to add this to HEP  Plan: Continue per plan of care  Progress treatment as tolerated         Eval/ Re-eval Auth #/ Referral # Total visits Start date  Expiration date Total active units  Total manual units  PT only or  PT+OT?   22                                   Date: 7/22/22 7/25 7/28 8/1 8/5 8/15 8/22    Total units authorized  Active:            Manual:           Total units remaining  Active:               Manual:                Date:           Total units authorized  Active:            Manual:           Total units remaining  Active:               Manual:               Precautions:  - Pacemaker, Hypertension, Afib    Specialty Daily Treatment Diary       Manuals 8/15/22 8/22/22 9/2/22     Visit # 6 7 8     STM UT, pec group 4' 4' 4'     PROM shld 3' 3' 3'     ST joint mobs        T-S rotation mobs 3' 3' 3'     Warm-up        NuStep 10' 10' 10'     Neuro Re-Ed        Posture correct        Scap squeeze        Cat camel        T-S ext over foam   Open books  15x             Ther Ex        TB row 20   BkTB 20   BkTB 20   BkTB     TB ext 20   BkTB 20   BkTB 20   BkTB     Overhead w/ ball  Wall slides 20x      Pulleys 30 30 30     Pec doorway stretch Supine pec stretch  10 x 10" Supine pec stretch  10 x 10"      S/L rotation        Wand flex 30 30 30             Ther Activity                                Modalities        CP 5 min 5 min 5 min

## 2022-09-06 ENCOUNTER — OFFICE VISIT (OUTPATIENT)
Dept: PHYSICAL THERAPY | Facility: CLINIC | Age: 77
End: 2022-09-06
Payer: MEDICARE

## 2022-09-06 DIAGNOSIS — M54.6 ACUTE LEFT-SIDED THORACIC BACK PAIN: ICD-10-CM

## 2022-09-06 DIAGNOSIS — M75.51 ACUTE BURSITIS OF RIGHT SHOULDER: Primary | ICD-10-CM

## 2022-09-06 PROCEDURE — 97140 MANUAL THERAPY 1/> REGIONS: CPT | Performed by: PHYSICAL THERAPIST

## 2022-09-06 PROCEDURE — 97110 THERAPEUTIC EXERCISES: CPT | Performed by: PHYSICAL THERAPIST

## 2022-09-06 NOTE — PROGRESS NOTES
Daily Note     Today's date: 2022  Patient name: Montserrat Snyder  : 1945  MRN: [id number]  Referring provider: Felicitas Swanson DO  Dx:   Encounter Diagnosis     ICD-10-CM    1  Acute bursitis of right shoulder  M75 51    2  Acute left-sided thoracic back pain  M54 6                   Subjective:  Kat Arana has no significant complaints today  He had some right chest soreness following the last session  Objective: See treatment diary below  Assessment:   Held pulleys and open books this session due to chest soreness on the right side  Plan: Continue per plan of care  Progress treatment as tolerated         Eval/ Re-eval Auth #/ Referral # Total visits Start date  Expiration date Total active units  Total manual units  PT only or  PT+OT?   22                                   Date: 7/22/22 7/25 7/28 8/1 8/5 8/15 8/22 9/2/22   Total units authorized  Active:            Manual:           Total units remaining  Active:               Manual:                Date: 22          Total units authorized  Active:            Manual:           Total units remaining  Active:               Manual:               Precautions:  - Pacemaker, Hypertension, Afib    Specialty Daily Treatment Diary       Manuals 8/15/22 8/22/22 9/2/22 9/6/22    Visit # 6 7 8 9    STM UT, pec group 4' 4' 4'     PROM shld 3' 3' 3' 3'    ST joint mobs        T-S rotation mobs 3' 3' 3' 3'    Warm-up        NuStep 10' 10' 10' 10'    Neuro Re-Ed        Posture correct        Scap squeeze        Cat camel        T-S ext over foam   Open books  15x             Ther Ex        TB row 20   BkTB 20   BkTB 20   BkTB 20   BkTB    TB ext 20   BkTB 20   BkTB 20   BkTB 20   BkTB    Overhead w/ ball  Wall slides 20x      Pulleys 30 30 30     Pec doorway stretch Supine pec stretch  10 x 10" Supine pec stretch  10 x 10"  10    S/L rotation        Wand flex 30 30 30 30            Ther Activity                                Modalities        CP 5 min 5 min 5 min

## 2022-09-07 ENCOUNTER — TELEPHONE (OUTPATIENT)
Dept: NEUROLOGY | Facility: CLINIC | Age: 77
End: 2022-09-07

## 2022-09-08 ENCOUNTER — TELEPHONE (OUTPATIENT)
Dept: OTHER | Facility: OTHER | Age: 77
End: 2022-09-08

## 2022-09-08 NOTE — TELEPHONE ENCOUNTER
OV 8/18/2021 [facility]  FU 10/31/22 [facility]  EGD/colonoscopy [facility]    H/o: diverticulosis, GERD, Afib, IBS-c/d  Meds: Imodium, nexium 20 mg @ HS,     Spoke with pt  Unhappy with appt being scheduled 10/31  Complaints of IBS with constipation symptoms being "really bad" Denies alarming s/s over phone triage  + flatus, + BM yesterday  Formed & diarrhea together  Denies rectal bleeding  Pt taking medications as prescribed  Recommended colace BID  Low FODMAPdiet, Miralax 1-2 scoops daily as needed, dietary modifications & hydration  Agreeable with recs and verbalized understanding   Pt will call back if symptoms worsen

## 2022-09-08 NOTE — TELEPHONE ENCOUNTER
Patient called today regarding he needs a [facility] Appointment before October 31, 2022  He has severe IBS and needs to see Dr Ronny Aldrich as soon as Possible  Please call patient to Schedule

## 2022-09-09 ENCOUNTER — APPOINTMENT (OUTPATIENT)
Dept: PHYSICAL THERAPY | Facility: CLINIC | Age: 77
End: 2022-09-09
Payer: MEDICARE

## 2022-09-13 ENCOUNTER — OFFICE VISIT (OUTPATIENT)
Dept: NEUROLOGY | Facility: CLINIC | Age: 77
End: 2022-09-13
Payer: MEDICARE

## 2022-09-13 VITALS
OXYGEN SATURATION: 98 % | HEIGHT: 70 IN | WEIGHT: 224 LBS | SYSTOLIC BLOOD PRESSURE: 147 MMHG | BODY MASS INDEX: 32.07 KG/M2 | TEMPERATURE: 97.6 F | HEART RATE: 62 BPM | DIASTOLIC BLOOD PRESSURE: 71 MMHG

## 2022-09-13 DIAGNOSIS — D36.10 SCHWANNOMA: ICD-10-CM

## 2022-09-13 DIAGNOSIS — E11.40 DIABETIC NEUROPATHY (HCC): Primary | ICD-10-CM

## 2022-09-13 PROCEDURE — 99214 OFFICE O/P EST MOD 30 MIN: CPT | Performed by: PSYCHIATRY & NEUROLOGY

## 2022-09-13 RX ORDER — SEMAGLUTIDE 1.34 MG/ML
INJECTION, SOLUTION SUBCUTANEOUS
COMMUNITY
Start: 2022-08-22

## 2022-09-13 NOTE — PROGRESS NOTES
Return NeuroOutpatient Note        Merline Fortune  [id number]  68 y o   1945       diabetic neuropathy and arachnoid cyst        History obtained from:  Patient     HPI/Subjective:    Merline Fortune  is a 67 yo M with PMH of diabetic neuropathy presents as f/u  Per my previous history, patient on 7/27/21 was walking in his backyard and giving water to plants  Patient denies tripping  He thinks he passed out  He struck his head but doesn't remember where  When he woke up, panicked and tried to find way to get up  At [facility], his fall was thought to be cardiac  He has pacemaker in place now  Previously, patient had reported  tingling, burning, pain in both feet  His Sxs started a year ago  He was tried on natural supplement by his podiatrist and both gave him side effects    Patient was prescribed gabapentin but he's never started  He still gets mild paresthesia in legs but they are manageable  His last HgbA1c was 6 5 and now 5 9         Previously for headaches, he was started on topamax and it has been d/c as he doesn't get headaches anymore  He is sensitive to salt  If he eats salt, he gets headache          He has h/o schwannoma many years ago  per 2018 MRI, he had 6mm in left perimesencephalic cistern along expected course of left trigeminal nerve   He had repeat study in May 2022 and it showed stable 0 6cm       Past Medical History:   Diagnosis Date    Allergic rhinitis     Anemia 10/23/2008    last assessed 9/9/13     Anxiety     Atrial fibrillation (HCC)     Bleeding     Chronic kidney disease     CPAP (continuous positive airway pressure) dependence     Diabetes mellitus ([address] )     Diarrhea     Diverticulitis     Eating disorder     GERD (gastroesophageal reflux disease)     Herpes zoster with complication     last assessed 7/3/17     Hiatal hernia     Hyperlipidemia     Hypertension     IBS (irritable bowel syndrome)     Incisional hernia     Increased urinary frequency     Irregular heart beat     Pyogenic granuloma 2006    last assessed 06    Carlos (subconjunctival hemorrhage), unspecified laterality 2005    last assessed 05    Sleep apnea     TMJ (dislocation of temporomandibular joint)     Ventral hernia     last assessed  17     Wears glasses      Social History     Socioeconomic History    Marital status: /Civil Union     Spouse name: Not on file    Number of children: Not on file    Years of education: Not on file    Highest education level: Not on file   Occupational History    Not on file   Tobacco Use    Smoking status: Former Smoker     Packs/day: 1 00     Years: 20 00     Pack years: 20      Types: Cigarettes     Quit date:      Years since quittin 7    Smokeless tobacco: Never Used   Vaping Use    Vaping Use: Never used   Substance and Sexual Activity    Alcohol use: No     Comment: none in 33 yrs    Drug use: No    Sexual activity: Not on file   Other Topics Concern    Not on file   Social History Narrative    Single per allscript      Social Determinants of Health     Financial Resource Strain: Low Risk     Difficulty of Paying Living Expenses: Not very hard   Food Insecurity: Not on file   Transportation Needs: No Transportation Needs    Lack of Transportation (Medical): No    Lack of Transportation (Non-Medical):  No   Physical Activity: Not on file   Stress: Not on file   Social Connections: Not on file   Intimate Partner Violence: Not on file   Housing Stability: Not on file     Family History   Problem Relation Age of Onset    Heart disease Mother     Cancer Mother     Colon cancer Mother     Dementia Mother     Early death Father     Seizures Father     Cancer Sister         bone    Heart disease Sister     Diabetes Sister     Lupus Sister     Heart disease Brother     Cancer Brother         kidney    Diabetes Brother     Other Sister         covid    Stroke Neg Hx      Allergies Allergen Reactions    Codeine Other (See Comments) and GI Intolerance     Reaction Date: 27Dec2004;  Annotation - [id number]: '' CRAZY ''  vomiting  headache    Demerol [Meperidine] Nausea Only, Other (See Comments), GI Intolerance and Vomiting     vomiting  Reaction Date: 27Dec2004;   headache    Morphine Nausea Only, GI Intolerance and Vomiting     Reaction Date: 23Feb2012;      Current Outpatient Medications on File Prior to Visit   Medication Sig Dispense Refill    acetaminophen (TYLENOL) 325 mg tablet Take 650 mg by mouth every 6 (six) hours as needed      amiodarone 200 mg tablet 200 mg daily after dinner       amLODIPine (NORVASC) 5 mg tablet       apixaban (ELIQUIS) 5 mg Take 5 mg by mouth 2 (two) times a day To stop 2 days prior to the procedure      atorvastatin (LIPITOR) 20 mg tablet TAKE 1 TABLET DAILY 90 tablet 3    Blood Glucose Monitoring Suppl (FREESTYLE LITE) GEOVANNY       carvedilol (COREG) 25 mg tablet Take 12 5 mg by mouth 2 (two) times a day       cyclobenzaprine (FLEXERIL) 10 mg tablet TAKE ONE TABLET BY MOUTH THREE TIMES A DAY AS NEEDED FOR MUSCLE SPASMS (GENERIC FOR FLEXERIL) 20 tablet 0    esomeprazole (NexIUM) 20 mg capsule TAKE ONE CAPSULE BY MOUTH EVERY DAY AT BEDTIME 90 capsule 3    fluticasone (FLONASE) 50 mcg/act nasal spray 2 sprays into each nostril daily 48 g 3    FREESTYLE LITE test strip       Lancets (FREESTYLE) lancets       losartan (COZAAR) 100 MG tablet 100 mg every morning       metroNIDAZOLE (METROCREAM) 0 75 % cream       minocycline (DYNACIN) 50 MG tablet       oxaprozin (DAYPRO) 600 MG tablet Take 1 tablet (600 mg total) by mouth 2 (two) times a day as needed (Pain) For 14 days 60 tablet 0    Ozempic, 0 25 or 0 5 MG/DOSE, 2 MG/1 5ML SOPN       pioglitazone-metFORMIN (ACTOPLUS MET)  MG per tablet daily after dinner      polyethylene glycol-propylene glycol (Systane) 0 4-0 3 % every 3 (three) hours as needed        RESTASIS 0 05 % ophthalmic emulsion Administer 1 drop to both eyes every 12 (twelve) hours       tamsulosin (FLOMAX) 0 4 mg        No current facility-administered medications on file prior to visit  Review of Systems   Refer to positive review of systems in HPI  Review of Systems    Constitutional- No fever  Eyes- No visual change  ENT- Hearing normal  CV- No chest pain  Resp- No Shortness of breath  GI- No diarrhea  - Bladder normal  MS- No Arthritis   Skin- No rash  Psych- No depression  Endo- No DM  Heme- No nodes    Vitals:    09/13/22 1057   BP: 147/71   BP Location: Right arm   Patient Position: Sitting   Cuff Size: Standard   Pulse: 62   Temp: 97 6 °F (36 4 °C)   TempSrc: Tympanic   SpO2: 98%   Weight: 102 kg (224 lb)   Height: 5' 10" (1 778 m)       PHYSICAL EXAM:  Appearance: No Acute Distress  Ophthalmoscopic: Disc Flat, Normal fundus  Mental status:  Orientation: Awake, Alert, and Orientedx3  Memory: Registation 3/3 Recall 3/3  Attention: normal  Knowledge: good  Language: No aphasia  Speech: No dysarthria  Cranial Nerves:  2 No Visual Defect on Confrontation, Pupils round, equal, reactive to light  3,4,6 Extraocular Movements Intact, no nystagmus  5 Facial Sensation Intact  7 No facial asymmetry  8 Intact hearing  9,10 Palate symmetric, normal gag  11 Good shoulder shrug  12 Tongue Midline  Gait: Stable  Coordination: No ataxia with finger to nose testing, and heel to shin  Sensory: decreased to pin prick, light touch upto mid shins, decreased vibration at toes     Muscle Tone: Normal              Muscle exam:  Arm Right Left Leg Right Left   Deltoid 5/5 5/5 Iliopsoas 5/5 5/5   Biceps 5/5 5/5 Quads 5/5 5/5   Triceps 5/5 5/5 Hamstrings 5/5 5/5   Wrist Extension 5/5 5/5 Ankle Dorsi Flexion 5/5 5/5   Wrist Flexion 5/5 5/5 Ankle Plantar Flexion 5/5 5/5   Interossei 5/5 5/5 Ankle Eversion 5/5 5/5   APB 5/5 5/5 Ankle Inversion 5/5 5/5       Reflexes   RJ BJ TJ KJ AJ Plantars Victor's   Right 2+ 2+ 2+ 2+ 0 Downgoing Not present   Left 2+ 2+ 2+ 2+ 0 Downgoing Not present     Personal review of  Labs:                  Diagnoses and all orders for this visit:      1  Diabetic neuropathy ([address] )     2  Schwannoma         Patient has remained stable  He says he would not rather add any more medications for neuropathy  Discussed trying alpha lipoic acid 600mg daily and B complex vitamins  He is working hard to control his BG                 Total time of encounter:  30 min  More than 50% of the time was used in counseling and/or coordination of care  Extent of counseling and/or coordination of care        Eve Valdez MD  [facility]  [id number]  [place]   [phone]

## 2022-09-16 ENCOUNTER — OFFICE VISIT (OUTPATIENT)
Dept: PHYSICAL THERAPY | Facility: CLINIC | Age: 77
End: 2022-09-16
Payer: MEDICARE

## 2022-09-16 DIAGNOSIS — M75.51 ACUTE BURSITIS OF RIGHT SHOULDER: Primary | ICD-10-CM

## 2022-09-16 DIAGNOSIS — M54.6 ACUTE LEFT-SIDED THORACIC BACK PAIN: ICD-10-CM

## 2022-09-16 PROCEDURE — 97110 THERAPEUTIC EXERCISES: CPT

## 2022-09-16 PROCEDURE — 97140 MANUAL THERAPY 1/> REGIONS: CPT

## 2022-09-16 NOTE — PROGRESS NOTES
Daily Note         Today's date: 2022  Patient name: Noe Burns  : 1945  MRN: [id number]  Referring provider: Davina Gaspar DO  Dx:   Encounter Diagnosis     ICD-10-CM    1  Acute bursitis of right shoulder  M75 51    2  Acute left-sided thoracic back pain  M54 6        Subjective: Noe Burns  reports no pain entering today  States he played golf yesterday and had to take a light pain pill due to back pain   Objective: See treatment diary below    Assessment:  patient continues to progress  excellent tolerance to program today   patient still needs cuing to perform scap retraction with banded exercises  Can self correct with cuing  Patient demonstrated good range of motion right shoulder with tightness at end range rotations  Plan: cotninue as indicated by primary PT           Eval/ Re-eval Auth #/ Referral # Total visits Start date  Expiration date Total active units  Total manual units  PT only or  PT+OT?   22                                   Date: 7/22/22 7/25 7/28 8/1 8/5 8/15 8/22 9/2/22   Total units authorized  Active:            Manual:           Total units remaining  Active:               Manual:                Date: 22         Total units authorized  Active:            Manual:           Total units remaining  Active:               Manual:               Precautions:  - Pacemaker, Hypertension, Afib    Specialty Daily Treatment Diary       Manuals 8/15/22 8/22/22 9/2/22 9/6/22 9/16/22   Visit # 6 7 8 9 10   STM UT, pec group 4' 4' 4'     PROM shld 3' 3' 3' 3' performed   ST joint mobs        T-S rotation mobs 3' 3' 3' 3' performed   Warm-up        NuStep 10' 10' 10' 10' 10 min    Neuro Re-Ed        Posture correct        Scap squeeze        Cat camel        T-S ext over foam   Open books  15x  --           Ther Ex        TB row 20   BkTB 20   BkTB 20   BkTB 20   BkTB BK: 20 x    TB ext 20   BkTB 20   BkTB 20   BkTB 20   BkTB BK: 20 x Overhead w/ ball  Wall slides 20x   20x    Pulleys 30 30 30  30   Pec doorway stretch Supine pec stretch  10 x 10" Supine pec stretch  10 x 10"  10 Standin sec x 10     S/L rotation        Wand flex 30 30 30 30 30 x            Ther Activity                                Modalities        CP 5 min 5 min 5 min

## 2022-09-19 ENCOUNTER — OFFICE VISIT (OUTPATIENT)
Dept: PHYSICAL THERAPY | Facility: CLINIC | Age: 77
End: 2022-09-19
Payer: MEDICARE

## 2022-09-19 DIAGNOSIS — M75.51 ACUTE BURSITIS OF RIGHT SHOULDER: Primary | ICD-10-CM

## 2022-09-19 DIAGNOSIS — M54.6 ACUTE LEFT-SIDED THORACIC BACK PAIN: ICD-10-CM

## 2022-09-19 PROCEDURE — 97110 THERAPEUTIC EXERCISES: CPT | Performed by: PHYSICAL THERAPIST

## 2022-09-19 NOTE — PROGRESS NOTES
Daily Note         Today's date: 2022  Patient name: Fortunato Foster  : 1945  MRN: [id number]  Referring provider: Evelia Goodson DO  Dx:   Encounter Diagnosis     ICD-10-CM    1  Acute bursitis of right shoulder  M75 51    2  Acute left-sided thoracic back pain  M54 6        Subjective: Fortunato Foster  has shoulder pain at times with heavy use of right arm  Objective: See treatment diary below      Assessment:   Ervin James has made good gains with shoulder ROM and is in need of further strengthening  His pain is better overall but continues at times  Plan: cotninue as indicated by primary PT  Discuss D/C to HEP next session          Eval/ Re-eval Auth #/ Referral # Total visits Start date  Expiration date Total active units  Total manual units  PT only or  PT+OT?   22                                   Date: 7/22/22 7/25 7/28 8/1 8/5 8/15 8/22 9/2/22   Total units authorized  Active:            Manual:           Total units remaining  Active:               Manual:                Date: 22        Total units authorized  Active:            Manual:           Total units remaining  Active:               Manual:               Precautions:  - Pacemaker, Hypertension, Afib    Specialty Daily Treatment Diary       Manuals 22   Visit # 7 8 9 10 11   STM UT, pec group 4' 4'   4'   PROM shld 3' 3' 3' performed 4'   ST joint mobs        T-S rotation mobs 3' 3' 3' performed    Warm-up        NuStep 10' 10' 10' 10 min  10 min   Neuro Re-Ed        T-S ext over foam  Open books  15x  --            Ther Ex        TB row 20   BkTB 20   BkTB 20   BkTB BK: 20 x  20  BkTB   TB ext 20   BkTB 20   BkTB 20   BkTB BK: 20 x     Overhead w/ ball Wall slides 20x   20x  20   Pulleys 30 30  30 30   Pec doorway stretch Supine pec stretch  10 x 10"  10 Standin sec x 10   10   5" hod   S/L rotation        Wand flex 30 30 30 30 x  30           Ther Activity Modalities        CP 5 min 5 min   MH  5'

## 2022-09-23 ENCOUNTER — APPOINTMENT (OUTPATIENT)
Dept: PHYSICAL THERAPY | Facility: CLINIC | Age: 77
End: 2022-09-23
Payer: MEDICARE

## 2022-10-12 ENCOUNTER — OFFICE VISIT (OUTPATIENT)
Dept: FAMILY MEDICINE CLINIC | Facility: CLINIC | Age: 77
End: 2022-10-12
Payer: MEDICARE

## 2022-10-12 VITALS
WEIGHT: 224 LBS | DIASTOLIC BLOOD PRESSURE: 58 MMHG | OXYGEN SATURATION: 98 % | TEMPERATURE: 98.3 F | SYSTOLIC BLOOD PRESSURE: 112 MMHG | RESPIRATION RATE: 16 BRPM | HEIGHT: 70 IN | BODY MASS INDEX: 32.07 KG/M2 | HEART RATE: 68 BPM

## 2022-10-12 DIAGNOSIS — R59.9 GLANDS SWOLLEN: Primary | ICD-10-CM

## 2022-10-12 LAB
SARS-COV-2 AG UPPER RESP QL IA: NEGATIVE
VALID CONTROL: NORMAL

## 2022-10-12 PROCEDURE — 99213 OFFICE O/P EST LOW 20 MIN: CPT | Performed by: FAMILY MEDICINE

## 2022-10-12 PROCEDURE — 87811 SARS-COV-2 COVID19 W/OPTIC: CPT | Performed by: FAMILY MEDICINE

## 2022-10-12 NOTE — PROGRESS NOTES
Name: John Richard  : 1945      MRN: [id number]  Encounter Provider: Marylee Shuck, MD  Encounter Date: 10/12/2022   Encounter department: [address] [place] [address]     1  Glands swollen  Comments:  His wife has COVID-19, but he has been testing negative and has no other symptoms  Recommend masking for now and monitoring for other symptoms  Orders:  -     POCT Rapid Covid Ag         Subjective      HPI   He noticed swollen glands and a mild sore throat since last night (10/11/22)  His wife is positive for COVID-19  Review of Systems   Constitutional: Negative  HENT: Negative  Eyes: Negative  Respiratory: Negative  Cardiovascular: Negative  Gastrointestinal: Negative  Endocrine: Negative  Genitourinary: Negative  Musculoskeletal: Negative  Neurological: Negative  Hematological: Negative  Psychiatric/Behavioral: Negative          Current Outpatient Medications on File Prior to Visit   Medication Sig   • acetaminophen (TYLENOL) 325 mg tablet Take 650 mg by mouth every 6 (six) hours as needed   • amiodarone 200 mg tablet 200 mg daily after dinner    • amLODIPine (NORVASC) 5 mg tablet    • apixaban (ELIQUIS) 5 mg Take 5 mg by mouth 2 (two) times a day To stop 2 days prior to the procedure   • atorvastatin (LIPITOR) 20 mg tablet TAKE 1 TABLET DAILY   • Blood Glucose Monitoring Suppl (FREESTYLE LITE) GEOVANNY    • carvedilol (COREG) 25 mg tablet Take 12 5 mg by mouth 2 (two) times a day    • cyclobenzaprine (FLEXERIL) 10 mg tablet TAKE ONE TABLET BY MOUTH THREE TIMES A DAY AS NEEDED FOR MUSCLE SPASMS (GENERIC FOR FLEXERIL)   • esomeprazole (NexIUM) 20 mg capsule TAKE ONE CAPSULE BY MOUTH EVERY DAY AT BEDTIME   • fluticasone (FLONASE) 50 mcg/act nasal spray 2 sprays into each nostril daily   • FREESTYLE LITE test strip    • Lancets (FREESTYLE) lancets    • losartan (COZAAR) 100 MG tablet 100 mg every morning    • metroNIDAZOLE (METROCREAM) 0 75 % cream    • minocycline (DYNACIN) 50 MG tablet    • oxaprozin (DAYPRO) 600 MG tablet Take 1 tablet (600 mg total) by mouth 2 (two) times a day as needed (Pain) For 14 days   • Ozempic, 0 25 or 0 5 MG/DOSE, 2 MG/1 5ML SOPN    • pioglitazone-metFORMIN (ACTOPLUS MET)  MG per tablet daily after dinner   • polyethylene glycol-propylene glycol (Systane) 0 4-0 3 % every 3 (three) hours as needed     • RESTASIS 0 05 % ophthalmic emulsion Administer 1 drop to both eyes every 12 (twelve) hours    • tamsulosin (FLOMAX) 0 4 mg        Objective     /58   Pulse 68   Temp 98 3 °F (36 8 °C)   Resp 16   Ht 5' 10" (1 778 m)   Wt 102 kg (224 lb)   SpO2 98%   BMI 32 14 kg/m²     Physical Exam  Constitutional:       General: He is not in acute distress  Appearance: He is well-developed  He is not diaphoretic  HENT:      Head: Normocephalic and atraumatic  Right Ear: Tympanic membrane and ear canal normal  No drainage, swelling or tenderness  No middle ear effusion  Tympanic membrane is not erythematous  Left Ear: Tympanic membrane and ear canal normal  No drainage, swelling or tenderness  No middle ear effusion  Tympanic membrane is not erythematous  Nose: No congestion or rhinorrhea  Mouth/Throat:      Mouth: Mucous membranes are moist  No oral lesions  Pharynx: No pharyngeal swelling, oropharyngeal exudate, posterior oropharyngeal erythema or uvula swelling  Eyes:      General: No scleral icterus  Right eye: No discharge  Left eye: No discharge  Conjunctiva/sclera: Conjunctivae normal    Cardiovascular:      Rate and Rhythm: Normal rate and regular rhythm  Pulses: Normal pulses  Pulmonary:      Effort: Pulmonary effort is normal  No respiratory distress  Breath sounds: Normal breath sounds  No stridor  No wheezing, rhonchi or rales  Chest:      Chest wall: No tenderness  Musculoskeletal:         General: Normal range of motion        Cervical back: Normal range of motion  Skin:     General: Skin is warm  Neurological:      Mental Status: He is alert and oriented to person, place, and time  Psychiatric:         Mood and Affect: Mood normal          Behavior: Behavior normal          Thought Content:  Thought content normal          Judgment: Judgment normal        Ayanna Alberto MD

## 2022-10-19 DIAGNOSIS — G43.119 INTRACTABLE MIGRAINE WITH AURA WITHOUT STATUS MIGRAINOSUS: Primary | ICD-10-CM

## 2022-10-19 RX ORDER — BUTALBITAL, ACETAMINOPHEN AND CAFFEINE 50; 325; 40 MG/1; MG/1; MG/1
TABLET ORAL
Qty: 20 TABLET | Refills: 0 | Status: SHIPPED | OUTPATIENT
Start: 2022-10-19

## 2022-10-31 ENCOUNTER — OFFICE VISIT (OUTPATIENT)
Dept: GASTROENTEROLOGY | Facility: CLINIC | Age: 77
End: 2022-10-31

## 2022-10-31 VITALS
HEIGHT: 70 IN | WEIGHT: 225.8 LBS | HEART RATE: 72 BPM | OXYGEN SATURATION: 95 % | TEMPERATURE: 97.9 F | SYSTOLIC BLOOD PRESSURE: 158 MMHG | DIASTOLIC BLOOD PRESSURE: 81 MMHG | BODY MASS INDEX: 32.33 KG/M2

## 2022-10-31 DIAGNOSIS — K58.0 IRRITABLE BOWEL SYNDROME WITH DIARRHEA: Primary | ICD-10-CM

## 2022-10-31 DIAGNOSIS — K59.00 CONSTIPATION, UNSPECIFIED CONSTIPATION TYPE: ICD-10-CM

## 2022-10-31 DIAGNOSIS — K21.9 GASTROESOPHAGEAL REFLUX DISEASE, UNSPECIFIED WHETHER ESOPHAGITIS PRESENT: ICD-10-CM

## 2022-10-31 DIAGNOSIS — R19.7 DIARRHEA, UNSPECIFIED TYPE: ICD-10-CM

## 2022-10-31 DIAGNOSIS — R10.9 ABDOMINAL PAIN, UNSPECIFIED ABDOMINAL LOCATION: ICD-10-CM

## 2022-10-31 DIAGNOSIS — D72.829 LEUKOCYTOSIS, UNSPECIFIED TYPE: ICD-10-CM

## 2022-10-31 RX ORDER — DICYCLOMINE HCL 20 MG
20 TABLET ORAL EVERY 6 HOURS PRN
Qty: 120 TABLET | Refills: 2 | Status: SHIPPED | OUTPATIENT
Start: 2022-10-31

## 2022-10-31 NOTE — PROGRESS NOTES
[facility] - Outpatient Consultation  Laila Petty  68 y o  male MRN: [id number]  Encounter: [id number]          ASSESSMENT AND PLAN:    Laila Petty  is a 68 y o  male who presents with irritable bowel syndrome predominantly with constipation but of mixed subtype who presents to establish care  Also with reflux  He notes that he has been having mostly constipation but occasional diarrhea, at times with dairy or other foods  Endoscopy and colonoscopy performed November 2021  Endoscopy with 10 mm polyp in the stomach with biopsy taken and clip placed  Colonoscopy with 2 polyps removed and scattered diverticulosis, as well as small internal hemorrhoids  Biopsies revealed inflammatory pseudopolyp in the stomach, sessile serrated lesion and tubular adenoma in the cecum  MRI of the abdomen March 2022 with exophytic cyst from the right lower pole which has progressively decreased in size, multiple pancreatic cysts measuring up to 1 3 cm without high-risk stigmata  CMP from July normal, B12 greater than a 1000, hemoglobin A1c 5 9, normal TSH  CBC from September 2021 with hemoglobin 12 4, white blood cell count 12 4, normal platelets  1  Irritable bowel syndrome with diarrhea    2  Gastroesophageal reflux disease, unspecified whether esophagitis present    3  Diarrhea, unspecified type    4  Constipation, unspecified constipation type    5  Leukocytosis, unspecified type    6   Abdominal pain, unspecified abdominal location        Orders Placed This Encounter   Procedures   • Calprotectin,Fecal   • Fecal fat, qualitative   • Pancreatic elastase, fecal   • Giardia, EIA, Ova/Parasite   • Celiac Disease Antibody Profile   • CBC and differential     Drink at least 8 cups of water per day  Low FODMAP diet  Metamucil or benefiber daily  Miralax as needed if you go a day without a bowel movement  Pepto bismal or Imodium as needed but try to limit  Bentyl as needed for pain  Blood work and stool tests  Continue Nexium daily  Gaviscon or Tums as needed for breakthrough symptoms  Things that can help improved reflux include: avoidance of trigger foods (potential foods include coffee, caffeine, chocolate, mint, tomato-based products, spicy foods, fatty foods), avoid tight fitting clothing, elevated head of bed 30 degrees, avoid eating 2-3 hours prior to bedtime, weight loss, avoid alcohol, avoid tobacco use  MRI/MRCP to follow-up pancreatic cyst next to be scheduled March 2024  Continue to work with PCP for weight loss, with diet and exercise  Ozempic management as per PCP  Next colonoscopy in 2026        ______________________________________________________________________    Referred by Dr Mark Smith for irritable bowel syndrome    HPI:    Leigh Huizar  is a 68 y o  male who presents with complaint of abdominal pain, IBS  He has pain, nausea and diarrhea  He was golfing the other day and he was constipated for 2-3 days  Then he ate pizza and he had to use the bathroom 20 minutes later  The stool changes from constipation to diarrhea and he has to go 2-3 times  pepto helps  He used to take imodium  He is also on blood thinners  Pain is in the lower abdomen  He is on ozempic  Also with nausea  He takes something with metformin as well  He took colace in the past  He used to drink more water but not as much now  Dairy makes the symptoms worse  Symptoms better after the BM  Stools black from pepto  No heartburn (on Nexium), dysphagia, odynophagia, vomiting, BRBPR  He lost 30 lbs on Ozemopic 2 years ago  His appetite was low    + FH of colon cancer on his mother's side (including mother)       REVIEW OF SYSTEMS:  10 point ROS reviewed and negative, except as above      Historical Information   Past Medical History:   Diagnosis Date   • Allergic rhinitis    • Anemia 10/23/2008    last assessed 9/9/13    • Anxiety    • Atrial fibrillation (HCC)    • Bleeding    • Chronic kidney disease    • CPAP (continuous positive airway pressure) dependence    • Diabetes mellitus (HCC)    • Diarrhea    • Diverticulitis    • Eating disorder    • GERD (gastroesophageal reflux disease)    • Herpes zoster with complication     last assessed 7/3/17    • Hiatal hernia    • Hyperlipidemia    • Hypertension    • IBS (irritable bowel syndrome)    • Incisional hernia    • Increased urinary frequency    • Irregular heart beat    • Pyogenic granuloma 09/13/2006    last assessed 9/13/06   • Carlos (subconjunctival hemorrhage), unspecified laterality 09/12/2005    last assessed 9/12/05   • Sleep apnea    • TMJ (dislocation of temporomandibular joint)    • Ventral hernia     last assessed  4/6/17    • Wears glasses      Past Surgical History:   Procedure Laterality Date   • APPENDECTOMY     • ATRIAL ABLATION SURGERY      2014   • ATRIAL ABLATION SURGERY      catheter ablation atrial fibrillation / last assessed 2/17/16    • CHOLECYSTECTOMY      lap   • COLON SURGERY  1990    Hartmans procedure   • COLON SURGERY      reversal 6 weeks later   • COLONOSCOPY N/A 1/26/2017    Procedure: COLONOSCOPY;  Surgeon: Willy Simmons MD;  Location: [facility] GI LAB; Service:    • ESOPHAGOGASTRODUODENOSCOPY N/A 1/26/2017    Procedure: ESOPHAGOGASTRODUODENOSCOPY (EGD); Surgeon: Willy Simmons MD;  Location: [facility] GI LAB;   Service:    • EYE SURGERY Bilateral 2015    lid repair   • HERNIA REPAIR Bilateral     inguinal 2014 & 2013   • [address]  2011   • INCISIONAL HERNIA REPAIR N/A 3/24/2017    Procedure: REPAIR OF INCARCERATED INCISIONAL HERNIA WITH MESH, Lysis of Adhesions, Partial Omentectomy;  Surgeon: Sergio Chandler MD;  Location: [address] [place] [address];  Service:    • [facility] 10/04/2021    [id number]-[id number]   • NEUROBLASTOMA EXCISION     • SKIN CANCER EXCISION      On nose   • TONSILLECTOMY       Social History   Social History     Substance and Sexual Activity   Alcohol Use No    Comment: none in 33 yrs Social History     Substance and Sexual Activity   Drug Use No     Social History     Tobacco Use   Smoking Status Former Smoker   • Packs/day: 1 00   • Years: 20    • Pack years:    • Types: Cigarettes   • Quit date: 5   • Years since quittin 8   Smokeless Tobacco Never Used     Family History   Problem Relation Age of Onset   • Heart disease Mother    • Cancer Mother    • Colon cancer Mother    • Dementia Mother    • Early death Father    • Seizures Father    • Cancer Sister         bone   • Heart disease Sister    • Diabetes Sister    • Lupus Sister    • Heart disease Brother    • Cancer Brother         kidney   • Diabetes Brother    • Other Sister         covid   • Stroke Neg Hx        Meds/Allergies       Current Outpatient Medications:   •  acetaminophen (TYLENOL) 325 mg tablet  •  amiodarone 200 mg tablet  •  amLODIPine (NORVASC) 5 mg tablet  •  apixaban (ELIQUIS) 5 mg  •  atorvastatin (LIPITOR) 20 mg tablet  •  Blood Glucose Monitoring Suppl (FREESTYLE LITE) GEOVANNY  •  butalbital-acetaminophen-caffeine (FIORICET,ESGIC) -40 mg per tablet  •  carvedilol (COREG) 25 mg tablet  •  cyclobenzaprine (FLEXERIL) 10 mg tablet  •  dicyclomine (BENTYL) 20 mg tablet  •  esomeprazole (NexIUM) 20 mg capsule  •  fluticasone (FLONASE) 50 mcg/act nasal spray  •  FREESTYLE LITE test strip  •  Lancets (FREESTYLE) lancets  •  losartan (COZAAR) 100 MG tablet  •  metroNIDAZOLE (METROCREAM) 0 75 % cream  •  minocycline (DYNACIN) 50 MG tablet  •  oxaprozin (DAYPRO) 600 MG tablet  •  Ozempic, 0 25 or 0 5 MG/DOSE, 2 MG/1 5ML SOPN  •  pioglitazone-metFORMIN (ACTOPLUS MET)  MG per tablet  •  polyethylene glycol-propylene glycol (Systane) 0 4-0 3 %  •  RESTASIS 0 05 % ophthalmic emulsion  •  tamsulosin (FLOMAX) 0 4 mg    Allergies   Allergen Reactions   • Codeine Other (See Comments) and GI Intolerance     Reaction Date: 2004;  Annotation - [id number]: '' CRAZY ''  vomiting  headache   • Demerol [Meperidine] Nausea Only, Other (See Comments), GI Intolerance and Vomiting     vomiting  Reaction Date: 27Dec2004;   headache   • Morphine Nausea Only, GI Intolerance and Vomiting     Reaction Date: 23Feb2012;            Objective     Blood pressure 158/81, pulse 72, temperature 97 9 °F (36 6 °C), temperature source Tympanic, height 5' 10" (1 778 m), weight 102 kg (225 lb 12 8 oz), SpO2 95 %  Body mass index is 32 4 kg/m²  PHYSICAL EXAMINATION:    General Appearance:   Alert, cooperative, no distress   HEENT:  Normocephalic, atraumatic, anicteric  Neck supple, symmetrical, trachea midline  Lungs:   Equal chest rise and unlabored breathing, normal effort, no coughing  Cardiovascular:   No visualized JVD  Abdomen:   No abdominal distension  Skin:   No jaundice, rashes, or lesions  Musculoskeletal:   Normal range of motion visualized  Psych:  Normal affect and normal insight  Neuro:  Alert and appropriate  Lab Results:   No visits with results within 1 Day(s) from this visit     Latest known visit with results is:   Office Visit on 10/12/2022   Component Date Value   • POCT SARS-CoV-2 Ag 10/12/2022 Negative    • VALID CONTROL 10/12/2022 Valid        Lab Results   Component Value Date    WBC 10 21 (H) 07/27/2021    HGB 12 0 07/27/2021    HCT 37 0 07/27/2021    MCV 90 07/27/2021     07/27/2021       Lab Results   Component Value Date    SODIUM 141 07/20/2022    K 4 1 07/20/2022     07/20/2022    CO2 27 07/20/2022    AGAP 7 07/20/2022    BUN 19 07/20/2022    CREATININE 0 98 07/20/2022    GLUC 106 07/20/2022    GLUF 128 (H) 03/08/2022    CALCIUM 8 5 07/20/2022    AST 30 07/20/2022    ALT 73 07/20/2022    ALKPHOS 60 07/20/2022    TP 7 1 07/20/2022    TBILI 0 55 07/20/2022    EGFR 74 07/20/2022       Lab Results   Component Value Date    CRP <3 0 04/22/2019       Lab Results   Component Value Date    [id number] 2 080 07/20/2022       No results found for: IRON, TIBC, FERRITIN    Radiology Results:   No results found

## 2022-10-31 NOTE — PATIENT INSTRUCTIONS
Drink at least 8 cups of water per day  Low FODMAP diet  Metamucil or benefiber daily  Miralax as needed if you go a day without a bowel movement  Pepto bismal or Imodium as needed but try to limit  Bentyl as needed for pain  Blood work and stool tests  Continue Nexium daily  Gaviscon or Tums as needed for breakthrough symptoms  Things that can help improved reflux include: avoidance of trigger foods (potential foods include coffee, caffeine, chocolate, mint, tomato-based products, spicy foods, fatty foods), avoid tight fitting clothing, elevated head of bed 30 degrees, avoid eating 2-3 hours prior to bedtime, weight loss, avoid alcohol, avoid tobacco use  MRI/MRCP to follow-up pancreatic cyst next to be scheduled March 2024  Continue to work with PCP for weight loss, with diet and exercise  Ozempic management as per PCP    Next colonoscopy in 2026

## 2022-11-08 ENCOUNTER — APPOINTMENT (OUTPATIENT)
Dept: LAB | Facility: CLINIC | Age: 77
End: 2022-11-08

## 2022-11-08 DIAGNOSIS — R19.7 DIARRHEA, UNSPECIFIED TYPE: ICD-10-CM

## 2022-11-08 DIAGNOSIS — K58.0 IRRITABLE BOWEL SYNDROME WITH DIARRHEA: ICD-10-CM

## 2022-11-08 DIAGNOSIS — K59.00 CONSTIPATION, UNSPECIFIED CONSTIPATION TYPE: ICD-10-CM

## 2022-11-08 LAB
BASOPHILS # BLD AUTO: 0.06 THOUSANDS/ÂΜL (ref 0–0.1)
BASOPHILS NFR BLD AUTO: 1 % (ref 0–1)
EOSINOPHIL # BLD AUTO: 0.26 THOUSAND/ÂΜL (ref 0–0.61)
EOSINOPHIL NFR BLD AUTO: 3 % (ref 0–6)
ERYTHROCYTE [DISTWIDTH] IN BLOOD BY AUTOMATED COUNT: 14.6 % (ref 11.6–15.1)
HCT VFR BLD AUTO: 37.5 % (ref 36.5–49.3)
HGB BLD-MCNC: 13.2 G/DL (ref 12–17)
IMM GRANULOCYTES # BLD AUTO: 0.02 THOUSAND/UL (ref 0–0.2)
IMM GRANULOCYTES NFR BLD AUTO: 0 % (ref 0–2)
LYMPHOCYTES # BLD AUTO: 3.29 THOUSANDS/ÂΜL (ref 0.6–4.47)
LYMPHOCYTES NFR BLD AUTO: 37 % (ref 14–44)
MCH RBC QN AUTO: 32.1 PG (ref 26.8–34.3)
MCHC RBC AUTO-ENTMCNC: 35.2 G/DL (ref 31.4–37.4)
MCV RBC AUTO: 91 FL (ref 82–98)
MONOCYTES # BLD AUTO: 0.58 THOUSAND/ÂΜL (ref 0.17–1.22)
MONOCYTES NFR BLD AUTO: 7 % (ref 4–12)
NEUTROPHILS # BLD AUTO: 4.58 THOUSANDS/ÂΜL (ref 1.85–7.62)
NEUTS SEG NFR BLD AUTO: 52 % (ref 43–75)
NRBC BLD AUTO-RTO: 0 /100 WBCS
PLATELET # BLD AUTO: 267 THOUSANDS/UL (ref 149–390)
PMV BLD AUTO: 10.9 FL (ref 8.9–12.7)
RBC # BLD AUTO: 4.11 MILLION/UL (ref 3.88–5.62)
WBC # BLD AUTO: 8.79 THOUSAND/UL (ref 4.31–10.16)

## 2022-11-10 LAB
ENDOMYSIUM IGA SER QL: NEGATIVE
GLIADIN PEPTIDE IGA SER-ACNC: 4 UNITS (ref 0–19)
GLIADIN PEPTIDE IGG SER-ACNC: 3 UNITS (ref 0–19)
IGA SERPL-MCNC: 191 MG/DL (ref 61–437)
TTG IGA SER-ACNC: <2 U/ML (ref 0–3)
TTG IGG SER-ACNC: <2 U/ML (ref 0–5)

## 2022-11-15 ENCOUNTER — APPOINTMENT (OUTPATIENT)
Dept: LAB | Facility: CLINIC | Age: 77
End: 2022-11-15

## 2022-11-15 DIAGNOSIS — K58.0 IRRITABLE BOWEL SYNDROME WITH DIARRHEA: ICD-10-CM

## 2022-11-15 DIAGNOSIS — R19.7 DIARRHEA, UNSPECIFIED TYPE: ICD-10-CM

## 2022-11-15 DIAGNOSIS — K59.00 CONSTIPATION, UNSPECIFIED CONSTIPATION TYPE: ICD-10-CM

## 2022-11-19 LAB — ELASTASE PANC STL-MCNT: 188 UG ELAST./G

## 2022-11-20 DIAGNOSIS — K86.81 EXOCRINE PANCREATIC INSUFFICIENCY: Primary | ICD-10-CM

## 2022-11-20 LAB
FAT STL QL: NORMAL
NEUTRAL FAT STL QL: NORMAL

## 2022-11-20 RX ORDER — PANCRELIPASE 15000; 3000; 9500 [USP'U]/1; [USP'U]/1; [USP'U]/1
1 CAPSULE, DELAYED RELEASE ORAL
Qty: 90 CAPSULE | Refills: 5 | Status: SHIPPED | OUTPATIENT
Start: 2022-11-20

## 2022-11-22 LAB
G LAMBLIA AG STL QL IA: NEGATIVE
O+P STL CONC: NORMAL

## 2022-12-07 ENCOUNTER — OFFICE VISIT (OUTPATIENT)
Dept: FAMILY MEDICINE CLINIC | Facility: CLINIC | Age: 77
End: 2022-12-07

## 2022-12-07 VITALS
DIASTOLIC BLOOD PRESSURE: 70 MMHG | WEIGHT: 220 LBS | RESPIRATION RATE: 18 BRPM | HEART RATE: 72 BPM | OXYGEN SATURATION: 96 % | TEMPERATURE: 100.1 F | BODY MASS INDEX: 31.57 KG/M2 | SYSTOLIC BLOOD PRESSURE: 130 MMHG

## 2022-12-07 DIAGNOSIS — J06.9 ACUTE UPPER RESPIRATORY INFECTION, UNSPECIFIED: ICD-10-CM

## 2022-12-07 DIAGNOSIS — R68.89 FLU-LIKE SYMPTOMS: Primary | ICD-10-CM

## 2022-12-07 RX ORDER — AMOXICILLIN 500 MG/1
CAPSULE ORAL
COMMUNITY
Start: 2022-12-05

## 2022-12-07 RX ORDER — OSELTAMIVIR PHOSPHATE 75 MG/1
75 CAPSULE ORAL 2 TIMES DAILY
Qty: 10 CAPSULE | Refills: 0 | Status: SHIPPED | OUTPATIENT
Start: 2022-12-07 | End: 2022-12-12

## 2022-12-07 RX ORDER — DEXTROMETHORPHAN HYDROBROMIDE AND PROMETHAZINE HYDROCHLORIDE 15; 6.25 MG/5ML; MG/5ML
5 SOLUTION ORAL 4 TIMES DAILY PRN
Qty: 180 ML | Refills: 0 | Status: SHIPPED | OUTPATIENT
Start: 2022-12-07

## 2022-12-07 NOTE — PROGRESS NOTES
Name: Cj Hayden  : 1945      MRN: [id number]  Encounter Provider: Arabella Gandhi MD  Encounter Date: 2022   Encounter department: [address] [place] [address]     1  Flu-like symptoms  Comments:  suspected flu due to symptoms, swab sent as they have visitors coming later this week  Empiric tamiflu was sent to the pharmacy  Continue supportive care  Orders:  -     oseltamivir (TAMIFLU) 75 mg capsule; Take 1 capsule (75 mg total) by mouth 2 (two) times a day for 5 days  -     Promethazine-DM (PHENERGAN-DM) 6 25-15 mg/5 mL oral syrup; Take 5 mL by mouth 4 (four) times a day as needed for cough  -     Covid/Flu- Office Collect    2  Acute upper respiratory infection, unspecified         Subjective      Sick for 3 days,  has a cough, congestion, nasal discharge, body aches, low grade fever  Cough is non productive  No dyspnea  Took covid test, negative  Taking mucinex DM, tylenol with some relief of symptoms  Wife also sick and also covid negative  Review of Systems   Constitutional: Positive for fatigue and fever  HENT: Positive for congestion, rhinorrhea and sore throat  Respiratory: Positive for cough  Current Outpatient Medications on File Prior to Visit   Medication Sig   • acetaminophen (TYLENOL) 325 mg tablet Take 650 mg by mouth every 6 (six) hours as needed   • amiodarone 200 mg tablet 200 mg daily after dinner    • amLODIPine (NORVASC) 5 mg tablet    • apixaban (ELIQUIS) 5 mg Take 5 mg by mouth 2 (two) times a day To stop 2 days prior to the procedure   • atorvastatin (LIPITOR) 20 mg tablet TAKE 1 TABLET DAILY   • Blood Glucose Monitoring Suppl (FREESTYLE LITE) GEOVANNY    • butalbital-acetaminophen-caffeine (FIORICET,ESGIC) -40 mg per tablet MAY TAKE 1 TABLET BY MOUTH ONCE DAILY AS NEEDED   DO NOT EXCEED 3 TABLETS PER WEEK   • carvedilol (COREG) 25 mg tablet Take 12 5 mg by mouth 2 (two) times a day    • dicyclomine (BENTYL) 20 mg tablet Take 1 tablet (20 mg total) by mouth every 6 (six) hours as needed (urgency, abdominal pain)   • esomeprazole (NexIUM) 20 mg capsule TAKE ONE CAPSULE BY MOUTH EVERY DAY AT BEDTIME   • fluticasone (FLONASE) 50 mcg/act nasal spray 2 sprays into each nostril daily   • FREESTYLE LITE test strip    • Lancets (FREESTYLE) lancets    • losartan (COZAAR) 100 MG tablet 100 mg every morning    • metroNIDAZOLE (METROCREAM) 0 75 % cream    • minocycline (DYNACIN) 50 MG tablet    • Ozempic, 0 25 or 0 5 MG/DOSE, 2 MG/1 5ML SOPN    • Pancrelipase, Lip-Prot-Amyl, (Creon) 5013-9022 units CPEP Take 1 capsule by mouth 3 (three) times a day with meals   • pioglitazone-metFORMIN (ACTOPLUS MET)  MG per tablet daily after dinner   • polyethylene glycol-propylene glycol (Systane) 0 4-0 3 % every 3 (three) hours as needed     • RESTASIS 0 05 % ophthalmic emulsion Administer 1 drop to both eyes every 12 (twelve) hours    • tamsulosin (FLOMAX) 0 4 mg    • amoxicillin (AMOXIL) 500 mg capsule    • [DISCONTINUED] cyclobenzaprine (FLEXERIL) 10 mg tablet TAKE ONE TABLET BY MOUTH THREE TIMES A DAY AS NEEDED FOR MUSCLE SPASMS (GENERIC FOR FLEXERIL) (Patient not taking: Reported on 12/7/2022)   • [DISCONTINUED] oxaprozin (DAYPRO) 600 MG tablet Take 1 tablet (600 mg total) by mouth 2 (two) times a day as needed (Pain) For 14 days (Patient not taking: Reported on 12/7/2022)       Objective     /70   Pulse 72   Temp 100 1 °F (37 8 °C)   Resp 18   Wt 99 8 kg (220 lb) Comment: per patient  SpO2 96%   BMI 31 57 kg/m²     Physical Exam  HENT:      Right Ear: Tympanic membrane is retracted  Left Ear: Tympanic membrane is retracted  Nose: Mucosal edema and rhinorrhea present  Cardiovascular:      Rate and Rhythm: Normal rate and regular rhythm  Heart sounds: Normal heart sounds  Pulmonary:      Effort: Pulmonary effort is normal       Breath sounds: Normal breath sounds  No wheezing or rales     Musculoskeletal:      Cervical back: Neck supple  Lymphadenopathy:      Cervical: No cervical adenopathy         Que Jarrell MD

## 2022-12-08 LAB
FLUAV RNA RESP QL NAA+PROBE: NEGATIVE
FLUBV RNA RESP QL NAA+PROBE: NEGATIVE
SARS-COV-2 RNA RESP QL NAA+PROBE: POSITIVE

## 2023-03-01 ENCOUNTER — TELEPHONE (OUTPATIENT)
Dept: NEUROLOGY | Facility: CLINIC | Age: 78
End: 2023-03-01

## 2023-03-09 DIAGNOSIS — F41.9 ANXIETY: Primary | ICD-10-CM

## 2023-03-09 RX ORDER — HYDROXYZINE PAMOATE 25 MG/1
25 CAPSULE ORAL 3 TIMES DAILY PRN
Qty: 60 CAPSULE | Refills: 3 | Status: SHIPPED | OUTPATIENT
Start: 2023-03-09

## 2023-03-13 ENCOUNTER — TELEPHONE (OUTPATIENT)
Dept: FAMILY MEDICINE CLINIC | Facility: CLINIC | Age: 78
End: 2023-03-13

## 2023-03-13 NOTE — TELEPHONE ENCOUNTER
Left message on machine for patient to call office back  We received a fax from [employer] stating that the patients wants immunodeficiency screening done  Can we please confirm if this is true  Form in nurse pending 1    [facility] LPN

## 2023-03-15 ENCOUNTER — OFFICE VISIT (OUTPATIENT)
Dept: NEUROLOGY | Facility: CLINIC | Age: 78
End: 2023-03-15

## 2023-03-15 VITALS
BODY MASS INDEX: 31.64 KG/M2 | HEIGHT: 70 IN | OXYGEN SATURATION: 95 % | HEART RATE: 65 BPM | DIASTOLIC BLOOD PRESSURE: 64 MMHG | TEMPERATURE: 96.9 F | WEIGHT: 221 LBS | SYSTOLIC BLOOD PRESSURE: 164 MMHG

## 2023-03-15 DIAGNOSIS — Z87.898 H/O HEADACHE: ICD-10-CM

## 2023-03-15 DIAGNOSIS — Z86.018 HISTORY OF BENIGN SCHWANNOMA: ICD-10-CM

## 2023-03-15 DIAGNOSIS — E11.40 DIABETIC NEUROPATHY (HCC): Primary | ICD-10-CM

## 2023-03-15 RX ORDER — KETOCONAZOLE 20 MG/ML
SHAMPOO TOPICAL
COMMUNITY
Start: 2023-01-12

## 2023-03-15 RX ORDER — GABAPENTIN 300 MG/1
300 CAPSULE ORAL
Qty: 30 CAPSULE | Refills: 3 | Status: SHIPPED | OUTPATIENT
Start: 2023-03-15

## 2023-03-15 NOTE — PROGRESS NOTES
Return NeuroOutpatient Note        Cornelio Mcneill  [id number]  13 y o   1945       Diabetic neuropathy and Schwannoma        History obtained from:  Patient     HPI/Subjective:    Cornelio Mcneill  is a 67 yo M with PMH of diabetes neuropathy presents as f/u  Previously, patient had reported  tingling, burning, pain in both feet  His Sxs started a year ago  He was tried on natural supplement by his podiatrist and both gave him side effects    Patient was prescribed gabapentin but he never started  He still gets mild paresthesia in legs but they are manageable  He denies any change in his symptoms  His diabetes is under well control  His most recent A1c was 5 9  He was asked to take alpha lipoic but he's never tried          Previously for headaches, he was started on topamax and it has been d/c as he doesn't get headaches anymore  He is sensitive to salt  If he eats salt, he gets headache  he is given prn fioricet  He has cervical spine nerve ablation which had helped in past          He has h/o schwannoma many years ago  per 2018 MRI, he had 6mm in left perimesencephalic cistern along expected course of left trigeminal nerve   He had repeat study in May 2022 and it showed stable 0 6cm          Past Medical History:   Diagnosis Date   • Allergic rhinitis    • Anemia 10/23/2008    last assessed 9/9/13    • Anxiety    • Atrial fibrillation (HCC)    • Bleeding    • Chronic kidney disease    • COVID-19 virus infection 12/2022   • CPAP (continuous positive airway pressure) dependence    • Diabetes mellitus ([facility] [address] )    • Diarrhea    • Diverticulitis    • Eating disorder    • GERD (gastroesophageal reflux disease)    • Herpes zoster with complication     last assessed 7/3/17    • Hiatal hernia    • Hyperlipidemia    • Hypertension    • IBS (irritable bowel syndrome)    • Incisional hernia    • Increased urinary frequency    • Irregular heart beat    • Pyogenic granuloma 09/13/2006    last assessed 9/13/06   • Carlos (subconjunctival hemorrhage), unspecified laterality 2005    last assessed 05   • Sleep apnea    • TMJ (dislocation of temporomandibular joint)    • Ventral hernia     last assessed  17    • Wears glasses      Social History     Socioeconomic History   • Marital status: /Civil Union     Spouse name: Not on file   • Number of children: Not on file   • Years of education: Not on file   • Highest education level: Not on file   Occupational History   • Not on file   Tobacco Use   • Smoking status: Former     Packs/day: 1 00     Years: 20 00     Pack years: 20 00     Types: Cigarettes     Quit date: 5     Years since quittin 2   • Smokeless tobacco: Never   • Tobacco comments:     Started age 16   Vaping Use   • Vaping Use: Never used   Substance and Sexual Activity   • Alcohol use: No     Comment: none in 33 yrs   • Drug use: No   • Sexual activity: Yes   Other Topics Concern   • Not on file   Social History Narrative    Single per allscript      Social Determinants of Health     Financial Resource Strain: Low Risk    • Difficulty of Paying Living Expenses: Not very hard   Food Insecurity: Not on file   Transportation Needs: No Transportation Needs   • Lack of Transportation (Medical): No   • Lack of Transportation (Non-Medical):  No   Physical Activity: Not on file   Stress: Not on file   Social Connections: Not on file   Intimate Partner Violence: Not on file   Housing Stability: Not on file     Family History   Problem Relation Age of Onset   • Heart disease Mother    • Cancer Mother    • Colon cancer Mother    • Dementia Mother    • Early death Father    • Seizures Father    • Cancer Sister         bone   • Heart disease Sister    • Diabetes Sister    • Lupus Sister    • Heart disease Brother    • Cancer Brother         kidney   • Diabetes Brother    • Other Sister         covid   • Stroke Neg Hx      Allergies   Allergen Reactions   • Codeine Other (See Comments) and GI Intolerance Reaction Date: 27Dec2004; Annotation - [id number]: '' CRAZY ''  vomiting  headache   • Demerol [Meperidine] Nausea Only, Other (See Comments), GI Intolerance and Vomiting     vomiting  Reaction Date: 27Dec2004;   headache   • Morphine Nausea Only, GI Intolerance and Vomiting     Reaction Date: 23Feb2012;      Current Outpatient Medications on File Prior to Visit   Medication Sig Dispense Refill   • acetaminophen (TYLENOL) 325 mg tablet Take 650 mg by mouth every 6 (six) hours as needed     • amiodarone 200 mg tablet 200 mg daily after dinner      • amLODIPine (NORVASC) 5 mg tablet      • apixaban (ELIQUIS) 5 mg Take 5 mg by mouth 2 (two) times a day To stop 2 days prior to the procedure     • atorvastatin (LIPITOR) 20 mg tablet TAKE 1 TABLET DAILY 90 tablet 3   • Blood Glucose Monitoring Suppl (FREESTYLE LITE) GEOVANNY      • butalbital-acetaminophen-caffeine (FIORICET,ESGIC) -40 mg per tablet MAY TAKE 1 TABLET BY MOUTH ONCE DAILY AS NEEDED   DO NOT EXCEED 3 TABLETS PER WEEK 20 tablet 0   • carvedilol (COREG) 25 mg tablet Take 12 5 mg by mouth 2 (two) times a day      • esomeprazole (NexIUM) 20 mg capsule TAKE ONE CAPSULE BY MOUTH EVERY DAY AT BEDTIME 90 capsule 3   • fluticasone (FLONASE) 50 mcg/act nasal spray 2 sprays into each nostril daily 48 g 3   • FREESTYLE LITE test strip      • hydrOXYzine pamoate (VISTARIL) 25 mg capsule Take 1 capsule (25 mg total) by mouth 3 (three) times a day as needed for anxiety 60 capsule 3   • ketoconazole (NIZORAL) 2 % shampoo      • Lancets (FREESTYLE) lancets      • losartan (COZAAR) 100 MG tablet 100 mg every morning      • Ozempic, 0 25 or 0 5 MG/DOSE, 2 MG/1 5ML SOPN      • pioglitazone-metFORMIN (ACTOPLUS MET)  MG per tablet daily after dinner     • polyethylene glycol-propylene glycol (Systane) 0 4-0 3 % every 3 (three) hours as needed       • RESTASIS 0 05 % ophthalmic emulsion Administer 1 drop to both eyes every 12 (twelve) hours      • tamsulosin (FLOMAX) 0 4 mg • amoxicillin (AMOXIL) 500 mg capsule      • dicyclomine (BENTYL) 20 mg tablet Take 1 tablet (20 mg total) by mouth every 6 (six) hours as needed (urgency, abdominal pain) 120 tablet 2   • metroNIDAZOLE (METROCREAM) 0 75 % cream  (Patient not taking: Reported on 3/15/2023)     • minocycline (DYNACIN) 50 MG tablet  (Patient not taking: Reported on 3/15/2023)     • Pancrelipase, Lip-Prot-Amyl, (Creon) 1657-9813 units CPEP Take 1 capsule by mouth 3 (three) times a day with meals 90 capsule 5   • Promethazine-DM (PHENERGAN-DM) 6 25-15 mg/5 mL oral syrup Take 5 mL by mouth 4 (four) times a day as needed for cough 180 mL 0     No current facility-administered medications on file prior to visit  Review of Systems   Refer to positive review of systems in HPI     Review of Systems    Constitutional- No fever  Eyes- No visual change  ENT- Hearing normal  CV- No chest pain  Resp- No Shortness of breath  GI- No diarrhea  - Bladder normal  MS- No Arthritis   Skin- No rash  Psych- No depression  Endo- No DM  Heme- No nodes    Vitals:    03/15/23 1109   BP: 164/64   BP Location: Left arm   Patient Position: Sitting   Cuff Size: Standard   Pulse: 65   Temp: (!) 96 9 °F (36 1 °C)   TempSrc: Tympanic   SpO2: 95%   Weight: 100 kg (221 lb)   Height: 5' 10" (1 778 m)       PHYSICAL EXAM:  Appearance: No Acute Distress  Ophthalmoscopic: Disc Flat, Normal fundus  Mental status:  Orientation: Awake, Alert, and Orientedx3  Memory: Registation 3/3 Recall 3/3  Attention: normal  Knowledge: good  Language: No aphasia  Speech: No dysarthria  Cranial Nerves:  2 No Visual Defect on Confrontation, Pupils round, equal, reactive to light  3,4,6 Extraocular Movements Intact, no nystagmus  5 Facial Sensation Intact  7 No facial asymmetry  8 Intact hearing  9,10 Palate symmetric, normal gag  11 Good shoulder shrug  12 Tongue Midline  Gait: Stable  Coordination: No ataxia with finger to nose testing, and heel to shin  Sensory: decreased to pin prick upto below ankles  decreased vibration at toes and ankles  Muscle Tone: Normal              Muscle exam:  Arm Right Left Leg Right Left   Deltoid 5/5 5/5 Iliopsoas 5/5 5/5   Biceps 5/5 5/5 Quads 5/5 5/5   Triceps 5/5 5/5 Hamstrings 5/5 5/5   Wrist Extension 5/5 5/5 Ankle Dorsi Flexion 5/5 5/5   Wrist Flexion 5/5 5/5 Ankle Plantar Flexion 5/5 5/5   Interossei 5/5 5/5 Ankle Eversion 5/5 5/5   APB 5/5 5/5 Ankle Inversion 5/5 5/5       Reflexes   RJ BJ TJ KJ AJ Plantars Victor's   Right 2+ 2+ 2+ 1+ 0 Downgoing Not present   Left 2+ 2+ 2+ 2+ 0 Downgoing Not present     Personal review of  Labs:                    Diagnoses and all orders for this visit:        1  Diabetic neuropathy (HCC)  gabapentin (Neurontin) 300 mg capsule      2  H/O headache        3  History of benign schwannoma            Patient is stable  He says he will now try gabapentin so re prescribing  Will start him on gabapentin 300mg qhs  Will surveil for schwannoma in 2 years or early if symptomatic                 Total time of encounter:  30 min  More than 50% of the time was used in counseling and/or coordination of care  Extent of counseling and/or coordination of care        Nereyda Leonard MD  [facility]  [address]  [address]  [phone]

## 2023-03-29 DIAGNOSIS — K21.9 GASTROESOPHAGEAL REFLUX DISEASE WITHOUT ESOPHAGITIS: ICD-10-CM

## 2023-03-30 ENCOUNTER — TELEPHONE (OUTPATIENT)
Dept: NEUROLOGY | Facility: CLINIC | Age: 78
End: 2023-03-30

## 2023-03-30 NOTE — TELEPHONE ENCOUNTER
received vm-Hello, good morning  My name is Cong Guzmán with [employer]  i'm calling in regards to a fax that was sent to dr Kaley Fernandez matter on regarding a mutual patient of ours, Jurgen Casiano, YOB: 1945  We faxed over a requisition form regarding the primary immunodeficiency screening  we need dr Kaley Fernandez  to sign and date the rest of the requisition been faxed back over to us  My callback number is [phone]  And my fax number is [phone]  Thanks so much have a good day   --------------------------------------------------  Located fax in fax folder  Pt contacted [employer] requesting immunodeficiency screening  Are you agreeable to signing form? Glo-fax in [place] fax folder  Please print for dr Kaley Fernandez to review

## 2023-05-01 ENCOUNTER — OFFICE VISIT (OUTPATIENT)
Dept: GASTROENTEROLOGY | Facility: CLINIC | Age: 78
End: 2023-05-01

## 2023-05-01 VITALS
HEART RATE: 65 BPM | DIASTOLIC BLOOD PRESSURE: 82 MMHG | BODY MASS INDEX: 32.07 KG/M2 | SYSTOLIC BLOOD PRESSURE: 168 MMHG | HEIGHT: 70 IN | WEIGHT: 224 LBS

## 2023-05-01 DIAGNOSIS — D12.3 ADENOMA OF TRANSVERSE COLON: ICD-10-CM

## 2023-05-01 DIAGNOSIS — K21.9 GASTROESOPHAGEAL REFLUX DISEASE WITHOUT ESOPHAGITIS: ICD-10-CM

## 2023-05-01 DIAGNOSIS — K57.30 COLON, DIVERTICULOSIS: ICD-10-CM

## 2023-05-01 DIAGNOSIS — I48.91 ATRIAL FIBRILLATION, UNSPECIFIED TYPE (HCC): ICD-10-CM

## 2023-05-01 DIAGNOSIS — K59.00 CONSTIPATION, UNSPECIFIED CONSTIPATION TYPE: Primary | ICD-10-CM

## 2023-05-01 DIAGNOSIS — Z80.0 FAMILY HISTORY OF COLON CANCER IN MOTHER: ICD-10-CM

## 2023-05-01 DIAGNOSIS — K64.9 HEMORRHOIDS, UNSPECIFIED HEMORRHOID TYPE: ICD-10-CM

## 2023-05-01 RX ORDER — HYDROCORTISONE 25 MG/G
CREAM TOPICAL 2 TIMES DAILY
Qty: 30 G | Refills: 2 | Status: SHIPPED | OUTPATIENT
Start: 2023-05-01

## 2023-05-01 NOTE — PROGRESS NOTES
[facility] - Outpatient Follow-up Note  Mikhail Iraheta  66 y o  male MRN: [id number]  Encounter: [id number]          ASSESSMENT AND PLAN:      1  Constipation, unspecified constipation type    2  Colon, diverticulosis    3  Family history of colon cancer in mother    3  Gastroesophageal reflux disease without esophagitis    5  BMI 32 0-32 9,adult    6  Atrial fibrillation, unspecified type ([facility] [address] )    7  Adenoma of transverse colon    8  Hemorrhoids, unspecified hemorrhoid type  -     hydrocortisone (ANUSOL-HC) 2 5 % rectal cream; Apply topically 2 (two) times a day  Patient has increasing constipation,'s hemorrhoidal symptoms, EGD colonoscopy couple years ago was unremarkable except for hemorrhoids diverticulosis and polyp  There is no apparent blood in stools and unremarkable examination  Advised patient to increase fiber intake, take MiraLAX to manage the bowels better and local cream   If symptoms persist then may need colonoscopy  Given his overall health and comorbid condition and his wife's illness, prefers a conservative  If no relief he will call to schedule further evaluation and examination  ______________________________________________________________________    SUBJECTIVE:     Patient came in for evaluation of history of constipation, sometimes does not move his bowels for couple days and hard stool, has to force and strain, denies any blood in stools melena hematochezia  Denies any chest pain shortness of breath fever chills rash, does have multiple medical problems and history of cardiovascular issues  Denies any dysphagia coughing choking spells does have some heartburn indigestion, history of A-fib on Eliquis  Diet medications more than 10 system reviewed, mother had colon cancer  REVIEW OF SYSTEMS IS OTHERWISE NEGATIVE        Historical Information   Past Medical History:   Diagnosis Date    Allergic rhinitis     Anemia 10/23/2008    last assessed 9/9/13  Anxiety     Atrial fibrillation (HCC)     Bleeding     Chronic kidney disease     COVID-19 virus infection 12/2022    CPAP (continuous positive airway pressure) dependence     Diabetes mellitus ([address] )     Diarrhea     Diverticulitis     Eating disorder     GERD (gastroesophageal reflux disease)     Herpes zoster with complication     last assessed 7/3/17     Hiatal hernia     Hyperlipidemia     Hypertension     IBS (irritable bowel syndrome)     Incisional hernia     Increased urinary frequency     Irregular heart beat     Pyogenic granuloma 09/13/2006    last assessed 9/13/06    Carlos (subconjunctival hemorrhage), unspecified laterality 09/12/2005    last assessed 9/12/05    Sleep apnea     TMJ (dislocation of temporomandibular joint)     Ventral hernia     last assessed  4/6/17     Wears glasses      Past Surgical History:   Procedure Laterality Date    APPENDECTOMY      ATRIAL ABLATION SURGERY      2014    ATRIAL ABLATION SURGERY      catheter ablation atrial fibrillation / last assessed 2/17/16     CHOLECYSTECTOMY      lap    COLON SURGERY  1990    Hartmans procedure    COLON SURGERY      reversal 6 weeks later    COLONOSCOPY N/A 1/26/2017    Procedure: COLONOSCOPY;  Surgeon: Raymon Castanon MD;  Location: [place] [address] GI LAB; Service:     ESOPHAGOGASTRODUODENOSCOPY N/A 1/26/2017    Procedure: ESOPHAGOGASTRODUODENOSCOPY (EGD); Surgeon: Raymon Castanon MD;  Location: [place] [address] GI LAB;   Service:     EYE SURGERY Bilateral 2015    lid repair    HERNIA REPAIR Bilateral     inguinal 2014 & 2013   [facility]  2011    INCISIONAL HERNIA REPAIR N/A 3/24/2017    Procedure: REPAIR OF INCARCERATED INCISIONAL HERNIA WITH MESH, Lysis of Adhesions, Partial Omentectomy;  Surgeon: Yoav Jones MD;  Location: [address] [place] [address];  Service:    Alec Knight / Metta Denver / Giselle May Left 10/04/2021    [id number]    NEUROBLASTOMA EXCISION      SKIN CANCER EXCISION      On nose    TONSILLECTOMY       Social History   Social History     Substance and Sexual Activity   Alcohol Use No    Comment: none in 33 yrs     Social History     Substance and Sexual Activity   Drug Use No     Social History     Tobacco Use   Smoking Status Former    Packs/day: 1 00    Years: 20 00    Pack years: 20 00    Types: Cigarettes    Quit date: 5    Years since quittin 3   Smokeless Tobacco Never   Tobacco Comments    Started age 16     Family History   Problem Relation Age of Onset    Heart disease Mother     Cancer Mother     Colon cancer Mother    Jackqueline Devoid Dementia Mother     Early death Father     Seizures Father    Jackqueline Devoid Cancer Sister         bone    Heart disease Sister     Diabetes Sister     Lupus Sister     Heart disease Brother     Cancer Brother         kidney    Diabetes Brother     Other Sister         covid    Stroke Neg Hx        Meds/Allergies       Current Outpatient Medications:     acetaminophen (TYLENOL) 325 mg tablet    amiodarone 200 mg tablet    amLODIPine (NORVASC) 5 mg tablet    apixaban (ELIQUIS) 5 mg    atorvastatin (LIPITOR) 20 mg tablet    Blood Glucose Monitoring Suppl (FREESTYLE LITE) GEOVANNY    butalbital-acetaminophen-caffeine (FIORICET,ESGIC) -40 mg per tablet    carvedilol (COREG) 25 mg tablet    esomeprazole (NexIUM) 20 mg capsule    fluticasone (FLONASE) 50 mcg/act nasal spray    FREESTYLE LITE test strip    hydrocortisone (ANUSOL-HC) 2 5 % rectal cream    hydrOXYzine pamoate (VISTARIL) 25 mg capsule    ketoconazole (NIZORAL) 2 % shampoo    Lancets (FREESTYLE) lancets    losartan (COZAAR) 100 MG tablet    metroNIDAZOLE (METROCREAM) 0 75 % cream    Ozempic, 0 25 or 0 5 MG/DOSE, 2 MG/1 5ML SOPN    pioglitazone-metFORMIN (ACTOPLUS MET)  MG per tablet    polyethylene glycol-propylene glycol (Systane) 0 4-0 3 %    RESTASIS 0 05 % ophthalmic emulsion    tamsulosin (FLOMAX) 0 4 mg    amoxicillin (AMOXIL) 500 mg capsule    dicyclomine "(BENTYL) 20 mg tablet    gabapentin (Neurontin) 300 mg capsule    minocycline (DYNACIN) 50 MG tablet    Pancrelipase, Lip-Prot-Amyl, (Creon) 3295-1024 units CPEP    Promethazine-DM (PHENERGAN-DM) 6 25-15 mg/5 mL oral syrup    Allergies   Allergen Reactions    Codeine Other (See Comments) and GI Intolerance     Reaction Date: 27Dec2004; Annotation - [id number]: '' CRAZY ''  vomiting  headache    Demerol [Meperidine] Nausea Only, Other (See Comments), GI Intolerance and Vomiting     vomiting  Reaction Date: 27Dec2004;   headache    Morphine Nausea Only, GI Intolerance and Vomiting     Reaction Date: 23Feb2012;            Objective     Blood pressure 168/82, pulse 65, height 5' 10\" (1 778 m), weight 102 kg (224 lb)  Body mass index is 32 14 kg/m²  PHYSICAL EXAM:      General Appearance:   Alert, cooperative, no distress   HEENT:   Normocephalic, atraumatic, anicteric  Neck:  Supple, symmetrical, trachea midline   Lungs:   Clear to auscultation bilaterally; no rales, rhonchi or wheezing; respirations unlabored    Heart[de-identified]   Regular rate and rhythm; no murmur  Abdomen:   Soft, non-tender, non-distended; normal bowel sounds; no masses, no organomegaly    Genitalia:   Deferred    Rectal:   Deferred    Extremities:  No cyanosis, clubbing or edema    Skin:  No jaundice, rashes, or lesions    Lymph nodes:  No palpable cervical lymphadenopathy        Lab Results:   No visits with results within 1 Day(s) from this visit  Latest known visit with results is:   Office Visit on 12/07/2022   Component Date Value    SARS-CoV-2 12/07/2022 Positive (A)     INFLUENZA A PCR 12/07/2022 Negative     INFLUENZA B PCR 12/07/2022 Negative          Radiology Results:   No results found    "

## 2023-05-10 ENCOUNTER — TELEPHONE (OUTPATIENT)
Dept: OTHER | Facility: OTHER | Age: 78
End: 2023-05-10

## 2023-05-10 ENCOUNTER — OFFICE VISIT (OUTPATIENT)
Dept: URGENT CARE | Facility: CLINIC | Age: 78
End: 2023-05-10

## 2023-05-10 VITALS
RESPIRATION RATE: 14 BRPM | HEART RATE: 63 BPM | BODY MASS INDEX: 31.85 KG/M2 | TEMPERATURE: 98.2 F | OXYGEN SATURATION: 99 % | WEIGHT: 222 LBS

## 2023-05-10 DIAGNOSIS — S31.139A PUNCTURE WOUND OF ABDOMEN, INITIAL ENCOUNTER: ICD-10-CM

## 2023-05-10 DIAGNOSIS — S30.861A TICK BITE OF ABDOMEN, INITIAL ENCOUNTER: Primary | ICD-10-CM
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
## 2023-05-10 RX ORDER — DOXYCYCLINE 100 MG/1
200 CAPSULE ORAL ONCE
Qty: 2 CAPSULE | Refills: 0
Start: 2023-05-10 | End: 2023-05-10

## 2023-05-10 RX ORDER — FLUOCINONIDE TOPICAL SOLUTION USP, 0.05% 0.5 MG/ML
SOLUTION TOPICAL
COMMUNITY
Start: 2023-04-22

## 2023-05-10 NOTE — TELEPHONE ENCOUNTER
Spoke with patient who has not been seen in almost a year, and previously Flomax was ordered by another urologist   Suggested he come in for evaluation  Patient agreed to schedule appt and is scheduled to see Maxwell Cyr @ [place] office on 5/15/23

## 2023-05-10 NOTE — TELEPHONE ENCOUNTER
"Patient states \"I am having prostates issues and I need a script for Flomax  \" Patient states he has taken it in the past and it was prescribed by a different provider  Patient is requesting a follow up from Dr Sergey Radford    "

## 2023-05-10 NOTE — PROGRESS NOTES
[facility]        NAME: Guanaco Cedeño  is a 66 y o  male  : 1945    MRN: [id number]  DATE: May 10, 2023  TIME: 7:57 PM    Assessment and Plan   Tick bite of abdomen, initial encounter [O27 242T, W57  XXXA]  1  Tick bite of abdomen, initial encounter  doxycycline monohydrate (MONODOX) 100 mg capsule      2  Puncture wound of abdomen, initial encounter  doxycycline monohydrate (MONODOX) 100 mg capsule        Recommend fu with pcp over next few weeks to make sure no Lyme  Discussed strict return to care precautions as well as red flag symptoms which should prompt immediate ED referral  Pt verbalized understanding and is in agreement with plan  Please follow up with your primary care provider within the next week  Please remember that your visit today was with an urgent care provider and should not replace follow up with your primary care provider for chronic medical issues or annual physicals  Patient Instructions       Follow up with PCP in 3-5 days  Proceed to  ER if symptoms worsen  Chief Complaint     Chief Complaint   Patient presents with   • Tick Bite     Pt presents with red swelling, tender to touch; pulled a tick off from right side torso two days ago         History of Present Illness       Pt is a 65 yo male with pmh DM2, a fib, CKD, IBS, HTN, HLD presenting with tick bite x 2 days  Noted 2 days ago and pulled it off immediately, thinks it was attached for between 12 and 24 hours  Pulled tick off in one piece  Denies fevers/chills, body or joint aches, rashes, n/v/d, cp, sob  Review of Systems   Review of Systems   Constitutional: Negative for chills, diaphoresis and fever  HENT: Negative for congestion, rhinorrhea and sore throat  Eyes: Negative for discharge and itching  Respiratory: Negative for cough, chest tightness, shortness of breath and wheezing  Cardiovascular: Negative for chest pain  Gastrointestinal: Negative for diarrhea, nausea and vomiting  Musculoskeletal: Negative for myalgias  Neurological: Negative for weakness and numbness  Current Medications       Current Outpatient Medications:   •  acetaminophen (TYLENOL) 325 mg tablet, Take 650 mg by mouth every 6 (six) hours as needed, Disp: , Rfl:   •  amiodarone 200 mg tablet, 200 mg daily after dinner , Disp: , Rfl:   •  amLODIPine (NORVASC) 5 mg tablet, , Disp: , Rfl:   •  apixaban (ELIQUIS) 5 mg, Take 5 mg by mouth 2 (two) times a day To stop 2 days prior to the procedure, Disp: , Rfl:   •  atorvastatin (LIPITOR) 20 mg tablet, TAKE 1 TABLET DAILY, Disp: 90 tablet, Rfl: 3  •  Blood Glucose Monitoring Suppl (FREESTYLE LITE) GEOVANNY, , Disp: , Rfl:   •  butalbital-acetaminophen-caffeine (FIORICET,ESGIC) -40 mg per tablet, MAY TAKE 1 TABLET BY MOUTH ONCE DAILY AS NEEDED   DO NOT EXCEED 3 TABLETS PER WEEK, Disp: 20 tablet, Rfl: 0  •  carvedilol (COREG) 25 mg tablet, Take 12 5 mg by mouth 2 (two) times a day , Disp: , Rfl:   •  doxycycline monohydrate (MONODOX) 100 mg capsule, Take 2 capsules (200 mg total) by mouth 1 (one) time for 1 dose, Disp: 2 capsule, Rfl: 0  •  esomeprazole (NexIUM) 20 mg capsule, TAKE ONE CAPSULE BY MOUTH EVERY DAY AT BEDTIME, Disp: 90 capsule, Rfl: 3  •  fluocinonide (LIDEX) 0 05 % external solution, , Disp: , Rfl:   •  fluticasone (FLONASE) 50 mcg/act nasal spray, 2 sprays into each nostril daily, Disp: 48 g, Rfl: 3  •  FREESTYLE LITE test strip, , Disp: , Rfl:   •  hydrocortisone (ANUSOL-HC) 2 5 % rectal cream, Apply topically 2 (two) times a day, Disp: 30 g, Rfl: 2  •  hydrOXYzine pamoate (VISTARIL) 25 mg capsule, Take 1 capsule (25 mg total) by mouth 3 (three) times a day as needed for anxiety, Disp: 60 capsule, Rfl: 3  •  ketoconazole (NIZORAL) 2 % shampoo, , Disp: , Rfl:   •  Lancets (FREESTYLE) lancets, , Disp: , Rfl:   •  losartan (COZAAR) 100 MG tablet, 100 mg every morning , Disp: , Rfl:   •  metroNIDAZOLE (METROCREAM) 0 75 % cream, , Disp: , Rfl:   • Ozempic, 0 25 or 0 5 MG/DOSE, 2 MG/1 5ML SOPN, , Disp: , Rfl:   •  pioglitazone-metFORMIN (ACTOPLUS MET)  MG per tablet, daily after dinner, Disp: , Rfl:   •  polyethylene glycol-propylene glycol (Systane) 0 4-0 3 %, every 3 (three) hours as needed  , Disp: , Rfl:   •  RESTASIS 0 05 % ophthalmic emulsion, Administer 1 drop to both eyes every 12 (twelve) hours , Disp: , Rfl:   •  tamsulosin (FLOMAX) 0 4 mg, , Disp: , Rfl:   •  amoxicillin (AMOXIL) 500 mg capsule, , Disp: , Rfl:   •  dicyclomine (BENTYL) 20 mg tablet, Take 1 tablet (20 mg total) by mouth every 6 (six) hours as needed (urgency, abdominal pain) (Patient not taking: Reported on 5/1/2023), Disp: 120 tablet, Rfl: 2  •  gabapentin (Neurontin) 300 mg capsule, Take 1 capsule (300 mg total) by mouth daily at bedtime (Patient not taking: Reported on 5/1/2023), Disp: 30 capsule, Rfl: 3  •  minocycline (DYNACIN) 50 MG tablet, , Disp: , Rfl:   •  Pancrelipase, Lip-Prot-Amyl, (Creon) 4308-0452 units CPEP, Take 1 capsule by mouth 3 (three) times a day with meals (Patient not taking: Reported on 5/1/2023), Disp: 90 capsule, Rfl: 5  •  Promethazine-DM (PHENERGAN-DM) 6 25-15 mg/5 mL oral syrup, Take 5 mL by mouth 4 (four) times a day as needed for cough (Patient not taking: Reported on 5/1/2023), Disp: 180 mL, Rfl: 0    Current Allergies     Allergies as of 05/10/2023 - Reviewed 05/10/2023   Allergen Reaction Noted   • Codeine Other (See Comments) and GI Intolerance 12/27/2004   • Demerol [meperidine] Nausea Only, Other (See Comments), GI Intolerance, and Vomiting 12/27/2004   • Morphine Nausea Only, GI Intolerance, and Vomiting 02/23/2012            The following portions of the patient's history were reviewed and updated as appropriate: allergies, current medications, past family history, past medical history, past social history, past surgical history and problem list      Past Medical History:   Diagnosis Date   • Allergic rhinitis    • Anemia 10/23/2008 last assessed 9/9/13    • Anxiety    • Atrial fibrillation (HCC)    • Bleeding    • Chronic kidney disease    • COVID-19 virus infection 12/2022   • CPAP (continuous positive airway pressure) dependence    • Diabetes mellitus ([facility] [address] )    • Diarrhea    • Diverticulitis    • Eating disorder    • GERD (gastroesophageal reflux disease)    • Herpes zoster with complication     last assessed 7/3/17    • Hiatal hernia    • Hyperlipidemia    • Hypertension    • IBS (irritable bowel syndrome)    • Incisional hernia    • Increased urinary frequency    • Irregular heart beat    • Pyogenic granuloma 09/13/2006    last assessed 9/13/06   • Carlos (subconjunctival hemorrhage), unspecified laterality 09/12/2005    last assessed 9/12/05   • Sleep apnea    • TMJ (dislocation of temporomandibular joint)    • Ventral hernia     last assessed  4/6/17    • Wears glasses        Past Surgical History:   Procedure Laterality Date   • APPENDECTOMY     • ATRIAL ABLATION SURGERY      2014   • ATRIAL ABLATION SURGERY      catheter ablation atrial fibrillation / last assessed 2/17/16    • CHOLECYSTECTOMY      lap   • COLON SURGERY  1990    Hartmans procedure   • COLON SURGERY      reversal 6 weeks later   • COLONOSCOPY N/A 1/26/2017    Procedure: COLONOSCOPY;  Surgeon: Paxton Carreon MD;  Location: [place] [address] GI LAB; Service:    • ESOPHAGOGASTRODUODENOSCOPY N/A 1/26/2017    Procedure: ESOPHAGOGASTRODUODENOSCOPY (EGD); Surgeon: Paxton Carreon MD;  Location: [place] [address] GI LAB;   Service:    • EYE SURGERY Bilateral 2015    lid repair   • HERNIA REPAIR Bilateral     inguinal 2014 & 2013   • [address]  2011   • INCISIONAL HERNIA REPAIR N/A 3/24/2017    Procedure: REPAIR OF INCARCERATED INCISIONAL HERNIA WITH MESH, Lysis of Adhesions, Partial Omentectomy;  Surgeon: Ryan Hess MD;  Location: [address] [place] [address];  Service:    • Fazal Melendez / Jacqui Richardson / Fitz Dopp Left 10/04/2021    medtronic-[id number]   • NEUROBLASTOMA EXCISION     • SKIN CANCER "EXCISION      On nose   • TONSILLECTOMY         Family History   Problem Relation Age of Onset   • Heart disease Mother    • Cancer Mother    • Colon cancer Mother    • Dementia Mother    • Early death Father    • Seizures Father    • Cancer Sister         bone   • Heart disease Sister    • Diabetes Sister    • Lupus Sister    • Heart disease Brother    • Cancer Brother         kidney   • Diabetes Brother    • Other Sister         covid   • Stroke Neg Hx          Medications have been verified  Objective   Pulse 63   Temp 98 2 °F (36 8 °C)   Resp 14   Wt 101 kg (222 lb)   SpO2 99%   BMI 31 85 kg/m²        Physical Exam     Physical Exam  Vitals and nursing note reviewed  Constitutional:       General: He is not in acute distress  Appearance: Normal appearance  He is not ill-appearing  HENT:      Head: Normocephalic and atraumatic  Cardiovascular:      Rate and Rhythm: Normal rate  Pulmonary:      Effort: Pulmonary effort is normal  No respiratory distress  Skin:     General: Skin is warm and dry  Capillary Refill: Capillary refill takes less than 2 seconds  Findings: Lesion (scabbed lesion with surrounding erythema noted to R anterior abdomen; no \"bullseye\" central clearing; no signs of infection) present  Neurological:      Mental Status: He is alert and oriented to person, place, and time     Psychiatric:         Behavior: Behavior normal                    "

## 2023-05-12 ENCOUNTER — TELEPHONE (OUTPATIENT)
Dept: FAMILY MEDICINE CLINIC | Facility: CLINIC | Age: 78
End: 2023-05-12

## 2023-05-12 ENCOUNTER — OFFICE VISIT (OUTPATIENT)
Dept: URGENT CARE | Facility: CLINIC | Age: 78
End: 2023-05-12

## 2023-05-12 VITALS
HEIGHT: 70 IN | OXYGEN SATURATION: 99 % | WEIGHT: 222 LBS | HEART RATE: 67 BPM | TEMPERATURE: 98.4 F | SYSTOLIC BLOOD PRESSURE: 155 MMHG | DIASTOLIC BLOOD PRESSURE: 70 MMHG | BODY MASS INDEX: 31.78 KG/M2 | RESPIRATION RATE: 20 BRPM
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
## 2023-05-12 DIAGNOSIS — S30.861A TICK BITE OF ABDOMINAL WALL, INITIAL ENCOUNTER: Primary | ICD-10-CM

## 2023-05-12 RX ORDER — DOXYCYCLINE 100 MG/1
100 CAPSULE ORAL 2 TIMES DAILY
Qty: 14 CAPSULE | Refills: 0 | Status: SHIPPED | OUTPATIENT
Start: 2023-05-12 | End: 2023-05-19

## 2023-05-12 NOTE — TELEPHONE ENCOUNTER
Patient calling to make an apt with Dr Julia Aponte for Monday  Arm swelling (not bad per patient) and tick bite  I stated he should probably be seen sooner than Monday but he said its not bad  Dr Julia Aponte does not have apts for awhile and offered another provider, patient declined  He stated he has been a patient of Dr Raciel Pascual for 20 years and wants to know if she can overbook him? I stated we would call Monday morning

## 2023-05-12 NOTE — PATIENT INSTRUCTIONS
Will treat multiple tick bites and surrounding erythema with course of doxycycline  List order for Lyme disease testing to be performed in the next few days  Encourage follow-up with PCP for further evaluation and management  All patient's questions answered and is agreeable with this plan

## 2023-05-13 ENCOUNTER — OFFICE VISIT (OUTPATIENT)
Dept: FAMILY MEDICINE CLINIC | Facility: CLINIC | Age: 78
End: 2023-05-13

## 2023-05-13 VITALS
OXYGEN SATURATION: 99 % | DIASTOLIC BLOOD PRESSURE: 64 MMHG | TEMPERATURE: 96 F | BODY MASS INDEX: 31.5 KG/M2 | HEIGHT: 70 IN | WEIGHT: 220 LBS | SYSTOLIC BLOOD PRESSURE: 132 MMHG | HEART RATE: 73 BPM | RESPIRATION RATE: 16 BRPM

## 2023-05-13 DIAGNOSIS — R60.0 LOCALIZED EDEMA: ICD-10-CM

## 2023-05-13 DIAGNOSIS — M79.89 LEFT ARM SWELLING: Primary | ICD-10-CM

## 2023-05-13 DIAGNOSIS — I48.91 ATRIAL FIBRILLATION, UNSPECIFIED TYPE (HCC): ICD-10-CM

## 2023-05-13 DIAGNOSIS — N18.30 TYPE 2 DIABETES MELLITUS WITH STAGE 3 CHRONIC KIDNEY DISEASE, WITHOUT LONG-TERM CURRENT USE OF INSULIN, UNSPECIFIED WHETHER STAGE 3A OR 3B CKD (HCC): ICD-10-CM

## 2023-05-13 DIAGNOSIS — E11.22 TYPE 2 DIABETES MELLITUS WITH STAGE 3 CHRONIC KIDNEY DISEASE, WITHOUT LONG-TERM CURRENT USE OF INSULIN, UNSPECIFIED WHETHER STAGE 3A OR 3B CKD (HCC): ICD-10-CM

## 2023-05-13 NOTE — PROGRESS NOTES
[facility]        NAME: Derek Chacon  is a 66 y o  male  : 1945    MRN: [id number]  DATE: May 12, 2023  TIME: 8:00 PM    Assessment and Plan   Tick bite of abdominal wall, initial encounter [S30 861A, W57  XXXA]  1  Tick bite of abdominal wall, initial encounter  doxycycline monohydrate (MONODOX) 100 mg capsule    LYME WESTERN BLOT, CSF            Patient Instructions     Patient Instructions   Will treat multiple tick bites and surrounding erythema with course of doxycycline  List order for Lyme disease testing to be performed in the next few days  Encourage follow-up with PCP for further evaluation and management  All patient's questions answered and is agreeable with this plan  Follow up with PCP in 3-5 days  Proceed to  ER if symptoms worsen  Chief Complaint     Chief Complaint   Patient presents with   • Tick Removal         History of Present Illness       Patient is a 60-year-old male presenting today with tick bite of abdomen x1 day  Patient notes he was seen and evaluated here 2 days ago on 5/10/2023 for tick bite of his abdomen, notes he had another bite around the same area that he removed today, also notes that initial bite now has significant surrounding redness and warmth and states he has felt chills and achy for the last day or so, is expressing concern of possible Lyme disease  Denies fever, N/V/D, abdominal pain, dizziness, altered mental status  Review of Systems   Review of Systems   Constitutional: Positive for chills  Negative for fever  HENT: Negative for ear pain and sore throat  Eyes: Negative for pain and visual disturbance  Respiratory: Negative for cough and shortness of breath  Cardiovascular: Negative for chest pain and palpitations  Gastrointestinal: Negative for abdominal pain and vomiting  Genitourinary: Negative for dysuria and hematuria  Musculoskeletal: Positive for arthralgias  Negative for back pain     Skin: Positive for rash  Negative for color change  Neurological: Negative for seizures and syncope  All other systems reviewed and are negative  Current Medications       Current Outpatient Medications:   •  doxycycline monohydrate (MONODOX) 100 mg capsule, Take 1 capsule (100 mg total) by mouth 2 (two) times a day for 7 days, Disp: 14 capsule, Rfl: 0  •  acetaminophen (TYLENOL) 325 mg tablet, Take 650 mg by mouth every 6 (six) hours as needed, Disp: , Rfl:   •  amiodarone 200 mg tablet, 200 mg daily after dinner , Disp: , Rfl:   •  amLODIPine (NORVASC) 5 mg tablet, , Disp: , Rfl:   •  amoxicillin (AMOXIL) 500 mg capsule, , Disp: , Rfl:   •  apixaban (ELIQUIS) 5 mg, Take 5 mg by mouth 2 (two) times a day To stop 2 days prior to the procedure, Disp: , Rfl:   •  atorvastatin (LIPITOR) 20 mg tablet, TAKE 1 TABLET DAILY, Disp: 90 tablet, Rfl: 3  •  Blood Glucose Monitoring Suppl (FREESTYLE LITE) GEOVANNY, , Disp: , Rfl:   •  butalbital-acetaminophen-caffeine (FIORICET,ESGIC) -40 mg per tablet, MAY TAKE 1 TABLET BY MOUTH ONCE DAILY AS NEEDED   DO NOT EXCEED 3 TABLETS PER WEEK, Disp: 20 tablet, Rfl: 0  •  carvedilol (COREG) 25 mg tablet, Take 12 5 mg by mouth 2 (two) times a day , Disp: , Rfl:   •  dicyclomine (BENTYL) 20 mg tablet, Take 1 tablet (20 mg total) by mouth every 6 (six) hours as needed (urgency, abdominal pain) (Patient not taking: Reported on 5/1/2023), Disp: 120 tablet, Rfl: 2  •  esomeprazole (NexIUM) 20 mg capsule, TAKE ONE CAPSULE BY MOUTH EVERY DAY AT BEDTIME, Disp: 90 capsule, Rfl: 3  •  fluocinonide (LIDEX) 0 05 % external solution, , Disp: , Rfl:   •  fluticasone (FLONASE) 50 mcg/act nasal spray, 2 sprays into each nostril daily, Disp: 48 g, Rfl: 3  •  FREESTYLE LITE test strip, , Disp: , Rfl:   •  gabapentin (Neurontin) 300 mg capsule, Take 1 capsule (300 mg total) by mouth daily at bedtime (Patient not taking: Reported on 5/1/2023), Disp: 30 capsule, Rfl: 3  •  hydrocortisone (ANUSOL-HC) 2 5 % rectal cream, Apply topically 2 (two) times a day, Disp: 30 g, Rfl: 2  •  hydrOXYzine pamoate (VISTARIL) 25 mg capsule, Take 1 capsule (25 mg total) by mouth 3 (three) times a day as needed for anxiety, Disp: 60 capsule, Rfl: 3  •  ketoconazole (NIZORAL) 2 % shampoo, , Disp: , Rfl:   •  Lancets (FREESTYLE) lancets, , Disp: , Rfl:   •  losartan (COZAAR) 100 MG tablet, 100 mg every morning , Disp: , Rfl:   •  metroNIDAZOLE (METROCREAM) 0 75 % cream, , Disp: , Rfl:   •  minocycline (DYNACIN) 50 MG tablet, , Disp: , Rfl:   •  Ozempic, 0 25 or 0 5 MG/DOSE, 2 MG/1 5ML SOPN, , Disp: , Rfl:   •  Pancrelipase, Lip-Prot-Amyl, (Creon) 5803-6302 units CPEP, Take 1 capsule by mouth 3 (three) times a day with meals (Patient not taking: Reported on 5/1/2023), Disp: 90 capsule, Rfl: 5  •  pioglitazone-metFORMIN (ACTOPLUS MET)  MG per tablet, daily after dinner, Disp: , Rfl:   •  polyethylene glycol-propylene glycol (Systane) 0 4-0 3 %, every 3 (three) hours as needed  , Disp: , Rfl:   •  Promethazine-DM (PHENERGAN-DM) 6 25-15 mg/5 mL oral syrup, Take 5 mL by mouth 4 (four) times a day as needed for cough (Patient not taking: Reported on 5/1/2023), Disp: 180 mL, Rfl: 0  •  RESTASIS 0 05 % ophthalmic emulsion, Administer 1 drop to both eyes every 12 (twelve) hours , Disp: , Rfl:   •  tamsulosin (FLOMAX) 0 4 mg, , Disp: , Rfl:     Current Allergies     Allergies as of 05/12/2023 - Reviewed 05/10/2023   Allergen Reaction Noted   • Codeine Other (See Comments) and GI Intolerance 12/27/2004   • Demerol [meperidine] Nausea Only, Other (See Comments), GI Intolerance, and Vomiting 12/27/2004   • Morphine Nausea Only, GI Intolerance, and Vomiting 02/23/2012            The following portions of the patient's history were reviewed and updated as appropriate: allergies, current medications, past family history, past medical history, past social history, past surgical history and problem list      Past Medical History:   Diagnosis Date   • Allergic rhinitis    • Anemia 10/23/2008    last assessed 9/9/13    • Anxiety    • Atrial fibrillation (HCC)    • Bleeding    • Chronic kidney disease    • COVID-19 virus infection 12/2022   • CPAP (continuous positive airway pressure) dependence    • [facility]    • Diarrhea    • Diverticulitis    • Eating disorder    • GERD (gastroesophageal reflux disease)    • Herpes zoster with complication     last assessed 7/3/17    • Hiatal hernia    • Hyperlipidemia    • Hypertension    • IBS (irritable bowel syndrome)    • Incisional hernia    • Increased urinary frequency    • Irregular heart beat    • Pyogenic granuloma 09/13/2006    last assessed 9/13/06   • Carlos (subconjunctival hemorrhage), unspecified laterality 09/12/2005    last assessed 9/12/05   • Sleep apnea    • TMJ (dislocation of temporomandibular joint)    • Ventral hernia     last assessed  4/6/17    • Wears glasses        Past Surgical History:   Procedure Laterality Date   • APPENDECTOMY     • ATRIAL ABLATION SURGERY      2014   • ATRIAL ABLATION SURGERY      catheter ablation atrial fibrillation / last assessed 2/17/16    • CHOLECYSTECTOMY      lap   • COLON SURGERY  1990    Hartmans procedure   • COLON SURGERY      reversal 6 weeks later   • COLONOSCOPY N/A 1/26/2017    Procedure: COLONOSCOPY;  Surgeon: Jones Swanson MD;  Location: [facility] GI LAB; Service:    • ESOPHAGOGASTRODUODENOSCOPY N/A 1/26/2017    Procedure: ESOPHAGOGASTRODUODENOSCOPY (EGD); Surgeon: Jones Swanson MD;  Location: [facility] GI LAB;   Service:    • EYE SURGERY Bilateral 2015    lid repair   • HERNIA REPAIR Bilateral     inguinal 2014 & 2013   • [address]  2011   • INCISIONAL HERNIA REPAIR N/A 3/24/2017    Procedure: REPAIR OF INCARCERATED INCISIONAL HERNIA WITH MESH, Lysis of Adhesions, Partial Omentectomy;  Surgeon: Ryan Fowler MD;  Location: [facility];  Service:    • Jono Wolf / Samina Mensah / Kiah Sextone Left 10/04/2021    medtronic-[id number]   • "NEUROBLASTOMA EXCISION     • SKIN CANCER EXCISION      On nose   • TONSILLECTOMY         Family History   Problem Relation Age of Onset   • Heart disease Mother    • Cancer Mother    • Colon cancer Mother    • Dementia Mother    • Early death Father    • Seizures Father    • Cancer Sister         bone   • Heart disease Sister    • Diabetes Sister    • Lupus Sister    • Heart disease Brother    • Cancer Brother         kidney   • Diabetes Brother    • Other Sister         covid   • Stroke Neg Hx          Medications have been verified  Objective   /70   Pulse 67   Temp 98 4 °F (36 9 °C)   Resp 20   Ht 5' 10\" (1 778 m)   Wt 101 kg (222 lb)   SpO2 99%   BMI 31 85 kg/m²        Physical Exam     Physical Exam  Vitals and nursing note reviewed  Constitutional:       General: He is not in acute distress  Appearance: Normal appearance  HENT:      Head: Normocephalic  Cardiovascular:      Rate and Rhythm: Normal rate and regular rhythm  Pulses: Normal pulses  Heart sounds: Normal heart sounds  Pulmonary:      Effort: Pulmonary effort is normal       Breath sounds: Normal breath sounds  Skin:     General: Skin is warm  Capillary Refill: Capillary refill takes less than 2 seconds  Comments: Two red raised areas of right side of abdomen consistent with tick bite, no presence of foreign body or tick present and bites, one of the bites has approximately 3 to 4 cm circumferential surrounding erythema, no discharge or drainage, no streaking   Neurological:      Mental Status: He is alert                     "

## 2023-05-13 NOTE — PROGRESS NOTES
"Assessment/Plan:    1  Left arm swelling  -     VAS upper limb venous duplex scan, unilateral/limited; Future; Expected date: 05/13/2023    2  Type 2 diabetes mellitus with stage 3 chronic kidney disease, without long-term current use of insulin, unspecified whether stage 3a or 3b CKD ([facility] [address] )  Assessment & Plan:  Managed by Endocronologist  Lab Results   Component Value Date    HGBA1C 5 9 (H) 07/20/2022         3  Localized edema  -     VAS upper limb venous duplex scan, unilateral/limited; Future; Expected date: 05/13/2023    4  Atrial fibrillation, unspecified type [facility])  Assessment & Plan:  Managed by cardiologist and on eliquis          Patient Instructions:  Supportive care discussed and advised  Advised to RTO for any worsening and no improvement  Follow up for no improvement and worsening of conditions  Patient advised and educated when to see immediate medical care  Return if symptoms worsen or fail to improve  Future Appointments   Date Time Provider [place] [facility]   5/15/2023  1:30 PM Wilner Obando PA-C [facility]   9/18/2023 10:30 AM Karyle Silvas, MD [facility]           Subjective:      Patient ID: Alan Blood  is a 66 y o  male  Chief Complaint   Patient presents with   • Tick Bite     Juliette Egan MA    • left arm swelling         Vitals:  /64   Pulse 73   Temp (!) 96 °F (35 6 °C)   Resp 16   Ht 5' 10\" (1 778 m)   Wt 99 8 kg (220 lb)   SpO2 99%   BMI 31 57 kg/m²     HPI  Patient stated that noticed swelling in left from couple of weeks  Denies any pain and movement issues  Denies any injury   Patient is on eliquis for Afib  Patient removed tick on 5/9/2023 and went to urgent care on 5/10/2023 and took doxycycline 200 mg total and then had another tick bite close to same area on right side of abdomen on 5/11/2023 and took 200 mg of doxy again on 5/12/2023 and will be starting doxy 100 mg BID twice a day for 7 days as per urgent " care   Complaint with medications for chronic illnesses and tolerating it well  Denies chest pain, sob, headache and dizziness          PHQ-2/9 Depression Screening    Little interest or pleasure in doing things: 0 - not at all  Feeling down, depressed, or hopeless: 0 - not at all  PHQ-2 Score: 0  PHQ-2 Interpretation: Negative depression screen             The following portions of the patient's history were reviewed and updated as appropriate: allergies, current medications, past family history, past medical history, past social history, past surgical history and problem list       Review of Systems   HENT: Negative  Respiratory: Negative  Cardiovascular: Negative for chest pain, palpitations and leg swelling  Gastrointestinal: Negative  Skin:        As noted in HPI     Neurological: Negative  Psychiatric/Behavioral: Negative  Objective:    Social History     Tobacco Use   Smoking Status Former   • Packs/day:  00   • Years:    • Pack years:    • Types: Cigarettes   • Quit date: 5   • Years since quittin 3   Smokeless Tobacco Never   Tobacco Comments    Started age 16       Allergies: Allergies   Allergen Reactions   • Codeine Other (See Comments) and GI Intolerance     Reaction Date: 2004;  Annotation - [id number]: '' CRAZY ''  vomiting  headache   • Demerol [Meperidine] Nausea Only, Other (See Comments), GI Intolerance and Vomiting     vomiting  Reaction Date: 2004;   headache   • Morphine Nausea Only, GI Intolerance and Vomiting     Reaction Date: 2012;          Current Outpatient Medications   Medication Sig Dispense Refill   • acetaminophen (TYLENOL) 325 mg tablet Take 650 mg by mouth every 6 (six) hours as needed     • amiodarone 200 mg tablet 200 mg daily after dinner      • amLODIPine (NORVASC) 5 mg tablet      • apixaban (ELIQUIS) 5 mg Take 5 mg by mouth 2 (two) times a day To stop 2 days prior to the procedure     • atorvastatin (LIPITOR) 20 mg tablet TAKE 1 TABLET DAILY 90 tablet 3   • Blood Glucose Monitoring Suppl (FREESTYLE LITE) GEOVANNY      • butalbital-acetaminophen-caffeine (FIORICET,ESGIC) -40 mg per tablet MAY TAKE 1 TABLET BY MOUTH ONCE DAILY AS NEEDED   DO NOT EXCEED 3 TABLETS PER WEEK 20 tablet 0   • carvedilol (COREG) 25 mg tablet Take 12 5 mg by mouth 2 (two) times a day      • doxycycline monohydrate (MONODOX) 100 mg capsule Take 1 capsule (100 mg total) by mouth 2 (two) times a day for 7 days 14 capsule 0   • esomeprazole (NexIUM) 20 mg capsule TAKE ONE CAPSULE BY MOUTH EVERY DAY AT BEDTIME 90 capsule 3   • fluocinonide (LIDEX) 0 05 % external solution      • fluticasone (FLONASE) 50 mcg/act nasal spray 2 sprays into each nostril daily 48 g 3   • FREESTYLE LITE test strip      • hydrocortisone (ANUSOL-HC) 2 5 % rectal cream Apply topically 2 (two) times a day 30 g 2   • hydrOXYzine pamoate (VISTARIL) 25 mg capsule Take 1 capsule (25 mg total) by mouth 3 (three) times a day as needed for anxiety 60 capsule 3   • ketoconazole (NIZORAL) 2 % shampoo      • Lancets (FREESTYLE) lancets      • losartan (COZAAR) 100 MG tablet 100 mg every morning      • metroNIDAZOLE (METROCREAM) 0 75 % cream      • Ozempic, 0 25 or 0 5 MG/DOSE, 2 MG/1 5ML SOPN      • pioglitazone-metFORMIN (ACTOPLUS MET)  MG per tablet daily after dinner     • polyethylene glycol-propylene glycol (Systane) 0 4-0 3 % every 3 (three) hours as needed       • RESTASIS 0 05 % ophthalmic emulsion Administer 1 drop to both eyes every 12 (twelve) hours      • tamsulosin (FLOMAX) 0 4 mg      • amoxicillin (AMOXIL) 500 mg capsule  (Patient not taking: Reported on 5/1/2023)     • dicyclomine (BENTYL) 20 mg tablet Take 1 tablet (20 mg total) by mouth every 6 (six) hours as needed (urgency, abdominal pain) (Patient not taking: Reported on 5/1/2023) 120 tablet 2   • gabapentin (Neurontin) 300 mg capsule Take 1 capsule (300 mg total) by mouth daily at bedtime (Patient not taking: Reported on 5/1/2023) 30 capsule 3   • minocycline (DYNACIN) 50 MG tablet  (Patient not taking: Reported on 3/15/2023)     • Pancrelipase, Lip-Prot-Amyl, (Creon) 6559-2072 units CPEP Take 1 capsule by mouth 3 (three) times a day with meals (Patient not taking: Reported on 5/1/2023) 90 capsule 5   • Promethazine-DM (PHENERGAN-DM) 6 25-15 mg/5 mL oral syrup Take 5 mL by mouth 4 (four) times a day as needed for cough (Patient not taking: Reported on 5/1/2023) 180 mL 0     No current facility-administered medications for this visit  Physical Exam  Constitutional:       Appearance: Normal appearance  HENT:      Head: Normocephalic  Nose: Nose normal    Eyes:      Conjunctiva/sclera: Conjunctivae normal    Cardiovascular:      Rate and Rhythm: Normal rate and regular rhythm  Heart sounds: Normal heart sounds  Pulmonary:      Effort: Pulmonary effort is normal       Breath sounds: Normal breath sounds  Musculoskeletal:         General: Swelling (left whole arm +1 edema noted and no erythema and tenderness noted  FROM of arm and shoulder noted  on left upper chest wall area s/p pacemaker area no swelling and subcutaneous emphysema noted) present  No tenderness  Normal range of motion  Skin:     General: Skin is warm and dry  Findings: Rash present  Neurological:      Mental Status: He is alert and oriented to person, place, and time  Psychiatric:         Mood and Affect: Mood normal          Behavior: Behavior normal          Thought Content:  Thought content normal          Judgment: Judgment normal                      SANDRITA Dunn

## 2023-05-15 ENCOUNTER — OFFICE VISIT (OUTPATIENT)
Dept: UROLOGY | Facility: CLINIC | Age: 78
End: 2023-05-15

## 2023-05-15 ENCOUNTER — APPOINTMENT (OUTPATIENT)
Dept: LAB | Facility: CLINIC | Age: 78
End: 2023-05-15

## 2023-05-15 VITALS
WEIGHT: 220 LBS | OXYGEN SATURATION: 98 % | HEIGHT: 71 IN | DIASTOLIC BLOOD PRESSURE: 66 MMHG | HEART RATE: 65 BPM | SYSTOLIC BLOOD PRESSURE: 134 MMHG | BODY MASS INDEX: 30.8 KG/M2

## 2023-05-15 DIAGNOSIS — Z12.5 PROSTATE CANCER SCREENING: ICD-10-CM

## 2023-05-15 DIAGNOSIS — S30.861A TICK BITE OF ABDOMINAL WALL, INITIAL ENCOUNTER: ICD-10-CM
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
## 2023-05-15 DIAGNOSIS — N40.1 BENIGN LOCALIZED PROSTATIC HYPERPLASIA WITH LOWER URINARY TRACT SYMPTOMS (LUTS): Primary | ICD-10-CM

## 2023-05-15 LAB
BACTERIA UR QL AUTO: ABNORMAL /HPF
BILIRUB UR QL STRIP: NEGATIVE
CLARITY UR: CLEAR
COLOR UR: ABNORMAL
GLUCOSE UR STRIP-MCNC: NEGATIVE MG/DL
HGB UR QL STRIP.AUTO: NEGATIVE
KETONES UR STRIP-MCNC: NEGATIVE MG/DL
LEUKOCYTE ESTERASE UR QL STRIP: ABNORMAL
NITRITE UR QL STRIP: NEGATIVE
NON-SQ EPI CELLS URNS QL MICRO: ABNORMAL /HPF
PH UR STRIP.AUTO: 6.5 [PH]
POST-VOID RESIDUAL VOLUME, ML POC: 163 ML
PROT UR STRIP-MCNC: NEGATIVE MG/DL
RBC #/AREA URNS AUTO: ABNORMAL /HPF
SL AMB  POCT GLUCOSE, UA: ABNORMAL
SL AMB LEUKOCYTE ESTERASE,UA: ABNORMAL
SL AMB POCT BILIRUBIN,UA: ABNORMAL
SL AMB POCT BLOOD,UA: ABNORMAL
SL AMB POCT CLARITY,UA: CLEAR
SL AMB POCT COLOR,UA: YELLOW
SL AMB POCT KETONES,UA: ABNORMAL
SL AMB POCT NITRITE,UA: ABNORMAL
SL AMB POCT PH,UA: 6
SL AMB POCT SPECIFIC GRAVITY,UA: 1.01
SL AMB POCT URINE PROTEIN: ABNORMAL
SL AMB POCT UROBILINOGEN: 0.2
SP GR UR STRIP.AUTO: 1.01 (ref 1–1.03)
UROBILINOGEN UR STRIP-ACNC: <2 MG/DL
WBC #/AREA URNS AUTO: ABNORMAL /HPF

## 2023-05-15 RX ORDER — TAMSULOSIN HYDROCHLORIDE 0.4 MG/1
0.4 CAPSULE ORAL
Qty: 30 CAPSULE | Refills: 0 | Status: SHIPPED | OUTPATIENT
Start: 2023-05-15 | End: 2023-06-01

## 2023-05-15 RX ORDER — TAMSULOSIN HYDROCHLORIDE 0.4 MG/1
0.4 CAPSULE ORAL
Qty: 90 CAPSULE | Refills: 3 | Status: SHIPPED | OUTPATIENT
Start: 2023-05-15

## 2023-05-15 NOTE — PROGRESS NOTES
Office Visit- Urology  Paulie Ingram 1945 MRN: [id number]      Assessment/Discussion/Plan    66 y o  male managed by     1  Complex renal cyst  Patient with Bosniak 3 cyst off the right lower pole that has decreased in size therefore warrants observation  Plan for repeat imaging already in place   -Patient is overdue for imaging -had  staff schedule patient for ultrasound already scheduled  -Follow-up in office afterwards with MD Kurtz  BPH with incomplete bladder emptying  - mL today  -Continue with Flomax  -Patient was on finasteride in the past but did not find it beneficial for his urinary symptoms  He does not want to restart at this time despite potential for benefit with PVR  -Hx of elevated PSA - likely reflective of BPH  Has not had PSA in 2 years -will get another reading to ensure not significantly elevated  -Patient has had previous discussions with Dr Mehdi Bustos and Dr Mirna Arriola for bladder outlet obstruction surgery  Reviewed options with patient again  -Patient to have discussion with Dr Mirna Arriola at next available regarding possible surgical intervention follow-up BPH with incomplete bladder emptying        Chief Complaint:   Liane Peoples is a 66 y o  male presenting to the office for a follow up visit regarding  Complex renal cyst/BPH with incomplete bladder emptying        Subjective    -80-year-old male  -Urologic history of BPH with incomplete bladder emptying, Bosniak 3 cyst -cyst has been decreasing in size  -Was seen in consultation by both Dr Mehdi Bustos and Dr Mirna Arriola  Patient was last seen by Dr Mirna Arriola in June 2022 for in-depth discussion about potential for HoLEP procedure    At the end of that visit patient decided he wanted to think about his options    -Patient presents the office today for refill of Flomax  -Patient experiencing nocturia, intermittency, weak stream  Patient denies flank pain, gross hematuria, dysuria          AUA SYMPTOM SCORE    Flowsheet Row Most Recent Value AUA SYMPTOM SCORE    How often have you had a sensation of not emptying your bladder completely after you finished urinating? 3   How often have you had to urinate again less than two hours after you finished urinating? 4   How often have you found you stopped and started again several times when you urinate? 2   How often have you found it difficult to postpone urination? 1   How often have you had a weak urinary stream? 4   How often have you had to push or strain to begin urination? 5   How many times did you most typically get up to urinate from the time you went to bed at night until the time you got up in the morning? 3   Quality of Life: If you were to spend the rest of your life with your urinary condition just the way it is now, how would you feel about that? 4   AUA SYMPTOM SCORE 22          ROS:   Review of Systems   Constitutional: Negative  Negative for chills, fatigue and fever  HENT: Negative  Eyes: Negative  Respiratory: Negative  Negative for shortness of breath  Cardiovascular: Negative  Negative for chest pain  Endocrine: Negative  Allergic/Immunologic: Negative  Neurological: Negative  Psychiatric/Behavioral: Negative            Past Medical History  Past Medical History:   Diagnosis Date   • Allergic rhinitis    • Anemia 10/23/2008    last assessed 9/9/13    • Anxiety    • Atrial fibrillation (HCC)    • Bleeding    • Chronic kidney disease    • COVID-19 virus infection 12/2022   • CPAP (continuous positive airway pressure) dependence    • [facility]    • Diarrhea    • Diverticulitis    • Eating disorder    • GERD (gastroesophageal reflux disease)    • Herpes zoster with complication     last assessed 7/3/17    • Hiatal hernia    • Hyperlipidemia    • Hypertension    • IBS (irritable bowel syndrome)    • Incisional hernia    • Increased urinary frequency    • Irregular heart beat    • Pyogenic granuloma 09/13/2006    last assessed 9/13/06   • Carlos (subconjunctival hemorrhage), unspecified laterality 09/12/2005    last assessed 9/12/05   • Sleep apnea    • TMJ (dislocation of temporomandibular joint)    • Ventral hernia     last assessed  4/6/17    • Wears glasses        Past Surgical History  Past Surgical History:   Procedure Laterality Date   • APPENDECTOMY     • ATRIAL ABLATION SURGERY      2014   • ATRIAL ABLATION SURGERY      catheter ablation atrial fibrillation / last assessed 2/17/16    • CHOLECYSTECTOMY      lap   • COLON SURGERY  1990    Hartmans procedure   • COLON SURGERY      reversal 6 weeks later   • COLONOSCOPY N/A 1/26/2017    Procedure: COLONOSCOPY;  Surgeon: Oumar Sun MD;  Location: [place] [address] [facility]; Service:    • ESOPHAGOGASTRODUODENOSCOPY N/A 1/26/2017    Procedure: ESOPHAGOGASTRODUODENOSCOPY (EGD); Surgeon: Oumar Sun MD;  Location: [place] [address] [facility];   Service:    • EYE SURGERY Bilateral 2015    lid repair   • HERNIA REPAIR Bilateral     inguinal 2014 & 2013   • [address]  2011   • INCISIONAL HERNIA REPAIR N/A 3/24/2017    Procedure: REPAIR OF INCARCERATED INCISIONAL HERNIA WITH MESH, Lysis of Adhesions, Partial Omentectomy;  Surgeon: Colt Frazier MD;  Location: [address] [place] [address];  Service:    • Hao Caller / Rl Hassan / Kika Rodriguez Left 10/04/2021    [id number]   • NEUROBLASTOMA EXCISION     • SKIN CANCER EXCISION      On nose   • TONSILLECTOMY         Past Family History  Family History   Problem Relation Age of Onset   • Heart disease Mother    • Cancer Mother    • Colon cancer Mother    • Dementia Mother    • Early death Father    • Seizures Father    • Cancer Sister         bone   • Heart disease Sister    • Diabetes Sister    • Lupus Sister    • Heart disease Brother    • Cancer Brother         kidney   • Diabetes Brother    • Other Sister         covid   • Stroke Neg Hx        Past Social history  Social History     Socioeconomic History   • Marital status: /Civil Union     Spouse name: Not on file • Number of children: Not on file   • Years of education: Not on file   • Highest education level: Not on file   Occupational History   • Not on file   Tobacco Use   • Smoking status: Former     Packs/day: 1 00     Years: 20 00     Pack years: 20 00     Types: Cigarettes     Quit date: 5     Years since quittin 3   • Smokeless tobacco: Never   • Tobacco comments:     Started age 16   Vaping Use   • Vaping Use: Never used   Substance and Sexual Activity   • Alcohol use: No     Comment: none in 33 yrs   • Drug use: No   • Sexual activity: Yes   Other Topics Concern   • Not on file   Social History Narrative    Single per allscript      Social Determinants of Health     Financial Resource Strain: Low Risk    • Difficulty of Paying Living Expenses: Not very hard   Food Insecurity: Not on file   Transportation Needs: No Transportation Needs   • Lack of Transportation (Medical): No   • Lack of Transportation (Non-Medical): No   Physical Activity: Not on file   Stress: Not on file   Social Connections: Not on file   Intimate Partner Violence: Not on file   Housing Stability: Not on file       Current Medications  Current Outpatient Medications   Medication Sig Dispense Refill   • acetaminophen (TYLENOL) 325 mg tablet Take 650 mg by mouth every 6 (six) hours as needed     • amiodarone 200 mg tablet 200 mg daily after dinner      • amLODIPine (NORVASC) 5 mg tablet      • apixaban (ELIQUIS) 5 mg Take 5 mg by mouth 2 (two) times a day To stop 2 days prior to the procedure     • atorvastatin (LIPITOR) 20 mg tablet TAKE 1 TABLET DAILY 90 tablet 3   • Blood Glucose Monitoring Suppl (FREESTYLE LITE) GEOVANNY      • butalbital-acetaminophen-caffeine (FIORICET,ESGIC) -40 mg per tablet MAY TAKE 1 TABLET BY MOUTH ONCE DAILY AS NEEDED   DO NOT EXCEED 3 TABLETS PER WEEK 20 tablet 0   • carvedilol (COREG) 25 mg tablet Take 12 5 mg by mouth 2 (two) times a day      • doxycycline monohydrate (MONODOX) 100 mg capsule Take 1 capsule (100 mg total) by mouth 2 (two) times a day for 7 days 14 capsule 0   • esomeprazole (NexIUM) 20 mg capsule TAKE ONE CAPSULE BY MOUTH EVERY DAY AT BEDTIME 90 capsule 3   • fluocinonide (LIDEX) 0 05 % external solution      • fluticasone (FLONASE) 50 mcg/act nasal spray 2 sprays into each nostril daily 48 g 3   • FREESTYLE LITE test strip      • hydrocortisone (ANUSOL-HC) 2 5 % rectal cream Apply topically 2 (two) times a day 30 g 2   • hydrOXYzine pamoate (VISTARIL) 25 mg capsule Take 1 capsule (25 mg total) by mouth 3 (three) times a day as needed for anxiety 60 capsule 3   • ketoconazole (NIZORAL) 2 % shampoo      • Lancets (FREESTYLE) lancets      • losartan (COZAAR) 100 MG tablet 100 mg every morning      • metroNIDAZOLE (METROCREAM) 0 75 % cream      • Ozempic, 0 25 or 0 5 MG/DOSE, 2 MG/1 5ML SOPN      • pioglitazone-metFORMIN (ACTOPLUS MET)  MG per tablet daily after dinner     • polyethylene glycol-propylene glycol (Systane) 0 4-0 3 % every 3 (three) hours as needed       • RESTASIS 0 05 % ophthalmic emulsion Administer 1 drop to both eyes every 12 (twelve) hours      • tamsulosin (FLOMAX) 0 4 mg      • amoxicillin (AMOXIL) 500 mg capsule  (Patient not taking: Reported on 5/1/2023)     • dicyclomine (BENTYL) 20 mg tablet Take 1 tablet (20 mg total) by mouth every 6 (six) hours as needed (urgency, abdominal pain) (Patient not taking: Reported on 5/1/2023) 120 tablet 2   • gabapentin (Neurontin) 300 mg capsule Take 1 capsule (300 mg total) by mouth daily at bedtime (Patient not taking: Reported on 5/1/2023) 30 capsule 3   • minocycline (DYNACIN) 50 MG tablet  (Patient not taking: Reported on 3/15/2023)     • Pancrelipase, Lip-Prot-Amyl, (Creon) 8004-9524 units CPEP Take 1 capsule by mouth 3 (three) times a day with meals (Patient not taking: Reported on 5/1/2023) 90 capsule 5   • Promethazine-DM (PHENERGAN-DM) 6 25-15 mg/5 mL oral syrup Take 5 mL by mouth 4 (four) times a day as needed for cough "(Patient not taking: Reported on 5/1/2023) 180 mL 0     No current facility-administered medications for this visit  Allergies  Allergies   Allergen Reactions   • Codeine Other (See Comments) and GI Intolerance     Reaction Date: 27Dec2004; Annotation - [id number]: '' CRAZY ''  vomiting  headache   • Demerol [Meperidine] Nausea Only, Other (See Comments), GI Intolerance and Vomiting     vomiting  Reaction Date: 27Dec2004;   headache   • Morphine Nausea Only, GI Intolerance and Vomiting     Reaction Date: 23Feb2012;        OBJECTIVE    Vitals   Vitals:    05/15/23 1328   BP: 134/66   BP Location: Left arm   Patient Position: Sitting   Pulse: 65   SpO2: 98%   Weight: 99 8 kg (220 lb)   Height: 5' 11\" (1 803 m)       PVR: 163 ml    Physical Exam  Constitutional:       Appearance: Normal appearance  He is normal weight  HENT:      Head: Normocephalic and atraumatic  Right Ear: External ear normal       Left Ear: External ear normal    Eyes:      Conjunctiva/sclera: Conjunctivae normal    Cardiovascular:      Rate and Rhythm: Normal rate  Pulmonary:      Effort: Pulmonary effort is normal  No respiratory distress  Musculoskeletal:         General: Normal range of motion  Cervical back: Normal range of motion and neck supple  Skin:     General: Skin is warm and dry  Neurological:      General: No focal deficit present  Mental Status: He is alert  Cranial Nerves: No cranial nerve deficit  Psychiatric:         Mood and Affect: Mood normal          Behavior: Behavior normal          Thought Content:  Thought content normal           Labs:     Lab Results   Component Value Date    PSA 4 2 (H) 07/27/2021    PSA 8 1 (H) 02/19/2021     Lab Results   Component Value Date    CREATININE 0 98 07/20/2022      Lab Results   Component Value Date    HGBA1C 5 9 (H) 07/20/2022     Lab Results   Component Value Date    CALCIUM 8 5 07/20/2022    K 4 1 07/20/2022    CO2 27 07/20/2022     07/20/2022 " BUN 19 07/20/2022    CREATININE 0 98 07/20/2022       I have personally reviewed all pertinent lab results and reviewed with patient    Imaging   MRI - ABDOMEN - WITH AND WITHOUT CONTRAST     INDICATION: 68 years / Male  N28 1: Cyst of kidney, acquired      COMPARISON: No prior MR available for comparison  Correlation with CT abdomen pelvis February 17, 2012 and May 18, 2017     TECHNIQUE:  The following pulse sequences were obtained prior to and following the administration of intravenous contrast:     Coronal and axial T2 with TE of 90 and 180 respectively, axial T2 with fat saturation, axial FIESTA fat-sat, axial T1-weighted   in-and-out-of phase, axial DWI/ADC, precontrast axial T1 with fat saturation, post-contrast dynamic axial T1 with fat saturation at 20, 70, and 180 seconds, coronal T1  with fat saturation and 7 minute delayed axial T1 with fat saturation         IV Contrast:  10 mL of Gadobutrol injection (SINGLE-DOSE)      FINDINGS:     LOWER CHEST:   Unremarkable      LIVER:   Normal in size and configuration  No suspicious mass  The hepatic veins and portal veins are patent      BILE DUCTS: No intrahepatic or extrahepatic bile duct dilation       GALLBLADDER:  Postcholecystectomy      PANCREAS:  Multiple pancreatic cysts measuring up to 1 3cm in the pancreatic neck (series 8, image 21)  The main pancreatic duct is not dilated      ADRENAL GLANDS:  Unchanged 1 6 cm left adrenal adenoma  No suspicious adrenal findings      SPLEEN: Wedge-shaped T2 hyperintense, nonenhancing lesion in the periphery of the spleen measures 2 7 x 2 4 cm, and likely represents a benign multiseptated cyst or lymphangioma  Lesion was present in the February 17, 2012 unenhanced CT      KIDNEYS/PROXIMAL URETERS: Exophytic lesion arising from the lower right pole measures 3 0 x 2 9 x 3 0 cm (series 12, image 23; series 13, image 31)    The lesion is centrally T2 hyperintense and has a T2 hypointense rim which measures 3 mm in thickness   (series 8, image 29)  Small amount of T2 hypointense material within the cyst can represent debris (for example series 8, image 29; series 9, image 29)  No significant T1 shortening is identified within the lesion  No definitive enhancing septations   identified  No nodular enhancing components are identified  On the May 18, 2017 CT this lesion measured 3 2 x 2 9 x 3 6 cm, and had a similar-appearing wall  On the February 17, 2012 unenhanced CT there is a similar positioned cyst which measured 9 2   x 8 3 x 7 9 cm        Few subcentimeter T2 hyperintense simple renal cysts bilaterally      No hydroureteronephrosis      BOWEL:   No dilated loops of bowel       PERITONEUM/RETROPERITONEUM: No mass  No ascites       LYMPH NODES: No abdominal lymphadenopathy      VASCULAR STRUCTURES:  No aneurysm      ABDOMINAL WALL:  Unremarkable      OSSEOUS STRUCTURES:  No suspicious osseous lesion         IMPRESSION:     1  Exophytic cyst arising from the right lower pole has progressively decreased in size since February 17, 2012  2   Multiple pancreatic cysts measuring up to 1 3 cm without high risk stigmata or worrisome features  For simple cyst(s) less than 1 5 cm, recommend followup every 2 year for 5 times or to age [de-identified], whichever comes first  Followup can stop at age [de-identified] or   can switch over to [de-identified] year or older algorithm  Recommend next followup in 2 years        Louis Dubose PA-C  Date: 5/15/2023 Time: 1:45 PM   for Urology    This note was written using Smart [employer] dictation software  Please excuse any resulting minor grammatical errors

## 2023-05-16 LAB
B BURGDOR IGG+IGM SER-ACNC: 0.2 AI
BACTERIA UR CULT: NORMAL

## 2023-05-17 ENCOUNTER — HOSPITAL ENCOUNTER (OUTPATIENT)
Dept: RADIOLOGY | Facility: HOSPITAL | Age: 78
Discharge: HOME | End: 2023-05-17
Attending: UROLOGY

## 2023-05-17 DIAGNOSIS — N40.1 BENIGN LOCALIZED PROSTATIC HYPERPLASIA WITH LOWER URINARY TRACT SYMPTOMS (LUTS): ICD-10-CM

## 2023-05-19 ENCOUNTER — TELEPHONE (OUTPATIENT)
Dept: UROLOGY | Facility: CLINIC | Age: 78
End: 2023-05-19

## 2023-05-19 NOTE — TELEPHONE ENCOUNTER
US kidney bladder done 5/17/23: IMPRESSION:     Exophytic complex cyst arising from the lower pole of the right kidney with a mildly thickened wall, not significantly changed      No hydronephrosis      Prominent postvoid urinary bladder volume of 305 1 cc      Enlarged heterogeneous prostate  Patient has f/u here in office 7/31/23   Please advise

## 2023-05-22 ENCOUNTER — TELEPHONE (OUTPATIENT)
Dept: FAMILY MEDICINE CLINIC | Facility: CLINIC | Age: 78
End: 2023-05-22

## 2023-05-22 NOTE — TELEPHONE ENCOUNTER
Lyme test was ordered by urgent care on 5/15/2023 and I reviewed and it was negative   SANDRITA Velasco

## 2023-05-25 ENCOUNTER — HOSPITAL ENCOUNTER (OUTPATIENT)
Dept: RADIOLOGY | Facility: HOSPITAL | Age: 78
Discharge: HOME | End: 2023-05-25

## 2023-05-25 DIAGNOSIS — R60.0 LOCALIZED EDEMA: ICD-10-CM

## 2023-05-25 DIAGNOSIS — M79.89 LEFT ARM SWELLING: ICD-10-CM

## 2023-06-01 ENCOUNTER — OFFICE VISIT (OUTPATIENT)
Dept: FAMILY MEDICINE CLINIC | Facility: CLINIC | Age: 78
End: 2023-06-01

## 2023-06-01 VITALS
TEMPERATURE: 97.7 F | HEIGHT: 71 IN | SYSTOLIC BLOOD PRESSURE: 124 MMHG | WEIGHT: 216 LBS | HEART RATE: 69 BPM | OXYGEN SATURATION: 99 % | RESPIRATION RATE: 16 BRPM | BODY MASS INDEX: 30.24 KG/M2 | DIASTOLIC BLOOD PRESSURE: 60 MMHG

## 2023-06-01 DIAGNOSIS — K21.9 GASTROESOPHAGEAL REFLUX DISEASE WITHOUT ESOPHAGITIS: ICD-10-CM

## 2023-06-01 DIAGNOSIS — B35.9 TINEA: ICD-10-CM

## 2023-06-01 DIAGNOSIS — K64.9 HEMORRHOIDS, UNSPECIFIED HEMORRHOID TYPE: Primary | ICD-10-CM

## 2023-06-01 DIAGNOSIS — I77.810 AORTIC ROOT DILATION (HCC): ICD-10-CM

## 2023-06-01 DIAGNOSIS — R60.9 EDEMA, UNSPECIFIED TYPE: ICD-10-CM

## 2023-06-01 PROBLEM — R06.02 SOB (SHORTNESS OF BREATH): Status: RESOLVED | Noted: 2021-01-31 | Resolved: 2023-06-01

## 2023-06-01 RX ORDER — CLOTRIMAZOLE AND BETAMETHASONE DIPROPIONATE 10; .64 MG/G; MG/G
CREAM TOPICAL 2 TIMES DAILY
Qty: 45 G | Refills: 1 | Status: SHIPPED | OUTPATIENT
Start: 2023-06-01

## 2023-06-01 NOTE — PROGRESS NOTES
Name: Annie De Leon  : 1945      MRN: [id number]  Encounter Provider: Margaret Reaves MD  Encounter Date: 2023   Encounter department: [facility]     1  Hemorrhoids, unspecified hemorrhoid type  -     Ambulatory Referral to Colorectal Surgery; Future    2  Aortic root dilation (HCC)    3  Tinea  -     clotrimazole-betamethasone (LOTRISONE) 1-0 05 % cream; Apply topically 2 (two) times a day    4  Gastroesophageal reflux disease without esophagitis  -     esomeprazole (NexIUM) 20 mg capsule; Take 1 capsule (20 mg total) by mouth daily at bedtime    5  Edema, unspecified type  Comments:  He has upcoming echo scheduled, but no signs/symptoms of CHF and suspect these are due to varicosities  Advised elevation and support hose  Subjective      He was recently here for swelling in LUE  Had UE duplex, negative  Continues to have minimal edema there and some swelling in his feet and ankles  This is gone when he gets out of bed in the morning, but gets a little worse as the day goes on  He gets a line in his sock  Does have varicose veins  There is no orthopnea or paroxysmal nocturnal dyspnea  He has hemorrhoids and has seen GI but these are not getting better with fiber and cream and he would like to see colorectal surgery  He is up to date with colonoscopy  Has a rash under his testicles, would like a cream for this  It is worse since it got warm  Review of Systems   Constitutional: Negative  Respiratory: Negative  Cardiovascular: Positive for leg swelling  Gastrointestinal: Positive for constipation and rectal pain  Skin: Positive for rash         Current Outpatient Medications on File Prior to Visit   Medication Sig   • acetaminophen (TYLENOL) 325 mg tablet Take 650 mg by mouth every 6 (six) hours as needed   • amiodarone 200 mg tablet 200 mg daily after dinner    • amLODIPine (NORVASC) 5 mg tablet    • apixaban (ELIQUIS) 5 mg Take 5 mg by mouth 2 (two) times a day To stop 2 days prior to the procedure   • atorvastatin (LIPITOR) 20 mg tablet TAKE 1 TABLET DAILY   • Blood Glucose Monitoring Suppl (FREESTYLE LITE) GEOVANNY    • carvedilol (COREG) 25 mg tablet Take 12 5 mg by mouth 2 (two) times a day    • fluocinonide (LIDEX) 0 05 % external solution    • fluticasone (FLONASE) 50 mcg/act nasal spray 2 sprays into each nostril daily   • FREESTYLE LITE test strip    • hydrocortisone (ANUSOL-HC) 2 5 % rectal cream Apply topically 2 (two) times a day   • hydrOXYzine pamoate (VISTARIL) 25 mg capsule Take 1 capsule (25 mg total) by mouth 3 (three) times a day as needed for anxiety   • ketoconazole (NIZORAL) 2 % shampoo    • Lancets (FREESTYLE) lancets    • losartan (COZAAR) 100 MG tablet 100 mg every morning    • Ozempic, 0 25 or 0 5 MG/DOSE, 2 MG/1 5ML SOPN    • pioglitazone-metFORMIN (ACTOPLUS MET)  MG per tablet daily after dinner   • polyethylene glycol-propylene glycol (Systane) 0 4-0 3 % every 3 (three) hours as needed     • RESTASIS 0 05 % ophthalmic emulsion Administer 1 drop to both eyes every 12 (twelve) hours    • tamsulosin (FLOMAX) 0 4 mg Take 1 capsule (0 4 mg total) by mouth daily with dinner   • [DISCONTINUED] esomeprazole (NexIUM) 20 mg capsule TAKE ONE CAPSULE BY MOUTH EVERY DAY AT BEDTIME   • amoxicillin (AMOXIL) 500 mg capsule  (Patient not taking: Reported on 5/1/2023)   • [DISCONTINUED] butalbital-acetaminophen-caffeine (FIORICET,ESGIC) -40 mg per tablet MAY TAKE 1 TABLET BY MOUTH ONCE DAILY AS NEEDED   DO NOT EXCEED 3 TABLETS PER WEEK (Patient not taking: Reported on 6/1/2023)   • [DISCONTINUED] dicyclomine (BENTYL) 20 mg tablet Take 1 tablet (20 mg total) by mouth every 6 (six) hours as needed (urgency, abdominal pain) (Patient not taking: Reported on 5/1/2023)   • [DISCONTINUED] gabapentin (Neurontin) 300 mg capsule Take 1 capsule (300 mg total) by mouth daily at bedtime (Patient not taking: Reported on 5/1/2023)   • "[DISCONTINUED] metroNIDAZOLE (METROCREAM) 0 75 % cream  (Patient not taking: Reported on 2023)   • [DISCONTINUED] minocycline (DYNACIN) 50 MG tablet  (Patient not taking: Reported on 3/15/2023)   • [DISCONTINUED] Pancrelipase, Lip-Prot-Amyl, (Creon) 2897-6591 units CPEP Take 1 capsule by mouth 3 (three) times a day with meals (Patient not taking: Reported on 2023)   • [DISCONTINUED] Promethazine-DM (PHENERGAN-DM) 6 25-15 mg/5 mL oral syrup Take 5 mL by mouth 4 (four) times a day as needed for cough (Patient not taking: Reported on 2023)   • [DISCONTINUED] tamsulosin (FLOMAX) 0 4 mg Take 1 capsule (0 4 mg total) by mouth daily with dinner (Patient not taking: Reported on 2023)       Objective     /60   Pulse 69   Temp 97 7 °F (36 5 °C)   Resp 16   Ht 5' 11\" (1 803 m)   Wt 98 kg (216 lb)   SpO2 99%   BMI 30 13 kg/m²     Physical Exam  Constitutional:       Appearance: He is well-developed  Eyes:      Conjunctiva/sclera: Conjunctivae normal    Neck:      Thyroid: No thyromegaly  Vascular: No JVD  Cardiovascular:      Rate and Rhythm: Normal rate and regular rhythm  Heart sounds: Normal heart sounds  No murmur heard  No friction rub  No gallop  Comments: No JVD  Pulmonary:      Effort: Pulmonary effort is normal       Breath sounds: Normal breath sounds  No wheezing or rales  Musculoskeletal:      Cervical back: Neck supple  Right lower le+ Pitting Edema present  Left lower le+ Pitting Edema present  Comments: 1+ pitting edema bilateral feet, multiple varicosities  No edema seen EDEN Jones MD  "

## 2023-06-14 ENCOUNTER — OFFICE VISIT (OUTPATIENT)
Dept: PULMONOLOGY | Facility: MEDICAL CENTER | Age: 78
End: 2023-06-14
Payer: MEDICARE

## 2023-06-14 VITALS
OXYGEN SATURATION: 97 % | TEMPERATURE: 97.6 F | SYSTOLIC BLOOD PRESSURE: 138 MMHG | WEIGHT: 215 LBS | RESPIRATION RATE: 12 BRPM | HEIGHT: 71 IN | BODY MASS INDEX: 30.1 KG/M2 | DIASTOLIC BLOOD PRESSURE: 74 MMHG | HEART RATE: 71 BPM

## 2023-06-14 DIAGNOSIS — R06.09 DYSPNEA ON EXERTION: ICD-10-CM

## 2023-06-14 DIAGNOSIS — G47.33 OBSTRUCTIVE SLEEP APNEA: Primary | ICD-10-CM

## 2023-06-14 DIAGNOSIS — M79.89 LEFT UPPER EXTREMITY SWELLING: ICD-10-CM

## 2023-06-14 PROCEDURE — 99214 OFFICE O/P EST MOD 30 MIN: CPT | Performed by: NURSE PRACTITIONER

## 2023-06-14 NOTE — ASSESSMENT & PLAN NOTE
Elfegojohn Avila will continue with nightly use of his CPAP and is deriving benefit  His CPAP is set to 10 cmH2O  He had an average time in large leak of 2 minutes and 26 seconds  His average AHI is 0 6  He is wearing it approximately 6-1/2 hours nightly  He will continue with this  I did order resupply for him  He is aware of proper use and maintenance of his machine

## 2023-06-14 NOTE — PROGRESS NOTES
"Pulmonary Follow-Up Note   Adryan Davidson  66 y o  male MRN: [id number]  6/14/2023      Assessment/Plan:    Problem List Items Addressed This Visit        Respiratory    Obstructive sleep apnea - Primary     Kym Thompson will continue with nightly use of his CPAP and is deriving benefit  His CPAP is set to 10 cmH2O  He had an average time in large leak of 2 minutes and 26 seconds  His average AHI is 0 6  He is wearing it approximately 6-1/2 hours nightly  He will continue with this  I did order resupply for him  He is aware of proper use and maintenance of his machine  Relevant Orders    PAP DME Resupply/Reorder       Other    Dyspnea on exertion     He denies any further complaints in regard to dyspnea on exertion  Left upper extremity swelling     Kym Thompson has left upper extremity swelling with mild tingling  He is following with his PCP for this and a duplex was unremarkable  I did place a call to his cardiology office and spoke with Glo Barajas and she will attempt to get him a sooner appointment with their office and touch base with his cardiologist as well  Return in about 1 year (around 6/14/2024), or if symptoms worsen or fail to improve  All of Kym Thompson' questions were answered prior to leaving the office today  He will follow-up with Dr Ling Moffett in 12 months or sooner should the need arise  He is aware to call our office with any further questions or concerns  History of Present Illness   Reason for Visit: Follow-up  Chief Complaint: \"I am swollen  \"  HPI: Adryan Davidson  is a 66 y o  male who presents to the office today for follow-up of obstructive sleep apnea  Overall, Kym Thompson reports he is doing well  He is using his CPAP nightly and is deriving benefit  He sleeps well at night and feels well rested during the day    He is comfortable in his mask, but needs new supplies in general   At his last visit, he was having some dyspnea on exertion; however, he reports this is now " resolved  He has no cough or chest pain  No other complaints from a pulmonary standpoint  He does report that he has had left upper extremity swelling and mild tingling for the last 1 month  He saw his PCP and had a duplex which was unremarkable  He is attempting to follow-up with his cardiologist as this is the side that he had his pacemaker placed  Review of Systems   All other systems reviewed and are negative  A full 12-point review of systems was completed and is negative except for those outlined in the HPI      Historical Information   Past Medical History:   Diagnosis Date   • Allergic rhinitis    • Anemia 10/23/2008    last assessed 9/9/13    • Anxiety    • Atrial fibrillation (HCC)    • Bleeding    • Chronic kidney disease    • COVID-19 virus infection 12/2022   • CPAP (continuous positive airway pressure) dependence    • Diabetes mellitus ([facility] )    • Diarrhea    • Diverticulitis    • Eating disorder    • GERD (gastroesophageal reflux disease)    • Herpes zoster with complication     last assessed 7/3/17    • Hiatal hernia    • Hyperlipidemia    • Hypertension    • IBS (irritable bowel syndrome)    • Incisional hernia    • Increased urinary frequency    • Irregular heart beat    • Pyogenic granuloma 09/13/2006    last assessed 9/13/06   • Carlos (subconjunctival hemorrhage), unspecified laterality 09/12/2005    last assessed 9/12/05   • Sleep apnea    • TMJ (dislocation of temporomandibular joint)    • Ventral hernia     last assessed  4/6/17    • Wears glasses      Past Surgical History:   Procedure Laterality Date   • APPENDECTOMY     • ATRIAL ABLATION SURGERY      2014   • ATRIAL ABLATION SURGERY      catheter ablation atrial fibrillation / last assessed 2/17/16    • CHOLECYSTECTOMY      lap   • COLON SURGERY  1990    Hartmans procedure   • COLON SURGERY      reversal 6 weeks later   • COLONOSCOPY N/A 1/26/2017    Procedure: COLONOSCOPY;  Surgeon: Vahe Dee MD;  Location: [facility] GI LAB; Service:    • ESOPHAGOGASTRODUODENOSCOPY N/A 2017    Procedure: ESOPHAGOGASTRODUODENOSCOPY (EGD); Surgeon: Eriberto Bautista MD;  Location: [place] [address] GI LAB;   Service:    • EYE SURGERY Bilateral 2015    lid repair   • HERNIA REPAIR Bilateral     inguinal  &    • [address]     • INCISIONAL HERNIA REPAIR N/A 3/24/2017    Procedure: REPAIR OF INCARCERATED INCISIONAL HERNIA WITH MESH, Lysis of Adhesions, Partial Omentectomy;  Surgeon: Epifanio Hurtado MD;  Location: [address] [place] [address];  Service:    • Anita Rede / Andrelo Loots / Nupur Gene Left 10/04/2021    medtronic-[id number]   • NEUROBLASTOMA EXCISION     • SKIN CANCER EXCISION      On nose   • TONSILLECTOMY       Family History   Problem Relation Age of Onset   • Heart disease Mother    • Cancer Mother    • Colon cancer Mother    • Dementia Mother    • Early death Father    • Seizures Father    • Cancer Sister         bone   • Heart disease Sister    • Diabetes Sister    • Lupus Sister    • Heart disease Brother    • Cancer Brother         kidney   • Diabetes Brother    • Other Sister         covid   • Stroke Neg Hx      Social History   Social History     Substance and Sexual Activity   Alcohol Use No    Comment: none in 33 yrs     Social History     Substance and Sexual Activity   Drug Use No     Social History     Tobacco Use   Smoking Status Former   • Packs/day: 1 00   • Years: 20 00   • Total pack years:  00   • Types: Cigarettes   • Quit date:    • Years since quittin 4   Smokeless Tobacco Never   Tobacco Comments    Started age 16     E-Cigarette/Vaping   • E-Cigarette Use Never User      E-Cigarette/Vaping Substances   • Nicotine No    • THC No    • CBD No    • Flavoring No    • Other No    • Unknown No        Meds/Allergies     Current Outpatient Medications:   •  acetaminophen (TYLENOL) 325 mg tablet, Take 650 mg by mouth every 6 (six) hours as needed, Disp: , Rfl:   •  amiodarone 200 mg tablet, 200 mg daily after dinner , Disp: , Rfl:   •  amLODIPine (NORVASC) 5 mg tablet, , Disp: , Rfl:   •  apixaban (ELIQUIS) 5 mg, Take 5 mg by mouth daily To stop 2 days prior to the procedure, Disp: , Rfl:   •  atorvastatin (LIPITOR) 20 mg tablet, TAKE 1 TABLET DAILY, Disp: 90 tablet, Rfl: 3  •  Blood Glucose Monitoring Suppl (FREESTYLE LITE) GEOVANNY, , Disp: , Rfl:   •  carvedilol (COREG) 25 mg tablet, Take 12 5 mg by mouth 2 (two) times a day , Disp: , Rfl:   •  clotrimazole-betamethasone (LOTRISONE) 1-0 05 % cream, Apply topically 2 (two) times a day, Disp: 45 g, Rfl: 1  •  esomeprazole (NexIUM) 20 mg capsule, Take 1 capsule (20 mg total) by mouth daily at bedtime, Disp: 90 capsule, Rfl: 3  •  fluocinonide (LIDEX) 0 05 % external solution, , Disp: , Rfl:   •  fluticasone (FLONASE) 50 mcg/act nasal spray, 2 sprays into each nostril daily, Disp: 48 g, Rfl: 3  •  FREESTYLE LITE test strip, , Disp: , Rfl:   •  hydrocortisone (ANUSOL-HC) 2 5 % rectal cream, Apply topically 2 (two) times a day, Disp: 30 g, Rfl: 2  •  hydrOXYzine pamoate (VISTARIL) 25 mg capsule, Take 1 capsule (25 mg total) by mouth 3 (three) times a day as needed for anxiety, Disp: 60 capsule, Rfl: 3  •  ketoconazole (NIZORAL) 2 % shampoo, , Disp: , Rfl:   •  Lancets (FREESTYLE) lancets, , Disp: , Rfl:   •  losartan (COZAAR) 100 MG tablet, 100 mg every morning , Disp: , Rfl:   •  Ozempic, 0 25 or 0 5 MG/DOSE, 2 MG/1 5ML SOPN, , Disp: , Rfl:   •  pioglitazone-metFORMIN (ACTOPLUS MET)  MG per tablet, daily after dinner, Disp: , Rfl:   •  polyethylene glycol-propylene glycol (Systane) 0 4-0 3 %, every 3 (three) hours as needed  , Disp: , Rfl:   •  RESTASIS 0 05 % ophthalmic emulsion, Administer 1 drop to both eyes every 12 (twelve) hours , Disp: , Rfl:   •  tamsulosin (FLOMAX) 0 4 mg, Take 1 capsule (0 4 mg total) by mouth daily with dinner, Disp: 90 capsule, Rfl: 3  •  amoxicillin (AMOXIL) 500 mg capsule, , Disp: , Rfl:   Allergies   Allergen Reactions   • Codeine Other (See "Comments) and GI Intolerance     Reaction Date: 27Dec2004; Annotation - [id number]: '' CRAZY ''  vomiting  headache   • Demerol [Meperidine] Nausea Only, Other (See Comments), GI Intolerance and Vomiting     vomiting  Reaction Date: 27Dec2004;   headache   • Morphine Nausea Only, GI Intolerance and Vomiting     Reaction Date: 23Feb2012;        Vitals: Blood pressure 138/74, pulse 71, temperature 97 6 °F (36 4 °C), temperature source Temporal, resp  rate 12, height 5' 11\" (1 803 m), weight 97 5 kg (215 lb), SpO2 97 %  Body mass index is 29 99 kg/m²  Oxygen Therapy  SpO2: 97 %    Physical Exam:  Physical Exam  Vitals reviewed  Constitutional:       General: He is not in acute distress  Appearance: He is well-developed and overweight  He is not toxic-appearing or diaphoretic  HENT:      Head: Normocephalic and atraumatic  Eyes:      General: No scleral icterus  Neck:      Trachea: No tracheal deviation  Cardiovascular:      Rate and Rhythm: Normal rate and regular rhythm  Heart sounds: S1 normal and S2 normal  No murmur heard  No friction rub  No gallop  Pulmonary:      Effort: Pulmonary effort is normal  No tachypnea, accessory muscle usage or respiratory distress  Breath sounds: Normal breath sounds  No stridor  No decreased breath sounds, wheezing, rhonchi or rales  Chest:      Chest wall: No tenderness  Abdominal:      General: Bowel sounds are normal  There is no distension  Palpations: Abdomen is soft  Tenderness: There is no abdominal tenderness  Musculoskeletal:         General: No tenderness  Cervical back: Neck supple  Right lower leg: No edema  Left lower leg: No edema  Comments: Left arm swelling  Skin:     General: Skin is warm and dry  Findings: No rash  Neurological:      Mental Status: He is alert and oriented to person, place, and time  GCS: GCS eye subscore is 4  GCS verbal subscore is 5  GCS motor subscore is 6   " "  Psychiatric:         Speech: Speech normal          Behavior: Behavior normal  Behavior is cooperative  Imaging and other studies: I have personally reviewed pertinent reports  Left upper extremity duplex shows no evidence of DVT  SANDRITA Banda  [facility]        Portions of the record may have been created with voice recognition software  Occasional wrong word or \"sound a like\" substitutions may have occurred due to the inherent limitations of voice recognition software  Read the chart carefully and recognize, using context, where substitutions have occurred or contact the dictating provider    "

## 2023-06-14 NOTE — PROGRESS NOTES
PAP Resupply Orders [facility]

## 2023-06-14 NOTE — ASSESSMENT & PLAN NOTE
Christine Prasad has left upper extremity swelling with mild tingling  He is following with his PCP for this and a duplex was unremarkable  I did place a call to his cardiology office and spoke with Kenn Ang and she will attempt to get him a sooner appointment with their office and touch base with his cardiologist as well

## 2023-06-16 LAB
DME PARACHUTE DELIVERY DATE ACTUAL: NORMAL
DME PARACHUTE DELIVERY DATE REQUESTED: NORMAL
DME PARACHUTE ITEM DESCRIPTION: NORMAL
DME PARACHUTE ORDER STATUS: NORMAL
DME PARACHUTE SUPPLIER NAME: NORMAL
DME PARACHUTE SUPPLIER PHONE: NORMAL

## 2023-06-18 ENCOUNTER — HOSPITAL ENCOUNTER (EMERGENCY)
Facility: HOSPITAL | Age: 78
Discharge: HOME | End: 2023-06-19
Attending: EMERGENCY MEDICINE
Payer: MEDICARE

## 2023-06-18 VITALS
SYSTOLIC BLOOD PRESSURE: 172 MMHG | HEIGHT: 71 IN | BODY MASS INDEX: 31.01 KG/M2 | WEIGHT: 221.5 LBS | DIASTOLIC BLOOD PRESSURE: 78 MMHG | HEART RATE: 64 BPM | TEMPERATURE: 98.2 F | RESPIRATION RATE: 18 BRPM | OXYGEN SATURATION: 98 %

## 2023-06-18 DIAGNOSIS — T82.9XXA PACEMAKER COMPLICATIONS, INITIAL ENCOUNTER: Primary | ICD-10-CM

## 2023-06-18 PROCEDURE — 99284 EMERGENCY DEPT VISIT MOD MDM: CPT

## 2023-06-19 DIAGNOSIS — K64.9 HEMORRHOIDS, UNSPECIFIED HEMORRHOID TYPE: ICD-10-CM

## 2023-06-19 RX ORDER — HYDROCORTISONE 25 MG/G
CREAM TOPICAL 2 TIMES DAILY
Qty: 30 G | Refills: 2 | Status: SHIPPED | OUTPATIENT
Start: 2023-06-19

## 2023-06-19 NOTE — ED PROVIDER NOTES
"Final Diagnosis:  1  Pacemaker complications, initial encounter        Chief Complaint   Patient presents with   • Pacemaker Problem     Pt states that pacemaker has been \"getting warm\" starting an hour and half ago  Pt states he had pacemaker place October 4, 2021 and has never felt it warm like this  Pt unable to contact pacemaker company and cardiologist is at [facility] denies chest pain or any other complaints at this time  HPI  Patient presents when he felt his pacer getting \"warm\"  Warm like a battery, not infxn  No fever/chills  No other symptoms  Never felt any kind of shock  Has been placed there for 2 years  Tried getting in contact w/ pacer company and [facility]  No chest pain sob diaphoresis n/v  At time of arrival is feeling normal  After interrogation, which has no events and good healthy battery wishes to go home  EMS reports if applicable: N/A     - Previous charting underwent limited review with attention to last ED visits, labs, ekgs, and prior imaging    Chart review reveals :     Office Visit on 06/14/2023   Component Date Value Ref Range Status   • Supplier Name 06/14/2023 [employer]   Final-Edited   • Supplier Phone Number 06/14/2023 [phone]   Final-Edited   • Order Status 06/14/2023 Delivery Successful   Final-Edited   • Delivery Request Date 06/14/2023 06/14/2023   Final-Edited   • Date Delivered  06/14/2023 06/16/2023   Final-Edited   • Item Description 06/14/2023 PAP Accessory   Final-Edited    Qty: 1   • Item Description 06/14/2023 PAP Mask, Full Face, Fit Upon Setup, N/A, 1 per 3 months   Final-Edited    Qty: 1   • Item Description 06/14/2023 Humidifier Water Chamber, 1 per 6 months   Final-Edited    Qty: 1   • Item Description 06/14/2023 PAP Headgear, 1 per 6 months   Final-Edited    Qty: 1   • Item Description 06/14/2023 Standard PAP Tubing, Non-Heated, 1 per 3 months   Final-Edited    Qty: 1   • Item Description 06/14/2023 " Heated PAP Tubing, 1 per 3 months   Final-Edited    Qty: 1   • Item Description 06/14/2023 Disposable PAP Filter, 2 per 1 month   Final-Edited    Qty: 1   • Item Description 06/14/2023 Non-Disposable PAP Filter, 1 per 6 months   Final-Edited    Qty: 1   • Item Description 06/14/2023 PAP Mask Interface Cushion, Full Face, 1 per 1 month   Final-Edited    Qty: 1       - No language barrier    - History obtained from patient   - There are no limitations to the history obtained  - Discuss patient's care, with patient permission or by chart review, with his son who is bedside  PMH:   has a past medical history of Allergic rhinitis, Anemia (10/23/2008), Anxiety, Atrial fibrillation ([address] ), Bleeding, Chronic kidney disease, COVID-19 virus infection (12/2022), CPAP (continuous positive airway pressure) dependence, Diabetes mellitus ([facility] ), Diarrhea, Diverticulitis, Eating disorder, GERD (gastroesophageal reflux disease), Herpes zoster with complication, Hiatal hernia, Hyperlipidemia, Hypertension, IBS (irritable bowel syndrome), Incisional hernia, Increased urinary frequency, Irregular heart beat, Pyogenic granuloma (09/13/2006), Carlos (subconjunctival hemorrhage), unspecified laterality (09/12/2005), Sleep apnea, TMJ (dislocation of temporomandibular joint), Ventral hernia, and Wears glasses  PSH:   has a past surgical history that includes Hernia repair (Bilateral); Cholecystectomy; Incisional hernia repair (2011); Eye surgery (Bilateral, 2015); Tonsillectomy; Appendectomy; Atrial ablation surgery; Incisional hernia repair (N/A, 3/24/2017); Esophagogastroduodenoscopy (N/A, 1/26/2017); Colonoscopy (N/A, 1/26/2017); Atrial ablation surgery; Skin cancer excision; Neuroblastoma excision; Insert / replace / remove pacemaker (Left, 10/04/2021); Colon surgery (1990); and Colon surgery       Social History:  Tobacco Use: Medium Risk (6/18/2023)    Patient History    • Smoking Tobacco Use: Former    • Smokeless Tobacco Use: Never    • Passive Exposure: Not on file     Alcohol Use: Not At Risk (8/22/2022)    AUDIT-C    • Frequency of Alcohol Consumption: Never    • Average Number of Drinks: Patient does not drink    • Frequency of Binge Drinking: Never     No illicit use       ROS:  Pertinent positives/negatives: [place] Some ROS may be present in the HPI and would take precedent over these standard questions asked below  Review of Systems   Constitutional: Negative for chills and fever  Respiratory: Negative for shortness of breath  Cardiovascular: Negative for chest pain and palpitations  Gastrointestinal: Negative for nausea and vomiting  CONSTITUTIONAL:  No lethargy  No weakness  No unexpected weight loss  No appetite change  EYES:  No pain, redness, or discharge  No loss of vision  No orbital trauma or pain  ENT:  No tinnitus or decreased hearing  No epistaxis/purulent rhinorrhea  No voice change, airway closing, trismus  CARDIOVASCULAR:  No chest pain  No skin mottling or pallor  No change in exertional capacity  RESPIRATORY:  No hemoptysis  No paroxysmal nocturnal dyspnea  No stridor  No audible wheezing  No production with cough  GASTROINTESTINAL:  Normal appetite  No vomiting, diarrhea  No pain  No bloating  No melena  No hematochezia  GENITOURINARY:  No frequency, urgency, nocturia  No hematuria or dysuria  No discharge  No sores/adenopathy  MUSCULOSKELETAL:  No arthralgias or myalgias that are new  No new deformity  INTEGUMENTARY:  No swelling  No unexpected contusions  No abrasions  No lymphangitis  NEUROLOGIC:  No meningismus  No new numbness of the extremities  No new focal weakness  No postural instability  PSYCHIATRIC:  No SI HI AVH  HEMATOLOGICAL:  No bleeding  No petechiae  No bruising  ALLERGIES:  No urticaria  No sudden abd cramping  No stridor      PE:     Physical exam highlights:   Physical Exam       Vitals:    06/18/23 2315   BP: (!) 172/78   BP Location: Right arm   Pulse: 64 "  Resp: 18   Temp: 98 2 °F (36 8 °C)   TempSrc: Oral   SpO2: 98%   Weight: 100 kg (221 lb 8 oz)   Height: 5' 11\" (1 803 m)     Vitals reviewed by me  Nursing note reviewed  Chaperone present for all sensitive exam   Const: No acute distress  Alert  Nontoxic  Not diaphoretic  HEENT: External ears normal  No protrusion drainage swelling  Nose normal  No drainage/traumatic deformity  MMM  Mouth with baseline/symmetric movement  No trismus  Eyes: No squinting  No icterus  No tearing/swelling/drainage  Tracks through the room with normal EOM  Neck: ROM normal  No rigidity  No meningismus  Cards: Rate as per vitals Compared to monitor sinus unless documented  Regular Well perfused  Pulm: able to verbalize without additional effort  Effort and excursion normal  No distress  No audible wheezing/ stridor  Normal resp rate without retraction or change in work of breathing  Abd: No distension beyond baseline  No fluctuant wave  Patient without peritoneal pain with shifting/bumping the bed  MSK: ROM normal baseline  No deformity  No contractures from baseline  Skin: No new rashes visible  Well perfused  No wounds visualized on exposed skin  No erythema no warmth over pacer  Neuro: Nonfocal  Baseline  CN grossly intact  Moving all four with coordination  Psych: Normal behavior and affect  A:  - Nursing note reviewed  Ddx and MDM  Considered diagnoses  R/o pacer malfunction  ekg w/o signs of ischemia  Patient reassured  Will f/u w/ cards    Offered trop r/o ischemic event declines          My conversation with consultant reveals: NA       Decision rules:                           My read of the XR/CT scan reveals:  NA   No orders to display       No orders of the defined types were placed in this encounter  Labs Reviewed - No data to display    *Each of these labs was reviewed  Particular standout labs will be noted in the ED Course above     Final Diagnosis:  1   Pacemaker complications, initial " encounter          P:  - hospital tx includes   Medications - No data to display      - disposition  Time reflects when diagnosis was documented in both MDM as applicable and the Disposition within this note     Time User Action Codes Description Comment    6/19/2023 12:11 AM Yakov Davis  9XXA] Pacemaker complications, initial encounter       ED Disposition     ED Disposition   Discharge    Condition   Stable    Date/Time   Mon Jun 19, 2023 12:10 AM    Comment   Yeceniakylejody Marami Jeffscar Diana  discharge to home/self care  Follow-up Information    None         - patient will call their PCP to let them know they were in the emergency department  We discuss return precautions and patient is agreeable with plan and aformentioned disposition         - additional treatment intended, if consistent with primary provider:  - patient to follow with :      Discharge Medication List as of 6/19/2023 12:11 AM      CONTINUE these medications which have NOT CHANGED    Details   acetaminophen (TYLENOL) 325 mg tablet Take 650 mg by mouth every 6 (six) hours as needed, Historical Med      amiodarone 200 mg tablet 200 mg daily after dinner , Starting Tue 2/5/2019, Historical Med      amLODIPine (NORVASC) 5 mg tablet Starting Wed 1/5/2022, Historical Med      amoxicillin (AMOXIL) 500 mg capsule Starting Mon 12/5/2022, Historical Med      apixaban (ELIQUIS) 5 mg Take 5 mg by mouth daily To stop 2 days prior to the procedure, Historical Med      atorvastatin (LIPITOR) 20 mg tablet TAKE 1 TABLET DAILY, Normal      Blood Glucose Monitoring Suppl (FREESTYLE LITE) GEOVANNY Starting Wed 5/1/2019, Historical Med      carvedilol (COREG) 25 mg tablet Take 12 5 mg by mouth 2 (two) times a day , Historical Med      clotrimazole-betamethasone (LOTRISONE) 1-0 05 % cream Apply topically 2 (two) times a day, Starting Thu 6/1/2023, Normal      esomeprazole (NexIUM) 20 mg capsule Take 1 capsule (20 mg total) by mouth daily at bedtime, Starting Thu 6/1/2023, Normal      fluocinonide (LIDEX) 0 05 % external solution Starting Sat 4/22/2023, Historical Med      fluticasone (FLONASE) 50 mcg/act nasal spray 2 sprays into each nostril daily, Starting Mon 2/21/2022, Normal      FREESTYLE LITE test strip Historical Med      hydrOXYzine pamoate (VISTARIL) 25 mg capsule Take 1 capsule (25 mg total) by mouth 3 (three) times a day as needed for anxiety, Starting Thu 3/9/2023, Normal      ketoconazole (NIZORAL) 2 % shampoo Starting u 1/12/2023, Historical Med      Lancets (FREESTYLE) lancets Historical Med      losartan (COZAAR) 100 MG tablet 100 mg every morning , Starting Thu 8/20/2020, Historical Med      Ozempic, 0 25 or 0 5 MG/DOSE, 2 MG/1 5ML SOPN Starting Mon 8/22/2022, Historical Med      pioglitazone-metFORMIN (ACTOPLUS MET)  MG per tablet daily after dinner, Starting Wed 10/20/2021, Historical Med      polyethylene glycol-propylene glycol (Systane) 0 4-0 3 % every 3 (three) hours as needed  , Historical Med      RESTASIS 0 05 % ophthalmic emulsion Administer 1 drop to both eyes every 12 (twelve) hours , Starting Mon 8/6/2018, Historical Med      tamsulosin (FLOMAX) 0 4 mg Take 1 capsule (0 4 mg total) by mouth daily with dinner, Starting Mon 5/15/2023, Normal      hydrocortisone (ANUSOL-HC) 2 5 % rectal cream Apply topically 2 (two) times a day, Starting Mon 5/1/2023, Normal           No discharge procedures on file  Prior to Admission Medications   Prescriptions Last Dose Informant Patient Reported? Taking?    Blood Glucose Monitoring Suppl (FREESTYLE LITE) GEOVANNY  Self Yes No   FREESTYLE LITE test strip  Self Yes No   Lancets (FREESTYLE) lancets  Self Yes No   Ozempic, 0 25 or 0 5 MG/DOSE, 2 MG/1 5ML SOPN  Self Yes No   RESTASIS 0 05 % ophthalmic emulsion  Self Yes No   Sig: Administer 1 drop to both eyes every 12 (twelve) hours    acetaminophen (TYLENOL) 325 mg tablet  Self Yes No   Sig: Take 650 mg by mouth every 6 (six) hours as needed   amLODIPine "(NORVASC) 5 mg tablet  Self Yes No   amiodarone 200 mg tablet  Self Yes No   Si mg daily after dinner    amoxicillin (AMOXIL) 500 mg capsule  Self Yes No   Patient not taking: Reported on 2023   apixaban (ELIQUIS) 5 mg  Self Yes No   Sig: Take 5 mg by mouth daily To stop 2 days prior to the procedure   atorvastatin (LIPITOR) 20 mg tablet  Self No No   Sig: TAKE 1 TABLET DAILY   carvedilol (COREG) 25 mg tablet  Self Yes No   Sig: Take 12 5 mg by mouth 2 (two) times a day    clotrimazole-betamethasone (LOTRISONE) 1-0 05 % cream   No No   Sig: Apply topically 2 (two) times a day   esomeprazole (NexIUM) 20 mg capsule   No No   Sig: Take 1 capsule (20 mg total) by mouth daily at bedtime   fluocinonide (LIDEX) 0 05 % external solution   Yes No   fluticasone (FLONASE) 50 mcg/act nasal spray  Self No No   Si sprays into each nostril daily   hydrOXYzine pamoate (VISTARIL) 25 mg capsule  Self No No   Sig: Take 1 capsule (25 mg total) by mouth 3 (three) times a day as needed for anxiety   ketoconazole (NIZORAL) 2 % shampoo  Self Yes No   losartan (COZAAR) 100 MG tablet  Self Yes No   Si mg every morning    pioglitazone-metFORMIN (ACTOPLUS MET)  MG per tablet  Self Yes No   Sig: daily after dinner   polyethylene glycol-propylene glycol (Systane) 0 4-0 3 %  Self Yes No   Sig: every 3 (three) hours as needed     tamsulosin (FLOMAX) 0 4 mg   No No   Sig: Take 1 capsule (0 4 mg total) by mouth daily with dinner      Facility-Administered Medications: None       Portions of the record may have been created with voice recognition software  Occasional wrong word or \"sound a like\" substitutions may have occurred due to the inherent limitations of voice recognition software  Read the chart carefully and recognize, using context, where substitutions have occurred      Electronically signed by:  MD Yesenia Bo MD  [id number]    "

## 2023-06-19 NOTE — ED NOTES
Pt instructed on if pacemaker feels hot to touch again to come back to [address], DEBBIE  06/19/23 0023

## 2023-06-20 ENCOUNTER — VBI (OUTPATIENT)
Dept: ADMINISTRATIVE | Facility: OTHER | Age: 78
End: 2023-06-20

## 2023-06-20 NOTE — TELEPHONE ENCOUNTER
[address] [facility]  ED Visit Information     Ed visit date:6/18/2023  Diagnosis Description: Pacemaker complications, initial encounter  In Network? Yes [address] [facility]  Discharge status: Home  Discharged with meds ? No  Number of ED visits to date: 1  ED Severity:3     Outreach Information    Outreach successful: Yes 1  Date letter mailed:0  Date Finalized:6/20/23    Care Coordination    Follow up appointment with specialilty: yes 6/21/23 [facility] cardio   Transportation issues ? No    Value [employer] type: 7 Day Outreach  Emergent necessity warranted by diagnosis: Yes  [facility] PCP: Yes  Transportation: Friend/Family Transport  Called PCP first?: No  Feels able to call PCP for urgent problems ?: Yes  Understands what emergencies can be handled by PCP ?: Yes  Ever any problems getting appointment with PCP for minor emergency/urgency problems?: No  Practice Contacted Patient ?: No  Pt had ED follow up with pcp/staff ?: No    Seen for follow-up out of network ?: Yes  Reason Pt went out of  network ?: Previously established with, PCP does not perform service  Urgent care Education?: No    06/20/2023 11:03 AM EDT by Deepak Limon - Call Complete    There was a personal communication with patient regarding recent ED visit  Kiah Bergeron declined a follow up with PCP /Appt 6/21/23 w/ [facility] Cardiologist prev established  [facility], education not given

## 2023-06-23 ENCOUNTER — TRANSCRIBE ORDERS (OUTPATIENT)
Dept: LAB | Facility: CLINIC | Age: 78
End: 2023-06-23

## 2023-06-23 ENCOUNTER — APPOINTMENT (OUTPATIENT)
Dept: LAB | Facility: CLINIC | Age: 78
End: 2023-06-23
Payer: MEDICARE

## 2023-06-23 DIAGNOSIS — I48.0 PAROXYSMAL ATRIAL FIBRILLATION (HCC): Primary | ICD-10-CM

## 2023-06-23 LAB
ALBUMIN SERPL BCP-MCNC: 3.5 G/DL (ref 3.5–5)
ALP SERPL-CCNC: 64 U/L (ref 46–116)
ALT SERPL W P-5'-P-CCNC: 47 U/L (ref 12–78)
ANION GAP SERPL CALCULATED.3IONS-SCNC: 0 MMOL/L
AST SERPL W P-5'-P-CCNC: 19 U/L (ref 5–45)
BILIRUB SERPL-MCNC: 0.49 MG/DL (ref 0.2–1)
BUN SERPL-MCNC: 22 MG/DL (ref 5–25)
CALCIUM SERPL-MCNC: 8.7 MG/DL (ref 8.3–10.1)
CHLORIDE SERPL-SCNC: 111 MMOL/L (ref 96–108)
CO2 SERPL-SCNC: 31 MMOL/L (ref 21–32)
CREAT SERPL-MCNC: 1.08 MG/DL (ref 0.6–1.3)
GFR SERPL CREATININE-BSD FRML MDRD: 65 ML/MIN/1.73SQ M
GLUCOSE SERPL-MCNC: 133 MG/DL (ref 65–140)
POTASSIUM SERPL-SCNC: 4.1 MMOL/L (ref 3.5–5.3)
PROT SERPL-MCNC: 6.7 G/DL (ref 6.4–8.4)
SODIUM SERPL-SCNC: 142 MMOL/L (ref 135–147)
TSH SERPL DL<=0.05 MIU/L-ACNC: 1.63 UIU/ML (ref 0.45–4.5)

## 2023-06-23 PROCEDURE — 84443 ASSAY THYROID STIM HORMONE: CPT

## 2023-06-23 PROCEDURE — 80053 COMPREHEN METABOLIC PANEL: CPT

## 2023-06-23 PROCEDURE — 36415 COLL VENOUS BLD VENIPUNCTURE: CPT

## 2023-06-27 ENCOUNTER — NURSE TRIAGE (OUTPATIENT)
Age: 78
End: 2023-06-27

## 2023-06-27 NOTE — TELEPHONE ENCOUNTER
Juliann Wise Jr  complains of constipation  He had taken dulcolax and prune juice to help have bowel movement today  His last colonoscopy was about 2 years ago and he has a family history of colon cancer         ----- Message from Evie Putnam sent at 6/27/2023  1:31 PM EDT -----  Pt was calling to see if he can get a sooner appt having rectal pain

## 2023-06-28 NOTE — PROGRESS NOTES
PT Re-Evaluation    Today's date: 2021  Patient name: Danni Carvajal  : 1945  MRN: [id number]  Referring provider: Sherwin Gardiner MD  Dx:   Encounter Diagnosis     ICD-10-CM    1  Sprain of right shoulder, unspecified shoulder sprain type, initial encounter  S43 [id number]        Start Time: 1140  Stop Time: 1230  Total time in clinic (min): 50 minutes    Assessment  Assessment details: Beranrd Yanez presents with remaining pain, decreased UE range of motion, decreased UE strength, impaired function, decreased activity tolerance and poor posture  Patient's clinical presentation is consistent with their referring diagnosis of Sprain of right shoulder, unspecified shoulder sprain type, initial encounter  (primary encounter diagnosis)  The pt presents with functional limitations of ADLs, recreational activities, self-care and reaching  Pt would benefit from continued physical therapy services to address these limitations and maximize function  Impairments: abnormal muscle firing, abnormal muscle tone, abnormal or restricted ROM, activity intolerance, impaired physical strength, lacks appropriate home exercise program, pain with function, scapular dyskinesis, poor posture  and poor body mechanics  Understanding of Dx/Px/POC: good   Prognosis: good    Goals  Short term goals:  (3 weeks)  1  Patient will have pain level 2/10 right  shoulder at rest  --  MET  2  Patient will report a 50% improvement in symptoms with ADL's  --  MET  3  Patient will demonstrate appropriate scapulohumeral rhythm with reaching shoulder height and below  --  MET  4  Patient will have improved right shoulder ROM by 20 degrees  --  MET    Long term goals: (6 weeks)  1  Patient will report 85 % improvement improvement in symptoms with ADL's  --  PARTIALLY MET  2  Patient will have pain level 2/10 right shoulder with ADL's  --  MET  3   Patient will demonstrate appropriate scapulohumeral rhythm with reaching overhead  --  PARTIALLY MET  4  Patient will be independent in a comprehensive home exercise program  --  PARTIALLY MET      Plan  Patient would benefit from: skilled physical therapy  Planned modality interventions: cryotherapy and thermotherapy: hydrocollator packs  Planned therapy interventions: joint mobilization, manual therapy, massage, neuromuscular re-education, patient education, postural training, strengthening, stretching, therapeutic activities, ADL training, functional ROM exercises, home exercise program, abdominal trunk stabilization and therapeutic exercise  Frequency: 2x week  Duration in visits: 6  Duration in weeks: 3  Treatment plan discussed with: patient        Subjective Evaluation    History of Present Illness  Mechanism of injury: Rodriguez Tracey reports overall improvement with pain level and function  His notes improved ROM and strength right shoulder  His largest functional complaint is having pain with overhead reaching and at times while sleeping  Pain  Current pain ratin  At best pain ratin  At worst pain ratin  Location: Right shoulder  Quality: dull ache  Relieving factors: ice  Aggravating factors: lifting and overhead activity    Social Support    Employment status: not working  Hand dominance: right  Exercise history: Dog walking, Golf, Antiquing    Patient Goals  Patient goals for therapy: decreased pain, return to sport/leisure activities and independence with ADLs/IADLs          Objective     Postural Observations  Seated posture: fair    Additional Postural Observation Details  Patient has slouched posture in sitting      Palpation     Right   Hypertonic in the levator scapulae and upper trapezius  Tenderness of the levator scapulae, pectoralis minor and upper trapezius       Passive Range of Motion   Left Shoulder   Flexion: 174 degrees   Abduction: 171 degrees   External rotation 0°: 90 degrees   Internal rotation 0°: 90 degrees     Right Shoulder   Flexion: 165 degrees   Abduction: 157 degrees   External rotation 0°: 90 degrees   Internal rotation 0°: 90 degrees     Scapular Mobility     Right Shoulder   Scapular mobility: fair    Scapular Mobility beyond 90° FF   Scapular elevation: minimal    Strength/Myotome Testing     Left Shoulder     Planes of Motion   Flexion: 5   Abduction: 5   External rotation at 0°: 5   Internal rotation at 0°: 5     Right Shoulder     Planes of Motion   Flexion: 4   Abduction: 4   External rotation at 0°: 4   Internal rotation at 0°: 4+              Precautions:  Afib, Diabetes, Hypertension, LATEX ALLERGY Finasteride Pregnancy And Lactation Text: This medication is absolutely contraindicated during pregnancy. It is unknown if it is excreted in breast milk.

## 2023-06-29 LAB
LEFT EYE DIABETIC RETINOPATHY: NORMAL
RIGHT EYE DIABETIC RETINOPATHY: NORMAL
SEVERITY (EYE EXAM): NORMAL

## 2023-07-05 ENCOUNTER — TELEPHONE (OUTPATIENT)
Age: 78
End: 2023-07-05

## 2023-07-05 ENCOUNTER — TELEPHONE (OUTPATIENT)
Dept: PULMONOLOGY | Facility: CLINIC | Age: 78
End: 2023-07-05

## 2023-07-05 NOTE — TELEPHONE ENCOUNTER
Patients GI provider:  Dr. Sheri Cui    Number to return call: [phone]     Reason for call: Patient called asking for an appointment asap with Dr Sammi Miranda nothing available until September and he said this symptoms cannot wait until sep, he his having a lot of IBS issues right now please review     Scheduled procedure/appointment date if applicable: n/a Plan of Care Dietitian Initial Evaluation

## 2023-07-05 NOTE — TELEPHONE ENCOUNTER
Patient called stating that he needs a script sent to [address] for the nasal canal mask with the nasal pillow because he doesn't use the full face mask. Please advise.

## 2023-07-05 NOTE — PROGRESS NOTES
PAP Resupply orders (Nasal pillows)were submitted to [facility] via [facility]ute. Therapist spoke with dtr Geni Serafin ([phone]), to follow up on dtr hiring 24/7 supervision as dtr reported last week she was going to look into HHA  Dtr reporting she is going to take FMLA starting 1/24/22 for at least 2 weeks and will be staying with pt immediately after DC  Dtr reports that she wants to see how pt is doing in her own environment and from there decide if she needs to hire a HHA  If not dtr reports that her Sister in law can work from home and stay with pt as well providing 24/7 supervision  Dtr verbalizes understanding that pt will need A with meds, meals and IADLs which she reports family can A  Dtr also reported that she will be removing stove knobs and taking pts car keys  Therapist communicated with dtr that she will relay this information during standup and if team feels like pt can DC this week CM will be in contact  Of note, CM when contacting dtr, dtr requesting to not call from 11am-1pm as she is working and serving lunch to students   Dtr with no further questions at this time and understands mentioned recommendations      -Mamta Martel MS, OTR/L

## 2023-07-06 NOTE — TELEPHONE ENCOUNTER
I spoke with the patient. Pt declining seeing another provider. Requesting Dr. Em Portillo specifically. Patient requesting RN triage tomorrow 7/7 am, unable to speak about current symptoms at this time.

## 2023-07-07 LAB

## 2023-07-07 NOTE — TELEPHONE ENCOUNTER
I connected with the patient on the phone. Last OV 5/1 [facility]     10/31/22 [place]  Next OV 7/17 Colon Surg                 9/19 [place]    Pt c/o of constipation. Bowel movements every 2-3 days, sits on toilet more that 10 minutes. Occasional abdominal pain once a week if patient eats trigger foods like oils or fried foods. Denies blood or seeing external hemorrhoids. Rectal discomfort. Not taking Creon or Bentyl- experienced stomach pain. Pt was advised to start Metamucil in the morning and Miralax at night, pt tried for a few days after appointment in May but stopped. Recommended  Appointment with Colon Rectal for rectal pain on 7/17  8 cups of water per day  Metamucil or benefiber daily  Miralax 1 cap in the evening  Avoid trigger foods, oils, fried foods  Offered early appointment with PA, pt declined. Advised patient to call office in 1 week if no change in constipation after following recommendations. Reviewed s/s for ED evaluation.

## 2023-07-10 ENCOUNTER — OFFICE VISIT (OUTPATIENT)
Dept: FAMILY MEDICINE CLINIC | Facility: CLINIC | Age: 78
End: 2023-07-10
Payer: MEDICARE

## 2023-07-10 VITALS
TEMPERATURE: 96.7 F | BODY MASS INDEX: 30.52 KG/M2 | HEART RATE: 68 BPM | SYSTOLIC BLOOD PRESSURE: 120 MMHG | HEIGHT: 71 IN | DIASTOLIC BLOOD PRESSURE: 70 MMHG | WEIGHT: 218 LBS | RESPIRATION RATE: 18 BRPM

## 2023-07-10 DIAGNOSIS — R60.0 EDEMA OF BOTH LOWER LEGS: ICD-10-CM

## 2023-07-10 DIAGNOSIS — M79.89 LEFT ARM SWELLING: Primary | ICD-10-CM

## 2023-07-10 DIAGNOSIS — E78.2 MIXED HYPERLIPIDEMIA: ICD-10-CM

## 2023-07-10 DIAGNOSIS — I48.91 ATRIAL FIBRILLATION, UNSPECIFIED TYPE (HCC): ICD-10-CM

## 2023-07-10 PROCEDURE — 99214 OFFICE O/P EST MOD 30 MIN: CPT | Performed by: NURSE PRACTITIONER

## 2023-07-10 RX ORDER — HYDROCHLOROTHIAZIDE 12.5 MG/1
12.5 TABLET ORAL DAILY
Qty: 90 TABLET | Refills: 1 | Status: SHIPPED | OUTPATIENT
Start: 2023-07-10 | End: 2024-01-06

## 2023-07-10 NOTE — PATIENT INSTRUCTIONS
Lymphedema   WHAT YOU NEED TO KNOW:   There is no cure for lymphedema. Treatment can relieve symptoms and prevent lymphedema from getting worse. DISCHARGE INSTRUCTIONS:   Contact your healthcare provider or lymphedema specialist if:   You have a fever or chills. You have an open area of skin that looks red or swollen, or drains pus. Your symptoms, such as swelling or pain, get worse. Your arms or legs feel heavy, or you cannot move them. Your skin becomes hard, thick, or rough. You have a skin wound that will not heal.     Your shoes, clothes, or jewelry feel tighter. You have questions or concerns about your condition or care. Self-care:   Elevate  your arm or leg above the level of your heart as often as you can. This will help decrease swelling and pain. Prop your arm or leg on pillows or blankets to keep it elevated comfortably. Wear compression socks, sleeves, or bandages  as directed. Compression devices must be fitted by a healthcare provider. Compression devices may need to be replaced every 3 to 6 months. Exercise  can help you maintain or regain function of your arm or leg. Ask your healthcare provider what type of exercise to do and how often to do it. Start slow, take breaks, and gradually do more each day. Do not do vigorous, repeated exercises. Watch for changes in your arm or leg during exercise. Stop and rest if you have swelling, increased pain, or heaviness. Elevate your arm or leg above the level of your heart. Change your position often  to help move lymphatic fluid through your body. Do not sit or  one position for more than 30 minutes. Do not cross your legs when you sit. These actions can cause lymphatic fluid to buildup. Maintain a healthy weight. Ask your healthcare provider what you should weigh. Weight loss may improve your symptoms.  If you need to lose weight, your healthcare provider can help you create a weight loss program.    Prevent infection with proper skin care:  A skin infection can make lymphedema worse. Do the following to decrease your risk for a skin infection in your arm or leg with lymphedema:  Wash your skin gently and dry it well. Use a mild soap to wash your skin. Gently pat your skin dry after you bathe. Apply a mild cream or lotion to moisturize your skin and prevent dryness or cracking. Keep your feet clean and dry. Protect your skin from injury. Wear gloves when you garden and wash dishes. Cut your nails straight across to prevent injury to your fingers and toes. Use sunscreen and insect repellant to avoid burns and punctures. Wear slippers in the house. Wear shoes when you go outside. Check your skin every day for signs of infection. Signs of infection include redness, swelling, increased heat, or pus. You may also have a fever or chills. Care for cuts, scratches or burns. Apply antibiotic ointment to cuts and other small breaks in the skin. Apply a cold pack or cold water to a burn for 15 minutes. Then wash it with soap and water. Cover scratches, cuts, or burns with a clean, dry gauze or bandage as directed. Keep the area clean and dry. Tell healthcare providers that you have lymphedema. Tell them not to do, IVs, blood draws, and blood pressure readings in the arm or leg with lymphedema. If there is lymphedema in both arms, ask them to take your blood pressure on your leg. Do not allow flu shots or vaccinations in your arm with lymphedema. Follow up with your healthcare provider or lymphedema specialist as directed: You will need regular visits so healthcare providers can examine the affected areas. You may also be referred to a clinic that specializes in lymphedema treatment. Write down your questions so you remember to ask them during your visits.   © Copyright [employer] 2022 Information is for End User's use only and may not be sold, redistributed or otherwise used for commercial purposes. The above information is an  only. It is not intended as medical advice for individual conditions or treatments. Talk to your doctor, nurse or pharmacist before following any medical regimen to see if it is safe and effective for you.

## 2023-07-10 NOTE — PROGRESS NOTES
Assessment/Plan:  Advised to elevate legs while sitting and also left arm too  1. Left arm swelling  -     Ambulatory Referral to PT/OT Lymphedema Therapy; Future    2. Edema of both lower legs  -     hydrochlorothiazide (HYDRODIURIL) 12.5 mg tablet; Take 1 tablet (12.5 mg total) by mouth daily    3. Mixed hyperlipidemia  Assessment & Plan:  Complaint with statin and tolerating it well      4. Atrial fibrillation, unspecified type [facility])  Assessment & Plan:  Managed by cardiologist jose m BALL and on amiodarone, eliquis and coreg              Patient Instructions:  Supportive care discussed and advised. Advised to RTO for any worsening and no improvement. Follow up for no improvement and worsening of conditions. Patient advised and educated when to see immediate medical care. Return if symptoms worsen or fail to improve. Future Appointments   Date Time Provider [address]   7/13/2023  8:15 AM Josi Vee MD URO [facility]   7/17/2023  3:30 PM Orien Buerger, MD [facility]   9/18/2023 10:30 AM Gloria Goldstein MD NEURO [facility]   9/19/2023  1:20 PM Umu Abbott MD GI [facility]           Subjective:      Patient ID: Chandra Good is a 66 y.o. male. Chief Complaint   Patient presents with   • Leg Swelling     Left leg swollen- all on left side lw cma         Vitals:  /70   Pulse 68   Temp (!) 96.7 °F (35.9 °C) (Tympanic)   Resp 18   Ht 5' 11" (1.803 m)   Wt 98.9 kg (218 lb)   BMI 30.40 kg/m²     HPI  Patient stated that noticed some swelling on bilateral lower legs at ankle areas as his socks are leaving marks on. Stated that this morning noticed bump on left shin area but resolved now. Also still having left arm edema. Had CT chest in sept 2022 and did not show any lymphadenopathy and vascular scan has been negative too, will follow with lymphedema clinic. Complaint with medications and tolerating it well.              The following portions of the patient's history were reviewed and updated as appropriate: allergies, current medications, past family history, past medical history, past social history, past surgical history and problem list.      Review of Systems   HENT: Negative. Respiratory: Negative. Cardiovascular: Positive for leg swelling. Negative for chest pain and palpitations. As noted in HPI     Neurological: Negative for dizziness and headaches. Objective:    Social History     Tobacco Use   Smoking Status Former   • Packs/day: 1.00   • Years: 20.00   • Total pack years: 20.00   • Types: Cigarettes   • Quit date: 5   • Years since quittin.5   Smokeless Tobacco Never   Tobacco Comments    Started age 16       Allergies: Allergies   Allergen Reactions   • Codeine Other (See Comments) and GI Intolerance     Reaction Date: 2004;  Annotation - [id number]: '' CRAZY ''  vomiting  headache   • Demerol [Meperidine] Nausea Only, Other (See Comments), GI Intolerance and Vomiting     vomiting  Reaction Date: 2004;   headache   • Morphine Nausea Only, GI Intolerance and Vomiting     Reaction Date: 2012;          Current Outpatient Medications   Medication Sig Dispense Refill   • acetaminophen (TYLENOL) 325 mg tablet Take 650 mg by mouth every 6 (six) hours as needed     • amiodarone 200 mg tablet 200 mg daily after dinner      • amLODIPine (NORVASC) 5 mg tablet      • apixaban (ELIQUIS) 5 mg Take 5 mg by mouth daily To stop 2 days prior to the procedure     • atorvastatin (LIPITOR) 20 mg tablet TAKE 1 TABLET DAILY 90 tablet 3   • Blood Glucose Monitoring Suppl (FREESTYLE LITE) GEOVANNY      • carvedilol (COREG) 25 mg tablet Take 12.5 mg by mouth 2 (two) times a day      • clotrimazole-betamethasone (LOTRISONE) 1-0.05 % cream Apply topically 2 (two) times a day 45 g 1   • esomeprazole (NexIUM) 20 mg capsule Take 1 capsule (20 mg total) by mouth daily at bedtime 90 capsule 3   • fluocinonide (LIDEX) 0.05 % external solution      • fluticasone (FLONASE) 50 mcg/act nasal spray 2 sprays into each nostril daily 48 g 3   • FREESTYLE LITE test strip      • hydrochlorothiazide (HYDRODIURIL) 12.5 mg tablet Take 1 tablet (12.5 mg total) by mouth daily 90 tablet 1   • ketoconazole (NIZORAL) 2 % shampoo      • Lancets (FREESTYLE) lancets      • losartan (COZAAR) 100 MG tablet 100 mg every morning      • Ozempic, 0.25 or 0.5 MG/DOSE, 2 MG/1.5ML SOPN      • pioglitazone-metFORMIN (ACTOPLUS MET)  MG per tablet daily after dinner     • polyethylene glycol-propylene glycol (Systane) 0.4-0.3 % every 3 (three) hours as needed       • RESTASIS 0.05 % ophthalmic emulsion Administer 1 drop to both eyes every 12 (twelve) hours      • tamsulosin (FLOMAX) 0.4 mg Take 1 capsule (0.4 mg total) by mouth daily with dinner 90 capsule 3   • amoxicillin (AMOXIL) 500 mg capsule  (Patient not taking: Reported on 5/1/2023)     • hydrocortisone (ANUSOL-HC) 2.5 % rectal cream Apply topically 2 (two) times a day (Patient not taking: Reported on 7/10/2023) 30 g 2   • hydrOXYzine pamoate (VISTARIL) 25 mg capsule Take 1 capsule (25 mg total) by mouth 3 (three) times a day as needed for anxiety (Patient not taking: Reported on 7/10/2023) 60 capsule 3     No current facility-administered medications for this visit. Physical Exam  Constitutional:       Appearance: Normal appearance. HENT:      Head: Normocephalic. Nose: Nose normal.   Eyes:      Conjunctiva/sclera: Conjunctivae normal.   Cardiovascular:      Rate and Rhythm: Normal rate and regular rhythm. Heart sounds: Normal heart sounds. Pulmonary:      Effort: Pulmonary effort is normal.      Breath sounds: Normal breath sounds. Musculoskeletal:         General: Swelling (left arm diffuse swelling noted without any movement issues) present. No tenderness. Normal range of motion. Right lower leg: Edema present. Left lower leg: Edema present.       Comments: Bilateral ankle edema noted   Skin:     General: Skin is warm and dry. Findings: No rash. Neurological:      Mental Status: He is alert and oriented to person, place, and time. Psychiatric:         Mood and Affect: Mood normal.         Behavior: Behavior normal.         Thought Content:  Thought content normal.         Judgment: Judgment normal.                     SANDRITA Johnson

## 2023-07-12 NOTE — PROGRESS NOTES
Colon and Rectal Surgery   Laurance Sandifer. 66 y.o. male MRN [id number]  Encounter: [id number]  07/17/23 4:10 PM            Assessment: Laurance Sandifer. is a 66 y.o. male who has hemorrhoids. Plan:   Benign localized prostatic hyperplasia with lower urinary tract symptoms (LUTS)  My exam shows a large prostate. He is following with urology. Grade IV hemorrhoids  The external hemorrhoids are very long and redundant. These are 2-1/2 cm in length and are broad-based. The internal hemorrhoids are extensive but less substantial.  The hemorrhoids cause the patient irritation especially with wiping after a bowel movement. Hemorrhoidectomy could be considered. Before that, I recommend a trial of high-fiber intake with avoidance of wipe trauma. A high fiber diet is recommended. I administered a fiber sheet and gave instructions on its use. Supplements were discussed. Exercise is recommended. He is to take Metamucil daily. He understands the recommendations. For the time being, I would like him to try to avoid MiraLAX in order to clean up the residue present after bowel movements. Colonoscopy was done only 2 years ago. I do not recommend repeating it at this time. Subjective     HPI    Laurance Sandifer. is a 66 y.o. male who is referred today by Dr. Brock Lewis for hemorrhoids evaluation. The patient notes history of irritable bowel syndrome with ongoing constipation and related straining for "a long time" and an uncomfortable anal lump for the past 6 months; he notes irregular frequency with his bowel movements, but denies any rectal bleeding and notes he has tried Preparation H and Metamucil. His most recent colonoscopy/EGD on 11/10/21 by Dr. Tootie Joseph showed: Indication: Irritable bowel syndrome with diarrhea. Impression: 1. 2 polyps removed. 2. Scattered diverticulosis. 5 year recall. Suboptimal prep in some areas. Colonoscopy pathology:  B.  Polyp, Colorectal, cecal polyp:  - Sessile serrated lesion.  - Negative for malignancy. C. Large Intestine, Transverse Colon, polyp:  - Tubular adenoma. - Negative for high-grade dysplasia and malignancy.     Family history of colon cancer in mother. Historical Information   Past Medical History:   Diagnosis Date   • Allergic rhinitis    • Anemia 10/23/2008    last assessed 9/9/13    • Anxiety    • Atrial fibrillation (HCC)    • Bleeding    • Chronic kidney disease    • COVID-19 virus infection 12/2022   • CPAP (continuous positive airway pressure) dependence    • Diabetes mellitus ([address])    • Diarrhea    • Diverticulitis    • Eating disorder    • GERD (gastroesophageal reflux disease)    • Herpes zoster with complication     last assessed 7/3/17    • Hiatal hernia    • Hyperlipidemia    • Hypertension    • IBS (irritable bowel syndrome)    • Incisional hernia    • Increased urinary frequency    • Irregular heart beat    • Pyogenic granuloma 09/13/2006    last assessed 9/13/06   • Carlos (subconjunctival hemorrhage), unspecified laterality 09/12/2005    last assessed 9/12/05   • Sleep apnea    • TMJ (dislocation of temporomandibular joint)    • Ventral hernia     last assessed  4/6/17    • Wears glasses      Past Surgical History:   Procedure Laterality Date   • APPENDECTOMY     • ATRIAL ABLATION SURGERY      2014   • ATRIAL ABLATION SURGERY      catheter ablation atrial fibrillation / last assessed 2/17/16    • CHOLECYSTECTOMY      lap   • COLON SURGERY  1990    Hartmans procedure   • COLON SURGERY      reversal 6 weeks later   • COLONOSCOPY N/A 1/26/2017    Procedure: COLONOSCOPY;  Surgeon: Fredo Mcclelland MD;  Location: [address] [place] [address] GI LAB; Service:    • ESOPHAGOGASTRODUODENOSCOPY N/A 1/26/2017    Procedure: ESOPHAGOGASTRODUODENOSCOPY (EGD); Surgeon: Fredo Mcclelland MD;  Location: [address] [place] [address] GI LAB;   Service:    • EYE SURGERY Bilateral 2015    lid repair   • HERNIA REPAIR Bilateral     inguinal 2014 & 2013   • [address]  2011   • INCISIONAL HERNIA REPAIR N/A 3/24/2017    Procedure: REPAIR OF INCARCERATED INCISIONAL HERNIA WITH MESH, Lysis of Adhesions, Partial Omentectomy;  Surgeon: Kirby Washington MD;  Location: [facility];  Service:    • Anni Byrne / Falguni Sanchez / Bea Pedersen PACEMAKER Left 10/04/2021    [employer]-[id number]   • NEUROBLASTOMA EXCISION     • SKIN CANCER EXCISION      On nose   • TONSILLECTOMY         Meds/Allergies       Current Outpatient Medications:   •  acetaminophen (TYLENOL) 325 mg tablet, Take 650 mg by mouth every 6 (six) hours as needed, Disp: , Rfl:   •  amiodarone 200 mg tablet, 200 mg daily after dinner , Disp: , Rfl:   •  amLODIPine (NORVASC) 5 mg tablet, , Disp: , Rfl:   •  apixaban (ELIQUIS) 5 mg, Take 5 mg by mouth daily To stop 2 days prior to the procedure, Disp: , Rfl:   •  atorvastatin (LIPITOR) 20 mg tablet, TAKE 1 TABLET DAILY, Disp: 90 tablet, Rfl: 3  •  clotrimazole-betamethasone (LOTRISONE) 1-0.05 % cream, Apply topically 2 (two) times a day, Disp: 45 g, Rfl: 1  •  esomeprazole (NexIUM) 20 mg capsule, Take 1 capsule (20 mg total) by mouth daily at bedtime, Disp: 90 capsule, Rfl: 3  •  fluticasone (FLONASE) 50 mcg/act nasal spray, 2 sprays into each nostril daily, Disp: 48 g, Rfl: 3  •  Lancets (FREESTYLE) lancets, , Disp: , Rfl:   •  losartan (COZAAR) 100 MG tablet, 100 mg every morning , Disp: , Rfl:   •  Ozempic, 0.25 or 0.5 MG/DOSE, 2 MG/1.5ML SOPN, , Disp: , Rfl:   •  tamsulosin (FLOMAX) 0.4 mg, Take 1 capsule (0.4 mg total) by mouth daily with dinner, Disp: 90 capsule, Rfl: 3  •  amoxicillin (AMOXIL) 500 mg capsule, , Disp: , Rfl:   •  Blood Glucose Monitoring Suppl (FREESTYLE LITE) GEOVANNY, , Disp: , Rfl:   •  carvedilol (COREG) 25 mg tablet, Take 12.5 mg by mouth 2 (two) times a day , Disp: , Rfl:   •  fluocinonide (LIDEX) 0.05 % external solution, , Disp: , Rfl:   •  FREESTYLE LITE test strip, , Disp: , Rfl:   •  hydrochlorothiazide (HYDRODIURIL) 12.5 mg tablet, Take 1 tablet (12.5 mg total) by mouth daily (Patient not taking: Reported on 2023), Disp: 90 tablet, Rfl: 1  •  hydrocortisone (ANUSOL-HC) 2.5 % rectal cream, Apply topically 2 (two) times a day (Patient not taking: Reported on 7/10/2023), Disp: 30 g, Rfl: 2  •  hydrOXYzine pamoate (VISTARIL) 25 mg capsule, Take 1 capsule (25 mg total) by mouth 3 (three) times a day as needed for anxiety (Patient not taking: Reported on 7/10/2023), Disp: 60 capsule, Rfl: 3  •  ketoconazole (NIZORAL) 2 % shampoo, , Disp: , Rfl:   •  pioglitazone-metFORMIN (ACTOPLUS MET)  MG per tablet, daily after dinner, Disp: , Rfl:   •  polyethylene glycol-propylene glycol (Systane) 0.4-0.3 %, every 3 (three) hours as needed  , Disp: , Rfl:   •  RESTASIS 0.05 % ophthalmic emulsion, Administer 1 drop to both eyes every 12 (twelve) hours , Disp: , Rfl:   •  tadalafil (CIALIS) 5 MG tablet, Take 1 tablet (5 mg total) by mouth in the morning (Patient not taking: Reported on 2023), Disp: 90 tablet, Rfl: 3  Allergies   Allergen Reactions   • Codeine Other (See Comments) and GI Intolerance     Reaction Date: 2004;  Annotation - : '' CRAZY ''  vomiting  headache   • Demerol [Meperidine] Nausea Only, Other (See Comments), GI Intolerance and Vomiting     vomiting  Reaction Date: 2004;   headache   • Morphine Nausea Only, GI Intolerance and Vomiting     Reaction Date: 2012;        Social History   Social History     Substance and Sexual Activity   Drug Use No     Social History     Tobacco Use   Smoking Status Former   • Packs/day: 1.00   • Years: 20.00   • Total pack years: 20.00   • Types: Cigarettes   • Quit date: 5   • Years since quittin.5   Smokeless Tobacco Never   Tobacco Comments    Started age 16         Family History   Problem Relation Age of Onset   • Heart disease Mother    • Cancer Mother    • Colon cancer Mother    • Dementia Mother    • Early death Father    • Seizures Father    • Cancer Sister         bone   • Heart disease Sister • Diabetes Sister    • Lupus Sister    • Heart disease Brother    • Cancer Brother         kidney   • Diabetes Brother    • Other Sister         covid   • Stroke Neg Hx          Review of Systems   Constitutional: Negative. Respiratory: Negative. Cardiovascular: Negative. Gastrointestinal: Positive for constipation. Hemorrhoid irritation       Objective   Current Vitals:  Vitals:    07/17/23 1543   Weight: 100 kg (221 lb)   Height: 5' 11" (1.803 m)         Physical Exam  Constitutional:       Appearance: Normal appearance. Genitourinary:     Comments: The prostate is enlarged but I feel no nodules. I cannot evaluate the entire prostate due to its size. Digital exam reveals large anterior and posterior hemorrhoidal tags. The anal canal is relatively unremarkable. There are engorged and redundant internal hemorrhoids circumferentially. Neurological:      General: No focal deficit present. Mental Status: He is alert and oriented to person, place, and time.

## 2023-07-13 ENCOUNTER — OFFICE VISIT (OUTPATIENT)
Dept: UROLOGY | Facility: CLINIC | Age: 78
End: 2023-07-13
Payer: MEDICARE

## 2023-07-13 ENCOUNTER — LAB (OUTPATIENT)
Dept: LAB | Facility: CLINIC | Age: 78
End: 2023-07-13
Payer: MEDICARE

## 2023-07-13 VITALS
HEIGHT: 71 IN | SYSTOLIC BLOOD PRESSURE: 124 MMHG | BODY MASS INDEX: 30.52 KG/M2 | WEIGHT: 218 LBS | DIASTOLIC BLOOD PRESSURE: 84 MMHG

## 2023-07-13 DIAGNOSIS — N28.1 COMPLEX RENAL CYST: ICD-10-CM

## 2023-07-13 DIAGNOSIS — N40.1 BENIGN LOCALIZED PROSTATIC HYPERPLASIA WITH LOWER URINARY TRACT SYMPTOMS (LUTS): ICD-10-CM

## 2023-07-13 DIAGNOSIS — N40.1 BENIGN LOCALIZED PROSTATIC HYPERPLASIA WITH LOWER URINARY TRACT SYMPTOMS (LUTS): Primary | ICD-10-CM

## 2023-07-13 LAB — PSA SERPL-MCNC: 4.06 NG/ML (ref 0–4)

## 2023-07-13 PROCEDURE — 84153 ASSAY OF PSA TOTAL: CPT

## 2023-07-13 PROCEDURE — 36415 COLL VENOUS BLD VENIPUNCTURE: CPT

## 2023-07-13 PROCEDURE — 99214 OFFICE O/P EST MOD 30 MIN: CPT | Performed by: UROLOGY

## 2023-07-13 RX ORDER — TADALAFIL 5 MG/1
5 TABLET ORAL DAILY
Qty: 90 TABLET | Refills: 3 | Status: SHIPPED | OUTPATIENT
Start: 2023-07-13 | End: 2023-10-11

## 2023-07-13 RX ORDER — TAMSULOSIN HYDROCHLORIDE 0.4 MG/1
0.4 CAPSULE ORAL
Qty: 90 CAPSULE | Refills: 3 | Status: SHIPPED | OUTPATIENT
Start: 2023-07-13

## 2023-07-13 NOTE — ASSESSMENT & PLAN NOTE
This appears stable ultrasound but to better assess we will get an MRI in 6 months. If that is stable then would plan for annual or every other year imaging.     He has a pacemaker but it is MRI compatible and he has had MRIs in the past with it

## 2023-07-13 NOTE — PROGRESS NOTES
Assessment/Plan:    Benign localized prostatic hyperplasia with lower urinary tract symptoms (LUTS)  Rediscussed the role for outlet surgery. I do think this is recommended given his elevated PVRs and voiding symptoms. Discussed HoLEP versus prostate artery embolization as I do not think he needs a robot simple prostatectomy. Based on his other medical issues and his wife medical needs he is appears to favor a surgery that is outpatient and without a catheter so I have placed referral to interventional radiology for prostate artery embolization. In the interval he will continue Flomax. He does not want to restart finasteride. He would like to try daily Cialis as well. We discussed the risk for hypotension    Complex renal cyst  This appears stable ultrasound but to better assess we will get an MRI in 6 months. If that is stable then would plan for annual or every other year imaging. He has a pacemaker but it is MRI compatible and he has had MRIs in the past with it          Subjective:      Patient ID: Bharati Kenney is a 66 y.o. male. HPI      35-year-old male with history of recurrent UTI complex right renal cyst and bladder outlet obstruction. He has been previously seen by Dr. Ana Barajas who referred him for discussion of HoLEP surgery.     The patient is to be followed by Dr. Efrain Leone for history of recurrent UTIs as well as incomplete emptying with BPH. He was on finasteride and Flomax.     The patient is bothered by a weak stream when he first starts to void and then improves over time. Uroflow showed a voided volume of 220 cc with a max flow of 9 and average flow 3 mL/second with PVR of 202 cc. Cytoscopy showed trabeculations with long prostrate with kissing lateral lobes and multiple small bladder stones. TRUS volume 88cc     PVR at time of recent renal bladder ultrasound was approximately 300 cc. CT urogram showed a 4.3 cm right lower pole exophytic cyst, classified as Bosniak 3. Follow-up imaging a via MRI in March 2022 showed the cyst to be smaller in size and therefore the patient was recommend observation with plan for additional imaging via ultrasound i performed in May 2023 and showed the cyst to be stable in size    Patient's abdominal surgical history significant for mesh placement. Past Surgical History:   Procedure Laterality Date   • APPENDECTOMY     • ATRIAL ABLATION SURGERY      2014   • ATRIAL ABLATION SURGERY      catheter ablation atrial fibrillation / last assessed 2/17/16    • CHOLECYSTECTOMY      lap   • COLON SURGERY  1990    Hartmans procedure   • COLON SURGERY      reversal 6 weeks later   • COLONOSCOPY N/A 1/26/2017    Procedure: COLONOSCOPY;  Surgeon: Jason Lugo MD;  Location: [address] [place] [address] [facility]; Service:    • ESOPHAGOGASTRODUODENOSCOPY N/A 1/26/2017    Procedure: ESOPHAGOGASTRODUODENOSCOPY (EGD); Surgeon: Jason Lugo MD;  Location: [address] [place] [address] [facility];   Service:    • EYE SURGERY Bilateral 2015    lid repair   • HERNIA REPAIR Bilateral     inguinal 2014 & 2013   • [address]  2011   • INCISIONAL HERNIA REPAIR N/A 3/24/2017    Procedure: REPAIR OF INCARCERATED INCISIONAL HERNIA WITH MESH, Lysis of Adhesions, Partial Omentectomy;  Surgeon: Yanna Castle MD;  Location: [facility];  Service:    • Manda Huggins / Martín Garcia / Tae Granda Left 10/04/2021    medtronic-M#[id number]   • NEUROBLASTOMA EXCISION     • SKIN CANCER EXCISION      On nose   • TONSILLECTOMY          Past Medical History:   Diagnosis Date   • Allergic rhinitis    • Anemia 10/23/2008    last assessed 9/9/13    • Anxiety    • Atrial fibrillation (HCC)    • Bleeding    • Chronic kidney disease    • COVID-19 virus infection 12/2022   • CPAP (continuous positive airway pressure) dependence    • Diabetes mellitus ([address])    • Diarrhea    • Diverticulitis    • Eating disorder    • GERD (gastroesophageal reflux disease)    • Herpes zoster with complication     last assessed 7/3/17    • Hiatal hernia • Hyperlipidemia    • Hypertension    • IBS (irritable bowel syndrome)    • Incisional hernia    • Increased urinary frequency    • Irregular heart beat    • Pyogenic granuloma 09/13/2006    last assessed 9/13/06   • Carlos (subconjunctival hemorrhage), unspecified laterality 09/12/2005    last assessed 9/12/05   • Sleep apnea    • TMJ (dislocation of temporomandibular joint)    • Ventral hernia     last assessed  4/6/17    • Wears glasses              Review of Systems   Constitutional: Negative for chills and fever. HENT: Negative for ear pain and sore throat. Eyes: Negative for pain and visual disturbance. Respiratory: Negative for cough and shortness of breath. Cardiovascular: Negative for chest pain and palpitations. Gastrointestinal: Negative for abdominal pain and vomiting. Genitourinary: Negative for dysuria and hematuria. Musculoskeletal: Negative for arthralgias and back pain. Skin: Negative for color change and rash. Neurological: Negative for seizures and syncope. All other systems reviewed and are negative. Objective:      /84 (BP Location: Left arm, Patient Position: Sitting, Cuff Size: Adult)   Ht 5' 11" (1.803 m)   Wt 98.9 kg (218 lb)   BMI 30.40 kg/m²     Lab Results   Component Value Date    PSA 4.2 (H) 07/27/2021    PSA 8.1 (H) 02/19/2021          Physical Exam  Vitals reviewed. Constitutional:       Appearance: Normal appearance. He is normal weight. HENT:      Head: Normocephalic and atraumatic. Eyes:      Pupils: Pupils are equal, round, and reactive to light. Abdominal:      General: Abdomen is flat. Neurological:      General: No focal deficit present. Mental Status: He is alert and oriented to person, place, and time. Psychiatric:         Mood and Affect: Mood normal.         Thought Content: Thought content normal.           MRI Feb 2022:  Impression    Impression:   1. Probable small calculi in the urinary bladder.  Diffuse bladder wall thickening likely secondary to cystitis. This could be inflammatory or   infectious. 2. Prostatomegaly. 3. Cystic lesion with slightly thickened wall demonstrating questionable   enhancement. If there is indeed enhancement, this would be a Bosniak type III   cystic lesion. However, MRI is recommended in 3 months to confirm that there is   indeed enhancement and to assess for internal septations. RENAL ULTRASOUND WITH PVR     INDICATION:   N40.1: Benign prostatic hyperplasia with lower urinary tract symptoms.     COMPARISON: Correlation is made with the prior MRI study dated 3/10/2022 and CT study dated 2/10/2022.     TECHNIQUE:   Ultrasound of the retroperitoneum was performed with a curvilinear transducer utilizing volumetric sweeps and still imaging techniques.     FINDINGS:     KIDNEYS:  Symmetric and normal size. Right kidney:  11.7 x 5.3 x 5.4 cm. Volume 173.8 mL  Left kidney:  12.1 x 5.4 x 5.6 cm. Volume 192.1 mL     Right kidney  Normal echogenicity and contour. No mass is identified. There is an exophytic complex cyst arising from the lower pole of the right kidney measuring approximately 3.0 x 2.4 x 2.0 cm with a mildly thickened wall, not significantly changed. No hydronephrosis. No shadowing calculi. No perinephric fluid collections.     Left kidney  Normal echogenicity and contour. No mass is identified. No hydronephrosis. No shadowing calculi. No perinephric fluid collections.     URETERS:  Nonvisualized.     BLADDER:  Normally distended. Mildly trabeculated bladder wall. No focal thickening or mass lesions. Bilateral ureteral jets detected. Prevoid: 542.4  Measured post void volume in mL: 305. 1     Incidental note is made of an enlarged prostate measuring 6.5 x 6.3 x 5.6 cm.        IMPRESSION:     Exophytic complex cyst arising from the lower pole of the right kidney with a mildly thickened wall, not significantly changed.     No hydronephrosis.     Prominent postvoid urinary bladder volume of 305.1 cc.     Enlarged heterogeneous prostate.       Orders  Orders Placed This Encounter   Procedures   • MRI abdomen w wo contrast     Pt has MRI compatible pacemaker     Standing Status:   Future     Standing Expiration Date:   7/13/2027     Scheduling Instructions: There is no preparation for this test. Please leave your jewelry and valuables at home, wedding rings are the exception. All patients will be required to change into a hospital gown and pants. Street clothes are not permitted in the MRI. Magnetic nail polish must be removed prior to arrival for your test. Please bring your insurance cards, a form of photo ID and a list of your medications with you. Arrive 15 minutes prior to your appointment time in order to register. Please bring any prior CT or MRI studies of this area that were not performed at a [facility]. To schedule this appointment, please contact Central Scheduling at [phone]. Prior to your appointment, please make sure you complete the MRI Screening Form when you e-Check in for your appointment. This will be available starting 7 days before your appointment in [address] [place] [address]. You may receive an e-mail with an activation code if you do not have a [employer] account. If you do not have access to a device, we will complete your screening at your appointment. Order Specific Question:   What is the patient's sedation requirement? If Medication for Claustrophobia is selected, order medication at this point. Answer:   No Sedation     Order Specific Question:   Release to patient through [facility]     Answer:   Immediate     Order Specific Question:   Is order priority selected as STAT?      Answer:   No     Order Specific Question:   Reason for Exam (FREE TEXT)     Answer:   reasesss bosniak III cyst   • PSA Total, Diagnostic     Standing Status:   Future     Standing Expiration Date:   7/13/2024   • Ambulatory referral to Interventional Radiology     Standing Status:   Future     Standing Expiration Date:   7/13/2024     Referral Priority:   Routine     Referral Type:   Consult - AMB     Referral Reason:   Specialty Services Required     Number of Visits Requested:   1     Expiration Date:   7/12/2024

## 2023-07-13 NOTE — TELEPHONE ENCOUNTER
Called patient, reached voicemail, left message to call back if patient is having any current GI symptoms after recommendations.

## 2023-07-13 NOTE — ASSESSMENT & PLAN NOTE
Rediscussed the role for outlet surgery. I do think this is recommended given his elevated PVRs and voiding symptoms. Discussed HoLEP versus prostate artery embolization as I do not think he needs a robot simple prostatectomy. Based on his other medical issues and his wife medical needs he is appears to favor a surgery that is outpatient and without a catheter so I have placed referral to interventional radiology for prostate artery embolization. In the interval he will continue Flomax. He does not want to restart finasteride. He would like to try daily Cialis as well.   We discussed the risk for hypotension

## 2023-07-17 ENCOUNTER — OFFICE VISIT (OUTPATIENT)
Age: 78
End: 2023-07-17
Payer: MEDICARE

## 2023-07-17 VITALS — WEIGHT: 221 LBS | BODY MASS INDEX: 30.94 KG/M2 | HEIGHT: 71 IN

## 2023-07-17 DIAGNOSIS — K64.3 GRADE IV HEMORRHOIDS: Primary | ICD-10-CM

## 2023-07-17 DIAGNOSIS — K64.9 HEMORRHOIDS, UNSPECIFIED HEMORRHOID TYPE: ICD-10-CM

## 2023-07-17 PROCEDURE — 99203 OFFICE O/P NEW LOW 30 MIN: CPT | Performed by: COLON & RECTAL SURGERY

## 2023-07-17 NOTE — ASSESSMENT & PLAN NOTE
The external hemorrhoids are very long and redundant. These are 2-1/2 cm in length and are broad-based. The internal hemorrhoids are extensive but less substantial.  The hemorrhoids cause the patient irritation especially with wiping after a bowel movement. Hemorrhoidectomy could be considered. Before that, I recommend a trial of high-fiber intake with avoidance of wipe trauma. A high fiber diet is recommended. I administered a fiber sheet and gave instructions on its use. Supplements were discussed. Exercise is recommended. He is to take Metamucil daily. He understands the recommendations. For the time being, I would like him to try to avoid MiraLAX in order to clean up the residue present after bowel movements. Colonoscopy was done only 2 years ago. I do not recommend repeating it at this time.

## 2023-07-18 ENCOUNTER — TELEMEDICINE (OUTPATIENT)
Dept: INTERVENTIONAL RADIOLOGY/VASCULAR | Facility: CLINIC | Age: 78
End: 2023-07-18
Payer: MEDICARE

## 2023-07-18 DIAGNOSIS — N40.1 BENIGN LOCALIZED PROSTATIC HYPERPLASIA WITH LOWER URINARY TRACT SYMPTOMS (LUTS): Primary | ICD-10-CM

## 2023-07-18 PROCEDURE — 99443 PR PHYS/QHP TELEPHONE EVALUATION 21-30 MIN: CPT | Performed by: RADIOLOGY

## 2023-07-18 RX ORDER — SODIUM CHLORIDE 9 MG/ML
30 INJECTION, SOLUTION INTRAVENOUS CONTINUOUS
OUTPATIENT
Start: 2023-07-18

## 2023-07-18 NOTE — PROGRESS NOTES
Virtual Brief Visit    This Visit is being completed by telephone. The Patient is located at Other and in the following state in which I hold an active license [place]    The patient was identified by name and date of birth. Inocente Saad was informed that this is a telemedicine visit and that the visit is being conducted through Telephone. My office door was closed. No one else was in the room. He acknowledged consent and understanding of privacy and security of the video platform. The patient has agreed to participate and understands they can discontinue the visit at any time. Patient is aware this is a billable service. Assessment/Plan:  Patient with BPH and lower urinary tract symptoms (LUTS). Patient referred by Dr. Cinthya Jonas from Urology for possible prostate artery embolization since the patient favors an outpatient surgery that does not involve a najera catheter. The patient has a history of recurrent UTI's and incomplete emptying as well as a weak stream.  His TRUS volume is 88 cc. I discussed the procedure as well as the risks, benefits and alternatives with the patient and he wishes to proceed. My schedulers will call him with a date at [facility] in [place]. Problem List Items Addressed This Visit        Genitourinary    Benign localized prostatic hyperplasia with lower urinary tract symptoms (LUTS)       Recent Visits  No visits were found meeting these conditions. Showing recent visits within past 7 days and meeting all other requirements  Future Appointments  No visits were found meeting these conditions.   Showing future appointments within next 150 days and meeting all other requirements         Visit Time  Total Visit Duration: 45 minutes

## 2023-07-18 NOTE — LETTER
July 18, 2023     Michaela Reyes MD  [address]    Patient: Kat Goss YOB: 1945   Date of Visit: 7/18/2023       Dear Dr. Krunal Prasad: Thank you for referring Serenity Stone to me for evaluation. Below are my notes for this consultation. If you have questions, please do not hesitate to call me. I look forward to following your patient along with you. Sincerely,        Kaya Salcido MD        CC: MD Kaya Travis MD  7/18/2023  1:18 PM  Sign when Signing Visit  Virtual Brief Visit    This Visit is being completed by telephone. The Patient is located at Other and in the following state in which I hold an active license [place]    The patient was identified by name and date of birth. Kat Lopez. was informed that this is a telemedicine visit and that the visit is being conducted through Telephone. My office door was closed. No one else was in the room. He acknowledged consent and understanding of privacy and security of the video platform. The patient has agreed to participate and understands they can discontinue the visit at any time. Patient is aware this is a billable service. Assessment/Plan:  Patient with BPH and lower urinary tract symptoms (LUTS). Patient referred by Dr. Krunal Prasad from Urology for possible prostate artery embolization since the patient favors an outpatient surgery that does not involve a najera catheter. The patient has a history of recurrent UTI's and incomplete emptying as well as a weak stream.  His TRUS volume is 88 cc. I discussed the procedure as well as the risks, benefits and alternatives with the patient and he wishes to proceed. My schedulers will call him with a date at [facility] in [place].     Problem List Items Addressed This Visit          Genitourinary    Benign localized prostatic hyperplasia with lower urinary tract symptoms (LUTS)       Recent Visits  No visits were found meeting these conditions. Showing recent visits within past 7 days and meeting all other requirements  Future Appointments  No visits were found meeting these conditions.   Showing future appointments within next 150 days and meeting all other requirements         Visit Time  Total Visit Duration: 45 minutes

## 2023-08-02 ENCOUNTER — EVALUATION (OUTPATIENT)
Dept: PHYSICAL THERAPY | Facility: CLINIC | Age: 78
End: 2023-08-02
Payer: MEDICARE

## 2023-08-02 DIAGNOSIS — M79.89 LEFT ARM SWELLING: ICD-10-CM

## 2023-08-02 DIAGNOSIS — I89.0 LYMPHEDEMA OF LEFT ARM: Primary | ICD-10-CM

## 2023-08-02 PROCEDURE — 97530 THERAPEUTIC ACTIVITIES: CPT | Performed by: PHYSICAL THERAPIST

## 2023-08-02 PROCEDURE — 97161 PT EVAL LOW COMPLEX 20 MIN: CPT | Performed by: PHYSICAL THERAPIST

## 2023-08-02 NOTE — PROGRESS NOTES
Lymphedema Evaluation    Today's Date: 2023  Patient name: Charlie Rodrigues. : 1945  MRN: [id number]  Referring provider: SANDRITA Quiñones  Dx:  Encounter Diagnosis     ICD-10-CM    1. Lymphedema of left arm  I89.0       2. Left arm swelling  M79.89 Ambulatory Referral to PT/OT Lymphedema Therapy                        Assessment  Assessment details: Patient is a 66year old male presenting with 6 month history of insidious onset of L UE edema. Patient with presence of trophic changes including change in color and dry/flaky skin on L versus R. Volume of L UE is approximately 500 mL greater than right. We discussed that anything greater than 200 mL of difference side to side is considered pathological.  Patient had pacemaker placement about 2 years ago which is likely cause of current mechanical insufficiency and development of secondary lymphedema. Patient was educated regarding CDT process and agrees to POC at this time. Other impairment: increased limb volume  Functional limitations: none  Symptom irritability: lowBarriers to therapy: none  Understanding of Dx/Px/POC: good   Prognosis: good    Goals  Short Term Goals ( 2 weeks)  1. Patient independent with skin care. 2.  Patient independent with basic HEP. Long Term Goals  (6 weeks)  1. Patient independent with donning/doffing compression garment  2. Patient with decrease in L UE volume to within 100 mL of unaffected side. 3.  Patient able to maintain stabilized volume with performance of self-MLD.       Plan  Referral necessary: No  Planned therapy interventions: manual therapy, massage, muscle pump exercises, therapeutic activities, therapeutic exercise, strengthening and compression  Frequency: 2x week  Duration in visits: 12  Duration in weeks: 7  Plan of Care beginning date: 2023  Plan of Care expiration date: 2023  Treatment plan discussed with: patient        Subjective/History:  - Chief Complaint: Patient notes he has had swelling in his left arm for about 2 months. He notes swelling was insidious in onset. - HPI:   Patient notes his pacemaker gets hot sometimes. He noticed the swelling started after his pacemaker started doing this. He denies any injuries to his UE.    - Prior Treatments:  Diuretic prescribed by PCP  - Imaging: ultrasound negative for DVT  - Relevant PMHx: a fib, pacemaker, kidney disease, type 2 diabetes  - Personal history of Cancer? Yes, basal cell carcinoma on nose removed, no treatment    Lymphedema special questions  - Feeling of heaviness, fullness, pressure? no  - Resolves with elevation: No  - Change in fit of shoes/clothes/jewelry? no  - History of Cellulitis?  no  - History of S/DVT? no  - History of Leg wounds? no  - Presence of trunk/abdominal/genital edema? no  - Latex Allergy? no    Systems Review:  - Cardiac dx: Yes  - On diuretics?  yes  - Thyroid dysfunction: No  - Diabetes: Yes  - Kidney disease:Yes   - Liver disease: No  - Abdominal surgeries: Yes, multiple abdominal and groin hernia repairs, colon resection  - Orthopedic injuries/surgeries: No    Pain: 0/10  Function: no limitations related to swelling  Working Status: retired  Exercise status: sedentary, golf once per week  Patient goals: decrease the swelling    Lymphedema Exam: Upper Extremity  - Hand dominance: right   - Chest Wall/Breast Edema location and quality:none  - Upper extremity Edema location and quality: left  - Fingers mild, soft  - Dorsum of hand mild, soft  - Palm mild, soft, dry/flaky skin  - Wrist soft, dry/flaky skin  - Forearm soft, dry/flaky skin, skin staining  - Elbow soft  - Upper arm soft  - Shoulder soft  - Lymphedema classification (0 latent, 1 reverse, 2 non-rev, 3 elephantiasis): 2  o >> REFER TO FLOW SHEET FOR GIRTH MEASUREMENTS <<  - Axillary Web Syndrome? no    Skin Assessment:  - Stemmer's sign? no  - Fibrosis (0 normal - 4 >severe): 0  - Wound present: no  - Scar present: no  - Surface anatomy changes: see  below  o Bony prominences: visualized  o Tendon pathways: decreased visibility in dorsum of hand      Functional Capacity:  - Gait assessment: normal  - Transfer status: independent  - Ability to don/doff clothing/garments: independent  - Upper quarter Sensation: Normal  - Upper quarter Range of Motion: Normal  o Shldr OH flex:  o Shldr OH abd:  o Shldr ER:  o Shldr IR:  o Elbow flex/ext:  o Wrist flex/ext:  - Upper Quarter Strength: Normal  o Shldr OH flex:  o Shldr OH abd:  o Shldr ER:  o Shldr IR:  o Elbow flex/ext:  o Wrist flex/ext:           Precautions: none    Date of Service 8/2/23          Manual Ther           MLD           compression                                                                  Ther Ex                                                       Ther Activity & Self Care           Skin care                                            Pt Education           Edema differential dx x8 min          Lymphatic A&P X 5 min          Compression options

## 2023-08-07 ENCOUNTER — OFFICE VISIT (OUTPATIENT)
Dept: OBGYN CLINIC | Facility: CLINIC | Age: 78
End: 2023-08-07
Payer: MEDICARE

## 2023-08-07 ENCOUNTER — APPOINTMENT (OUTPATIENT)
Dept: RADIOLOGY | Facility: CLINIC | Age: 78
End: 2023-08-07
Payer: MEDICARE

## 2023-08-07 VITALS
WEIGHT: 223.3 LBS | SYSTOLIC BLOOD PRESSURE: 118 MMHG | HEIGHT: 71 IN | BODY MASS INDEX: 31.26 KG/M2 | DIASTOLIC BLOOD PRESSURE: 66 MMHG | HEART RATE: 65 BPM

## 2023-08-07 DIAGNOSIS — M75.51 ACUTE BURSITIS OF RIGHT SHOULDER: Primary | ICD-10-CM

## 2023-08-07 DIAGNOSIS — M25.511 RIGHT SHOULDER PAIN, UNSPECIFIED CHRONICITY: ICD-10-CM

## 2023-08-07 PROCEDURE — 73030 X-RAY EXAM OF SHOULDER: CPT

## 2023-08-07 PROCEDURE — 20610 DRAIN/INJ JOINT/BURSA W/O US: CPT | Performed by: ORTHOPAEDIC SURGERY

## 2023-08-07 PROCEDURE — 99213 OFFICE O/P EST LOW 20 MIN: CPT | Performed by: ORTHOPAEDIC SURGERY

## 2023-08-07 RX ORDER — DEXAMETHASONE SODIUM PHOSPHATE 10 MG/ML
40 INJECTION, SOLUTION INTRAMUSCULAR; INTRAVENOUS
Status: COMPLETED | OUTPATIENT
Start: 2023-08-07 | End: 2023-08-07

## 2023-08-07 RX ORDER — LIDOCAINE HYDROCHLORIDE 10 MG/ML
4 INJECTION, SOLUTION INFILTRATION; PERINEURAL
Status: COMPLETED | OUTPATIENT
Start: 2023-08-07 | End: 2023-08-07

## 2023-08-07 RX ADMIN — LIDOCAINE HYDROCHLORIDE 4 ML: 10 INJECTION, SOLUTION INFILTRATION; PERINEURAL at 11:30

## 2023-08-07 RX ADMIN — DEXAMETHASONE SODIUM PHOSPHATE 40 MG: 10 INJECTION, SOLUTION INTRAMUSCULAR; INTRAVENOUS at 11:30

## 2023-08-07 NOTE — PROGRESS NOTES
Assessment/Plan:  1. Acute bursitis of right shoulder  XR shoulder 2+ vw right    Large joint arthrocentesis: R subacromial bursa        Joann Garza has right-sided shoulder pain consistent with right shoulder bursitis. He has some impingement signs on physical examination and mild degenerative changes on x-ray. I recommended a right cortisone injection since he cannot take anti-inflammatories due to his A-fib. We could consider formal physical therapy if necessary in the future. He also has some discomfort along the biceps anteriorly and I briefly discussed a ultrasound-guided biceps tendon sheath injection if his pain would worsen. Follow-up with me as needed. Large joint arthrocentesis: R subacromial bursa  Universal Protocol:  Consent: Verbal consent obtained. Risks and benefits: risks, benefits and alternatives were discussed  Consent given by: patient  Time out: Immediately prior to procedure a "time out" was called to verify the correct patient, procedure, equipment, support staff and site/side marked as required. Site marked: the operative site was marked  Supporting Documentation  Indications: pain   Procedure Details  Location: shoulder - R subacromial bursa  Preparation: Patient was prepped and draped in the usual sterile fashion  Needle size: 22 G  Ultrasound guidance: no  Approach: posterior  Medications administered: 40 mg dexamethasone 100 mg/10 mL; 4 mL lidocaine 1 %    Patient tolerance: patient tolerated the procedure well with no immediate complications  Dressing:  Sterile dressing applied          Subjective:   Bharati Lundberg. is a 66 y.o. male who presents to the office for evaluation for right-sided shoulder pain he denies any recent injury or trauma. He states he has been lifting his wife's wheelchair and this causes increased pain in the shoulder. He denies feeling a popping sensation and has been more uncomfortable for the past 1 to 2 weeks.   He has a history of bursitis in the right shoulder in the past which improved with therapy. Review of Systems   Constitutional: Negative for chills, fever and unexpected weight change. HENT: Negative for hearing loss, nosebleeds and sore throat. Eyes: Negative for pain, redness and visual disturbance. Respiratory: Negative for cough, shortness of breath and wheezing. Cardiovascular: Negative for chest pain, palpitations and leg swelling. Gastrointestinal: Negative for abdominal pain, nausea and vomiting. Endocrine: Negative for polyphagia and polyuria. Genitourinary: Negative for dysuria and hematuria. Musculoskeletal:        See HPI   Skin: Negative for rash and wound. Neurological: Negative for dizziness, numbness and headaches. Psychiatric/Behavioral: Negative for decreased concentration and suicidal ideas. The patient is not nervous/anxious.           Past Medical History:   Diagnosis Date   • Allergic rhinitis    • Anemia 10/23/2008    last assessed 9/9/13    • Anxiety    • Atrial fibrillation ([address])    • Bleeding    • Chronic kidney disease    • COVID-19 virus infection 12/2022   • CPAP (continuous positive airway pressure) dependence    • Diabetes mellitus ([address])    • Diarrhea    • Diverticulitis    • Eating disorder    • GERD (gastroesophageal reflux disease)    • Herpes zoster with complication     last assessed 7/3/17    • Hiatal hernia    • Hyperlipidemia    • Hypertension    • IBS (irritable bowel syndrome)    • Incisional hernia    • Increased urinary frequency    • Irregular heart beat    • Pyogenic granuloma 09/13/2006    last assessed 9/13/06   • Carlos (subconjunctival hemorrhage), unspecified laterality 09/12/2005    last assessed 9/12/05   • Sleep apnea    • TMJ (dislocation of temporomandibular joint)    • Ventral hernia     last assessed  4/6/17    • Wears glasses        Past Surgical History:   Procedure Laterality Date   • APPENDECTOMY     • ATRIAL ABLATION SURGERY      2014   • ATRIAL ABLATION SURGERY catheter ablation atrial fibrillation / last assessed 16    • CHOLECYSTECTOMY      lap   • COLON SURGERY      Hartmans procedure   • COLON SURGERY      reversal 6 weeks later   • COLONOSCOPY N/A 2017    Procedure: COLONOSCOPY;  Surgeon: Geo Barros MD;  Location: [address] [place] [address] GI LAB; Service:    • ESOPHAGOGASTRODUODENOSCOPY N/A 2017    Procedure: ESOPHAGOGASTRODUODENOSCOPY (EGD); Surgeon: Geo Barros MD;  Location: [address] [place] [address] GI LAB;   Service:    • EYE SURGERY Bilateral 2015    lid repair   • HERNIA REPAIR Bilateral     inguinal  &    • [address] 2011   • INCISIONAL HERNIA REPAIR N/A 3/24/2017    Procedure: REPAIR OF INCARCERATED INCISIONAL HERNIA WITH MESH, Lysis of Adhesions, Partial Omentectomy;  Surgeon: Mary Yanez MD;  Location: [facility];  Service:    • Xiomara Clarke / Tevin Gomez / Cody Jones Left 10/04/2021    medtronic-[id number]   • NEUROBLASTOMA EXCISION     • SKIN CANCER EXCISION      On nose   • TONSILLECTOMY         Family History   Problem Relation Age of Onset   • Heart disease Mother    • Cancer Mother    • Colon cancer Mother    • Dementia Mother    • Early death Father    • Seizures Father    • Cancer Sister         bone   • Heart disease Sister    • Diabetes Sister    • Lupus Sister    • Heart disease Brother    • Cancer Brother         kidney   • Diabetes Brother    • Other Sister         covid   • Stroke Neg Hx        Social History     Occupational History   • Not on file   Tobacco Use   • Smoking status: Former     Packs/day: 1.00     Years: 20.00     Total pack years: 20.00     Types: Cigarettes     Quit date:      Years since quittin.6   • Smokeless tobacco: Never   • Tobacco comments:     Started age 16   Vaping Use   • Vaping Use: Never used   Substance and Sexual Activity   • Alcohol use: No     Comment: none in 33 yrs   • Drug use: No   • Sexual activity: Yes         Current Outpatient Medications:   •  hydrochlorothiazide (HYDRODIURIL) 12.5 mg tablet, Take 1 tablet (12.5 mg total) by mouth daily, Disp: 90 tablet, Rfl: 1  •  acetaminophen (TYLENOL) 325 mg tablet, Take 650 mg by mouth every 6 (six) hours as needed, Disp: , Rfl:   •  amiodarone 200 mg tablet, 200 mg daily after dinner , Disp: , Rfl:   •  amLODIPine (NORVASC) 5 mg tablet, , Disp: , Rfl:   •  amoxicillin (AMOXIL) 500 mg capsule, , Disp: , Rfl:   •  apixaban (ELIQUIS) 5 mg, Take 5 mg by mouth daily To stop 2 days prior to the procedure, Disp: , Rfl:   •  atorvastatin (LIPITOR) 20 mg tablet, TAKE 1 TABLET DAILY, Disp: 90 tablet, Rfl: 3  •  Blood Glucose Monitoring Suppl (FREESTYLE LITE) GEOVANNY, , Disp: , Rfl:   •  carvedilol (COREG) 25 mg tablet, Take 12.5 mg by mouth 2 (two) times a day , Disp: , Rfl:   •  clotrimazole-betamethasone (LOTRISONE) 1-0.05 % cream, Apply topically 2 (two) times a day, Disp: 45 g, Rfl: 1  •  esomeprazole (NexIUM) 20 mg capsule, Take 1 capsule (20 mg total) by mouth daily at bedtime, Disp: 90 capsule, Rfl: 3  •  fluocinonide (LIDEX) 0.05 % external solution, , Disp: , Rfl:   •  fluticasone (FLONASE) 50 mcg/act nasal spray, 2 sprays into each nostril daily, Disp: 48 g, Rfl: 3  •  FREESTYLE LITE test strip, , Disp: , Rfl:   •  hydrocortisone (ANUSOL-HC) 2.5 % rectal cream, Apply topically 2 (two) times a day (Patient not taking: Reported on 7/10/2023), Disp: 30 g, Rfl: 2  •  hydrOXYzine pamoate (VISTARIL) 25 mg capsule, Take 1 capsule (25 mg total) by mouth 3 (three) times a day as needed for anxiety (Patient not taking: Reported on 7/10/2023), Disp: 60 capsule, Rfl: 3  •  ketoconazole (NIZORAL) 2 % shampoo, , Disp: , Rfl:   •  Lancets (FREESTYLE) lancets, , Disp: , Rfl:   •  losartan (COZAAR) 100 MG tablet, 100 mg every morning , Disp: , Rfl:   •  Ozempic, 0.25 or 0.5 MG/DOSE, 2 MG/1.5ML SOPN, , Disp: , Rfl:   •  pioglitazone-metFORMIN (ACTOPLUS MET)  MG per tablet, daily after dinner, Disp: , Rfl:   •  polyethylene glycol-propylene glycol (Systane) 0.4-0.3 %, every 3 (three) hours as needed  , Disp: , Rfl:   •  RESTASIS 0.05 % ophthalmic emulsion, Administer 1 drop to both eyes every 12 (twelve) hours , Disp: , Rfl:   •  tadalafil (CIALIS) 5 MG tablet, Take 1 tablet (5 mg total) by mouth in the morning (Patient not taking: Reported on 7/17/2023), Disp: 90 tablet, Rfl: 3  •  tamsulosin (FLOMAX) 0.4 mg, Take 1 capsule (0.4 mg total) by mouth daily with dinner, Disp: 90 capsule, Rfl: 3    Allergies   Allergen Reactions   • Codeine Other (See Comments) and GI Intolerance     Reaction Date: 27Dec2004; Annotation - [id number]: '' CRAZY ''  vomiting  headache   • Demerol [Meperidine] Nausea Only, Other (See Comments), GI Intolerance and Vomiting     vomiting  Reaction Date: 27Dec2004;   headache   • Morphine Nausea Only, GI Intolerance and Vomiting     Reaction Date: 23Feb2012;        Objective:  Vitals:    08/07/23 1136   BP: 118/66   Pulse: 65            Right Shoulder Exam     Tenderness   The patient is experiencing tenderness in the biceps tendon (Subacromial bursa). Range of Motion   Active abduction: normal   Passive abduction: normal   Extension: normal   External rotation: normal   Forward flexion: normal     Muscle Strength   Abduction: 5/5   Internal rotation: 5/5   External rotation: 5/5   Supraspinatus: 5/5   Subscapularis: 5/5   Biceps: 5/5     Tests   Norton test: positive  Impingement: positive  Drop arm: negative    Other   Erythema: absent  Sensation: normal  Pulse: present    Comments:  Negative speeds test            Physical Exam  Vitals and nursing note reviewed. Constitutional:       Appearance: Normal appearance. He is well-developed. HENT:      Head: Normocephalic and atraumatic. Right Ear: External ear normal.      Left Ear: External ear normal.      Nose: Nose normal.   Eyes:      General: No scleral icterus. Extraocular Movements: Extraocular movements intact.       Conjunctiva/sclera: Conjunctivae normal. Cardiovascular:      Rate and Rhythm: Normal rate. Pulmonary:      Effort: Pulmonary effort is normal. No respiratory distress. Musculoskeletal:      Cervical back: Normal range of motion and neck supple. Comments: See Ortho exam   Skin:     General: Skin is warm and dry. Neurological:      General: No focal deficit present. Mental Status: He is alert and oriented to person, place, and time. Psychiatric:         Behavior: Behavior normal.           I have personally reviewed pertinent films in PACS and my interpretation is as follows:  X-rays of the right shoulder demonstrate mild degenerative changes. This document was created using speech voice recognition software. Grammatical errors, random word insertions, pronoun errors, and incomplete sentences are an occasional consequence of this system due to software limitations, ambient noise, and hardware issues. Any formal questions or concerns about content, text, or information contained within the body of this dictation should be directly addressed to the provider for clarification.

## 2023-08-09 ENCOUNTER — TELEPHONE (OUTPATIENT)
Dept: RADIOLOGY | Facility: HOSPITAL | Age: 78
End: 2023-08-09

## 2023-08-09 NOTE — NURSING NOTE
Spoke to patient regarding IR prostate artery embolization scheduled 8/17 at 0830. Patient given arrival time of 0700 to W, instructed to be NPO after midnight, to have a , and to take medications in the AM with small sip of water. Last dose of Eliquis to be 8/14. Instructed to hold losartan and hydrochlothiazide day of procedure per IR guidelines. Questions answered as offered.

## 2023-08-10 DIAGNOSIS — E78.2 MIXED HYPERLIPIDEMIA: ICD-10-CM

## 2023-08-10 RX ORDER — ATORVASTATIN CALCIUM 20 MG/1
TABLET, FILM COATED ORAL
Qty: 90 TABLET | Refills: 3 | Status: SHIPPED | OUTPATIENT
Start: 2023-08-10

## 2023-08-14 ENCOUNTER — APPOINTMENT (OUTPATIENT)
Dept: PHYSICAL THERAPY | Facility: CLINIC | Age: 78
End: 2023-08-14
Payer: MEDICARE

## 2023-08-16 ENCOUNTER — APPOINTMENT (OUTPATIENT)
Dept: PHYSICAL THERAPY | Facility: CLINIC | Age: 78
End: 2023-08-16
Payer: MEDICARE

## 2023-08-28 ENCOUNTER — OFFICE VISIT (OUTPATIENT)
Dept: OBGYN CLINIC | Facility: CLINIC | Age: 78
End: 2023-08-28
Payer: MEDICARE

## 2023-08-28 VITALS
DIASTOLIC BLOOD PRESSURE: 66 MMHG | HEART RATE: 67 BPM | SYSTOLIC BLOOD PRESSURE: 116 MMHG | WEIGHT: 223 LBS | HEIGHT: 71 IN | BODY MASS INDEX: 31.22 KG/M2

## 2023-08-28 DIAGNOSIS — S46.811A STRAIN OF RIGHT TRAPEZIUS MUSCLE, INITIAL ENCOUNTER: ICD-10-CM

## 2023-08-28 DIAGNOSIS — M75.51 ACUTE BURSITIS OF RIGHT SHOULDER: Primary | ICD-10-CM

## 2023-08-28 PROCEDURE — 99213 OFFICE O/P EST LOW 20 MIN: CPT | Performed by: ORTHOPAEDIC SURGERY

## 2023-08-28 NOTE — PROGRESS NOTES
Assessment/Plan:  1. Acute bursitis of right shoulder        2. Strain of right trapezius muscle, initial encounter            ***    Subjective:   Noris Uriarte. is a 66 y.o. male who presents ***      Review of Systems      Past Medical History:   Diagnosis Date   • Allergic rhinitis    • Anemia 10/23/2008    last assessed 9/9/13    • Anxiety    • Atrial fibrillation (HCC)    • Bleeding    • Chronic kidney disease    • COVID-19 virus infection 12/2022   • CPAP (continuous positive airway pressure) dependence    • Diabetes mellitus ([address])    • Diarrhea    • Diverticulitis    • Eating disorder    • GERD (gastroesophageal reflux disease)    • Herpes zoster with complication     last assessed 7/3/17    • Hiatal hernia    • Hyperlipidemia    • Hypertension    • IBS (irritable bowel syndrome)    • Incisional hernia    • Increased urinary frequency    • Irregular heart beat    • Pyogenic granuloma 09/13/2006    last assessed 9/13/06   • Carlos (subconjunctival hemorrhage), unspecified laterality 09/12/2005    last assessed 9/12/05   • Sleep apnea    • TMJ (dislocation of temporomandibular joint)    • Ventral hernia     last assessed  4/6/17    • Wears glasses        Past Surgical History:   Procedure Laterality Date   • APPENDECTOMY     • ATRIAL ABLATION SURGERY      2014   • ATRIAL ABLATION SURGERY      catheter ablation atrial fibrillation / last assessed 2/17/16    • CHOLECYSTECTOMY      lap   • COLON SURGERY  1990    Hartmans procedure   • COLON SURGERY      reversal 6 weeks later   • COLONOSCOPY N/A 1/26/2017    Procedure: COLONOSCOPY;  Surgeon: Shavon Hunter MD;  Location: [address] [place] [address] GI LAB; Service:    • ESOPHAGOGASTRODUODENOSCOPY N/A 1/26/2017    Procedure: ESOPHAGOGASTRODUODENOSCOPY (EGD); Surgeon: Shavon Hunter MD;  Location: [address] [place] [address] GI LAB;   Service:    • EYE SURGERY Bilateral 2015    lid repair   • HERNIA REPAIR Bilateral     inguinal 2014 & 2013   • [address]  2011   • INCISIONAL HERNIA REPAIR N/A 3/24/2017    Procedure: REPAIR OF INCARCERATED INCISIONAL HERNIA WITH MESH, Lysis of Adhesions, Partial Omentectomy;  Surgeon: Rogelia Soulier, MD;  Location: [facility];  Service:    • Gilbert Jones / Roberto Tafoya / Tanvi Read Left 10/04/2021    medtronic-[id number]   • NEUROBLASTOMA EXCISION     • SKIN CANCER EXCISION      On nose   • TONSILLECTOMY         Family History   Problem Relation Age of Onset   • Heart disease Mother    • Cancer Mother    • Colon cancer Mother    • Dementia Mother    • Early death Father    • Seizures Father    • Cancer Sister         bone   • Heart disease Sister    • Diabetes Sister    • Lupus Sister    • Heart disease Brother    • Cancer Brother         kidney   • Diabetes Brother    • Other Sister         covid   • Stroke Neg Hx        Social History     Occupational History   • Not on file   Tobacco Use   • Smoking status: Former     Packs/day: 1.00     Years: 20.00     Total pack years: 20.00     Types: Cigarettes     Quit date: 5     Years since quittin.6   • Smokeless tobacco: Never   • Tobacco comments:     Started age 16   Vaping Use   • Vaping Use: Never used   Substance and Sexual Activity   • Alcohol use: No     Comment: none in 33 yrs   • Drug use: No   • Sexual activity: Yes         Current Outpatient Medications:   •  acetaminophen (TYLENOL) 325 mg tablet, Take 650 mg by mouth every 6 (six) hours as needed, Disp: , Rfl:   •  amiodarone 200 mg tablet, 200 mg daily after dinner , Disp: , Rfl:   •  amLODIPine (NORVASC) 5 mg tablet, , Disp: , Rfl:   •  apixaban (ELIQUIS) 5 mg, Take 5 mg by mouth daily To stop 2 days prior to the procedure, Disp: , Rfl:   •  atorvastatin (LIPITOR) 20 mg tablet, TAKE 1 TABLET DAILY, Disp: 90 tablet, Rfl: 3  •  Blood Glucose Monitoring Suppl (FREESTYLE LITE) GEOVANNY, , Disp: , Rfl:   •  carvedilol (COREG) 25 mg tablet, Take 12.5 mg by mouth 2 (two) times a day , Disp: , Rfl:   •  clotrimazole-betamethasone (LOTRISONE) 1-0.05 % cream, Apply topically 2 (two) times a day, Disp: 45 g, Rfl: 1  •  esomeprazole (NexIUM) 20 mg capsule, Take 1 capsule (20 mg total) by mouth daily at bedtime, Disp: 90 capsule, Rfl: 3  •  fluticasone (FLONASE) 50 mcg/act nasal spray, 2 sprays into each nostril daily, Disp: 48 g, Rfl: 3  •  FREESTYLE LITE test strip, , Disp: , Rfl:   •  hydrochlorothiazide (HYDRODIURIL) 12.5 mg tablet, Take 1 tablet (12.5 mg total) by mouth daily, Disp: 90 tablet, Rfl: 1  •  ketoconazole (NIZORAL) 2 % shampoo, , Disp: , Rfl:   •  Lancets (FREESTYLE) lancets, , Disp: , Rfl:   •  losartan (COZAAR) 100 MG tablet, 100 mg every morning , Disp: , Rfl:   •  Ozempic, 0.25 or 0.5 MG/DOSE, 2 MG/1.5ML SOPN, , Disp: , Rfl:   •  pioglitazone-metFORMIN (ACTOPLUS MET)  MG per tablet, daily after dinner, Disp: , Rfl:   •  polyethylene glycol-propylene glycol (Systane) 0.4-0.3 %, every 3 (three) hours as needed  , Disp: , Rfl:   •  RESTASIS 0.05 % ophthalmic emulsion, Administer 1 drop to both eyes every 12 (twelve) hours , Disp: , Rfl:   •  tadalafil (CIALIS) 5 MG tablet, Take 1 tablet (5 mg total) by mouth in the morning, Disp: 90 tablet, Rfl: 3  •  tamsulosin (FLOMAX) 0.4 mg, Take 1 capsule (0.4 mg total) by mouth daily with dinner, Disp: 90 capsule, Rfl: 3  •  amoxicillin (AMOXIL) 500 mg capsule, , Disp: , Rfl:   •  fluocinonide (LIDEX) 0.05 % external solution, , Disp: , Rfl:   •  hydrocortisone (ANUSOL-HC) 2.5 % rectal cream, Apply topically 2 (two) times a day (Patient not taking: Reported on 7/10/2023), Disp: 30 g, Rfl: 2  •  hydrOXYzine pamoate (VISTARIL) 25 mg capsule, Take 1 capsule (25 mg total) by mouth 3 (three) times a day as needed for anxiety (Patient not taking: Reported on 7/10/2023), Disp: 60 capsule, Rfl: 3    Allergies   Allergen Reactions   • Codeine Other (See Comments) and GI Intolerance     Reaction Date: 27Dec2004;  Annotation - [id number]: '' CRAZY ''  vomiting  headache   • Demerol [Meperidine] Nausea Only, Other (See Comments), GI Intolerance and Vomiting     vomiting  Reaction Date: 27Dec2004;   headache   • Morphine Nausea Only, GI Intolerance and Vomiting     Reaction Date: 23Feb2012;        Objective:  Vitals:    08/28/23 1117   BP: 116/66   Pulse: 67            Ortho Exam    Physical Exam    I have personally reviewed pertinent films in PACS and my interpretation is as follows:  ***      This document was created using speech voice recognition software. Grammatical errors, random word insertions, pronoun errors, and incomplete sentences are an occasional consequence of this system due to software limitations, ambient noise, and hardware issues. Any formal questions or concerns about content, text, or information contained within the body of this dictation should be directly addressed to the provider for clarification.

## 2023-08-31 ENCOUNTER — TELEPHONE (OUTPATIENT)
Age: 78
End: 2023-08-31

## 2023-08-31 NOTE — TELEPHONE ENCOUNTER
Caller: Keri Corporal    Doctor: Tessa Ruiz    Reason for call: Unable to get into physical therapy until next Wednesday, He is having chronic pain and inflammation, heat/ice not working, would like to know if you can send something into his [place]    Call back#: [phone]

## 2023-09-01 ENCOUNTER — OFFICE VISIT (OUTPATIENT)
Dept: FAMILY MEDICINE CLINIC | Facility: CLINIC | Age: 78
End: 2023-09-01
Payer: MEDICARE

## 2023-09-01 VITALS
TEMPERATURE: 97.5 F | SYSTOLIC BLOOD PRESSURE: 130 MMHG | DIASTOLIC BLOOD PRESSURE: 60 MMHG | HEIGHT: 71 IN | OXYGEN SATURATION: 96 % | HEART RATE: 67 BPM | BODY MASS INDEX: 31.22 KG/M2 | RESPIRATION RATE: 16 BRPM | WEIGHT: 223 LBS

## 2023-09-01 DIAGNOSIS — E11.22 TYPE 2 DIABETES MELLITUS WITH STAGE 3 CHRONIC KIDNEY DISEASE, WITHOUT LONG-TERM CURRENT USE OF INSULIN, UNSPECIFIED WHETHER STAGE 3A OR 3B CKD (HCC): ICD-10-CM

## 2023-09-01 DIAGNOSIS — R60.0 EDEMA OF BOTH LOWER LEGS: ICD-10-CM

## 2023-09-01 DIAGNOSIS — M75.51 ACUTE BURSITIS OF RIGHT SHOULDER: ICD-10-CM

## 2023-09-01 DIAGNOSIS — S46.811A STRAIN OF RIGHT TRAPEZIUS MUSCLE, INITIAL ENCOUNTER: Primary | ICD-10-CM

## 2023-09-01 DIAGNOSIS — N18.30 TYPE 2 DIABETES MELLITUS WITH STAGE 3 CHRONIC KIDNEY DISEASE, WITHOUT LONG-TERM CURRENT USE OF INSULIN, UNSPECIFIED WHETHER STAGE 3A OR 3B CKD (HCC): ICD-10-CM

## 2023-09-01 PROCEDURE — 99214 OFFICE O/P EST MOD 30 MIN: CPT | Performed by: NURSE PRACTITIONER

## 2023-09-01 RX ORDER — HYDROCHLOROTHIAZIDE 25 MG/1
25 TABLET ORAL DAILY
Qty: 90 TABLET | Refills: 1 | Status: SHIPPED | OUTPATIENT
Start: 2023-09-01 | End: 2024-02-28

## 2023-09-01 RX ORDER — METHYLPREDNISOLONE 4 MG/1
TABLET ORAL
Qty: 21 TABLET | Refills: 0 | Status: SHIPPED | OUTPATIENT
Start: 2023-09-01

## 2023-09-01 NOTE — PATIENT INSTRUCTIONS
Methylprednisolone (By mouth)   Methylprednisolone (meth-il-pred-NIS-oh-lone)  Treats inflammation, severe allergies, flare-ups of ongoing illnesses, and many other medical problems. May also be used to decrease some symptoms of cancer. This medicine is a corticosteroid. Brand Name(s): Medrol, Medrol Dosepak, Methylpred-DP   There may be other brand names for this medicine. When This Medicine Should Not Be Used: This medicine is not right for everyone. Do not use it if you had an allergic reaction to methylprednisolone, or if you have a fungus infection. How to Use This Medicine:   Tablet  Take your medicine as directed. Your dose may need to be changed several times to find what works best for you. If you are using this medicine for an ongoing illness, your dose may need to be changed occasionally. Some people take this medicine only every other day, which helps to decrease side effects. Take your medicine in the morning, unless your doctor tells you otherwise. It is best to take this medicine with food or milk. Missed dose: Take a dose as soon as you remember. If it is almost time for your next dose, wait until then and take a regular dose. Do not take extra medicine to make up for a missed dose. Store the medicine in a closed container at room temperature, away from heat, moisture, and direct light. Drugs and Foods to Avoid:   Ask your doctor or pharmacist before using any other medicine, including over-the-counter medicines, vitamins, and herbal products. Some medicines can affect how methylprednisolone works. Tell your doctor if you are using any of the following:  Cyclosporine, ketoconazole, phenobarbital, phenytoin, rifampin, troleandomycin  Aspirin, especially in high doses  Blood thinner (including warfarin)  Diabetes medicine  This medicine may interfere with vaccines. Ask your doctor before you get a flu shot or any other vaccines.   Warnings While Using This Medicine:   Tell your doctor if you are pregnant or breastfeeding, or if you have kidney disease, liver disease (including cirrhosis), adrenal gland problems, heart failure, high blood pressure, diabetes, osteoporosis, blood clotting problems, thyroid problems, mental health problems (including depression), myasthenia gravis, or stomach or bowel problems (including ulcer or diverticulitis). Tell your doctor if you have an infection (including herpes eye infection, tuberculosis, or threadworm). Also tell your doctor if you have a recent exposure to chickenpox or measles. This medicine may cause the following problems:  Increased risk of infection  Changes in mood or behavior  High blood pressure  Adrenal gland problems  Eye problems or changes in vision (including cataracts or glaucoma)  Bone problems (including osteoporosis)  Slow growth in children  Increased risk for cancer (including Kaposi's sarcoma)  If you use this medicine for a long time, tell your doctor about any extra stress or anxiety in your life, including other health concerns and emotional stress. Your dose might need to be changed for a short time while you have extra stress. Do not stop using this medicine suddenly. Your doctor will need to slowly decrease your dose before you stop it completely. Tell any doctor or dentist who treats you that you are using this medicine. This medicine may affect certain medical test results. Your doctor will do lab tests at regular visits to check on the effects of this medicine. Keep all appointments. Keep all medicine out of the reach of children. Never share your medicine with anyone. Possible Side Effects While Using This Medicine:   Call your doctor right away if you notice any of these side effects:   Allergic reaction: Itching or hives, swelling in your face or hands, swelling or tingling in your mouth or throat, chest tightness, trouble breathing  Bone pain, decrease in height  Dark freckles, skin color changes, coldness, weakness, tiredness, weight loss  Depression, unusual thoughts, feelings, or behaviors, trouble sleeping  Fever, chills, cough, sore throat, body aches  Severe stomach pain, nausea, vomiting, or red or black stools  Skin changes or growths  Swelling in your hands, ankles, or feet, rapid weight gain  Trouble seeing, blurred vision or other changes in vision, eye pain, headache  Unusual bleeding or bruising  If you notice these less serious side effects, talk with your doctor: Increased appetite  Round, puffy face  Weight gain around your neck, upper back, breast, face, or waist  If you notice other side effects that you think are caused by this medicine, tell your doctor. Call your doctor for medical advice about side effects. You may report side effects to FDA at 9-417-FDA-1933  © Copyright [facility] 2022 Information is for End User's use only and may not be sold, redistributed or otherwise used for commercial purposes. The above information is an  only. It is not intended as medical advice for individual conditions or treatments. Talk to your doctor, nurse or pharmacist before following any medical regimen to see if it is safe and effective for you.

## 2023-09-01 NOTE — TELEPHONE ENCOUNTER
Caller: Patient    Doctor: Dr. Wandy Lopez    Reason for call: Patient f/u on his call from yesterday in regard to increased pain and asking if a prescription can be sent to pharmacy below. Patient asking for call back from Dr. Wandy Lopez.     [address] [facility] [address] [facility], [address] [facility] [address]    Call back#: [phone]

## 2023-09-01 NOTE — PROGRESS NOTES
Assessment/Plan:    1. Strain of right trapezius muscle, initial encounter  -     methylPREDNISolone 4 MG tablet therapy pack; Use as directed on package    2. Acute bursitis of right shoulder    3. Edema of both lower legs  -     hydrochlorothiazide (HYDRODIURIL) 25 mg tablet; Take 1 tablet (25 mg total) by mouth daily  -     Basic metabolic panel; Future    4. Type 2 diabetes mellitus with stage 3 chronic kidney disease, without long-term current use of insulin, unspecified whether stage 3a or 3b CKD ([address])  Assessment & Plan:  Complaint with current regimen and advised to monitor blood sugar while on steroid pack  Lab Results   Component Value Date    HGBA1C 5.9 (H) 07/20/2022                   Patient Instructions: Take prednisone with food in morning and do not take any NSAID's while taking prednisone. Supportive care discussed and advised. Advised to RTO for any worsening and no improvement. Follow up for no improvement and worsening of conditions. Patient advised and educated when to see immediate medical care. Return in about 1 month (around 10/1/2023), or if symptoms worsen or fail to improve. Future Appointments   Date Time Provider [address]   9/6/2023 11:00 AM Anu Lamb, PT [facility], [facility]. [facility]   9/18/2023 10:30 AM Lencho Morgan MD NEURO [facility]   9/19/2023  1:20 PM Johanna Arrington MD GI LO [facility]   9/27/2023  8:30 AM [place] IR/CATH  [facility]   10/11/2023 11:15 AM Jina Mackay MD [facility]   10/16/2023  1:20 PM Christina Palomo MD [facility]G [facility]   1/16/2024 11:30 AM Roxanne Marcus PA-C URO [facility]           Subjective:      Patient ID: Jorge Owen. is a 66 y.o. male.     Chief Complaint   Patient presents with   • neck and shoulder pain     wmcma         Vitals:  /60 (BP Location: Right arm, Patient Position: Sitting, Cuff Size: Large)   Pulse 67   Temp 97.5 °F (36.4 °C) (Temporal)   Resp 16   Ht 5' 11" (1.803 m)   Wt 101 kg (223 lb)   SpO2 96%   BMI 31.10 kg/m²     HPI  Patient stated that was seen by orthopedics couple of days ago for right shoulder pain and neck pain and stated that taking tylenol but no relief. Stated that his last HbA1c is below 6. Complaint with all medications and tolerating it well. Stated that his edema has improved with HCTZ but still having slight edema in lower legs and will increase HCTZ to 25 mg and will repeat BMP in a month to monitor electrolytes and kidney function. The following portions of the patient's history were reviewed and updated as appropriate: allergies, current medications, past family history, past medical history, past social history, past surgical history and problem list.      Review of Systems   HENT: Negative. Respiratory: Negative. Cardiovascular: Positive for leg swelling. Negative for chest pain and palpitations. Genitourinary: Negative. Musculoskeletal: Positive for arthralgias and neck pain. Negative for neck stiffness. As noted in HPI           Objective:    Social History     Tobacco Use   Smoking Status Former   • Packs/day: 1.00   • Years: 20.00   • Total pack years: 20.00   • Types: Cigarettes   • Quit date: 5   • Years since quittin.6   Smokeless Tobacco Never   Tobacco Comments    Started age 16       Allergies: Allergies   Allergen Reactions   • Codeine Other (See Comments) and GI Intolerance     Reaction Date: 2004;  Annotation - [id number]: '' CRAZY ''  vomiting  headache   • Demerol [Meperidine] Nausea Only, Other (See Comments), GI Intolerance and Vomiting     vomiting  Reaction Date: 2004;   headache   • Morphine Nausea Only, GI Intolerance and Vomiting     Reaction Date: 2012;          Current Outpatient Medications   Medication Sig Dispense Refill   • acetaminophen (TYLENOL) 325 mg tablet Take 650 mg by mouth every 6 (six) hours as needed     • amiodarone 200 mg tablet 200 mg daily after dinner      • amLODIPine (NORVASC) 5 mg tablet      • apixaban (ELIQUIS) 5 mg Take 5 mg by mouth daily To stop 2 days prior to the procedure     • atorvastatin (LIPITOR) 20 mg tablet TAKE 1 TABLET DAILY 90 tablet 3   • Blood Glucose Monitoring Suppl (FREESTYLE LITE) GEOVANNY      • carvedilol (COREG) 25 mg tablet Take 12.5 mg by mouth 2 (two) times a day      • clotrimazole-betamethasone (LOTRISONE) 1-0.05 % cream Apply topically 2 (two) times a day 45 g 1   • esomeprazole (NexIUM) 20 mg capsule Take 1 capsule (20 mg total) by mouth daily at bedtime 90 capsule 3   • fluticasone (FLONASE) 50 mcg/act nasal spray 2 sprays into each nostril daily 48 g 3   • FREESTYLE LITE test strip      • hydrochlorothiazide (HYDRODIURIL) 25 mg tablet Take 1 tablet (25 mg total) by mouth daily 90 tablet 1   • ketoconazole (NIZORAL) 2 % shampoo      • Lancets (FREESTYLE) lancets      • losartan (COZAAR) 100 MG tablet 100 mg every morning      • methylPREDNISolone 4 MG tablet therapy pack Use as directed on package 21 tablet 0   • Ozempic, 0.25 or 0.5 MG/DOSE, 2 MG/1.5ML SOPN      • pioglitazone-metFORMIN (ACTOPLUS MET)  MG per tablet daily after dinner     • polyethylene glycol-propylene glycol (Systane) 0.4-0.3 % every 3 (three) hours as needed       • RESTASIS 0.05 % ophthalmic emulsion Administer 1 drop to both eyes every 12 (twelve) hours      • tadalafil (CIALIS) 5 MG tablet Take 1 tablet (5 mg total) by mouth in the morning 90 tablet 3   • tamsulosin (FLOMAX) 0.4 mg Take 1 capsule (0.4 mg total) by mouth daily with dinner 90 capsule 3   • amoxicillin (AMOXIL) 500 mg capsule  (Patient not taking: Reported on 5/1/2023)     • fluocinonide (LIDEX) 0.05 % external solution  (Patient not taking: Reported on 7/17/2023)     • hydrocortisone (ANUSOL-HC) 2.5 % rectal cream Apply topically 2 (two) times a day (Patient not taking: Reported on 7/10/2023) 30 g 2   • hydrOXYzine pamoate (VISTARIL) 25 mg capsule Take 1 capsule (25 mg total) by mouth 3 (three) times a day as needed for anxiety (Patient not taking: Reported on 7/10/2023) 60 capsule 3     No current facility-administered medications for this visit. Physical Exam  Constitutional:       Appearance: Normal appearance. HENT:      Head: Normocephalic. Nose: Nose normal.   Eyes:      Conjunctiva/sclera: Conjunctivae normal.   Cardiovascular:      Rate and Rhythm: Normal rate and regular rhythm. Heart sounds: Normal heart sounds. Pulmonary:      Effort: Pulmonary effort is normal.      Breath sounds: Normal breath sounds. Musculoskeletal:      Left shoulder: Tenderness (also tender on right trapeizus muscle and shoulder area) present. Right lower leg: Edema present. Left lower leg: Edema present. Comments: Bilateral trace edema on lower legs   Skin:     General: Skin is warm and dry. Findings: No rash. Neurological:      Mental Status: He is alert and oriented to person, place, and time. Psychiatric:         Mood and Affect: Mood normal.         Behavior: Behavior normal.         Thought Content:  Thought content normal.         Judgment: Judgment normal.                     SANDRITA Mcfadden

## 2023-09-01 NOTE — ASSESSMENT & PLAN NOTE
Complaint with current regimen and advised to monitor blood sugar while on steroid pack  Lab Results   Component Value Date    HGBA1C 5.9 (H) 07/20/2022

## 2023-09-05 ENCOUNTER — TELEPHONE (OUTPATIENT)
Dept: NEUROLOGY | Facility: CLINIC | Age: 78
End: 2023-09-05

## 2023-09-06 ENCOUNTER — EVALUATION (OUTPATIENT)
Dept: PHYSICAL THERAPY | Facility: CLINIC | Age: 78
End: 2023-09-06
Payer: MEDICARE

## 2023-09-06 DIAGNOSIS — S46.811A STRAIN OF RIGHT TRAPEZIUS MUSCLE, INITIAL ENCOUNTER: Primary | ICD-10-CM

## 2023-09-06 PROCEDURE — 97161 PT EVAL LOW COMPLEX 20 MIN: CPT | Performed by: PHYSICAL THERAPIST

## 2023-09-06 NOTE — PROGRESS NOTES
PT Evaluation     Today's date: 2023  Patient name: Deb Marquis : 1945  MRN: [id number]  Referring provider: Brissa Pederson DO  Dx:   Encounter Diagnosis     ICD-10-CM    1. Strain of right trapezius muscle, initial encounter  [phone] [id number] Ambulatory referral to Physical Therapy                     Assessment  Assessment details: Deb Marquis is a 66 y.o. male who presents to physical therapy with pain, decreased UE range of motion, decreased UE strength, impaired function, decreased activity tolerance and poor posture. Patient's clinical presentation is consistent with their referring diagnosis of Strain of right trapezius muscle, initial encounter  (primary encounter diagnosis). The pt presents with functional limitations of ADLs, recreational activities, self-care and reaching. Pt would benefit from physical therapy services to address these limitations and maximize function. Pt was instructed and educated on good sitting posture today and demonstrates understanding. Impairments: abnormal muscle firing, abnormal muscle tone, abnormal or restricted ROM, activity intolerance, impaired physical strength, lacks appropriate home exercise program, pain with function, scapular dyskinesis, poor posture  and poor body mechanics  Understanding of Dx/Px/POC: good   Prognosis: good    Goals  Short term goals:  (3 weeks)  1. Patient will have pain level 2/10 right  shoulder at rest  2. Patient will report a 50% improvement in symptoms with ADL's  3. Patient will demonstrate appropriate scapulohumeral rhythm with reaching shoulder height and below  4. Patient will have improved right shoulder ROM by 20 degrees    Long term goals: (6 weeks)  1. Patient will report 85 % improvement improvement in symptoms with ADL's  2. Patient will have pain level 2/10 right shoulder with ADL's   3. Patient will demonstrate appropriate scapulohumeral rhythm with reaching overhead  4.  Patient will be independent in a comprehensive home exercise program      Plan  Patient would benefit from: PT eval and skilled physical therapy  Planned modality interventions: cryotherapy and thermotherapy: hydrocollator packs  Planned therapy interventions: joint mobilization, manual therapy, massage, neuromuscular re-education, patient education, postural training, strengthening, stretching, therapeutic activities, ADL training, functional ROM exercises, home exercise program, abdominal trunk stabilization and therapeutic exercise  Frequency: 2x week  Duration in visits: 12  Duration in weeks: 6  Treatment plan discussed with: patient        Subjective Evaluation    History of Present Illness  Mechanism of injury: He reports right shoulder pain into the right side of his neck beginning 2 weeks ago. He thinks that it began after lifting a bag of cement. He followed up with Dr. Kaiser Sunshine. He was referred to PT. Patient Goals  Patient goals for therapy: decreased pain    Pain  Current pain ratin  At best pain ratin  At worst pain ratin  Location: Right upper trap  Quality: throbbing, tight and pressure  Relieving factors: heat  Aggravating factors: lifting and overhead activity (turning neck)    Social Support    Employment status: not working (retired)  Hand dominance: right  Exercise history: Yard work, House work ,Golf          Objective     Postural Observations  Seated posture: fair    Additional Postural Observation Details  Patient has slouched posture in sitting       Palpation     Right   Hypertonic in the levator scapulae, rhomboids and upper trapezius. Tenderness of the rhomboids and upper trapezius.      Neurological Testing     Sensation     Shoulder     Right Shoulder   Intact: light touch    Passive Range of Motion   Left Shoulder   Flexion: 175 degrees   Abduction: 150 degrees   External rotation 0°: 80 degrees   Internal rotation 0°: 80 degrees     Right Shoulder   Flexion: 147 degrees   Abduction: 115 degrees External rotation 0°: 70 degrees   Internal rotation 0°: 73 degrees     Scapular Mobility     Right Shoulder   Scapular Mobility with Shoulder to 90° FF   Scapular elevation: minimal    Scapular Mobility beyond 90° FF   Scapular elevation: moderate    Strength/Myotome Testing     Left Shoulder     Planes of Motion   Flexion: 5   Abduction: 5   External rotation at 0°: 5   Internal rotation at 0°: 5     Right Shoulder     Planes of Motion   Flexion: 4-   Abduction: 4-   External rotation at 0°: 4-   Internal rotation at 0°: 4-                Eval/ Re-eval Auth #/ Referral # Total visits Start date  Expiration date Total active units  Total manual units  PT only or  PT+OT?   9/6/23 9/6              Total units authorized                Total units remaining                      Precautions:   Hypertension, Diabetes        Specialty Daily Treatment Diary       Manuals 9/6/23       Visit # 1       STM R UT        PROM R shld        ST joint mobs                Warm-up        NuStep        Neuro Re-Ed        Posture correct        Scap squeeze        UT stretch 10                       Ther Ex        TB row 20  GTB       TB ext        Pulleys        S/L ER        S/L rotation        Wand flex                Ther Activity                                Modalities        CP

## 2023-09-07 ENCOUNTER — OFFICE VISIT (OUTPATIENT)
Dept: PHYSICAL THERAPY | Facility: CLINIC | Age: 78
End: 2023-09-07
Payer: MEDICARE

## 2023-09-07 DIAGNOSIS — S46.811A STRAIN OF RIGHT TRAPEZIUS MUSCLE, INITIAL ENCOUNTER: Primary | ICD-10-CM

## 2023-09-07 PROCEDURE — 97110 THERAPEUTIC EXERCISES: CPT | Performed by: PHYSICAL THERAPIST

## 2023-09-07 PROCEDURE — 97112 NEUROMUSCULAR REEDUCATION: CPT | Performed by: PHYSICAL THERAPIST

## 2023-09-07 NOTE — PROGRESS NOTES
Daily Note     Today's date: 2023  Patient name: Jc Tejada. : 1945  MRN: [id number]  Referring provider: Cathy Frederick DO  Dx:   Encounter Diagnosis     ICD-10-CM    1. Strain of right trapezius muscle, initial encounter  S46.811A                      Subjective:   He notes having a lot of pain today. Objective: See treatment diary below      Assessment: Tolerated treatment well. Patient would benefit from continued PT. He had some relief of pain with assisted flexion in supine. He agreed to add this to HEP. Plan: Continue per plan of care. Progress treatment as tolerated.             Eval/ Re-eval Auth #/ Referral # Total visits Start date  Expiration date Total active units  Total manual units  PT only or  PT+OT?   23             Total units authorized                Total units remaining                      Precautions:   Hypertension, Diabetes        Specialty Daily Treatment Diary       Manuals 23      Visit # 1 2      STM R UT  3 min      PROM R shld  3 min      ST joint mobs  GH Jt A to P 2 min              Warm-up        NuStep  4 min      Neuro Re-Ed        Posture correct  10      Scap squeeze  20      UT stretch 10 10                      Ther Ex        TB row 20  GTB 20   GTB      TB ext  20   GTB      Pulleys  20x      S/L ER        S/L rotation        Wand flex  20              Ther Activity                                Modalities        CP  MH  5 min

## 2023-09-11 ENCOUNTER — OFFICE VISIT (OUTPATIENT)
Dept: PHYSICAL THERAPY | Facility: CLINIC | Age: 78
End: 2023-09-11
Payer: MEDICARE

## 2023-09-11 ENCOUNTER — TELEPHONE (OUTPATIENT)
Dept: FAMILY MEDICINE CLINIC | Facility: CLINIC | Age: 78
End: 2023-09-11

## 2023-09-11 DIAGNOSIS — S46.811A STRAIN OF RIGHT TRAPEZIUS MUSCLE, INITIAL ENCOUNTER: Primary | ICD-10-CM

## 2023-09-11 PROCEDURE — 97112 NEUROMUSCULAR REEDUCATION: CPT | Performed by: PHYSICAL THERAPIST

## 2023-09-11 PROCEDURE — 97110 THERAPEUTIC EXERCISES: CPT | Performed by: PHYSICAL THERAPIST

## 2023-09-11 NOTE — TELEPHONE ENCOUNTER
Patient called and would like to speak to Ester Fields or her nurse. He would like a refill on his medrol dose pack for his shoulder/ neck area. He is doing PT and it is slowly starting to feel better.     Stephanie Chance  CMA

## 2023-09-11 NOTE — PROGRESS NOTES
Daily Note     Today's date: 2023  Patient name: Varghese Bhatti. : 1945  MRN: [id number]  Referring provider: Chata Calhoun DO  Dx:   Encounter Diagnosis     ICD-10-CM    1. Strain of right trapezius muscle, initial encounter  S46.811A                      Subjective:   He notes some improvement overall. Objective: See treatment diary below      Assessment: Tolerated treatment well. Patient would benefit from continued PT. Patient has forward right shld position. He was given supine pec stretch to improve shld position to more neutral..  He agreed to add this to his HEP      Plan: Continue per plan of care. Progress treatment as tolerated.             Eval/ Re-eval Auth #/ Referral # Total visits Start date  Expiration date Total active units  Total manual units  PT only or  PT+OT?   23            Total units authorized                Total units remaining                      Precautions:   Hypertension, Diabetes        Specialty Daily Treatment Diary       Manuals 23     Visit # 1 2 3     STM R UT  3 min 3'     PROM R shld  3 min 3'     ST joint mobs  GH Jt A to P 2 min 3'             Warm-up        NuStep  4 min 6 min     Neuro Re-Ed        Posture correct  10 10     Scap squeeze  20      UT stretch 10 10 10     Supine pec stretch   10             Ther Ex        TB row 20  GTB 20   GTB 30   GTB     TB ext  20   GTB 30   GTB     Pulleys  20x 30     S/L ER   Bilateral ER 20x  YTB     S/L rotation        Wand flex  20 20             Ther Activity                                Modalities        CP  MH  5 min   5 min

## 2023-09-11 NOTE — TELEPHONE ENCOUNTER
He is diabetic and I will not repeat steroid pack at this time atleast before a month.  SANDRITA Guevara

## 2023-09-14 ENCOUNTER — OFFICE VISIT (OUTPATIENT)
Dept: PHYSICAL THERAPY | Facility: CLINIC | Age: 78
End: 2023-09-14
Payer: MEDICARE

## 2023-09-14 DIAGNOSIS — S46.811A STRAIN OF RIGHT TRAPEZIUS MUSCLE, INITIAL ENCOUNTER: Primary | ICD-10-CM

## 2023-09-14 PROCEDURE — 97140 MANUAL THERAPY 1/> REGIONS: CPT | Performed by: PHYSICAL THERAPIST

## 2023-09-14 NOTE — PROGRESS NOTES
Daily Note     Today's date: 2023  Patient name: Jennifer Gooden. : 1945  MRN: [id number]  Referring provider: Pam Dior DO  Dx:   Encounter Diagnosis     ICD-10-CM    1. Strain of right trapezius muscle, initial encounter  S46.811A                      Subjective:   Navdeep Brown reports feeling good for the last few days but awoke this morning with significant pain      Objective: See treatment diary below      Assessment: Tolerated treatment well. He had pain of 4/10 or 5/10 at the end of the visit which is half of what he started with. He agreed to continue using ice and rest over the weekend. Plan: Continue per plan of care. Progress treatment as tolerated.             Eval/ Re-eval Auth #/ Referral # Total visits Start date  Expiration date Total active units  Total manual units  PT only or  PT+OT?   23           Total units authorized                Total units remaining                      Precautions:   Hypertension, Diabetes        Specialty Daily Treatment Diary       Manuals 23    Visit # 1 2 3 4    STM R UT  3 min 3' 5'    PROM R shld  3 min 3' 3'    ST joint mobs  GH Jt A to P 2 min 3' 4' GHJ P to A   STJ retract        2' LAD    Warm-up        NuStep  4 min 6 min     Neuro Re-Ed        Posture correct  10 10     Scap squeeze  20      UT stretch 10 10 10     Supine pec stretch   10             Ther Ex        TB row 20  GTB 20   GTB 30   GTB     TB ext  20   GTB 30   GTB     Pulleys  20x 30     S/L ER   Bilateral ER 20x  YTB     S/L rotation        Wand flex  20 20 Standing ball rolls 20x            Ther Activity                                Modalities        CP  MH  5 min MH  5 min CP 5' pre  CP 5' post

## 2023-09-18 ENCOUNTER — OFFICE VISIT (OUTPATIENT)
Dept: PHYSICAL THERAPY | Facility: CLINIC | Age: 78
End: 2023-09-18
Payer: MEDICARE

## 2023-09-18 ENCOUNTER — OFFICE VISIT (OUTPATIENT)
Dept: NEUROLOGY | Facility: CLINIC | Age: 78
End: 2023-09-18
Payer: MEDICARE

## 2023-09-18 VITALS
DIASTOLIC BLOOD PRESSURE: 80 MMHG | HEART RATE: 70 BPM | WEIGHT: 223 LBS | OXYGEN SATURATION: 98 % | TEMPERATURE: 96.9 F | BODY MASS INDEX: 31.22 KG/M2 | SYSTOLIC BLOOD PRESSURE: 138 MMHG | HEIGHT: 71 IN

## 2023-09-18 DIAGNOSIS — S46.811A STRAIN OF RIGHT TRAPEZIUS MUSCLE, INITIAL ENCOUNTER: Primary | ICD-10-CM

## 2023-09-18 DIAGNOSIS — D36.10 SCHWANNOMA: ICD-10-CM

## 2023-09-18 DIAGNOSIS — Z87.898 H/O HEADACHE: ICD-10-CM

## 2023-09-18 DIAGNOSIS — E11.40 DIABETIC NEUROPATHY (HCC): Primary | ICD-10-CM

## 2023-09-18 PROCEDURE — 97110 THERAPEUTIC EXERCISES: CPT | Performed by: PHYSICAL THERAPIST

## 2023-09-18 PROCEDURE — 99214 OFFICE O/P EST MOD 30 MIN: CPT | Performed by: PSYCHIATRY & NEUROLOGY

## 2023-09-18 RX ORDER — TRAMADOL HYDROCHLORIDE 50 MG/1
50 TABLET ORAL EVERY 8 HOURS PRN
Qty: 90 TABLET | Refills: 1 | Status: SHIPPED | OUTPATIENT
Start: 2023-09-18

## 2023-09-18 NOTE — PROGRESS NOTES
Return NeuroOutpatient Note        Sachin Muse  [id number]  73 y.o.  1945       Diabetic neuropathy , History of benign schwannoma, and History of headache        History obtained from:  Patient     HPI/Subjective:    Sachin Muse. is a 65 yo M with diabetic neuropathy, benign schwannoma, headaches presents as f/u. Previously, patient had reported  tingling, burning, pain in both feet. His Sxs started a year ago. He was tried on natural supplement by his podiatrist and both gave him side effects. Patient was prescribed gabapentin but he never started. He now only gets numbness in right toes.  He said he would at last visit but he still didn't. His most recent A1c was in range of 6. His diabetes is under well control. His most recent A1c was 5.9. He was asked to take alpha lipoic but he's never tried.      Previously for headaches, he was started on topamax and it has been d/c as he doesn't get headaches anymore. He was being given prn fioricet but he's requesting tramadol today as he's tried from his wife and it works better. He is sensitive to salt. If he eats salt, he gets headache.   Currently, frequency of his headaches is 2-3x/year. He has cervical spine nerve ablation which had helped in past.      He has h/o schwannoma many years ago. 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 He had repeat study in May 2022 and it showed stable 0.6cm   He has no symptoms.        Past Medical History:   Diagnosis Date   • Allergic rhinitis    • Anemia 10/23/2008    last assessed 9/9/13    • Anxiety    • Atrial fibrillation ([address])    • Bleeding    • Chronic kidney disease    • COVID-19 virus infection 12/2022   • CPAP (continuous positive airway pressure) dependence    • Diabetes mellitus ([address])    • Diarrhea    • Diverticulitis    • Eating disorder    • GERD (gastroesophageal reflux disease)    • Herpes zoster with complication     last assessed 7/3/17    • Hiatal hernia    • Hyperlipidemia    • Hypertension    • IBS (irritable bowel syndrome)    • Incisional hernia    • Increased urinary frequency    • Irregular heart beat    • Pyogenic granuloma 2006    last assessed 06   • Carlos (subconjunctival hemorrhage), unspecified laterality 2005    last assessed 05   • Sleep apnea    • TMJ (dislocation of temporomandibular joint)    • Ventral hernia     last assessed  17    • Wears glasses      Social History     Socioeconomic History   • Marital status: /Civil Union     Spouse name: Not on file   • Number of children: Not on file   • Years of education: Not on file   • Highest education level: Not on file   Occupational History   • Not on file   Tobacco Use   • Smoking status: Former     Packs/day: 1.00     Years: 20.00     Total pack years: 20.00     Types: Cigarettes     Quit date: 5     Years since quittin.7   • Smokeless tobacco: Never   • Tobacco comments:     Started age 16   Vaping Use   • Vaping Use: Never used   Substance and Sexual Activity   • Alcohol use: No     Comment: none in 33 yrs   • Drug use: No   • Sexual activity: Yes   Other Topics Concern   • Not on file   Social History Narrative    Single per allscript      Social Determinants of Health     Financial Resource Strain: Low Risk  (2022)    Overall Financial Resource Strain (CARDIA)    • Difficulty of Paying Living Expenses: Not very hard   Food Insecurity: Not on file   Transportation Needs: No Transportation Needs (2022)    PRAPARE - Transportation    • Lack of Transportation (Medical): No    • Lack of Transportation (Non-Medical):  No   Physical Activity: Not on file   Stress: Not on file   Social Connections: Not on file   Intimate Partner Violence: Not on file   Housing Stability: Not on file     Family History   Problem Relation Age of Onset   • Heart disease Mother    • Cancer Mother    • Colon cancer Mother    • Dementia Mother • Early death Father    • Seizures Father    • Cancer Sister         bone   • Heart disease Sister    • Diabetes Sister    • Lupus Sister    • Heart disease Brother    • Cancer Brother         kidney   • Diabetes Brother    • Other Sister         covid   • Stroke Neg Hx      Allergies   Allergen Reactions   • Codeine Other (See Comments) and GI Intolerance     Reaction Date: 27Dec2004;  Annotation - [id number]: '' CRAZY ''  vomiting  headache   • Demerol [Meperidine] Nausea Only, Other (See Comments), GI Intolerance and Vomiting     vomiting  Reaction Date: 27Dec2004;   headache   • Morphine Nausea Only, GI Intolerance and Vomiting     Reaction Date: 23Feb2012;      Current Outpatient Medications on File Prior to Visit   Medication Sig Dispense Refill   • acetaminophen (TYLENOL) 325 mg tablet Take 650 mg by mouth every 6 (six) hours as needed     • amiodarone 200 mg tablet 200 mg daily after dinner      • amLODIPine (NORVASC) 5 mg tablet      • apixaban (ELIQUIS) 5 mg Take 5 mg by mouth daily To stop 2 days prior to the procedure     • atorvastatin (LIPITOR) 20 mg tablet TAKE 1 TABLET DAILY 90 tablet 3   • Blood Glucose Monitoring Suppl (FREESTYLE LITE) GEOVANNY      • carvedilol (COREG) 25 mg tablet Take 12.5 mg by mouth 2 (two) times a day      • clotrimazole-betamethasone (LOTRISONE) 1-0.05 % cream Apply topically 2 (two) times a day 45 g 1   • esomeprazole (NexIUM) 20 mg capsule Take 1 capsule (20 mg total) by mouth daily at bedtime 90 capsule 3   • fluticasone (FLONASE) 50 mcg/act nasal spray 2 sprays into each nostril daily 48 g 3   • FREESTYLE LITE test strip      • hydrochlorothiazide (HYDRODIURIL) 25 mg tablet Take 1 tablet (25 mg total) by mouth daily 90 tablet 1   • ketoconazole (NIZORAL) 2 % shampoo      • Lancets (FREESTYLE) lancets      • losartan (COZAAR) 100 MG tablet 100 mg every morning      • Ozempic, 0.25 or 0.5 MG/DOSE, 2 MG/1.5ML SOPN once a week     • pioglitazone-metFORMIN (ACTOPLUS MET)  MG per tablet daily after dinner     • polyethylene glycol-propylene glycol (Systane) 0.4-0.3 % every 3 (three) hours as needed       • RESTASIS 0.05 % ophthalmic emulsion Administer 1 drop to both eyes every 12 (twelve) hours      • tadalafil (CIALIS) 5 MG tablet Take 1 tablet (5 mg total) by mouth in the morning 90 tablet 3   • tamsulosin (FLOMAX) 0.4 mg Take 1 capsule (0.4 mg total) by mouth daily with dinner 90 capsule 3   • amoxicillin (AMOXIL) 500 mg capsule  (Patient not taking: Reported on 5/1/2023)     • fluocinonide (LIDEX) 0.05 % external solution  (Patient not taking: Reported on 7/17/2023)     • hydrocortisone (ANUSOL-HC) 2.5 % rectal cream Apply topically 2 (two) times a day (Patient not taking: Reported on 7/10/2023) 30 g 2   • hydrOXYzine pamoate (VISTARIL) 25 mg capsule Take 1 capsule (25 mg total) by mouth 3 (three) times a day as needed for anxiety (Patient not taking: Reported on 7/10/2023) 60 capsule 3   • methylPREDNISolone 4 MG tablet therapy pack Use as directed on package (Patient not taking: Reported on 9/18/2023) 21 tablet 0     No current facility-administered medications on file prior to visit. Review of Systems   Refer to positive review of systems in HPI.    Review of Systems    Constitutional- No fever  Eyes- No visual change  ENT- Hearing normal  CV- No chest pain  Resp- No Shortness of breath  GI- No diarrhea  - Bladder normal  MS- No Arthritis   Skin- No rash  Psych- No depression  Endo- No DM  Heme- No nodes    Vitals:    09/18/23 1023   BP: 138/80   BP Location: Left arm   Patient Position: Sitting   Cuff Size: Standard   Pulse: 70   Temp: (!) 96.9 °F (36.1 °C)   TempSrc: Tympanic   SpO2: 98%   Weight: 101 kg (223 lb)   Height: 5' 11" (1.803 m)       PHYSICAL EXAM:  Appearance: No Acute Distress  Ophthalmoscopic: Disc Flat, Normal fundus  Mental status:  Orientation: Awake, Alert, and Orientedx3  Memory: Registation 3/3 Recall 3/3  Attention: normal  Knowledge: good  Language: No aphasia  Speech: No dysarthria  Cranial Nerves:  2 No Visual Defect on Confrontation, Pupils round, equal, reactive to light  3,4,6 Extraocular Movements Intact, no nystagmus  5 Facial Sensation Intact  7 No facial asymmetry  8 Intact hearing  9,10 Palate symmetric, normal gag  11 Good shoulder shrug  12 Tongue Midline  Gait: Stable  Coordination: No ataxia with finger to nose testing, and heel to shin  Sensory: decreased to pin prick upto below ankles, decreased vibration at toes and ankles. Muscle Tone: Normal              Muscle exam:  Arm Right Left Leg Right Left   Deltoid 5/5 5/5 Iliopsoas 5/5 5/5   Biceps 5/5 5/5 Quads 5/5 5/5   Triceps 5/5 5/5 Hamstrings 5/5 5/5   Wrist Extension 5/5 5/5 Ankle Dorsi Flexion 5/5 5/5   Wrist Flexion 5/5 5/5 Ankle Plantar Flexion 5/5 5/5   Interossei 5/5 5/5 Ankle Eversion 5/5 5/5   APB 5/5 5/5 Ankle Inversion 5/5 5/5       Reflexes   RJ BJ TJ KJ AJ Plantars Victor's   Right 2+ 2+ 2+ 1+ 2+ Downgoing Not present   Left 2+ 2+ 2+ 2+ 2+ Downgoing Not present     Personal review of  Labs:                  Diagnoses and all orders for this visit:      1. Diabetic neuropathy (HCC)  traMADol (Ultram) 50 mg tablet      2. H/O headache  traMADol (Ultram) 50 mg tablet      3. Schwannoma            Patient's symptoms are stable, not any worse. He doesn't want to take daily medication for neuropathy but only as needed. He also gets episodic headaches. Will provide with prn tramadol for both. He does his best to control BG.                Total time of encounter:  30 min  More than 50% of the time was used in counseling and/or coordination of care  Extent of counseling and/or coordination of care        Kristal Martinez MD  [facility]  [address]  [phone]

## 2023-09-18 NOTE — PROGRESS NOTES
Daily Note     Today's date: 2023  Patient name: Mickey Zamora. : 1945  MRN: [id number]  Referring provider: Marianne Craig DO  Dx:   Encounter Diagnosis     ICD-10-CM    1. Strain of right trapezius muscle, initial encounter  S46.811A                      Subjective:   He reports some soreness but not as painful as he was last session. Objective: See treatment diary below      Assessment: Tolerated treatment well. Carley Brightly remains tight through right UT and pec group. Plan: Continue per plan of care. Progress treatment as tolerated.     Re-Assess effect of C-S retraction and extension on right shoulder symptoms         Eval/ Re-eval Auth #/ Referral # Total visits Start date  Expiration date Total active units  Total manual units  PT only or  PT+OT?   23          Total units authorized                Total units remaining                      Precautions:   Hypertension, Diabetes        Specialty Daily Treatment Diary       Manuals 23   Visit # 1 2 3 4 5   STM R UT  3 min 3' 5' 5'   PROM R shld  3 min 3' 3' 1'   ST joint mobs  GH Jt A to P 2 min 3' 4' GHJ P to A   STJ retract        2' LAD 2'   Warm-up        NuStep  4 min 6 min  6 min   Neuro Re-Ed        Posture correct  10 10     Scap squeeze  20   20x   UT stretch 10 10 10  10x   Supine pec stretch   10  10x           Ther Ex        TB row 20  GTB 20   GTB 30   GTB 30   BTB 30  CC   12.5#   TB ext  20   GTB 30   GTB 30   BTB 30  CC   12.5#   Pulleys  20x 30 30 30   S/L ER   Bilateral ER 20x  YTB  20x  YTB   S/L rotation        Wand flex  20 20 Standing ball rolls 20x 20 wand flex           Ther Activity                                Modalities        CP  MH  5 min MH  5 min CP 5' pre  CP 5' post --

## 2023-09-21 ENCOUNTER — APPOINTMENT (OUTPATIENT)
Dept: PHYSICAL THERAPY | Facility: CLINIC | Age: 78
End: 2023-09-21
Payer: MEDICARE

## 2023-09-22 ENCOUNTER — OFFICE VISIT (OUTPATIENT)
Dept: GASTROENTEROLOGY | Facility: CLINIC | Age: 78
End: 2023-09-22
Payer: MEDICARE

## 2023-09-22 VITALS
HEIGHT: 71 IN | WEIGHT: 222.8 LBS | SYSTOLIC BLOOD PRESSURE: 128 MMHG | DIASTOLIC BLOOD PRESSURE: 68 MMHG | BODY MASS INDEX: 31.19 KG/M2 | TEMPERATURE: 98.3 F | OXYGEN SATURATION: 98 % | HEART RATE: 82 BPM

## 2023-09-22 DIAGNOSIS — K86.2 PANCREATIC CYST: ICD-10-CM

## 2023-09-22 DIAGNOSIS — K58.1 IRRITABLE BOWEL SYNDROME WITH CONSTIPATION: ICD-10-CM

## 2023-09-22 DIAGNOSIS — K21.9 GASTROESOPHAGEAL REFLUX DISEASE WITHOUT ESOPHAGITIS: ICD-10-CM

## 2023-09-22 DIAGNOSIS — R10.9 ABDOMINAL PAIN, UNSPECIFIED ABDOMINAL LOCATION: ICD-10-CM

## 2023-09-22 DIAGNOSIS — K59.00 CONSTIPATION, UNSPECIFIED CONSTIPATION TYPE: Primary | ICD-10-CM

## 2023-09-22 DIAGNOSIS — I48.91 ATRIAL FIBRILLATION, UNSPECIFIED TYPE (HCC): ICD-10-CM

## 2023-09-22 DIAGNOSIS — K86.81 EXOCRINE PANCREATIC INSUFFICIENCY: ICD-10-CM

## 2023-09-22 PROCEDURE — 99214 OFFICE O/P EST MOD 30 MIN: CPT | Performed by: INTERNAL MEDICINE

## 2023-09-22 RX ORDER — LINACLOTIDE 72 UG/1
72 CAPSULE, GELATIN COATED ORAL
Qty: 30 CAPSULE | Refills: 4 | Status: SHIPPED | OUTPATIENT
Start: 2023-09-22

## 2023-09-22 NOTE — PROGRESS NOTES
[facility] - Outpatient Follow-up Note  Marylou Bauman. 66 y.o. male MRN: [id number]  Encounter: [id number]          ASSESSMENT AND PLAN:    Marylou Bauman. is a 66 y.o. male with irritable bowel syndrome predominantly with constipation but mixed subtype who presents for follow-up, also with reflux. He still has constipation at times. Better when he uses laxatives. Endoscopy and colonoscopy from November 2021 with gastric polyp that was removed and clip placed as well as colonic polyps, diverticulosis, internal hemorrhoids; biopsies with inflammatory pseudopolyp in the stomach, sessile serrated lesion and tubular adenoma in the cecum. MRI of the abdomen March 2022 with exophytic cyst from the right lower pole which decreased in size and multiple pancreatic cyst measuring up to 1.3 cm without high risk stigmata. Most recent CMP with chloride 111 but otherwise normal.  PSA 4.06.  TSH normal.  CBC normal.  Hemoglobin A1c 5.9.  TSH normal.  Celiac serologies negative. 1. Constipation, unspecified constipation type    2. Irritable bowel syndrome with constipation    3. Gastroesophageal reflux disease without esophagitis    4. Atrial fibrillation, unspecified type ([address])    5. Exocrine pancreatic insufficiency    6. Abdominal pain, unspecified abdominal location    7. Pancreatic cyst        No orders of the defined types were placed in this encounter. Use Anusol cream as needed  Currently on Ozempic which can contribute to nausea and vomiting as well as symptoms of gastroparesis and dysmotility.     High-fiber diet  Add Metamucil  Can use MiraLAX in the evenings  Drink at least 8 cups of water per day  Depending on the above can consider Linzess    Continue Nexium  Gaviscon or Tums as needed for breakthrough symptoms    Things that can help improved reflux include: avoidance of trigger foods (potential foods include coffee, caffeine, chocolate, mint, tomato-based products, spicy foods, fatty foods), avoid tight fitting clothing, elevated head of bed 30 degrees, avoid eating 2-3 hours prior to bedtime, weight loss, avoid alcohol, avoid tobacco use. MRI/MRCP for pancreatic cyst March 2024    Next colonoscopy 2026  ______________________________________________________________________    SUBJECTIVE:    Viktoria Baez. is a 66 y.o. male who presents with complaint of IBS. He has a torn rotator cuff. He has an MRI coming up and he uses ice packs. He saw a colorectal doctor because he was having hemorrhoid pain. The constipation has been difficult. Since starting ozempic. He uses some metamucil with some benefit. Hemorrhoids are better. He has FH of colon cancer on his mother's side. Heartburn play. REVIEW OF SYSTEMS IS OTHERWISE NEGATIVE.   10 point ROS reviewed and negative, except as above      Historical Information   Past Medical History:   Diagnosis Date    Allergic rhinitis     Anemia 10/23/2008    last assessed 9/9/13     Anxiety     Atrial fibrillation (HCC)     Bleeding     Chronic kidney disease     COVID-19 virus infection 12/2022    CPAP (continuous positive airway pressure) dependence     Diabetes mellitus (HCC)     Diarrhea     Diverticulitis     Eating disorder     GERD (gastroesophageal reflux disease)     Herpes zoster with complication     last assessed 7/3/17     Hiatal hernia     Hyperlipidemia     Hypertension     IBS (irritable bowel syndrome)     Incisional hernia     Increased urinary frequency     Irregular heart beat     Pyogenic granuloma 09/13/2006    last assessed 9/13/06    Carlos (subconjunctival hemorrhage), unspecified laterality 09/12/2005    last assessed 9/12/05    Sleep apnea     TMJ (dislocation of temporomandibular joint)     Ventral hernia     last assessed  4/6/17     Wears glasses      Past Surgical History:   Procedure Laterality Date    APPENDECTOMY      ATRIAL ABLATION SURGERY      2014    ATRIAL ABLATION SURGERY      catheter ablation atrial fibrillation / last assessed 16     CHOLECYSTECTOMY      lap    COLON SURGERY      Hartmans procedure    COLON SURGERY      reversal 6 weeks later    COLONOSCOPY N/A 2017    Procedure: COLONOSCOPY;  Surgeon: Yuko Magallon MD;  Location: [address] [place] [address] GI LAB; Service:     ESOPHAGOGASTRODUODENOSCOPY N/A 2017    Procedure: ESOPHAGOGASTRODUODENOSCOPY (EGD); Surgeon: Yuko Magallon MD;  Location: [address] [place] [address] GI LAB;   Service:     EYE SURGERY Bilateral 2015    lid repair    HERNIA REPAIR Bilateral     inguinal 2014 &     INCISIONAL HERNIA REPAIR      INCISIONAL HERNIA REPAIR N/A 3/24/2017    Procedure: REPAIR OF INCARCERATED INCISIONAL HERNIA WITH MESH, Lysis of Adhesions, Partial Omentectomy;  Surgeon: Erinn Moy MD;  Location: [facility];  Service:     Zuri Lowe / Tomi Riddle / Kale Harman Left 10/04/2021    [id number]    NEUROBLASTOMA EXCISION      SKIN CANCER EXCISION      On nose    TONSILLECTOMY       Social History   Social History     Substance and Sexual Activity   Alcohol Use No    Comment: none in 33 yrs     Social History     Substance and Sexual Activity   Drug Use No     Social History     Tobacco Use   Smoking Status Former    Packs/day: 1.00    Years: 20.00    Total pack years: 20.00    Types: Cigarettes    Quit date:     Years since quittin.7   Smokeless Tobacco Never   Tobacco Comments    Started age 16     Family History   Problem Relation Age of Onset    Heart disease Mother     Cancer Mother     Colon cancer Mother     Dementia Mother     Early death Father     Seizures Father     Cancer Sister         bone    Heart disease Sister     Diabetes Sister     Lupus Sister     Heart disease Brother     Cancer Brother         kidney    Diabetes Brother     Other Sister         covid    Stroke Neg Hx        Meds/Allergies       Current Outpatient Medications:     acetaminophen (TYLENOL) 325 mg tablet    amiodarone 200 mg tablet    amLODIPine (NORVASC) 5 mg tablet    apixaban (ELIQUIS) 5 mg    atorvastatin (LIPITOR) 20 mg tablet    Blood Glucose Monitoring Suppl (FREESTYLE LITE) GEOVANNY    carvedilol (COREG) 25 mg tablet    clotrimazole-betamethasone (LOTRISONE) 1-0.05 % cream    esomeprazole (NexIUM) 20 mg capsule    fluticasone (FLONASE) 50 mcg/act nasal spray    FREESTYLE LITE test strip    hydrochlorothiazide (HYDRODIURIL) 25 mg tablet    ketoconazole (NIZORAL) 2 % shampoo    Lancets (FREESTYLE) lancets    linaCLOtide (Linzess) 72 MCG CAPS    losartan (COZAAR) 100 MG tablet    Ozempic, 0.25 or 0.5 MG/DOSE, 2 MG/1.5ML SOPN    pioglitazone-metFORMIN (ACTOPLUS MET)  MG per tablet    polyethylene glycol-propylene glycol (Systane) 0.4-0.3 %    RESTASIS 0.05 % ophthalmic emulsion    tamsulosin (FLOMAX) 0.4 mg    traMADol (Ultram) 50 mg tablet    amoxicillin (AMOXIL) 500 mg capsule    fluocinonide (LIDEX) 0.05 % external solution    hydrocortisone (ANUSOL-HC) 2.5 % rectal cream    hydrOXYzine pamoate (VISTARIL) 25 mg capsule    methylPREDNISolone 4 MG tablet therapy pack    tadalafil (CIALIS) 5 MG tablet    Allergies   Allergen Reactions    Codeine Other (See Comments) and GI Intolerance     Reaction Date: 27Dec2004; Annotation - [id number]: '' CRAZY ''  vomiting  headache    Demerol [Meperidine] Nausea Only, Other (See Comments), GI Intolerance and Vomiting     vomiting  Reaction Date: 27Dec2004;   headache    Morphine Nausea Only, GI Intolerance and Vomiting     Reaction Date: 23Feb2012;            Objective     Blood pressure 128/68, pulse 82, temperature 98.3 °F (36.8 °C), temperature source Tympanic, height 5' 11" (1.803 m), weight 101 kg (222 lb 12.8 oz), SpO2 98 %. Body mass index is 31.07 kg/m². PHYSICAL EXAMINATION:    General Appearance:   Alert, cooperative, no distress   HEENT:  Normocephalic, atraumatic, anicteric. Neck supple, symmetrical, trachea midline. Lungs:   Equal chest rise and unlabored breathing, normal effort, no coughing. Cardiovascular:   No visualized JVD. Abdomen:   No abdominal distension. Skin:   No jaundice, rashes, or lesions. Musculoskeletal:   Normal range of motion visualized. Psych:  Normal affect and normal insight. Neuro:  Alert and appropriate. Lab Results:   No visits with results within 1 Day(s) from this visit. Latest known visit with results is:   Lab on 07/13/2023   Component Date Value    PSA, Diagnostic 07/13/2023 4.06 (H)        Lab Results   Component Value Date    WBC 8.79 11/08/2022    HGB 13.2 11/08/2022    HCT 37.5 11/08/2022    MCV 91 11/08/2022     11/08/2022       Lab Results   Component Value Date    SODIUM 142 06/23/2023    K 4.1 06/23/2023     (H) 06/23/2023    CO2 31 06/23/2023    AGAP 0 06/23/2023    BUN 22 06/23/2023    CREATININE 1.08 06/23/2023    GLUC 133 06/23/2023    GLUF 128 (H) 03/08/2022    CALCIUM 8.7 06/23/2023    AST 19 06/23/2023    ALT 47 06/23/2023    ALKPHOS 64 06/23/2023    TP 6.7 06/23/2023    TBILI 0.49 06/23/2023    EGFR 65 06/23/2023       Lab Results   Component Value Date    CRP <3.0 04/22/2019       Lab Results   Component Value Date    ZIB5WAAIUOWD 1.629 06/23/2023       No results found for: "IRON", "TIBC", "FERRITIN"    Radiology Results:   No results found.

## 2023-09-29 ENCOUNTER — HOSPITAL ENCOUNTER (OUTPATIENT)
Dept: RADIOLOGY | Facility: HOSPITAL | Age: 78
Discharge: HOME | End: 2023-09-29
Payer: MEDICARE

## 2023-09-29 VITALS — OXYGEN SATURATION: 95 % | HEART RATE: 84 BPM

## 2023-09-29 DIAGNOSIS — M25.511 SHOULDER PAIN, RIGHT: ICD-10-CM

## 2023-09-29 PROCEDURE — G1004 CDSM NDSC: HCPCS

## 2023-09-29 PROCEDURE — 73221 MRI JOINT UPR EXTREM W/O DYE: CPT

## 2023-09-29 NOTE — PROGRESS NOTES
Patient arrived to MRI appointment ambulatory and independent, AAO x4. Heidi Hamilton PA-C from Cardiology department has signed general consent for imaging and care interventions as related to today's MRI Right shoulder imaging study 09/29/2023. Patient does have a Medtronic Yvette pacemaker in place, and as such is being monitored for the duration of today's imaging study. Remote device interrogation for Medtronic Pacemaker was completed pre-procedure by [employer]al[employer] Guerrero representative. Device settings adjusted for MRI safe compatibility/ MRI Sure-Scan mode. Baseline settings of AAI (low threshold 60 bpm, high threshold 120 bpm) changed to DOO with programmed rate of 85 bpm.     Patient tolerated imaging study without incidence. (Please see procedural section of patient's chart to view vital signs which were continuously monitored & documented at q5min intervals for duration of patient's imaging appointment). Device again remotely interrogated by Iris grayson following completion of the MRI imaging study, and returned to baseline home settings of AAI (low threshold 60 bpm, high threshold 120 bpm). Normal device function confirmed prior to discharge home from MRI suite. As patient's ordering provider Dr Archie Yates is no longer affiliated with [facility], an imaging disc was provided to patient so it is available to follow-up appointment with physician on Wednesday 10/04/2023. Our department will follow-up again early next week once report is dictated by radiologist to confirm that report is faxed to Dr Petty's office as well.

## 2023-10-09 ENCOUNTER — EVALUATION (OUTPATIENT)
Dept: PHYSICAL THERAPY | Facility: CLINIC | Age: 78
End: 2023-10-09
Payer: MEDICARE

## 2023-10-09 DIAGNOSIS — S46.811A STRAIN OF RIGHT TRAPEZIUS MUSCLE, INITIAL ENCOUNTER: Primary | ICD-10-CM

## 2023-10-09 PROCEDURE — 97110 THERAPEUTIC EXERCISES: CPT | Performed by: PHYSICAL THERAPIST

## 2023-10-09 NOTE — PROGRESS NOTES
PT Re-Evaluation     Today's date: 10/9/2023  Patient name: Varghese Bhatti. : 1945  MRN: [id number]  Referring provider: No ref. provider found  Dx:   Encounter Diagnosis     ICD-10-CM    1. Strain of right trapezius muscle, initial encounter  S46.811A                      Assessment  Assessment details: David Channel has remaining pain, decreased UE range of motion, decreased UE strength, impaired function, decreased activity tolerance and poor posture. Patient's clinical presentation is consistent with their referring diagnosis of Strain of right trapezius muscle, initial encounter  (primary encounter diagnosis). The pt presents with functional limitations of ADLs, recreational activities, self-care and reaching. Pt would benefit from continued physical therapy services to address these limitations and maximize function. Impairments: abnormal muscle firing, abnormal muscle tone, abnormal or restricted ROM, activity intolerance, impaired physical strength, lacks appropriate home exercise program, pain with function, scapular dyskinesis, poor posture  and poor body mechanics  Understanding of Dx/Px/POC: good   Prognosis: good    Goals  Short term goals:  (3 weeks)  1. Patient will have pain level 2/10 right  shoulder at rest  --  NOT MET  2. Patient will report a 50% improvement in symptoms with ADL's  --  NOT MET  3. Patient will demonstrate appropriate scapulohumeral rhythm with reaching shoulder height and below  -  MET  4. Patient will have improved right shoulder ROM by 20 degrees  -  PARTIALLY MET    Long term goals: (6 weeks)  1. Patient will report 85 % improvement improvement in symptoms with ADL's  --  NOT MET  2. Patient will have pain level 2/10 right shoulder with ADL's   --  NOT MET  3. Patient will demonstrate appropriate scapulohumeral rhythm with reaching overhead  --  NOT MET  4.  Patient will be independent in a comprehensive home exercise program  --  NOT MET      Plan  Patient would benefit from: skilled physical therapy  Planned modality interventions: cryotherapy and thermotherapy: hydrocollator packs  Planned therapy interventions: joint mobilization, manual therapy, massage, neuromuscular re-education, patient education, postural training, strengthening, stretching, therapeutic activities, ADL training, functional ROM exercises, home exercise program, abdominal trunk stabilization and therapeutic exercise  Frequency: 2x week  Duration in visits: 12  Duration in weeks: 6  Treatment plan discussed with: patient        Subjective Evaluation    History of Present Illness  Mechanism of injury: He reports right shoulder pain into the right side of his neck beginning 2 weeks ago. He thinks that it began after lifting a bag of cement. He followed up with Dr. Akhil Bell. He was referred to PT.    10/9/23 Re-Eal --  He had a follow up visit with Dr. Savanah Underwood. An MRI was ordered. It indicated RC tear. He is to continue with PT to see if strengthening can resolve pain. Patient Goals  Patient goals for therapy: decreased pain    Pain  Current pain ratin  At best pain ratin  At worst pain ratin  Location: Right upper trap  Quality: throbbing, tight and pressure  Relieving factors: heat  Aggravating factors: lifting and overhead activity (turning neck)    Social Support    Employment status: not working (retired)  Hand dominance: right  Exercise history: Yard work, House work ,Golf          Objective     Postural Observations  Seated posture: fair    Additional Postural Observation Details  Patient has slouched posture in sitting       Palpation     Right   Hypertonic in the levator scapulae and upper trapezius. Tenderness of the upper trapezius.      Neurological Testing     Sensation     Shoulder     Right Shoulder   Intact: light touch    Passive Range of Motion   Left Shoulder   Flexion: 175 degrees   Abduction: 150 degrees   External rotation 0°: 80 degrees   Internal rotation 0°: 80 degrees Right Shoulder   Flexion: 147 degrees   Abduction: 115 degrees   External rotation 0°: 41 degrees   Internal rotation 0°: 57 degrees     Scapular Mobility     Right Shoulder     Scapular Mobility beyond 90° FF   Scapular elevation: moderate    Strength/Myotome Testing     Left Shoulder     Planes of Motion   Flexion: 5   Abduction: 5   External rotation at 0°: 5   Internal rotation at 0°: 5     Right Shoulder     Planes of Motion   Flexion: 4-   Abduction: 3+   External rotation at 0°: 3+   Internal rotation at 0°: 4-                   Eval/ Re-eval Auth #/ Referral # Total visits Start date  Expiration date Total active units  Total manual units  PT only or  PT+OT?   9/6/23 9/6 9/7 9/11 9/14 9/18                 Total units authorized                            Total units remaining                                     Precautions:   Hypertension, Diabetes           Specialty Daily Treatment Diary         Manuals 9/7/23 9/11/23 9/14/23 9/18/23 10/9/23   Visit # 2 3 4 5 6  RE   STM R UT 3 min 3' 5' 5'    PROM R shld 3 min 3' 3' 1'    ST joint mobs GH Jt A to P 2 min 3' 4' GHJ P to A   STJ retract            2' LAD 2'    Warm-up            NuStep 4 min 6 min   6 min ---   Neuro Re-Ed            Posture correct 10 10        Scap squeeze 20     20x    UT stretch 10 10   10x    Supine pec stretch   10   10x    Isomets                             Ther Ex            TB row 20   GTB 30   GTB 30   BTB 30  CC   12.5#    TB ext 20   GTB 30   GTB 30   BTB 30  CC   12.5#    Pulleys 20x 30 30 30    S/L ER   Bilateral ER 20x  YTB   20x  YTB ER AROM  20x   S/L rotation         Biceps curls  20x   Wand flex 20 20 Standing ball rolls 20x 20 wand flex Ball rolls  20x                Ther Activity                                                   Modalities            CP MH  5 min MH  5 min CP 5' pre  CP 5' post --

## 2023-10-11 ENCOUNTER — OFFICE VISIT (OUTPATIENT)
Dept: FAMILY MEDICINE CLINIC | Facility: CLINIC | Age: 78
End: 2023-10-11
Payer: MEDICARE

## 2023-10-11 VITALS
WEIGHT: 227.2 LBS | HEART RATE: 70 BPM | HEIGHT: 71 IN | DIASTOLIC BLOOD PRESSURE: 68 MMHG | RESPIRATION RATE: 16 BRPM | TEMPERATURE: 98.6 F | BODY MASS INDEX: 31.81 KG/M2 | SYSTOLIC BLOOD PRESSURE: 136 MMHG

## 2023-10-11 DIAGNOSIS — Z23 ENCOUNTER FOR IMMUNIZATION: ICD-10-CM

## 2023-10-11 DIAGNOSIS — Z00.00 MEDICARE ANNUAL WELLNESS VISIT, SUBSEQUENT: Primary | ICD-10-CM

## 2023-10-11 PROCEDURE — G0008 ADMIN INFLUENZA VIRUS VAC: HCPCS

## 2023-10-11 PROCEDURE — G0439 PPPS, SUBSEQ VISIT: HCPCS | Performed by: INTERNAL MEDICINE

## 2023-10-11 PROCEDURE — 90662 IIV NO PRSV INCREASED AG IM: CPT

## 2023-10-11 NOTE — PROGRESS NOTES
Assessment and Plan:     Problem List Items Addressed This Visit    None  Visit Diagnoses     Medicare annual wellness visit, subsequent    -  Primary    Encounter for immunization        Relevant Orders    influenza vaccine, high-dose, PF 0.7 mL (FLUZONE HIGH-DOSE) (Completed)            Depression Screening and Follow-up Plan: Patient's depression screening was positive with a PHQ-2 score of 4. Their PHQ-9 score was 8. Preventive health issues were discussed with patient, and age appropriate screening tests were ordered as noted in patient's After Visit Summary. Personalized health advice and appropriate referrals for health education or preventive services given if needed, as noted in patient's After Visit Summary. History of Present Illness:     Patient presents for a Medicare Wellness Visit    Here for AWV. Patient Care Team:  Rahul Dawson MD as PCP - Brooklynn Fatima, MD Gaby Douglass MD Darra London, MD Nitza Diaz MD Rupert Bloom, MD Berlinda Shirts, MD as Endoscopist     Review of Systems:     Review of Systems   Constitutional: Negative. Negative for chills and fever. HENT:  Negative for ear pain and sore throat. Eyes:  Negative for pain and visual disturbance. Respiratory: Negative. Negative for cough and shortness of breath. Cardiovascular: Negative. Negative for chest pain and palpitations. Gastrointestinal:  Negative for abdominal pain and vomiting. Genitourinary:  Negative for dysuria and hematuria. Musculoskeletal:  Negative for arthralgias and back pain. Skin:  Negative for color change and rash. Neurological:  Negative for seizures and syncope. All other systems reviewed and are negative.        Problem List:     Patient Active Problem List   Diagnosis   • Atrial fibrillation ([address])   • Hypertension, benign   • Chronic kidney disease (CKD), stage II (mild)   • Chronic rhinitis   • Colon, diverticulosis • Type 2 diabetes mellitus with stage 3 chronic kidney disease, without long-term current use of insulin, unspecified whether stage 3a or 3b CKD ([address])   • Diabetic neuropathy ([address])   • Esophageal reflux   • Mixed hyperlipidemia   • Obstructive sleep apnea   • Aortic root dilation (HCC)   • Seasonal allergic rhinitis due to pollen   • Class 1 obesity   • Dyspnea on exertion   • Stage 2 chronic kidney disease   • Left groin pain   • Syncope   • Positive depression screening   • Benign localized prostatic hyperplasia with lower urinary tract symptoms (LUTS)   • Complex renal cyst   • Recurrent UTI   • Left upper extremity swelling   • Grade IV hemorrhoids      Past Medical and Surgical History:     Past Medical History:   Diagnosis Date   • Allergic rhinitis    • Anemia 10/23/2008    last assessed 9/9/13    • Anxiety    • Atrial fibrillation (HCC)    • Bleeding    • Chronic kidney disease    • COVID-19 virus infection 12/2022   • CPAP (continuous positive airway pressure) dependence    • Diabetes mellitus ([address])    • Diarrhea    • Diverticulitis    • Eating disorder    • GERD (gastroesophageal reflux disease)    • Herpes zoster with complication     last assessed 7/3/17    • Hiatal hernia    • Hyperlipidemia    • Hypertension    • IBS (irritable bowel syndrome)    • Incisional hernia    • Increased urinary frequency    • Irregular heart beat    • Pyogenic granuloma 09/13/2006    last assessed 9/13/06   • Carlos (subconjunctival hemorrhage), unspecified laterality 09/12/2005    last assessed 9/12/05   • Sleep apnea    • TMJ (dislocation of temporomandibular joint)    • Ventral hernia     last assessed  4/6/17    • Wears glasses      Past Surgical History:   Procedure Laterality Date   • APPENDECTOMY     • ATRIAL ABLATION SURGERY      2014   • ATRIAL ABLATION SURGERY      catheter ablation atrial fibrillation / last assessed 2/17/16    • CHOLECYSTECTOMY      lap   • COLON SURGERY  1990    Hartmans procedure   • COLON SURGERY      reversal 6 weeks later   • COLONOSCOPY N/A 2017    Procedure: COLONOSCOPY;  Surgeon: Iraj Douglas MD;  Location: [address] [place] [address] GI LAB; Service:    • ESOPHAGOGASTRODUODENOSCOPY N/A 2017    Procedure: ESOPHAGOGASTRODUODENOSCOPY (EGD); Surgeon: Iraj Douglas MD;  Location: [address] [place] [address] GI LAB;   Service:    • EYE SURGERY Bilateral 2015    lid repair   • HERNIA REPAIR Bilateral     inguinal  &    • [address]   • INCISIONAL HERNIA REPAIR N/A 3/24/2017    Procedure: REPAIR OF INCARCERATED INCISIONAL HERNIA WITH MESH, Lysis of Adhesions, Partial Omentectomy;  Surgeon: Natali Evans MD;  Location: [facility];  Service:    • Brittny Marcos / Sloane Fernandez / Erma Harman Left 10/04/2021    medtronic-M#[id number]   • NEUROBLASTOMA EXCISION     • SKIN CANCER EXCISION      On nose   • TONSILLECTOMY        Family History:     Family History   Problem Relation Age of Onset   • Heart disease Mother    • Cancer Mother    • Colon cancer Mother    • Dementia Mother    • Early death Father    • Seizures Father    • Cancer Sister         bone   • Heart disease Sister    • Diabetes Sister    • Lupus Sister    • Heart disease Brother    • Cancer Brother         kidney   • Diabetes Brother    • Other Sister         covid   • Stroke Neg Hx       Social History:     Social History     Socioeconomic History   • Marital status: /Civil Union     Spouse name: None   • Number of children: None   • Years of education: None   • Highest education level: None   Occupational History   • None   Tobacco Use   • Smoking status: Former     Packs/day: 1.00     Years: 20.00     Total pack years: 20.00     Types: Cigarettes     Quit date:      Years since quittin.8   • Smokeless tobacco: Never   • Tobacco comments:     Started age 16   Vaping Use   • Vaping Use: Never used   Substance and Sexual Activity   • Alcohol use: No     Comment: none in 33 yrs   • Drug use: No   • Sexual activity: Yes   Other Topics Concern   • None   Social History Narrative    Single per allscript      Social Determinants of Health     Financial Resource Strain: Low Risk  (10/11/2023)    Overall Financial Resource Strain (CARDIA)    • Difficulty of Paying Living Expenses: Not hard at all   Food Insecurity: Not on file   Transportation Needs: No Transportation Needs (10/11/2023)    PRAPARE - Transportation    • Lack of Transportation (Medical): No    • Lack of Transportation (Non-Medical):  No   Physical Activity: Not on file   Stress: Not on file   Social Connections: Not on file   Intimate Partner Violence: Not on file   Housing Stability: Not on file      Medications and Allergies:     Current Outpatient Medications   Medication Sig Dispense Refill   • acetaminophen (TYLENOL) 325 mg tablet Take 650 mg by mouth every 6 (six) hours as needed     • amiodarone 200 mg tablet 200 mg daily after dinner      • amLODIPine (NORVASC) 5 mg tablet      • apixaban (ELIQUIS) 5 mg Take 5 mg by mouth daily To stop 2 days prior to the procedure     • atorvastatin (LIPITOR) 20 mg tablet TAKE 1 TABLET DAILY 90 tablet 3   • Blood Glucose Monitoring Suppl (FREESTYLE LITE) GEOVANNY      • carvedilol (COREG) 25 mg tablet Take 12.5 mg by mouth 2 (two) times a day      • clotrimazole-betamethasone (LOTRISONE) 1-0.05 % cream Apply topically 2 (two) times a day 45 g 1   • esomeprazole (NexIUM) 20 mg capsule Take 1 capsule (20 mg total) by mouth daily at bedtime 90 capsule 3   • fluticasone (FLONASE) 50 mcg/act nasal spray 2 sprays into each nostril daily 48 g 3   • FREESTYLE LITE test strip      • hydrochlorothiazide (HYDRODIURIL) 25 mg tablet Take 1 tablet (25 mg total) by mouth daily 90 tablet 1   • ketoconazole (NIZORAL) 2 % shampoo      • Lancets (FREESTYLE) lancets      • linaCLOtide (Linzess) 72 MCG CAPS Take 72 mcg by mouth daily before breakfast (Patient taking differently: Take 72 mcg by mouth daily before breakfast Pt patient has not started medication yet) 30 capsule 4   • losartan (COZAAR) 100 MG tablet 100 mg every morning      • Ozempic, 0.25 or 0.5 MG/DOSE, 2 MG/1.5ML SOPN once a week     • pioglitazone-metFORMIN (ACTOPLUS MET)  MG per tablet daily after dinner     • polyethylene glycol-propylene glycol (Systane) 0.4-0.3 % every 3 (three) hours as needed       • RESTASIS 0.05 % ophthalmic emulsion Administer 1 drop to both eyes every 12 (twelve) hours      • tamsulosin (FLOMAX) 0.4 mg Take 1 capsule (0.4 mg total) by mouth daily with dinner 90 capsule 3   • urea (CARMOL) 20 % cream if needed       No current facility-administered medications for this visit. Allergies   Allergen Reactions   • Codeine Other (See Comments) and GI Intolerance     Reaction Date: 27Dec2004;  Annotation - [id number]: '' CRAZY ''  vomiting  headache   • Demerol [Meperidine] Nausea Only, Other (See Comments), GI Intolerance and Vomiting     vomiting  Reaction Date: 27Dec2004;   headache   • Morphine Nausea Only, GI Intolerance and Vomiting     Reaction Date: 23Feb2012;       Immunizations:     Immunization History   Administered Date(s) Administered   • COVID-19 PFIZER VACCINE 0.3 ML IM 02/03/2021, 02/24/2021, 11/06/2021   • H1N1, All Formulations 11/05/2009   • INFLUENZA 09/29/2020, 10/01/2022   • Influenza Split High Dose Preservative Free IM 09/09/2013, 10/09/2014, 10/21/2015, 10/13/2016, 09/07/2017   • Influenza, high dose seasonal 0.7 mL 10/08/2018, 10/31/2019, 10/11/2021, 10/11/2023   • Influenza, seasonal, injectable 11/20/2001, 10/28/2002, 10/09/2003, 02/17/2005, 11/15/2005, 11/29/2006, 10/18/2007, 10/23/2008, 10/21/2009, 10/28/2010, 09/28/2011, 11/15/2012   • Pneumococcal Conjugate 13-Valent 10/21/2015   • Pneumococcal Polysaccharide PPV23 01/10/2013   • influenza, injectable, quadrivalent 09/29/2020      Health Maintenance:         Topic Date Due   • Hepatitis C Screening  Never done   • Colorectal Cancer Screening  Discontinued         Topic Date Due   • COVID-19 Vaccine (4 - Pfizer series) 01/01/2022      Medicare Screening Tests and Risk Assessments:     Garrett Stanley is here for his Subsequent Wellness visit. Health Risk Assessment:   Patient rates overall health as fair. Patient feels that their physical health rating is slightly worse. Patient is satisfied with their life. Eyesight was rated as slightly worse. Hearing was rated as same. Patient feels that their emotional and mental health rating is same. Patients states they are often angry. Patient states they are sometimes unusually tired/fatigued. Pain experienced in the last 7 days has been a lot. Patient's pain rating has been 7/10. Patient states that he has experienced weight loss or gain in last 6 months. Depression Screening:   PHQ-2 Score: 4  PHQ-9 Score: 8      Fall Risk Screening: In the past year, patient has experienced: no history of falling in past year      Home Safety:  Patient does not have trouble with stairs inside or outside of their home. Patient has working smoke alarms and has working carbon monoxide detector. Home safety hazards include: none. Nutrition:   Current diet is Regular. Medications:   Patient is currently taking over-the-counter supplements. OTC medications include: see medication list. Patient is able to manage medications. Activities of Daily Living (ADLs)/Instrumental Activities of Daily Living (IADLs):   Walk and transfer into and out of bed and chair?: Yes  Dress and groom yourself?: Yes    Bathe or shower yourself?: Yes    Feed yourself? Yes  Do your laundry/housekeeping?: Yes  Manage your money, pay your bills and track your expenses?: Yes  Make your own meals?: Yes    Do your own shopping?: Yes    Previous Hospitalizations:   Any hospitalizations or ED visits within the last 12 months?: No      Advance Care Planning:   Living will: No    Durable POA for healthcare:  Yes    Advanced directive: No    End of Life Decisions reviewed with patient: Yes      Cognitive Screening: Provider or family/friend/caregiver concerned regarding cognition?: No    PREVENTIVE SCREENINGS      Cardiovascular Screening:    General: Screening Not Indicated and History Lipid Disorder      Diabetes Screening:     General: Screening Not Indicated and History Diabetes      Colorectal Cancer Screening:     General: Screening Current      Prostate Cancer Screening:    General: Screening Not Indicated      Osteoporosis Screening:    General: Screening Not Indicated      Abdominal Aortic Aneurysm (AAA) Screening:    Risk factors include: tobacco use        General: Screening Not Indicated      Lung Cancer Screening:     General: Screening Not Indicated      Hepatitis C Screening:    General: Risks and Benefits Discussed    Screening, Brief Intervention, and Referral to Treatment (SBIRT)    Screening  Typical number of drinks in a day: 0  Typical number of drinks in a week: 0  Interpretation: Low risk drinking behavior. Single Item Drug Screening:  How often have you used an illegal drug (including marijuana) or a prescription medication for non-medical reasons in the past year? never    Single Item Drug Screen Score: 0  Interpretation: Negative screen for possible drug use disorder    Brief Intervention  Alcohol & drug use screenings were reviewed. No concerns regarding substance use disorder identified. Review of Current Opioid Use  Opioid Risk Tool (ORT) Score: 0  Opioid Risk Tool (ORT) Interpretation: Score 0-3: Low risk for opioid misuse    Other Counseling Topics:   Car/seat belt/driving safety, skin self-exam, sunscreen and calcium and vitamin D intake and regular weightbearing exercise. No results found. Physical Exam:     /68   Pulse 70   Temp 98.6 °F (37 °C)   Resp 16   Ht 5' 11" (1.803 m)   Wt 103 kg (227 lb 3.2 oz)   BMI 31.69 kg/m²     Physical Exam  Vitals and nursing note reviewed. Constitutional:       General: He is not in acute distress.      Appearance: He is well-developed. HENT:      Head: Normocephalic and atraumatic. Eyes:      Conjunctiva/sclera: Conjunctivae normal.   Cardiovascular:      Rate and Rhythm: Normal rate and regular rhythm. Pulses: no weak pulses          Dorsalis pedis pulses are 2+ on the right side and 2+ on the left side. Heart sounds: No murmur heard. Pulmonary:      Effort: Pulmonary effort is normal. No respiratory distress. Breath sounds: Normal breath sounds. Abdominal:      Palpations: Abdomen is soft. Tenderness: There is no abdominal tenderness. Musculoskeletal:         General: No swelling. Cervical back: Neck supple. Feet:      Right foot:      Skin integrity: No ulcer, skin breakdown, erythema, warmth, callus or dry skin. Left foot:      Skin integrity: No ulcer, skin breakdown, erythema, warmth, callus or dry skin. Skin:     General: Skin is warm and dry. Capillary Refill: Capillary refill takes less than 2 seconds. Neurological:      Mental Status: He is alert. Psychiatric:         Mood and Affect: Mood normal.      Patient's shoes and socks removed. Right Foot/Ankle   Right Foot Inspection  Skin Exam: skin normal and skin intact. No dry skin, no warmth, no callus, no erythema, no maceration, no abnormal color, no pre-ulcer, no ulcer and no callus. Toe Exam: ROM and strength within normal limits. Sensory   Monofilament testing: intact    Vascular  Capillary refills: < 3 seconds  The right DP pulse is 2+. Left Foot/Ankle  Left Foot Inspection  Skin Exam: skin normal and skin intact. No dry skin, no warmth, no erythema, no maceration, normal color, no pre-ulcer, no ulcer and no callus. Toe Exam: ROM and strength within normal limits. Sensory   Monofilament testing: intact    Vascular  Capillary refills: < 3 seconds  The left DP pulse is 2+.      Assign Risk Category  No deformity present  No loss of protective sensation  No weak pulses  Risk: 0        [facility] MD Cy

## 2023-10-11 NOTE — PATIENT INSTRUCTIONS
Medicare Preventive Visit Patient Instructions  Thank you for completing your Welcome to Medicare Visit or Medicare Annual Wellness Visit today. Your next wellness visit will be due in one year (10/11/2024). The screening/preventive services that you may require over the next 5-10 years are detailed below. Some tests may not apply to you based off risk factors and/or age. Screening tests ordered at today's visit but not completed yet may show as past due. Also, please note that scanned in results may not display below. Preventive Screenings:  Service Recommendations Previous Testing/Comments   Colorectal Cancer Screening  Colonoscopy    Fecal Occult Blood Test (FOBT)/Fecal Immunochemical Test (FIT)  Fecal DNA/Cologuard Test  Flexible Sigmoidoscopy Age: 43-73 years old   Colonoscopy: every 10 years (May be performed more frequently if at higher risk)  OR  FOBT/FIT: every 1 year  OR  Cologuard: every 3 years  OR  Sigmoidoscopy: every 5 years  Screening may be recommended earlier than age 39 if at higher risk for colorectal cancer. Also, an individualized decision between you and your healthcare provider will decide whether screening between the ages of 77-80 would be appropriate.  Colonoscopy: 11/10/2021  FOBT/FIT: Not on file  Cologuard: Not on file  Sigmoidoscopy: Not on file          Prostate Cancer Screening Individualized decision between patient and health care provider in men between ages of 53-66   Medicare will cover every 12 months beginning on the day after your 50th birthday PSA: 4.06 ng/mL     Screening Not Indicated     Hepatitis C Screening Once for adults born between 1945 and 1965  More frequently in patients at high risk for Hepatitis C Hep C Antibody: Not on file        Diabetes Screening 1-2 times per year if you're at risk for diabetes or have pre-diabetes Fasting glucose: 128 mg/dL (3/8/2022)  A1C: 5.9 % (7/20/2022)  Screening Not Indicated  History Diabetes   Cholesterol Screening Once every 5 years if you don't have a lipid disorder. May order more often based on risk factors. Lipid panel: Not on file  Screening Not Indicated  History Lipid Disorder      Other Preventive Screenings Covered by Medicare:  Abdominal Aortic Aneurysm (AAA) Screening: covered once if your at risk. You're considered to be at risk if you have a family history of AAA or a male between the age of 70-76 who smoking at least 100 cigarettes in your lifetime. Lung Cancer Screening: covers low dose CT scan once per year if you meet all of the following conditions: (1) Age 48-67; (2) No signs or symptoms of lung cancer; (3) Current smoker or have quit smoking within the last 15 years; (4) You have a tobacco smoking history of at least 20 pack years (packs per day x number of years you smoked); (5) You get a written order from a healthcare provider. Glaucoma Screening: covered annually if you're considered high risk: (1) You have diabetes OR (2) Family history of glaucoma OR (3)  aged 48 and older OR (3)  American aged 72 and older  Osteoporosis Screening: covered every 2 years if you meet one of the following conditions: (1) Have a vertebral abnormality; (2) On glucocorticoid therapy for more than 3 months; (3) Have primary hyperparathyroidism; (4) On osteoporosis medications and need to assess response to drug therapy. HIV Screening: covered annually if you're between the age of 14-79. Also covered annually if you are younger than 13 and older than 72 with risk factors for HIV infection. For pregnant patients, it is covered up to 3 times per pregnancy.     Immunizations:  Immunization Recommendations   Influenza Vaccine Annual influenza vaccination during flu season is recommended for all persons aged >= 6 months who do not have contraindications   Pneumococcal Vaccine   * Pneumococcal conjugate vaccine = PCV13 (Prevnar 13), PCV15 (Vaxneuvance), PCV20 (Prevnar 20)  * Pneumococcal polysaccharide vaccine = PPSV23 (Pneumovax) Adults 56-11 yo with certain risk factors or if 69+ yo  If never received any pneumonia vaccine: recommend Prevnar 20 (PCV20)  Give PCV20 if previously received 1 dose of PCV13 or PPSV23   Hepatitis B Vaccine 3 dose series if at intermediate or high risk (ex: diabetes, end stage renal disease, liver disease)   Respiratory syncytial virus (RSV) Vaccine - COVERED BY MEDICARE PART D  * RSVPreF3 (Arexvy) CDC recommends that adults 61years of age and older may receive a single dose of RSV vaccine using shared clinical decision-making (SCDM)   Tetanus (Td) Vaccine - COST NOT COVERED BY MEDICARE PART B Following completion of primary series, a booster dose should be given every 10 years to maintain immunity against tetanus. Td may also be given as tetanus wound prophylaxis. Tdap Vaccine - COST NOT COVERED BY MEDICARE PART B Recommended at least once for all adults. For pregnant patients, recommended with each pregnancy. Shingles Vaccine (Shingrix) - COST NOT COVERED BY MEDICARE PART B  2 shot series recommended in those 19 years and older who have or will have weakened immune systems or those 50 years and older     Health Maintenance Due:      Topic Date Due   • Hepatitis C Screening  Never done   • Colorectal Cancer Screening  Discontinued     Immunizations Due:      Topic Date Due   • COVID-19 Vaccine (4 - Pfizer series) 01/01/2022   • Influenza Vaccine (1) 09/01/2023     Advance Directives   What are advance directives? Advance directives are legal documents that state your wishes and plans for medical care. These plans are made ahead of time in case you lose your ability to make decisions for yourself. Advance directives can apply to any medical decision, such as the treatments you want, and if you want to donate organs. What are the types of advance directives? There are many types of advance directives, and each state has rules about how to use them.  You may choose a combination of any of the following:  Living will: This is a written record of the treatment you want. You can also choose which treatments you do not want, which to limit, and which to stop at a certain time. This includes surgery, medicine, IV fluid, and tube feedings. Durable power of  for healthcare [facility]): This is a written record that states who you want to make healthcare choices for you when you are unable to make them for yourself. This person, called a proxy, is usually a family member or a friend. You may choose more than 1 proxy. Do not resuscitate (DNR) order:  A DNR order is used in case your heart stops beating or you stop breathing. It is a request not to have certain forms of treatment, such as CPR. A DNR order may be included in other types of advance directives. Medical directive: This covers the care that you want if you are in a coma, near death, or unable to make decisions for yourself. You can list the treatments you want for each condition. Treatment may include pain medicine, surgery, blood transfusions, dialysis, IV or tube feedings, and a ventilator (breathing machine). Values history: This document has questions about your views, beliefs, and how you feel and think about life. This information can help others choose the care that you would choose. Why are advance directives important? An advance directive helps you control your care. Although spoken wishes may be used, it is better to have your wishes written down. Spoken wishes can be misunderstood, or not followed. Treatments may be given even if you do not want them. An advance directive may make it easier for your family to make difficult choices about your care. Depression   Depression  is a medical condition that causes feelings of sadness or hopelessness that do not go away. Depression may cause you to lose interest in things you used to enjoy. These feelings may interfere with your daily life.   Call your local emergency number (911 in the [address]) if:   You think about harming yourself or someone else. You have done something on purpose to hurt yourself. The following resources are available at any time to help you, if needed:   [place]: 4-803.170.7084 (1-811-122-CHASIDY)   Suicide Hotline: 7-692.424.9615 (2-129-NNFWZBM)   For a list of international numbers: https://save.org/find-help/international-resources/  Treatment for depression may include medicine to relieve depression. Medicine is often used together with therapy. Therapy is a way for you to talk about your feelings and anything that may be causing depression. Therapy can be done alone or in a group. It may also be done with family members or a significant other. Get regular physical activity. Create a regular sleep schedule. Eat a variety of healthy foods. Do not drink alcohol or use drugs. Weight Management   Why it is important to manage your weight:  Being overweight increases your risk of health conditions such as heart disease, high blood pressure, type 2 diabetes, and certain types of cancer. It can also increase your risk for osteoarthritis, sleep apnea, and other respiratory problems. Aim for a slow, steady weight loss. Even a small amount of weight loss can lower your risk of health problems. How to lose weight safely:  A safe and healthy way to lose weight is to eat fewer calories and get regular exercise. You can lose up about 1 pound a week by decreasing the number of calories you eat by 500 calories each day. Healthy meal plan for weight management:  A healthy meal plan includes a variety of foods, contains fewer calories, and helps you stay healthy. A healthy meal plan includes the following:  Eat whole-grain foods more often. A healthy meal plan should contain fiber. Fiber is the part of grains, fruits, and vegetables that is not broken down by your body. Whole-grain foods are healthy and provide extra fiber in your diet. Some examples of whole-grain foods are whole-wheat breads and pastas, oatmeal, brown rice, and bulgur. Eat a variety of vegetables every day. Include dark, leafy greens such as spinach, kale, bernie greens, and mustard greens. Eat yellow and orange vegetables such as carrots, sweet potatoes, and winter squash. Eat a variety of fruits every day. Choose fresh or canned fruit (canned in its own juice or light syrup) instead of juice. Fruit juice has very little or no fiber. Eat low-fat dairy foods. Drink fat-free (skim) milk or 1% milk. Eat fat-free yogurt and low-fat cottage cheese. Try low-fat cheeses such as mozzarella and other reduced-fat cheeses. Choose meat and other protein foods that are low in fat. Choose beans or other legumes such as split peas or lentils. Choose fish, skinless poultry (chicken or turkey), or lean cuts of red meat (beef or pork). Before you cook meat or poultry, cut off any visible fat. Use less fat and oil. Try baking foods instead of frying them. Add less fat, such as margarine, sour cream, regular salad dressing and mayonnaise to foods. Eat fewer high-fat foods. Some examples of high-fat foods include french fries, doughnuts, ice cream, and cakes. Eat fewer sweets. Limit foods and drinks that are high in sugar. This includes candy, cookies, regular soda, and sweetened drinks. Exercise:  Exercise at least 30 minutes per day on most days of the week. Some examples of exercise include walking, biking, dancing, and swimming. You can also fit in more physical activity by taking the stairs instead of the elevator or parking farther away from stores. Ask your healthcare provider about the best exercise plan for you.    Narcotic (Opioid) Safety    Use narcotics safely:  Take prescribed narcotics exactly as directed  Do not give narcotics to others or take narcotics that belong to someone else  Do not mix narcotics without medicines or alcohol  Do not drive or operate heavy machinery after you take the narcotic  Monitor for side effects and notify your healthcare provider if you experienced side effects such as nausea, sleepiness, itching, or trouble thinking clearly. Manage constipation:    Constipation is the most common side effect of narcotic medicine. Constipation is when you have hard, dry bowel movements, or you go longer than usual between bowel movements. Tell your healthcare provider about all changes in your bowel movements while you are taking narcotics. He or she may recommend laxative medicine to help you have a bowel movement. He or she may also change the kind of narcotic you are taking, or change when you take it. The following are more ways you can prevent or relieve constipation:    Drink liquids as directed. You may need to drink extra liquids to help soften and move your bowels. Ask how much liquid to drink each day and which liquids are best for you. Eat high-fiber foods. This may help decrease constipation by adding bulk to your bowel movements. High-fiber foods include fruits, vegetables, whole-grain breads and cereals, and beans. Your healthcare provider or dietitian can help you create a high-fiber meal plan. Your provider may also recommend a fiber supplement if you cannot get enough fiber from food. Exercise regularly. Regular physical activity can help stimulate your intestines. Walking is a good exercise to prevent or relieve constipation. Ask which exercises are best for you. Schedule a time each day to have a bowel movement. This may help train your body to have regular bowel movements. Bend forward while you are on the toilet to help move the bowel movement out. Sit on the toilet for at least 10 minutes, even if you do not have a bowel movement. Store narcotics safely:   Store narcotics where others cannot easily get them. Keep them in a locked cabinet or secure area. Do not  keep them in a purse or other bag you carry with you.  A person may be looking for something else and find the narcotics. Make sure narcotics are stored out of the reach of children. A child can easily overdose on narcotics. Narcotics may look like candy to a small child. The best way to dispose of narcotics: The laws vary by country and area. In the [facility], the best way is to return the narcotics through a take-back program. This program is offered by the [employer] ([employer]). The following are options for using the program:  Take the narcotics to a JIMMY collection site. The site is often a law enforcement center. Call your local law enforcement center for scheduled take-back days in your area. You will be given information on where to go if the collection site is in a different location. Take the narcotics to an approved pharmacy or hospital.  A pharmacy or hospital may be set up as a collection site. You will need to ask if it is a JIMMY collection site if you were not directed there. A pharmacy or doctor's office may not be able to take back narcotics unless it is a JIMMY site. Use a mail-back system. This means you are given containers to put the narcotics into. You will then mail them in the containers. Use a take-back drop box. This is a place to leave the narcotics at any time. People and animals will not be able to get into the box. Your local law enforcement agency can tell you where to find a drop box in your area. Other ways to manage pain:   Ask your healthcare provider about non-narcotic medicines to control pain. Nonprescription medicines include NSAIDs (such as ibuprofen) and acetaminophen. Prescription medicines include muscle relaxers, antidepressants, and steroids. Pain may be managed without any medicines. Some ways to relieve pain include massage, aromatherapy, or meditation. Physical or occupational therapy may also help.     For more information:   Drug Enforcement Administration  [address], [place] [address]  Phone: 8- 704 - 790-1185  Web Address: [email or web address]/    [address] [place] [address]  Phone: 5- 553 - 799-1741  Web Address: http://[employer]/     © Copyright [employer] 2018 Information is for End User's use only and may not be sold, redistributed or otherwise used for commercial purposes.  All illustrations and images included in CareNotes® are the copyrighted property of [employer].D.A[employer]M., Inc. or [address] [place] [address]

## 2023-10-12 ENCOUNTER — OFFICE VISIT (OUTPATIENT)
Dept: PHYSICAL THERAPY | Facility: CLINIC | Age: 78
End: 2023-10-12
Payer: MEDICARE

## 2023-10-12 ENCOUNTER — TELEPHONE (OUTPATIENT)
Dept: ADMINISTRATIVE | Facility: OTHER | Age: 78
End: 2023-10-12

## 2023-10-12 DIAGNOSIS — S46.811A STRAIN OF RIGHT TRAPEZIUS MUSCLE, INITIAL ENCOUNTER: Primary | ICD-10-CM

## 2023-10-12 PROCEDURE — 97112 NEUROMUSCULAR REEDUCATION: CPT | Performed by: PHYSICAL THERAPIST

## 2023-10-12 PROCEDURE — 97110 THERAPEUTIC EXERCISES: CPT | Performed by: PHYSICAL THERAPIST

## 2023-10-12 NOTE — PROGRESS NOTES
Daily Note     Today's date: 10/12/2023  Patient name: Mickey Zamora. : 1945  MRN: [id number]  Referring provider: Megan Howell*  Dx:   Encounter Diagnosis     ICD-10-CM    1. Strain of right trapezius muscle, initial encounter  F45.686E                      Subjective: He strained his right shoulder today tryng to help his wife up from sitting. Objective: See treatment diary below      Assessment: Tolerated treatment well. Patient would benefit from continued PT. No increase in pain during exercises with the focus on AROM or AAROM. Plan: Continue per plan of care. Progress treatment as tolerated.    Add isomets        Eval/ Re-eval Auth #/ Referral # Total visits Start date  Expiration date Total active units  Total manual units  PT only or  PT+OT?   9/6/23                                                                9/6 9/7 9/11 9/14 9/18  10/9  10/12             Total units authorized                            Total units remaining                                     Precautions:   Hypertension, Diabetes           Specialty Daily Treatment Diary         Manuals 9/11/23 9/14/23 9/18/23 10/9/23 10/12/23   Visit # 3 4 5 6  RE 7   STM R UT 3' 5' 5'  --   PROM R shld 3' 3' 1'  5' PROM   ST joint mobs 3' 4' GHJ P to A   STJ retract    LAD  3'       2' LAD 2'     Warm-up           NuStep 6 min   6 min ---    Neuro Re-Ed           Posture correct 10         Scap squeeze     20x  20x   UT stretch 10   10x     Supine pec stretch 10   10x     Isomets                            Ther Ex           TB row 30   GTB 30   BTB 30  CC   12.5#     TB ext 30   GTB 30   BTB 30  CC   12.5#     Pulleys 30 30 30  20   S/L ER Bilateral ER 20x  YTB   20x  YTB ER AROM  20x ER AROM  20x   S/L rotation       Biceps curls  20x 20x   Wand flex 20 Standing ball rolls 20x 20 wand flex Ball rolls  20x Ball rolls 30               Ther Activity                                               Modalities           CP MH 5 min CP 5' pre  CP 5' post --  5' CP post

## 2023-10-12 NOTE — TELEPHONE ENCOUNTER
----- Message from Kallie Child MD sent at 10/11/2023 11:25 AM EDT -----  10/11/23 11:25 AM    Hello, our patient No patient name on file. has had Diabetic Eye Exam completed/performed. Please assist in updating the patient chart by making an External outreach to Dr. Mark Rizo facility located in [place], [place]. The date of service is 2023.     Thank you,  Lucyann Opitz Hotchkin  [facility]

## 2023-10-12 NOTE — LETTER
Procedure Request Form: Hemoglobin A1c and Urine Albumin / Creatine and Ratio       Date Requested: 10/16/23  Patient: Jannie Aase. Patient : 1945   Referring Provider: Shell Pina MD        Date of Procedure ______________________________       The above patient has informed us that they have completed their   most recent See above Items at your facility. Please complete   this form and attach all corresponding procedure reports/results. Comments __________________________________________________________  ____________________________________________________________________  ____________________________________________________________________  ____________________________________________________________________    Facility Completing Procedure _________________________________________    Form Completed By (print name) _______________________________________      Signature __________________________________________________________      These reports are needed for  compliance. Please fax this completed form and a copy of the procedure report to our office located at [address] [place] [address] as soon as possible to Fax [phone] marlena Lopez: Phone [phone]    We thank you for your assistance in treating our mutual patient.

## 2023-10-12 NOTE — LETTER
Diabetic Eye Exam Form    Date Requested: 10/16/23  Patient: Choco Sawyer. Patient : 1945   Referring Provider: Mirna Deutsch MD      DIABETIC Eye Exam Date _______________________________      Type of Exam MUST be documented for Diabetic Eye Exams. Please CHECK ONE. Retinal Exam       Dilated Retinal Exam       OCT       Optomap-Iris Exam      Fundus Photography       Left Eye - Please check Retinopathy or No Retinopathy        Exam did show retinopathy    Exam did not show retinopathy       Right Eye - Please check Retinopathy or No Retinopathy       Exam did show retinopathy    Exam did not show retinopathy       Comments __________________________________________________________    Practice Providing Exam ______________________________________________    Exam Performed By (print name) _______________________________________      Provider Signature ___________________________________________________      These reports are needed for  compliance. Please fax this completed form and a copy of the Diabetic Eye Exam report to our office located at [address] [place] [address] as soon as possible via Fax [phone] attention Kaylie Fernandoebonie: Phone [phone]  We thank you for your assistance in treating our mutual patient.

## 2023-10-12 NOTE — TELEPHONE ENCOUNTER
----- Message from Aide Arzola MD sent at 10/11/2023 11:25 AM EDT -----  10/11/23 11:25 AM    Hello, our patient No patient name on file. has had Hemoglobin A1c and Urine Albumin/Creatinine Ratio completed/performed. Please assist in updating the patient chart by making an External outreach to Dr. Shon Giles facility located in [place], [place]. The date of service is 2023.     Thank you,  Raymon Babinski Hotchkin  [facility]

## 2023-10-16 NOTE — TELEPHONE ENCOUNTER
Upon review of the In Basket request and the patient's chart, initial outreach has been made via fax to facility. Please see Contacts section for details.      DM Eye  Julisa   10-16-23    RECMARCO'JOSE RAFAEL  AND SCANNED    Dr. Thomas Hernandez   10-16-23    Thank you  Abiel Heredia MA

## 2023-10-17 ENCOUNTER — APPOINTMENT (OUTPATIENT)
Dept: PHYSICAL THERAPY | Facility: CLINIC | Age: 78
End: 2023-10-17
Payer: MEDICARE

## 2023-10-20 ENCOUNTER — OFFICE VISIT (OUTPATIENT)
Dept: PHYSICAL THERAPY | Facility: CLINIC | Age: 78
End: 2023-10-20
Payer: MEDICARE

## 2023-10-20 DIAGNOSIS — S46.811A STRAIN OF RIGHT TRAPEZIUS MUSCLE, INITIAL ENCOUNTER: Primary | ICD-10-CM

## 2023-10-20 PROCEDURE — 97110 THERAPEUTIC EXERCISES: CPT | Performed by: PHYSICAL THERAPIST

## 2023-10-20 NOTE — PROGRESS NOTES
Daily Note     Today's date: 10/20/2023  Patient name: Brea Almendarez. : 1945  MRN: [id number]  Referring provider: No ref. provider found  Dx:   Encounter Diagnosis     ICD-10-CM    1. Strain of right trapezius muscle, initial encounter  S46.811A                      Subjective: His pain level has been lower this past week. Objective: See treatment diary below      Assessment: Tolerated treatment well. Patient would benefit from continued PT. He was 25 min late for appt. Performed less exercise this session due to time constraints. Plan: Continue per plan of care. Progress treatment as tolerated.    Add isomets        Eval/ Re-eval Auth #/ Referral # Total visits Start date  Expiration date Total active units  Total manual units  PT only or  PT+OT?   9/6/23                                                                9/6 9/7 9/11 9/14 9/18  10/9  10/12  10/20           Total units authorized                            Total units remaining                                     Precautions:   Hypertension, Diabetes           Specialty Daily Treatment Diary         Manuals 9/14/23 9/18/23 10/9/23 10/12/23 10/20/23   Visit # 4 5 6  RE 7 8   STM R UT 5' 5'  --    PROM R shld 3' 1'  5' PROM 5'   ST joint mobs 4' GHJ P to A   STJ retract    LAD  3' 3'     2' LAD 2'      Warm-up          NuStep   6 min ---     Neuro Re-Ed          Posture correct          Scap squeeze   20x  20x 20   UT stretch   10x      Supine pec stretch   10x      Isomets                           Ther Ex          TB row 30   BTB 30  CC   12.5#      TB ext 30   BTB 30  CC   12.5#      Pulleys 30 30  20 20   S/L ER   20x  YTB ER AROM  20x ER AROM  20x 20   S/L rotation     Biceps curls  20x 20x    Wand flex Standing ball rolls 20x 20 wand flex Ball rolls  20x Ball rolls 30 30              Ther Activity                                           Modalities          CP CP 5' pre  CP 5' post --  5' CP post

## 2023-10-24 ENCOUNTER — OFFICE VISIT (OUTPATIENT)
Dept: PHYSICAL THERAPY | Facility: CLINIC | Age: 78
End: 2023-10-24
Payer: MEDICARE

## 2023-10-24 DIAGNOSIS — S46.811A STRAIN OF RIGHT TRAPEZIUS MUSCLE, INITIAL ENCOUNTER: Primary | ICD-10-CM

## 2023-10-24 PROCEDURE — 97110 THERAPEUTIC EXERCISES: CPT | Performed by: PHYSICAL THERAPIST

## 2023-10-24 NOTE — PROGRESS NOTES
Daily Note     Today's date: 10/24/2023  Patient name: Darrel Paulson. : 1945  MRN: [id number]  Referring provider: No ref. provider found  Dx:   Encounter Diagnosis     ICD-10-CM    1. Strain of right trapezius muscle, initial encounter  S46.811A                      Subjective:  pain is present but still improving      Objective: See treatment diary below      Assessment: Tolerated treatment well. Patient would benefit from continued PT.  ROM improving and symptoms are less as long as he avoids over head motion and abduction. Plan: Continue per plan of care. Progress treatment as tolerated.            Eval/ Re-eval Auth #/ Referral # Total visits Start date  Expiration date Total active units  Total manual units  PT only or  PT+OT?   9/6/23                                                                9/6 9/7 9/11 9/14 9/18  10/9  10/12  10/20  10/24         Total units authorized                            Total units remaining                                     Precautions:   Hypertension, Diabetes           Specialty Daily Treatment Diary         Manuals 10/9/23 10/12/23 10/20/23 10/24/23    Visit # 6  RE 7 8 9    STM R UT  --      PROM R shld  5' PROM 5' 5'    ST joint mobs  LAD  3' 3' 3'             Warm-up        NuStep ---   4 min    Neuro Re-Ed        Posture correct        Scap squeeze  20x 20 20    UT stretch        Supine pec stretch        Isomets                         Ther Ex        TB row        TB ext        Pulleys  20 20 30    S/L ER ER AROM  20x ER AROM  20x 20     S/L rotation Biceps curls  20x 20x  20    Wand flex Ball rolls  20x Ball rolls 30 30 30             Ther Activity                                   Modalities        CP  5' CP post  5' CP post

## 2023-10-26 ENCOUNTER — OFFICE VISIT (OUTPATIENT)
Dept: PHYSICAL THERAPY | Facility: CLINIC | Age: 78
End: 2023-10-26
Payer: MEDICARE

## 2023-10-26 DIAGNOSIS — S46.811A STRAIN OF RIGHT TRAPEZIUS MUSCLE, INITIAL ENCOUNTER: Primary | ICD-10-CM

## 2023-10-26 PROCEDURE — 97110 THERAPEUTIC EXERCISES: CPT | Performed by: PHYSICAL THERAPIST

## 2023-10-26 NOTE — PROGRESS NOTES
Daily Note         Today's date: 10/26/2023  Patient name: Lou Lozano. : 1945  MRN: [id number]  Referring provider: Dr. Do [place]    Dx:   Encounter Diagnosis     ICD-10-CM    1. Strain of right trapezius muscle, initial encounter  S46.811A           Subjective: Lou Lozano. reports no pain entering today. Pain occurs with reaching and lifting. Objective: See treatment diary below    Assessment:   PLOC discussed with primary PT. Good mobility noted with PROM.  Denied pain throughout session     Plan:  continue per ploc of care etablished by primary PT           Eval/ Re-eval Auth #/ Referral # Total visits Start date  Expiration date Total active units  Total manual units  PT only or  PT+OT?   9/6/23                                                                9/6 9/7 9/11 9/14 9/18  10/9  10/12  10/20  10/24  10/26       Total units authorized                            Total units remaining                                     Precautions:   Hypertension, Diabetes           Specialty Daily Treatment Diary         Manuals 10/9/23 10/12/23 10/20/23 10/24/23 10/26/23   Visit # 6  RE 7 8 9 10   STM R UT  --      PROM R shld  5' PROM 5' 5' 5 min    ST joint mobs  LAD  3' 3' 3' 3 min             Warm-up        NuStep ---   4 min    Neuro Re-Ed        Posture correct        Scap squeeze  20x 20 20 5 sec x 20    UT stretch        Supine pec stretch        Isomets                         Ther Ex        TB row        TB ext        Pulleys  20 20 30 30 x    S/L ER ER AROM  20x ER AROM  20x 20  Sitting 30 x    S/L rotation Biceps curls  20x 20x  20 30x    Wand flex Ball rolls  20x Ball rolls 30 30 30 30 x     scap rows     Red band: 20 x    Ther Activity                                   Modalities        CP  5' CP post  5' CP post

## 2023-10-27 ENCOUNTER — LAB (OUTPATIENT)
Dept: LAB | Facility: CLINIC | Age: 78
End: 2023-10-27
Payer: MEDICARE

## 2023-10-27 DIAGNOSIS — Z12.5 PROSTATE CANCER SCREENING: ICD-10-CM

## 2023-10-27 DIAGNOSIS — R60.0 EDEMA OF BOTH LOWER LEGS: ICD-10-CM

## 2023-10-27 LAB
ANION GAP SERPL CALCULATED.3IONS-SCNC: 6 MMOL/L
BUN SERPL-MCNC: 21 MG/DL (ref 5–25)
CALCIUM SERPL-MCNC: 9.3 MG/DL (ref 8.4–10.2)
CHLORIDE SERPL-SCNC: 100 MMOL/L (ref 96–108)
CO2 SERPL-SCNC: 32 MMOL/L (ref 21–32)
CREAT SERPL-MCNC: 1.12 MG/DL (ref 0.6–1.3)
GFR SERPL CREATININE-BSD FRML MDRD: 62 ML/MIN/1.73SQ M
GLUCOSE P FAST SERPL-MCNC: 131 MG/DL (ref 65–99)
POTASSIUM SERPL-SCNC: 3.9 MMOL/L (ref 3.5–5.3)
PSA SERPL-MCNC: 5.26 NG/ML (ref 0–4)
SODIUM SERPL-SCNC: 138 MMOL/L (ref 135–147)

## 2023-10-27 PROCEDURE — 36415 COLL VENOUS BLD VENIPUNCTURE: CPT

## 2023-10-27 PROCEDURE — 80048 BASIC METABOLIC PNL TOTAL CA: CPT

## 2023-10-27 PROCEDURE — G0103 PSA SCREENING: HCPCS

## 2023-10-31 ENCOUNTER — OFFICE VISIT (OUTPATIENT)
Dept: PHYSICAL THERAPY | Facility: CLINIC | Age: 78
End: 2023-10-31
Payer: MEDICARE

## 2023-10-31 DIAGNOSIS — S46.811A STRAIN OF RIGHT TRAPEZIUS MUSCLE, INITIAL ENCOUNTER: Primary | ICD-10-CM

## 2023-10-31 PROCEDURE — 97110 THERAPEUTIC EXERCISES: CPT | Performed by: PHYSICAL THERAPIST

## 2023-10-31 NOTE — PROGRESS NOTES
Daily Note         Today's date: 10/31/2023  Patient name: Leigha Carreno. : 1945  MRN: [id number]  Referring provider: Dr. Gino Valdez    Dx:   Encounter Diagnosis     ICD-10-CM    1. Strain of right trapezius muscle, initial encounter  P78.126Z           Subjective: Leigha Carreno. Feels independent wit HEP. He wants to make today his last day. Objective: See treatment diary below      Assessment:  Parth Ruiz has achieved all personal goals. He has a potential surgery date in January. He will contact our office if he has an acute flare up or if he postpones or foregoes the surgery. Plan:  Discharge at this time. Eval/ Re-eval Auth #/ Referral # Total visits Start date  Expiration date Total active units  Total manual units  PT only or  PT+OT?   9/6/23                                                                9/6 9/7 9/11 9/14 9/18  10/9  10/12  10/20  10/24 10/26 10/30     Total units authorized                            Total units remaining                                     Precautions:   Hypertension, Diabetes           Specialty Daily Treatment Diary         Manuals 10/12/23 10/20/23 10/24/23 10/26/23 10/30/23   Visit # 7 8 9 10 11   STM R UT --       PROM R shld 5' PROM 5' 5' 5 min  5 min   ST joint mobs LAD  3' 3' 3' 3 min  3 min            Warm-up        NuStep   4 min     Neuro Re-Ed        Posture correct        Scap squeeze 20x 20 20 5 sec x 20  20   UT stretch        Supine pec stretch        Isomets                         Ther Ex        TB row        TB ext        Pulleys 20 20 30 30 x  30   S/L ER ER AROM  20x 20  Sitting 30 x  20   S/L rotation 20x  20 30x  30   Wand flex Ball rolls 30 30 30 30 x  30    scap rows    Red band: 20 x  BTB  20   Ther Activity                                   Modalities        CP 5' CP post  5' CP post                     Access Code: [email or web address]  URL: [email or web address]  Date: 10/31/2023  Prepared by:  Jabier Machado Lamb    Exercises  - Shoulder External Rotation and Scapular Retraction  - 1 x daily - 7 x weekly - 2 sets - 10 reps  - Standing Row with Anchored Resistance  - 1 x daily - 7 x weekly - 2 sets - 10 reps  - Standing Bicep Curls Neutral with Dumbbells  - 1 x daily - 7 x weekly - 2 sets - 10 reps  - Standing Shoulder Flexion AAROM with Swiss Ball  - 1 x daily - 7 x weekly - 2 sets - 10 reps  - Seated Shoulder Flexion AAROM with Pulley Behind  - 1 x daily - 7 x weekly - 2 sets - 10 reps  - Supine Shoulder Flexion with Dowel  - 1 x daily - 7 x weekly - 2 sets - 10 reps

## 2023-11-03 NOTE — TELEPHONE ENCOUNTER
Upon review of the In Basket request we were able to locate, review, and update the patient chart as requested for Diabetic Eye Exam.    Any additional questions or concerns should be emailed to the Practice Liaisons via the appropriate education email address, please do not reply via In Basket.     Thank you  Eleno Perry MA       No reponse from  on bloodwork results  11-3-23

## 2023-11-27 ENCOUNTER — TELEPHONE (OUTPATIENT)
Age: 78
End: 2023-11-27

## 2023-11-27 DIAGNOSIS — N40.1 BENIGN LOCALIZED PROSTATIC HYPERPLASIA WITH LOWER URINARY TRACT SYMPTOMS (LUTS): Primary | ICD-10-CM

## 2023-11-27 RX ORDER — TADALAFIL 5 MG/1
5 TABLET ORAL DAILY PRN
Qty: 90 TABLET | Refills: 1 | Status: SHIPPED | OUTPATIENT
Start: 2023-11-27

## 2023-11-27 NOTE — TELEPHONE ENCOUNTER
Review historical meds and current meds as well as last 2 office notes. And do not see it listed as a prescribed medicine.  Please advise if a script can be sent

## 2023-11-27 NOTE — TELEPHONE ENCOUNTER
Reason for call:   [x] Refill   [] Prior Auth  [] Other:     Office:   [] PCP/Provider -   [x] Specialty/Provider - uro      Pt called and is requesting a refill on cilias, states he never received the rx back in July, please call pt. [phone], thank you

## 2023-11-27 NOTE — TELEPHONE ENCOUNTER
Spoke to [employer]. This medication is not covered by plan.  They will offer him the option of using [facility]

## 2023-12-01 ENCOUNTER — NURSE TRIAGE (OUTPATIENT)
Age: 78
End: 2023-12-01

## 2023-12-01 ENCOUNTER — OFFICE VISIT (OUTPATIENT)
Dept: FAMILY MEDICINE CLINIC | Facility: CLINIC | Age: 78
End: 2023-12-01
Payer: MEDICARE

## 2023-12-01 VITALS
RESPIRATION RATE: 18 BRPM | HEART RATE: 76 BPM | DIASTOLIC BLOOD PRESSURE: 62 MMHG | SYSTOLIC BLOOD PRESSURE: 134 MMHG | TEMPERATURE: 98.2 F

## 2023-12-01 DIAGNOSIS — A08.4 VIRAL GASTROENTERITIS: Primary | ICD-10-CM

## 2023-12-01 PROCEDURE — 99213 OFFICE O/P EST LOW 20 MIN: CPT | Performed by: FAMILY MEDICINE

## 2023-12-01 RX ORDER — ONDANSETRON HYDROCHLORIDE 8 MG/1
8 TABLET, FILM COATED ORAL EVERY 8 HOURS PRN
Qty: 20 TABLET | Refills: 0 | Status: SHIPPED | OUTPATIENT
Start: 2023-12-01

## 2023-12-01 NOTE — TELEPHONE ENCOUNTER
Regarding: vomiting, diarrhea, nausea, aches, chills  ----- Message from Meka Walker sent at 12/1/2023  9:58 AM EST -----  Pt c/o vomiting, diarrhea, nausea, aches, chills starting last night. Please, triage.

## 2023-12-01 NOTE — TELEPHONE ENCOUNTER
Patient calls in stating he developed flu like symptoms over night. Complains of mild diarrhea, vomited x 2, body aches, mild intermittent abdominal pain . States he took hs wife Zofran to stop the vomiting . Denies fever , chills, SOB or chest pain. Recommended evaluation within 24 hours. Appointment scheduled for today 12 1 2023 at 1145 pm.        Reason for Disposition   Patient wants to be seen    Answer Assessment - Initial Assessment Questions  1. VOMITING SEVERITY: "How many times have you vomited in the past 24 hours?"      - MILD:  1 - 2 times/day     - MODERATE: 3 - 5 times/day, decreased oral intake without significant weight loss or symptoms of dehydration     - SEVERE: 6 or more times/day, vomits everything or nearly everything, with significant weight loss, symptoms of dehydration          Mild x 2  Took his wife zofran     2. ONSET: "When did the vomiting begin?"           Last night       3. FLUIDS: "What fluids or food have you vomited up today?" "Have you been able to keep any fluids down?"         Yes able to drink fluids now         4. ABDOMINAL PAIN: "Are your having any abdominal pain?" If yes : "How bad is it and what does it feel like?" (e.g., crampy, dull, intermittent, constant)           Mild     5. DIARRHEA: "Is there any diarrhea?" If Yes, ask: "How many times today?"           Mild       6. CONTACTS: "Is there anyone else in the family with the same symptoms?"           Denies      7.  CAUSE: "What do you think is causing your vomiting?"         Unsure    Protocols used: Vomiting-ADULT-OH

## 2023-12-01 NOTE — PROGRESS NOTES
Assessment/Plan:    1. Viral gastroenteritis  -     ondansetron (ZOFRAN) 8 mg tablet; Take 1 tablet (8 mg total) by mouth every 8 (eight) hours as needed for nausea or vomiting    Pt advised on zofran  BRAT diet  Fluids - if symptom,s persist for a number of days we can do stool, studies and or labs  Advised on hanbd washing    Depression Screening and Follow-up Plan: Patient was screened for depression during today's encounter. They screened negative with a PHQ-2 score of 0. There are no Patient Instructions on file for this visit. No follow-ups on file. Subjective:      Patient ID: Dianna Cheung. is a 66 y.o. male. Chief Complaint   Patient presents with   • sick visit     Flu like symptoms, chest hurts, chills, body aches. Upset stomach. Took covid test at home negative  [facility] CMA        Pt is here for same day appt  Pt states he starrted last night with nausea. States he was up all night with pain in stomach. Diarrthea and vomiting  Now has body achs  Tested tghis am for covid was negative. The following portions of the patient's history were reviewed and updated as appropriate: allergies, current medications, past family history, past medical history, past social history, past surgical history and problem list.    Review of Systems   Gastrointestinal:  Positive for nausea and vomiting.          Current Outpatient Medications   Medication Sig Dispense Refill   • acetaminophen (TYLENOL) 325 mg tablet Take 650 mg by mouth every 6 (six) hours as needed     • amiodarone 200 mg tablet 200 mg daily after dinner      • amLODIPine (NORVASC) 5 mg tablet      • apixaban (ELIQUIS) 5 mg Take 5 mg by mouth daily To stop 2 days prior to the procedure     • atorvastatin (LIPITOR) 20 mg tablet TAKE 1 TABLET DAILY 90 tablet 3   • Blood Glucose Monitoring Suppl (FREESTYLE LITE) GEOVANNY      • carvedilol (COREG) 25 mg tablet Take 12.5 mg by mouth 2 (two) times a day      • clotrimazole-betamethasone (LOTRISONE) 1-0.05 % cream Apply topically 2 (two) times a day 45 g 1   • esomeprazole (NexIUM) 20 mg capsule Take 1 capsule (20 mg total) by mouth daily at bedtime 90 capsule 3   • fluticasone (FLONASE) 50 mcg/act nasal spray 2 sprays into each nostril daily 48 g 3   • FREESTYLE LITE test strip      • hydrochlorothiazide (HYDRODIURIL) 25 mg tablet Take 1 tablet (25 mg total) by mouth daily 90 tablet 1   • ketoconazole (NIZORAL) 2 % shampoo      • Lancets (FREESTYLE) lancets      • linaCLOtide (Linzess) 72 MCG CAPS Take 72 mcg by mouth daily before breakfast 30 capsule 4   • losartan (COZAAR) 100 MG tablet 100 mg every morning      • ondansetron (ZOFRAN) 8 mg tablet Take 1 tablet (8 mg total) by mouth every 8 (eight) hours as needed for nausea or vomiting 20 tablet 0   • Ozempic, 0.25 or 0.5 MG/DOSE, 2 MG/1.5ML SOPN once a week     • pioglitazone-metFORMIN (ACTOPLUS MET)  MG per tablet daily after dinner     • polyethylene glycol-propylene glycol (Systane) 0.4-0.3 % every 3 (three) hours as needed       • RESTASIS 0.05 % ophthalmic emulsion Administer 1 drop to both eyes every 12 (twelve) hours      • tadalafil (CIALIS) 5 MG tablet Take 1 tablet (5 mg total) by mouth daily as needed for erectile dysfunction 90 tablet 1   • tamsulosin (FLOMAX) 0.4 mg Take 1 capsule (0.4 mg total) by mouth daily with dinner 90 capsule 3   • urea (CARMOL) 20 % cream if needed       No current facility-administered medications for this visit.        Objective:    /62   Pulse 76   Temp 98.2 °F (36.8 °C)   Resp 18        Physical Exam  Abdominal:      Comments: Hyperactive bowel sounds                Noellillian Foss, DO

## 2023-12-04 ENCOUNTER — OFFICE VISIT (OUTPATIENT)
Age: 78
End: 2023-12-04
Payer: MEDICARE

## 2023-12-04 VITALS
WEIGHT: 220 LBS | HEIGHT: 71 IN | DIASTOLIC BLOOD PRESSURE: 70 MMHG | BODY MASS INDEX: 30.8 KG/M2 | SYSTOLIC BLOOD PRESSURE: 126 MMHG

## 2023-12-04 DIAGNOSIS — K64.3 GRADE IV HEMORRHOIDS: Primary | ICD-10-CM

## 2023-12-04 PROCEDURE — 99212 OFFICE O/P EST SF 10 MIN: CPT | Performed by: COLON & RECTAL SURGERY

## 2023-12-04 NOTE — PROGRESS NOTES
Colon and Rectal Surgery   Liban Blevins. 66 y.o. male MRN [id number]  Encounter: [id number]  12/04/23 2:21 PM            Assessment: Liban Salmon is a 66 y.o. male who had hemorrhoid symptoms. Plan:   Grade IV hemorrhoids  He is doing very well after initiation of a high-fiber diet. Because of this, I recommend no surgical therapy for the hemorrhoids. He has good control of his hemorrhoidal symptoms at this time. I will see him as needed. Subjective     HPI    Liban Salmon is a 66 y.o. male who is here for follow up to grade IV hemorrhoids. The patient notes his symptoms have improved; taking Metamucil and notes regular bowel movements with Linzess. He is currently taking Eliquis. Last seen in the office on 7/17/2023. His most recent colonoscopy/EGD on 11/10/21 by Dr. Jany Cates showed: Indication: Irritable bowel syndrome with diarrhea. Impression: 1. 2 polyps removed. 2. Scattered diverticulosis. 5 year recall. Suboptimal prep in some areas. Colonoscopy pathology:  B. Polyp, Colorectal, cecal polyp:  - Sessile serrated lesion.  - Negative for malignancy. C. Large Intestine, Transverse Colon, polyp:  - Tubular adenoma. - Negative for high-grade dysplasia and malignancy. Family history of colon cancer in mother.      Historical Information   Past Medical History:   Diagnosis Date    Allergic rhinitis     Anemia 10/23/2008    last assessed 9/9/13     Anxiety     Atrial fibrillation (HCC)     Bleeding     Chronic kidney disease     COVID-19 virus infection 12/2022    CPAP (continuous positive airway pressure) dependence     Diabetes mellitus (HCC)     Diarrhea     Diverticulitis     Eating disorder     GERD (gastroesophageal reflux disease)     Herpes zoster with complication     last assessed 7/3/17     Hiatal hernia     Hyperlipidemia     Hypertension     IBS (irritable bowel syndrome)     Incisional hernia     Increased urinary frequency     Irregular heart beat     Pyogenic granuloma 09/13/2006    last assessed 9/13/06    Carlos (subconjunctival hemorrhage), unspecified laterality 09/12/2005    last assessed 9/12/05    Sleep apnea     TMJ (dislocation of temporomandibular joint)     Ventral hernia     last assessed  4/6/17     Wears glasses      Past Surgical History:   Procedure Laterality Date    APPENDECTOMY      ATRIAL ABLATION SURGERY      2014    ATRIAL ABLATION SURGERY      catheter ablation atrial fibrillation / last assessed 2/17/16     CHOLECYSTECTOMY      lap    COLON SURGERY  1990    [place]mans procedure    COLON SURGERY      reversal 6 weeks later    COLONOSCOPY N/A 1/26/2017    Procedure: COLONOSCOPY;  Surgeon: Zane Blanco MD;  Location: [address] [place] [address] GI LAB; Service:     ESOPHAGOGASTRODUODENOSCOPY N/A 1/26/2017    Procedure: ESOPHAGOGASTRODUODENOSCOPY (EGD); Surgeon: Zane Blanco MD;  Location: [address] [place] [address] GI LAB;   Service:     EYE SURGERY Bilateral 2015    lid repair    HERNIA REPAIR Bilateral     inguinal 2014 & 2013    [place]    INCISIONAL HERNIA REPAIR N/A 3/24/2017    Procedure: REPAIR OF INCARCERATED INCISIONAL HERNIA WITH MESH, Lysis of Adhesions, Partial Omentectomy;  Surgeon: Josh Buckner MD;  Location: [facility];  Service:     Gila Ogden / Veronika Sheth / Zoe Machado PACEMAKER Left 10/04/2021    [id number]    NEUROBLASTOMA EXCISION      SKIN CANCER EXCISION      On nose    TONSILLECTOMY         Meds/Allergies       Current Outpatient Medications:     linaCLOtide (Linzess) 72 MCG CAPS, Take 72 mcg by mouth daily before breakfast, Disp: 30 capsule, Rfl: 4    acetaminophen (TYLENOL) 325 mg tablet, Take 650 mg by mouth every 6 (six) hours as needed, Disp: , Rfl:     amiodarone 200 mg tablet, 200 mg daily after dinner , Disp: , Rfl:     amLODIPine (NORVASC) 5 mg tablet, , Disp: , Rfl:     apixaban (ELIQUIS) 5 mg, Take 5 mg by mouth daily To stop 2 days prior to the procedure, Disp: , Rfl:     atorvastatin (LIPITOR) 20 mg tablet, TAKE 1 TABLET DAILY, Disp: 90 tablet, Rfl: 3    Blood Glucose Monitoring Suppl (FREESTYLE LITE) GEOVANNY, , Disp: , Rfl:     carvedilol (COREG) 25 mg tablet, Take 12.5 mg by mouth 2 (two) times a day , Disp: , Rfl:     clotrimazole-betamethasone (LOTRISONE) 1-0.05 % cream, Apply topically 2 (two) times a day, Disp: 45 g, Rfl: 1    esomeprazole (NexIUM) 20 mg capsule, Take 1 capsule (20 mg total) by mouth daily at bedtime, Disp: 90 capsule, Rfl: 3    fluticasone (FLONASE) 50 mcg/act nasal spray, 2 sprays into each nostril daily, Disp: 48 g, Rfl: 3    FREESTYLE LITE test strip, , Disp: , Rfl:     hydrochlorothiazide (HYDRODIURIL) 25 mg tablet, Take 1 tablet (25 mg total) by mouth daily, Disp: 90 tablet, Rfl: 1    ketoconazole (NIZORAL) 2 % shampoo, , Disp: , Rfl:     Lancets (FREESTYLE) lancets, , Disp: , Rfl:     losartan (COZAAR) 100 MG tablet, 100 mg every morning , Disp: , Rfl:     ondansetron (ZOFRAN) 8 mg tablet, Take 1 tablet (8 mg total) by mouth every 8 (eight) hours as needed for nausea or vomiting, Disp: 20 tablet, Rfl: 0    Ozempic, 0.25 or 0.5 MG/DOSE, 2 MG/1.5ML SOPN, once a week, Disp: , Rfl:     pioglitazone-metFORMIN (ACTOPLUS MET)  MG per tablet, daily after dinner, Disp: , Rfl:     polyethylene glycol-propylene glycol (Systane) 0.4-0.3 %, every 3 (three) hours as needed  , Disp: , Rfl:     RESTASIS 0.05 % ophthalmic emulsion, Administer 1 drop to both eyes every 12 (twelve) hours , Disp: , Rfl:     tadalafil (CIALIS) 5 MG tablet, Take 1 tablet (5 mg total) by mouth daily as needed for erectile dysfunction, Disp: 90 tablet, Rfl: 1    tamsulosin (FLOMAX) 0.4 mg, Take 1 capsule (0.4 mg total) by mouth daily with dinner, Disp: 90 capsule, Rfl: 3    urea (CARMOL) 20 % cream, if needed, Disp: , Rfl:   Allergies   Allergen Reactions    Codeine Other (See Comments) and GI Intolerance     Reaction Date: 27Dec2004;  Annotation - [id number]: '' CRAZY ''  vomiting  headache    Demerol [Meperidine] Nausea Only, Other (See Comments), GI Intolerance and Vomiting     vomiting  Reaction Date: 2004;   headache    Morphine Nausea Only, GI Intolerance and Vomiting     Reaction Date: 2012;        Social History   Social History     Substance and Sexual Activity   Drug Use No     Social History     Tobacco Use   Smoking Status Former    Packs/day: 1.00    Years: 20.00    Total pack years: 20.00    Types: Cigarettes    Quit date:     Years since quittin.9   Smokeless Tobacco Never   Tobacco Comments    Started age 16         Family History   Problem Relation Age of Onset    Heart disease Mother     Cancer Mother     Colon cancer Mother     Dementia Mother     Early death Father     Seizures Father     Cancer Sister         bone    Heart disease Sister     Diabetes Sister     Lupus Sister     Heart disease Brother     Cancer Brother         kidney    Diabetes Brother     Other Sister         covid    Stroke Neg Hx          Review of Systems    Objective   Current Vitals:  Vitals:    23 1412   BP: 126/70   Weight: 99.8 kg (220 lb)   Height: 5' 11" (1.803 m)         Physical Exam

## 2023-12-04 NOTE — ASSESSMENT & PLAN NOTE
He is doing very well after initiation of a high-fiber diet. Because of this, I recommend no surgical therapy for the hemorrhoids. He has good control of his hemorrhoidal symptoms at this time. I will see him as needed.

## 2023-12-11 ENCOUNTER — EVALUATION (OUTPATIENT)
Dept: PHYSICAL THERAPY | Facility: CLINIC | Age: 78
End: 2023-12-11
Payer: MEDICARE

## 2023-12-11 DIAGNOSIS — M25.511 RIGHT SHOULDER PAIN, UNSPECIFIED CHRONICITY: Primary | ICD-10-CM

## 2023-12-11 PROCEDURE — 97161 PT EVAL LOW COMPLEX 20 MIN: CPT | Performed by: PHYSICAL THERAPIST

## 2023-12-11 NOTE — PROGRESS NOTES
PT Evaluation     Today's date: 2023  Patient name: Brea Almendarez. : 1945  MRN: [id number]  Referring provider: Leona Muse*  Dx:   Encounter Diagnosis     ICD-10-CM    1. Right shoulder pain, unspecified chronicity  M25.511                      Assessment  Assessment details: Brea Doty is a 66 y.o. male who presents to physical therapy with pain, decreased UE range of motion, decreased UE strength, impaired function, decreased activity tolerance and poor posture. Patient's clinical presentation is consistent with their referring diagnosis of Right shoulder pain, unspecified chronicity  (primary encounter diagnosis). The pt presents with functional limitations of ADLs, recreational activities, self-care and reaching. Pt would benefit from physical therapy services to address these limitations and maximize function. Pt was instructed and educated on good sitting posture today and demonstrates understanding. Impairments: abnormal muscle firing, abnormal muscle tone, abnormal or restricted ROM, activity intolerance, impaired physical strength, lacks appropriate home exercise program, pain with function, scapular dyskinesis, poor posture  and poor body mechanics  Understanding of Dx/Px/POC: good   Prognosis: good    Goals  Short term goals:  (3 weeks)  1. Patient will have pain level 2/10 right  shoulder at rest  2. Patient will report a 50% improvement in symptoms with ADL's  3. Patient will demonstrate appropriate scapulohumeral rhythm with reaching shoulder height and below  4. Patient will have improved right shoulder ROM by 20 degrees    Long term goals: (6 weeks)  1. Patient will report 75% improvement in symptoms with ADL's  2. Patient will have pain level 2/10 right shoulder with ADL's   3. Patient will demonstrate appropriate scapulohumeral rhythm with reaching overhead  4.  Patient will be independent in a comprehensive home exercise program      Plan  Patient would benefit from: PT eval and skilled physical therapy  Planned modality interventions: cryotherapy and thermotherapy: hydrocollator packs  Planned therapy interventions: joint mobilization, manual therapy, massage, neuromuscular re-education, patient education, postural training, strengthening, stretching, therapeutic activities, ADL training, functional ROM exercises, home exercise program, abdominal trunk stabilization and therapeutic exercise  Frequency: 2x week  Duration in visits: 12  Duration in weeks: 6  Treatment plan discussed with: patient        Subjective Evaluation    History of Present Illness  Mechanism of injury: He reports 6 month history of right shoulder pain. He followed up with Dr. Sidney Pettit. He was referred to PT. Patient Goals  Patient goals for therapy: decreased pain and increased motion    Pain  Current pain ratin  At best pain ratin  At worst pain ratin  Location: Anterior aspect right shoulder  Quality: dull ache, throbbing and discomfort  Relieving factors: rest  Aggravating factors: lifting and overhead activity (Side reach)    Social Support    Employment status: not working (retired)  Hand dominance: right  Exercise history: Golf        Objective     Postural Observations  Seated posture: fair    Additional Postural Observation Details  Patient has slouched posture in sitting and rounded shoulders bilaterally. Palpation     Right   Hypertonic in the pectoralis minor, rhomboids and upper trapezius.      Neurological Testing     Sensation     Shoulder   Left Shoulder   Intact: light touch    Right Shoulder   Intact: Light touch    Passive Range of Motion   Left Shoulder   Flexion: 170 degrees   Abduction: 155 degrees   External rotation 0°: 90 degrees   Internal rotation 0°: 90 degrees     Right Shoulder   Flexion: 151 degrees   Abduction: 106 degrees   External rotation 0°: 70 degrees   Internal rotation 0°: 70 degrees     Scapular Mobility     Right Shoulder Scapular mobility: fair    Scapular Mobility beyond 90° FF   Scapular elevation: minimal  Upward rotation: inadequate    Strength/Myotome Testing     Left Shoulder     Planes of Motion   Flexion: 5   Abduction: 5   External rotation at 0°: 5   Internal rotation at 0°: 5     Right Shoulder     Planes of Motion   Flexion: 4   Abduction: 4-   External rotation at 0°: 4-   Internal rotation at 0°: 4+                  Eval/ Re-eval Auth #/ Referral # Total visits Start date  Expiration date Total active units  Total manual units  PT only or  PT+OT?   12/11/23 12/11              Total units authorized                Total units remaining                      Precautions: Diabetes, Hypertension        Specialty Daily Treatment Diary       Manuals 12/11/23       Visit # 1       STM UT, pec group        PROM shld        ST joint mobs                Warm-up        NuStep        Neuro Re-Ed        Posture correct        Scap squeeze 20                               Ther Ex        TB row 20  BTB       TB ext        Wall slide 20       Pulleys        TB ER 20  YTB       S/L rotation        Wand flex 20               Ther Activity                                Modalities        CP

## 2023-12-14 ENCOUNTER — OFFICE VISIT (OUTPATIENT)
Dept: PHYSICAL THERAPY | Facility: CLINIC | Age: 78
End: 2023-12-14
Payer: MEDICARE

## 2023-12-14 DIAGNOSIS — M25.511 RIGHT SHOULDER PAIN, UNSPECIFIED CHRONICITY: Primary | ICD-10-CM

## 2023-12-14 PROCEDURE — 97110 THERAPEUTIC EXERCISES: CPT | Performed by: PHYSICAL THERAPIST

## 2023-12-14 PROCEDURE — 97140 MANUAL THERAPY 1/> REGIONS: CPT | Performed by: PHYSICAL THERAPIST

## 2023-12-14 NOTE — PROGRESS NOTES
Daily Note     Today's date: 2023  Patient name: Lou Lozano. : 1945  MRN: [id number]  Referring provider: Claudia Tirado*  Dx:   Encounter Diagnosis     ICD-10-CM    1. Right shoulder pain, unspecified chronicity  M25.511                      Subjective:  He reports pain today right shoulder = 4/10      Objective: See treatment diary below      Assessment: Tolerated treatment well. Patient would benefit from continued PT. He had improved ROM and less pain following PROM and and STM. Plan: Continue per plan of care.       Eval/ Re-eval Auth #/ Referral # Total visits Start date  Expiration date Total active units  Total manual units  PT only or  PT+OT?   23             Total units authorized                Total units remaining                      Precautions: Diabetes, Hypertension        Specialty Daily Treatment Diary       Manuals 23      Visit # 1 2      STM UT, pec group  6 min      PROM shld  8 min      ST joint mobs                Warm-up        NuStep  5 min      Neuro Re-Ed        Posture correct        Scap squeeze 20 20                              Ther Ex        TB row 20  BTB 20   BTB      TB ext        Wall slide 20       Pulleys  10       TB ER 20  YTB 20  YTB      S/L rotation        Wand flex 20 10              Ther Activity                                Modalities        CP  5 min

## 2023-12-21 ENCOUNTER — OFFICE VISIT (OUTPATIENT)
Dept: PHYSICAL THERAPY | Facility: CLINIC | Age: 78
End: 2023-12-21
Payer: MEDICARE

## 2023-12-21 DIAGNOSIS — M25.511 RIGHT SHOULDER PAIN, UNSPECIFIED CHRONICITY: Primary | ICD-10-CM

## 2023-12-21 PROCEDURE — 97140 MANUAL THERAPY 1/> REGIONS: CPT | Performed by: PHYSICAL THERAPIST

## 2023-12-21 PROCEDURE — 97110 THERAPEUTIC EXERCISES: CPT | Performed by: PHYSICAL THERAPIST

## 2023-12-21 NOTE — PROGRESS NOTES
Daily Note     Today's date: 2023  Patient name: Andrade Wise Jr.  : 1945  MRN: [id number]  Referring provider: Nitish Petty*  Dx:   Encounter Diagnosis     ICD-10-CM    1. Right shoulder pain, unspecified chronicity  M25.511                      Subjective:  He reports pain today right shoulder = 5/10      Objective: See treatment diary below      Assessment: Tolerated treatment well. Patient would benefit from continued PT.  Chris had a slight anterior strain feeling following resisted ER with red thera-band.        Plan: Continue per plan of care.  Hold resisted ER.  Add abd cane stretch and extension stretch     Eval/ Re-eval Auth #/ Referral # Total visits Start date  Expiration date Total active units  Total manual units  PT only or  PT+OT?   23            Total units authorized                Total units remaining                      Precautions: Diabetes, Hypertension        Specialty Daily Treatment Diary       Manuals 23     Visit # 1 2 3     STM UT, pec group  6 min 4 min     PROM shld  8 min 8 min     ST joint mobs                Warm-up        NuStep  5 min 5 min     Neuro Re-Ed        Posture correct        Scap squeeze 20 20 20                             Ther Ex        TB row 20  BTB 20   BTB 20  BTB     TB ext        Wall slide 20       Pulleys  10  20     TB ER 20  YTB 20  YTB 20  RTB     S/L rotation        Wand flex 20 10 20             Ther Activity                                Modalities        CP  5 min

## 2023-12-26 ENCOUNTER — OFFICE VISIT (OUTPATIENT)
Dept: PHYSICAL THERAPY | Facility: CLINIC | Age: 78
End: 2023-12-26
Payer: MEDICARE

## 2023-12-26 DIAGNOSIS — M25.511 RIGHT SHOULDER PAIN, UNSPECIFIED CHRONICITY: Primary | ICD-10-CM

## 2023-12-26 PROCEDURE — 97110 THERAPEUTIC EXERCISES: CPT | Performed by: PHYSICAL THERAPIST

## 2023-12-26 PROCEDURE — 97140 MANUAL THERAPY 1/> REGIONS: CPT | Performed by: PHYSICAL THERAPIST

## 2023-12-26 NOTE — PROGRESS NOTES
Daily Note     Today's date: 2023  Patient name: Andrade Wise Jr.  : 1945  MRN: [id number]  Referring provider: Nitish Petty*  Dx:   Encounter Diagnosis     ICD-10-CM    1. Right shoulder pain, unspecified chronicity  M25.511                      Subjective: He had some soreness following resisted ER last session.      Objective: See treatment diary below      Assessment: Tolerated treatment well. Patient would benefit from continued PT. Held resisted ER this session.  He had anterior tightness with flexion stretching.  Added pec stretch with no increased pain.      Plan: Continue per plan of care.    Add abd cane stretch and extension stretch         Eval/ Re-eval Auth #/ Referral # Total visits Start date  Expiration date Total active units  Total manual units  PT only or  PT+OT?   23           Total units authorized                Total units remaining                      Precautions: Diabetes, Hypertension        Specialty Daily Treatment Diary       Manuals 23    Visit # 1 2 3 4    STM UT, pec group  6 min 4 min 2 min    PROM shld  8 min 8 min 8 min    ST joint mobs    2 min pec stretch            Warm-up        NuStep  5 min 5 min 5 min    Neuro Re-Ed        Posture correct        Scap squeeze 20 20 20 20                            Ther Ex        TB row 20  BTB 20   BTB 20  BTB 20  BTB    TB ext        Wall slide 20   20    Pulleys  10  20 20    TB ER 20  YTB 20  YTB 20  RTB --    S/L rotation        Wand flex 20 10 20 20            Ther Activity                                Modalities        CP  5 min

## 2024-01-02 ENCOUNTER — OFFICE VISIT (OUTPATIENT)
Dept: PHYSICAL THERAPY | Facility: CLINIC | Age: 79
End: 2024-01-02
Payer: MEDICARE

## 2024-01-02 DIAGNOSIS — M25.511 RIGHT SHOULDER PAIN, UNSPECIFIED CHRONICITY: Primary | ICD-10-CM

## 2024-01-02 PROCEDURE — 97140 MANUAL THERAPY 1/> REGIONS: CPT

## 2024-01-02 PROCEDURE — 97110 THERAPEUTIC EXERCISES: CPT

## 2024-01-02 NOTE — PROGRESS NOTES
Daily Note     Today's date: 2024  Patient name: Andrade Wise Jr.  : 1945  MRN: [id number]  Referring provider: Nitish Petty*  Dx:   Encounter Diagnosis     ICD-10-CM    1. Right shoulder pain, unspecified chronicity  M25.511                      Subjective: Patient arrives 15 min late for scheduled appt, pt was accomodated with shortened session. Pt reports that he is doing well today.       Objective: See treatment diary below      Assessment: Tolerated treatment well. Patient demonstrated fatigue post treatment      Plan: Continue per plan of care.      Eval/ Re-eval Auth #/ Referral # Total visits Start date  Expiration date Total active units  Total manual units  PT only or  PT+OT?   23          Total units authorized                Total units remaining                      Precautions: Diabetes, Hypertension        Specialty Daily Treatment Diary       Manuals 23   Visit # 1 2 3 4 5   STM UT, pec group  6 min 4 min 2 min HA   PROM shld  8 min 8 min 8 min HA   ST joint mobs    2 min pec stretch HA           Warm-up        NuStep  5 min 5 min 5 min    Neuro Re-Ed        Posture correct        Scap squeeze 20 20 20 20 20                           Ther Ex        TB row 20  BTB 20   BTB 20  BTB 20  BTB 20 BTB   TB ext     20 BTB   Wall slide 20   20 20   Pulleys  10  20 20 20   TB ER 20  YTB 20  YTB 20  RTB --    S/L rotation        Wand flex 20 10 20 20 20           Ther Activity                                Modalities        CP  5 min

## 2024-01-04 ENCOUNTER — OFFICE VISIT (OUTPATIENT)
Dept: PHYSICAL THERAPY | Facility: CLINIC | Age: 79
End: 2024-01-04
Payer: MEDICARE

## 2024-01-04 DIAGNOSIS — M25.511 RIGHT SHOULDER PAIN, UNSPECIFIED CHRONICITY: Primary | ICD-10-CM

## 2024-01-04 PROCEDURE — 97110 THERAPEUTIC EXERCISES: CPT | Performed by: PHYSICAL THERAPIST

## 2024-01-04 NOTE — PROGRESS NOTES
"        Daily Note         Today's date: 2024  Patient name: Andrade Wise Jr.  : 1945  MRN: [id number]  Referring provider: Nitish Petty*  Dx:   Encounter Diagnosis     ICD-10-CM    1. Right shoulder pain, unspecified chronicity  M25.511           Subjective: Andrade Wise Jr. reports pain level entering today is 1-2/10  \"I just have to be careful how I move it\"        Objective: See treatment diary below    Assessment:   PLOC discussed with primary PT. Still noted is reduced flexion and ER ROM with PROM   .   The goal of the current treatment is to address patient's functional limitations as well as objective findings.       Plan:  conitnue as indicated by primary PT.         Eval/ Re-eval Auth #/ Referral # Total visits Start date  Expiration date Total active units  Total manual units  PT only or  PT+OT?   23         Total units authorized                Total units remaining                      Precautions: Diabetes, Hypertension        Specialty Daily Treatment Diary       Manuals 23   Visit # 2 3 4 5 6   STM UT, pec group 6 min 4 min 2 min HA performed   PROM shld 8 min 8 min 8 min HA performed   ST joint mobs   2 min pec stretch HA performed           Warm-up        NuStep 5 min 5 min 5 min  Lv 2 5 min    Neuro Re-Ed        Posture correct        Scap squeeze 20 20 20 20 5 sec x 20                            Ther Ex        TB row 20   BTB 20  BTB 20  BTB 20 BTB Blue:  30 x   TB ext    20 BTB Blue: 20 x    Wall slide   20 20 20 x    Pulleys 10  20 20 20 20 x    TB ER 20  YTB 20  RTB --     S/L rotation        Wand flex 10 20 20 20 20 x            Ther Activity                                Modalities        CP 5 min                              "

## 2024-01-09 ENCOUNTER — OFFICE VISIT (OUTPATIENT)
Dept: PHYSICAL THERAPY | Facility: CLINIC | Age: 79
End: 2024-01-09
Payer: MEDICARE

## 2024-01-09 DIAGNOSIS — M25.511 RIGHT SHOULDER PAIN, UNSPECIFIED CHRONICITY: Primary | ICD-10-CM

## 2024-01-09 PROCEDURE — 97110 THERAPEUTIC EXERCISES: CPT

## 2024-01-09 PROCEDURE — 97140 MANUAL THERAPY 1/> REGIONS: CPT

## 2024-01-09 NOTE — PROGRESS NOTES
"Daily Note     Today's date: 2024  Patient name: Andrade Wise Jr.  : 1945  MRN: [id number]  Referring provider: Nitish Petty*  Dx:   Encounter Diagnosis     ICD-10-CM    1. Right shoulder pain, unspecified chronicity  M25.511                      Subjective: Pt reports using the  a little the other day without any increased pain.       Objective: See treatment diary below      Assessment: Pt did well with all TE as listed, reports no increased pain during or post tx.       Plan: Continue per plan of care.      Eval/ Re-eval Auth #/ Referral # Total visits Start date  Expiration date Total active units  Total manual units  PT only or  PT+OT?   23        Total units authorized                Total units remaining                      Precautions: Diabetes, Hypertension        Specialty Daily Treatment Diary       Manuals 23   Visit # 3 4 5 6 7   STM UT, pec group 4 min 2 min HA performed HA   PROM shld 8 min 8 min HA performed HA   ST joint mobs  2 min pec stretch HA performed HA           Warm-up        NuStep 5 min 5 min  Lv 2 5 min  Lv 2 5min   Neuro Re-Ed        Posture correct        Scap squeeze 20 20 20 5 sec x 20  5\"x20                           Ther Ex        TB row 20  BTB 20  BTB 20 BTB Blue:  30 x Blue x30   TB ext   20 BTB Blue: 20 x  Blue x20   Wall slide  20 20 20 x  20x   Pulleys 20 20 20 20 x  20x   TB ER 20  RTB --      S/L rotation        Wand flex 20 20 20 20 x  20x           Ther Activity                                Modalities        CP                                 "

## 2024-01-11 ENCOUNTER — OFFICE VISIT (OUTPATIENT)
Dept: PHYSICAL THERAPY | Facility: CLINIC | Age: 79
End: 2024-01-11
Payer: MEDICARE

## 2024-01-11 DIAGNOSIS — M25.511 RIGHT SHOULDER PAIN, UNSPECIFIED CHRONICITY: Primary | ICD-10-CM

## 2024-01-11 PROCEDURE — 97110 THERAPEUTIC EXERCISES: CPT | Performed by: PHYSICAL THERAPIST

## 2024-01-11 NOTE — PROGRESS NOTES
"Daily Note     Today's date: 2024  Patient name: Andrade Wise Jr.  : 1945  MRN: [id number]  Referring provider: Nitish Petty*  Dx:   Encounter Diagnosis     ICD-10-CM    1. Right shoulder pain, unspecified chronicity  M25.511                      Subjective: Chris notes overall improvement.  He has had less occurrence of pain right shoulder recently.  He notes he still has to be careful how fast  he moves and how much weight he has.      Objective: See treatment diary below      Assessment:   He attempted 1/2 and 3/4 golf swings at half speed this session.  He had no right shoulder pain with this activity.      Plan: Continue per plan of care.      Eval/ Re-eval Auth #/ Referral # Total visits Start date  Expiration date Total active units  Total manual units  PT only or  PT+OT?   23       Total units authorized                Total units remaining                      Precautions: Diabetes, Hypertension        Specialty Daily Treatment Diary       Manuals 23   Visit # 4 5 6 7 8   STM UT, pec group 2 min HA performed HA 2'   PROM shld 8 min HA performed HA 8'   ST joint mobs 2 min pec stretch HA performed HA 2'           Warm-up        NuStep 5 min  Lv 2 5 min  Lv 2 5min 5 min  L2   Neuro Re-Ed        Posture correct        Scap squeeze 20 20 5 sec x 20  5\"x20                            Ther Ex        TB row 20  BTB 20 BTB Blue:  30 x Blue x30 20   BTB   TB ext  20 BTB Blue: 20 x  Blue x20 20   BTB   Wall slide 20 20 20 x  20x 20   Pulleys 20 20 20 x  20x 20   TB ER --       S/L rotation        Wand flex 20 20 20 x  20x 20           Ther Activity        1/2 to 3/4 golf swings     20                   Modalities        CP                                 "

## 2024-01-12 ENCOUNTER — TELEPHONE (OUTPATIENT)
Dept: UROLOGY | Facility: CLINIC | Age: 79
End: 2024-01-12

## 2024-01-22 ENCOUNTER — OFFICE VISIT (OUTPATIENT)
Dept: GASTROENTEROLOGY | Facility: AMBULARY SURGERY CENTER | Age: 79
End: 2024-01-22
Payer: MEDICARE

## 2024-01-22 VITALS
BODY MASS INDEX: 30.8 KG/M2 | OXYGEN SATURATION: 98 % | SYSTOLIC BLOOD PRESSURE: 116 MMHG | WEIGHT: 220 LBS | HEART RATE: 68 BPM | HEIGHT: 71 IN | DIASTOLIC BLOOD PRESSURE: 70 MMHG

## 2024-01-22 DIAGNOSIS — K59.00 CONSTIPATION, UNSPECIFIED CONSTIPATION TYPE: Primary | ICD-10-CM

## 2024-01-22 DIAGNOSIS — D12.3 ADENOMA OF TRANSVERSE COLON: ICD-10-CM

## 2024-01-22 DIAGNOSIS — K86.81 EXOCRINE PANCREATIC INSUFFICIENCY: ICD-10-CM

## 2024-01-22 DIAGNOSIS — K86.2 PANCREATIC CYST: ICD-10-CM

## 2024-01-22 DIAGNOSIS — K58.1 IRRITABLE BOWEL SYNDROME WITH CONSTIPATION: ICD-10-CM

## 2024-01-22 DIAGNOSIS — I48.91 ATRIAL FIBRILLATION, UNSPECIFIED TYPE (HCC): ICD-10-CM

## 2024-01-22 DIAGNOSIS — R73.9 ELEVATED BLOOD SUGAR: ICD-10-CM

## 2024-01-22 DIAGNOSIS — K21.9 GASTROESOPHAGEAL REFLUX DISEASE WITHOUT ESOPHAGITIS: ICD-10-CM

## 2024-01-22 PROCEDURE — 99214 OFFICE O/P EST MOD 30 MIN: CPT | Performed by: INTERNAL MEDICINE

## 2024-01-22 RX ORDER — LINACLOTIDE 145 UG/1
145 CAPSULE, GELATIN COATED ORAL
Qty: 30 CAPSULE | Refills: 3 | Status: SHIPPED | OUTPATIENT
Start: 2024-01-22

## 2024-01-22 NOTE — PROGRESS NOTES
[facility] - Outpatient Follow-up Note  Andrade Wise Jr. 79 y.o. male MRN: [id number]  Encounter: [id number]          ASSESSMENT AND PLAN:    Andrade Wise Jr. is a 79 y.o. male with irritable bowel syndrome predominantly with constipation/mixed subtype, reflux who presents for follow-up.  He continues to have constipation despite use of low-dose Linzess. Endoscopy and colonoscopy from November 2021 with gastric polyp which was removed and clip placed, colonic polyps, diverticulosis, internal hemorrhoids; biopsies with inflammatory pseudopolyp in the stomach, sessile serrated lesion and tubular adenoma in the cecum.    MRI of the abdomen March 2022 with exophytic cyst in the right lower pole with decreased in size and multiple pancreatic cyst measuring up to 1.3 cm without high risk stigmata.    Most recent BMP with elevated glucose at 131 but normal electrolytes and creatinine.  PSA elevated at 5.26.  TSH normal.  Prior celiac serologies normal.  Prior TSH normal.    1. Constipation, unspecified constipation type    2. Irritable bowel syndrome with constipation    3. Gastroesophageal reflux disease without esophagitis    4. Atrial fibrillation, unspecified type (HCC)    5. Exocrine pancreatic insufficiency    6. Adenoma of transverse colon    7. Elevated blood sugar    8. Pancreatic cyst        Orders Placed This Encounter   Procedures    CBC and differential    Comprehensive metabolic panel    Hemoglobin A1C     Recommend daily Metamucil  Continue with Linzess but increase to 145mcg  Can use MiraLAX in the evening    Continue Zofran as needed for nausea  Okay to continue Ozempic to help with weight loss.  It is noted this may contribute to symptoms of gastroparesis and dysmotility    Try to drink at least 8 cups of water per day    Continue Nexium daily  Gaviscon or Tums as needed for breakthrough symptoms    Will order MRI/MRCP for pancreatic cyst    Can consider repeat colonoscopy in  2026    ______________________________________________________________________    SUBJECTIVE:    Andrade Wise Jr. is a 79 y.o. male who presents with complaint of constipation.     He takes the Linzess daily. If he has diary products it might trigger his symptoms. Milk products give him diarrhea. He is on Ozempic and it causes him constipation. No blood int he stool or black stools. When he has to have a BM he will get some pain. He takes pepto.     No heartburn, dysphagia, odynophagia, nausea, vomiting, diarrhea, BRBPR, melena, abdominal pain, weight loss.       REVIEW OF SYSTEMS IS OTHERWISE NEGATIVE.  10 point ROS reviewed and negative, except as above      Historical Information   Past Medical History:   Diagnosis Date    Allergic rhinitis     Anemia 10/23/2008    last assessed 9/9/13     Anxiety     Atrial fibrillation (HCC)     Bleeding     Chronic kidney disease     COVID-19 virus infection 12/2022    CPAP (continuous positive airway pressure) dependence     Diabetes mellitus (HCC)     Diarrhea     Diverticulitis     Eating disorder     GERD (gastroesophageal reflux disease)     Herpes zoster with complication     last assessed 7/3/17     Hiatal hernia     Hyperlipidemia     Hypertension     IBS (irritable bowel syndrome)     Incisional hernia     Increased urinary frequency     Irregular heart beat     Pyogenic granuloma 09/13/2006    last assessed 9/13/06    Carlos (subconjunctival hemorrhage), unspecified laterality 09/12/2005    last assessed 9/12/05    Sleep apnea     TMJ (dislocation of temporomandibular joint)     Ventral hernia     last assessed  4/6/17     Wears glasses      Past Surgical History:   Procedure Laterality Date    APPENDECTOMY      ATRIAL ABLATION SURGERY      2014    ATRIAL ABLATION SURGERY      catheter ablation atrial fibrillation / last assessed 2/17/16     CHOLECYSTECTOMY      lap    COLON SURGERY  1990    Hartmans procedure    COLON SURGERY      reversal 6 weeks later    COLONOSCOPY  N/A 2017    Procedure: COLONOSCOPY;  Surgeon: Portillo Schumacher MD;  Location: [facility] GI [facility];  Service:     ESOPHAGOGASTRODUODENOSCOPY N/A 2017    Procedure: ESOPHAGOGASTRODUODENOSCOPY (EGD);  Surgeon: Portillo Schumacher MD;  Location: [facility] GI [facility];  Service:     EYE SURGERY Bilateral 2015    lid repair    HERNIA REPAIR Bilateral     inguinal  &     INCISIONAL HERNIA REPAIR      INCISIONAL HERNIA REPAIR N/A 3/24/2017    Procedure: REPAIR OF INCARCERATED INCISIONAL HERNIA WITH MESH, Lysis of Adhesions, Partial Omentectomy;  Surgeon: Colt Davis MD;  Location: [facility];  Service:     INSERT / REPLACE / REMOVE PACEMAKER Left 10/04/2021    [id number]    NEUROBLASTOMA EXCISION      SKIN CANCER EXCISION      On nose    TONSILLECTOMY       Social History   Social History     Substance and Sexual Activity   Alcohol Use No    Comment: none in 33 yrs     Social History     Substance and Sexual Activity   Drug Use No     Social History     Tobacco Use   Smoking Status Former    Current packs/day: 0.00    Average packs/day: 1 pack/day for 20.0 years (20.0 ttl pk-yrs)    Types: Cigarettes    Start date:     Quit date:     Years since quittin.0   Smokeless Tobacco Never   Tobacco Comments    Started age 17     Family History   Problem Relation Age of Onset    Heart disease Mother     Cancer Mother     Colon cancer Mother     Dementia Mother     Early death Father     Seizures Father     Cancer Sister         bone    Heart disease Sister     Diabetes Sister     Lupus Sister     Heart disease Brother     Cancer Brother         kidney    Diabetes Brother     Other Sister         covid    Stroke Neg Hx        Meds/Allergies       Current Outpatient Medications:     acetaminophen (TYLENOL) 325 mg tablet    amiodarone 200 mg tablet    amLODIPine (NORVASC) 5 mg tablet    apixaban (ELIQUIS) 5 mg    atorvastatin (LIPITOR) 20 mg tablet    Blood Glucose Monitoring Suppl (FREESTYLE LITE) GEOVANNY    " carvedilol (COREG) 25 mg tablet    clotrimazole-betamethasone (LOTRISONE) 1-0.05 % cream    esomeprazole (NexIUM) 20 mg capsule    fluticasone (FLONASE) 50 mcg/act nasal spray    FREESTYLE LITE test strip    hydrochlorothiazide (HYDRODIURIL) 25 mg tablet    ketoconazole (NIZORAL) 2 % shampoo    Lancets (FREESTYLE) lancets    linaCLOtide (Linzess) 145 MCG CAPS    losartan (COZAAR) 100 MG tablet    ondansetron (ZOFRAN) 8 mg tablet    Ozempic, 0.25 or 0.5 MG/DOSE, 2 MG/1.5ML SOPN    pioglitazone-metFORMIN (ACTOPLUS MET)  MG per tablet    RESTASIS 0.05 % ophthalmic emulsion    tamsulosin (FLOMAX) 0.4 mg    urea (CARMOL) 20 % cream    polyethylene glycol-propylene glycol (Systane) 0.4-0.3 %    tadalafil (CIALIS) 5 MG tablet    Allergies   Allergen Reactions    Codeine Other (See Comments) and GI Intolerance     Reaction Date: 27Dec2004; Annotation - [id number]: '' CRAZY ''  vomiting  headache    Demerol [Meperidine] Nausea Only, Other (See Comments), GI Intolerance and Vomiting     vomiting  Reaction Date: 27Dec2004;   headache    Morphine Nausea Only, GI Intolerance and Vomiting     Reaction Date: 23Feb2012;            Objective     Blood pressure 116/70, pulse 68, height 5' 11\" (1.803 m), weight 99.8 kg (220 lb), SpO2 98%. Body mass index is 30.68 kg/m².    PHYSICAL EXAMINATION:    General Appearance:   Alert, cooperative, no distress   HEENT:  Normocephalic, atraumatic, anicteric. Neck supple, symmetrical, trachea midline.   Lungs:   Equal chest rise and unlabored breathing, normal effort, no coughing.   Cardiovascular:   No visualized JVD.   Abdomen:   No abdominal distension.   Skin:   No jaundice, rashes, or lesions.    Musculoskeletal:   Normal range of motion visualized.   Psych:  Normal affect and normal insight.   Neuro:  Alert and appropriate.         Lab Results:   No visits with results within 1 Day(s) from this visit.   Latest known visit with results is:   Lab on 10/27/2023   Component Date Value    " "PSA 10/27/2023 5.26 (H)     Sodium 10/27/2023 138     Potassium 10/27/2023 3.9     Chloride 10/27/2023 100     CO2 10/27/2023 32     ANION GAP 10/27/2023 6     BUN 10/27/2023 21     Creatinine 10/27/2023 1.12     Glucose, Fasting 10/27/2023 131 (H)     Calcium 10/27/2023 9.3     eGFR 10/27/2023 62        Lab Results   Component Value Date    WBC 8.79 11/08/2022    HGB 13.2 11/08/2022    HCT 37.5 11/08/2022    MCV 91 11/08/2022     11/08/2022       Lab Results   Component Value Date    SODIUM 138 10/27/2023    K 3.9 10/27/2023     10/27/2023    CO2 32 10/27/2023    AGAP 6 10/27/2023    BUN 21 10/27/2023    CREATININE 1.12 10/27/2023    GLUC 133 06/23/2023    GLUF 131 (H) 10/27/2023    CALCIUM 9.3 10/27/2023    AST 19 06/23/2023    ALT 47 06/23/2023    ALKPHOS 64 06/23/2023    TP 6.7 06/23/2023    TBILI 0.49 06/23/2023    EGFR 62 10/27/2023       Lab Results   Component Value Date    CRP <3.0 04/22/2019       Lab Results   Component Value Date    QGD1MTOZIFZO 1.629 06/23/2023       No results found for: \"IRON\", \"TIBC\", \"FERRITIN\"    Radiology Results:   No results found.  "

## 2024-01-23 ENCOUNTER — OFFICE VISIT (OUTPATIENT)
Dept: PHYSICAL THERAPY | Facility: CLINIC | Age: 79
End: 2024-01-23
Payer: MEDICARE

## 2024-01-23 DIAGNOSIS — M25.511 RIGHT SHOULDER PAIN, UNSPECIFIED CHRONICITY: Primary | ICD-10-CM

## 2024-01-23 PROCEDURE — 97140 MANUAL THERAPY 1/> REGIONS: CPT

## 2024-01-23 PROCEDURE — 97110 THERAPEUTIC EXERCISES: CPT

## 2024-01-30 ENCOUNTER — APPOINTMENT (OUTPATIENT)
Dept: PHYSICAL THERAPY | Facility: CLINIC | Age: 79
End: 2024-01-30
Payer: MEDICARE

## 2024-02-12 ENCOUNTER — APPOINTMENT (OUTPATIENT)
Dept: LAB | Facility: CLINIC | Age: 79
End: 2024-02-12
Payer: MEDICARE

## 2024-02-12 DIAGNOSIS — K21.9 GASTROESOPHAGEAL REFLUX DISEASE WITHOUT ESOPHAGITIS: ICD-10-CM

## 2024-02-12 DIAGNOSIS — K59.00 CONSTIPATION, UNSPECIFIED CONSTIPATION TYPE: ICD-10-CM

## 2024-02-12 DIAGNOSIS — R73.9 ELEVATED BLOOD SUGAR: ICD-10-CM

## 2024-02-12 DIAGNOSIS — D12.3 ADENOMA OF TRANSVERSE COLON: ICD-10-CM

## 2024-02-12 LAB
ALBUMIN SERPL BCP-MCNC: 4 G/DL (ref 3.5–5)
ALP SERPL-CCNC: 54 U/L (ref 34–104)
ALT SERPL W P-5'-P-CCNC: 37 U/L (ref 7–52)
ANION GAP SERPL CALCULATED.3IONS-SCNC: 9 MMOL/L
AST SERPL W P-5'-P-CCNC: 20 U/L (ref 13–39)
BASOPHILS # BLD AUTO: 0.07 THOUSANDS/ÂΜL (ref 0–0.1)
BASOPHILS NFR BLD AUTO: 1 % (ref 0–1)
BILIRUB SERPL-MCNC: 0.46 MG/DL (ref 0.2–1)
BUN SERPL-MCNC: 25 MG/DL (ref 5–25)
CALCIUM SERPL-MCNC: 8.7 MG/DL (ref 8.4–10.2)
CHLORIDE SERPL-SCNC: 103 MMOL/L (ref 96–108)
CO2 SERPL-SCNC: 29 MMOL/L (ref 21–32)
CREAT SERPL-MCNC: 1.09 MG/DL (ref 0.6–1.3)
EOSINOPHIL # BLD AUTO: 0.14 THOUSAND/ÂΜL (ref 0–0.61)
EOSINOPHIL NFR BLD AUTO: 1 % (ref 0–6)
ERYTHROCYTE [DISTWIDTH] IN BLOOD BY AUTOMATED COUNT: 14.6 % (ref 11.6–15.1)
GFR SERPL CREATININE-BSD FRML MDRD: 64 ML/MIN/1.73SQ M
GLUCOSE SERPL-MCNC: 126 MG/DL (ref 65–140)
HCT VFR BLD AUTO: 35.5 % (ref 36.5–49.3)
HGB BLD-MCNC: 11.5 G/DL (ref 12–17)
IMM GRANULOCYTES # BLD AUTO: 0.04 THOUSAND/UL (ref 0–0.2)
IMM GRANULOCYTES NFR BLD AUTO: 0 % (ref 0–2)
LYMPHOCYTES # BLD AUTO: 4.78 THOUSANDS/ÂΜL (ref 0.6–4.47)
LYMPHOCYTES NFR BLD AUTO: 42 % (ref 14–44)
MCH RBC QN AUTO: 29.7 PG (ref 26.8–34.3)
MCHC RBC AUTO-ENTMCNC: 32.4 G/DL (ref 31.4–37.4)
MCV RBC AUTO: 92 FL (ref 82–98)
MONOCYTES # BLD AUTO: 0.7 THOUSAND/ÂΜL (ref 0.17–1.22)
MONOCYTES NFR BLD AUTO: 6 % (ref 4–12)
NEUTROPHILS # BLD AUTO: 5.57 THOUSANDS/ÂΜL (ref 1.85–7.62)
NEUTS SEG NFR BLD AUTO: 50 % (ref 43–75)
NRBC BLD AUTO-RTO: 0 /100 WBCS
PLATELET # BLD AUTO: 201 THOUSANDS/UL (ref 149–390)
PMV BLD AUTO: 10.5 FL (ref 8.9–12.7)
POTASSIUM SERPL-SCNC: 4 MMOL/L (ref 3.5–5.3)
PROT SERPL-MCNC: 6.7 G/DL (ref 6.4–8.4)
RBC # BLD AUTO: 3.87 MILLION/UL (ref 3.88–5.62)
SODIUM SERPL-SCNC: 141 MMOL/L (ref 135–147)
WBC # BLD AUTO: 11.3 THOUSAND/UL (ref 4.31–10.16)

## 2024-02-12 PROCEDURE — 80053 COMPREHEN METABOLIC PANEL: CPT

## 2024-02-12 PROCEDURE — 85025 COMPLETE CBC W/AUTO DIFF WBC: CPT

## 2024-02-12 PROCEDURE — 83036 HEMOGLOBIN GLYCOSYLATED A1C: CPT

## 2024-02-12 PROCEDURE — 36415 COLL VENOUS BLD VENIPUNCTURE: CPT

## 2024-02-13 ENCOUNTER — HOSPITAL ENCOUNTER (OUTPATIENT)
Dept: RADIOLOGY | Facility: HOSPITAL | Age: 79
Discharge: HOME | End: 2024-02-13

## 2024-02-13 LAB
EST. AVERAGE GLUCOSE BLD GHB EST-MCNC: 148 MG/DL
HBA1C MFR BLD: 6.8 %

## 2024-02-19 ENCOUNTER — TELEPHONE (OUTPATIENT)
Dept: GASTROENTEROLOGY | Facility: CLINIC | Age: 79
End: 2024-02-19

## 2024-02-19 DIAGNOSIS — D50.8 OTHER IRON DEFICIENCY ANEMIA: Primary | ICD-10-CM

## 2024-02-19 NOTE — TELEPHONE ENCOUNTER
----- Message from Peter Frankel MD sent at 2/13/2024  9:15 AM EST -----  Hi,    Can you please call the patient and let him know his hemoglobin A1c came back and it was elevated within the range of diabetes, but it showed good control and it was less than 7.  His blood counts came back and he did have a slightly elevated white blood cell count as well as a slightly low hemoglobin.  His electrolytes and liver tests look good.  The anemia is new compared to last time.  I would like to repeat the labs in 2 weeks and also check his iron levels with that.  Can you make sure this is okay with him?    Thank you

## 2024-02-21 PROBLEM — N39.0 RECURRENT UTI: Status: RESOLVED | Noted: 2022-06-06 | Resolved: 2024-02-21

## 2024-02-22 ENCOUNTER — TELEPHONE (OUTPATIENT)
Dept: NEUROLOGY | Facility: CLINIC | Age: 79
End: 2024-02-22

## 2024-02-22 NOTE — TELEPHONE ENCOUNTER
LMOM confirming the appointment on 03/07/24.    Patient called back to confirm the appointment on 03/07/24.

## 2024-02-24 DIAGNOSIS — B35.9 TINEA: ICD-10-CM

## 2024-02-26 RX ORDER — CLOTRIMAZOLE AND BETAMETHASONE DIPROPIONATE 10; .64 MG/G; MG/G
1 CREAM TOPICAL 2 TIMES DAILY
Qty: 45 G | Refills: 1 | Status: SHIPPED | OUTPATIENT
Start: 2024-02-26

## 2024-03-04 ENCOUNTER — LAB (OUTPATIENT)
Dept: LAB | Facility: CLINIC | Age: 79
End: 2024-03-04
Payer: MEDICARE

## 2024-03-04 DIAGNOSIS — D50.8 OTHER IRON DEFICIENCY ANEMIA: ICD-10-CM

## 2024-03-04 LAB
BASOPHILS # BLD AUTO: 0.04 THOUSANDS/ÂΜL (ref 0–0.1)
BASOPHILS NFR BLD AUTO: 0 % (ref 0–1)
EOSINOPHIL # BLD AUTO: 0.15 THOUSAND/ÂΜL (ref 0–0.61)
EOSINOPHIL NFR BLD AUTO: 2 % (ref 0–6)
ERYTHROCYTE [DISTWIDTH] IN BLOOD BY AUTOMATED COUNT: 14.4 % (ref 11.6–15.1)
FERRITIN SERPL-MCNC: 17 NG/ML (ref 24–336)
HCT VFR BLD AUTO: 36.5 % (ref 36.5–49.3)
HGB BLD-MCNC: 12 G/DL (ref 12–17)
IMM GRANULOCYTES # BLD AUTO: 0.03 THOUSAND/UL (ref 0–0.2)
IMM GRANULOCYTES NFR BLD AUTO: 0 % (ref 0–2)
IRON SATN MFR SERPL: 18 % (ref 15–50)
IRON SERPL-MCNC: 64 UG/DL (ref 50–212)
LYMPHOCYTES # BLD AUTO: 4.2 THOUSANDS/ÂΜL (ref 0.6–4.47)
LYMPHOCYTES NFR BLD AUTO: 41 % (ref 14–44)
MCH RBC QN AUTO: 29.9 PG (ref 26.8–34.3)
MCHC RBC AUTO-ENTMCNC: 32.9 G/DL (ref 31.4–37.4)
MCV RBC AUTO: 91 FL (ref 82–98)
MONOCYTES # BLD AUTO: 0.59 THOUSAND/ÂΜL (ref 0.17–1.22)
MONOCYTES NFR BLD AUTO: 6 % (ref 4–12)
NEUTROPHILS # BLD AUTO: 5.28 THOUSANDS/ÂΜL (ref 1.85–7.62)
NEUTS SEG NFR BLD AUTO: 51 % (ref 43–75)
NRBC BLD AUTO-RTO: 0 /100 WBCS
PLATELET # BLD AUTO: 195 THOUSANDS/UL (ref 149–390)
PMV BLD AUTO: 11.5 FL (ref 8.9–12.7)
RBC # BLD AUTO: 4.01 MILLION/UL (ref 3.88–5.62)
TIBC SERPL-MCNC: 348 UG/DL (ref 250–450)
UIBC SERPL-MCNC: 284 UG/DL (ref 155–355)
WBC # BLD AUTO: 10.29 THOUSAND/UL (ref 4.31–10.16)

## 2024-03-04 PROCEDURE — 82728 ASSAY OF FERRITIN: CPT

## 2024-03-04 PROCEDURE — 83550 IRON BINDING TEST: CPT

## 2024-03-04 PROCEDURE — 36415 COLL VENOUS BLD VENIPUNCTURE: CPT

## 2024-03-04 PROCEDURE — 83540 ASSAY OF IRON: CPT

## 2024-03-04 PROCEDURE — 85025 COMPLETE CBC W/AUTO DIFF WBC: CPT

## 2024-03-05 DIAGNOSIS — R60.0 EDEMA OF BOTH LOWER LEGS: ICD-10-CM

## 2024-03-05 RX ORDER — HYDROCHLOROTHIAZIDE 25 MG/1
25 TABLET ORAL DAILY
Qty: 90 TABLET | Refills: 1 | Status: SHIPPED | OUTPATIENT
Start: 2024-03-05

## 2024-03-05 NOTE — RESULT ENCOUNTER NOTE
Hi,    Can you please let him know his blood counts showed his white blood cells were still slightly elevated bu no more anemia. His iron stores did come back a little low. He can take oral iron 3 days a week with a stool softener to help build the iron levels (and take this for 3-4 months and then we can recheck). Can you see if he would be okay seeing a hematologist given the slightly elevated white blood cells?    Thank you!

## 2024-03-07 ENCOUNTER — TELEPHONE (OUTPATIENT)
Dept: GASTROENTEROLOGY | Facility: CLINIC | Age: 79
End: 2024-03-07

## 2024-03-07 ENCOUNTER — OFFICE VISIT (OUTPATIENT)
Dept: NEUROLOGY | Facility: CLINIC | Age: 79
End: 2024-03-07
Payer: MEDICARE

## 2024-03-07 VITALS
DIASTOLIC BLOOD PRESSURE: 72 MMHG | WEIGHT: 224 LBS | BODY MASS INDEX: 31.36 KG/M2 | HEART RATE: 66 BPM | SYSTOLIC BLOOD PRESSURE: 140 MMHG | HEIGHT: 71 IN

## 2024-03-07 DIAGNOSIS — D36.10 BENIGN SCHWANNOMA: ICD-10-CM

## 2024-03-07 DIAGNOSIS — Z87.898 H/O HEADACHE: ICD-10-CM

## 2024-03-07 DIAGNOSIS — E11.42 DIABETIC POLYNEUROPATHY (HCC): Primary | ICD-10-CM

## 2024-03-07 PROCEDURE — 99214 OFFICE O/P EST MOD 30 MIN: CPT | Performed by: PSYCHIATRY & NEUROLOGY

## 2024-03-07 RX ORDER — AMIODARONE HYDROCHLORIDE 100 MG/1
TABLET ORAL
COMMUNITY
Start: 2024-02-29

## 2024-03-07 RX ORDER — TRAMADOL HYDROCHLORIDE 50 MG/1
50 TABLET ORAL ONCE AS NEEDED
COMMUNITY

## 2024-03-07 RX ORDER — VARENICLINE 0.03 MG/.05ML
SPRAY NASAL
COMMUNITY
Start: 2024-01-16

## 2024-03-07 NOTE — PROGRESS NOTES
Return NeuroOutpatient Note        Andrade Wise Jr.  [id number]  79 y.o.  1945       Headache (F/u headaches)        History obtained from:  Patient     HPI/Subjective:    Andrade Wise Jr. Is a 80 yo M with PMH of headaches presents as f/u. He has h/o diabetic neuropathy, benign schwannoma as well. Previously, patient had reported  tingling, burning, pain in both feet. His Sxs started a year ago. He was tried on natural supplement by his podiatrist and both gave him side effects. Patient was prescribed gabapentin but he never started. He now only gets numbness in right toes. he rarely takes tramadol prn.  He says he's doing well.   He denies headaches anymore.   His most recent A1c was 6.8.    He was asked to take alpha lipoic but he's never tried.      He has cervical spine nerve ablation which had helped in past.      He has h/o schwannoma many years ago. 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 He had repeat study in May 2022 and it showed stable 0.6cm                    Past Medical History:   Diagnosis Date   • Allergic rhinitis    • Anemia 10/23/2008    last assessed 9/9/13    • Anxiety    • Atrial fibrillation (HCC)    • Bleeding    • Chronic kidney disease    • COVID-19 virus infection 12/2022   • CPAP (continuous positive airway pressure) dependence    • Diabetes mellitus (HCC)    • Diarrhea    • Diverticulitis    • Eating disorder    • GERD (gastroesophageal reflux disease)    • Herpes zoster with complication     last assessed 7/3/17    • Hiatal hernia    • Hyperlipidemia    • Hypertension    • IBS (irritable bowel syndrome)    • Incisional hernia    • Increased urinary frequency    • Irregular heart beat    • Pyogenic granuloma 09/13/2006    last assessed 9/13/06   • Carlos (subconjunctival hemorrhage), unspecified laterality 09/12/2005    last assessed 9/12/05   • Sleep apnea    • TMJ (dislocation of temporomandibular joint)    • Ventral  hernia     last assessed  17    • Wears glasses      Social History     Socioeconomic History   • Marital status: /Civil Union     Spouse name: Not on file   • Number of children: Not on file   • Years of education: Not on file   • Highest education level: Not on file   Occupational History   • Not on file   Tobacco Use   • Smoking status: Former     Current packs/day: 0.00     Average packs/day: 1 pack/day for 20.0 years (20.0 ttl pk-yrs)     Types: Cigarettes     Start date:      Quit date:      Years since quittin.2   • Smokeless tobacco: Never   • Tobacco comments:     Started age 17   Vaping Use   • Vaping status: Never Used   Substance and Sexual Activity   • Alcohol use: No     Comment: none in 33 yrs   • Drug use: No   • Sexual activity: Yes   Other Topics Concern   • Not on file   Social History Narrative    Single per allscript      Social Determinants of Health     Financial Resource Strain: Low Risk  (10/11/2023)    Overall Financial Resource Strain (CARDIA)    • Difficulty of Paying Living Expenses: Not hard at all   Food Insecurity: Not on file   Transportation Needs: No Transportation Needs (10/11/2023)    PRAPARE - Transportation    • Lack of Transportation (Medical): No    • Lack of Transportation (Non-Medical): No   Physical Activity: Not on file   Stress: Not on file   Social Connections: Not on file   Intimate Partner Violence: Not on file   Housing Stability: Not on file     Family History   Problem Relation Age of Onset   • Heart disease Mother    • Cancer Mother    • Colon cancer Mother    • Dementia Mother    • Early death Father    • Seizures Father    • Cancer Sister         bone   • Heart disease Sister    • Diabetes Sister    • Lupus Sister    • Heart disease Brother    • Cancer Brother         kidney   • Diabetes Brother    • Other Sister         covid   • Stroke Neg Hx      Allergies   Allergen Reactions   • Codeine Other (See Comments) and GI Intolerance      Reaction Date: 27Dec2004; Annotation - [id number]: '' CRAZY ''  vomiting  headache   • Demerol [Meperidine] Nausea Only, Other (See Comments), GI Intolerance and Vomiting     vomiting  Reaction Date: 27Dec2004;   headache   • Morphine Nausea Only, GI Intolerance and Vomiting     Reaction Date: 23Feb2012;      Current Outpatient Medications on File Prior to Visit   Medication Sig Dispense Refill   • acetaminophen (TYLENOL) 325 mg tablet Take 650 mg by mouth every 6 (six) hours as needed     • amiodarone 100 mg tablet      • apixaban (ELIQUIS) 5 mg Take 5 mg by mouth daily To stop 2 days prior to the procedure     • atorvastatin (LIPITOR) 20 mg tablet TAKE 1 TABLET DAILY 90 tablet 3   • Blood Glucose Monitoring Suppl (FREESTYLE LITE) GEOVANNY      • carvedilol (COREG) 25 mg tablet Take 12.5 mg by mouth 2 (two) times a day      • esomeprazole (NexIUM) 20 mg capsule Take 1 capsule (20 mg total) by mouth daily at bedtime 90 capsule 3   • fluticasone (FLONASE) 50 mcg/act nasal spray 2 sprays into each nostril daily 48 g 3   • FREESTYLE LITE test strip      • hydroCHLOROthiazide 25 mg tablet TAKE ONE TABLET BY MOUTH EVERY DAY 90 tablet 1   • ketoconazole (NIZORAL) 2 % shampoo      • Lancets (FREESTYLE) lancets      • linaCLOtide (Linzess) 145 MCG CAPS Take 1 capsule (145 mcg total) by mouth daily before breakfast 30 capsule 3   • losartan (COZAAR) 100 MG tablet 100 mg every morning      • metroNIDAZOLE (METROCREAM) 0.75 % cream      • ondansetron (ZOFRAN) 8 mg tablet Take 1 tablet (8 mg total) by mouth every 8 (eight) hours as needed for nausea or vomiting 20 tablet 0   • pioglitazone-metFORMIN (ACTOPLUS MET)  MG per tablet daily after dinner     • tamsulosin (FLOMAX) 0.4 mg Take 1 capsule (0.4 mg total) by mouth daily with dinner 90 capsule 3   • traMADol (ULTRAM) 50 mg tablet Take 50 mg by mouth once as needed for moderate pain He uses it rarely.     • Tyrvaya 0.03 MG/ACT SOLN      • amiodarone 200 mg tablet 200 mg  "daily after dinner  (Patient not taking: Reported on 3/7/2024)     • amLODIPine (NORVASC) 5 mg tablet  (Patient not taking: Reported on 3/7/2024)     • clotrimazole-betamethasone (LOTRISONE) 1-0.05 % cream APPLY TO AFFECTED AREA(S) TWO TIMES A DAY (Patient not taking: Reported on 3/7/2024) 45 g 1   • Ozempic, 0.25 or 0.5 MG/DOSE, 2 MG/1.5ML SOPN once a week (Patient not taking: Reported on 3/7/2024)     • polyethylene glycol-propylene glycol (Systane) 0.4-0.3 % every 3 (three) hours as needed   (Patient not taking: Reported on 1/22/2024)     • RESTASIS 0.05 % ophthalmic emulsion Administer 1 drop to both eyes every 12 (twelve) hours  (Patient not taking: Reported on 3/7/2024)     • tadalafil (CIALIS) 5 MG tablet Take 1 tablet (5 mg total) by mouth daily as needed for erectile dysfunction (Patient not taking: Reported on 1/22/2024) 90 tablet 1   • urea (CARMOL) 20 % cream if needed (Patient not taking: Reported on 3/7/2024)       No current facility-administered medications on file prior to visit.         Review of Systems   Refer to positive review of systems in HPI.   Review of Systems    Constitutional- No fever  Eyes- No visual change  ENT- Hearing normal  CV- No chest pain  Resp- No Shortness of breath  GI- No diarrhea  - Bladder normal  MS- No Arthritis   Skin- No rash  Psych- No depression  Endo- No DM  Heme- No nodes    Vitals:    03/07/24 1041   BP: 140/72   BP Location: Left arm   Patient Position: Sitting   Cuff Size: Standard   Pulse: 66   Weight: 102 kg (224 lb)   Height: 5' 11\" (1.803 m)       PHYSICAL EXAM:  Appearance: No Acute Distress  Ophthalmoscopic: Disc Flat, Normal fundus  Mental status:  Orientation: Awake, Alert, and Orientedx3  Memory: Registation 3/3 Recall 3/3  Attention: normal  Knowledge: good  Language: No aphasia  Speech: No dysarthria  Cranial Nerves:  2 No Visual Defect on Confrontation, Pupils round, equal, reactive to light  3,4,6 Extraocular Movements Intact, no nystagmus  5 " Facial Sensation Intact  7 No facial asymmetry  8 Intact hearing  9,10 Palate symmetric, normal gag  11 Good shoulder shrug  12 Tongue Midline  Gait: Stable  Coordination: No ataxia with finger to nose testing, and heel to shin  Sensory: decreased to pin prick upto below ankles, decreased vibratory sense at toes and ankles.   Muscle Tone: Normal              Muscle exam:  Arm Right Left Leg Right Left   Deltoid 5/5 5/5 Iliopsoas 5/5 5/5   Biceps 5/5 5/5 Quads 5/5 5/5   Triceps 5/5 5/5 Hamstrings 5/5 5/5   Wrist Extension 5/5 5/5 Ankle Dorsi Flexion 5/5 5/5   Wrist Flexion 5/5 5/5 Ankle Plantar Flexion 5/5 5/5   Interossei 5/5 5/5 Ankle Eversion 5/5 5/5   APB 5/5 5/5 Ankle Inversion 5/5 5/5       Reflexes   RJ BJ TJ KJ AJ Plantars Victor's   Right 2+ 2+ 2+ 1+ 2+ Downgoing Not present   Left 2+ 2+ 2+ 2+ 2+ Downgoing Not present     Personal review of  Labs:                    Diagnoses and all orders for this visit:      1. Diabetic polyneuropathy (HCC)        2. Benign schwannoma        3. H/O headache            Patient has remained stable in terms of neuropathy and denies any headaches now.  Will keep him on prn tramadol.   Asked him to better control his BG.  Asked him to be more physically active.               Total time of encounter:  30 min  More than 50% of the time was used in counseling and/or coordination of care  Extent of counseling and/or coordination of care        Anahi Beltran MD  [facility]  [address] [place] [address] [id number] [phone]

## 2024-03-07 NOTE — TELEPHONE ENCOUNTER
----- Message from Peter Frankel MD sent at 3/5/2024  2:50 PM EST -----  Hi,    Can you please let him know his blood counts showed his white blood cells were still slightly elevated bu no more anemia. His iron stores did come back a little low. He can take oral iron 3 days a week with a stool softener to help build the iron levels (and take this for 3-4 months and then we can recheck). Can you see if he would be okay seeing a hematologist given the slightly elevated white blood cells?    Thank you!

## 2024-03-07 NOTE — TELEPHONE ENCOUNTER
I have spoke with pt and related results.  Pt verbalized and understood results given and message.

## 2024-03-12 ENCOUNTER — TELEPHONE (OUTPATIENT)
Dept: HEMATOLOGY ONCOLOGY | Facility: CLINIC | Age: 79
End: 2024-03-12

## 2024-03-12 ENCOUNTER — OFFICE VISIT (OUTPATIENT)
Dept: UROLOGY | Facility: CLINIC | Age: 79
End: 2024-03-12
Payer: MEDICARE

## 2024-03-12 VITALS
DIASTOLIC BLOOD PRESSURE: 70 MMHG | HEART RATE: 70 BPM | WEIGHT: 220 LBS | HEIGHT: 71 IN | BODY MASS INDEX: 30.8 KG/M2 | SYSTOLIC BLOOD PRESSURE: 120 MMHG | OXYGEN SATURATION: 99 %

## 2024-03-12 DIAGNOSIS — I48.91 ATRIAL FIBRILLATION, UNSPECIFIED TYPE (HCC): Primary | ICD-10-CM

## 2024-03-12 DIAGNOSIS — N28.1 COMPLEX RENAL CYST: ICD-10-CM

## 2024-03-12 DIAGNOSIS — N40.1 BENIGN LOCALIZED PROSTATIC HYPERPLASIA WITH LOWER URINARY TRACT SYMPTOMS (LUTS): ICD-10-CM

## 2024-03-12 PROCEDURE — 99214 OFFICE O/P EST MOD 30 MIN: CPT | Performed by: UROLOGY

## 2024-03-12 NOTE — PROGRESS NOTES
Andrade Wise Jr. is a(n) 79 y.o. male. , :  1945    Subjective   Chief Complaint:   Chief Complaint   Patient presents with    Follow-up     Diagnoses: There were no encounter diagnoses.    Assessment/Plan  79 M hx of BPH advised HoLEP.  Cystic renal mass.  Overdue for MRI.  Was ordered by Dr. Echeverria.  Pacemaker is MRI compatible.  Elevated PSA.  Had prostate ultrasound 2022.  78 g.   Plan: Get MRI, do uroflow to see about bipolar TURP.  We will get the generator to do the bipolar.  Hold off on the PAE. Will need OK from PMD to be able to do the operation. Needs to see Montano regarding white count.  Will need to hold the eliquis.    Patient Instructions   Flow test. MRI as planned.  Will schedule TURp at [place] after testing is done.     Radiology  23  Exophytic complex cyst arising from the lower pole of the right kidney with a mildly thickened wall, not significantly changed.  No hydronephrosis.  Prominent postvoid urinary bladder volume of 305.1 cc.     Enlarged heterogeneous prostate.     History  BPH  Renal mass  Elevated PSA.      Prior Visits  5/15/2023 office visit Giacomo  1. Complex renal cyst  Patient with Bosniak 3 cyst off the right lower pole that has decreased in size therefore warrants observation.  Plan for repeat imaging already in place.  -Patient is overdue for imaging -had  staff schedule patient for ultrasound already scheduled  -Follow-up in office afterwards with MD     2.  BPH with incomplete bladder emptying  - mL today  -Continue with Flomax  -Patient was on finasteride in the past but did not find it beneficial for his urinary symptoms.  He does not want to restart at this time despite potential for benefit with PVR  -Hx of elevated PSA - likely reflective of BPH. Has not had PSA in 2 years -will get another reading to ensure not significantly elevated.   -Patient has had previous discussions with Dr. Fuller and Dr. Echeverria for bladder outlet obstruction  "surgery. Reviewed options with patient again  -Patient to have discussion with Dr. Echeverria at next available regarding possible surgical intervention follow-up BPH with incomplete bladder emptying    7/13/2023 office visit Ju[facility]  Benign localized prostatic hyperplasia with lower urinary tract symptoms (LUTS)  Rediscussed the role for outlet surgery.  I do think this is recommended given his elevated PVRs and voiding symptoms.  Discussed HoLEP versus prostate artery embolization as I do not think he needs a robot simple prostatectomy.     Based on his other medical issues and his wife medical needs he is appears to favor a surgery that is outpatient and without a catheter so I have placed referral to interventional radiology for prostate artery embolization.     In the interval he will continue Flomax.  He does not want to restart finasteride.  He would like to try daily Cialis as well.  We discussed the risk for hypotension     Complex renal cyst  This appears stable ultrasound but to better assess we will get an MRI in 6 months.  If that is stable then would plan for annual or every other year imaging.     He has a pacemaker but it is MRI compatible and he has had MRIs in the past with it    3/12/2024 OV Dangelo  79 M hx of BPH advised HoLEP.  Cystic renal mass.  Overdue for MRI.  Was ordered by Dr. Echeverria.  Pacemaker is MRI compatible.  Elevated PSA.  Had prostate ultrasound 6/6/2022.  78 g.   Plan: Get MRI, do uroflow to see about bipolar TURP.  We will get the generator to do the bipolar.  Hold off on the PAE. Will need OK from PMD to be able to do the operation.    Lab Results   Component Value Date    PSA 5.26 (H) 10/27/2023    PSA 4.06 (H) 07/13/2023    PSA 4.2 (H) 07/27/2021     No results found for: \"TESTOSTERONE\"  No components found for: \"CR\"    Allergies   Allergen Reactions    Codeine Other (See Comments) and GI Intolerance     Reaction Date: 27Dec2004; Annotation - 04Sep2012: '' CRAZY " ''  vomiting  headache    Demerol [Meperidine] Nausea Only, Other (See Comments), GI Intolerance and Vomiting     vomiting  Reaction Date: 27Dec2004;   headache    Morphine Nausea Only, GI Intolerance and Vomiting     Reaction Date: 23Feb2012;        Review of Systems    Past Surgical History:   Procedure Laterality Date    APPENDECTOMY      ATRIAL ABLATION SURGERY      2014    ATRIAL ABLATION SURGERY      catheter ablation atrial fibrillation / last assessed 2/17/16     CHOLECYSTECTOMY      lap    COLON SURGERY  1990    Hartmans procedure    COLON SURGERY      reversal 6 weeks later    COLONOSCOPY N/A 1/26/2017    Procedure: COLONOSCOPY;  Surgeon: Portillo Schumacher MD;  Location: [facility] GI LAB;  Service:     ESOPHAGOGASTRODUODENOSCOPY N/A 1/26/2017    Procedure: ESOPHAGOGASTRODUODENOSCOPY (EGD);  Surgeon: Portillo Schumacher MD;  Location: [facility] GI LAB;  Service:     EYE SURGERY Bilateral 2015    lid repair    HERNIA REPAIR Bilateral     inguinal 2014 & 2013    INCISIONAL HERNIA REPAIR  2011    INCISIONAL HERNIA REPAIR N/A 3/24/2017    Procedure: REPAIR OF INCARCERATED INCISIONAL HERNIA WITH MESH, Lysis of Adhesions, Partial Omentectomy;  Surgeon: Colt Davis MD;  Location: [facility];  Service:     INSERT / REPLACE / REMOVE PACEMAKER Left 10/04/2021    medtronic-[id number]    NEUROBLASTOMA EXCISION      SKIN CANCER EXCISION      On nose    TONSILLECTOMY         Family History   Problem Relation Age of Onset    Heart disease Mother     Cancer Mother     Colon cancer Mother     Dementia Mother     Early death Father     Seizures Father     Cancer Sister         bone    Heart disease Sister     Diabetes Sister     Lupus Sister     Heart disease Brother     Cancer Brother         kidney    Diabetes Brother     Other Sister         covid    Stroke Neg Hx        Social History     Socioeconomic History    Marital status: /Civil Union     Spouse name: Not on file    Number of children: Not on file    Years of  education: Not on file    Highest education level: Not on file   Occupational History    Not on file   Tobacco Use    Smoking status: Former     Current packs/day: 0.00     Average packs/day: 1 pack/day for 20.0 years (20.0 ttl pk-yrs)     Types: Cigarettes     Start date:      Quit date:      Years since quittin.2    Smokeless tobacco: Never    Tobacco comments:     Started age 17   Vaping Use    Vaping status: Never Used   Substance and Sexual Activity    Alcohol use: No     Comment: none in 33 yrs    Drug use: No    Sexual activity: Yes   Other Topics Concern    Not on file   Social History Narrative    Single per allscript      Social Determinants of Health     Financial Resource Strain: Low Risk  (10/11/2023)    Overall Financial Resource Strain (CARDIA)     Difficulty of Paying Living Expenses: Not hard at all   Food Insecurity: Not on file   Transportation Needs: No Transportation Needs (10/11/2023)    PRAPARE - Transportation     Lack of Transportation (Medical): No     Lack of Transportation (Non-Medical): No   Physical Activity: Not on file   Stress: Not on file   Social Connections: Not on file   Intimate Partner Violence: Not on file   Housing Stability: Not on file         Current Outpatient Medications:     acetaminophen (TYLENOL) 325 mg tablet, Take 650 mg by mouth every 6 (six) hours as needed, Disp: , Rfl:     amiodarone 100 mg tablet, , Disp: , Rfl:     apixaban (ELIQUIS) 5 mg, Take 5 mg by mouth daily To stop 2 days prior to the procedure, Disp: , Rfl:     atorvastatin (LIPITOR) 20 mg tablet, TAKE 1 TABLET DAILY, Disp: 90 tablet, Rfl: 3    Blood Glucose Monitoring Suppl (FREESTYLE LITE) GEOVANNY, , Disp: , Rfl:     carvedilol (COREG) 25 mg tablet, Take 12.5 mg by mouth 2 (two) times a day , Disp: , Rfl:     esomeprazole (NexIUM) 20 mg capsule, Take 1 capsule (20 mg total) by mouth daily at bedtime, Disp: 90 capsule, Rfl: 3    fluticasone (FLONASE) 50 mcg/act nasal spray, 2 sprays into  each nostril daily, Disp: 48 g, Rfl: 3    FREESTYLE LITE test strip, , Disp: , Rfl:     hydroCHLOROthiazide 25 mg tablet, TAKE ONE TABLET BY MOUTH EVERY DAY, Disp: 90 tablet, Rfl: 1    ketoconazole (NIZORAL) 2 % shampoo, , Disp: , Rfl:     Lancets (FREESTYLE) lancets, , Disp: , Rfl:     linaCLOtide (Linzess) 145 MCG CAPS, Take 1 capsule (145 mcg total) by mouth daily before breakfast, Disp: 30 capsule, Rfl: 3    losartan (COZAAR) 100 MG tablet, 100 mg every morning , Disp: , Rfl:     metroNIDAZOLE (METROCREAM) 0.75 % cream, , Disp: , Rfl:     ondansetron (ZOFRAN) 8 mg tablet, Take 1 tablet (8 mg total) by mouth every 8 (eight) hours as needed for nausea or vomiting, Disp: 20 tablet, Rfl: 0    pioglitazone-metFORMIN (ACTOPLUS MET)  MG per tablet, daily after dinner, Disp: , Rfl:     tamsulosin (FLOMAX) 0.4 mg, Take 1 capsule (0.4 mg total) by mouth daily with dinner, Disp: 90 capsule, Rfl: 3    traMADol (ULTRAM) 50 mg tablet, Take 50 mg by mouth once as needed for moderate pain He uses it rarely., Disp: , Rfl:     Tyrvaya 0.03 MG/ACT SOLN, , Disp: , Rfl:     amiodarone 200 mg tablet, 200 mg daily after dinner  (Patient not taking: Reported on 3/7/2024), Disp: , Rfl:     amLODIPine (NORVASC) 5 mg tablet, , Disp: , Rfl:     clotrimazole-betamethasone (LOTRISONE) 1-0.05 % cream, APPLY TO AFFECTED AREA(S) TWO TIMES A DAY (Patient not taking: Reported on 3/7/2024), Disp: 45 g, Rfl: 1    Ozempic, 0.25 or 0.5 MG/DOSE, 2 MG/1.5ML SOPN, once a week (Patient not taking: Reported on 3/7/2024), Disp: , Rfl:     polyethylene glycol-propylene glycol (Systane) 0.4-0.3 %, every 3 (three) hours as needed   (Patient not taking: Reported on 1/22/2024), Disp: , Rfl:     RESTASIS 0.05 % ophthalmic emulsion, Administer 1 drop to both eyes every 12 (twelve) hours  (Patient not taking: Reported on 3/7/2024), Disp: , Rfl:     tadalafil (CIALIS) 5 MG tablet, Take 1 tablet (5 mg total) by mouth daily as needed for erectile dysfunction  "(Patient not taking: Reported on 1/22/2024), Disp: 90 tablet, Rfl: 1    urea (CARMOL) 20 % cream, if needed (Patient not taking: Reported on 3/7/2024), Disp: , Rfl:     Objective     /70 (BP Location: Left arm, Patient Position: Sitting, Cuff Size: Standard)   Pulse 70   Ht 5' 11\" (1.803 m)   Wt 99.8 kg (220 lb)   SpO2 99%   BMI 30.68 kg/m²     Physical Exam      [facility]  "

## 2024-03-12 NOTE — TELEPHONE ENCOUNTER
Andrade called in response to a referral that was received for patient to establish care with Hematology.     Outreach was made to schedule a consultation.    A consultation was scheduled for patient during this call. Patient is scheduled on 4/1/24 at 9:00 AM with Malia Brown at the [facility]

## 2024-04-01 ENCOUNTER — OFFICE VISIT (OUTPATIENT)
Dept: HEMATOLOGY ONCOLOGY | Facility: MEDICAL CENTER | Age: 79
End: 2024-04-01
Payer: MEDICARE

## 2024-04-01 VITALS
WEIGHT: 228 LBS | HEIGHT: 71 IN | SYSTOLIC BLOOD PRESSURE: 132 MMHG | OXYGEN SATURATION: 97 % | BODY MASS INDEX: 31.92 KG/M2 | TEMPERATURE: 98.2 F | HEART RATE: 62 BPM | RESPIRATION RATE: 17 BRPM | DIASTOLIC BLOOD PRESSURE: 74 MMHG

## 2024-04-01 DIAGNOSIS — D50.9 IRON DEFICIENCY ANEMIA, UNSPECIFIED IRON DEFICIENCY ANEMIA TYPE: ICD-10-CM

## 2024-04-01 DIAGNOSIS — D72.829 LEUKOCYTOSIS, UNSPECIFIED TYPE: Primary | ICD-10-CM

## 2024-04-01 PROCEDURE — 99203 OFFICE O/P NEW LOW 30 MIN: CPT | Performed by: PHYSICIAN ASSISTANT

## 2024-04-01 RX ORDER — TIRZEPATIDE 5 MG/.5ML
INJECTION, SOLUTION SUBCUTANEOUS
COMMUNITY
Start: 2024-03-08

## 2024-04-01 RX ORDER — TIRZEPATIDE 2.5 MG/.5ML
INJECTION, SOLUTION SUBCUTANEOUS
COMMUNITY
Start: 2024-03-28

## 2024-04-01 NOTE — PROGRESS NOTES
[facility]  [address] [place] [id number]  Hematology Ambulatory Consult  Andrade Wise Jr., 1945, [id number]  4/1/2024      Assessment and Plan   Patient is a 79-year-old who presents at the request of his GI physician for evaluation of leukocytosis.  He has history of mild intermittent leukocytosis dating back to at least 2017.  WBC count typically ranges between 7,000 and about 13,000.    He had lab work repeated on 3/4/2024.  WBC count at that time was 10.29, normal differential, hemoglobin 12.0 and platelets 195.    He has history of mild anemia which appears to be secondary to iron deficiency. Last EGD and colonoscopy were in November 2021. He denies obvious bleeding. He may have decreased absorption related to IBD. Also has history of Grade IV hemorrhoids. Defer to GI regarding repeating GI work-up.    We discussed the possibility of an underlying bone marrow disorder, though this is felt to be unlikely.    He has no systemic findings suggestive of malignancy.    Leukocytosis could be related to chronic inflammation in the setting of IBD, or it may be a normal variant for him.    Will send for flow cytometry.     I will call the patient with results of flow cytometry. Recommend observation. He will RTC PRN. I recommend he ask his PCP to repeat a CBC once or twice yearly.    Patient voiced agreement and understanding to the above.   Patient knows to call the Hematology/Oncology office with any questions and concerns regarding the above.    Barrier(s) to care: None.   The patient is able to self care.    Discussed with Dr. Montano.    Subjective     Chief Complaint   Patient presents with    Consult     Leukocytosis       Referring provider    Dr. Peter Frankel MD     History of present illness:   Patient is a 79-year-old who presents at the request of his GI physician for evaluation of intermittent leukocytosis.    Patient  has intermittent leukocytosis dating back to atleast 2017 per chart review. His white count at that time was 12.70.     He had lab work repeated on 3/4/2024.  At that time WBC count was 10.29, differential normal, hemoglobin 12.0, hematocrit 36.5, and platelets 195.    He says he follows closely with GI.  He has history of IBS with intermittent flares. He says his IBS is currently under good control on Linzess. He is also on Mounjaro. He was on Ozempic prior but struggled with constipation on it.     He has no unexplained weightloss. No nightsweats or fevers. No palpable adenopathy.     He is a nonsmoker. He has history of complex renal mass. He also has elevated PSA. He follows with Urology.    He denies nausea, vomiting, bowel changes, chest pain, SOB. He denies obvious GI bleeding. Per chart review, he has history of grade IV hemorrhoids and follows with Dr. Rm for management.    Review of Systems   Constitutional:  Positive for fatigue. Negative for activity change, appetite change and fever.   HENT:  Negative for nosebleeds.    Respiratory:  Negative for cough, choking and shortness of breath.    Cardiovascular:  Negative for chest pain, palpitations and leg swelling.   Gastrointestinal:  Negative for abdominal distention, abdominal pain, anal bleeding, blood in stool, constipation, diarrhea, nausea and vomiting.   Endocrine: Negative for cold intolerance.   Genitourinary:  Negative for hematuria.   Musculoskeletal:  Positive for arthralgias. Negative for myalgias.   Skin:  Negative for color change, pallor and rash.   Allergic/Immunologic: Negative for immunocompromised state.   Neurological:  Negative for headaches.   Hematological:  Negative for adenopathy. Does not bruise/bleed easily.   All other systems reviewed and are negative.      Past Medical History:   Diagnosis Date    Allergic rhinitis     Anemia 10/23/2008    last assessed 9/9/13     Anxiety     Atrial fibrillation (HCC)     Bleeding      Chronic kidney disease     COVID-19 virus infection 12/2022    CPAP (continuous positive airway pressure) dependence     Diabetes mellitus (HCC)     Diarrhea     Diverticulitis     Eating disorder     GERD (gastroesophageal reflux disease)     Herpes zoster with complication     last assessed 7/3/17     Hiatal hernia     Hyperlipidemia     Hypertension     IBS (irritable bowel syndrome)     Incisional hernia     Increased urinary frequency     Irregular heart beat     Pyogenic granuloma 09/13/2006    last assessed 9/13/06    Carlos (subconjunctival hemorrhage), unspecified laterality 09/12/2005    last assessed 9/12/05    Sleep apnea     TMJ (dislocation of temporomandibular joint)     Ventral hernia     last assessed  4/6/17     Wears glasses      Past Surgical History:   Procedure Laterality Date    APPENDECTOMY      ATRIAL ABLATION SURGERY      2014    ATRIAL ABLATION SURGERY      catheter ablation atrial fibrillation / last assessed 2/17/16     CHOLECYSTECTOMY      lap    COLON SURGERY  1990    Hartmans procedure    COLON SURGERY      reversal 6 weeks later    COLONOSCOPY N/A 1/26/2017    Procedure: COLONOSCOPY;  Surgeon: Portillo Schumacher MD;  Location: [facility] GI LAB;  Service:     ESOPHAGOGASTRODUODENOSCOPY N/A 1/26/2017    Procedure: ESOPHAGOGASTRODUODENOSCOPY (EGD);  Surgeon: Portillo Schumacher MD;  Location: [facility] GI LAB;  Service:     EYE SURGERY Bilateral 2015    lid repair    HERNIA REPAIR Bilateral     inguinal 2014 & 2013    INCISIONAL HERNIA REPAIR  2011    INCISIONAL HERNIA REPAIR N/A 3/24/2017    Procedure: REPAIR OF INCARCERATED INCISIONAL HERNIA WITH MESH, Lysis of Adhesions, Partial Omentectomy;  Surgeon: Colt Davis MD;  Location: [facility];  Service:     INSERT / REPLACE / REMOVE PACEMAKER Left 10/04/2021    [id number]    NEUROBLASTOMA EXCISION      SKIN CANCER EXCISION      On nose    TONSILLECTOMY       Family History   Problem Relation Age of Onset    Heart disease Mother     Cancer  Mother     Colon cancer Mother     Dementia Mother     Early death Father     Seizures Father     Cancer Sister         bone    Heart disease Sister     Diabetes Sister     Lupus Sister     Heart disease Brother     Cancer Brother         kidney    Diabetes Brother     Other Sister         covid    Stroke Neg Hx      Social History     Socioeconomic History    Marital status: /Civil Union     Spouse name: Not on file    Number of children: Not on file    Years of education: Not on file    Highest education level: Not on file   Occupational History    Not on file   Tobacco Use    Smoking status: Former     Current packs/day: 0.00     Average packs/day: 1 pack/day for 20.0 years (20.0 ttl pk-yrs)     Types: Cigarettes     Start date:      Quit date:      Years since quittin.2    Smokeless tobacco: Never    Tobacco comments:     Started age 17   Vaping Use    Vaping status: Never Used   Substance and Sexual Activity    Alcohol use: No     Comment: none in 33 yrs    Drug use: No    Sexual activity: Yes   Other Topics Concern    Not on file   Social History Narrative    Single per allscript      Social Determinants of Health     Financial Resource Strain: Low Risk  (10/11/2023)    Overall Financial Resource Strain (CARDIA)     Difficulty of Paying Living Expenses: Not hard at all   Food Insecurity: Not on file   Transportation Needs: No Transportation Needs (10/11/2023)    PRAPARE - Transportation     Lack of Transportation (Medical): No     Lack of Transportation (Non-Medical): No   Physical Activity: Not on file   Stress: Not on file   Social Connections: Not on file   Intimate Partner Violence: Not on file   Housing Stability: Not on file         Current Outpatient Medications:     acetaminophen (TYLENOL) 325 mg tablet, Take 650 mg by mouth every 6 (six) hours as needed, Disp: , Rfl:     amiodarone 100 mg tablet, , Disp: , Rfl:     amiodarone 200 mg tablet, 200 mg daily after dinner  (Patient not  taking: Reported on 3/7/2024), Disp: , Rfl:     amLODIPine (NORVASC) 5 mg tablet, , Disp: , Rfl:     apixaban (ELIQUIS) 5 mg, Take 5 mg by mouth daily To stop 2 days prior to the procedure, Disp: , Rfl:     atorvastatin (LIPITOR) 20 mg tablet, TAKE 1 TABLET DAILY, Disp: 90 tablet, Rfl: 3    Blood Glucose Monitoring Suppl (FREESTYLE LITE) GEOVANNY, , Disp: , Rfl:     carvedilol (COREG) 25 mg tablet, Take 12.5 mg by mouth 2 (two) times a day , Disp: , Rfl:     clotrimazole-betamethasone (LOTRISONE) 1-0.05 % cream, APPLY TO AFFECTED AREA(S) TWO TIMES A DAY (Patient not taking: Reported on 3/7/2024), Disp: 45 g, Rfl: 1    esomeprazole (NexIUM) 20 mg capsule, Take 1 capsule (20 mg total) by mouth daily at bedtime, Disp: 90 capsule, Rfl: 3    fluticasone (FLONASE) 50 mcg/act nasal spray, 2 sprays into each nostril daily, Disp: 48 g, Rfl: 3    FREESTYLE LITE test strip, , Disp: , Rfl:     hydroCHLOROthiazide 25 mg tablet, TAKE ONE TABLET BY MOUTH EVERY DAY, Disp: 90 tablet, Rfl: 1    ketoconazole (NIZORAL) 2 % shampoo, , Disp: , Rfl:     Lancets (FREESTYLE) lancets, , Disp: , Rfl:     linaCLOtide (Linzess) 145 MCG CAPS, Take 1 capsule (145 mcg total) by mouth daily before breakfast, Disp: 30 capsule, Rfl: 3    losartan (COZAAR) 100 MG tablet, 100 mg every morning , Disp: , Rfl:     metroNIDAZOLE (METROCREAM) 0.75 % cream, , Disp: , Rfl:     ondansetron (ZOFRAN) 8 mg tablet, Take 1 tablet (8 mg total) by mouth every 8 (eight) hours as needed for nausea or vomiting, Disp: 20 tablet, Rfl: 0    Ozempic, 0.25 or 0.5 MG/DOSE, 2 MG/1.5ML SOPN, once a week (Patient not taking: Reported on 3/7/2024), Disp: , Rfl:     pioglitazone-metFORMIN (ACTOPLUS MET)  MG per tablet, daily after dinner, Disp: , Rfl:     polyethylene glycol-propylene glycol (Systane) 0.4-0.3 %, every 3 (three) hours as needed   (Patient not taking: Reported on 1/22/2024), Disp: , Rfl:     RESTASIS 0.05 % ophthalmic emulsion, Administer 1 drop to both eyes every  12 (twelve) hours  (Patient not taking: Reported on 3/7/2024), Disp: , Rfl:     tadalafil (CIALIS) 5 MG tablet, Take 1 tablet (5 mg total) by mouth daily as needed for erectile dysfunction (Patient not taking: Reported on 1/22/2024), Disp: 90 tablet, Rfl: 1    tamsulosin (FLOMAX) 0.4 mg, Take 1 capsule (0.4 mg total) by mouth daily with dinner, Disp: 90 capsule, Rfl: 3    traMADol (ULTRAM) 50 mg tablet, Take 50 mg by mouth once as needed for moderate pain He uses it rarely., Disp: , Rfl:     Tyrvaya 0.03 MG/ACT SOLN, , Disp: , Rfl:     urea (CARMOL) 20 % cream, if needed (Patient not taking: Reported on 3/7/2024), Disp: , Rfl:   Allergies   Allergen Reactions    Codeine Other (See Comments) and GI Intolerance     Reaction Date: 27Dec2004; Annotation - 04Sep2012: '' CRAZY ''  vomiting  headache    Demerol [Meperidine] Nausea Only, Other (See Comments), GI Intolerance and Vomiting     vomiting  Reaction Date: 27Dec2004;   headache    Morphine Nausea Only, GI Intolerance and Vomiting     Reaction Date: 23Feb2012;        Objective     Vitals:    04/01/24 0904   BP: 132/74   Pulse: 62   Resp: 17   Temp: 98.2 °F (36.8 °C)   SpO2: 97%     Physical Exam  Constitutional:       General: He is not in acute distress.     Appearance: Normal appearance. He is well-developed.   HENT:      Head: Normocephalic and atraumatic.      Right Ear: External ear normal.      Left Ear: External ear normal.      Nose: Nose normal.   Eyes:      General: No scleral icterus.     Conjunctiva/sclera: Conjunctivae normal.   Cardiovascular:      Rate and Rhythm: Normal rate and regular rhythm.      Heart sounds: No murmur heard.  Pulmonary:      Effort: Pulmonary effort is normal. No respiratory distress.      Breath sounds: Normal breath sounds.   Abdominal:      General: Bowel sounds are normal. There is no distension.      Palpations: Abdomen is soft.      Tenderness: There is no abdominal tenderness.   Musculoskeletal:      Cervical back: Normal  range of motion.   Skin:     Coloration: Skin is not pale.      Findings: No erythema or rash.   Neurological:      Mental Status: He is alert and oriented to person, place, and time.   Psychiatric:         Mood and Affect: Mood normal.         Thought Content: Thought content normal.         Judgment: Judgment normal.     Extremities: No lower extremity edema bilaterally.  Lymph: No cervical, supraclavicular, axillary adenopathy.    Result Review  Labs:  3/4/2024 WBC count was 10.29, differential is normal, hemoglobin is 12.0, hematocrit 36.5, and platelets 195.     2/12/2024 WBC 11.30, ALC 4.78, hemoglobin 11.5, platelets 201.    Please note:  This report has been generated by a voice recognition software system. Therefore there may be syntax, spelling, and/or grammatical errors. Please call if you have any questions.

## 2024-04-02 LAB — HBA1C MFR BLD HPLC: 6.9 %

## 2024-04-08 ENCOUNTER — HOSPITAL ENCOUNTER (OUTPATIENT)
Dept: RADIOLOGY | Facility: HOSPITAL | Age: 79
Discharge: HOME | End: 2024-04-08

## 2024-04-08 DIAGNOSIS — N28.1 COMPLEX RENAL CYST: ICD-10-CM

## 2024-04-11 ENCOUNTER — HOSPITAL ENCOUNTER (OUTPATIENT)
Dept: MRI IMAGING | Facility: HOSPITAL | Age: 79
Discharge: HOME | End: 2024-04-11
Payer: MEDICARE

## 2024-04-11 DIAGNOSIS — N28.1 COMPLEX RENAL CYST: ICD-10-CM

## 2024-04-11 PROCEDURE — G1004 CDSM NDSC: HCPCS

## 2024-04-11 PROCEDURE — A9585 GADOBUTROL INJECTION: HCPCS | Performed by: INTERNAL MEDICINE

## 2024-04-11 PROCEDURE — 74183 MRI ABD W/O CNTR FLWD CNTR: CPT

## 2024-04-11 RX ORDER — GADOBUTROL 604.72 MG/ML
10 INJECTION INTRAVENOUS
Status: COMPLETED | OUTPATIENT
Start: 2024-04-11 | End: 2024-04-11

## 2024-04-11 RX ADMIN — GADOBUTROL 10 ML: 604.72 INJECTION INTRAVENOUS at 09:11

## 2024-04-11 NOTE — NURSING NOTE
Device programmed for MRI. Patient continuously monitored by Radiology RN. Patient tolerated well. Device reprogrammed to prior settings. Vital signs stable throughout. No complaints per patient.

## 2024-04-12 ENCOUNTER — OFFICE VISIT (OUTPATIENT)
Dept: UROLOGY | Facility: CLINIC | Age: 79
End: 2024-04-12
Payer: MEDICARE

## 2024-04-12 ENCOUNTER — TELEPHONE (OUTPATIENT)
Age: 79
End: 2024-04-12

## 2024-04-12 VITALS
HEIGHT: 71 IN | BODY MASS INDEX: 31.36 KG/M2 | HEART RATE: 74 BPM | SYSTOLIC BLOOD PRESSURE: 110 MMHG | WEIGHT: 224 LBS | OXYGEN SATURATION: 99 % | DIASTOLIC BLOOD PRESSURE: 70 MMHG

## 2024-04-12 DIAGNOSIS — I48.91 ATRIAL FIBRILLATION, UNSPECIFIED TYPE (HCC): Primary | ICD-10-CM

## 2024-04-12 DIAGNOSIS — N13.8 BPH WITH URINARY OBSTRUCTION: ICD-10-CM

## 2024-04-12 DIAGNOSIS — N40.1 BPH WITH URINARY OBSTRUCTION: ICD-10-CM

## 2024-04-12 LAB — POST-VOID RESIDUAL VOLUME, ML POC: 225 ML

## 2024-04-12 PROCEDURE — 51798 US URINE CAPACITY MEASURE: CPT | Performed by: UROLOGY

## 2024-04-12 PROCEDURE — 99214 OFFICE O/P EST MOD 30 MIN: CPT | Performed by: UROLOGY

## 2024-04-12 RX ORDER — SODIUM CHLORIDE, SODIUM LACTATE, POTASSIUM CHLORIDE, CALCIUM CHLORIDE 600; 310; 30; 20 MG/100ML; MG/100ML; MG/100ML; MG/100ML
125 INJECTION, SOLUTION INTRAVENOUS CONTINUOUS
OUTPATIENT
Start: 2024-04-12

## 2024-04-12 NOTE — PATIENT INSTRUCTIONS
Ure you are cleared by heart doctor for TURP and that you can hold eliquis three days prior and two days after.

## 2024-04-12 NOTE — TELEPHONE ENCOUNTER
Radiology Test Results - Radiology Calling with report update    Pt under care of:  Dr Echeverria    Imaging Completed: 4/11/24    Significant Findings - Please review

## 2024-04-12 NOTE — PROGRESS NOTES
Andrade Wise Jr. is a(n) 79 y.o. male. , :  1945    Subjective   Chief Complaint:   Chief Complaint   Patient presents with    Follow-up     Diagnoses: The encounter diagnosis was Atrial fibrillation, unspecified type (HCC).    Assessment/Plan  79 M BPH with obstruction.  Elevated PVR. Wants TURP with bipolar. Risks and benefits. Will need to hold eliquis for 3 days prior and couple days afterward if ok with cardiologist.    Patient Instructions   Ure you are cleared by heart doctor for TURP and that you can hold eliquis three days prior and two days after.     Radiology  23  Exophytic complex cyst arising from the lower pole of the right kidney with a mildly thickened wall, not significantly changed.  No hydronephrosis.  Prominent postvoid urinary bladder volume of 305.1 cc.     Enlarged heterogeneous prostate.      History  BPH  Renal mass  Elevated PSA.        Prior Visits  2022 [facility]  CT urogram showed a 4.3 cm right lower pole exophytic cyst, classified as Bosniak 3.  Follow-up imaging a via MRI in 2022 showed the cyst to be smaller in size and therefore the patient was recommend observation with plan for additional imaging via ultrasound in approximately 2022.     Uroflow showed a voided volume of 220 cc with a max flow of 9 and average flow 3 mL/second with PVR of 202 cc.  Cytoscopy showed trabeculations with long prostrate with kissing lateral lobes and multiple small bladder stones. TRUS volume 88cc    5/15/2023 office visit [facility]  1. Complex renal cyst  Patient with Bosniak 3 cyst off the right lower pole that has decreased in size therefore warrants observation.  Plan for repeat imaging already in place.  -Patient is overdue for imaging -had  staff schedule patient for ultrasound already scheduled  -Follow-up in office afterwards with MD     2.  BPH with incomplete bladder emptying  - mL today  -Continue with Flomax  -Patient was on finasteride in the  past but did not find it beneficial for his urinary symptoms.  He does not want to restart at this time despite potential for benefit with PVR  -Hx of elevated PSA - likely reflective of BPH. Has not had PSA in 2 years -will get another reading to ensure not significantly elevated.   -Patient has had previous discussions with Dr. Fuller and Dr. Echeverria for bladder outlet obstruction surgery. Reviewed options with patient again  -Patient to have discussion with Dr. Echeverria at next available regarding possible surgical intervention follow-up BPH with incomplete bladder emptying     7/13/2023 office visit [facility]  Benign localized prostatic hyperplasia with lower urinary tract symptoms (LUTS)  Rediscussed the role for outlet surgery.  I do think this is recommended given his elevated PVRs and voiding symptoms.  Discussed HoLEP versus prostate artery embolization as I do not think he needs a robot simple prostatectomy.     Based on his other medical issues and his wife medical needs he is appears to favor a surgery that is outpatient and without a catheter so I have placed referral to interventional radiology for prostate artery embolization.     In the interval he will continue Flomax.  He does not want to restart finasteride.  He would like to try daily Cialis as well.  We discussed the risk for hypotension     Complex renal cyst  This appears stable ultrasound but to better assess we will get an MRI in 6 months.  If that is stable then would plan for annual or every other year imaging.     He has a pacemaker but it is MRI compatible and he has had MRIs in the past with it     3/12/2024 [facility]  79 M hx of BPH advised HoLEP.  Cystic renal mass.  Overdue for MRI.  Was ordered by Dr. Echeverria.  Pacemaker is MRI compatible.  Elevated PSA.  Had prostate ultrasound 6/6/2022.  78 g.   Plan: Get MRI, do uroflow to see about bipolar TURP.  We will get the generator to do the bipolar.  Hold off on the PAE. Will need OK from PMD to  "be able to do the operation.    4/12/2024 OV Dangelo  79 M BPH with obstruction.  Uroflow 87 voided.  6 max, 4 average.  Sna prior was 229mL Voided at home.  Elevated PVR. Wants TURP with bipolar. Risks and benefits. Will need to hold eliquis for 3 days prior and couple days afterward if ok with cardiologist.      Lab Results   Component Value Date    PSA 5.26 (H) 10/27/2023    PSA 4.06 (H) 07/13/2023    PSA 4.2 (H) 07/27/2021     No results found for: \"TESTOSTERONE\"  No components found for: \"CR\"    Allergies   Allergen Reactions    Codeine Other (See Comments) and GI Intolerance     Reaction Date: 27Dec2004; Annotation - 04Sep2012: '' CRAZY ''  vomiting  headache    Demerol [Meperidine] Nausea Only, Other (See Comments), GI Intolerance and Vomiting     vomiting  Reaction Date: 27Dec2004;   headache    Morphine Nausea Only, GI Intolerance and Vomiting     Reaction Date: 23Feb2012;        Review of Systems    Past Surgical History:   Procedure Laterality Date    APPENDECTOMY      ATRIAL ABLATION SURGERY      2014    ATRIAL ABLATION SURGERY      catheter ablation atrial fibrillation / last assessed 2/17/16     CHOLECYSTECTOMY      lap    COLON SURGERY  1990    Hartmans procedure    COLON SURGERY      reversal 6 weeks later    COLONOSCOPY N/A 1/26/2017    Procedure: COLONOSCOPY;  Surgeon: Portillo Schumacher MD;  Location: [facility];  Service:     ESOPHAGOGASTRODUODENOSCOPY N/A 1/26/2017    Procedure: ESOPHAGOGASTRODUODENOSCOPY (EGD);  Surgeon: Portillo Schumacher MD;  Location: [facility];  Service:     EYE SURGERY Bilateral 2015    lid repair    HERNIA REPAIR Bilateral     inguinal 2014 & 2013    INCISIONAL HERNIA REPAIR  2011    INCISIONAL HERNIA REPAIR N/A 3/24/2017    Procedure: REPAIR OF INCARCERATED INCISIONAL HERNIA WITH MESH, Lysis of Adhesions, Partial Omentectomy;  Surgeon: Colt Davis MD;  Location: [facility];  Service:     INSERT / REPLACE / REMOVE PACEMAKER Left 10/04/2021    [id number] "    NEUROBLASTOMA EXCISION      SKIN CANCER EXCISION      On nose    TONSILLECTOMY         Family History   Problem Relation Age of Onset    Heart disease Mother     Cancer Mother     Colon cancer Mother     Dementia Mother     Early death Father     Seizures Father     Cancer Sister         bone    Heart disease Sister     Diabetes Sister     Lupus Sister     Heart disease Brother     Cancer Brother         kidney    Diabetes Brother     Other Sister         covid    Stroke Neg Hx        Social History     Socioeconomic History    Marital status: /Civil Union     Spouse name: Not on file    Number of children: Not on file    Years of education: Not on file    Highest education level: Not on file   Occupational History    Not on file   Tobacco Use    Smoking status: Former     Current packs/day: 0.00     Average packs/day: 1 pack/day for 20.0 years (20.0 ttl pk-yrs)     Types: Cigarettes     Start date:      Quit date:      Years since quittin.3    Smokeless tobacco: Never    Tobacco comments:     Started age 17   Vaping Use    Vaping status: Never Used   Substance and Sexual Activity    Alcohol use: No     Comment: none in 33 yrs    Drug use: No    Sexual activity: Yes   Other Topics Concern    Not on file   Social History Narrative    Single per allscript      Social Determinants of Health     Financial Resource Strain: Low Risk  (10/11/2023)    Overall Financial Resource Strain (CARDIA)     Difficulty of Paying Living Expenses: Not hard at all   Food Insecurity: Not on file   Transportation Needs: No Transportation Needs (10/11/2023)    PRAPARE - Transportation     Lack of Transportation (Medical): No     Lack of Transportation (Non-Medical): No   Physical Activity: Not on file   Stress: Not on file   Social Connections: Not on file   Intimate Partner Violence: Not on file   Housing Stability: Not on file         Current Outpatient Medications:     acetaminophen (TYLENOL) 325 mg tablet, Take  650 mg by mouth every 6 (six) hours as needed, Disp: , Rfl:     amiodarone 100 mg tablet, , Disp: , Rfl:     apixaban (ELIQUIS) 5 mg, Take 5 mg by mouth daily To stop 2 days prior to the procedure, Disp: , Rfl:     atorvastatin (LIPITOR) 20 mg tablet, TAKE 1 TABLET DAILY, Disp: 90 tablet, Rfl: 3    Blood Glucose Monitoring Suppl (FREESTYLE LITE) GEOVANNY, , Disp: , Rfl:     carvedilol (COREG) 25 mg tablet, Take 12.5 mg by mouth 2 (two) times a day , Disp: , Rfl:     esomeprazole (NexIUM) 20 mg capsule, Take 1 capsule (20 mg total) by mouth daily at bedtime, Disp: 90 capsule, Rfl: 3    fluticasone (FLONASE) 50 mcg/act nasal spray, 2 sprays into each nostril daily, Disp: 48 g, Rfl: 3    FREESTYLE LITE test strip, , Disp: , Rfl:     hydroCHLOROthiazide 25 mg tablet, TAKE ONE TABLET BY MOUTH EVERY DAY, Disp: 90 tablet, Rfl: 1    ketoconazole (NIZORAL) 2 % shampoo, , Disp: , Rfl:     Lancets (FREESTYLE) lancets, , Disp: , Rfl:     linaCLOtide (Linzess) 145 MCG CAPS, Take 1 capsule (145 mcg total) by mouth daily before breakfast, Disp: 30 capsule, Rfl: 3    losartan (COZAAR) 100 MG tablet, 100 mg every morning , Disp: , Rfl:     metroNIDAZOLE (METROCREAM) 0.75 % cream, , Disp: , Rfl:     Mounjaro 2.5 MG/0.5ML, Taking until 04/08/2024, Disp: , Rfl:     Mounjaro 5 MG/0.5ML, Not taking until 04/08/2024, Disp: , Rfl:     ondansetron (ZOFRAN) 8 mg tablet, Take 1 tablet (8 mg total) by mouth every 8 (eight) hours as needed for nausea or vomiting, Disp: 20 tablet, Rfl: 0    pioglitazone-metFORMIN (ACTOPLUS MET)  MG per tablet, daily after dinner, Disp: , Rfl:     tamsulosin (FLOMAX) 0.4 mg, Take 1 capsule (0.4 mg total) by mouth daily with dinner, Disp: 90 capsule, Rfl: 3    traMADol (ULTRAM) 50 mg tablet, Take 50 mg by mouth once as needed for moderate pain He uses it rarely., Disp: , Rfl:     Tyrvaya 0.03 MG/ACT SOLN, , Disp: , Rfl:     amiodarone 200 mg tablet, 200 mg daily after dinner  (Patient not taking: Reported on  "3/7/2024), Disp: , Rfl:     amLODIPine (NORVASC) 5 mg tablet, , Disp: , Rfl:     clotrimazole-betamethasone (LOTRISONE) 1-0.05 % cream, APPLY TO AFFECTED AREA(S) TWO TIMES A DAY (Patient not taking: Reported on 3/7/2024), Disp: 45 g, Rfl: 1    Ozempic, 0.25 or 0.5 MG/DOSE, 2 MG/1.5ML SOPN, once a week (Patient not taking: Reported on 3/7/2024), Disp: , Rfl:     polyethylene glycol-propylene glycol (Systane) 0.4-0.3 %, every 3 (three) hours as needed   (Patient not taking: Reported on 1/22/2024), Disp: , Rfl:     RESTASIS 0.05 % ophthalmic emulsion, Administer 1 drop to both eyes every 12 (twelve) hours  (Patient not taking: Reported on 3/7/2024), Disp: , Rfl:     tadalafil (CIALIS) 5 MG tablet, Take 1 tablet (5 mg total) by mouth daily as needed for erectile dysfunction (Patient not taking: Reported on 1/22/2024), Disp: 90 tablet, Rfl: 1    urea (CARMOL) 20 % cream, if needed (Patient not taking: Reported on 3/7/2024), Disp: , Rfl:     Objective     /70 (BP Location: Left arm, Patient Position: Sitting, Cuff Size: Standard)   Pulse 74   Ht 5' 11\" (1.803 m)   Wt 102 kg (224 lb)   SpO2 99%   BMI 31.24 kg/m²     Physical Exam      [facility]  "

## 2024-04-15 ENCOUNTER — TELEPHONE (OUTPATIENT)
Age: 79
End: 2024-04-15

## 2024-04-15 ENCOUNTER — OFFICE VISIT (OUTPATIENT)
Dept: FAMILY MEDICINE CLINIC | Facility: CLINIC | Age: 79
End: 2024-04-15
Payer: MEDICARE

## 2024-04-15 VITALS
BODY MASS INDEX: 31.36 KG/M2 | HEART RATE: 69 BPM | WEIGHT: 224 LBS | DIASTOLIC BLOOD PRESSURE: 80 MMHG | RESPIRATION RATE: 18 BRPM | SYSTOLIC BLOOD PRESSURE: 126 MMHG | HEIGHT: 71 IN | TEMPERATURE: 97.1 F

## 2024-04-15 DIAGNOSIS — I48.91 ATRIAL FIBRILLATION, UNSPECIFIED TYPE (HCC): ICD-10-CM

## 2024-04-15 DIAGNOSIS — J20.9 ACUTE BRONCHITIS, UNSPECIFIED ORGANISM: Primary | ICD-10-CM

## 2024-04-15 DIAGNOSIS — I77.810 AORTIC ROOT DILATION (HCC): ICD-10-CM

## 2024-04-15 DIAGNOSIS — E11.22 TYPE 2 DIABETES MELLITUS WITH STAGE 3 CHRONIC KIDNEY DISEASE, WITHOUT LONG-TERM CURRENT USE OF INSULIN, UNSPECIFIED WHETHER STAGE 3A OR 3B CKD (HCC): ICD-10-CM

## 2024-04-15 DIAGNOSIS — N18.30 TYPE 2 DIABETES MELLITUS WITH STAGE 3 CHRONIC KIDNEY DISEASE, WITHOUT LONG-TERM CURRENT USE OF INSULIN, UNSPECIFIED WHETHER STAGE 3A OR 3B CKD (HCC): ICD-10-CM

## 2024-04-15 PROCEDURE — 99213 OFFICE O/P EST LOW 20 MIN: CPT | Performed by: INTERNAL MEDICINE

## 2024-04-15 PROCEDURE — G2211 COMPLEX E/M VISIT ADD ON: HCPCS | Performed by: INTERNAL MEDICINE

## 2024-04-15 RX ORDER — METFORMIN HYDROCHLORIDE 500 MG/1
TABLET, EXTENDED RELEASE ORAL
COMMUNITY
Start: 2024-04-02

## 2024-04-15 RX ORDER — DEXTROMETHORPHAN HYDROBROMIDE AND PROMETHAZINE HYDROCHLORIDE 15; 6.25 MG/5ML; MG/5ML
5 SYRUP ORAL 4 TIMES DAILY PRN
Qty: 180 ML | Refills: 0 | Status: SHIPPED | OUTPATIENT
Start: 2024-04-15

## 2024-04-15 RX ORDER — AMOXICILLIN 875 MG/1
875 TABLET, COATED ORAL 2 TIMES DAILY
Qty: 20 TABLET | Refills: 0 | Status: SHIPPED | OUTPATIENT
Start: 2024-04-15 | End: 2024-04-25

## 2024-04-15 RX ORDER — ESOMEPRAZOLE MAGNESIUM 20 MG/1
GRANULE, DELAYED RELEASE ORAL
COMMUNITY
End: 2024-04-15

## 2024-04-15 NOTE — PROGRESS NOTES
Name: Andrade Wise Jr.      : 1945      MRN: [id number]  Encounter Provider: Mariah Benoit MD  Encounter Date: 4/15/2024   Encounter department: [facility]    Assessment & Plan     1. Acute bronchitis, unspecified organism  Comments:  supportive care discussed, follow up if not improving.  Orders:  -     amoxicillin (AMOXIL) 875 mg tablet; Take 1 tablet (875 mg total) by mouth 2 (two) times a day for 10 days  -     promethazine-dextromethorphan (PHENERGAN-DM) 6.25-15 mg/5 mL oral syrup; Take 5 mL by mouth 4 (four) times a day as needed for cough    2. Type 2 diabetes mellitus with stage 3 chronic kidney disease, without long-term current use of insulin, unspecified whether stage 3a or 3b CKD (HCC)  Assessment & Plan:    Lab Results   Component Value Date    HGBA1C 6.8 (H) 2024     Managed by endocrinology. We will call for recent labwork.       3. Aortic root dilation (HCC)  -     Ambulatory Referral to Cardiology; Future    4. Atrial fibrillation, unspecified type (HCC)  -     apixaban (ELIQUIS) 5 mg; Take 1 tablet (5 mg total) by mouth daily To stop 2 days prior to the procedure         Subjective      Started one week ago with cough and now chest is congested.  He feels it is productive but he can't get it up, it is too thick. There is no fever but he is very fatigued.  Denies dyspnea.  Took a home covid test, negative.  Started his wife's amoxicillin, 500 mg TID and he feels it is helping.        Review of Systems   Constitutional:  Positive for fatigue. Negative for fever.   HENT:  Positive for congestion, rhinorrhea and sore throat.    Respiratory:  Positive for cough. Negative for shortness of breath and wheezing.        Current Outpatient Medications on File Prior to Visit   Medication Sig   • acetaminophen (TYLENOL) 325 mg tablet Take 650 mg by mouth every 6 (six) hours as needed   • amiodarone 100 mg tablet    • atorvastatin (LIPITOR) 20 mg tablet TAKE 1 TABLET DAILY   • Blood  Glucose Monitoring Suppl (FREESTYLE LITE) GEOVANNY    • carvedilol (COREG) 25 mg tablet Take 12.5 mg by mouth 2 (two) times a day    • clotrimazole-betamethasone (LOTRISONE) 1-0.05 % cream APPLY TO AFFECTED AREA(S) TWO TIMES A DAY   • esomeprazole (NexIUM) 20 mg capsule Take 1 capsule (20 mg total) by mouth daily at bedtime   • fluticasone (FLONASE) 50 mcg/act nasal spray 2 sprays into each nostril daily   • FREESTYLE LITE test strip    • hydroCHLOROthiazide 25 mg tablet TAKE ONE TABLET BY MOUTH EVERY DAY   • ketoconazole (NIZORAL) 2 % shampoo    • Lancets (FREESTYLE) lancets    • linaCLOtide (Linzess) 145 MCG CAPS Take 1 capsule (145 mcg total) by mouth daily before breakfast   • losartan (COZAAR) 100 MG tablet 100 mg every morning    • metFORMIN (GLUCOPHAGE-XR) 500 mg 24 hr tablet    • metroNIDAZOLE (METROCREAM) 0.75 % cream    • Mounjaro 5 MG/0.5ML Not taking until 04/08/2024   • RESTASIS 0.05 % ophthalmic emulsion Administer 1 drop to both eyes every 12 (twelve) hours   • tamsulosin (FLOMAX) 0.4 mg Take 1 capsule (0.4 mg total) by mouth daily with dinner   • traMADol (ULTRAM) 50 mg tablet Take 50 mg by mouth once as needed for moderate pain He uses it rarely.   • urea (CARMOL) 20 % cream if needed   • [DISCONTINUED] apixaban (ELIQUIS) 5 mg Take 5 mg by mouth daily To stop 2 days prior to the procedure   • Ozempic, 0.25 or 0.5 MG/DOSE, 2 MG/1.5ML SOPN once a week (Patient not taking: Reported on 3/7/2024)   • pioglitazone-metFORMIN (ACTOPLUS MET)  MG per tablet daily after dinner (Patient not taking: Reported on 4/15/2024)   • polyethylene glycol-propylene glycol (Systane) 0.4-0.3 % every 3 (three) hours as needed   (Patient not taking: Reported on 4/15/2024)   • [DISCONTINUED] amiodarone 200 mg tablet 200 mg daily after dinner  (Patient not taking: Reported on 3/7/2024)   • [DISCONTINUED] amLODIPine (NORVASC) 5 mg tablet  (Patient not taking: Reported on 3/7/2024)   • [DISCONTINUED] esomeprazole (NexIUM) 20 mg  "packet take 1 capsule by ORAL route  every day ORAL (Patient not taking: Reported on 4/15/2024)   • [DISCONTINUED] Mounjaro 2.5 MG/0.5ML Taking until 04/08/2024 (Patient not taking: Reported on 4/15/2024)   • [DISCONTINUED] ondansetron (ZOFRAN) 8 mg tablet Take 1 tablet (8 mg total) by mouth every 8 (eight) hours as needed for nausea or vomiting (Patient not taking: Reported on 4/15/2024)   • [DISCONTINUED] tadalafil (CIALIS) 5 MG tablet Take 1 tablet (5 mg total) by mouth daily as needed for erectile dysfunction (Patient not taking: Reported on 4/15/2024)   • [DISCONTINUED] Tyrvaya 0.03 MG/ACT SOLN  (Patient not taking: Reported on 4/15/2024)       Objective     /80   Pulse 69   Temp (!) 97.1 °F (36.2 °C)   Resp 18   Ht 5' 11\" (1.803 m)   Wt 102 kg (224 lb) Comment: per patient  BMI 31.24 kg/m²     Physical Exam  HENT:      Right Ear: Tympanic membrane is retracted.      Left Ear: Tympanic membrane is retracted.      Nose: Mucosal edema and rhinorrhea present.   Cardiovascular:      Rate and Rhythm: Normal rate and regular rhythm.      Pulses: no weak pulses.           Dorsalis pedis pulses are 2+ on the right side and 2+ on the left side.      Heart sounds: Normal heart sounds.   Pulmonary:      Effort: Pulmonary effort is normal.      Breath sounds: Normal breath sounds. No wheezing or rales.   Musculoskeletal:      Cervical back: Neck supple.   Feet:      Right foot:      Skin integrity: No ulcer, skin breakdown, erythema, warmth, callus or dry skin.      Left foot:      Skin integrity: No ulcer, skin breakdown, erythema, warmth, callus or dry skin.   Lymphadenopathy:      Cervical: No cervical adenopathy.     Patient's shoes and socks removed.    Right Foot/Ankle   Right Foot Inspection  Skin Exam: skin normal and skin intact. No dry skin, no warmth, no callus, no erythema, no maceration, no abnormal color, no pre-ulcer, no ulcer and no callus.     Toe Exam: ROM and strength within normal limits. "     Sensory   Monofilament testing: intact    Vascular  Capillary refills: < 3 seconds  The right DP pulse is 2+.     Left Foot/Ankle  Left Foot Inspection  Skin Exam: skin normal and skin intact. No dry skin, no warmth, no erythema, no maceration, normal color, no pre-ulcer, no ulcer and no callus.     Toe Exam: ROM and strength within normal limits.     Sensory   Monofilament testing: intact    Vascular  Capillary refills: < 3 seconds  The left DP pulse is 2+.     Assign Risk Category  No deformity present  No loss of protective sensation  No weak pulses  Risk: 0      Mariah Benoit MD

## 2024-04-15 NOTE — ASSESSMENT & PLAN NOTE
Lab Results   Component Value Date    HGBA1C 6.8 (H) 02/12/2024     Managed by endocrinology. We will call for recent labwork.

## 2024-04-15 NOTE — TELEPHONE ENCOUNTER
Pharmacist calling regarding Eliquis 5mg that was called in to pharmacy today. She states that Eliquis is usually prescribed twice a day, but the script is only written for once a day. She wants to confirm how the PCP wants this med prescribed before filling. Please follow up with pharmacy.

## 2024-04-16 ENCOUNTER — TELEPHONE (OUTPATIENT)
Dept: ADMINISTRATIVE | Facility: OTHER | Age: 79
End: 2024-04-16

## 2024-04-16 ENCOUNTER — TELEPHONE (OUTPATIENT)
Dept: UROLOGY | Facility: CLINIC | Age: 79
End: 2024-04-16

## 2024-04-16 NOTE — TELEPHONE ENCOUNTER
Called patient spoke with him and he is holding off on surgery he will call me when he wants to schedule

## 2024-04-16 NOTE — LETTER
Lab Result(s) Request Form: Albumin Creatinine Urine Ratio or Microalbumin Creatinine Urine Ratio and Hemoglobin A1c      Date Requested: 24  Patient: Andrade Wise Jr.  Patient : 1945   Referring Provider: Mariah Benoit MD        Date of Lab Collection ______________________________       The above patient has informed us that they have completed their   most recent Albumin Creatinine Urine Ratio or Microalbumin Creatinine Urine Ratio and Hemoglobin A1c at your facility. Please complete   this form and attach all corresponding procedure reports/results.    Comments __________________________________________________________  ____________________________________________________________________  ____________________________________________________________________  ____________________________________________________________________    Collecting/Resulting Facility  ___________________________________________  Form Completed By (print name) ________________________________________    Signature ___________________________________________________________      These reports are needed for  compliance.    Please fax this completed form and a copy of the lab result(s)/ report(s) to our office located at [address] [place] [address] as soon as possible to Fax [phone] marelna Garland: Phone [phone]    We thank you for your assistance in treating our mutual patient.

## 2024-04-16 NOTE — TELEPHONE ENCOUNTER
----- Message from Mariah Benoit MD sent at 4/15/2024  3:17 PM EDT -----  04/15/24 3:17 PM    Hello, our patient No patient name on file. has had Hemoglobin A1c and Urine Albumin/Creatinine Ratio completed/performed. Please assist in updating the patient chart by making an External outreach to Dr. Araujo facility located in [place], [place]. The date of service is 2024.    Thank you,  Mariah Benoit  [facility]

## 2024-04-16 NOTE — TELEPHONE ENCOUNTER
Upon review of the In Basket request and the patient's chart, initial outreach has been made via fax to facility. Please see Contacts section for details.     Thank you  Gill Caruso

## 2024-04-19 NOTE — TELEPHONE ENCOUNTER
Upon review of the In Basket request we were able to locate, review, and update the patient chart as requested for Hemoglobin A1c. and have found/obtained the documentation. After careful review of the document we are unable to complete this request for Microalbumin Creatinine Urine Ratio OR Albumin Creatinine Urine Ratio  because the documentation does not have the result(s) needed to close the requested care gap(s).    Any additional questions or concerns should be emailed to the Practice Liaisons via the appropriate education email address, please do not reply via In Basket.    Thank you  Gill Caruso

## 2024-05-29 ENCOUNTER — APPOINTMENT (OUTPATIENT)
Dept: LAB | Facility: CLINIC | Age: 79
End: 2024-05-29
Payer: MEDICARE

## 2024-05-29 DIAGNOSIS — I48.19 ATRIAL FIBRILLATION, PERSISTENT (HCC): ICD-10-CM

## 2024-05-29 LAB
ALBUMIN SERPL BCP-MCNC: 4.1 G/DL (ref 3.5–5)
ALP SERPL-CCNC: 61 U/L (ref 34–104)
ALT SERPL W P-5'-P-CCNC: 31 U/L (ref 7–52)
ANION GAP SERPL CALCULATED.3IONS-SCNC: 6 MMOL/L (ref 4–13)
AST SERPL W P-5'-P-CCNC: 18 U/L (ref 13–39)
BASOPHILS # BLD AUTO: 0.05 THOUSANDS/ÂΜL (ref 0–0.1)
BASOPHILS NFR BLD AUTO: 1 % (ref 0–1)
BILIRUB SERPL-MCNC: 0.73 MG/DL (ref 0.2–1)
BUN SERPL-MCNC: 19 MG/DL (ref 5–25)
CALCIUM SERPL-MCNC: 8.8 MG/DL (ref 8.4–10.2)
CHLORIDE SERPL-SCNC: 104 MMOL/L (ref 96–108)
CO2 SERPL-SCNC: 29 MMOL/L (ref 21–32)
CREAT SERPL-MCNC: 1.01 MG/DL (ref 0.6–1.3)
EOSINOPHIL # BLD AUTO: 0.1 THOUSAND/ÂΜL (ref 0–0.61)
EOSINOPHIL NFR BLD AUTO: 1 % (ref 0–6)
ERYTHROCYTE [DISTWIDTH] IN BLOOD BY AUTOMATED COUNT: 14.1 % (ref 11.6–15.1)
GFR SERPL CREATININE-BSD FRML MDRD: 70 ML/MIN/1.73SQ M
GLUCOSE SERPL-MCNC: 155 MG/DL (ref 65–140)
HCT VFR BLD AUTO: 39 % (ref 36.5–49.3)
HGB BLD-MCNC: 12.5 G/DL (ref 12–17)
IMM GRANULOCYTES # BLD AUTO: 0.02 THOUSAND/UL (ref 0–0.2)
IMM GRANULOCYTES NFR BLD AUTO: 0 % (ref 0–2)
INR PPP: 1.19 (ref 0.84–1.19)
LYMPHOCYTES # BLD AUTO: 4.44 THOUSANDS/ÂΜL (ref 0.6–4.47)
LYMPHOCYTES NFR BLD AUTO: 41 % (ref 14–44)
MAGNESIUM SERPL-MCNC: 2.3 MG/DL (ref 1.9–2.7)
MCH RBC QN AUTO: 29.4 PG (ref 26.8–34.3)
MCHC RBC AUTO-ENTMCNC: 32.1 G/DL (ref 31.4–37.4)
MCV RBC AUTO: 92 FL (ref 82–98)
MONOCYTES # BLD AUTO: 0.62 THOUSAND/ÂΜL (ref 0.17–1.22)
MONOCYTES NFR BLD AUTO: 6 % (ref 4–12)
NEUTROPHILS # BLD AUTO: 5.66 THOUSANDS/ÂΜL (ref 1.85–7.62)
NEUTS SEG NFR BLD AUTO: 51 % (ref 43–75)
NRBC BLD AUTO-RTO: 0 /100 WBCS
PLATELET # BLD AUTO: 209 THOUSANDS/UL (ref 149–390)
PMV BLD AUTO: 11.2 FL (ref 8.9–12.7)
POTASSIUM SERPL-SCNC: 4.1 MMOL/L (ref 3.5–5.3)
PROT SERPL-MCNC: 6.7 G/DL (ref 6.4–8.4)
PROTHROMBIN TIME: 15 SECONDS (ref 11.6–14.5)
RBC # BLD AUTO: 4.25 MILLION/UL (ref 3.88–5.62)
SODIUM SERPL-SCNC: 139 MMOL/L (ref 135–147)
WBC # BLD AUTO: 10.89 THOUSAND/UL (ref 4.31–10.16)

## 2024-05-29 PROCEDURE — 85610 PROTHROMBIN TIME: CPT

## 2024-05-29 PROCEDURE — 80053 COMPREHEN METABOLIC PANEL: CPT

## 2024-05-29 PROCEDURE — 83735 ASSAY OF MAGNESIUM: CPT

## 2024-05-29 PROCEDURE — 85025 COMPLETE CBC W/AUTO DIFF WBC: CPT

## 2024-05-29 PROCEDURE — 36415 COLL VENOUS BLD VENIPUNCTURE: CPT

## 2024-06-03 ENCOUNTER — OFFICE VISIT (OUTPATIENT)
Dept: OBGYN CLINIC | Facility: CLINIC | Age: 79
End: 2024-06-03
Payer: MEDICARE

## 2024-06-03 ENCOUNTER — APPOINTMENT (OUTPATIENT)
Dept: RADIOLOGY | Facility: CLINIC | Age: 79
End: 2024-06-03
Payer: MEDICARE

## 2024-06-03 VITALS
WEIGHT: 224 LBS | DIASTOLIC BLOOD PRESSURE: 66 MMHG | BODY MASS INDEX: 31.36 KG/M2 | HEART RATE: 66 BPM | HEIGHT: 71 IN | SYSTOLIC BLOOD PRESSURE: 109 MMHG

## 2024-06-03 DIAGNOSIS — M17.12 PRIMARY OSTEOARTHRITIS OF LEFT KNEE: Primary | ICD-10-CM

## 2024-06-03 DIAGNOSIS — Z01.89 ENCOUNTER FOR LOWER EXTREMITY COMPARISON IMAGING STUDY: ICD-10-CM

## 2024-06-03 DIAGNOSIS — M25.562 LEFT KNEE PAIN, UNSPECIFIED CHRONICITY: ICD-10-CM

## 2024-06-03 DIAGNOSIS — M71.22 SYNOVIAL CYST OF LEFT POPLITEAL SPACE: ICD-10-CM

## 2024-06-03 PROCEDURE — 99213 OFFICE O/P EST LOW 20 MIN: CPT | Performed by: ORTHOPAEDIC SURGERY

## 2024-06-03 PROCEDURE — 73564 X-RAY EXAM KNEE 4 OR MORE: CPT

## 2024-06-03 PROCEDURE — 73562 X-RAY EXAM OF KNEE 3: CPT

## 2024-06-03 NOTE — PROGRESS NOTES
Assessment/Plan:  1. Primary osteoarthritis of left knee  XR knee 4+ vw left injury      2. Encounter for lower extremity comparison imaging study  XR knee 3 vw right non injury      3. Synovial cyst of left popliteal space          Scribe Attestation      I,:  Jarret Gilmore am acting as a scribe while in the presence of the attending physician.:       I,:  Ho Dao MD personally performed the services described in this documentation    as scribed in my presence.:               Andrade and I reviewed his x-rays together.  He does have evidence of mild arthritic changes about the knee.  I explained to Andrade that this can cause swelling into the posterior region.  I do appreciate a palpable Baker's cyst.  The increase in the numbness that he is experiencing can be due to the swelling posteriorly, vs his chronic neuropathy vs new radicular symptoms. I believe we can observe this for now.  We discussed treatment options moving forward such as physical therapy.  I did offer a steroid injection today.  He prefers to attend physical therapy for now.  If he has not worsening conditions he can return to see me.  I would like him to attend therapy for 6 weeks and then follow-up.    Subjective:   Andrade Wise Jr. is a 79 y.o. male who presents for left knee pain and swelling that developed approximately 1 week ago.  He does not recall a specific injury to the knee.  He has noticed pain with ascending and descending stairs.  The pain is located in the posterior lateral aspect of his knee.  He also complains of an increase in numbness in his left lower extremity.  He states he does have a history of neuropathy and is currently under the care of endocrinology for type 2 diabetes and neurology for the neuropathy.  Andrade states he continues to walk his dog on a daily basis and walks approximately 1 mile per day.  He has not taken anything for his knee pain.      Review of Systems   Constitutional:  Negative for chills,  fever and unexpected weight change.   HENT:  Negative for hearing loss, nosebleeds and sore throat.    Eyes:  Negative for pain, redness and visual disturbance.   Respiratory:  Negative for cough, shortness of breath and wheezing.    Cardiovascular:  Negative for chest pain, palpitations and leg swelling.   Gastrointestinal:  Negative for abdominal pain, nausea and vomiting.   Endocrine: Negative for polyphagia and polyuria.   Genitourinary:  Negative for dysuria and hematuria.   Musculoskeletal:         See HPI   Skin:  Negative for rash and wound.   Neurological:  Negative for dizziness, numbness and headaches.   Psychiatric/Behavioral:  Negative for decreased concentration and suicidal ideas. The patient is not nervous/anxious.          Past Medical History:   Diagnosis Date    Allergic rhinitis     Anemia 10/23/2008    last assessed 9/9/13     Anxiety     Atrial fibrillation (HCC)     Bleeding     Chronic kidney disease     COVID-19 virus infection 12/2022    CPAP (continuous positive airway pressure) dependence     Diabetes mellitus (HCC)     Diarrhea     Diverticulitis     Eating disorder     GERD (gastroesophageal reflux disease)     Herpes zoster with complication     last assessed 7/3/17     Hiatal hernia     Hyperlipidemia     Hypertension     IBS (irritable bowel syndrome)     Incisional hernia     Increased urinary frequency     Irregular heart beat     Pyogenic granuloma 09/13/2006    last assessed 9/13/06    Carlos (subconjunctival hemorrhage), unspecified laterality 09/12/2005    last assessed 9/12/05    Sleep apnea     TMJ (dislocation of temporomandibular joint)     Ventral hernia     last assessed  4/6/17     Wears glasses        Past Surgical History:   Procedure Laterality Date    APPENDECTOMY      ATRIAL ABLATION SURGERY      2014    ATRIAL ABLATION SURGERY      catheter ablation atrial fibrillation / last assessed 2/17/16     CHOLECYSTECTOMY      lap    COLON SURGERY  1990    Hartmans procedure     COLON SURGERY      reversal 6 weeks later    COLONOSCOPY N/A 2017    Procedure: COLONOSCOPY;  Surgeon: Portillo Schumacher MD;  Location: [facility];  Service:     ESOPHAGOGASTRODUODENOSCOPY N/A 2017    Procedure: ESOPHAGOGASTRODUODENOSCOPY (EGD);  Surgeon: Portillo Schumacher MD;  Location: [facility];  Service:     EYE SURGERY Bilateral 2015    lid repair    HERNIA REPAIR Bilateral     inguinal  &     INCISIONAL HERNIA REPAIR      INCISIONAL HERNIA REPAIR N/A 3/24/2017    Procedure: REPAIR OF INCARCERATED INCISIONAL HERNIA WITH MESH, Lysis of Adhesions, Partial Omentectomy;  Surgeon: Colt Davis MD;  Location: [facility];  Service:     INSERT / REPLACE / REMOVE PACEMAKER Left 10/04/2021    [id number]    NEUROBLASTOMA EXCISION      SKIN CANCER EXCISION      On nose    TONSILLECTOMY         Family History   Problem Relation Age of Onset    Heart disease Mother     Cancer Mother     Colon cancer Mother     Dementia Mother     Early death Father     Seizures Father     Cancer Sister         bone    Heart disease Sister     Diabetes Sister     Lupus Sister     Heart disease Brother     Cancer Brother         kidney    Diabetes Brother     Other Sister         covid    Stroke Neg Hx        Social History     Occupational History    Not on file   Tobacco Use    Smoking status: Former     Current packs/day: 0.00     Average packs/day: 1 pack/day for 20.0 years (20.0 ttl pk-yrs)     Types: Cigarettes     Start date:      Quit date:      Years since quittin.4     Passive exposure: Never    Smokeless tobacco: Never    Tobacco comments:     Started age 17   Vaping Use    Vaping status: Never Used   Substance and Sexual Activity    Alcohol use: No     Comment: none in 33 yrs    Drug use: No    Sexual activity: Yes         Current Outpatient Medications:     acetaminophen (TYLENOL) 325 mg tablet, Take 650 mg by mouth every 6 (six) hours as needed, Disp: , Rfl:     amiodarone 100  mg tablet, 200 mg, Disp: , Rfl:     apixaban (ELIQUIS) 5 mg, Take 1 tablet (5 mg total) by mouth 2 (two) times a day, Disp: 180 tablet, Rfl: 3    atorvastatin (LIPITOR) 20 mg tablet, TAKE 1 TABLET DAILY, Disp: 90 tablet, Rfl: 3    Blood Glucose Monitoring Suppl (FREESTYLE LITE) GEOVANNY, , Disp: , Rfl:     carvedilol (COREG) 25 mg tablet, Take 12.5 mg by mouth 2 (two) times a day , Disp: , Rfl:     clotrimazole-betamethasone (LOTRISONE) 1-0.05 % cream, APPLY TO AFFECTED AREA(S) TWO TIMES A DAY, Disp: 45 g, Rfl: 1    esomeprazole (NexIUM) 20 mg capsule, Take 1 capsule (20 mg total) by mouth daily at bedtime, Disp: 90 capsule, Rfl: 3    fluticasone (FLONASE) 50 mcg/act nasal spray, 2 sprays into each nostril daily, Disp: 48 g, Rfl: 3    FREESTYLE LITE test strip, , Disp: , Rfl:     hydroCHLOROthiazide 25 mg tablet, TAKE ONE TABLET BY MOUTH EVERY DAY, Disp: 90 tablet, Rfl: 1    ketoconazole (NIZORAL) 2 % shampoo, , Disp: , Rfl:     Lancets (FREESTYLE) lancets, , Disp: , Rfl:     linaCLOtide (Linzess) 145 MCG CAPS, Take 1 capsule (145 mcg total) by mouth daily before breakfast, Disp: 30 capsule, Rfl: 3    losartan (COZAAR) 100 MG tablet, 100 mg every morning , Disp: , Rfl:     metroNIDAZOLE (METROCREAM) 0.75 % cream, , Disp: , Rfl:     Mounjaro 5 MG/0.5ML, Not taking until 04/08/2024, Disp: , Rfl:     RESTASIS 0.05 % ophthalmic emulsion, Administer 1 drop to both eyes every 12 (twelve) hours, Disp: , Rfl:     tamsulosin (FLOMAX) 0.4 mg, Take 1 capsule (0.4 mg total) by mouth daily with dinner, Disp: 90 capsule, Rfl: 3    urea (CARMOL) 20 % cream, if needed, Disp: , Rfl:     metFORMIN (GLUCOPHAGE-XR) 500 mg 24 hr tablet, , Disp: , Rfl:     Ozempic, 0.25 or 0.5 MG/DOSE, 2 MG/1.5ML SOPN, once a week (Patient not taking: Reported on 3/7/2024), Disp: , Rfl:     pioglitazone-metFORMIN (ACTOPLUS MET)  MG per tablet, daily after dinner (Patient not taking: Reported on 4/15/2024), Disp: , Rfl:     polyethylene glycol-propylene  glycol (Systane) 0.4-0.3 %, every 3 (three) hours as needed   (Patient not taking: Reported on 4/15/2024), Disp: , Rfl:     promethazine-dextromethorphan (PHENERGAN-DM) 6.25-15 mg/5 mL oral syrup, Take 5 mL by mouth 4 (four) times a day as needed for cough (Patient not taking: Reported on 6/3/2024), Disp: 180 mL, Rfl: 0    traMADol (ULTRAM) 50 mg tablet, Take 50 mg by mouth once as needed for moderate pain He uses it rarely. (Patient not taking: Reported on 6/3/2024), Disp: , Rfl:     Allergies   Allergen Reactions    Codeine Other (See Comments) and GI Intolerance     Reaction Date: 27Dec2004; Annotation - 04Sep2012: '' CRAZY ''  vomiting  headache    Demerol [Meperidine] Nausea Only, Other (See Comments), GI Intolerance and Vomiting     vomiting  Reaction Date: 27Dec2004;   headache    Morphine Nausea Only, GI Intolerance and Vomiting     Reaction Date: 23Feb2012;        Objective:  Vitals:    06/03/24 1022   BP: 109/66   Pulse: 66       Left Knee Exam     Muscle Strength   The patient has normal left knee strength.    Tenderness   Left knee tenderness location: Biceps femoris tendon, posterior lateral.    Range of Motion   Extension:  0   Flexion:  130     Tests   Pardeep:  Medial - negative Lateral - negative  Varus: negative Valgus: negative  Drawer:  Anterior - negative     Posterior - negative    Other   Erythema: absent  Scars: absent  Swelling: mild (posterior lateral)  Effusion: no effusion present    Comments:  Baker's cyst          Observations   Left Knee   Negative for effusion.       Physical Exam  Vitals reviewed.   Constitutional:       Appearance: He is well-developed.   HENT:      Head: Normocephalic and atraumatic.   Eyes:      General:         Right eye: No discharge.         Left eye: No discharge.      Conjunctiva/sclera: Conjunctivae normal.   Cardiovascular:      Rate and Rhythm: Regular rhythm.   Pulmonary:      Effort: Pulmonary effort is normal. No respiratory distress.    Musculoskeletal:      Cervical back: Normal range of motion and neck supple.      Left knee: No effusion.      Instability Tests: Medial Pardeep test negative and lateral Pardeep test negative.      Comments: As noted in the HPI.   Skin:     General: Skin is warm and dry.   Neurological:      Mental Status: He is alert and oriented to person, place, and time.   Psychiatric:         Behavior: Behavior normal.         I have personally reviewed pertinent films in PACS and my interpretation is as follows:  X-rays of the left knee taken today show evidence of mild arthritic changes with mild narrowing of the medial compartment.  There is no evidence of acute fracture or other osseous abnormality.      This document was created using speech voice recognition software.   Grammatical errors, random word insertions, pronoun errors, and incomplete sentences are an occasional consequence of this system due to software limitations, ambient noise, and hardware issues.   Any formal questions or concerns about content, text, or information contained within the body of this dictation should be directly addressed to the provider for clarification.

## 2024-06-06 ENCOUNTER — HOSPITAL ENCOUNTER (OUTPATIENT)
Dept: RADIOLOGY | Facility: HOSPITAL | Age: 79
Discharge: HOME | End: 2024-06-06
Payer: MEDICARE

## 2024-06-06 DIAGNOSIS — R60.0 LOCALIZED EDEMA: ICD-10-CM

## 2024-06-06 PROCEDURE — 93971 EXTREMITY STUDY: CPT | Performed by: SURGERY

## 2024-06-06 PROCEDURE — 93971 EXTREMITY STUDY: CPT

## 2024-06-11 ENCOUNTER — EVALUATION (OUTPATIENT)
Dept: PHYSICAL THERAPY | Facility: CLINIC | Age: 79
End: 2024-06-11
Payer: MEDICARE

## 2024-06-11 ENCOUNTER — TRANSCRIBE ORDERS (OUTPATIENT)
Dept: LAB | Facility: CLINIC | Age: 79
End: 2024-06-11

## 2024-06-11 ENCOUNTER — APPOINTMENT (OUTPATIENT)
Dept: LAB | Facility: CLINIC | Age: 79
End: 2024-06-11
Payer: MEDICARE

## 2024-06-11 DIAGNOSIS — I10 ESSENTIAL HYPERTENSION, BENIGN: ICD-10-CM

## 2024-06-11 DIAGNOSIS — K59.03 CONSTIPATION DUE TO PAIN MEDICATION: ICD-10-CM

## 2024-06-11 DIAGNOSIS — R60.0 LOCALIZED EDEMA: ICD-10-CM

## 2024-06-11 DIAGNOSIS — E78.49 FAMILIAL COMBINED HYPERLIPIDEMIA: ICD-10-CM

## 2024-06-11 DIAGNOSIS — M17.12 PRIMARY OSTEOARTHRITIS OF LEFT KNEE: Primary | ICD-10-CM

## 2024-06-11 DIAGNOSIS — D72.829 LEUKOCYTOSIS, UNSPECIFIED TYPE: ICD-10-CM

## 2024-06-11 DIAGNOSIS — E13.49 OTHER DIABETIC NEUROLOGICAL COMPLICATION ASSOCIATED WITH OTHER SPECIFIED DIABETES MELLITUS (HCC): Primary | ICD-10-CM

## 2024-06-11 DIAGNOSIS — Z79.899 ENCOUNTER FOR LONG-TERM (CURRENT) USE OF OTHER MEDICATIONS: ICD-10-CM

## 2024-06-11 DIAGNOSIS — M71.22 SYNOVIAL CYST OF LEFT POPLITEAL SPACE: ICD-10-CM

## 2024-06-11 LAB
ALBUMIN SERPL BCP-MCNC: 4 G/DL (ref 3.5–5)
ALP SERPL-CCNC: 67 U/L (ref 34–104)
ALT SERPL W P-5'-P-CCNC: 42 U/L (ref 7–52)
ANION GAP SERPL CALCULATED.3IONS-SCNC: 10 MMOL/L (ref 4–13)
AST SERPL W P-5'-P-CCNC: 24 U/L (ref 13–39)
BILIRUB SERPL-MCNC: 0.67 MG/DL (ref 0.2–1)
BUN SERPL-MCNC: 20 MG/DL (ref 5–25)
CALCIUM SERPL-MCNC: 8.8 MG/DL (ref 8.4–10.2)
CHLORIDE SERPL-SCNC: 101 MMOL/L (ref 96–108)
CO2 SERPL-SCNC: 29 MMOL/L (ref 21–32)
CREAT SERPL-MCNC: 1.02 MG/DL (ref 0.6–1.3)
CREAT UR-MCNC: 69.6 MG/DL
GFR SERPL CREATININE-BSD FRML MDRD: 69 ML/MIN/1.73SQ M
GLUCOSE SERPL-MCNC: 129 MG/DL (ref 65–140)
MICROALBUMIN UR-MCNC: <7 MG/L
MICROALBUMIN/CREAT 24H UR: <10 MG/G CREATININE (ref 0–30)
POTASSIUM SERPL-SCNC: 3.8 MMOL/L (ref 3.5–5.3)
PROT SERPL-MCNC: 6.8 G/DL (ref 6.4–8.4)
SODIUM SERPL-SCNC: 140 MMOL/L (ref 135–147)
TSH SERPL DL<=0.05 MIU/L-ACNC: 2.06 UIU/ML (ref 0.45–4.5)

## 2024-06-11 PROCEDURE — 36415 COLL VENOUS BLD VENIPUNCTURE: CPT

## 2024-06-11 PROCEDURE — 97530 THERAPEUTIC ACTIVITIES: CPT

## 2024-06-11 PROCEDURE — 80053 COMPREHEN METABOLIC PANEL: CPT

## 2024-06-11 PROCEDURE — 82043 UR ALBUMIN QUANTITATIVE: CPT

## 2024-06-11 PROCEDURE — 82570 ASSAY OF URINE CREATININE: CPT

## 2024-06-11 PROCEDURE — 97161 PT EVAL LOW COMPLEX 20 MIN: CPT

## 2024-06-11 PROCEDURE — 84443 ASSAY THYROID STIM HORMONE: CPT

## 2024-06-11 NOTE — PROGRESS NOTES
PT Evaluation   Today's date: 2024  Patient name: Andrade Wise Jr.  : 1945  MRN: [id number]  Referring provider: Ho Dao*  Dx:   Encounter Diagnosis     ICD-10-CM    1. Primary osteoarthritis of left knee  M17.12 Ambulatory Referral to Physical Therapy      2. Synovial cyst of left popliteal space  M71.22 Ambulatory Referral to Physical Therapy          Assessment    Andrade Wise Jr. is a pleasant 79 y.o. male who presents with a referring diagnosis of primary OA of Lt knee and synovial cyst of Lt popliteal space. The patient's greatest concern is difficulty ascending/descending stairs and standing after a prolonged period of time. The primary movement system diagnosis is force production deficit with nociceptive pain resulting in pathoanatomical symptoms of impaired knee ROM, impaired patellar ROM, poor quad neuromuscular control, poor hip and knee strength, poor weightbearing tolerance, and poor functional mobility. The aforementioned impairments have limited the patient's ability to perform ADLs. No further referral is neccessary at this time based upon examination results. Positive prognostic indicators include motivation to improve and positive attitude. Negative prognostic indicators include none.     Impairments: abnormal muscle firing, abnormal or restricted ROM, activity intolerance, Impaired balance, Impaired physical strength, lacks appropriate HEP, pain with function, weight-bearing intolerance, poor posture, and poor body mechanics    Symptom Irritability: moderate    Problem List:  - impaired quad neuromuscular control   - poor patellar mobility     Concordant Sign:  - descending stairs    Patient education performed this session included: home exercise program, plan of care, expected tissue healing time, prognosis and diagnosis, and ice    Patient verbalized good understanding of POC.    Please contact me if you have any questions or recommendations. Thank you for the  referral and the opportunity to share in Andrade Wise Jr.'s care.       Plan    Patient would benefit from: Skilled PT  Planned modality interventions: biofeedback, cryotherapy, TENS, and thermotherapy (hot pack)  Planned therapy interventions: abdominal trunk stabilization, activity modification, balance/weight bearing training, behavior modification, body mechanics training, functional ROM exercises, graded exercise, HEP, joint mobilization, manual therapy, Cruz taping, motor coordination training, neuromuscular re-education, patient education, postural training, strengthening, stretching, therapeutic activities, and therapeutic exercises    Frequency: 2x per week  Duration in weeks: 6 weeks    Plan of Care beginning date: 6/11/2024  Plan of Care expiration date: 6 weeks - 7/23/2024  Treatment plan discussed with: patient      Goals    Short Term Goals (3 weeks):    Patient will be independent with basic HEP.  Patient will report >50% reduction in pain.  Patient will demonstrate >1/3 improvement in MMT grade as applicable  Patient will be able to achieve full knee ROM  Patient will be able to stand from a standard height chair without pain    Long Term Goals (6 weeks):  Patient will be independent in a comprehensive home exercise program  Patient FOTO score will improve by 10 points  Patient will self-report >75% improvement in function  Patient will be able to ascend/descend stairs without pain  Patient will be able to resume all previously modified ADLs      History    History of Present Illness  Mechanism of injury: Patient reports that he started having pain in the back of his knee a few weeks ago and was worried about having a blood clot. Doppler was negative for blood clot, but did demonstrate an enlarged cyst in the popliteal fossa. Patient is able to walk a mile per day with his dog, but has trouble with stairs, specifically descending, and getting up out of a chair. He is able to sleep without pain.  He states that there is some OA on x-ray, but he refused the injection. He will follow up with Dr. Dao on 7/18/24.    Prior level of function: independent    Progression: improved    Patient goals for therapy: decrease pain, improve balance, and independence with ADLs    Pain   6/11/2024    Current 2/10    Best 0/10    Worst 7-8/10     Location: medial anterior Lt knee, occasionally in the Lt  Description: dull aching and sharp  Aggravating factors: sitting, ascending stairs, and descending stairs  Relieving factors:  none      Physical Examination    Red Flag Screen  (+): age >50 years old and history of cancer, cardioversion     Sensation  Light touch: intact bilaterally    Lumbar Spine ROM  WNL and pain-free in all directions    LE ROM   6/11/2024    LEFT RIGHT     Knee Flexion 115* 120    Knee Extension -3* 0     (*) = reproduction of patients reported pain    LE MMT   6/11/2024    LEFT RIGHT     Hip Flexion 4/5 4/5    Hip Extension 4/5 4/5    Hip Abduction 4/5 4/5    Hip Adduction 4/5 4/5       Knee Flexion 4/5* 5/5    Knee Extension 4/5* 5/5     (*) = reproduction of patient's reported pain    Palpation  (+) TTP of popliteal space and medial joint line    Joint Mobility  Patella: hypomobile*     Flexibility   6/11/2024    LEFT RIGHT     Hamstring Mod limit Min limit    Quads Mod limit Min limit     Edema  Pocket of swelling in posterior knee likely associated with Baker's cyst    Functional Assessment  Stair Negotiation:   Ascend: reciprocal   Descend: step-to leading with Lt  Gait Assessment: unremarkable           Insurance:  AMA/CMS Eval/ Re-eval Auth #/ Referral # Total units  Start date  Expiration date Extension  Visit limitation?  PT only or  PT+OT? Co-Insurance   CMS ([facility]) 6/11/2024 No auth needed                                                            POC Start Date POC Expiration Date Signed POC?   6/11/2024 6 weeks - 7/23/2024       Date               Units:  Used               Authed:   Remaining                  Precautions:   Past Medical History:   Diagnosis Date    Allergic rhinitis     Anemia 10/23/2008    last assessed 9/9/13     Anxiety     Atrial fibrillation (HCC)     Bleeding     Chronic kidney disease     COVID-19 virus infection 12/2022    CPAP (continuous positive airway pressure) dependence     Diabetes mellitus (HCC)     Diarrhea     Diverticulitis     Eating disorder     GERD (gastroesophageal reflux disease)     Herpes zoster with complication     last assessed 7/3/17     Hiatal hernia     Hyperlipidemia     Hypertension     IBS (irritable bowel syndrome)     Incisional hernia     Increased urinary frequency     Irregular heart beat     Pyogenic granuloma 09/13/2006    last assessed 9/13/06    Carlos (subconjunctival hemorrhage), unspecified laterality 09/12/2005    last assessed 9/12/05    Sleep apnea     TMJ (dislocation of temporomandibular joint)     Ventral hernia     last assessed  4/6/17     Wears glasses        Patient provided verbal consent to treatment plan and recommended interventions.    Date 6/11/2024        Visit Number IE        FOTO Tracking Intake Survey     Follow-up #1   Manual         Joint mobs         STM         Patella mobs                           TherEx         Active GALLEGO         Knee PROM Seated hamstring stretch w/ DF SOS 5 x 10 sec        Bridge progression         Squat progression         SL hip ABD         SAQ         LAQ         Step ups         Leg press                  Neuro Re-Ed         Quad sets 10 x 5 sec        Clamshells         SL balance         Side stepping         TKE         Balance                           TherAct         Patient education HEP and POC x 10 min                                            Gait Training         AD training                           Modalities         CP

## 2024-06-12 DIAGNOSIS — K59.00 CONSTIPATION, UNSPECIFIED CONSTIPATION TYPE: ICD-10-CM

## 2024-06-12 DIAGNOSIS — K58.1 IRRITABLE BOWEL SYNDROME WITH CONSTIPATION: ICD-10-CM

## 2024-06-12 RX ORDER — LINACLOTIDE 145 UG/1
145 CAPSULE, GELATIN COATED ORAL
Qty: 30 CAPSULE | Refills: 5 | Status: SHIPPED | OUTPATIENT
Start: 2024-06-12

## 2024-06-17 ENCOUNTER — OFFICE VISIT (OUTPATIENT)
Dept: PHYSICAL THERAPY | Facility: CLINIC | Age: 79
End: 2024-06-17
Payer: MEDICARE

## 2024-06-17 DIAGNOSIS — M17.12 PRIMARY OSTEOARTHRITIS OF LEFT KNEE: Primary | ICD-10-CM

## 2024-06-17 DIAGNOSIS — M71.22 SYNOVIAL CYST OF LEFT POPLITEAL SPACE: ICD-10-CM

## 2024-06-17 PROCEDURE — 97112 NEUROMUSCULAR REEDUCATION: CPT | Performed by: PHYSICAL THERAPIST

## 2024-06-17 PROCEDURE — 97110 THERAPEUTIC EXERCISES: CPT | Performed by: PHYSICAL THERAPIST

## 2024-06-17 NOTE — PROGRESS NOTES
Daily Note     Today's date: 2024  Patient name: Andrade Wise Jr.  : 1945  MRN: [id number]  Referring provider: Mariah Benoit MD  Dx:   Encounter Diagnosis     ICD-10-CM    1. Primary osteoarthritis of left knee  M17.12       2. Synovial cyst of left popliteal space  M71.22                      Subjective: Posterior left knee pain = 4/10.       Objective: See treatment diary below      Assessment: Tolerated treatment well. Patient would benefit from continued PT.  He is positive for TTP left medial HS tendons.  He felt improvement following stretching and STM to the same.  No adverse effects with addition of new exercise as noted.       Plan: Continue per plan of care.  Clamshells, standing TKE       Insurance:  AMA/CMS Eval/ Re-eval Auth #/ Referral # Total units  Start date  Expiration date Extension  Visit limitation?  PT only or  PT+OT? Co-Insurance   CMS ([facility]) 2024 No auth needed                                                            POC Start Date POC Expiration Date Signed POC?   2024 6 weeks - 2024       Date               Units:  Used               Authed:  Remaining                  Precautions:   Past Medical History:   Diagnosis Date    Allergic rhinitis     Anemia 10/23/2008    last assessed 13     Anxiety     Atrial fibrillation (HCC)     Bleeding     Chronic kidney disease     COVID-19 virus infection 2022    CPAP (continuous positive airway pressure) dependence     Diabetes mellitus (HCC)     Diarrhea     Diverticulitis     Eating disorder     GERD (gastroesophageal reflux disease)     Herpes zoster with complication     last assessed 7/3/17     Hiatal hernia     Hyperlipidemia     Hypertension     IBS (irritable bowel syndrome)     Incisional hernia     Increased urinary frequency     Irregular heart beat     Pyogenic granuloma 2006    last assessed 06    Carlos (subconjunctival hemorrhage), unspecified laterality 2005    last assessed 05     Sleep apnea     TMJ (dislocation of temporomandibular joint)     Ventral hernia     last assessed  4/6/17     Wears glasses        Patient provided verbal consent to treatment plan and recommended interventions.    Date 6/11/2024 6/17/24       Visit Number IE 2       FOTO Tracking Intake Survey     Follow-up #1   Manual         Joint mobs         STM  3 min       Patella mobs  2 min         HS stretch 3 min                TherEx         Active GALLEGO  NS  5 min       Knee PROM Seated hamstring stretch w/ DF SOS 5 x 10 sec 10 supine HS stretch w df       Bridge progression         Squat progression  2x10 at bar       SL hip ABD         SAQ  2x10       LAQ  2x10       Step ups         Leg press  75#  3x10                Neuro Re-Ed         Quad sets 10 x 5 sec 20  5s       Clamshells         SL balance         Side stepping  5 laps 10 ft       TKE  20       Balance                           TherAct         Patient education HEP and POC x 10 min                                            Gait Training         AD training                           Modalities         CP

## 2024-06-19 ENCOUNTER — OFFICE VISIT (OUTPATIENT)
Dept: PHYSICAL THERAPY | Facility: CLINIC | Age: 79
End: 2024-06-19
Payer: MEDICARE

## 2024-06-19 DIAGNOSIS — M71.22 SYNOVIAL CYST OF LEFT POPLITEAL SPACE: ICD-10-CM

## 2024-06-19 DIAGNOSIS — M17.12 PRIMARY OSTEOARTHRITIS OF LEFT KNEE: Primary | ICD-10-CM

## 2024-06-19 PROCEDURE — 97112 NEUROMUSCULAR REEDUCATION: CPT | Performed by: PHYSICAL THERAPIST

## 2024-06-19 PROCEDURE — 97110 THERAPEUTIC EXERCISES: CPT | Performed by: PHYSICAL THERAPIST

## 2024-06-19 NOTE — PROGRESS NOTES
Daily Note     Today's date: 2024  Patient name: Andrade Wise Jr.  : 1945  MRN: [id number]  Referring provider: Mariah Benoit MD  Dx:   Encounter Diagnosis     ICD-10-CM    1. Primary osteoarthritis of left knee  M17.12       2. Synovial cyst of left popliteal space  M71.22                      Subjective:   No posterior pain today.  He has anterior-lateral knee pain complaints.      Objective: See treatment diary below      Assessment: Tolerated treatment well. Patient would benefit from continued PT.   Rectus femoris tightness continues left leg.  He responded well to quad stretching in deng test position.  Increased reps to 30 this session with no adverse effects.       Plan: Continue per plan of care.  Clamshells, standing TKE, bridges       Insurance:  AMA/CMS Eval/ Re-eval Auth #/ Referral # Total units  Start date  Expiration date Extension  Visit limitation?  PT only or  PT+OT? Co-Insurance   CMS ([facility]) 2024 No auth needed                                                            POC Start Date POC Expiration Date Signed POC?   2024 6 weeks - 2024       Date               Units:  Used               Authed:  Remaining                  Precautions:   Past Medical History:   Diagnosis Date    Allergic rhinitis     Anemia 10/23/2008    last assessed 13     Anxiety     Atrial fibrillation (HCC)     Bleeding     Chronic kidney disease     COVID-19 virus infection 2022    CPAP (continuous positive airway pressure) dependence     Diabetes mellitus (HCC)     Diarrhea     Diverticulitis     Eating disorder     GERD (gastroesophageal reflux disease)     Herpes zoster with complication     last assessed 7/3/17     Hiatal hernia     Hyperlipidemia     Hypertension     IBS (irritable bowel syndrome)     Incisional hernia     Increased urinary frequency     Irregular heart beat     Pyogenic granuloma 2006    last assessed 06    Carlos (subconjunctival hemorrhage), unspecified  laterality 09/12/2005    last assessed 9/12/05    Sleep apnea     TMJ (dislocation of temporomandibular joint)     Ventral hernia     last assessed  4/6/17     Wears glasses        Patient provided verbal consent to treatment plan and recommended interventions.    Date 6/11/2024 6/17/24 6/19/24      Visit Number IE 2 3      FOTO Tracking Intake Survey     Follow-up #1   Manual         Joint mobs         STM  3 min 2 min rectus      Patella mobs  2 min 2 min        HS stretch 3 min 4 min HS quad               TherEx         Active GALLEGO  NS  5 min 5 min NS      Knee PROM Seated hamstring stretch w/ DF SOS 5 x 10 sec 10 supine HS stretch w df       Bridge progression         Squat progression  2x10 at bar       SL hip ABD         SAQ  2x10 30      LAQ  2x10 30      SLR   10      Leg press  75#  3x10 75#  30               Neuro Re-Ed         Quad sets 10 x 5 sec 20  5s 20      Clamshells         SL balance         Side stepping  5 laps 10 ft       TKE  20       Balance         Heel slides   30               TherAct         Patient education HEP and POC x 10 min                                            Gait Training         AD training                           Modalities         CP

## 2024-06-21 ENCOUNTER — OFFICE VISIT (OUTPATIENT)
Dept: OBGYN CLINIC | Facility: CLINIC | Age: 79
End: 2024-06-21
Payer: MEDICARE

## 2024-06-21 VITALS
BODY MASS INDEX: 30.94 KG/M2 | HEIGHT: 71 IN | DIASTOLIC BLOOD PRESSURE: 80 MMHG | SYSTOLIC BLOOD PRESSURE: 145 MMHG | WEIGHT: 221 LBS | HEART RATE: 64 BPM

## 2024-06-21 DIAGNOSIS — M17.12 PRIMARY OSTEOARTHRITIS OF LEFT KNEE: Primary | ICD-10-CM

## 2024-06-21 DIAGNOSIS — M71.22 SYNOVIAL CYST OF LEFT POPLITEAL SPACE: ICD-10-CM

## 2024-06-21 PROCEDURE — 99213 OFFICE O/P EST LOW 20 MIN: CPT | Performed by: ORTHOPAEDIC SURGERY

## 2024-06-21 PROCEDURE — 20610 DRAIN/INJ JOINT/BURSA W/O US: CPT

## 2024-06-21 RX ADMIN — TRIAMCINOLONE ACETONIDE 40 MG: 40 INJECTION, SUSPENSION INTRA-ARTICULAR; INTRAMUSCULAR at 08:30

## 2024-06-21 RX ADMIN — BUPIVACAINE HYDROCHLORIDE 4 ML: 5 INJECTION, SOLUTION PERINEURAL at 08:30

## 2024-06-21 NOTE — PROGRESS NOTES
Assessment/Plan:  1. Primary osteoarthritis of left knee    2. Synovial cyst of left popliteal space      Orders Placed This Encounter   Procedures    Large joint arthrocentesis: L knee     We reviewed the treatment options for Andrade' left knee degenerative joint disease, including continued physical therapy, bracing options, voltaren gel, ibuprofen, and injection options. Given his persistent pain with some improvement following formal PT, he is interested in a steroid injection today. After discussing risks, benefits, and alternatives, the patient consented for and underwent the procedure without any acute complications. Post-injection instructions were provided. Of note, the patient does have a history of type 2 DM and we discussed continuing to monitor blood sugar levels closely over the next 48 hours or so as the steroid can increase levels over this time. We explained that getting multiple steroid injections in close proximity can be detrimental to tissues, but a single injection is usually effective in providing some degree of pain relief. The patient has a follow up appointment previously scheduled in about 1 month. We will plan to reevaluate his symptoms at that time.      Subjective   Chief Complaint:   Chief Complaint   Patient presents with    Left Knee - Follow-up       Andrade Wise Jr. is a 79 y.o. male who presents for follow up for left primary knee osteoarthritis. The patient has been attending formal PT for nearly 2 weeks and notes improvement, stating he is enjoying sessions. However, he is having persistent pain in the knee such that he is interested in receiving a steroid injection today as previously discussed at last visit to help progress his PT progress and alleviate pain. He has not had prior steroid injections in the knee. Of note, the patient does have a history of type 2 diabetes. Andrade also notes the Iqbal's cyst has decreased in size since last visit. He denies new injury/trauma,  swelling, locking episodes, and instability.     Review of Systems  ROS:    See HPI for musculoskeletal review.   All other systems reviewed are negative     History:  Past Medical History:   Diagnosis Date    Allergic rhinitis     Anemia 10/23/2008    last assessed 9/9/13     Anxiety     Atrial fibrillation (HCC)     Bleeding     Chronic kidney disease     COVID-19 virus infection 12/2022    CPAP (continuous positive airway pressure) dependence     Diabetes mellitus (HCC)     Diarrhea     Diverticulitis     Eating disorder     GERD (gastroesophageal reflux disease)     Herpes zoster with complication     last assessed 7/3/17     Hiatal hernia     Hyperlipidemia     Hypertension     IBS (irritable bowel syndrome)     Incisional hernia     Increased urinary frequency     Irregular heart beat     Pyogenic granuloma 09/13/2006    last assessed 9/13/06    Carlos (subconjunctival hemorrhage), unspecified laterality 09/12/2005    last assessed 9/12/05    Sleep apnea     TMJ (dislocation of temporomandibular joint)     Ventral hernia     last assessed  4/6/17     Wears glasses      Past Surgical History:   Procedure Laterality Date    APPENDECTOMY      ATRIAL ABLATION SURGERY      2014    ATRIAL ABLATION SURGERY      catheter ablation atrial fibrillation / last assessed 2/17/16     CHOLECYSTECTOMY      lap    COLON SURGERY  1990    Hartmans procedure    COLON SURGERY      reversal 6 weeks later    COLONOSCOPY N/A 1/26/2017    Procedure: COLONOSCOPY;  Surgeon: Portillo Schumachre MD;  Location: [facility];  Service:     ESOPHAGOGASTRODUODENOSCOPY N/A 1/26/2017    Procedure: ESOPHAGOGASTRODUODENOSCOPY (EGD);  Surgeon: Portillo Schumacher MD;  Location: [facility] GI [facility];  Service:     EYE SURGERY Bilateral 2015    lid repair    HERNIA REPAIR Bilateral     inguinal 2014 & 2013    INCISIONAL HERNIA REPAIR  2011    INCISIONAL HERNIA REPAIR N/A 3/24/2017    Procedure: REPAIR OF INCARCERATED INCISIONAL HERNIA WITH MESH, Lysis of Adhesions,  "Partial Omentectomy;  Surgeon: Colt Davis MD;  Location: [facility];  Service:     INSERT / REPLACE / REMOVE PACEMAKER Left 10/04/2021    [id number]    NEUROBLASTOMA EXCISION      SKIN CANCER EXCISION      On nose    TONSILLECTOMY       Social History   Social History     Substance and Sexual Activity   Alcohol Use No    Comment: none in 33 yrs     Social History     Substance and Sexual Activity   Drug Use No     Social History     Tobacco Use   Smoking Status Former    Current packs/day: 0.00    Average packs/day: 1 pack/day for 20.0 years (20.0 ttl pk-yrs)    Types: Cigarettes    Start date:     Quit date:     Years since quittin.4    Passive exposure: Never   Smokeless Tobacco Never   Tobacco Comments    Started age 17     Family History:   Family History   Problem Relation Age of Onset    Heart disease Mother     Cancer Mother     Colon cancer Mother     Dementia Mother     Early death Father     Seizures Father     Cancer Sister         bone    Heart disease Sister     Diabetes Sister     Lupus Sister     Heart disease Brother     Cancer Brother         kidney    Diabetes Brother     Other Sister         covid    Stroke Neg Hx        Meds/Allergies   Not in a hospital admission.  Allergies   Allergen Reactions    Codeine Other (See Comments) and GI Intolerance     Reaction Date: 2004; Annotation - 2012: '' CRAZY ''  vomiting  headache    Demerol [Meperidine] Nausea Only, Other (See Comments), GI Intolerance and Vomiting     vomiting  Reaction Date: 2004;   headache    Morphine Nausea Only, GI Intolerance and Vomiting     Reaction Date: 2012;           Objective     /80   Pulse 64   Ht 5' 11\" (1.803 m)   Wt 100 kg (221 lb)   BMI 30.82 kg/m²      PE:  AAOx 3  Well nourished   no audible wheezing  no abdominal distension  LE compartments soft, skin intact    Knee Exam:   No significant skin lesions or deformity  No significant joint effusion  Range of " motion from 0 to 120  No palpable Baker's cyst appreciated  Mild medial joint line tenderness   Gapping with a firm endpoint with valgus stress  Knee is stable to varus stress, Lachman, and posterior drawer.    Neurovascularly intact distally      Imaging Studies:     No new imaging reviewed today      Large joint arthrocentesis: L knee  Universal Protocol:  Consent: Verbal consent obtained.  Risks and benefits: risks, benefits and alternatives were discussed  Consent given by: patient  Timeout called at: 6/21/2024 8:41 AM.  Patient understanding: patient states understanding of the procedure being performed  Patient consent: the patient's understanding of the procedure matches consent given  Site marked: the operative site was marked  Radiology Images displayed and confirmed. If images not available, report reviewed: imaging studies available  Patient identity confirmed: verbally with patient  Supporting Documentation  Indications: pain   Procedure Details  Location: knee - L knee  Needle size: 22 G  Ultrasound guidance: no  Approach: anterolateral  Medications administered: 40 mg triamcinolone acetonide 40 mg/mL; 4 mL bupivacaine 0.5 %    Patient tolerance: patient tolerated the procedure well with no immediate complications  Dressing:  Sterile dressing applied

## 2024-06-24 ENCOUNTER — OFFICE VISIT (OUTPATIENT)
Dept: PHYSICAL THERAPY | Facility: CLINIC | Age: 79
End: 2024-06-24
Payer: MEDICARE

## 2024-06-24 DIAGNOSIS — M17.12 PRIMARY OSTEOARTHRITIS OF LEFT KNEE: Primary | ICD-10-CM

## 2024-06-24 DIAGNOSIS — M71.22 SYNOVIAL CYST OF LEFT POPLITEAL SPACE: ICD-10-CM

## 2024-06-24 PROCEDURE — 97110 THERAPEUTIC EXERCISES: CPT

## 2024-06-24 PROCEDURE — 97112 NEUROMUSCULAR REEDUCATION: CPT

## 2024-06-24 RX ORDER — BUPIVACAINE HYDROCHLORIDE 5 MG/ML
4 INJECTION, SOLUTION PERINEURAL
Status: COMPLETED | OUTPATIENT
Start: 2024-06-21 | End: 2024-06-21

## 2024-06-24 RX ORDER — TRIAMCINOLONE ACETONIDE 40 MG/ML
40 INJECTION, SUSPENSION INTRA-ARTICULAR; INTRAMUSCULAR
Status: COMPLETED | OUTPATIENT
Start: 2024-06-21 | End: 2024-06-21

## 2024-06-24 NOTE — PROGRESS NOTES
Daily Note     Today's date: 2024  Patient name: Andrade Wise Jr.  : 1945  MRN: [id number]  Referring provider: Mariah Benoit MD  Dx:   Encounter Diagnosis     ICD-10-CM    1. Primary osteoarthritis of left knee  M17.12       2. Synovial cyst of left popliteal space  M71.22                      Subjective: Patient states that he had an corticosteroid injection in his Lt knee on Friday and is feeling really good.       Objective: See treatment diary below      Assessment: Tolerated treatment well. Patient is progressing well. Given low levels of pain, added more standing activities with addition of balance activities on BOSU. Patient reported no pain throughout standing activities, but did report anteromedial Lt knee pain with LAQ and SAQ. Patient would benefit from continued PT to address functional mobility deficits and return to PLOF.       Plan: Continue per plan of care.  Clamshells, standing TKE, bridges       Insurance:  AMA/CMS Eval/ Re-eval Auth #/ Referral # Total units  Start date  Expiration date Extension  Visit limitation?  PT only or  PT+OT? Co-Insurance   CMS ([facility]) 2024 No auth needed                                                            POC Start Date POC Expiration Date Signed POC?   2024 6 weeks - 2024       Date               Units:  Used               Authed:  Remaining                  Precautions:   Past Medical History:   Diagnosis Date    Allergic rhinitis     Anemia 10/23/2008    last assessed 13     Anxiety     Atrial fibrillation (HCC)     Bleeding     Chronic kidney disease     COVID-19 virus infection 2022    CPAP (continuous positive airway pressure) dependence     Diabetes mellitus (HCC)     Diarrhea     Diverticulitis     Eating disorder     GERD (gastroesophageal reflux disease)     Herpes zoster with complication     last assessed 7/3/17     Hiatal hernia     Hyperlipidemia     Hypertension     IBS (irritable bowel syndrome)     Incisional  hernia     Increased urinary frequency     Irregular heart beat     Pyogenic granuloma 09/13/2006    last assessed 9/13/06    Carlos (subconjunctival hemorrhage), unspecified laterality 09/12/2005    last assessed 9/12/05    Sleep apnea     TMJ (dislocation of temporomandibular joint)     Ventral hernia     last assessed  4/6/17     Wears glasses        Patient provided verbal consent to treatment plan and recommended interventions.    Date 6/11/2024 6/17/24 6/19/24 6/24/24     Visit Number IE 2 3 4     FOTO Tracking Intake Survey     Follow-up #1   Manual         Joint mobs         STM  3 min 2 min rectus      Patella mobs  2 min 2 min        HS stretch 3 min 4 min HS quad               TherEx         Active GALLEGO  NS  5 min 5 min NS NS x 6 min     Knee PROM Seated hamstring stretch w/ DF SOS 5 x 10 sec 10 supine HS stretch w df  10 supine HS stretch w df     Bridge progression         Squat progression  2x10 at bar  2x10 at bar     SL hip ABD         SAQ  2x10 30 30     LAQ  2x10 30 30 2.5#     SLR   10 10     Leg press  75#  3x10 75#  30 75#  30              Neuro Re-Ed         Quad sets 10 x 5 sec 20  5s 20 rev     Clamshells         SL balance         Side stepping  5 laps 10 ft       TKE  20       Balance    BOSU lateral step up and over x20    BOSU step up and hold x10     Heel slides   30 30                       TherAct         Patient education HEP and POC x 10 min                                            Gait Training         AD training                           Modalities         CP

## 2024-06-26 ENCOUNTER — APPOINTMENT (OUTPATIENT)
Dept: PHYSICAL THERAPY | Facility: CLINIC | Age: 79
End: 2024-06-26
Payer: MEDICARE

## 2024-06-28 ENCOUNTER — TELEPHONE (OUTPATIENT)
Age: 79
End: 2024-06-28

## 2024-06-28 NOTE — TELEPHONE ENCOUNTER
Spouse called in to speak with Hanh who was assisting her. She was unavailable so I offered Hanh to call her back. She was ok with it.  
patient's belongings returned

## 2024-07-01 ENCOUNTER — OFFICE VISIT (OUTPATIENT)
Dept: PHYSICAL THERAPY | Facility: CLINIC | Age: 79
End: 2024-07-01
Payer: MEDICARE

## 2024-07-01 DIAGNOSIS — M17.12 PRIMARY OSTEOARTHRITIS OF LEFT KNEE: Primary | ICD-10-CM

## 2024-07-01 DIAGNOSIS — M71.22 SYNOVIAL CYST OF LEFT POPLITEAL SPACE: ICD-10-CM

## 2024-07-01 PROCEDURE — 97112 NEUROMUSCULAR REEDUCATION: CPT | Performed by: PHYSICAL THERAPIST

## 2024-07-01 PROCEDURE — 97110 THERAPEUTIC EXERCISES: CPT | Performed by: PHYSICAL THERAPIST

## 2024-07-01 NOTE — PROGRESS NOTES
Daily Note     Today's date: 2024  Patient name: Andrade Wise Jr.  : 1945  MRN: [id number]  Referring provider: Mariah Benoit MD  Dx:   Encounter Diagnosis     ICD-10-CM    1. Primary osteoarthritis of left knee  M17.12       2. Synovial cyst of left popliteal space  M71.22                      Subjective: Patient states that he is feeling much better.  He has a minimal amount of soreness medial patella.      Objective: See treatment diary below      Assessment: Tolerated treatment well.. He has good progress resolving left knee pain.        Plan: Continue per plan of care.  Clamshells, standing TKE, bridges.  He has 3 more session scheduled. Progress strengthening exercises for hips and knees.  Possible discharge to [facility] if medial patella pain resolves.       Insurance:  AMA/CMS Eval/ Re-eval Auth #/ Referral # Total units  Start date  Expiration date Extension  Visit limitation?  PT only or  PT+OT? Co-Insurance   CMS ([facility]) 2024 No auth needed                                                            POC Start Date POC Expiration Date Signed POC?   2024 6 weeks - 2024       Date               Units:  Used               Authed:  Remaining                  Precautions:   Past Medical History:   Diagnosis Date    Allergic rhinitis     Anemia 10/23/2008    last assessed 13     Anxiety     Atrial fibrillation (HCC)     Bleeding     Chronic kidney disease     COVID-19 virus infection 2022    CPAP (continuous positive airway pressure) dependence     Diabetes mellitus (HCC)     Diarrhea     Diverticulitis     Eating disorder     GERD (gastroesophageal reflux disease)     Herpes zoster with complication     last assessed 7/3/17     Hiatal hernia     Hyperlipidemia     Hypertension     IBS (irritable bowel syndrome)     Incisional hernia     Increased urinary frequency     Irregular heart beat     Pyogenic granuloma 2006    last assessed 06    Carlos (subconjunctival hemorrhage),  unspecified laterality 09/12/2005    last assessed 9/12/05    Sleep apnea     TMJ (dislocation of temporomandibular joint)     Ventral hernia     last assessed  4/6/17     Wears glasses        Patient provided verbal consent to treatment plan and recommended interventions.    Date 6/11/2024 6/17/24 6/19/24 6/24/24 7/1/24    Visit Number IE 2 3 4 5 - foto    FOTO Tracking Intake Survey     Follow-up #1   Manual         Joint mobs         STM  3 min 2 min rectus  2 min VL    Patella mobs  2 min 2 min  2 min      HS stretch 3 min 4 min HS quad  4 min HS quad             TherEx         Active GALLEGO  NS  5 min 5 min NS NS x 6 min NS  6 min    Knee PROM Seated hamstring stretch w/ DF SOS 5 x 10 sec 10 supine HS stretch w df  10 supine HS stretch w df 10 supine HS stretch w df    Bridge progression         Squat progression  2x10 at bar  2x10 at bar 20    SL hip ABD         SAQ  2x10 30 30 30    LAQ  2x10 30 30 2.5# 30    SLR   10 10 20    Leg press  75#  3x10 75#  30 75#  30 75#  2x20             Neuro Re-Ed         Quad sets 10 x 5 sec 20  5s 20 rev     Clamshells         SL balance         Side stepping  5 laps 10 ft       TKE  20       Balance    BOSU lateral step up and over x20    BOSU step up and hold x10     Heel slides   30 30 30                      TherAct         Patient education HEP and POC x 10 min                                            Gait Training         AD training                           Modalities         CP               Access Code: [email or web address]  URL: [email or web address]  Date: 07/01/2024  Prepared by: Zeke Lamb    Exercises  - Seated Long Arc Quad  - 1 x daily - 7 x weekly - 2 sets - 10 reps  - Supine Knee Extension Strengthening  - 1 x daily - 7 x weekly - 2 sets - 10 reps  - Standing Knee Flexion AROM with Chair Support  - 1 x daily - 7 x weekly - 2 sets - 10 reps  - Active Straight Leg Raise with Quad Set  - 1 x daily - 7 x weekly - 2 sets - 10 reps  - Supine Heel Slide  - 1 x  daily - 7 x weekly - 2 sets - 10 reps  - Mini Squat with Counter Support  - 1 x daily - 7 x weekly - 2 sets - 10 reps  - Supine Quad Set  - 1 x daily - 7 x weekly - 2 sets - 10 reps

## 2024-07-03 ENCOUNTER — OFFICE VISIT (OUTPATIENT)
Dept: PHYSICAL THERAPY | Facility: CLINIC | Age: 79
End: 2024-07-03
Payer: MEDICARE

## 2024-07-03 DIAGNOSIS — M17.12 PRIMARY OSTEOARTHRITIS OF LEFT KNEE: Primary | ICD-10-CM

## 2024-07-03 DIAGNOSIS — M71.22 SYNOVIAL CYST OF LEFT POPLITEAL SPACE: ICD-10-CM

## 2024-07-03 PROCEDURE — 97110 THERAPEUTIC EXERCISES: CPT | Performed by: PHYSICAL THERAPIST

## 2024-07-03 NOTE — PROGRESS NOTES
Daily Note     Today's date: 7/3/2024  Patient name: Andrade Wise Jr.  : 1945  MRN: [id number]  Referring provider: Mariah Benoit MD  Dx:   Encounter Diagnosis     ICD-10-CM    1. Primary osteoarthritis of left knee  M17.12       2. Synovial cyst of left popliteal space  M71.22                      Subjective: He feels significant improvement.  He feels that his schedule is really busy and his symptoms have resolved enough at this point.  He requested discharge.      Objective: See treatment diary below      Assessment:  He has achieved personal goals and feels functional with all activities.  He will continue independent HEP.       Plan:  Discharge at patient's request.       Insurance:  AMA/CMS Eval/ Re-eval Auth #/ Referral # Total units  Start date  Expiration date Extension  Visit limitation?  PT only or  PT+OT? Co-Insurance   CMS ([facility]) 2024 No auth needed                                                            POC Start Date POC Expiration Date Signed POC?   2024 6 weeks - 2024       Date               Units:  Used               Authed:  Remaining                  Precautions:   Past Medical History:   Diagnosis Date    Allergic rhinitis     Anemia 10/23/2008    last assessed 13     Anxiety     Atrial fibrillation (HCC)     Bleeding     Chronic kidney disease     COVID-19 virus infection 2022    CPAP (continuous positive airway pressure) dependence     Diabetes mellitus (HCC)     Diarrhea     Diverticulitis     Eating disorder     GERD (gastroesophageal reflux disease)     Herpes zoster with complication     last assessed 7/3/17     Hiatal hernia     Hyperlipidemia     Hypertension     IBS (irritable bowel syndrome)     Incisional hernia     Increased urinary frequency     Irregular heart beat     Pyogenic granuloma 2006    last assessed 06    Carlos (subconjunctival hemorrhage), unspecified laterality 2005    last assessed 05    Sleep apnea     TMJ  (dislocation of temporomandibular joint)     Ventral hernia     last assessed  4/6/17     Wears glasses        Patient provided verbal consent to treatment plan and recommended interventions.    Date 6/11/2024 6/17/24 6/19/24 6/24/24 7/1/24 7/3/24   Visit Number IE 2 3 4 5 - foto 6   FOTO Tracking Intake Survey     Follow-up #1   Manual         Joint mobs         STM  3 min 2 min rectus  2 min VL 2 min   Patella mobs  2 min 2 min  2 min 3 min     HS stretch 3 min 4 min HS quad  4 min HS quad 5 min            TherEx         Active GALLEGO  NS  5 min 5 min NS NS x 6 min NS  6 min NS  6 min   Knee PROM Seated hamstring stretch w/ DF SOS 5 x 10 sec 10 supine HS stretch w df  10 supine HS stretch w df 10 supine HS stretch w df    Bridge progression         Squat progression  2x10 at bar  2x10 at bar 20    SL hip ABD         SAQ  2x10 30 30 30 30   LAQ  2x10 30 30 2.5# 30 30   SLR   10 10 20    Leg press  75#  3x10 75#  30 75#  30 75#  2x20 85#  2x20            Neuro Re-Ed         Quad sets 10 x 5 sec 20  5s 20 rev     Clamshells         SL balance         Side stepping  5 laps 10 ft       TKE  20       Balance    BOSU lateral step up and over x20    BOSU step up and hold x10     Heel slides   30 30 30                      TherAct         Patient education HEP and POC x 10 min                                            Gait Training         AD training                           Modalities         CP               Access Code: [email or web address]  URL: [email or web address]  Date: 07/01/2024  Prepared by: Zeke Lamb    Exercises  - Seated Long Arc Quad  - 1 x daily - 7 x weekly - 2 sets - 10 reps  - Supine Knee Extension Strengthening  - 1 x daily - 7 x weekly - 2 sets - 10 reps  - Standing Knee Flexion AROM with Chair Support  - 1 x daily - 7 x weekly - 2 sets - 10 reps  - Active Straight Leg Raise with Quad Set  - 1 x daily - 7 x weekly - 2 sets - 10 reps  - Supine Heel Slide  - 1 x daily - 7 x weekly - 2 sets - 10  reps  - Mini Squat with Counter Support  - 1 x daily - 7 x weekly - 2 sets - 10 reps  - Supine Quad Set  - 1 x daily - 7 x weekly - 2 sets - 10 reps

## 2024-07-08 ENCOUNTER — OFFICE VISIT (OUTPATIENT)
Dept: GASTROENTEROLOGY | Facility: AMBULARY SURGERY CENTER | Age: 79
End: 2024-07-08
Payer: MEDICARE

## 2024-07-08 ENCOUNTER — TELEPHONE (OUTPATIENT)
Dept: GASTROENTEROLOGY | Facility: AMBULARY SURGERY CENTER | Age: 79
End: 2024-07-08

## 2024-07-08 VITALS
BODY MASS INDEX: 29.54 KG/M2 | SYSTOLIC BLOOD PRESSURE: 118 MMHG | HEIGHT: 71 IN | OXYGEN SATURATION: 98 % | HEART RATE: 66 BPM | DIASTOLIC BLOOD PRESSURE: 70 MMHG | WEIGHT: 211 LBS

## 2024-07-08 DIAGNOSIS — K59.00 CONSTIPATION, UNSPECIFIED CONSTIPATION TYPE: Primary | ICD-10-CM

## 2024-07-08 DIAGNOSIS — Z86.010 PERSONAL HISTORY OF COLONIC POLYPS: ICD-10-CM

## 2024-07-08 DIAGNOSIS — K58.1 IRRITABLE BOWEL SYNDROME WITH CONSTIPATION: ICD-10-CM

## 2024-07-08 DIAGNOSIS — N40.1 BENIGN LOCALIZED PROSTATIC HYPERPLASIA WITH LOWER URINARY TRACT SYMPTOMS (LUTS): ICD-10-CM

## 2024-07-08 DIAGNOSIS — D50.8 OTHER IRON DEFICIENCY ANEMIA: ICD-10-CM

## 2024-07-08 DIAGNOSIS — K21.9 GASTROESOPHAGEAL REFLUX DISEASE WITHOUT ESOPHAGITIS: ICD-10-CM

## 2024-07-08 PROCEDURE — 99214 OFFICE O/P EST MOD 30 MIN: CPT | Performed by: INTERNAL MEDICINE

## 2024-07-08 PROCEDURE — G2211 COMPLEX E/M VISIT ADD ON: HCPCS | Performed by: INTERNAL MEDICINE

## 2024-07-08 RX ORDER — LINACLOTIDE 290 UG/1
290 CAPSULE, GELATIN COATED ORAL
Qty: 90 CAPSULE | Refills: 3 | Status: SHIPPED | OUTPATIENT
Start: 2024-07-08

## 2024-07-08 RX ORDER — ESOMEPRAZOLE MAGNESIUM 40 MG/1
40 CAPSULE, DELAYED RELEASE ORAL
Qty: 90 CAPSULE | Refills: 3 | Status: SHIPPED | OUTPATIENT
Start: 2024-07-08

## 2024-07-08 RX ORDER — TAMSULOSIN HYDROCHLORIDE 0.4 MG/1
CAPSULE ORAL
Qty: 100 CAPSULE | Refills: 1 | Status: SHIPPED | OUTPATIENT
Start: 2024-07-08

## 2024-07-08 RX ORDER — POLYETHYLENE GLYCOL 3350, SODIUM SULFATE ANHYDROUS, SODIUM BICARBONATE, SODIUM CHLORIDE, POTASSIUM CHLORIDE 236; 22.74; 6.74; 5.86; 2.97 G/4L; G/4L; G/4L; G/4L; G/4L
4000 POWDER, FOR SOLUTION ORAL ONCE
Qty: 4000 ML | Refills: 0 | Status: SHIPPED | OUTPATIENT
Start: 2024-07-08 | End: 2024-07-08

## 2024-07-08 RX ORDER — AMIODARONE HYDROCHLORIDE 200 MG/1
TABLET ORAL
COMMUNITY
Start: 2024-06-16

## 2024-07-08 NOTE — TELEPHONE ENCOUNTER
Scheduled date of colonoscopy (as of today):   11/13/24  Physician performing colonoscopy: Dr Frankel  Location of colonoscopy:  [place]  Bowel prep reviewed with patient: Nelly/dulcolax  Instructions reviewed with patient by:  Hanh CA  Clearances: Eliquis hold sending to [facility]

## 2024-07-08 NOTE — PROGRESS NOTES
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  He has had a history of a Robertson's procedure and reversal in the 1990s.  He has a history of ventral hernia repair with mesh and infected mesh complications with explantation and then repeat ventral hernia repair with mesh by Dr. Ryan ZABALA in past.    Echocardiogram from March 2024 with EF of 55 to 60% and grade 1 diastolic dysfunction.  Patient will be going for cardioversion soon.    Patient just had a follow-up appointment with gastroenterology yesterday.  His chronic constipation continues.      PMH:  Past Medical History:   Diagnosis Date    Allergic rhinitis     Anemia 10/23/2008    last assessed 9/9/13     Anxiety     Atrial fibrillation (HCC)     Bleeding     Chronic kidney disease     COVID-19 virus infection 12/2022    CPAP (continuous positive airway pressure) dependence     Diabetes mellitus (HCC)     Diarrhea     Diverticulitis     Eating disorder     GERD (gastroesophageal reflux disease)     Herpes zoster with complication     last assessed 7/3/17     Hiatal hernia     Hyperlipidemia     Hypertension     IBS (irritable bowel syndrome)     Incisional hernia     Increased urinary frequency     Irregular heart beat     Pyogenic granuloma 09/13/2006    last assessed 9/13/06    Carlos (subconjunctival hemorrhage), unspecified laterality 09/12/2005    last assessed 9/12/05    Sleep apnea     TMJ (dislocation of temporomandibular joint)     Ventral hernia     last assessed  " 4/6/17     Wears glasses        PSH:  Past Surgical History:   Procedure Laterality Date    APPENDECTOMY      ATRIAL ABLATION SURGERY      2014    ATRIAL ABLATION SURGERY      catheter ablation atrial fibrillation / last assessed 2/17/16     CARDIOVERSION  05/30/2024    CHOLECYSTECTOMY      lap    COLON SURGERY  1990    Hartmans procedure    COLON SURGERY      reversal 6 weeks later    COLONOSCOPY N/A 01/26/2017    Procedure: COLONOSCOPY;  Surgeon: Portillo Schumacher MD;  Location: [facility] GI [facility];  Service:     ESOPHAGOGASTRODUODENOSCOPY N/A 01/26/2017    Procedure: ESOPHAGOGASTRODUODENOSCOPY (EGD);  Surgeon: Portillo Schumacher MD;  Location: [facility] GI [facility];  Service:     EYE SURGERY Bilateral 2015    lid repair    HERNIA REPAIR Bilateral     inguinal 2014 & 2013    INCISIONAL HERNIA REPAIR  2011    INCISIONAL HERNIA REPAIR N/A 03/24/2017    Procedure: REPAIR OF INCARCERATED INCISIONAL HERNIA WITH MESH, Lysis of Adhesions, Partial Omentectomy;  Surgeon: Colt Davis MD;  Location: [place] [facility];  Service:     INSERT / REPLACE / REMOVE PACEMAKER Left 10/04/2021    medtronic-[id number]    NEUROBLASTOMA EXCISION      SKIN CANCER EXCISION      On nose    TONSILLECTOMY         Home Meds:  Current Outpatient Medications on File Prior to Visit   Medication Sig Dispense Refill    acetaminophen (TYLENOL) 325 mg tablet Take 650 mg by mouth every 6 (six) hours as needed      amiodarone 100 mg tablet 200 mg (Patient not taking: Reported on 7/8/2024)      amiodarone 200 mg tablet       apixaban (ELIQUIS) 5 mg Take 1 tablet (5 mg total) by mouth 2 (two) times a day 180 tablet 3    atorvastatin (LIPITOR) 20 mg tablet TAKE 1 TABLET DAILY 90 tablet 3    Blood Glucose Monitoring Suppl (FREESTYLE LITE) GEOVANNY       carvedilol (COREG) 25 mg tablet Take 12.5 mg by mouth 2 (two) times a day  (Patient not taking: Reported on 7/8/2024)      clotrimazole-betamethasone (LOTRISONE) 1-0.05 % cream APPLY TO AFFECTED AREA(S) TWO TIMES A DAY 45 g 1     esomeprazole (NexIUM) 40 MG capsule Take 1 capsule (40 mg total) by mouth daily before breakfast 90 capsule 3    fluticasone (FLONASE) 50 mcg/act nasal spray 2 sprays into each nostril daily 48 g 3    FREESTYLE LITE test strip       hydroCHLOROthiazide 25 mg tablet TAKE ONE TABLET BY MOUTH EVERY DAY 90 tablet 1    ketoconazole (NIZORAL) 2 % shampoo       Lancets (FREESTYLE) lancets       linaCLOtide (Linzess) 290 MCG CAPS Take 1 capsule by mouth daily before breakfast 90 capsule 3    losartan (COZAAR) 100 MG tablet 100 mg every morning       metFORMIN (GLUCOPHAGE-XR) 500 mg 24 hr tablet  (Patient not taking: Reported on 6/3/2024)      metroNIDAZOLE (METROCREAM) 0.75 % cream       Mounjaro 5 MG/0.5ML Not taking until 04/08/2024      Ozempic, 0.25 or 0.5 MG/DOSE, 2 MG/1.5ML SOPN once a week (Patient not taking: Reported on 3/7/2024)      pioglitazone-metFORMIN (ACTOPLUS MET)  MG per tablet daily after dinner (Patient not taking: Reported on 4/15/2024)      polyethylene glycol (Golytely) 4000 mL solution Take 4,000 mL by mouth once for 1 dose Take 4000 mL by mouth once for 1 dose. Use as directed 4000 mL 0    polyethylene glycol-propylene glycol (Systane) 0.4-0.3 % every 3 (three) hours as needed   (Patient not taking: Reported on 4/15/2024)      promethazine-dextromethorphan (PHENERGAN-DM) 6.25-15 mg/5 mL oral syrup Take 5 mL by mouth 4 (four) times a day as needed for cough (Patient not taking: Reported on 6/3/2024) 180 mL 0    RESTASIS 0.05 % ophthalmic emulsion Administer 1 drop to both eyes every 12 (twelve) hours      tamsulosin (FLOMAX) 0.4 mg TAKE 1 CAPSULE DAILY WITH DINNER 100 capsule 1    traMADol (ULTRAM) 50 mg tablet Take 50 mg by mouth once as needed for moderate pain He uses it rarely. (Patient not taking: Reported on 6/3/2024)      urea (CARMOL) 20 % cream if needed      [DISCONTINUED] esomeprazole (NexIUM) 20 mg capsule Take 1 capsule (20 mg total) by mouth daily at bedtime 90 capsule 3     [DISCONTINUED] linaCLOtide (Linzess) 145 MCG CAPS TAKE ONE CAPSULE BY MOUTH EVERY DAY BEFORE BREAKFAST 30 capsule 5    [DISCONTINUED] tamsulosin (FLOMAX) 0.4 mg Take 1 capsule (0.4 mg total) by mouth daily with dinner 90 capsule 3     No current facility-administered medications on file prior to visit.       Allergies:  Allergies   Allergen Reactions    Codeine Other (See Comments) and GI Intolerance     Reaction Date: 2004; Annotation - 2012: '' CRAZY ''  vomiting  headache    Demerol [Meperidine] Nausea Only, Other (See Comments), GI Intolerance and Vomiting     vomiting  Reaction Date: 2004;   headache    Morphine Nausea Only, GI Intolerance and Vomiting     Reaction Date: 2012;        Social Hx:  Social History     Socioeconomic History    Marital status: /Civil Union     Spouse name: Not on file    Number of children: Not on file    Years of education: Not on file    Highest education level: Not on file   Occupational History    Not on file   Tobacco Use    Smoking status: Former     Current packs/day: 0.00     Average packs/day: 1 pack/day for 20.0 years (20.0 ttl pk-yrs)     Types: Cigarettes     Start date:      Quit date:      Years since quittin.5     Passive exposure: Never    Smokeless tobacco: Never    Tobacco comments:     Started age 17   Vaping Use    Vaping status: Never Used   Substance and Sexual Activity    Alcohol use: No     Comment: none in 33 yrs    Drug use: No    Sexual activity: Yes   Other Topics Concern    Not on file   Social History Narrative    Single per allscript      Social Determinants of Health     Financial Resource Strain: Low Risk  (10/11/2023)    Overall Financial Resource Strain (CARDIA)     Difficulty of Paying Living Expenses: Not hard at all   Food Insecurity: Not on file   Transportation Needs: No Transportation Needs (10/11/2023)    PRAPARE - Transportation     Lack of Transportation (Medical): No     Lack of Transportation  "(Non-Medical): No   Physical Activity: Not on file   Stress: Not on file   Social Connections: Not on file   Intimate Partner Violence: Not At Risk (5/30/2024)    [facility] from [facility]    Humiliation, Afraid, Rape, and Kick questionnaire     Fear of Current or Ex-Partner: No     Emotionally Abused: No     Physically Abused: No     Sexually Abused: No   Housing Stability: Not on file        Family Hx:    Family History   Problem Relation Age of Onset    Heart disease Mother     Cancer Mother     Colon cancer Mother     Dementia Mother     Early death Father     Seizures Father     Cancer Sister         bone    Heart disease Sister     Diabetes Sister     Lupus Sister     Heart disease Brother     Cancer Brother         kidney    Diabetes Brother     Other Sister         covid    Stroke Neg Hx          Review of Systems   Constitutional:  Negative for chills and fever.   HENT: Negative.     Respiratory:          ROBER   Cardiovascular:         A-fib on Eliquis.  Cardioversion planned   Gastrointestinal:         See HPI past medical history   Endocrine:        Diabetes   Genitourinary:         BPH   Musculoskeletal: Negative.    Neurological: Negative.    All other systems reviewed and are negative.      /67 (BP Location: Left arm, Patient Position: Sitting, Cuff Size: Large)   Pulse 64   Temp (!) 97 °F (36.1 °C) (Core)   Ht 5' 11\" (1.803 m)   Wt 95.7 kg (211 lb)   SpO2 96%   BMI 29.43 kg/m²       Physical Exam  Vitals reviewed.   Constitutional:       General: He is not in acute distress.     Appearance: Normal appearance.   HENT:      Head: Normocephalic and atraumatic.   Cardiovascular:      Rate and Rhythm: Normal rate.   Pulmonary:      Effort: Pulmonary effort is normal.      Breath sounds: Normal breath sounds.   Abdominal:      General: Abdomen is flat. There is no distension.      Palpations: Abdomen is soft.      Comments: Patient has an upper abdominal fusiform bulge with " Valsalva.  No obvious hernia but likely eventration of previous mesh repair   Musculoskeletal:      Cervical back: Normal range of motion and neck supple.   Lymphadenopathy:      Cervical: No cervical adenopathy.   Neurological:      General: No focal deficit present.      Mental Status: He is alert.       Pertinent labs reviewed    Pertinent images and available reads personally reviewed    Pertinent notes reviewed  Reviewed the GI note from yesterday     Tyrel Cody MD FACS  [facility]  [phone]

## 2024-07-08 NOTE — PROGRESS NOTES
[facility] - Outpatient Follow-up Note  Andrade Wise Jr. 79 y.o. male MRN: [id number]  Encounter: [id number]          ASSESSMENT AND PLAN:    Andrade Wise Jr. is a 79 y.o. male with irritable bowel syndrome predominantly with constipation/mixed subtype, reflux, pancreatic cyst who presents for follow-up. Overall still with constipation. Also with ventral hernia.     Endoscopy and colonoscopy from November 2021 with gastric polyp which was removed and clip placed, colonic polyps, diverticulosis, internal hemorrhoids.  Biopsy showed inflammatory pseudopolyp in the stomach as well as sessile serrated lesion and tubular adenoma in the cecum.    Echocardiogram from March 2024 with EF of 55 to 60% and grade 1 diastolic dysfunction.    MRI of the abdomen March 2022 with exophytic cyst in the right lower pole with decreased in size and multiple pancreatic cyst measuring up to 1.3 cm without high risk stigmata.    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    Most recent CMP with normal electrolytes, creatinine, liver test.  CBC with elevated white blood cell count to 1029 but normal hemoglobin, MCV, platelets.  INR normal.  TSH normal.    Prior celiac serologies normal.    1. Constipation, unspecified constipation type    2. Gastroesophageal reflux disease without esophagitis    3. Irritable bowel syndrome with constipation    4. Other iron deficiency anemia    5. Personal history of colonic polyps        Orders Placed This Encounter   Procedures    Colonoscopy     Continue Linzess but increase to 290 mcg daily to help with constipation  Drink at least 8 cups of water per day  Take MiraLAX daily or as needed  Daily Metamucil or benefiber  Continue Nexium but increase the dose to 40mg daily  Gaviscon  or Tums as needed for breakthrough symptoms  Zofran as needed for nausea    Follow-up with PCP for repeat imaging for right renal lower pole cyst.  Next MRI/MRCP for pancreatic cyst in 2026    Repeat colonoscopy ordered today    ______________________________________________________________________    SUBJECTIVE:    Andrade Wise Jr. is a 79 y.o. male who presents with complaint of constipation.     He has been having symptoms of pain. He has been constipation.   He will be going for a cardioversion.   He gets lightheadedness and SOB. HE is able to walk and no SOB> HE waks 1.5 miles per day. He drinks water.       REVIEW OF SYSTEMS IS OTHERWISE NEGATIVE.  10 point ROS reviewed and negative, except as above      Historical Information   Past Medical History:   Diagnosis Date    Allergic rhinitis     Anemia 10/23/2008    last assessed 9/9/13     Anxiety     Atrial fibrillation (HCC)     Bleeding     Chronic kidney disease     COVID-19 virus infection 12/2022    CPAP (continuous positive airway pressure) dependence     Diabetes mellitus (HCC)     Diarrhea     Diverticulitis     Eating disorder     GERD (gastroesophageal reflux disease)     Herpes zoster with complication     last assessed 7/3/17     Hiatal hernia     Hyperlipidemia     Hypertension     IBS (irritable bowel syndrome)     Incisional hernia     Increased urinary frequency     Irregular heart beat     Pyogenic granuloma 09/13/2006    last assessed 9/13/06    Carlos (subconjunctival hemorrhage), unspecified laterality 09/12/2005    last assessed 9/12/05    Sleep apnea     TMJ (dislocation of temporomandibular joint)     Ventral hernia     last assessed  4/6/17     Wears glasses      Past Surgical History:   Procedure Laterality Date    APPENDECTOMY      ATRIAL ABLATION SURGERY      2014    ATRIAL ABLATION SURGERY      catheter ablation atrial fibrillation / last assessed 2/17/16     CARDIOVERSION  05/30/2024    CHOLECYSTECTOMY      lap    COLON SURGERY  1990     Hartmans procedure    COLON SURGERY      reversal 6 weeks later    COLONOSCOPY N/A 2017    Procedure: COLONOSCOPY;  Surgeon: Portillo Schumacher MD;  Location: [facility] GI LAB;  Service:     ESOPHAGOGASTRODUODENOSCOPY N/A 2017    Procedure: ESOPHAGOGASTRODUODENOSCOPY (EGD);  Surgeon: Portillo Schumacher MD;  Location: [facility];  Service:     EYE SURGERY Bilateral 2015    lid repair    HERNIA REPAIR Bilateral     inguinal  &     INCISIONAL HERNIA REPAIR      INCISIONAL HERNIA REPAIR N/A 2017    Procedure: REPAIR OF INCARCERATED INCISIONAL HERNIA WITH MESH, Lysis of Adhesions, Partial Omentectomy;  Surgeon: Colt Davis MD;  Location: [place] [facility];  Service:     INSERT / REPLACE / REMOVE PACEMAKER Left 10/04/2021    medtronic-[id number]    NEUROBLASTOMA EXCISION      SKIN CANCER EXCISION      On nose    TONSILLECTOMY       Social History   Social History     Substance and Sexual Activity   Alcohol Use No    Comment: none in 33 yrs     Social History     Substance and Sexual Activity   Drug Use No     Social History     Tobacco Use   Smoking Status Former    Current packs/day: 0.00    Average packs/day: 1 pack/day for 20.0 years (20.0 ttl pk-yrs)    Types: Cigarettes    Start date:     Quit date:     Years since quittin.5    Passive exposure: Never   Smokeless Tobacco Never   Tobacco Comments    Started age 17     Family History   Problem Relation Age of Onset    Heart disease Mother     Cancer Mother     Colon cancer Mother     Dementia Mother     Early death Father     Seizures Father     Cancer Sister         bone    Heart disease Sister     Diabetes Sister     Lupus Sister     Heart disease Brother     Cancer Brother         kidney    Diabetes Brother     Other Sister         covid    Stroke Neg Hx        Meds/Allergies       Current Outpatient Medications:     acetaminophen (TYLENOL) 325 mg tablet    amiodarone 200 mg tablet    apixaban (ELIQUIS) 5 mg    atorvastatin  "(LIPITOR) 20 mg tablet    Blood Glucose Monitoring Suppl (FREESTYLE LITE) GEOVANNY    clotrimazole-betamethasone (LOTRISONE) 1-0.05 % cream    esomeprazole (NexIUM) 40 MG capsule    fluticasone (FLONASE) 50 mcg/act nasal spray    FREESTYLE LITE test strip    hydroCHLOROthiazide 25 mg tablet    ketoconazole (NIZORAL) 2 % shampoo    Lancets (FREESTYLE) lancets    linaCLOtide (Linzess) 290 MCG CAPS    losartan (COZAAR) 100 MG tablet    metroNIDAZOLE (METROCREAM) 0.75 % cream    Mounjaro 5 MG/0.5ML    polyethylene glycol (Golytely) 4000 mL solution    RESTASIS 0.05 % ophthalmic emulsion    tamsulosin (FLOMAX) 0.4 mg    urea (CARMOL) 20 % cream    amiodarone 100 mg tablet    carvedilol (COREG) 25 mg tablet    metFORMIN (GLUCOPHAGE-XR) 500 mg 24 hr tablet    Ozempic, 0.25 or 0.5 MG/DOSE, 2 MG/1.5ML SOPN    pioglitazone-metFORMIN (ACTOPLUS MET)  MG per tablet    polyethylene glycol-propylene glycol (Systane) 0.4-0.3 %    promethazine-dextromethorphan (PHENERGAN-DM) 6.25-15 mg/5 mL oral syrup    traMADol (ULTRAM) 50 mg tablet    Allergies   Allergen Reactions    Codeine Other (See Comments) and GI Intolerance     Reaction Date: 27Dec2004; Annotation - 04Sep2012: '' CRAZY ''  vomiting  headache    Demerol [Meperidine] Nausea Only, Other (See Comments), GI Intolerance and Vomiting     vomiting  Reaction Date: 27Dec2004;   headache    Morphine Nausea Only, GI Intolerance and Vomiting     Reaction Date: 23Feb2012;            Objective     Blood pressure 118/70, pulse 66, height 5' 11\" (1.803 m), weight 95.7 kg (211 lb), SpO2 98%. Body mass index is 29.43 kg/m².      PHYSICAL EXAMINATION:    General Appearance:   Alert, cooperative, no distress   HEENT:  Normocephalic, atraumatic, anicteric. Neck supple, symmetrical, trachea midline.   Lungs:   Equal chest rise and unlabored breathing, normal effort, no coughing.   Cardiovascular:   No visualized JVD.   Abdomen:   No abdominal distension. + ventral hernia   Skin:   No " jaundice, rashes, or lesions.    Musculoskeletal:   Normal range of motion visualized.   Psych:  Normal affect and normal insight.   Neuro:  Alert and appropriate.         Lab Results:   No visits with results within 1 Day(s) from this visit.   Latest known visit with results is:   Transcribe Orders on 06/11/2024   Component Date Value    TSH 3RD GENERATON 06/11/2024 2.065     Sodium 06/11/2024 140     Potassium 06/11/2024 3.8     Chloride 06/11/2024 101     CO2 06/11/2024 29     ANION GAP 06/11/2024 10     BUN 06/11/2024 20     Creatinine 06/11/2024 1.02     Glucose 06/11/2024 129     Calcium 06/11/2024 8.8     AST 06/11/2024 24     ALT 06/11/2024 42     Alkaline Phosphatase 06/11/2024 67     Total Protein 06/11/2024 6.8     Albumin 06/11/2024 4.0     Total Bilirubin 06/11/2024 0.67     eGFR 06/11/2024 69     Creatinine, Ur 06/11/2024 69.6     Albumin,U,Random 06/11/2024 <7.0     Albumin Creat Ratio 06/11/2024 <10        Lab Results   Component Value Date    WBC 10.89 (H) 05/29/2024    HGB 12.5 05/29/2024    HCT 39.0 05/29/2024    MCV 92 05/29/2024     05/29/2024       Lab Results   Component Value Date    SODIUM 140 06/11/2024    K 3.8 06/11/2024     06/11/2024    CO2 29 06/11/2024    AGAP 10 06/11/2024    BUN 20 06/11/2024    CREATININE 1.02 06/11/2024    GLUC 129 06/11/2024    GLUF 131 (H) 10/27/2023    CALCIUM 8.8 06/11/2024    AST 24 06/11/2024    ALT 42 06/11/2024    ALKPHOS 67 06/11/2024    TP 6.8 06/11/2024    TBILI 0.67 06/11/2024    EGFR 69 06/11/2024       Lab Results   Component Value Date    CRP <3.0 04/22/2019       Lab Results   Component Value Date    TBA7UPLELHZG 2.065 06/11/2024       Lab Results   Component Value Date    IRON 64 03/04/2024    TIBC 348 03/04/2024    FERRITIN 17 (L) 03/04/2024       Radiology Results:   No results found.

## 2024-07-09 ENCOUNTER — OFFICE VISIT (OUTPATIENT)
Dept: SURGERY | Facility: CLINIC | Age: 79
End: 2024-07-09
Payer: MEDICARE

## 2024-07-09 VITALS
WEIGHT: 211 LBS | SYSTOLIC BLOOD PRESSURE: 120 MMHG | HEART RATE: 64 BPM | HEIGHT: 71 IN | OXYGEN SATURATION: 96 % | DIASTOLIC BLOOD PRESSURE: 67 MMHG | TEMPERATURE: 97 F | BODY MASS INDEX: 29.54 KG/M2

## 2024-07-09 DIAGNOSIS — Z87.19 HISTORY OF VENTRAL HERNIA REPAIR: Primary | ICD-10-CM

## 2024-07-09 DIAGNOSIS — Z98.890 HISTORY OF VENTRAL HERNIA REPAIR: Primary | ICD-10-CM

## 2024-07-09 PROCEDURE — 99213 OFFICE O/P EST LOW 20 MIN: CPT | Performed by: SURGERY

## 2024-07-09 PROCEDURE — 99203 OFFICE O/P NEW LOW 30 MIN: CPT | Performed by: SURGERY

## 2024-07-10 ENCOUNTER — APPOINTMENT (OUTPATIENT)
Dept: PHYSICAL THERAPY | Facility: CLINIC | Age: 79
End: 2024-07-10
Payer: MEDICARE

## 2024-08-01 ENCOUNTER — OFFICE VISIT (OUTPATIENT)
Dept: OBGYN CLINIC | Facility: CLINIC | Age: 79
End: 2024-08-01
Payer: MEDICARE

## 2024-08-01 VITALS
BODY MASS INDEX: 29.54 KG/M2 | SYSTOLIC BLOOD PRESSURE: 127 MMHG | WEIGHT: 211 LBS | HEART RATE: 67 BPM | HEIGHT: 71 IN | DIASTOLIC BLOOD PRESSURE: 77 MMHG

## 2024-08-01 DIAGNOSIS — M17.12 PRIMARY OSTEOARTHRITIS OF LEFT KNEE: Primary | ICD-10-CM

## 2024-08-01 DIAGNOSIS — M54.16 RADICULOPATHY, LUMBAR REGION: ICD-10-CM

## 2024-08-01 PROCEDURE — 99213 OFFICE O/P EST LOW 20 MIN: CPT | Performed by: ORTHOPAEDIC SURGERY

## 2024-08-01 NOTE — PROGRESS NOTES
Assessment/Plan:  1. Primary osteoarthritis of left knee    2. Radiculopathy, lumbar region      Orders Placed This Encounter   Procedures    Large joint arthrocentesis    Injection Procedure Prior Authorization    Ambulatory Referral to Physical Therapy     We reviewed the treatment options for Andrade' left knee degenerative joint disease, including continued physical therapy, bracing options, voltaren gel, ibuprofen, and injection options. I recommended reintroducing PT as this was helping him symptomatically and can work on quad strengthening to address any patellar maltracking. The patient does report subjective instability and there is gapping with a firm endpoint with valgus stress testing. In the setting of medial compartment degenerative changes with symptoms localized medially, we discussed ordering a medial  brace to wear for activities as needed. [facility] will be in contact with him about this. The patient was agreeable to this. Additionally, it is too soon to perform a repeat steroid injection today as these can occur every 3 months at a minimum, however, we discussed performing a Toradol injection or ordering Visco injections in the meantime. The patient preferred to order Visco injections at this time and will plan to follow up once approved.     Regarding the patient's numbness in the left leg, we discussed the diagnosis and treatment options for lumbar radiculopathy. This occurs with compression or inflammation of the nerves that exit from the lumbar spine. We discussed a referral to PT for this as well and the patient can complete exercises in conjunction with knee exercises. The patient can use OTC medications and ice/heat as needed for pain control. We will reevaluate these symptoms at the patient's follow up visit. If symptoms persist following PT, we can consider additional workup with imaging and a referral to Sp[facility] and Pain for further management as necessary. If the patient develops  progressive weakness/radicular symptoms or red flag symptoms as discussed, he should present to the ER immediately. All questions were addressed today.          Subjective   Chief Complaint:   Chief Complaint   Patient presents with    Left Knee - Follow-up       Andrade Wise Jr. is a 79 y.o. male who presents for follow up for left primary knee osteoarthritis. Today, the patient notes increased medial-sided left knee pain over the past 2 days without any injury/trauma. He notes pain is worsened by stairs and does note occasional instability. He denies swelling, locking episodes, calf pain, and weakness. The patient discontinued PT as the knee was feeling really good after his steroid injection on 6/21/24. Of note, the patient does also note a radiating numbness from the knee into the toes. He denies low back pain, saddle anesthesia, or bowel/bladder dysfunction.       Review of Systems  ROS:    See HPI for musculoskeletal review.   All other systems reviewed are negative     History:  Past Medical History:   Diagnosis Date    Allergic rhinitis     Anemia 10/23/2008    last assessed 9/9/13     Anxiety     Atrial fibrillation (HCC)     Bleeding     Chronic kidney disease     COVID-19 virus infection 12/2022    CPAP (continuous positive airway pressure) dependence     Diabetes mellitus (HCC)     Diarrhea     Diverticulitis     Eating disorder     GERD (gastroesophageal reflux disease)     Herpes zoster with complication     last assessed 7/3/17     Hiatal hernia     Hyperlipidemia     Hypertension     IBS (irritable bowel syndrome)     Incisional hernia     Increased urinary frequency     Irregular heart beat     Pyogenic granuloma 09/13/2006    last assessed 9/13/06    Carlos (subconjunctival hemorrhage), unspecified laterality 09/12/2005    last assessed 9/12/05    Sleep apnea     TMJ (dislocation of temporomandibular joint)     Ventral hernia     last assessed  4/6/17     Wears glasses      Past Surgical History:    Procedure Laterality Date    APPENDECTOMY      ATRIAL ABLATION SURGERY          ATRIAL ABLATION SURGERY      catheter ablation atrial fibrillation / last assessed 16     CARDIOVERSION  2024    CHOLECYSTECTOMY      lap    COLON SURGERY      Hartmans procedure    COLON SURGERY      reversal 6 weeks later    COLONOSCOPY N/A 2017    Procedure: COLONOSCOPY;  Surgeon: Portillo Schumacher MD;  Location: [facility];  Service:     ESOPHAGOGASTRODUODENOSCOPY N/A 2017    Procedure: ESOPHAGOGASTRODUODENOSCOPY (EGD);  Surgeon: Portillo Schumacher MD;  Location: [facility];  Service:     EYE SURGERY Bilateral 2015    lid repair    HERNIA REPAIR Bilateral     inguinal  &     INCISIONAL HERNIA REPAIR      INCISIONAL HERNIA REPAIR N/A 2017    Procedure: REPAIR OF INCARCERATED INCISIONAL HERNIA WITH MESH, Lysis of Adhesions, Partial Omentectomy;  Surgeon: Colt Davis MD;  Location: [facility];  Service:     INSERT / REPLACE / REMOVE PACEMAKER Left 10/04/2021    [id number]    NEUROBLASTOMA EXCISION      SKIN CANCER EXCISION      On nose    TONSILLECTOMY       Social History   Social History     Substance and Sexual Activity   Alcohol Use No    Comment: none in 33 yrs     Social History     Substance and Sexual Activity   Drug Use No     Social History     Tobacco Use   Smoking Status Former    Current packs/day: 0.00    Average packs/day: 1 pack/day for 20.0 years (20.0 ttl pk-yrs)    Types: Cigarettes    Start date:     Quit date:     Years since quittin.6    Passive exposure: Never   Smokeless Tobacco Never   Tobacco Comments    Started age 17     Family History:   Family History   Problem Relation Age of Onset    Heart disease Mother     Cancer Mother     Colon cancer Mother     Dementia Mother     Early death Father     Seizures Father     Cancer Sister         bone    Heart disease Sister     Diabetes Sister     Lupus Sister     Heart disease Brother      "Cancer Brother         kidney    Diabetes Brother     Other Sister         covid    Stroke Neg Hx        Meds/Allergies   Not in a hospital admission.  Allergies   Allergen Reactions    Codeine Other (See Comments) and GI Intolerance     Reaction Date: 27Dec2004; Annotation - [id number]: '' CRAZY ''  vomiting  headache    Demerol [Meperidine] Nausea Only, Other (See Comments), GI Intolerance and Vomiting     vomiting  Reaction Date: 27Dec2004;   headache    Morphine Nausea Only, GI Intolerance and Vomiting     Reaction Date: 23Feb2012;           Objective     /77   Pulse 67   Ht 5' 11\" (1.803 m)   Wt 95.7 kg (211 lb)   BMI 29.43 kg/m²      PE:  AAOx 3  Well nourished   no audible wheezing  no abdominal distension  LE compartments soft, skin intact    Knee Exam:   No significant skin lesions or deformity  No significant joint effusion  Range of motion from 0 to 120  Mild medial joint line tenderness. Mild extensor mechanism tenderness  Able to straight leg raise  5/5 knee flexion extension, 5/5 ankle DF/PF  Gapping with a firm endpoint with valgus stress  Knee is stable to varus stress, Lachman, and posterior drawer.    Neurovascularly intact distally      Imaging Studies:     No new imaging reviewed today        "

## 2024-08-05 DIAGNOSIS — E78.2 MIXED HYPERLIPIDEMIA: ICD-10-CM

## 2024-08-05 RX ORDER — ATORVASTATIN CALCIUM 20 MG/1
TABLET, FILM COATED ORAL
Qty: 90 TABLET | Refills: 1 | Status: SHIPPED | OUTPATIENT
Start: 2024-08-05

## 2024-08-14 DIAGNOSIS — B35.9 TINEA: ICD-10-CM

## 2024-08-15 RX ORDER — CLOTRIMAZOLE AND BETAMETHASONE DIPROPIONATE 10; .64 MG/G; MG/G
1 CREAM TOPICAL 2 TIMES DAILY
Qty: 45 G | Refills: 1 | Status: SHIPPED | OUTPATIENT
Start: 2024-08-15

## 2024-08-28 DIAGNOSIS — R60.0 EDEMA OF BOTH LOWER LEGS: ICD-10-CM

## 2024-08-28 RX ORDER — HYDROCHLOROTHIAZIDE 25 MG/1
25 TABLET ORAL DAILY
Qty: 90 TABLET | Refills: 1 | Status: SHIPPED | OUTPATIENT
Start: 2024-08-28

## 2024-09-23 ENCOUNTER — OFFICE VISIT (OUTPATIENT)
Dept: OBGYN CLINIC | Facility: CLINIC | Age: 79
End: 2024-09-23
Payer: MEDICARE

## 2024-09-23 VITALS
WEIGHT: 211 LBS | BODY MASS INDEX: 29.54 KG/M2 | SYSTOLIC BLOOD PRESSURE: 115 MMHG | HEIGHT: 71 IN | HEART RATE: 73 BPM | DIASTOLIC BLOOD PRESSURE: 68 MMHG

## 2024-09-23 DIAGNOSIS — M25.562 CHRONIC PAIN OF BOTH KNEES: ICD-10-CM

## 2024-09-23 DIAGNOSIS — M25.561 CHRONIC PAIN OF BOTH KNEES: ICD-10-CM

## 2024-09-23 DIAGNOSIS — G89.29 CHRONIC PAIN OF BOTH KNEES: ICD-10-CM

## 2024-09-23 DIAGNOSIS — M17.0 BILATERAL PRIMARY OSTEOARTHRITIS OF KNEE: Primary | ICD-10-CM

## 2024-09-23 PROCEDURE — 20610 DRAIN/INJ JOINT/BURSA W/O US: CPT | Performed by: ORTHOPAEDIC SURGERY

## 2024-09-23 PROCEDURE — 99213 OFFICE O/P EST LOW 20 MIN: CPT | Performed by: ORTHOPAEDIC SURGERY

## 2024-09-23 RX ORDER — ROPIVACAINE HYDROCHLORIDE 2 MG/ML
4 INJECTION, SOLUTION EPIDURAL; INFILTRATION; PERINEURAL
Status: COMPLETED | OUTPATIENT
Start: 2024-09-23 | End: 2024-09-23

## 2024-09-23 RX ORDER — TRIAMCINOLONE ACETONIDE 40 MG/ML
40 INJECTION, SUSPENSION INTRA-ARTICULAR; INTRAMUSCULAR
Status: COMPLETED | OUTPATIENT
Start: 2024-09-23 | End: 2024-09-23

## 2024-09-23 RX ADMIN — TRIAMCINOLONE ACETONIDE 40 MG: 40 INJECTION, SUSPENSION INTRA-ARTICULAR; INTRAMUSCULAR at 10:15

## 2024-09-23 RX ADMIN — ROPIVACAINE HYDROCHLORIDE 4 ML: 2 INJECTION, SOLUTION EPIDURAL; INFILTRATION; PERINEURAL at 10:15

## 2024-09-23 NOTE — PROGRESS NOTES
"Assessment/Plan:  1. Bilateral primary osteoarthritis of knee    2. Chronic pain of both knees      Orders Placed This Encounter   Procedures    Large joint arthrocentesis     Andrade is a pleasant 79 y.o. male who presents for follow-up evaluation of his bilateral knee pain. I believe he is symptomatic of his underlying osteoarthritis of the knees. I offered him a repeat corticosteroid injection of the left knee today. We discussed that he may proceed with the Durolane injection if his relief is short-lived from the corticosteroid injection of the left knee. He expressed his understanding of this. He will follow-up as needed in regards to the right knee for a corticosteroid injection.    Large joint arthrocentesis: L knee  Universal Protocol:  Consent: Verbal consent obtained.  Risks and benefits: risks, benefits and alternatives were discussed  Consent given by: patient  Time out: Immediately prior to procedure a \"time out\" was called to verify the correct patient, procedure, equipment, support staff and site/side marked as required.  Patient understanding: patient states understanding of the procedure being performed  Site marked: the operative site was marked  Patient identity confirmed: verbally with patient  Supporting Documentation  Indications: pain   Procedure Details  Location: knee - L knee  Preparation: Patient was prepped and draped in the usual sterile fashion  Needle size: 22 G  Approach: anterolateral  Medications administered: 40 mg triamcinolone acetonide 40 mg/mL; 4 mL ropivacaine 0.2 %    Patient tolerance: patient tolerated the procedure well with no immediate complications  Dressing:  Sterile dressing applied           Return if symptoms worsen or fail to improve.      Subjective   Chief Complaint:   Chief Complaint   Patient presents with    Left Knee - Follow-up    Right Knee - Pain       Andrade STYLES Billie Lopez is a 79 y.o. male who presents for follow up for his bilateral knee pain. He reports the " left knee is more painful than the right. He states sitting for long periods of time and standing form a seated position aggravate his knee pain. He denies pain with walking long distances. He indicates his right knee pain is located medially. He is interested in receiving bilateral corticosteroid injections of the knees. He does have a history or diabetes.    Review of Systems  ROS:    See [facility] for musculoskeletal review.   All other systems reviewed are negative     History:  Past Medical History:   Diagnosis Date    Allergic rhinitis     Anemia 10/23/2008    last assessed 9/9/13     Anxiety     Atrial fibrillation (HCC)     Bleeding     Chronic kidney disease     COVID-19 virus infection 12/2022    CPAP (continuous positive airway pressure) dependence     Diabetes mellitus (HCC)     Diarrhea     Diverticulitis     Eating disorder     GERD (gastroesophageal reflux disease)     Herpes zoster with complication     last assessed 7/3/17     Hiatal hernia     Hyperlipidemia     Hypertension     IBS (irritable bowel syndrome)     Incisional hernia     Increased urinary frequency     Irregular heart beat     Pyogenic granuloma 09/13/2006    last assessed 9/13/06    Carlos (subconjunctival hemorrhage), unspecified laterality 09/12/2005    last assessed 9/12/05    Sleep apnea     TMJ (dislocation of temporomandibular joint)     Ventral hernia     last assessed  4/6/17     Wears glasses      Past Surgical History:   Procedure Laterality Date    APPENDECTOMY      ATRIAL ABLATION SURGERY      2014    ATRIAL ABLATION SURGERY      catheter ablation atrial fibrillation / last assessed 2/17/16     CARDIOVERSION  05/30/2024    CHOLECYSTECTOMY      lap    COLON SURGERY  1990    Hartmans procedure    COLON SURGERY      reversal 6 weeks later    COLONOSCOPY N/A 01/26/2017    Procedure: COLONOSCOPY;  Surgeon: Portillo Schumacher MD;  Location: [facility];  Service:     ESOPHAGOGASTRODUODENOSCOPY N/A 01/26/2017    Procedure:  ESOPHAGOGASTRODUODENOSCOPY (EGD);  Surgeon: Portillo Schumacher MD;  Location: [facility];  Service:     EYE SURGERY Bilateral 2015    lid repair    HERNIA REPAIR Bilateral     inguinal  &     INCISIONAL HERNIA REPAIR      INCISIONAL HERNIA REPAIR N/A 2017    Procedure: REPAIR OF INCARCERATED INCISIONAL HERNIA WITH MESH, Lysis of Adhesions, Partial Omentectomy;  Surgeon: Colt Davis MD;  Location: [facility];  Service:     INSERT / REPLACE / REMOVE PACEMAKER Left 10/04/2021    [id number]    NEUROBLASTOMA EXCISION      SKIN CANCER EXCISION      On nose    TONSILLECTOMY       Social History   Social History     Substance and Sexual Activity   Alcohol Use No    Comment: none in 33 yrs     Social History     Substance and Sexual Activity   Drug Use No     Social History     Tobacco Use   Smoking Status Former    Current packs/day: 0.00    Average packs/day: 1 pack/day for 20.0 years (20.0 ttl pk-yrs)    Types: Cigarettes    Start date:     Quit date:     Years since quittin.7    Passive exposure: Never   Smokeless Tobacco Never   Tobacco Comments    Started age 17     Family History:   Family History   Problem Relation Age of Onset    Heart disease Mother     Cancer Mother     Colon cancer Mother     Dementia Mother     Early death Father     Seizures Father     Cancer Sister         bone    Heart disease Sister     Diabetes Sister     Lupus Sister     Heart disease Brother     Cancer Brother         kidney    Diabetes Brother     Other Sister         covid    Stroke Neg Hx        Meds/Allergies   Not in a hospital admission.  Allergies   Allergen Reactions    Codeine Other (See Comments) and GI Intolerance     Reaction Date: 2004; Annotation - [id number]: '' CRAZY ''  vomiting  headache    Demerol [Meperidine] Nausea Only, Other (See Comments), GI Intolerance and Vomiting     vomiting  Reaction Date: 2004;   headache    Morphine Nausea Only, GI Intolerance and  "Vomiting     Reaction Date: 23Feb2012;           Objective     /68   Pulse 73   Ht 5' 11\" (1.803 m)   Wt 95.7 kg (211 lb)   BMI 29.43 kg/m²      PE:  AAOx 3  Well nourished   no audible wheezing  no abdominal distension  LE compartments soft, skin intact    Knee Exam:   No significant skin lesions or deformity  Range of motion from 0° to 120°  Medial joint line tenderness bilaterally  Knee is stable to varus stress, valgus stress, Lachman, and posterior drawer.    Neurovascularly intact distally      Scribe Attestation      I,:  Ameena De Souza am acting as a scribe while in the presence of the attending physician.:       I,:  Ho Dao MD personally performed the services described in this documentation    as scribed in my presence.:            "

## 2024-10-28 ENCOUNTER — TELEPHONE (OUTPATIENT)
Age: 79
End: 2024-10-28

## 2024-10-29 DIAGNOSIS — K58.1 IRRITABLE BOWEL SYNDROME WITH CONSTIPATION: ICD-10-CM

## 2024-10-29 DIAGNOSIS — K21.9 GASTROESOPHAGEAL REFLUX DISEASE WITHOUT ESOPHAGITIS: ICD-10-CM

## 2024-10-29 DIAGNOSIS — D50.8 OTHER IRON DEFICIENCY ANEMIA: ICD-10-CM

## 2024-10-29 DIAGNOSIS — K59.00 CONSTIPATION, UNSPECIFIED CONSTIPATION TYPE: ICD-10-CM

## 2024-10-29 RX ORDER — LINACLOTIDE 290 UG/1
290 CAPSULE, GELATIN COATED ORAL
Qty: 14 CAPSULE | Refills: 0 | Status: SHIPPED | OUTPATIENT
Start: 2024-10-29

## 2024-10-29 RX ORDER — LINACLOTIDE 290 UG/1
290 CAPSULE, GELATIN COATED ORAL
Qty: 90 CAPSULE | Refills: 1 | Status: SHIPPED | OUTPATIENT
Start: 2024-10-29

## 2024-10-29 NOTE — TELEPHONE ENCOUNTER
Needs emergency supply to retail pharmacy and 90 day new prescription to mail order,pharmacy stated needs new prescription     Reason for call:   [x] Refill   [] Prior Auth  [] Other:     Office:   [] PCP/Provider -   [x] Specialty/Provider - Peter Perez    Medication: Linaclotide  Dose/Frequency: 290 mcg Daily   Quantity: 90  Pharmacy: [facility]       Medication: Linaclotide  Dose/Frequency: 290 mcg Daily   Quantity: 14  Pharmacy: [facility]    Does the patient have enough for 3 days?   [] Yes   [x] No - Send as HP to POD

## 2024-11-04 ENCOUNTER — TELEPHONE (OUTPATIENT)
Dept: OBGYN CLINIC | Facility: CLINIC | Age: 79
End: 2024-11-04

## 2024-11-04 NOTE — TELEPHONE ENCOUNTER
Lvm for pt to cb about appointment made today, he had a sx discussion with Dr. Petty last year regarding this shoulder. If patient is looking for continued care and possible sx, he will need to see Dr. Dao or Lynette . Dr. Newton is primary sports, he is not able to discuss surgery.

## 2024-11-05 ENCOUNTER — OFFICE VISIT (OUTPATIENT)
Dept: OBGYN CLINIC | Facility: CLINIC | Age: 79
End: 2024-11-05
Payer: MEDICARE

## 2024-11-05 ENCOUNTER — APPOINTMENT (OUTPATIENT)
Dept: RADIOLOGY | Facility: CLINIC | Age: 79
End: 2024-11-05
Payer: MEDICARE

## 2024-11-05 VITALS
WEIGHT: 211 LBS | DIASTOLIC BLOOD PRESSURE: 76 MMHG | BODY MASS INDEX: 29.54 KG/M2 | HEART RATE: 65 BPM | HEIGHT: 71 IN | SYSTOLIC BLOOD PRESSURE: 132 MMHG

## 2024-11-05 DIAGNOSIS — M25.511 RIGHT SHOULDER PAIN, UNSPECIFIED CHRONICITY: ICD-10-CM

## 2024-11-05 DIAGNOSIS — M54.12 RADICULOPATHY, CERVICAL REGION: Primary | ICD-10-CM

## 2024-11-05 PROCEDURE — 73030 X-RAY EXAM OF SHOULDER: CPT

## 2024-11-05 PROCEDURE — 99213 OFFICE O/P EST LOW 20 MIN: CPT | Performed by: ORTHOPAEDIC SURGERY

## 2024-11-05 RX ORDER — METHYLPREDNISOLONE 4 MG/1
TABLET ORAL
Qty: 21 EACH | Refills: 0 | Status: SHIPPED | OUTPATIENT
Start: 2024-11-05

## 2024-11-05 NOTE — PROGRESS NOTES
Orthopedic Sports Medicine Patient Visit     Assesment:   79 y.o. male with myofascial neck pain and and cervical radiculopathy    Plan:    The patient does have a previous history of rotator cuff pathology however I do not believe his new issue today is related to his shoulder.  His pain is mostly along the trapezius and right side of his neck.  He has pain radiating past his elbow into his hand.  He does not have any weakness, numbness, or issues with fine motor movements or balance.  Is also tender around his trapezius and periscapular muscles.  Given his neck pain without any significant red flag symptoms and no recent trauma I recommended starting a course of conservative treatment including a short course of oral steroids as well as physical therapy.  He may also try over-the-counter medications as well as ice and or heat.  He was agreeable to this plan.  Start physical therapy and follow-up in 6 weeks for repeat evaluation.  If he continues have pain at that time we will continue workup of his neck pain and referral to spine and pain management.        Follow up:    No follow-ups on file.        Chief Complaint   Patient presents with    Left Knee - Follow-up    Right Knee - Pain    Right Shoulder - Pain, Swelling       History of Present Illness:    The patient is a 79 y.o. presenting 5 to 6 days of pain in the right trapezius and upper back muscles with radiation into his neck and down his entire right arm.  He denies any specific inciting event.  He has a known history of a rotator cuff tear on the right side which is done very well.  He does not localize any pain to the anterior shoulder or lateral shoulder.  Pain is worse with neck motion and also with some arm motion however it is always in his upper back and neck not the shoulder itself.  He has not had much in the way of numbness or tingling.  He has not experienced any weakness in the arm or hand.  He has not had any issues with fine motor  movements or with his balance.        Past Medical, Social and Family History:  Past Medical History:   Diagnosis Date    Allergic rhinitis     Anemia 10/23/2008    last assessed 9/9/13     Anxiety     Atrial fibrillation (HCC)     Bleeding     Chronic kidney disease     COVID-19 virus infection 12/2022    CPAP (continuous positive airway pressure) dependence     Diabetes mellitus (HCC)     Diarrhea     Diverticulitis     Eating disorder     GERD (gastroesophageal reflux disease)     Herpes zoster with complication     last assessed 7/3/17     Hiatal hernia     Hyperlipidemia     Hypertension     IBS (irritable bowel syndrome)     Incisional hernia     Increased urinary frequency     Irregular heart beat     Pyogenic granuloma 09/13/2006    last assessed 9/13/06    Carlos (subconjunctival hemorrhage), unspecified laterality 09/12/2005    last assessed 9/12/05    Sleep apnea     TMJ (dislocation of temporomandibular joint)     Ventral hernia     last assessed  4/6/17     Wears glasses      Past Surgical History:   Procedure Laterality Date    APPENDECTOMY      ATRIAL ABLATION SURGERY      2014    ATRIAL ABLATION SURGERY      catheter ablation atrial fibrillation / last assessed 2/17/16     CARDIOVERSION  05/30/2024    CHOLECYSTECTOMY      lap    COLON SURGERY  1990    Hartmans procedure    COLON SURGERY      reversal 6 weeks later    COLONOSCOPY N/A 01/26/2017    Procedure: COLONOSCOPY;  Surgeon: Portillo Schumacher MD;  Location: [facility] GI LAB;  Service:     ESOPHAGOGASTRODUODENOSCOPY N/A 01/26/2017    Procedure: ESOPHAGOGASTRODUODENOSCOPY (EGD);  Surgeon: Portillo Schumacher MD;  Location: [facility] GI LAB;  Service:     EYE SURGERY Bilateral 2015    lid repair    HERNIA REPAIR Bilateral     inguinal 2014 & 2013    INCISIONAL HERNIA REPAIR  2011    INCISIONAL HERNIA REPAIR N/A 03/24/2017    Procedure: REPAIR OF INCARCERATED INCISIONAL HERNIA WITH MESH, Lysis of Adhesions, Partial Omentectomy;  Surgeon: Colt Davis MD;  Location: [place]  MAIN OR;  Service:     INSERT / REPLACE / REMOVE PACEMAKER Left 10/04/2021    medtronic-[id number]    NEUROBLASTOMA EXCISION      SKIN CANCER EXCISION      On nose    TONSILLECTOMY       Allergies   Allergen Reactions    Codeine Other (See Comments) and GI Intolerance     Reaction Date: 27Dec2004; Annotation - 04Sep2012: '' CRAZY ''  vomiting  headache    Demerol [Meperidine] Nausea Only, Other (See Comments), GI Intolerance and Vomiting     vomiting  Reaction Date: 27Dec2004;   headache    Morphine Nausea Only, GI Intolerance and Vomiting     Reaction Date: 23Feb2012;      Current Outpatient Medications on File Prior to Visit   Medication Sig Dispense Refill    acetaminophen (TYLENOL) 325 mg tablet Take 650 mg by mouth every 6 (six) hours as needed      amiodarone 200 mg tablet       apixaban (ELIQUIS) 5 mg Take 1 tablet (5 mg total) by mouth 2 (two) times a day 180 tablet 3    atorvastatin (LIPITOR) 20 mg tablet TAKE 1 TABLET DAILY 90 tablet 1    Blood Glucose Monitoring Suppl (FREESTYLE LITE) GEOVANNY       carvedilol (COREG) 25 mg tablet Take 12.5 mg by mouth 2 (two) times a day      clotrimazole-betamethasone (LOTRISONE) 1-0.05 % cream APPLY TO AFFECTED AREA(S) TWO TIMES A DAY 45 g 1    esomeprazole (NexIUM) 40 MG capsule Take 1 capsule (40 mg total) by mouth daily before breakfast 90 capsule 3    fluticasone (FLONASE) 50 mcg/act nasal spray 2 sprays into each nostril daily 48 g 3    FREESTYLE LITE test strip       hydroCHLOROthiazide 25 mg tablet TAKE ONE TABLET BY MOUTH EVERY DAY 90 tablet 1    ketoconazole (NIZORAL) 2 % shampoo       Lancets (FREESTYLE) lancets       linaCLOtide (Linzess) 290 MCG CAPS Take 1 capsule by mouth daily before breakfast 90 capsule 1    linaCLOtide (Linzess) 290 MCG CAPS Take 1 capsule by mouth daily before breakfast 14 capsule 0    losartan (COZAAR) 100 MG tablet 100 mg every morning       metroNIDAZOLE (METROCREAM) 0.75 % cream       Mounjaro 5 MG/0.5ML Not taking until  2024      RESTASIS 0.05 % ophthalmic emulsion Administer 1 drop to both eyes every 12 (twelve) hours      tamsulosin (FLOMAX) 0.4 mg TAKE 1 CAPSULE DAILY WITH DINNER 100 capsule 1    amiodarone 100 mg tablet 200 mg (Patient not taking: Reported on 2024)      metFORMIN (GLUCOPHAGE-XR) 500 mg 24 hr tablet  (Patient not taking: Reported on 6/3/2024)      Ozempic, 0.25 or 0.5 MG/DOSE, 2 MG/1.5ML SOPN once a week (Patient not taking: Reported on 3/7/2024)      pioglitazone-metFORMIN (ACTOPLUS MET)  MG per tablet daily after dinner (Patient not taking: Reported on 4/15/2024)      polyethylene glycol (Golytely) 4000 mL solution Take 4,000 mL by mouth once for 1 dose Take 4000 mL by mouth once for 1 dose. Use as directed 4000 mL 0    polyethylene glycol-propylene glycol (Systane) 0.4-0.3 % every 3 (three) hours as needed   (Patient not taking: Reported on 4/15/2024)      promethazine-dextromethorphan (PHENERGAN-DM) 6.25-15 mg/5 mL oral syrup Take 5 mL by mouth 4 (four) times a day as needed for cough (Patient not taking: Reported on 6/3/2024) 180 mL 0    traMADol (ULTRAM) 50 mg tablet Take 50 mg by mouth once as needed for moderate pain He uses it rarely. (Patient not taking: Reported on 6/3/2024)      urea (CARMOL) 20 % cream if needed (Patient not taking: Reported on 2024)       No current facility-administered medications on file prior to visit.     Social History     Socioeconomic History    Marital status: /Civil Union     Spouse name: Not on file    Number of children: Not on file    Years of education: Not on file    Highest education level: Not on file   Occupational History    Not on file   Tobacco Use    Smoking status: Former     Current packs/day: 0.00     Average packs/day: 1 pack/day for 20.0 years (20.0 ttl pk-yrs)     Types: Cigarettes     Start date:      Quit date:      Years since quittin.8     Passive exposure: Never    Smokeless tobacco: Never    Tobacco comments:  "    Started age 17   Vaping Use    Vaping status: Never Used   Substance and Sexual Activity    Alcohol use: No     Comment: none in 33 yrs    Drug use: No    Sexual activity: Yes   Other Topics Concern    Not on file   Social History Narrative    Single per allscript      Social Determinants of Health     Financial Resource Strain: Low Risk  (10/11/2023)    Overall Financial Resource Strain (CARDIA)     Difficulty of Paying Living Expenses: Not hard at all   Food Insecurity: Not on file   Transportation Needs: No Transportation Needs (10/11/2023)    PRAPARE - Transportation     Lack of Transportation (Medical): No     Lack of Transportation (Non-Medical): No   Physical Activity: Not on file   Stress: Not on file   Social Connections: Not on file   Intimate Partner Violence: Not At Risk (5/30/2024)    Received from [facility]    Humiliation, Afraid, Rape, and Kick questionnaire     Fear of Current or Ex-Partner: No     Emotionally Abused: No     Physically Abused: No     Sexually Abused: No   Housing Stability: Not on file         I have reviewed the past medical, surgical, social and family history, medications and allergies as documented in the EMR.    Review of systems: ROS is negative other than that noted in the HPI.     Physical Exam:    Blood pressure 132/76, pulse 65, height 5' 11\" (1.803 m), weight 95.7 kg (211 lb).    General/Constitutional: NAD, well developed, well nourished        Cervical Exam:      Inspection: No ecchymosis, edema, or deformity. No visualized wounds or skin lesions   Palpation: Under right trapezius, right paraspinal muscles in the cervical spine and thoracic spine.  Active Motion: Somewhat limited cervical motion  Strength: 5 out of 5 strength of all major myotomes of bilateral upper extremities  Sensory - SILT in the Radial / Ulnar / Median / Axillary nerve distributions  Motor - AIN / PIN / Radial / Ulnar / Median / Axillary motor nerves in tact  Palpable Radial " pulse  Cap refill <2secs in all digits    Negative Joshua's positive Spurling's        Imaging    I reviewed and interpreted X-rays of the  shoulder which show mild degenerative changes no focal acute osseous abnormalities

## 2024-11-11 ENCOUNTER — EVALUATION (OUTPATIENT)
Dept: PHYSICAL THERAPY | Facility: CLINIC | Age: 79
End: 2024-11-11
Payer: MEDICARE

## 2024-11-11 DIAGNOSIS — M54.12 RADICULOPATHY, CERVICAL REGION: ICD-10-CM

## 2024-11-11 PROCEDURE — 97140 MANUAL THERAPY 1/> REGIONS: CPT

## 2024-11-11 PROCEDURE — 97161 PT EVAL LOW COMPLEX 20 MIN: CPT

## 2024-11-11 NOTE — PROGRESS NOTES
PT Evaluation   Today's date: 2024  Patient name: Andrade Wise Jr.  : 1945  MRN: [id number]  Referring provider: Ho Dao*  Dx:   Encounter Diagnosis     ICD-10-CM    1. Radiculopathy, cervical region  M54.12 Ambulatory Referral to Physical Therapy              Assessment  Assessment details: Patient is a 79 y.o. Male who presents with referring diagnosis of cervical radiculopathy. Patient's greatest concern is the pain he is experiencing, worry over not knowing what's wrong, concern at no signs of improvement, and fear of not being able to keep active.    Primary movement impairment diagnosis of mobility deficits of cervical spine resulting in pathoanatomical symptoms of decreased range of motion, impaired physical strength, poor posture, poor body mechanics, scapular dyskinesia, neural tension, and nociceptive and neuropathic pain limiting function. The aforementioned impairments have limited the patient's ability to lift weight, walk his dog, and utilize a computer mouse, impacting his day to day activities. No further referral appears necessary at this time based upon examination results    Patient education performed during today's session included: Nature of cervical radiculopathy, POC, and HEP    Primary movement impairments:  1) mobility deficits   2) poor posture     Etiological factors include: n/a        Impairments: Abnormal or restricted ROM, Activity intolerance, Impaired physical strength, Lacks appropriate HEP, Poor posture, Poor body mechanics, Pain with function, Weight-bearing intolerance, Scapular dyskinesis, and Abnormal movement  Understanding of Dx/Px/POC: Good  Prognosis: Good   Positive prognostic factors: positive response to previous therapy   Negative prognostic factors:     Patient verbalized understanding of POC.         Please contact me if you have any questions or recommendations. Thank you  "for the referral and the opportunity to share in Andrade Wise 's care.        Plan  Plan details: 2x per week for 6 weeks     Patient would benefit from: PT Eval and Skilled PT  Planned modality interventions: Cryotherapy and Traction  Planned therapy interventions: Body mechanics training, Coordination, Dry Needling, Functional ROM exercises, HEP, Joint mobilization, Manual therapy, Cruz taping, Neuromuscular re-education, Patient education, Postural training, Strengthening, Stretching, Therapeutic activities, Therapeutic exercises, and Activity modification  Frequency: 2x/wk  Duration in weeks: 6  Plan of Care beginning date: 2024   Plan of Care expiration date: 6 weeks - 2024  Treatment plan discussed with: Patient       Goals  Short Term Goals (4 weeks):    - Patient will be independent in basic HEP 2-3 weeks  - Patient will report >50% reduction in pain  - Patient will demonstrate >1/3 improvement in MMT grade as applicable  - Patient will deny pain when utilizing computer for up to 30 minutes   - Patient will demonstrate improved posture when seated     Long Term Goals (6 weeks):  - Patient will be independent in a comprehensive home exercise program  - Patient FOTO score will improve to 57/100  - Patient will self-report >70% improvement in function  - Patient will deny pain when walking his dog up to 1 mile   - Patient will demonstrate improved cervical mobility to at most \"minimally limited\" in all directions      Subjective    History of Present Illness  - Mechanism of injury: h/o shoulder injuries and surgeries. 2 weeks ago, he moved a generator, no injury, but after he had pain running down his arm (to around elbow and sometimes further). Was prescribed anti-inflammatory, but did not work   - Primary AD: none reported   - Assist level at home: independent   - Prior level of function: walks dog daily 1.5 mi/day   - Relieving factors: ice     Pain  - Current pain ratin/10  - At " best pain ratin/10  - At worst pain rating: 10/10  - Location: right side of neck down right arm   - Aggravating factors: lifting, walking the dog, using computer     Social Support  - Employment status: not working (retired)  - Hand dominance: right  - Exercise history: Yard work, House work ,Golf       Objective     Red Flag Screening  - age >50 years old     Postural Assessment  -Posture in Sitting: rounded shoulders, protruded cervical spine; increased thoracic kyphosis   -Posture in Standing: rounded shoulders, protruded cervical spine; increased thoracic kyphosis  -Postural Correction: improved symptoms     Sensation  - Light touch: slightly diminished at C4   - Reflexes:   Left: C5: 2+    Right: C5: 2+    - Upper Motor Neuron Signs: WNL     UE MMT   LEFT RIGHT   Shoulder Shrug (C4) 5/5 4+/5   Shoulder Abduction (C5) 4+/5 4/5   Elbow Flexion (C6) 5/5 4/5   Elbow Extension (C7) 5/5 4+/5   Thumb Extension (C8) 5/5 5/5   Finger Adduction (T1) 4+/5 4+/5       Cervical Spine Range of Motion   2024   Flexion within normal limits   Extension moderately limited, pain   Protraction within normal limits   Retraction moderately limited, pain   Side bending R moderately limited   Side bending L  moderately limited   Rotation R minimally limited   Rotation L minimally limited   Thoracic rotation R minimally limited   Thoracic rotation L minimally limited     Shoulder range of motion       Mechanical Assessment  - Unable to fully assess secondary to increased irritability. Slight centralization with cervical retraction, however, he did not tolerate increased sensation to cervical spine at this time. Plan to continue assessment as symptom irritability decreases.    Joint Play     2024   OA Normal   AA Normal   C2 Normal   C3 Normal   C4 Hypomobile and Pain   C5 Hypomobile and Pain   C6 Hypomobile and Pain   C7 Hypomobile   CTJ Hypomobile   Mid-Thoracic Spine Normal and Hypomobile   Lower Thoracic Spine Normal  "      Palpation  (+) TTP of mid-lower cervical spine, right upper trap (hypertonicity)      Diagnostic Tests Performed  Positive: Spurling A and ULTT 1A (median nerve)  Negative: Alar Ligament and Sharp Ameena         Insurance Eval/ Re-eval POC expires Auth Status Total visits  Start date  Expiration date Misc   CMS 11/11/24 12/23/24 No auth req     Co-Pay $4.67                 Date 11/11        Visit Number 1        Auth                  Manual         Cervical mobs  Upglides MR        Cervical traction          MDT  Continue nv        STM UT/LS STM/TPR                 TherEx         TWU         PROM                                                                Neuro Re-Ed         Cervical retractions          Scapular retractions  HEP 5\" x20        Scapular depression  HEP 5\"x20        Neural tensioning                                             TherAct                                                      Gait Training                                    Modalities         CP            Precautions: STANDARD  Past Medical History:   Diagnosis Date    Allergic rhinitis     Anemia 10/23/2008    last assessed 9/9/13     Anxiety     Atrial fibrillation (HCC)     Bleeding     Chronic kidney disease     COVID-19 virus infection 12/2022    CPAP (continuous positive airway pressure) dependence     Diabetes mellitus (HCC)     Diarrhea     Diverticulitis     Eating disorder     GERD (gastroesophageal reflux disease)     Herpes zoster with complication     last assessed 7/3/17     Hiatal hernia     Hyperlipidemia     Hypertension     IBS (irritable bowel syndrome)     Incisional hernia     Increased urinary frequency     Irregular heart beat     Pyogenic granuloma 09/13/2006    last assessed 9/13/06    Carlos (subconjunctival hemorrhage), unspecified laterality 09/12/2005    last assessed 9/12/05    Sleep apnea     TMJ (dislocation of temporomandibular joint)     Ventral hernia     last assessed  4/6/17     Wears glasses  "

## 2024-11-12 ENCOUNTER — TELEPHONE (OUTPATIENT)
Age: 79
End: 2024-11-12

## 2024-11-12 NOTE — TELEPHONE ENCOUNTER
Patients GI provider:  Dr. Frankel    Number to return call:      Reason for call: Pt called and stated he has a neck injury and has been very painful he was wondering if he can take anything else for the pain since he has a colonoscopy tomorrow he also mentioned his sugar levels have been very high please review and reach out for advise thank you     Scheduled procedure/appointment date if applicable:procedure 11/13/2024

## 2024-11-12 NOTE — TELEPHONE ENCOUNTER
Patient calling to follow up with earlier call . Explained message was sent and he will be receiving a cb from our clinical staff / Patient will wait to hear back

## 2024-11-12 NOTE — TELEPHONE ENCOUNTER
Spoke with pt, advised he can take Tylenol for pain. Pt mentioned he took Mounjaro on Friday, advised he has to reschedule. Pt transferred to scheduling to reschedule. Also pt requesting miralax/dulcolax prep, instructions sent to [facility].

## 2024-11-13 ENCOUNTER — TELEPHONE (OUTPATIENT)
Age: 79
End: 2024-11-13

## 2024-11-13 NOTE — TELEPHONE ENCOUNTER
Received call from patient to [facility] a new patient appt    I offered the next  available in 2025    Patient declined.

## 2024-11-14 ENCOUNTER — TELEPHONE (OUTPATIENT)
Age: 79
End: 2024-11-14

## 2024-11-14 ENCOUNTER — OFFICE VISIT (OUTPATIENT)
Dept: PHYSICAL THERAPY | Facility: CLINIC | Age: 79
End: 2024-11-14
Payer: MEDICARE

## 2024-11-14 DIAGNOSIS — M54.12 RADICULOPATHY, CERVICAL REGION: Primary | ICD-10-CM

## 2024-11-14 PROCEDURE — 97110 THERAPEUTIC EXERCISES: CPT

## 2024-11-14 PROCEDURE — 97140 MANUAL THERAPY 1/> REGIONS: CPT

## 2024-11-14 PROCEDURE — 97530 THERAPEUTIC ACTIVITIES: CPT

## 2024-11-14 NOTE — PROGRESS NOTES
"Daily Note     Today's date: 2024  Patient name: Andrade Wise Jr.  : 1945  MRN: [id number]  Referring provider: Ho Dao*  Dx:   Encounter Diagnosis     ICD-10-CM    1. Radiculopathy, cervical region  M54.12                      Subjective: Patient states he went to the chiropractor yesterday. He states he plans on seeing the chiropractor 3x per week in conjunction with PT.       Objective: See treatment diary below. He required chin tucks after push up plus on table, it resolved his symptoms       Assessment: Continued MDT assessment. He responds to cervical retractions with extension. Followed up with postural strengthening exercises which he tolerated very well. Plan to add thoracic mobility exercises next visit and continue progressing postural strengthening and utilize cervical retractions PRN. Provided and reviewed updated HEP and green TB.       Plan: Continue per plan of care.        Insurance Eval/ Re-eval POC expires Auth Status Total visits  Start date  Expiration date Misc   CMS 24 No auth req     Co-Pay $4.67                 Date        Visit Number 1 2       Auth                  Manual         Cervical mobs  Upglides MR        Cervical traction          MDT  Continue nv Continued - see assessment        STM UT/LS STM/TPR UT/LS STM/TPR                TherEx         TWU         PROM                                                                Neuro Re-Ed         Cervical retractions   Chin tucks 2x10     Chin tucks + ext 2x10        Scapular retractions  HEP 5\" x20 Rows GTB 3x10       Scapular depression  HEP 5\"x20 Ext GTB 3x10        Neural tensioning  Wall angels 15x        Push up plus   Hi low table 3x10 - req chin tucks                                   TherAct           Updated HEP + review                                            Gait Training                                    Modalities         CP            Precautions: STANDARD  Past " Medical History:   Diagnosis Date    Allergic rhinitis     Anemia 10/23/2008    last assessed 9/9/13     Anxiety     Atrial fibrillation (HCC)     Bleeding     Chronic kidney disease     COVID-19 virus infection 12/2022    CPAP (continuous positive airway pressure) dependence     Diabetes mellitus (HCC)     Diarrhea     Diverticulitis     Eating disorder     GERD (gastroesophageal reflux disease)     Herpes zoster with complication     last assessed 7/3/17     Hiatal hernia     Hyperlipidemia     Hypertension     IBS (irritable bowel syndrome)     Incisional hernia     Increased urinary frequency     Irregular heart beat     Pyogenic granuloma 09/13/2006    last assessed 9/13/06    Carlos (subconjunctival hemorrhage), unspecified laterality 09/12/2005    last assessed 9/12/05    Sleep apnea     TMJ (dislocation of temporomandibular joint)     Ventral hernia     last assessed  4/6/17     Wears glasses      Access Code: [email or web address]  URL: [email or web address]  Date: 11/14/2024  Prepared by: Dyan Pritchett    Exercises  - Standing Shoulder Row with Anchored Resistance  - 1 x daily - 3 sets - 10 reps  - Shoulder extension with resistance - Neutral  - 1 x daily - 3 sets - 10 reps  - Seated Cervical Retraction and Extension  - 1 x daily - 3 sets - 10 reps  - Wall Juliette  - 1 x daily - 3 sets - 10 reps

## 2024-11-15 ENCOUNTER — TELEPHONE (OUTPATIENT)
Dept: OBGYN CLINIC | Facility: MEDICAL CENTER | Age: 79
End: 2024-11-15

## 2024-11-15 DIAGNOSIS — M54.12 RADICULOPATHY, CERVICAL REGION: Primary | ICD-10-CM

## 2024-11-15 RX ORDER — TIZANIDINE 2 MG/1
2 TABLET ORAL 2 TIMES DAILY PRN
Qty: 20 TABLET | Refills: 0 | Status: SHIPPED | OUTPATIENT
Start: 2024-11-15

## 2024-11-15 NOTE — TELEPHONE ENCOUNTER
Caller: Andrade     Doctor: Dr Dao- myofascial neck pain and and cervical radiculopathy     Reason for call: The patient is calling due to seeing you on 11/5. After taking steroids he went into Afib and his sugar is high - 193- up to 250.  He is calling back to being in pain. Is there anything that can be called into help him. No codeine and no steroids.   Please call to advise.    [id number] [address] [address]  Phone: [phone]  Fax: [phone]  JIMMY #: --       Call back#: [phone]

## 2024-11-15 NOTE — PROGRESS NOTES
Called and discussed with the patient.  He is having severe neck pain radiating to his arm.  He did not have much relief with his steroid that he tried it also made his blood sugar increased as expected.  He has tried Tylenol with no relief.  He is on Eliquis so is not able to take NSAIDs.  I recommended against any opioids and he is not interested in that anyway.  We did discuss the possibly trying a muscle relaxer.  We did discuss that patients at his age are at significantly increased risk of complication with a muscle relaxer.  We reviewed signs and symptoms that could occur.  He understood and is desperate for relief so I did prescribe muscle relaxer as well.  This was sent to his pharmacy.

## 2024-11-17 ENCOUNTER — HOSPITAL ENCOUNTER (EMERGENCY)
Facility: HOSPITAL | Age: 79
Discharge: HOME | End: 2024-11-17
Attending: EMERGENCY MEDICINE
Payer: MEDICARE

## 2024-11-17 VITALS
SYSTOLIC BLOOD PRESSURE: 135 MMHG | TEMPERATURE: 98.7 F | WEIGHT: 210 LBS | OXYGEN SATURATION: 98 % | HEART RATE: 77 BPM | RESPIRATION RATE: 20 BRPM | DIASTOLIC BLOOD PRESSURE: 71 MMHG | BODY MASS INDEX: 29.29 KG/M2

## 2024-11-17 DIAGNOSIS — M54.12 CERVICAL RADICULOPATHY: Primary | ICD-10-CM

## 2024-11-17 PROCEDURE — 99283 EMERGENCY DEPT VISIT LOW MDM: CPT

## 2024-11-17 PROCEDURE — 99284 EMERGENCY DEPT VISIT MOD MDM: CPT | Performed by: PHYSICIAN ASSISTANT

## 2024-11-17 RX ORDER — ONDANSETRON 4 MG/1
4 TABLET, FILM COATED ORAL EVERY 6 HOURS
Qty: 6 TABLET | Refills: 0 | Status: SHIPPED | OUTPATIENT
Start: 2024-11-17

## 2024-11-17 RX ORDER — OXYCODONE HYDROCHLORIDE 5 MG/1
5 TABLET ORAL EVERY 4 HOURS PRN
Qty: 6 TABLET | Refills: 0 | Status: SHIPPED | OUTPATIENT
Start: 2024-11-17 | End: 2024-11-27

## 2024-11-17 NOTE — ED PROVIDER NOTES
Time reflects when diagnosis was documented in both MDM as applicable and the Disposition within this note       Time User Action Codes Description Comment    11/17/2024 10:46 AM Andrade Newton Add [M54.12] Cervical radiculopathy           ED Disposition       ED Disposition   Discharge    Condition   Stable    Date/Time   Sun Nov 17, 2024 10:46 AM    Comment   Andrade Wise Jr. discharge to home/self care.                   Assessment & Plan       Medical Decision Making  79-year-old white male presenting with symptoms consistent with cervical radiculopathy.  He is getting no pain relief with Tylenol, Medrol Dosepak, PT, tizanidine, chiropractor.  He is amenable to a short course of opiate at this point.  States he has had nausea in the past.  Will prescribe Zofran as well to try to counteract any nausea that he might get from the oxycodone.  He was advised he may take Tylenol every 6 hours as needed for mild to moderate pain.  He is advised to use oxycodone only for moderate to severe pain not help by Tylenol.  Was advised no driving if taking oxycodone and to take a stool softener as can constipate.  He was advised to follow-up with orthopedist Dr. Dao earlier this week.  Return precautions were reviewed.    Risk  Prescription drug management.             Medications - No data to display    ED Risk Strat Scores                           SBIRT 22yo+      Flowsheet Row Most Recent Value   Initial Alcohol Screen: US AUDIT-C     1. How often do you have a drink containing alcohol? 0 Filed at: 11/17/2024 1038   2. How many drinks containing alcohol do you have on a typical day you are drinking?  0 Filed at: 11/17/2024 1038   3a. Male UNDER 65: How often do you have five or more drinks on one occasion? 0 Filed at: 11/17/2024 1038   3b. FEMALE Any Age, or MALE 65+: How often do you have 4 or more drinks on one occassion? 0 Filed at: 11/17/2024 1038   Audit-C Score 0 Filed at: 11/17/2024 1038   IGNACIA: How many  times in the past year have you...    Used an illegal drug or used a prescription medication for non-medical reasons? Never Filed at: 11/17/2024 1038                            History of Present Illness       Chief Complaint   Patient presents with    Neck Pain     States he has had neck pain for over 2 weeks. Has seen PT, doctors and had xrays. Nothing is helping, meds aren't helping.        Past Medical History:   Diagnosis Date    Allergic rhinitis     Anemia 10/23/2008    last assessed 9/9/13     Anxiety     Atrial fibrillation (HCC)     Bleeding     Chronic kidney disease     COVID-19 virus infection 12/2022    CPAP (continuous positive airway pressure) dependence     Diabetes mellitus (HCC)     Diarrhea     Diverticulitis     Eating disorder     GERD (gastroesophageal reflux disease)     Herpes zoster with complication     last assessed 7/3/17     Hiatal hernia     Hyperlipidemia     Hypertension     IBS (irritable bowel syndrome)     Incisional hernia     Increased urinary frequency     Irregular heart beat     Pyogenic granuloma 09/13/2006    last assessed 9/13/06    Carlos (subconjunctival hemorrhage), unspecified laterality 09/12/2005    last assessed 9/12/05    Sleep apnea     TMJ (dislocation of temporomandibular joint)     Ventral hernia     last assessed  4/6/17     Wears glasses       Past Surgical History:   Procedure Laterality Date    APPENDECTOMY      ATRIAL ABLATION SURGERY      2014    ATRIAL ABLATION SURGERY      catheter ablation atrial fibrillation / last assessed 2/17/16     CARDIOVERSION  05/30/2024    CHOLECYSTECTOMY      lap    COLON SURGERY  1990    Hartmans procedure    COLON SURGERY      reversal 6 weeks later    COLONOSCOPY N/A 01/26/2017    Procedure: COLONOSCOPY;  Surgeon: Portillo Schumacher MD;  Location: [facility];  Service:     ESOPHAGOGASTRODUODENOSCOPY N/A 01/26/2017    Procedure: ESOPHAGOGASTRODUODENOSCOPY (EGD);  Surgeon: Portillo Schumacher MD;  Location: [facility];  Service:      EYE SURGERY Bilateral 2015    lid repair    HERNIA REPAIR Bilateral     inguinal  &     INCISIONAL HERNIA REPAIR      INCISIONAL HERNIA REPAIR N/A 2017    Procedure: REPAIR OF INCARCERATED INCISIONAL HERNIA WITH MESH, Lysis of Adhesions, Partial Omentectomy;  Surgeon: Colt Davis MD;  Location: [facility];  Service:     INSERT / REPLACE / REMOVE PACEMAKER Left 10/04/2021    [id number]    NEUROBLASTOMA EXCISION      SKIN CANCER EXCISION      On nose    TONSILLECTOMY        Family History   Problem Relation Age of Onset    Heart disease Mother     Cancer Mother     Colon cancer Mother     Dementia Mother     Early death Father     Seizures Father     Cancer Sister         bone    Heart disease Sister     Diabetes Sister     Lupus Sister     Heart disease Brother     Cancer Brother         kidney    Diabetes Brother     Other Sister         covid    Stroke Neg Hx       Social History     Tobacco Use    Smoking status: Former     Current packs/day: 0.00     Average packs/day: 1 pack/day for 20.0 years (20.0 ttl pk-yrs)     Types: Cigarettes     Start date:      Quit date:      Years since quittin.9     Passive exposure: Never    Smokeless tobacco: Never    Tobacco comments:     Started age 17   Vaping Use    Vaping status: Never Used   Substance Use Topics    Alcohol use: No     Comment: none in 33 yrs    Drug use: No      E-Cigarette/Vaping    E-Cigarette Use Never User       E-Cigarette/Vaping Substances    Nicotine No     THC No     CBD No     Flavoring No     Other No     Unknown No       I have reviewed and agree with the history as documented.     Patient is a 79-year-old white male with history of A-fib on Eliquis, hypertension, hyperlipidemia, anxiety who reports 17-day history of pain felt from right neck rating to right trapezius and right shoulder to the mid upper arm.  Has been seen by orthopedist Dr. Dao.  Tylenol is not helping for his pain.  He was  prescribed a Medrol Dosepak which did not help.  He is currently 2 sessions into physical therapy.  He has seen a chiropractor on multiple occasions without relief.  He was prescribed tizanidine  2 days ago which he states is not helping.  He states he needs something stronger for pain.  He reports his pain began after he pulled a generator across the lawn.  He denies right arm numbness or tingling.  He denies right arm weakness.        Review of Systems   Constitutional:  Negative for fever.   Respiratory:  Negative for shortness of breath.    Cardiovascular:  Negative for chest pain.   Gastrointestinal:  Negative for abdominal pain.   Musculoskeletal:  Positive for neck pain.   Neurological:  Negative for numbness.           Objective       ED Triage Vitals [11/17/24 1012]   Temperature Pulse Blood Pressure Respirations SpO2 Patient Position - Orthostatic VS   98.7 °F (37.1 °C) 77 135/71 20 98 % --      Temp src Heart Rate Source BP Location FiO2 (%) Pain Score    -- -- -- -- 9      Vitals      Date and Time Temp Pulse SpO2 Resp BP Pain Score FACES Pain Rating User   11/17/24 1012 98.7 °F (37.1 °C) 77 98 % 20 135/71 9 -- KELLI            Physical Exam  Vitals and nursing note reviewed.   Constitutional:       General: He is not in acute distress.     Appearance: Normal appearance. He is not ill-appearing, toxic-appearing or diaphoretic.   HENT:      Head: Normocephalic and atraumatic.      Nose: Nose normal.      Mouth/Throat:      Mouth: Mucous membranes are moist.      Pharynx: Oropharynx is clear.   Eyes:      Extraocular Movements: Extraocular movements intact.      Conjunctiva/sclera: Conjunctivae normal.      Pupils: Pupils are equal, round, and reactive to light.   Neck:      Comments: No midline spinal tenderness.  Pain is felt right neck rating to right trapezius ridge and right shoulder when he rotates his head    Right upper extremity is neurovascular intact with normal sensation, strength,  pulses.  Cardiovascular:      Rate and Rhythm: Normal rate and regular rhythm.      Pulses: Normal pulses.      Heart sounds: Normal heart sounds.   Pulmonary:      Effort: Pulmonary effort is normal.      Breath sounds: Normal breath sounds.   Abdominal:      General: Abdomen is flat. Bowel sounds are normal.      Palpations: Abdomen is soft.   Musculoskeletal:         General: Normal range of motion.      Cervical back: Normal range of motion and neck supple.   Skin:     General: Skin is warm.      Capillary Refill: Capillary refill takes less than 2 seconds.   Neurological:      Mental Status: He is alert and oriented to person, place, and time. Mental status is at baseline.      Cranial Nerves: No cranial nerve deficit.      Sensory: No sensory deficit.      Motor: No weakness.      Coordination: Coordination normal.      Gait: Gait normal.      Deep Tendon Reflexes: Reflexes normal.         Results Reviewed       None            No orders to display       Procedures    ED Medication and Procedure Management   Prior to Admission Medications   Prescriptions Last Dose Informant Patient Reported? Taking?   Blood Glucose Monitoring Suppl (FREESTYLE LITE) GEOVANNY  Self Yes No   FREESTYLE LITE test strip  Self Yes No   Lancets (FREESTYLE) lancets  Self Yes No   Mounjaro 5 MG/0.5ML   Yes No   Sig: Not taking until 2024   Ozempic, 0.25 or 0.5 MG/DOSE, 2 MG/1.5ML SOPN  Self Yes No   Sig: once a week   Patient not taking: Reported on 3/7/2024   RESTASIS 0.05 % ophthalmic emulsion  Self Yes No   Sig: Administer 1 drop to both eyes every 12 (twelve) hours   acetaminophen (TYLENOL) 325 mg tablet  Self Yes No   Sig: Take 650 mg by mouth every 6 (six) hours as needed   amiodarone 100 mg tablet  Self Yes No   Si mg   Patient not taking: Reported on 2024   amiodarone 200 mg tablet   Yes No   apixaban (ELIQUIS) 5 mg   No No   Sig: Take 1 tablet (5 mg total) by mouth 2 (two) times a day   atorvastatin (LIPITOR) 20 mg  tablet   No No   Sig: TAKE 1 TABLET DAILY   carvedilol (COREG) 25 mg tablet  Self Yes No   Sig: Take 12.5 mg by mouth 2 (two) times a day   clotrimazole-betamethasone (LOTRISONE) 1-0.05 % cream   No No   Sig: APPLY TO AFFECTED AREA(S) TWO TIMES A DAY   esomeprazole (NexIUM) 40 MG capsule   No No   Sig: Take 1 capsule (40 mg total) by mouth daily before breakfast   fluticasone (FLONASE) 50 mcg/act nasal spray  Self No No   Si sprays into each nostril daily   hydroCHLOROthiazide 25 mg tablet   No No   Sig: TAKE ONE TABLET BY MOUTH EVERY DAY   ketoconazole (NIZORAL) 2 % shampoo  Self Yes No   linaCLOtide (Linzess) 290 MCG CAPS   No No   Sig: Take 1 capsule by mouth daily before breakfast   linaCLOtide (Linzess) 290 MCG CAPS   No No   Sig: Take 1 capsule by mouth daily before breakfast   losartan (COZAAR) 100 MG tablet  Self Yes No   Si mg every morning    metFORMIN (GLUCOPHAGE-XR) 500 mg 24 hr tablet   Yes No   Patient not taking: Reported on 6/3/2024   methylPREDNISolone 4 MG tablet therapy pack   No No   Sig: Use as directed on package   metroNIDAZOLE (METROCREAM) 0.75 % cream  Self Yes No   pioglitazone-metFORMIN (ACTOPLUS MET)  MG per tablet  Self Yes No   Sig: daily after dinner   Patient not taking: Reported on 4/15/2024   polyethylene glycol (Golytely) 4000 mL solution   No No   Sig: Take 4,000 mL by mouth once for 1 dose Take 4000 mL by mouth once for 1 dose. Use as directed   polyethylene glycol-propylene glycol (Systane) 0.4-0.3 %  Self Yes No   Sig: every 3 (three) hours as needed     Patient not taking: Reported on 4/15/2024   promethazine-dextromethorphan (PHENERGAN-DM) 6.25-15 mg/5 mL oral syrup   No No   Sig: Take 5 mL by mouth 4 (four) times a day as needed for cough   Patient not taking: Reported on 6/3/2024   tamsulosin (FLOMAX) 0.4 mg   No No   Sig: TAKE 1 CAPSULE DAILY WITH DINNER   tiZANidine (ZANAFLEX) 2 mg tablet   No No   Sig: Take 1 tablet (2 mg total) by mouth 2 (two) times a  day as needed for muscle spasms   traMADol (ULTRAM) 50 mg tablet  Self Yes No   Sig: Take 50 mg by mouth once as needed for moderate pain He uses it rarely.   Patient not taking: Reported on 6/3/2024   urea (CARMOL) 20 % cream  Self Yes No   Sig: if needed   Patient not taking: Reported on 7/9/2024      Facility-Administered Medications: None     Discharge Medication List as of 11/17/2024 10:49 AM        START taking these medications    Details   ondansetron (ZOFRAN) 4 mg tablet Take 1 tablet (4 mg total) by mouth every 6 (six) hours, Starting Sun 11/17/2024, Normal      oxyCODONE (Roxicodone) 5 immediate release tablet Take 1 tablet (5 mg total) by mouth every 4 (four) hours as needed for moderate pain for up to 10 days Max Daily Amount: 30 mg, Starting Sun 11/17/2024, Until Wed 11/27/2024 at 2359, Normal           CONTINUE these medications which have NOT CHANGED    Details   acetaminophen (TYLENOL) 325 mg tablet Take 650 mg by mouth every 6 (six) hours as needed, Historical Med      !! amiodarone 100 mg tablet 200 mg, Starting Thu 2/29/2024, Historical Med      !! amiodarone 200 mg tablet Historical Med      apixaban (ELIQUIS) 5 mg Take 1 tablet (5 mg total) by mouth 2 (two) times a day, Starting Mon 4/15/2024, Normal      atorvastatin (LIPITOR) 20 mg tablet TAKE 1 TABLET DAILY, Normal      Blood Glucose Monitoring Suppl (FREESTYLE LITE) GEOVANNY Starting Wed 5/1/2019, Historical Med      carvedilol (COREG) 25 mg tablet Take 12.5 mg by mouth 2 (two) times a day, Historical Med      clotrimazole-betamethasone (LOTRISONE) 1-0.05 % cream APPLY TO AFFECTED AREA(S) TWO TIMES A DAY, Starting Thu 8/15/2024, Normal      esomeprazole (NexIUM) 40 MG capsule Take 1 capsule (40 mg total) by mouth daily before breakfast, Starting Mon 7/8/2024, Normal      fluticasone (FLONASE) 50 mcg/act nasal spray 2 sprays into each nostril daily, Starting Mon 2/21/2022, Normal      FREESTYLE LITE test strip Historical Med       hydroCHLOROthiazide 25 mg tablet TAKE ONE TABLET BY MOUTH EVERY DAY, Starting Wed 8/28/2024, Normal      ketoconazole (NIZORAL) 2 % shampoo Starting Thu 1/12/2023, Historical Med      Lancets (FREESTYLE) lancets Historical Med      !! linaCLOtide (Linzess) 290 MCG CAPS Take 1 capsule by mouth daily before breakfast, Starting Tue 10/29/2024, Normal      !! linaCLOtide (Linzess) 290 MCG CAPS Take 1 capsule by mouth daily before breakfast, Starting Tue 10/29/2024, Normal      losartan (COZAAR) 100 MG tablet 100 mg every morning , Starting Thu 8/20/2020, Historical Med      metFORMIN (GLUCOPHAGE-XR) 500 mg 24 hr tablet Historical Med      methylPREDNISolone 4 MG tablet therapy pack Use as directed on package, Normal      metroNIDAZOLE (METROCREAM) 0.75 % cream Historical Med      Mounjaro 5 MG/0.5ML Not taking until 04/08/2024, Starting Fri 3/8/2024, Historical Med      Ozempic, 0.25 or 0.5 MG/DOSE, 2 MG/1.5ML SOPN once a week, Starting Mon 8/22/2022, Historical Med      pioglitazone-metFORMIN (ACTOPLUS MET)  MG per tablet daily after dinner, Starting Wed 10/20/2021, Historical Med      polyethylene glycol (Golytely) 4000 mL solution Take 4,000 mL by mouth once for 1 dose Take 4000 mL by mouth once for 1 dose. Use as directed, Starting Mon 7/8/2024, Normal      polyethylene glycol-propylene glycol (Systane) 0.4-0.3 % every 3 (three) hours as needed  , Historical Med      promethazine-dextromethorphan (PHENERGAN-DM) 6.25-15 mg/5 mL oral syrup Take 5 mL by mouth 4 (four) times a day as needed for cough, Starting Mon 4/15/2024, Normal      RESTASIS 0.05 % ophthalmic emulsion Administer 1 drop to both eyes every 12 (twelve) hours, Starting Mon 8/6/2018, Historical Med      tamsulosin (FLOMAX) 0.4 mg TAKE 1 CAPSULE DAILY WITH DINNER, Normal      tiZANidine (ZANAFLEX) 2 mg tablet Take 1 tablet (2 mg total) by mouth 2 (two) times a day as needed for muscle spasms, Starting Fri 11/15/2024, Normal      traMADol (ULTRAM)  50 mg tablet Take 50 mg by mouth once as needed for moderate pain He uses it rarely., Historical Med      urea (CARMOL) 20 % cream if needed, Starting Wed 9/27/2023, Historical Med       !! - Potential duplicate medications found. Please discuss with provider.        No discharge procedures on file.  ED SEPSIS DOCUMENTATION   Time reflects when diagnosis was documented in both MDM as applicable and the Disposition within this note       Time User Action Codes Description Comment    11/17/2024 10:46 AM Andrade Newton Add [M54.12] Cervical radiculopathy                  Andrade Newton PA-C  11/17/24 1104

## 2024-11-17 NOTE — DISCHARGE INSTRUCTIONS
Follow up with your orthopedist - Dr Dao next 1-2 days     Continue physical therapy    May take tylenol every 6 hours for mild to moderate pain    May try salonpas (lidocaine) patch    Return to ED for increased pain, worsening symptoms

## 2024-11-18 ENCOUNTER — APPOINTMENT (OUTPATIENT)
Dept: RADIOLOGY | Facility: CLINIC | Age: 79
End: 2024-11-18
Payer: MEDICARE

## 2024-11-18 ENCOUNTER — OFFICE VISIT (OUTPATIENT)
Dept: OBGYN CLINIC | Facility: CLINIC | Age: 79
End: 2024-11-18
Payer: MEDICARE

## 2024-11-18 VITALS
WEIGHT: 210 LBS | DIASTOLIC BLOOD PRESSURE: 69 MMHG | HEIGHT: 71 IN | BODY MASS INDEX: 29.4 KG/M2 | HEART RATE: 72 BPM | SYSTOLIC BLOOD PRESSURE: 108 MMHG

## 2024-11-18 DIAGNOSIS — M54.12 RADICULOPATHY, CERVICAL REGION: ICD-10-CM

## 2024-11-18 DIAGNOSIS — M75.51 ACUTE BURSITIS OF RIGHT SHOULDER: ICD-10-CM

## 2024-11-18 DIAGNOSIS — M50.30 DEGENERATIVE DISC DISEASE, CERVICAL: Primary | ICD-10-CM

## 2024-11-18 DIAGNOSIS — M54.2 MYOFASCIAL NECK PAIN: ICD-10-CM

## 2024-11-18 PROCEDURE — 72040 X-RAY EXAM NECK SPINE 2-3 VW: CPT

## 2024-11-18 PROCEDURE — 20610 DRAIN/INJ JOINT/BURSA W/O US: CPT | Performed by: ORTHOPAEDIC SURGERY

## 2024-11-18 PROCEDURE — 99213 OFFICE O/P EST LOW 20 MIN: CPT | Performed by: ORTHOPAEDIC SURGERY

## 2024-11-18 RX ORDER — TRIAMCINOLONE ACETONIDE 40 MG/ML
40 INJECTION, SUSPENSION INTRA-ARTICULAR; INTRAMUSCULAR
Status: COMPLETED | OUTPATIENT
Start: 2024-11-18 | End: 2024-11-18

## 2024-11-18 RX ORDER — BUPIVACAINE HYDROCHLORIDE 5 MG/ML
4 INJECTION, SOLUTION PERINEURAL
Status: COMPLETED | OUTPATIENT
Start: 2024-11-18 | End: 2024-11-18

## 2024-11-18 RX ADMIN — TRIAMCINOLONE ACETONIDE 40 MG: 40 INJECTION, SUSPENSION INTRA-ARTICULAR; INTRAMUSCULAR at 14:00

## 2024-11-18 RX ADMIN — BUPIVACAINE HYDROCHLORIDE 4 ML: 5 INJECTION, SOLUTION PERINEURAL at 14:00

## 2024-11-18 NOTE — PROGRESS NOTES
Orthopedic Sports Medicine New Patient Visit     Assesment:   79 y.o. male with     Plan:    ***        Follow up:    No follow-ups on file.        Chief Complaint   Patient presents with    Left Knee - Follow-up    Right Knee - Pain    Right Shoulder - Pain, Swelling       History of Present Illness:    The patient is a 79 y.o., *** hand dominant, male, ***    Affected shoulder SANE score: ***    Shoulder Surgical History:  {none surgery:52010}    Past Medical, Social and Family History:  Past Medical History:   Diagnosis Date    Allergic rhinitis     Anemia 10/23/2008    last assessed 9/9/13     Anxiety     Atrial fibrillation (HCC)     Bleeding     Chronic kidney disease     COVID-19 virus infection 12/2022    CPAP (continuous positive airway pressure) dependence     Diabetes mellitus (HCC)     Diarrhea     Diverticulitis     Eating disorder     GERD (gastroesophageal reflux disease)     Herpes zoster with complication     last assessed 7/3/17     Hiatal hernia     Hyperlipidemia     Hypertension     IBS (irritable bowel syndrome)     Incisional hernia     Increased urinary frequency     Irregular heart beat     Pyogenic granuloma 09/13/2006    last assessed 9/13/06    Carlos (subconjunctival hemorrhage), unspecified laterality 09/12/2005    last assessed 9/12/05    Sleep apnea     TMJ (dislocation of temporomandibular joint)     Ventral hernia     last assessed  4/6/17     Wears glasses      Past Surgical History:   Procedure Laterality Date    APPENDECTOMY      ATRIAL ABLATION SURGERY      2014    ATRIAL ABLATION SURGERY      catheter ablation atrial fibrillation / last assessed 2/17/16     CARDIOVERSION  05/30/2024    CHOLECYSTECTOMY      lap    COLON SURGERY  1990    Hartmans procedure    COLON SURGERY      reversal 6 weeks later    COLONOSCOPY N/A 01/26/2017    Procedure: COLONOSCOPY;  Surgeon: Portillo Schumacher MD;  Location: [facility];  Service:     ESOPHAGOGASTRODUODENOSCOPY N/A 01/26/2017    Procedure:  ESOPHAGOGASTRODUODENOSCOPY (EGD);  Surgeon: Portillo Schumacher MD;  Location: [facility];  Service:     EYE SURGERY Bilateral 2015    lid repair    HERNIA REPAIR Bilateral     inguinal 2014 & 2013    INCISIONAL HERNIA REPAIR  2011    INCISIONAL HERNIA REPAIR N/A 03/24/2017    Procedure: REPAIR OF INCARCERATED INCISIONAL HERNIA WITH MESH, Lysis of Adhesions, Partial Omentectomy;  Surgeon: Colt Davis MD;  Location: [facility];  Service:     INSERT / REPLACE / REMOVE PACEMAKER Left 10/04/2021    medtronic-[id number]    NEUROBLASTOMA EXCISION      SKIN CANCER EXCISION      On nose    TONSILLECTOMY       Allergies   Allergen Reactions    Codeine Other (See Comments) and GI Intolerance     Reaction Date: 27Dec2004; Annotation - 04Sep2012: '' CRAZY ''  vomiting  headache    Demerol [Meperidine] Nausea Only, Other (See Comments), GI Intolerance and Vomiting     vomiting  Reaction Date: 27Dec2004;   headache    Morphine Nausea Only, GI Intolerance and Vomiting     Reaction Date: 23Feb2012;      Current Outpatient Medications on File Prior to Visit   Medication Sig Dispense Refill    acetaminophen (TYLENOL) 325 mg tablet Take 650 mg by mouth every 6 (six) hours as needed      amiodarone 200 mg tablet       apixaban (ELIQUIS) 5 mg Take 1 tablet (5 mg total) by mouth 2 (two) times a day 180 tablet 3    atorvastatin (LIPITOR) 20 mg tablet TAKE 1 TABLET DAILY 90 tablet 1    Blood Glucose Monitoring Suppl (FREESTYLE LITE) GEOVANNY       carvedilol (COREG) 25 mg tablet Take 12.5 mg by mouth 2 (two) times a day      clotrimazole-betamethasone (LOTRISONE) 1-0.05 % cream APPLY TO AFFECTED AREA(S) TWO TIMES A DAY 45 g 1    esomeprazole (NexIUM) 40 MG capsule Take 1 capsule (40 mg total) by mouth daily before breakfast 90 capsule 3    fluticasone (FLONASE) 50 mcg/act nasal spray 2 sprays into each nostril daily 48 g 3    FREESTYLE LITE test strip       hydroCHLOROthiazide 25 mg tablet TAKE ONE TABLET BY MOUTH EVERY DAY 90 tablet 1     ketoconazole (NIZORAL) 2 % shampoo       Lancets (FREESTYLE) lancets       linaCLOtide (Linzess) 290 MCG CAPS Take 1 capsule by mouth daily before breakfast 14 capsule 0    losartan (COZAAR) 100 MG tablet 100 mg every morning       metFORMIN (GLUCOPHAGE-XR) 500 mg 24 hr tablet       metroNIDAZOLE (METROCREAM) 0.75 % cream       Mounjaro 5 MG/0.5ML Not taking until 04/08/2024      ondansetron (ZOFRAN) 4 mg tablet Take 1 tablet (4 mg total) by mouth every 6 (six) hours 6 tablet 0    oxyCODONE (Roxicodone) 5 immediate release tablet Take 1 tablet (5 mg total) by mouth every 4 (four) hours as needed for moderate pain for up to 10 days Max Daily Amount: 30 mg 6 tablet 0    RESTASIS 0.05 % ophthalmic emulsion Administer 1 drop to both eyes every 12 (twelve) hours      tamsulosin (FLOMAX) 0.4 mg TAKE 1 CAPSULE DAILY WITH DINNER 100 capsule 1    tiZANidine (ZANAFLEX) 2 mg tablet Take 1 tablet (2 mg total) by mouth 2 (two) times a day as needed for muscle spasms 20 tablet 0    amiodarone 100 mg tablet 200 mg (Patient not taking: Reported on 7/9/2024)      linaCLOtide (Linzess) 290 MCG CAPS Take 1 capsule by mouth daily before breakfast 90 capsule 1    methylPREDNISolone 4 MG tablet therapy pack Use as directed on package 21 each 0    Ozempic, 0.25 or 0.5 MG/DOSE, 2 MG/1.5ML SOPN once a week (Patient not taking: Reported on 3/7/2024)      pioglitazone-metFORMIN (ACTOPLUS MET)  MG per tablet daily after dinner (Patient not taking: Reported on 4/15/2024)      polyethylene glycol (Golytely) 4000 mL solution Take 4,000 mL by mouth once for 1 dose Take 4000 mL by mouth once for 1 dose. Use as directed 4000 mL 0    polyethylene glycol-propylene glycol (Systane) 0.4-0.3 % every 3 (three) hours as needed   (Patient not taking: Reported on 4/15/2024)      promethazine-dextromethorphan (PHENERGAN-DM) 6.25-15 mg/5 mL oral syrup Take 5 mL by mouth 4 (four) times a day as needed for cough (Patient not taking: Reported on  2024) 180 mL 0    traMADol (ULTRAM) 50 mg tablet Take 50 mg by mouth once as needed for moderate pain He uses it rarely. (Patient not taking: Reported on 6/3/2024)      urea (CARMOL) 20 % cream if needed (Patient not taking: Reported on 2024)       No current facility-administered medications on file prior to visit.     Social History     Socioeconomic History    Marital status: /Civil Union     Spouse name: Not on file    Number of children: Not on file    Years of education: Not on file    Highest education level: Not on file   Occupational History    Not on file   Tobacco Use    Smoking status: Former     Current packs/day: 0.00     Average packs/day: 1 pack/day for 20.0 years (20.0 ttl pk-yrs)     Types: Cigarettes     Start date:      Quit date:      Years since quittin.9     Passive exposure: Never    Smokeless tobacco: Never    Tobacco comments:     Started age 17   Vaping Use    Vaping status: Never Used   Substance and Sexual Activity    Alcohol use: No     Comment: none in 33 yrs    Drug use: No    Sexual activity: Yes   Other Topics Concern    Not on file   Social History Narrative    Single per allscript      Social Drivers of Health     Financial Resource Strain: Low Risk  (10/11/2023)    Overall Financial Resource Strain (CARDIA)     Difficulty of Paying Living Expenses: Not hard at all   Food Insecurity: Not on file   Transportation Needs: No Transportation Needs (10/11/2023)    PRAPARE - Transportation     Lack of Transportation (Medical): No     Lack of Transportation (Non-Medical): No   Physical Activity: Not on file   Stress: Not on file   Social Connections: Not on file   Intimate Partner Violence: Not At Risk (2024)    Received from [facility]    Humiliation, Afraid, Rape, and Kick questionnaire     Fear of Current or Ex-Partner: No     Emotionally Abused: No     Physically Abused: No     Sexually Abused: No   Housing Stability: Not on file  "        I have reviewed the past medical, surgical, social and family history, medications and allergies as documented in the EMR.    Review of systems: ROS is negative other than that noted in the HPI.  Constitutional: Negative for fatigue and fever.   HENT: Negative for sore throat.    Respiratory: Negative for shortness of breath.    Cardiovascular: Negative for chest pain.   Gastrointestinal: Negative for abdominal pain.   Endocrine: Negative for cold intolerance and heat intolerance.   Genitourinary: Negative for flank pain.   Musculoskeletal: Negative for back pain. ***  Skin: Negative for rash.   Allergic/Immunologic: Negative for immunocompromised state.   Neurological: Negative for dizziness. ***  Psychiatric/Behavioral: Negative for agitation.      Physical Exam:    Blood pressure 108/69, pulse 72, height 5' 11\" (1.803 m), weight 95.3 kg (210 lb).    General/Constitutional: NAD, well developed, well nourished  HENT: Normocephalic, atraumatic  CV: Intact distal pulses, regular rate  Resp: No respiratory distress or labored breathing  Abdomen: soft, nondistended, non tender   Lymphatic: No lymphadenopathy palpated  Neuro: Alert and Oriented x 3, no focal deficits  Psych: Normal mood, normal affect  Skin: Warm, dry, no rashes, no erythema      Shoulder Exam:      Inspection: No ecchymosis, edema, or deformity. No visualized wounds or skin lesions   Palpation: ***  Active Motion:       FF: ***        ER: ***        IR: ***  Strength: ***/5 empty can, ***/5 ER,  ***/5 IR   Sensory - SILT in the Radial / Ulnar / Median / Axillary nerve distributions  Motor - AIN / PIN / Radial / Ulnar / Median / Axillary motor nerves in tact  Palpable Radial pulse  Cap refill <2secs in all digits    *** Norton  *** Lisandra's  *** Belly Press  *** Bear Hug  *** Internal Rotation Lag Sign     *** Najera  *** Speed     *** Apprehension   *** Load and shift   *** Sulcus     *** Spurling        Imaging    I reviewed and interpreted " X-rays of the *** shoulder which show ***    I reviewed and interpreted MRI of the ***shoulder which shows***

## 2024-11-18 NOTE — PROGRESS NOTES
Orthopedic Sports Medicine Follow-up Patient Visit     Assesment:   79 y.o. male with degenerative disc disease, cervical radiculopathy and myofascial neck pain. Right glenohumeral arthritis and likely chronic rotator cuff tear    Plan:    Andrade is a pleasant 79 y.o. male who presents for follow-up evaluation of his right shoulder and cervical spine.  At last visit his pain all appear to be localized to his neck and upper back with radiation down the arm.  Today this continues to be the case.  However he does have more focal pain localized to the lateral and anterior aspect of the shoulder with radiation to the elbow.  He is desperate for some relief of pain.  I did perform a right glenohumeral injection with hopes that some treatment of his shoulder pain will help with his overall pain picture.     That being said I do still feel his neck is the main  of symptoms at this time.  I recommend he continue physical therapy for the neck.  He did inquire about additional opioid dosing as he only has 3 pills left from his emergency room visit.  I recommended against that at this time.  Given his persistent severe pain in the neck with radiation down the arm despite starting conservative treatments, I have ordered an MRI of the cervical spine today. A referral to spine and pain management was provided to him for further evaluation and treatment.  He did explain that given the short duration of the symptoms that further physical therapy continue to be in his best interest.  It is also possible that his MRI is denied until he completes a course of therapy in total.  Injection aftercare instructions were provided to him. He will follow-up with spine and pain management after his MRI of the cervical spine. He will follow-up with me as needed for his right shoulder pain.    Large joint arthrocentesis: R subacromial bursa  Universal Protocol:  Consent: Verbal consent obtained.  Risks and benefits: risks, benefits and  "alternatives were discussed  Consent given by: patient  Time out: Immediately prior to procedure a \"time out\" was called to verify the correct patient, procedure, equipment, support staff and site/side marked as required.  Patient understanding: patient states understanding of the procedure being performed  Site marked: the operative site was marked  Patient identity confirmed: verbally with patient  Supporting Documentation  Indications: pain   Procedure Details  Location: shoulder - R subacromial bursa  Preparation: Patient was prepped and draped in the usual sterile fashion  Needle size: 22 G  Approach: posterolateral  Medications administered: 40 mg triamcinolone acetonide 40 mg/mL; 4 mL bupivacaine 0.5 %    Patient tolerance: patient tolerated the procedure well with no immediate complications  Dressing:  Sterile dressing applied           Follow up:    Return for Recheck with Spine and Pain Managment after cervical spine MRI.        Chief Complaint   Patient presents with    Right Shoulder - Follow-up    Neck - Follow-up       History of Present Illness:    The patient is a 79 y.o. male, who presents for follow-up evaluation of his cervical spine and right shoulder. He has been attending physical therapy for the neck. He is not sure if it is helping yet, as he has only had two sessions. Today, he indicates his pain radiates for the right shoulder down to the elbow. He was seen in the ED yesterday for increased pain. He was prescribed oxycodone by the ED, which he feels only takes the edge off. He has been using Salonpas lidocaine patches, with some relief.      Past Medical, Social and Family History:  Past Medical History:   Diagnosis Date    Allergic rhinitis     Anemia 10/23/2008    last assessed 9/9/13     Anxiety     Atrial fibrillation (HCC)     Bleeding     Chronic kidney disease     COVID-19 virus infection 12/2022    CPAP (continuous positive airway pressure) dependence     Diabetes mellitus (HCC)     " Diarrhea     Diverticulitis     Eating disorder     GERD (gastroesophageal reflux disease)     Herpes zoster with complication     last assessed 7/3/17     Hiatal hernia     Hyperlipidemia     Hypertension     IBS (irritable bowel syndrome)     Incisional hernia     Increased urinary frequency     Irregular heart beat     Pyogenic granuloma 09/13/2006    last assessed 9/13/06    Carlos (subconjunctival hemorrhage), unspecified laterality 09/12/2005    last assessed 9/12/05    Sleep apnea     TMJ (dislocation of temporomandibular joint)     Ventral hernia     last assessed  4/6/17     Wears glasses      Past Surgical History:   Procedure Laterality Date    APPENDECTOMY      ATRIAL ABLATION SURGERY      2014    ATRIAL ABLATION SURGERY      catheter ablation atrial fibrillation / last assessed 2/17/16     CARDIOVERSION  05/30/2024    CHOLECYSTECTOMY      lap    COLON SURGERY  1990    Hartmans procedure    COLON SURGERY      reversal 6 weeks later    COLONOSCOPY N/A 01/26/2017    Procedure: COLONOSCOPY;  Surgeon: Portillo Schumacher MD;  Location: [facility];  Service:     ESOPHAGOGASTRODUODENOSCOPY N/A 01/26/2017    Procedure: ESOPHAGOGASTRODUODENOSCOPY (EGD);  Surgeon: Portillo Schumacher MD;  Location: [facility];  Service:     EYE SURGERY Bilateral 2015    lid repair    HERNIA REPAIR Bilateral     inguinal 2014 & 2013    INCISIONAL HERNIA REPAIR  2011    INCISIONAL HERNIA REPAIR N/A 03/24/2017    Procedure: REPAIR OF INCARCERATED INCISIONAL HERNIA WITH MESH, Lysis of Adhesions, Partial Omentectomy;  Surgeon: Colt Davis MD;  Location: [facility];  Service:     INSERT / REPLACE / REMOVE PACEMAKER Left 10/04/2021    medtronic-[id number]    NEUROBLASTOMA EXCISION      SKIN CANCER EXCISION      On nose    TONSILLECTOMY       Allergies   Allergen Reactions    Codeine Other (See Comments) and GI Intolerance     Reaction Date: 27Dec2004; Annotation - 04Sep2012: '' CRAZY ''  vomiting  headache    Demerol [Meperidine] Nausea  Only, Other (See Comments), GI Intolerance and Vomiting     vomiting  Reaction Date: 27Dec2004;   headache    Morphine Nausea Only, GI Intolerance and Vomiting     Reaction Date: 23Feb2012;      Current Outpatient Medications on File Prior to Visit   Medication Sig Dispense Refill    acetaminophen (TYLENOL) 325 mg tablet Take 650 mg by mouth every 6 (six) hours as needed      amiodarone 200 mg tablet       apixaban (ELIQUIS) 5 mg Take 1 tablet (5 mg total) by mouth 2 (two) times a day 180 tablet 3    atorvastatin (LIPITOR) 20 mg tablet TAKE 1 TABLET DAILY 90 tablet 1    Blood Glucose Monitoring Suppl (FREESTYLE LITE) GEOVANNY       carvedilol (COREG) 25 mg tablet Take 12.5 mg by mouth 2 (two) times a day      clotrimazole-betamethasone (LOTRISONE) 1-0.05 % cream APPLY TO AFFECTED AREA(S) TWO TIMES A DAY 45 g 1    esomeprazole (NexIUM) 40 MG capsule Take 1 capsule (40 mg total) by mouth daily before breakfast 90 capsule 3    fluticasone (FLONASE) 50 mcg/act nasal spray 2 sprays into each nostril daily 48 g 3    FREESTYLE LITE test strip       hydroCHLOROthiazide 25 mg tablet TAKE ONE TABLET BY MOUTH EVERY DAY 90 tablet 1    ketoconazole (NIZORAL) 2 % shampoo       Lancets (FREESTYLE) lancets       linaCLOtide (Linzess) 290 MCG CAPS Take 1 capsule by mouth daily before breakfast 14 capsule 0    losartan (COZAAR) 100 MG tablet 100 mg every morning       metFORMIN (GLUCOPHAGE-XR) 500 mg 24 hr tablet       metroNIDAZOLE (METROCREAM) 0.75 % cream       Mounjaro 5 MG/0.5ML Not taking until 04/08/2024      ondansetron (ZOFRAN) 4 mg tablet Take 1 tablet (4 mg total) by mouth every 6 (six) hours 6 tablet 0    oxyCODONE (Roxicodone) 5 immediate release tablet Take 1 tablet (5 mg total) by mouth every 4 (four) hours as needed for moderate pain for up to 10 days Max Daily Amount: 30 mg 6 tablet 0    RESTASIS 0.05 % ophthalmic emulsion Administer 1 drop to both eyes every 12 (twelve) hours      tamsulosin (FLOMAX) 0.4 mg TAKE 1  CAPSULE DAILY WITH DINNER 100 capsule 1    tiZANidine (ZANAFLEX) 2 mg tablet Take 1 tablet (2 mg total) by mouth 2 (two) times a day as needed for muscle spasms 20 tablet 0    amiodarone 100 mg tablet 200 mg (Patient not taking: Reported on 2024)      linaCLOtide (Linzess) 290 MCG CAPS Take 1 capsule by mouth daily before breakfast 90 capsule 1    methylPREDNISolone 4 MG tablet therapy pack Use as directed on package 21 each 0    Ozempic, 0.25 or 0.5 MG/DOSE, 2 MG/1.5ML SOPN once a week (Patient not taking: Reported on 3/7/2024)      pioglitazone-metFORMIN (ACTOPLUS MET)  MG per tablet daily after dinner (Patient not taking: Reported on 4/15/2024)      polyethylene glycol (Golytely) 4000 mL solution Take 4,000 mL by mouth once for 1 dose Take 4000 mL by mouth once for 1 dose. Use as directed 4000 mL 0    polyethylene glycol-propylene glycol (Systane) 0.4-0.3 % every 3 (three) hours as needed   (Patient not taking: Reported on 4/15/2024)      promethazine-dextromethorphan (PHENERGAN-DM) 6.25-15 mg/5 mL oral syrup Take 5 mL by mouth 4 (four) times a day as needed for cough (Patient not taking: Reported on 2024) 180 mL 0    traMADol (ULTRAM) 50 mg tablet Take 50 mg by mouth once as needed for moderate pain He uses it rarely. (Patient not taking: Reported on 6/3/2024)      urea (CARMOL) 20 % cream if needed (Patient not taking: Reported on 2024)       No current facility-administered medications on file prior to visit.     Social History     Socioeconomic History    Marital status: /Civil Union     Spouse name: Not on file    Number of children: Not on file    Years of education: Not on file    Highest education level: Not on file   Occupational History    Not on file   Tobacco Use    Smoking status: Former     Current packs/day: 0.00     Average packs/day: 1 pack/day for 20.0 years (20.0 ttl pk-yrs)     Types: Cigarettes     Start date:      Quit date:      Years since quittin.9      Passive exposure: Never    Smokeless tobacco: Never    Tobacco comments:     Started age 17   Vaping Use    Vaping status: Never Used   Substance and Sexual Activity    Alcohol use: No     Comment: none in 33 yrs    Drug use: No    Sexual activity: Yes   Other Topics Concern    Not on file   Social History Narrative    Single per allscript      Social Drivers of Health     Financial Resource Strain: Low Risk  (10/11/2023)    Overall Financial Resource Strain (CARDIA)     Difficulty of Paying Living Expenses: Not hard at all   Food Insecurity: Not on file   Transportation Needs: No Transportation Needs (10/11/2023)    PRAPARE - Transportation     Lack of Transportation (Medical): No     Lack of Transportation (Non-Medical): No   Physical Activity: Not on file   Stress: Not on file   Social Connections: Not on file   Intimate Partner Violence: Not At Risk (5/30/2024)    Received from [facility]    Humiliation, Afraid, Rape, and Kick questionnaire     Fear of Current or Ex-Partner: No     Emotionally Abused: No     Physically Abused: No     Sexually Abused: No   Housing Stability: Not on file         I have reviewed the past medical, surgical, social and family history, medications and allergies as documented in the EMR.    Review of systems: ROS is negative other than that noted in the HPI.  Constitutional: Negative for fatigue and fever.   HENT: Negative for sore throat.    Respiratory: Negative for shortness of breath.    Cardiovascular: Negative for chest pain.   Gastrointestinal: Negative for abdominal pain.   Endocrine: Negative for cold intolerance and heat intolerance.   Genitourinary: Negative for flank pain.   Musculoskeletal: Negative for back pain.   Skin: Negative for rash.   Allergic/Immunologic: Negative for immunocompromised state.   Neurological: Negative for dizziness.   Psychiatric/Behavioral: Negative for agitation.      Physical Exam:    Blood pressure 108/69, pulse 72, height  "5' 11\" (1.803 m), weight 95.3 kg (210 lb).    General/Constitutional: NAD, well developed, well nourished  HENT: Normocephalic, atraumatic  CV: Intact distal pulses, regular rate  Resp: No respiratory distress or labored breathing  Abdomen: soft, nondistended, non tender   Lymphatic: No lymphadenopathy palpated  Neuro: Alert and Oriented x 3, no focal deficits  Psych: Normal mood, normal affect  Skin: Warm, dry, no rashes, no erythema    Cervical Spine Exam:    Inspection: No ecchymosis, edema, or deformity. No visualized wounds or skin lesions   Palpation: Under right trapezius, right paraspinal muscles in the cervical spine and thoracic spine.  Active Motion: Somewhat limited cervical motion  Strength: 5 out of 5 strength of all major myotomes of bilateral upper extremities  Sensory - SILT in the Radial / Ulnar / Median / Axillary nerve distributions  Motor - AIN / PIN / Radial / Ulnar / Median / Axillary motor nerves in tact  Palpable Radial pulse  Cap refill <2secs in all digits     Negative Joshua's positive Spurling's        Imaging    I reviewed and interpreted X-rays of the cervical spine which show diffuse degenerative changes. Loss of lordosis.    Scribe Attestation      I,:  Ameena De Souza am acting as a scribe while in the presence of the attending physician.:       I,:  Ho Dao MD personally performed the services described in this documentation    as scribed in my presence.:              "

## 2024-11-19 ENCOUNTER — HOSPITAL ENCOUNTER (OUTPATIENT)
Dept: RADIOLOGY | Facility: HOSPITAL | Age: 79
Discharge: HOME | End: 2024-11-19
Payer: MEDICARE

## 2024-11-19 DIAGNOSIS — E04.1 NONTOXIC UNINODULAR GOITER: ICD-10-CM

## 2024-11-19 PROCEDURE — 76536 US EXAM OF HEAD AND NECK: CPT

## 2024-11-20 ENCOUNTER — OFFICE VISIT (OUTPATIENT)
Dept: PHYSICAL THERAPY | Facility: CLINIC | Age: 79
End: 2024-11-20
Payer: MEDICARE

## 2024-11-20 DIAGNOSIS — M54.12 RADICULOPATHY, CERVICAL REGION: Primary | ICD-10-CM

## 2024-11-20 PROCEDURE — 97530 THERAPEUTIC ACTIVITIES: CPT

## 2024-11-20 PROCEDURE — 97140 MANUAL THERAPY 1/> REGIONS: CPT

## 2024-11-20 PROCEDURE — 97110 THERAPEUTIC EXERCISES: CPT

## 2024-11-20 NOTE — PROGRESS NOTES
"Daily Note     Today's date: 2024  Patient name: Andrade Wise Jr.  : 1945  MRN: [id number]  Referring provider: Ho Dao*  Dx:   Encounter Diagnosis     ICD-10-CM    1. Radiculopathy, cervical region  M54.12                      Subjective: Patient had CSI in shoulder . He states he was sore, but it is starting to feel better.       Objective: See treatment diary below      Assessment: Tolerated treatment well. Continued POC with progressions in periscapular strengthening. Included thoracic mobility exercises, which he responded very well to. Provided updated HEP handout. Patient demonstrated fatigue post treatment, exhibited good technique with therapeutic exercises, and would benefit from continued PT      Plan: Continue per plan of care.  Progress treatment as tolerated.         Insurance Eval/ Re-eval POC expires Auth Status Total visits  Start date  Expiration date Misc   CMS 24 No auth req     Co-Pay $4.67                 Date 24      Visit Number 1 2 3      Auth                  Manual         Cervical mobs  Upglides MR  Upglides MR      Cervical traction          MDT  Continue nv Continued - see assessment        STM UT/LS STM/TPR UT/LS STM/TPR UT/LS STM/TPR               TherEx         TWU         PROM          Thoracic extension    5\" x20 seated over soft fulcrum      Thoracic rotation    Open books 10\" x 10 ea       Serratus punches    Supine cane + 3#  20x 120 deg  20x 90 deg                                  Neuro Re-Ed         Cervical retractions   Chin tucks 2x10     Chin tucks + ext 2x10  Chin tucks supine 3\" 20x       Scapular retractions  HEP 5\" x20 Rows GTB 3x10 Rows GTB 3x10 Cc nv      Scapular depression  HEP 5\"x20 Ext GTB 3x10  Ext GTB 3x10 Cc nv      Neural tensioning  Wall angels 15x  Wall angels 15x       Push up plus   Hi low table 3x10 - req chin tucks  Hi low table 3x10 - req chin tucks                                "   TherAct           Updated HEP + review                                            Gait Training                                    Modalities         CP            Precautions: STANDARD  Past Medical History:   Diagnosis Date    Allergic rhinitis     Anemia 10/23/2008    last assessed 9/9/13     Anxiety     Atrial fibrillation (HCC)     Bleeding     Chronic kidney disease     COVID-19 virus infection 12/2022    CPAP (continuous positive airway pressure) dependence     Diabetes mellitus (HCC)     Diarrhea     Diverticulitis     Eating disorder     GERD (gastroesophageal reflux disease)     Herpes zoster with complication     last assessed 7/3/17     Hiatal hernia     Hyperlipidemia     Hypertension     IBS (irritable bowel syndrome)     Incisional hernia     Increased urinary frequency     Irregular heart beat     Pyogenic granuloma 09/13/2006    last assessed 9/13/06    Carlos (subconjunctival hemorrhage), unspecified laterality 09/12/2005    last assessed 9/12/05    Sleep apnea     TMJ (dislocation of temporomandibular joint)     Ventral hernia     last assessed  4/6/17     Wears glasses      Access Code: [email or web address]  URL: [email or web address]  Date: 11/14/2024  Prepared by: Dyan Pritchett    Exercises  - Standing Shoulder Row with Anchored Resistance  - 1 x daily - 3 sets - 10 reps  - Shoulder extension with resistance - Neutral  - 1 x daily - 3 sets - 10 reps  - Seated Cervical Retraction and Extension  - 1 x daily - 3 sets - 10 reps  - Wall [place]  - 1 x daily - 3 sets - 10 reps

## 2024-11-22 ENCOUNTER — OFFICE VISIT (OUTPATIENT)
Dept: PHYSICAL THERAPY | Facility: CLINIC | Age: 79
End: 2024-11-22
Payer: MEDICARE

## 2024-11-22 DIAGNOSIS — M54.12 RADICULOPATHY, CERVICAL REGION: Primary | ICD-10-CM

## 2024-11-22 PROCEDURE — 97110 THERAPEUTIC EXERCISES: CPT

## 2024-11-22 NOTE — PROGRESS NOTES
Daily Note     Today's date: 2024  Patient name: Andrade Wise Jr.  : 1945  MRN: [id number]  Referring provider: Ho Dao*  Dx:   Encounter Diagnosis     ICD-10-CM    1. Radiculopathy, cervical region  M54.12           Start Time: 0800  Stop Time: 0845  Total time in clinic (min): 45 minutes    Patient was co-treated by SPT [place] Hidalgo and Estelle Aly PT, DPT under my direct supervision.     Subjective: Pt reported increase in cervical pain to which he then placed patches last night to help relieve pain. Pt spent 1 hour driving to [place] and back which he reported an increase of symptoms which made him switch to driving with L arm rather than R arm. Pt states the pain night is greater than morning/throughout day. Reported radicular throbbing symptoms down R arm to elbow. Pt reported pain today to be 4/10.       Objective: See treatment diary below      Assessment: Tolerated treatment well. Patient would benefit from continued PT to decrease radicular symptoms and decrease pain in R upper trap. Per pt's subjective report, he was experiencing increase radicular symptoms and described it as throbbing down to R elbow. PT performed manual cervical distraction to help reduce radicular symptoms. Pt self reported decrease in radicular symptoms which stopped at middle deltoid.  PT required VC for proper technique for supine chin tucks. Pt reported an improvement in symptoms but had pain in R upper trap. STM/TPR were then performed with pt experiencing mild relief. Education regarding self TPR with lacrosse ball was provided to pt. Pt then inquired about dry needling to help with ongoing pain, education was provided and pt will have dry needling at next tx session. With pt reporting decrease of symptoms, postural stabilization exercises were initiated today.         Plan: Continue per plan of care.        Insurance Eval/ Re-eval POC expires Auth Status Total visits  Start date   "Expiration date Misc   CMS 11/11/24 12/23/24 No auth req     Co-Pay $4.67                 Date 11/11 11/14 11/20/24 11/22/24     Visit Number 1 2 3 4     Auth                  Manual         Cervical mobs  Upglides MR  Upglides MR      Cervical traction     Performed ~2 min      MDT  Continue nv Continued - see assessment        STM UT/LS STM/TPR UT/LS STM/TPR UT/LS STM/TPR STM/TPR of UT and along medial border of scapula              TherEx         TWU         PROM          Thoracic extension    5\" x20 seated over soft fulcrum 5\" x20 seated over fulcrum     Thoracic rotation    Open books 10\" x 10 ea       Serratus punches    Supine cane + 3#  20x 120 deg  20x 90 deg                                  Neuro Re-Ed         Cervical retractions   Chin tucks 2x10     Chin tucks + ext 2x10  Chin tucks supine 3\" 20x  Chin tucks supine 5\" 20x      Scapular retractions  HEP 5\" x20 Rows GTB 3x10 Rows GTB 3x10 Cc nv      Scapular depression  HEP 5\"x20 Ext GTB 3x10  Ext GTB 3x10 Cc nv      Neural tensioning  Wall angels 15x  Wall angels 15x       Push up plus   Hi low table 3x10 - req chin tucks  Hi low table 3x10 - req chin tucks       Rows    CC 10.5# 2x10                        TherAct           Updated HEP + review   Dry needling education          Self TPR with lacrosse ball education                                 Gait Training                                    Modalities         CP            Precautions: STANDARD  Past Medical History:   Diagnosis Date    Allergic rhinitis     Anemia 10/23/2008    last assessed 9/9/13     Anxiety     Atrial fibrillation (HCC)     Bleeding     Chronic kidney disease     COVID-19 virus infection 12/2022    CPAP (continuous positive airway pressure) dependence     Diabetes mellitus (HCC)     Diarrhea     Diverticulitis     Eating disorder     GERD (gastroesophageal reflux disease)     Herpes zoster with complication     last assessed 7/3/17     Hiatal hernia     Hyperlipidemia     " Hypertension     IBS (irritable bowel syndrome)     Incisional hernia     Increased urinary frequency     Irregular heart beat     Pyogenic granuloma 09/13/2006    last assessed 9/13/06    Carlos (subconjunctival hemorrhage), unspecified laterality 09/12/2005    last assessed 9/12/05    Sleep apnea     TMJ (dislocation of temporomandibular joint)     Ventral hernia     last assessed  4/6/17     Wears glasses      Access Code: [email or web address]  URL: [email or web address]  Date: 11/14/2024  Prepared by: Dyan Pritchett    Exercises  - Standing Shoulder Row with Anchored Resistance  - 1 x daily - 3 sets - 10 reps  - Shoulder extension with resistance - Neutral  - 1 x daily - 3 sets - 10 reps  - Seated Cervical Retraction and Extension  - 1 x daily - 3 sets - 10 reps  - Wall [place]  - 1 x daily - 3 sets - 10 reps

## 2024-11-25 ENCOUNTER — OFFICE VISIT (OUTPATIENT)
Dept: PHYSICAL THERAPY | Facility: CLINIC | Age: 79
End: 2024-11-25
Payer: MEDICARE

## 2024-11-25 DIAGNOSIS — M54.12 RADICULOPATHY, CERVICAL REGION: Primary | ICD-10-CM

## 2024-11-25 PROCEDURE — 97110 THERAPEUTIC EXERCISES: CPT

## 2024-11-25 PROCEDURE — 97140 MANUAL THERAPY 1/> REGIONS: CPT

## 2024-11-25 PROCEDURE — 97112 NEUROMUSCULAR REEDUCATION: CPT

## 2024-11-25 NOTE — PROGRESS NOTES
"Daily Note     Today's date: 2024  Patient name: Andrade Wise Jr.  : 1945  MRN: [id number]  Referring provider: Ho Dao*  Dx:   Encounter Diagnosis     ICD-10-CM    1. Radiculopathy, cervical region  M54.12                      Subjective: Patient reports continued neck/shoulder pain.      Objective: See treatment diary below      Assessment: Tolerated treatment well. Noted hypertonicity in rhomboid muscle along medial border of scapula. He tolerated thoracic mobility exercises well with no adverse effects. Increased resistance to periscapular strengthening. Will trial dry neelding nv. Patient demonstrated fatigue post treatment, exhibited good technique with therapeutic exercises, and would benefit from continued PT      Plan: Dry needling        Insurance Eval/ Re-eval POC expires Auth Status Total visits  Start date  Expiration date Misc   CMS 24 No auth req     Co-Pay $4.67                 Date 24    Visit Number 1 2 3 4     Auth                  Manual         Cervical mobs  Upglides MR  Upglides MR      Cervical traction     Performed ~2 min      MDT  Continue nv Continued - see assessment        STM UT/LS STM/TPR UT/LS STM/TPR UT/LS STM/TPR STM/TPR of UT and along medial border of scapula STM/TPR of UT and along medial border of scapula         Thoracic extension with clinician OP 5\" x15    TherEx         TWU     Arm bike 3'/3'    PROM          Thoracic extension    5\" x20 seated over soft fulcrum 5\" x20 seated over fulcrum Manual     Thoracic rotation    Open books 10\" x 10 ea   Standing open books 10\"x10 ea     Serratus punches    Supine cane + 3#  20x 120 deg  20x 90 deg                                  Neuro Re-Ed         Cervical retractions   Chin tucks 2x10     Chin tucks + ext 2x10  Chin tucks supine 3\" 20x  Chin tucks supine 5\" 20x  Chin tucks seated 5\"x20    Scapular retractions  HEP 5\" x20 Rows GTB 3x10 Rows GTB " "3x10 Cc nv  CC 15.5# 3x8    Scapular depression  HEP 5\"x20 Ext GTB 3x10  Ext GTB 3x10 Cc nv  CC 15.5# 3x8      Neural tensioning  Wall angels 15x  Wall angels 15x       Push up plus   Hi low table 3x10 - req chin tucks  Hi low table 3x10 - req chin tucks   Wall ball scaption    Rows    CC 10.5# 2x10                        TherAct           Updated HEP + review   Dry needling education          Self TPR with lacrosse ball education  Self TPR with lacrosse ball - re-ed                               Gait Training                                    Modalities         CP            Precautions: STANDARD  Past Medical History:   Diagnosis Date    Allergic rhinitis     Anemia 10/23/2008    last assessed 9/9/13     Anxiety     Atrial fibrillation (HCC)     Bleeding     Chronic kidney disease     COVID-19 virus infection 12/2022    CPAP (continuous positive airway pressure) dependence     Diabetes mellitus (HCC)     Diarrhea     Diverticulitis     Eating disorder     GERD (gastroesophageal reflux disease)     Herpes zoster with complication     last assessed 7/3/17     Hiatal hernia     Hyperlipidemia     Hypertension     IBS (irritable bowel syndrome)     Incisional hernia     Increased urinary frequency     Irregular heart beat     Pyogenic granuloma 09/13/2006    last assessed 9/13/06    Carlos (subconjunctival hemorrhage), unspecified laterality 09/12/2005    last assessed 9/12/05    Sleep apnea     TMJ (dislocation of temporomandibular joint)     Ventral hernia     last assessed  4/6/17     Wears glasses      Access Code: [email or web address]  URL: [email or web address]  Date: 11/14/2024  Prepared by: Dyan Pritchett    Exercises  - Standing Shoulder Row with Anchored Resistance  - 1 x daily - 3 sets - 10 reps  - Shoulder extension with resistance - Neutral  - 1 x daily - 3 sets - 10 reps  - Seated Cervical Retraction and Extension  - 1 x daily - 3 sets - 10 reps  - Wall [place]  - 1 x daily - 3 sets - 10 reps                 "

## 2024-11-27 ENCOUNTER — APPOINTMENT (OUTPATIENT)
Dept: PHYSICAL THERAPY | Facility: CLINIC | Age: 79
End: 2024-11-27
Payer: MEDICARE

## 2024-11-29 ENCOUNTER — CONSULT (OUTPATIENT)
Dept: PAIN MEDICINE | Facility: CLINIC | Age: 79
End: 2024-11-29
Payer: MEDICARE

## 2024-11-29 ENCOUNTER — OFFICE VISIT (OUTPATIENT)
Dept: PHYSICAL THERAPY | Facility: CLINIC | Age: 79
End: 2024-11-29
Payer: MEDICARE

## 2024-11-29 VITALS
SYSTOLIC BLOOD PRESSURE: 125 MMHG | BODY MASS INDEX: 30.06 KG/M2 | WEIGHT: 210 LBS | DIASTOLIC BLOOD PRESSURE: 76 MMHG | HEART RATE: 68 BPM | HEIGHT: 70 IN

## 2024-11-29 DIAGNOSIS — R05.9 COUGH: ICD-10-CM

## 2024-11-29 DIAGNOSIS — M54.12 RADICULOPATHY, CERVICAL REGION: Primary | ICD-10-CM

## 2024-11-29 DIAGNOSIS — M54.2 MYOFASCIAL NECK PAIN: ICD-10-CM

## 2024-11-29 DIAGNOSIS — M54.12 RADICULOPATHY, CERVICAL REGION: ICD-10-CM

## 2024-11-29 PROCEDURE — 97530 THERAPEUTIC ACTIVITIES: CPT | Performed by: PHYSICAL THERAPIST

## 2024-11-29 PROCEDURE — 99204 OFFICE O/P NEW MOD 45 MIN: CPT | Performed by: STUDENT IN AN ORGANIZED HEALTH CARE EDUCATION/TRAINING PROGRAM

## 2024-11-29 PROCEDURE — 97140 MANUAL THERAPY 1/> REGIONS: CPT | Performed by: PHYSICAL THERAPIST

## 2024-11-29 RX ORDER — GABAPENTIN 300 MG/1
300 CAPSULE ORAL
Qty: 30 CAPSULE | Refills: 1 | Status: SHIPPED | OUTPATIENT
Start: 2024-11-29

## 2024-11-29 RX ORDER — PANTOPRAZOLE SODIUM 40 MG/1
40 TABLET, DELAYED RELEASE ORAL
COMMUNITY
Start: 2024-11-27 | End: 2024-12-27

## 2024-11-29 NOTE — PROGRESS NOTES
Assessment  1. Radiculopathy, cervical region    2. Myofascial neck pain        Plan  79-year-old male who presents today for evaluation and management of chronic neck pain.  We reviewed his cervical radiographs showing cervical spondylosis with arthritic changes.  I do think this is the source of the patient's pain based on his distribution.   we discussed management including activity modification, cervical and scapular strengthening and stabilization, medications, and interventional therapy.    Given his comorbidities, he is limited by medications that he can take.  I did offer him gabapentin as an adjunct of pain medication and started him at a low dose at nighttime.  We discussed side effect of sedation.  Given that he has failed other conservative care, I do think he would benefit from targeting his cervical facets.  However he just had cardiac ablation and will need to be on Eliquis for at least a month.  I will have him follow-up in a month so we can review his MRI that he is getting next week and see how he is progressing with his physical therapy, dry needling, and gabapentin.  If failure to improve we will consider cervical MBB/RFA if indicated.      My impressions and treatment recommendations were discussed in detail with the patient who verbalized understanding and had no further questions.  Discharge instructions were provided. I personally saw and examined the patient and I agree with the above discussed plan of care.    No orders of the defined types were placed in this encounter.    New Medications Ordered This Visit   Medications    pantoprazole (PROTONIX) 40 mg tablet     Sig: Take 40 mg by mouth    gabapentin (NEURONTIN) 300 mg capsule     Sig: Take 1 capsule (300 mg total) by mouth daily at bedtime     Dispense:  30 capsule     Refill:  1       History of Present Illness    Andrade Wise  is a pleasant 79 y.o. male who presents to [facility] for initial evaluation  of the above stated pain complaints. The patient has a past medical and pain history as outlined below. He was referred by Ho Dao MD  [address] [place] [address]     79-year-old male with a past medical history of anxiety, coagulopathy, angina, diabetes, GERD, atrial fibrillation status post ablation on Tuesday  on Eliquis Who presents today for evaluation and management of  neck pain.  Pain is been present for 1 month without inciting event.  Pain is rated as moderate to severe and is currently 5 out of 10.  The pain is constant without a particular pattern.  Pain associated with shooting, numbness, cutting, pins-and-needles, throbbing.  Pain is located at the base of the neck with radiation into the posterior right shoulder and arm.  Pain is made worse with activity and relieved with rest.  He has an MRI scheduled for 12 6.  He has not had any injections or surgeries.  He has done physical therapy without any relief as well as dry needling.  He is currently taking Tylenol and Zanaflex.  He presents today for further evaluation.    I have personally reviewed and/or updated the patient's past medical history, past surgical history, family history, social history, current medications, allergies, and vital signs today.     Review of Systems   Constitutional:  Negative for chills, fever and unexpected weight change.   HENT:  Negative for hearing loss, nosebleeds and sore throat.    Eyes:  Negative for pain, redness and visual disturbance.   Respiratory:  Negative for cough and shortness of breath.    Cardiovascular:  Positive for chest pain. Negative for palpitations and leg swelling.   Gastrointestinal:  Negative for abdominal pain, nausea and vomiting.   Endocrine: Positive for polyuria. Negative for polydipsia.   Genitourinary:  Negative for difficulty urinating and dysuria.   Musculoskeletal:  Positive for arthralgias and myalgias. Negative for joint swelling.   Skin:  Negative for rash  and wound.   Neurological:  Negative for dizziness, light-headedness and numbness.   Psychiatric/Behavioral:  Negative for decreased concentration and dysphoric mood. The patient is nervous/anxious.        Patient Active Problem List   Diagnosis    Atrial fibrillation (HCC)    Hypertension, benign    Chronic kidney disease (CKD), stage II (mild)    Chronic rhinitis    Colon, diverticulosis    Type 2 diabetes mellitus with stage 3 chronic kidney disease, without long-term current use of insulin, unspecified whether stage 3a or 3b CKD (HCC)    Diabetic neuropathy (HCC)    Esophageal reflux    Mixed hyperlipidemia    Obstructive sleep apnea    Aortic root dilation (HCC)    Seasonal allergic rhinitis due to pollen    Class 1 obesity    Dyspnea on exertion    Stage 2 chronic kidney disease    Left groin pain    Syncope    Positive depression screening    Benign localized prostatic hyperplasia with lower urinary tract symptoms (LUTS)    Complex renal cyst    Left upper extremity swelling    Grade IV hemorrhoids    History of ventral hernia repair    Primary osteoarthritis of left knee       Past Medical History:   Diagnosis Date    Allergic rhinitis     Anemia 10/23/2008    last assessed 9/9/13     Anxiety     Atrial fibrillation (HCC)     Bleeding     Chronic kidney disease     COVID-19 virus infection 12/2022    CPAP (continuous positive airway pressure) dependence     Diabetes mellitus (HCC)     Diarrhea     Diverticulitis     Eating disorder     GERD (gastroesophageal reflux disease)     Herpes zoster with complication     last assessed 7/3/17     Hiatal hernia     Hyperlipidemia     Hypertension     IBS (irritable bowel syndrome)     Incisional hernia     Increased urinary frequency     Irregular heart beat     Pyogenic granuloma 09/13/2006    last assessed 9/13/06    Carlos (subconjunctival hemorrhage), unspecified laterality 09/12/2005    last assessed 9/12/05    Sleep apnea     TMJ (dislocation of temporomandibular  joint)     Ventral hernia     last assessed  17     Wears glasses        Past Surgical History:   Procedure Laterality Date    APPENDECTOMY      ATRIAL ABLATION SURGERY          ATRIAL ABLATION SURGERY      catheter ablation atrial fibrillation / last assessed 16     CARDIOVERSION  2024    CHOLECYSTECTOMY      lap    COLON SURGERY      Hartmans procedure    COLON SURGERY      reversal 6 weeks later    COLONOSCOPY N/A 2017    Procedure: COLONOSCOPY;  Surgeon: Portillo Schumacher MD;  Location: [facility];  Service:     ESOPHAGOGASTRODUODENOSCOPY N/A 2017    Procedure: ESOPHAGOGASTRODUODENOSCOPY (EGD);  Surgeon: Portillo Schumacher MD;  Location: [facility];  Service:     EYE SURGERY Bilateral 2015    lid repair    HERNIA REPAIR Bilateral     inguinal  &     INCISIONAL HERNIA REPAIR      INCISIONAL HERNIA REPAIR N/A 2017    Procedure: REPAIR OF INCARCERATED INCISIONAL HERNIA WITH MESH, Lysis of Adhesions, Partial Omentectomy;  Surgeon: Colt Davis MD;  Location: [facility];  Service:     INSERT / REPLACE / REMOVE PACEMAKER Left 10/04/2021    [id number]    NEUROBLASTOMA EXCISION      SKIN CANCER EXCISION      On nose    TONSILLECTOMY         Family History   Problem Relation Age of Onset    Heart disease Mother     Cancer Mother     Colon cancer Mother     Dementia Mother     Early death Father     Seizures Father     Cancer Sister         bone    Heart disease Sister     Diabetes Sister     Lupus Sister     Heart disease Brother     Cancer Brother         kidney    Diabetes Brother     Other Sister         covid    Stroke Neg Hx        Social History     Occupational History    Not on file   Tobacco Use    Smoking status: Former     Current packs/day: 0.00     Average packs/day: 1 pack/day for 20.0 years (20.0 ttl pk-yrs)     Types: Cigarettes     Start date:      Quit date:      Years since quittin.9     Passive exposure: Never     Smokeless tobacco: Never    Tobacco comments:     Started age 17   Vaping Use    Vaping status: Never Used   Substance and Sexual Activity    Alcohol use: No     Comment: none in 33 yrs    Drug use: No    Sexual activity: Yes       Current Outpatient Medications on File Prior to Visit   Medication Sig    acetaminophen (TYLENOL) 325 mg tablet Take 650 mg by mouth every 6 (six) hours as needed    amiodarone 200 mg tablet     apixaban (ELIQUIS) 5 mg Take 1 tablet (5 mg total) by mouth 2 (two) times a day    atorvastatin (LIPITOR) 20 mg tablet TAKE 1 TABLET DAILY    Blood Glucose Monitoring Suppl (FREESTYLE LITE) GEOVANNY     carvedilol (COREG) 25 mg tablet Take 12.5 mg by mouth 2 (two) times a day    clotrimazole-betamethasone (LOTRISONE) 1-0.05 % cream APPLY TO AFFECTED AREA(S) TWO TIMES A DAY    fluticasone (FLONASE) 50 mcg/act nasal spray 2 sprays into each nostril daily    FREESTYLE LITE test strip     hydroCHLOROthiazide 25 mg tablet TAKE ONE TABLET BY MOUTH EVERY DAY    ketoconazole (NIZORAL) 2 % shampoo     Lancets (FREESTYLE) lancets     losartan (COZAAR) 100 MG tablet 100 mg every morning     metroNIDAZOLE (METROCREAM) 0.75 % cream     Mounjaro 5 MG/0.5ML Not taking until 04/08/2024    ondansetron (ZOFRAN) 4 mg tablet Take 1 tablet (4 mg total) by mouth every 6 (six) hours    pantoprazole (PROTONIX) 40 mg tablet Take 40 mg by mouth    pioglitazone-metFORMIN (ACTOPLUS MET)  MG per tablet daily after dinner    promethazine-dextromethorphan (PHENERGAN-DM) 6.25-15 mg/5 mL oral syrup Take 5 mL by mouth 4 (four) times a day as needed for cough    RESTASIS 0.05 % ophthalmic emulsion Administer 1 drop to both eyes every 12 (twelve) hours    tamsulosin (FLOMAX) 0.4 mg TAKE 1 CAPSULE DAILY WITH DINNER    tiZANidine (ZANAFLEX) 2 mg tablet Take 1 tablet (2 mg total) by mouth 2 (two) times a day as needed for muscle spasms    traMADol (ULTRAM) 50 mg tablet Take 50 mg by mouth once as needed for moderate pain He uses it  "rarely.    amiodarone 100 mg tablet 200 mg (Patient not taking: Reported on 7/9/2024)    esomeprazole (NexIUM) 40 MG capsule Take 1 capsule (40 mg total) by mouth daily before breakfast (Patient not taking: Reported on 11/29/2024)    linaCLOtide (Linzess) 290 MCG CAPS Take 1 capsule by mouth daily before breakfast    linaCLOtide (Linzess) 290 MCG CAPS Take 1 capsule by mouth daily before breakfast    metFORMIN (GLUCOPHAGE-XR) 500 mg 24 hr tablet     methylPREDNISolone 4 MG tablet therapy pack Use as directed on package    Ozempic, 0.25 or 0.5 MG/DOSE, 2 MG/1.5ML SOPN once a week (Patient not taking: Reported on 3/7/2024)    polyethylene glycol (Golytely) 4000 mL solution Take 4,000 mL by mouth once for 1 dose Take 4000 mL by mouth once for 1 dose. Use as directed    polyethylene glycol-propylene glycol (Systane) 0.4-0.3 % every 3 (three) hours as needed   (Patient not taking: Reported on 4/15/2024)    urea (CARMOL) 20 % cream if needed (Patient not taking: Reported on 7/9/2024)     No current facility-administered medications on file prior to visit.       Allergies   Allergen Reactions    Codeine Other (See Comments) and GI Intolerance     Reaction Date: 27Dec2004; Annotation - 04Sep2012: '' CRAZY ''  vomiting  headache    Demerol [Meperidine] Nausea Only, Other (See Comments), GI Intolerance and Vomiting     vomiting  Reaction Date: 27Dec2004;   headache    Morphine Nausea Only, GI Intolerance and Vomiting     Reaction Date: 23Feb2012;        Physical Exam    /76 (Patient Position: Sitting, Cuff Size: Adult)   Pulse 68   Ht 5' 10\" (1.778 m)   Wt 95.3 kg (210 lb)   BMI 30.13 kg/m²     Constitutional: normal, well developed, well nourished, alert, in no distress and non-toxic and no overt pain behavior.  Eyes: anicteric  HEENT: grossly intact  Neck: supple, symmetric, trachea midline and no masses   Pulmonary:even and unlabored  Cardiovascular:No edema or pitting edema present  Skin:Normal without rashes or " lesions and well hydrated  Psychiatric:Mood and affect appropriate  Neurologic:Cranial Nerves II-XII grossly intact  Musculoskeletal: Tenderness to palpation of the cervical paraspinals.  Positive facet loading on the right.  Range of motion limited with cervical flexion extension.  Strength, sensation, and reflexes in the upper limb are normal and symmetric.    Imaging   XR SPINE CERVICAL 2 OR 3 VW INJURY     INDICATION: M54.12: Radiculopathy, cervical region.     COMPARISON: 9/30/2016     FINDINGS:     No acute fracture or subluxation.     Straightening of the usual cervical lordosis.     Moderate degenerative changes of C4-5 and C5-6 with disc space narrowing, endplate sclerosis and small osteophyte formation. Mild facet joint arthropathy. Mild degenerative changes at other levels.     Normal prevertebral soft tissues.     Clear lung apices.     IMPRESSION:     No acute osseous abnormality.     Degenerative changes as described.

## 2024-11-29 NOTE — PROGRESS NOTES
"Daily Note     Today's date: 2024  Patient name: Andrade Wise Jr.  : 1945  MRN: [id number]  Referring provider: Ho Dao*  Dx:   Encounter Diagnosis     ICD-10-CM    1. Radiculopathy, cervical region  M54.12           Start Time: 803  Stop Time: 08  Total time in clinic (min): 42 minutes    Subjective: Patient reports continued right sided neck discomfort that can radiate to right arm stopping above shoulder.     Objective: See treatment diary below    Assessment: Tolerated treatment well. Pt verbally consented to dry needling treatment session. Risks/benefits/aftercare instructions reviewed. All questions/concerns addressed.  Pt in massage chair. Hand hygiene performed pre and post DN treatment session. Placement of needles in pathological tissue.   B/L cervical mm, cervical multifidus, B/L UT (needle location). No stim  Total treatment duration to include set up/break down/in situ: 35 minutes. Patient was supervised by clinician throughout entirety of treatment session to include when DN in situ; clinician next to patient. All needles counted upon insertion and removal-- 20 needles. All accounted for and disposed in appropriate sharp container.     No adverse reaction to treatment. Pt advised may experience soreness. Pt verbalized understanding.     Plan: Continue per primary therapist's POC.       Insurance Eval/ Re-eval POC expires Auth Status Total visits  Start date  Expiration date Misc   CMS 24 No auth req     Co-Pay $4.67               Date 24   Visit Number 2 3 4 5 6   Auth                Manual        Cervical mobs   Upglides MR      Cervical traction    Performed ~2 min      MDT  Continued - see assessment        STM UT/LS STM/TPR UT/LS STM/TPR STM/TPR of UT and along medial border of scapula STM/TPR of UT and along medial border of scapula        Thoracic extension with clinician OP 5\" x15    Dry needling     15'   TherEx   " "     TWU    Arm bike 3'/3'    PROM         Thoracic extension   5\" x20 seated over soft fulcrum 5\" x20 seated over fulcrum Manual     Thoracic rotation   Open books 10\" x 10 ea   Standing open books 10\"x10 ea     Serratus punches   Supine cane + 3#  20x 120 deg  20x 90 deg                               Neuro Re-Ed        Cervical retractions  Chin tucks 2x10     Chin tucks + ext 2x10  Chin tucks supine 3\" 20x  Chin tucks supine 5\" 20x  Chin tucks seated 5\"x20    Scapular retractions  Rows GTB 3x10 Rows GTB 3x10 Cc nv  CC 15.5# 3x8    Scapular depression  Ext GTB 3x10  Ext GTB 3x10 Cc nv  CC 15.5# 3x8      Neural tensioning Wall angels 15x  Wall angels 15x       Push up plus  Hi low table 3x10 - req chin tucks  Hi low table 3x10 - req chin tucks   Wall ball scaption    Rows   CC 10.5# 2x10                      TherAct        Pt ed Updated HEP + review   Dry needling education   10'      Self TPR with lacrosse ball education  Self TPR with lacrosse ball - re-ed                            Gait Training                                Modalities        CP           Precautions: STANDARD  Past Medical History:   Diagnosis Date    Allergic rhinitis     Anemia 10/23/2008    last assessed 9/9/13     Anxiety     Atrial fibrillation (HCC)     Bleeding     Chronic kidney disease     COVID-19 virus infection 12/2022    CPAP (continuous positive airway pressure) dependence     Diabetes mellitus (HCC)     Diarrhea     Diverticulitis     Eating disorder     GERD (gastroesophageal reflux disease)     Herpes zoster with complication     last assessed 7/3/17     Hiatal hernia     Hyperlipidemia     Hypertension     IBS (irritable bowel syndrome)     Incisional hernia     Increased urinary frequency     Irregular heart beat     Pyogenic granuloma 09/13/2006    last assessed 9/13/06    Carlos (subconjunctival hemorrhage), unspecified laterality 09/12/2005    last assessed 9/12/05    Sleep apnea     TMJ (dislocation of temporomandibular " joint)     Ventral hernia     last assessed  4/6/17     Wears glasses      Access Code: [email or web address]  URL: [email or web address]  Date: 11/14/2024  Prepared by: Dyan Pritchett    Exercises  - Standing Shoulder Row with Anchored Resistance  - 1 x daily - 3 sets - 10 reps  - Shoulder extension with resistance - Neutral  - 1 x daily - 3 sets - 10 reps  - Seated Cervical Retraction and Extension  - 1 x daily - 3 sets - 10 reps  - Wall [place]  - 1 x daily - 3 sets - 10 reps

## 2024-12-02 ENCOUNTER — OFFICE VISIT (OUTPATIENT)
Dept: PHYSICAL THERAPY | Facility: CLINIC | Age: 79
End: 2024-12-02
Payer: MEDICARE

## 2024-12-02 DIAGNOSIS — M54.12 RADICULOPATHY, CERVICAL REGION: Primary | ICD-10-CM

## 2024-12-02 PROCEDURE — 97140 MANUAL THERAPY 1/> REGIONS: CPT | Performed by: PHYSICAL THERAPIST

## 2024-12-02 RX ORDER — FLUTICASONE PROPIONATE 50 MCG
SPRAY, SUSPENSION (ML) NASAL
Qty: 48 G | Refills: 3 | Status: SHIPPED | OUTPATIENT
Start: 2024-12-02

## 2024-12-02 NOTE — PROGRESS NOTES
"Daily Note     Today's date: 2024  Patient name: Andrade Wise Jr.  : 1945  MRN: [id number]  Referring provider: Ho Dao*  Dx:   Encounter Diagnosis     ICD-10-CM    1. Radiculopathy, cervical region  M54.12                Subjective: Patient reporting right UT discomfort at this time, less pain in his neck, less symptoms into arm and feeling better overall.     Objective: See treatment diary below    Assessment: Tolerated treatment well. Pt reported soreness right UT region with needling today.   Pt verbally consented to dry needling treatment session. Risks/benefits/aftercare instructions reviewed. All questions/concerns addressed.  Pt in massage chair. Hand hygiene performed pre and post DN treatment session. Placement of needles in pathological tissue.   B/L cervical multifidus, B/L UT (needle location). No stim  Total treatment duration to include set up/break down/in situ: 35 minutes. Patient was supervised by clinician throughout entirety of treatment session to include when DN in situ; clinician next to patient. All needles counted upon insertion and removal-- 18 needles. All accounted for and disposed in appropriate sharp container.     No adverse reaction to treatment. Pt advised may experience soreness. Pt verbalized understanding.     Plan: Resume exercise program next visit.        Insurance Eval/ Re-eval POC expires Auth Status Total visits  Start date  Expiration date Misc   CMS 24 No auth req     Co-Pay $4.67               Date 24   Visit Number 3 4 5 6 7   Auth                Manual        Cervical mobs  Upglides MR       Cervical traction   Performed ~2 min       MDT         STM UT/LS STM/TPR STM/TPR of UT and along medial border of scapula STM/TPR of UT and along medial border of scapula        Thoracic extension with clinician OP 5\" x15     Dry needling    15' 14'   TherEx        TWU   Arm bike 3'/3'     PROM       " "  Thoracic extension  5\" x20 seated over soft fulcrum 5\" x20 seated over fulcrum Manual      Thoracic rotation  Open books 10\" x 10 ea   Standing open books 10\"x10 ea      Serratus punches  Supine cane + 3#  20x 120 deg  20x 90 deg                                Neuro Re-Ed        Cervical retractions  Chin tucks supine 3\" 20x  Chin tucks supine 5\" 20x  Chin tucks seated 5\"x20     Scapular retractions  Rows GTB 3x10 Cc nv  CC 15.5# 3x8     Scapular depression  Ext GTB 3x10 Cc nv  CC 15.5# 3x8       Neural tensioning Wall angels 15x        Push up plus  Hi low table 3x10 - req chin tucks   Wall ball scaption     Rows  CC 10.5# 2x10                       TherAct        Pt ed  Dry needling education   10'      Self TPR with lacrosse ball education  Self TPR with lacrosse ball - re-ed                             Gait Training                                Modalities        CP           Precautions: STANDARD  Past Medical History:   Diagnosis Date    Allergic rhinitis     Anemia 10/23/2008    last assessed 9/9/13     Anxiety     Atrial fibrillation (HCC)     Bleeding     Chronic kidney disease     COVID-19 virus infection 12/2022    CPAP (continuous positive airway pressure) dependence     Diabetes mellitus (HCC)     Diarrhea     Diverticulitis     Eating disorder     GERD (gastroesophageal reflux disease)     Herpes zoster with complication     last assessed 7/3/17     Hiatal hernia     Hyperlipidemia     Hypertension     IBS (irritable bowel syndrome)     Incisional hernia     Increased urinary frequency     Irregular heart beat     Pyogenic granuloma 09/13/2006    last assessed 9/13/06    Carlos (subconjunctival hemorrhage), unspecified laterality 09/12/2005    last assessed 9/12/05    Sleep apnea     TMJ (dislocation of temporomandibular joint)     Ventral hernia     last assessed  4/6/17     Wears glasses      Access Code: [email or web address]  URL: [email or web address]  Date: 11/14/2024  Prepared by: Dyan " Shreyas    Exercises  - Standing Shoulder Row with Anchored Resistance  - 1 x daily - 3 sets - 10 reps  - Shoulder extension with resistance - Neutral  - 1 x daily - 3 sets - 10 reps  - Seated Cervical Retraction and Extension  - 1 x daily - 3 sets - 10 reps  - Wall [place]  - 1 x daily - 3 sets - 10 reps

## 2024-12-03 ENCOUNTER — TELEPHONE (OUTPATIENT)
Age: 79
End: 2024-12-03

## 2024-12-03 NOTE — TELEPHONE ENCOUNTER
Pt called to see which cardiologist her was referred to in April. I told him the name and phone number.

## 2024-12-04 ENCOUNTER — OFFICE VISIT (OUTPATIENT)
Dept: PHYSICAL THERAPY | Facility: CLINIC | Age: 79
End: 2024-12-04
Payer: MEDICARE

## 2024-12-04 DIAGNOSIS — M54.12 RADICULOPATHY, CERVICAL REGION: Primary | ICD-10-CM

## 2024-12-04 DIAGNOSIS — M54.12 RADICULOPATHY, CERVICAL REGION: ICD-10-CM

## 2024-12-04 PROCEDURE — 97140 MANUAL THERAPY 1/> REGIONS: CPT

## 2024-12-04 PROCEDURE — 97110 THERAPEUTIC EXERCISES: CPT

## 2024-12-04 PROCEDURE — 97112 NEUROMUSCULAR REEDUCATION: CPT

## 2024-12-04 RX ORDER — TIZANIDINE 2 MG/1
TABLET ORAL
Qty: 20 TABLET | Refills: 0 | Status: SHIPPED | OUTPATIENT
Start: 2024-12-04

## 2024-12-04 NOTE — TELEPHONE ENCOUNTER
Reason for call:   [x] Refill   [] Prior Auth  [] Other:     Office:   [] PCP/Provider -   [x] Specialty/Provider - [facility] / Ho Dao MD     Medication: tiZANidine (ZANAFLEX) 2 mg tablet     Dose/Frequency: Take 1 tablet (2 mg total) by mouth 2 (two) times a day as needed for muscle spasms     Quantity: 20    Pharmacy: [id number] - [place], [place] - [address] [place] [address] 22     Does the patient have enough for 3 days?   [] Yes   [x] No - Send as HP to POD

## 2024-12-04 NOTE — PROGRESS NOTES
"Daily Note     Today's date: 2024  Patient name: Andrade Wise Jr.  : 1945  MRN: [id number]  Referring provider: Ho Dao*  Dx:   Encounter Diagnosis     ICD-10-CM    1. Radiculopathy, cervical region  M54.12                      Subjective: Patient reports significant improvement in his neck and shoulder pain. He reports mild continued pain in his superior shoulder with occasional pain down to his mid arm. He is very happy with his overall progress.      Objective: See treatment diary below.       Assessment: Noted hypertonicity in right upper trap and rhomboids, along medial border of scapula. Performed STM/TPR which he tolerated well. Also assessed neural tension with noted tension in right median nerve with ULTT 1. Performed neural slides and instructed him on wall angels for neural tension. He required occasional chin tucks throughout session which centralizes pain. He would benefit from continued PT to address neural tension and reduce limitations secondary to pain.      Plan: Continue per plan of care.  Progress treatment as tolerated.         Insurance Eval/ Re-eval POC expires Auth Status Total visits  Start date  Expiration date Misc   CMS 24 No auth req     Co-Pay $4.67               Date 24   Visit Number 4 [phone]   Auth                Manual        Cervical mobs         Cervical traction  Performed ~2 min        MDT         STM STM/TPR of UT and along medial border of scapula STM/TPR of UT and along medial border of scapula   STM/TPR of UT and long medial boarder of scapula      Thoracic extension with clinician OP 5\" x15   Neural tensioning R median nerve      Dry needling   15' 14'    TherEx        TWU  Arm bike 3'/3'   Arm bike 3'/3'   PROM         Thoracic extension  5\" x20 seated over fulcrum Manual    10\"x10 seated over fulcrum    Thoracic rotation   Standing open books 10\"x10 ea    Sidelying open books 10x bilat   Serratus " "punches                                 Neuro Re-Ed        Cervical retractions  Chin tucks supine 5\" 20x  Chin tucks seated 5\"x20   Throughout session 3x10    Scapular retractions  Cc nv  CC 15.5# 3x8   CC 15.5# 3x8    Scapular depression  Cc nv  CC 15.5# 3x8     CC 15.5# 3x8    Neural tensioning        Push up plus   Wall ball scaption      Rows CC 10.5# 2x10             Wall angels x15            TherAct        Pt ed Dry needling education   10'      Self TPR with lacrosse ball education  Self TPR with lacrosse ball - re-ed                              Gait Training                                Modalities        CP           Precautions: STANDARD  Past Medical History:   Diagnosis Date    Allergic rhinitis     Anemia 10/23/2008    last assessed 9/9/13     Anxiety     Atrial fibrillation (HCC)     Bleeding     Chronic kidney disease     COVID-19 virus infection 12/2022    CPAP (continuous positive airway pressure) dependence     Diabetes mellitus (HCC)     Diarrhea     Diverticulitis     Eating disorder     GERD (gastroesophageal reflux disease)     Herpes zoster with complication     last assessed 7/3/17     Hiatal hernia     Hyperlipidemia     Hypertension     IBS (irritable bowel syndrome)     Incisional hernia     Increased urinary frequency     Irregular heart beat     Pyogenic granuloma 09/13/2006    last assessed 9/13/06    Carlos (subconjunctival hemorrhage), unspecified laterality 09/12/2005    last assessed 9/12/05    Sleep apnea     TMJ (dislocation of temporomandibular joint)     Ventral hernia     last assessed  4/6/17     Wears glasses      Access Code: [email or web address]  URL: [email or web address]  Date: 11/14/2024  Prepared by: Dyan Pritchett    Exercises  - Standing Shoulder Row with Anchored Resistance  - 1 x daily - 3 sets - 10 reps  - Shoulder extension with resistance - Neutral  - 1 x daily - 3 sets - 10 reps  - Seated Cervical Retraction and Extension  - 1 x daily - 3 sets - 10 reps  - Wall " [place]  - 1 x daily - 3 sets - 10 reps

## 2024-12-06 ENCOUNTER — HOSPITAL ENCOUNTER (OUTPATIENT)
Dept: RADIOLOGY | Facility: HOSPITAL | Age: 79
Discharge: HOME | End: 2024-12-06
Payer: MEDICARE

## 2024-12-06 ENCOUNTER — OFFICE VISIT (OUTPATIENT)
Dept: PHYSICAL THERAPY | Facility: CLINIC | Age: 79
End: 2024-12-06
Payer: MEDICARE

## 2024-12-06 ENCOUNTER — IMMUNIZATIONS (OUTPATIENT)
Dept: FAMILY MEDICINE CLINIC | Facility: CLINIC | Age: 79
End: 2024-12-06
Payer: MEDICARE

## 2024-12-06 ENCOUNTER — TELEPHONE (OUTPATIENT)
Age: 79
End: 2024-12-06

## 2024-12-06 VITALS — HEART RATE: 100 BPM | OXYGEN SATURATION: 93 %

## 2024-12-06 DIAGNOSIS — M54.12 RADICULOPATHY, CERVICAL REGION: ICD-10-CM

## 2024-12-06 DIAGNOSIS — M54.2 MYOFASCIAL NECK PAIN: ICD-10-CM

## 2024-12-06 DIAGNOSIS — Z95.0 MRI SAFE CARDIAC PACEMAKER IN SITU: ICD-10-CM

## 2024-12-06 DIAGNOSIS — Z23 ENCOUNTER FOR IMMUNIZATION: Primary | ICD-10-CM

## 2024-12-06 DIAGNOSIS — M54.12 RADICULOPATHY, CERVICAL REGION: Primary | ICD-10-CM

## 2024-12-06 PROCEDURE — 90662 IIV NO PRSV INCREASED AG IM: CPT

## 2024-12-06 PROCEDURE — G0008 ADMIN INFLUENZA VIRUS VAC: HCPCS

## 2024-12-06 PROCEDURE — 72141 MRI NECK SPINE W/O DYE: CPT

## 2024-12-06 PROCEDURE — 97140 MANUAL THERAPY 1/> REGIONS: CPT | Performed by: PHYSICAL THERAPIST

## 2024-12-06 NOTE — LETTER
[facility]  [address] [place] [address]      December 12, 2024    MRN: [id number]     Phone: [phone]     Dear Mr. Wise,    You recently had a(n) MRI performed on 12/6/2024 at  [facility] that was requested by Ho Dao MD. The study was reviewed by a radiologist, which is a physician who specializes in medical imaging. The radiologist issued a report describing his or her findings. In that report there was a finding that the radiologist felt warranted further discussion with your health care provider and that discussion would be beneficial to you.      The results were sent to Ho Dao MD on 12/09/2024  9:19 AM. We recommend that you contact Ho Dao MD at [phone] or set up an appointment to discuss the results of the imaging test. If you have already heard from Ho Dao MD regarding the results of your study, you can disregard this letter.     This letter is not meant to alarm you, but intended to encourage you to follow-up on your results with the provider that sent you for the imaging study. In addition, we have enclosed answers to frequently asked questions by other patients who have also received a letter to review results with their health care provider (see page two).      Thank you for choosing [facility] for your medical imaging needs.                                                                                                                                                        FREQUENTLY ASKED QUESTIONS    Why am I receiving this letter?  [place] State Law requires us to notify patients who have findings on imaging exams that may require more testing or follow-up with a health professional within the next 3 months.        How serious is the finding on the imaging test?  This letter is sent to all patients who may need follow-up or more  testing within the next 3 months.  Receiving this letter does not necessarily mean you have a life-threatening imaging finding or disease.  Recommendations in the radiologist’s imaging report are general in nature and it is up to your healthcare provider to say whether those recommendations make sense for your situation.  You are strongly encouraged to talk to your health care provider about the results and ask whether additional steps need to be taken.    Where can I get a copy of the final report for my recent radiology exam?  To get a full copy of the report you can access your records online at [email or web address] or please contact [facility] Medical Records Department at [phone] Monday through Friday between 8 am and 6 pm.         What do I need to do now?           Please contact your health care provider who requested the imaging study to discuss what further actions (if any) are needed.  You may have already heard from (your ordering provider) in regard to this test in which case you can disregard this letter.        NOTICE IN ACCORDANCE WITH THE [place] STATE “PATIENT TEST RESULT INFORMATION ACT OF 2018”    You are receiving this notice as a result of a determination by your diagnostic imaging service that further discussions of your test results are warranted and would be beneficial to you.    The complete results of your test or tests have been or will be sent to the health care practitioner that ordered the test or tests. It is recommended that you contact your health care practitioner to discuss your results as soon as possible.

## 2024-12-06 NOTE — PROGRESS NOTES
"Daily Note     Today's date: 2024  Patient name: Andrade Wise Jr.  : 1945  MRN: [id number]  Referring provider: Ho Dao*  Dx:   Encounter Diagnosis     ICD-10-CM    1. Radiculopathy, cervical region  M54.12                Subjective: Patient reports that he is doing quite well, minimal trouble sleeping. Reports some soreness right UT region.     Objective: See treatment diary below.     Assessment: Pt reported soreness right UT region with needling today, not as much prior.   Pt verbally consented to dry needling treatment session. Risks/benefits/aftercare instructions reviewed. All questions/concerns addressed.  Pt in massage chair. Hand hygiene performed pre and post DN treatment session. Placement of needles in pathological tissue.   B/L cervical multifidus, B/L UT (needle location). With stim  Total treatment duration to include set up/break down/in situ: 35 minutes. Patient was supervised by clinician throughout entirety of treatment session to include when DN in situ; clinician next to patient. All needles counted upon insertion and removal-- 15 needles. All accounted for and disposed in appropriate sharp container.      No adverse reaction to treatment. Pt advised may experience soreness. Pt verbalized understanding.     Plan: Continue per plan of care.  Progress treatment as tolerated.         Insurance Eval/ Re-eval POC expires Auth Status Total visits  Start date  Expiration date Misc   CMS 24 No auth req     Co-Pay $4.67               Date 24   Visit Number 5 [phone] 9   Auth                Manual        Cervical mobs         Cervical traction         MDT         STM STM/TPR of UT and along medial border of scapula   STM/TPR of UT and long medial boarder of scapula      Thoracic extension with clinician OP 5\" x15   Neural tensioning R median nerve       Dry needling  15' 14'  16'   TherEx        TWU Arm bike 3'/3'   Arm bike 3'/3'    PROM  " "       Thoracic extension  Manual    10\"x10 seated over fulcrum     Thoracic rotation  Standing open books 10\"x10 ea    Sidelying open books 10x bilat    Serratus punches                                 Neuro Re-Ed        Cervical retractions  Chin tucks seated 5\"x20   Throughout session 3x10     Scapular retractions  CC 15.5# 3x8   CC 15.5# 3x8     Scapular depression  CC 15.5# 3x8     CC 15.5# 3x8     Rows            Wall angels x15             TherAct        Pt ed  10'       Self TPR with lacrosse ball - re-ed                          Precautions: STANDARD  Past Medical History:   Diagnosis Date    Allergic rhinitis     Anemia 10/23/2008    last assessed 9/9/13     Anxiety     Atrial fibrillation (HCC)     Bleeding     Chronic kidney disease     COVID-19 virus infection 12/2022    CPAP (continuous positive airway pressure) dependence     Diabetes mellitus (HCC)     Diarrhea     Diverticulitis     Eating disorder     GERD (gastroesophageal reflux disease)     Herpes zoster with complication     last assessed 7/3/17     Hiatal hernia     Hyperlipidemia     Hypertension     IBS (irritable bowel syndrome)     Incisional hernia     Increased urinary frequency     Irregular heart beat     Pyogenic granuloma 09/13/2006    last assessed 9/13/06    Carlos (subconjunctival hemorrhage), unspecified laterality 09/12/2005    last assessed 9/12/05    Sleep apnea     TMJ (dislocation of temporomandibular joint)     Ventral hernia     last assessed  4/6/17     Wears glasses      Access Code: [email or web address]  URL: [email or web address]  Date: 11/14/2024  Prepared by: Dyan Pritchett    Exercises  - Standing Shoulder Row with Anchored Resistance  - 1 x daily - 3 sets - 10 reps  - Shoulder extension with resistance - Neutral  - 1 x daily - 3 sets - 10 reps  - Seated Cervical Retraction and Extension  - 1 x daily - 3 sets - 10 reps  - Wall [place]  - 1 x daily - 3 sets - 10 reps         "

## 2024-12-06 NOTE — TELEPHONE ENCOUNTER
Received call from Patient to schedule New Patient appt for a Spot left side of nose. Patient has history of BCC.     Offered first avail 8/2025 [place]. Patient rejected because it is too far out.     Patient verbalized understanding.

## 2024-12-06 NOTE — PROGRESS NOTES
Patient arrived to outpatient appointment today 12/06/2024 for MRI evaluation of C-Spine. Melodie Bruno PA-C from Cardiology department has signed general consent for imaging and care interventions as related to today's study. Patient does have a Medtronic Dunellen pacemaker in place, and as such is being monitored for the duration of today's imaging study.     Remote device evaluation for Medtronic ICD/ Pacemaker was completed pre-procedure by myself Bertha Basurto RN with assistance of Medtronic Tablet using pre-programmed cardiac agorithm software. Device settings adjusted for MRI safe compatibility/ MRI Sure-Scan mode. Baseline settings of AAI-DDD (low threshold 60 bpm, high threshold 120 bpm) changed to DOO with programmed rate of 100 bpm.     Patient tolerated imaging study without incidence. (Please see procedural section of patient's chart to view vital signs which were continuously monitored & documented at q5min intervals for duration of patient's imaging appointment). Device again remotely interrogated by myself Bertha Basurto RN with assistance of Medtronic Tablet pre-programmed cardiac algorithm software. MRI Sure Scan mode turned off, and patient's device returned to baseline settings of ____ (low threshold ___ bpm, and high threshold ___ bpm) at completion of the MRI imaging study. Normal device function confirmed prior to discharge from the MRI suite.

## 2024-12-09 ENCOUNTER — TELEPHONE (OUTPATIENT)
Age: 79
End: 2024-12-09

## 2024-12-09 ENCOUNTER — TELEPHONE (OUTPATIENT)
Dept: OBGYN CLINIC | Facility: CLINIC | Age: 79
End: 2024-12-09

## 2024-12-09 ENCOUNTER — OFFICE VISIT (OUTPATIENT)
Dept: PHYSICAL THERAPY | Facility: CLINIC | Age: 79
End: 2024-12-09
Payer: MEDICARE

## 2024-12-09 DIAGNOSIS — M54.12 RADICULOPATHY, CERVICAL REGION: Primary | ICD-10-CM

## 2024-12-09 PROCEDURE — 97110 THERAPEUTIC EXERCISES: CPT

## 2024-12-09 NOTE — TELEPHONE ENCOUNTER
Patient returning call from Dr. Dao's office.  While attempting to convey previous message patient disconnected call.

## 2024-12-09 NOTE — PROGRESS NOTES
"Daily Note     Today's date: 2024  Patient name: Andrade Wise Jr.  : 1945  MRN: [id number]  Referring provider: Ho Dao*  Dx:   Encounter Diagnosis     ICD-10-CM    1. Radiculopathy, cervical region  M54.12                      Subjective: Patient reports feeling good since his last session.       Objective: See treatment diary below      Assessment: Patient did well with all TE and manuals as listed, reports no increased pain only fatigued at end of tx session.     Plan: Continue per plan of care.        Insurance Eval/ Re-eval POC expires Auth Status Total visits  Start date  Expiration date Misc   CMS 24 No auth req     Co-Pay $4.67               Date 24   Visit Number 5 6 [phone] 10   Auth                  Manual         Cervical mobs          Cervical traction          MDT          STM STM/TPR of UT and along medial border of scapula   STM/TPR of UT and long medial boarder of scapula   STM/TPR of UT and long medial boarder of scapula     Thoracic extension with clinician OP 5\" x15   Neural tensioning R median nerve        Dry needling  15' 14'  16'    TherEx         TWU Arm bike 3'/3'   Arm bike 3'/3'  Arm bike 3'/3'   PROM          Thoracic extension  Manual    10\"x10 seated over fulcrum   10'x10 over fulcrum   Thoracic rotation  Standing open books 10\"x10 ea    Sidelying open books 10x bilat  Sidelying open books 10x bilat   Serratus punches                                     Neuro Re-Ed         Cervical retractions  Chin tucks seated 5\"x20   Throughout session 3x10   3x10   Scapular retractions  CC 15.5# 3x8   CC 15.5# 3x8   CC 15.5# 3x8    Scapular depression  CC 15.5# 3x8     CC 15.5# 3x8   CC 15.5# 3x8    Rows             Wall angels x15   Wall angels x15            TherAct         Pt ed  10'        Self TPR with lacrosse ball - re-ed                             Precautions: STANDARD  Past Medical History:   Diagnosis Date    " Allergic rhinitis     Anemia 10/23/2008    last assessed 9/9/13     Anxiety     Atrial fibrillation (HCC)     Bleeding     Chronic kidney disease     COVID-19 virus infection 12/2022    CPAP (continuous positive airway pressure) dependence     Diabetes mellitus (HCC)     Diarrhea     Diverticulitis     Eating disorder     GERD (gastroesophageal reflux disease)     Herpes zoster with complication     last assessed 7/3/17     Hiatal hernia     Hyperlipidemia     Hypertension     IBS (irritable bowel syndrome)     Incisional hernia     Increased urinary frequency     Irregular heart beat     Pyogenic granuloma 09/13/2006    last assessed 9/13/06    Carlos (subconjunctival hemorrhage), unspecified laterality 09/12/2005    last assessed 9/12/05    Sleep apnea     TMJ (dislocation of temporomandibular joint)     Ventral hernia     last assessed  4/6/17     Wears glasses      Access Code: [email or web address]  URL: [email or web address]  Date: 11/14/2024  Prepared by: Dyan Pritchett    Exercises  - Standing Shoulder Row with Anchored Resistance  - 1 x daily - 3 sets - 10 reps  - Shoulder extension with resistance - Neutral  - 1 x daily - 3 sets - 10 reps  - Seated Cervical Retraction and Extension  - 1 x daily - 3 sets - 10 reps  - Wall [place]  - 1 x daily - 3 sets - 10 reps

## 2024-12-09 NOTE — TELEPHONE ENCOUNTER
Billym for patient to call office back. Patient has FU scheduled with Dr. Dao (ortho) on 12/16 for MRI review of cervical spine. Patient would be better off seeing Dr. Mays who he has seen in the past to go over the MRI. Please transfer to office if patient calls back and has any additional questions.

## 2024-12-09 NOTE — TELEPHONE ENCOUNTER
Caller: Andrade    Doctor/Office: Tabatha    Call regarding :  Call back     Call was transferred to: SPA

## 2024-12-10 ENCOUNTER — OFFICE VISIT (OUTPATIENT)
Dept: PAIN MEDICINE | Facility: CLINIC | Age: 79
End: 2024-12-10
Payer: MEDICARE

## 2024-12-10 VITALS
DIASTOLIC BLOOD PRESSURE: 72 MMHG | WEIGHT: 210 LBS | SYSTOLIC BLOOD PRESSURE: 116 MMHG | BODY MASS INDEX: 30.06 KG/M2 | HEART RATE: 69 BPM | HEIGHT: 70 IN

## 2024-12-10 DIAGNOSIS — M47.812 CERVICAL SPONDYLOSIS: Primary | ICD-10-CM

## 2024-12-10 PROCEDURE — G2211 COMPLEX E/M VISIT ADD ON: HCPCS | Performed by: STUDENT IN AN ORGANIZED HEALTH CARE EDUCATION/TRAINING PROGRAM

## 2024-12-10 PROCEDURE — 99213 OFFICE O/P EST LOW 20 MIN: CPT | Performed by: STUDENT IN AN ORGANIZED HEALTH CARE EDUCATION/TRAINING PROGRAM

## 2024-12-10 NOTE — PROGRESS NOTES
Pain Medicine Follow-Up Note    Assessment:  1. Cervical spondylosis        Plan:  No orders of the defined types were placed in this encounter.      No orders of the defined types were placed in this encounter.      My impressions and treatment recommendations were discussed in detail with the patient who verbalized understanding and had no further questions.      Andrade is a very pleasant 79-year-old male who presents today for follow-up regarding his neck pain.  We reviewed his cervical MRI showing multilevel degenerative changes with disc protrusion most prominent at C5-6 resulting in moderate canal stenosis.  We again discussed management including conservative care, activity modification, physical therapy, medications, and injection therapy.    Given his significant improvement with conservative care and physical therapy, we will continue this approach at this time.  He remains on Eliquis following cardiac ablation and will need to be on this through 12/26 before being able to hold the medication for any procedures.    If his pain returns, he would likely be appropriate for cervical MBB and RFA.  For now he can continue with his physical therapy and home exercise program and can follow-up as needed if failure to improve.   Discharge instructions were provided. I personally saw and examined the patient and I agree with the above discussed plan of care.    History of Present Illness:    Andrade Wise Jr. is a 79 y.o. male who presents to [facility] for interval re-evaluation of the above stated pain complaints. The patient has a past medical and chronic pain history as outlined in the assessment section. He was last seen on 11/29/2024.    79-year-old male who presents today for follow-up regarding right-sided neck pain.  Reports his pain is 75% improved with his physical therapy program.  He reports they are doing dry needling which has been helpful.  He has not started the gabapentin  due to his physical therapy program being helpful for him.  He reports his pain does still persist with certain movements and overactivity.  Pain is rated 3 out of 10 today.  Located at the right side of the neck radiates into the proximal right shoulder.    Other than as stated above, the patient denies any interval changes in medications, medical condition, mental condition, symptoms, or allergies since the last office visit.         Review of Systems:    Review of Systems   Constitutional:  Negative for chills and fever.   HENT:  Negative for ear pain and sore throat.    Eyes:  Negative for pain and visual disturbance.   Respiratory:  Negative for cough and shortness of breath.    Cardiovascular:  Negative for chest pain and palpitations.   Gastrointestinal:  Negative for abdominal pain and vomiting.   Genitourinary:  Negative for frequency.   Musculoskeletal:  Positive for neck pain and neck stiffness.   Neurological:  Positive for weakness and numbness.   All other systems reviewed and are negative.        Past Medical History:   Diagnosis Date    Allergic rhinitis     Anemia 10/23/2008    last assessed 9/9/13     Anxiety     Atrial fibrillation (HCC)     Bleeding     Chronic kidney disease     COVID-19 virus infection 12/2022    CPAP (continuous positive airway pressure) dependence     Diabetes mellitus (HCC)     Diarrhea     Diverticulitis     Eating disorder     GERD (gastroesophageal reflux disease)     Herpes zoster with complication     last assessed 7/3/17     Hiatal hernia     Hyperlipidemia     Hypertension     IBS (irritable bowel syndrome)     Incisional hernia     Increased urinary frequency     Irregular heart beat     Pyogenic granuloma 09/13/2006    last assessed 9/13/06    Carlos (subconjunctival hemorrhage), unspecified laterality 09/12/2005    last assessed 9/12/05    Sleep apnea     TMJ (dislocation of temporomandibular joint)     Ventral hernia     last assessed  4/6/17     Wears glasses         Past Surgical History:   Procedure Laterality Date    APPENDECTOMY      ATRIAL ABLATION SURGERY          ATRIAL ABLATION SURGERY      catheter ablation atrial fibrillation / last assessed 16     CARDIOVERSION  2024    CHOLECYSTECTOMY      lap    COLON SURGERY      Hartmans procedure    COLON SURGERY      reversal 6 weeks later    COLONOSCOPY N/A 2017    Procedure: COLONOSCOPY;  Surgeon: Portillo Schumacher MD;  Location: [facility];  Service:     ESOPHAGOGASTRODUODENOSCOPY N/A 2017    Procedure: ESOPHAGOGASTRODUODENOSCOPY (EGD);  Surgeon: Portillo Schumacher MD;  Location: [facility];  Service:     EYE SURGERY Bilateral 2015    lid repair    HERNIA REPAIR Bilateral     inguinal  &     INCISIONAL HERNIA REPAIR      INCISIONAL HERNIA REPAIR N/A 2017    Procedure: REPAIR OF INCARCERATED INCISIONAL HERNIA WITH MESH, Lysis of Adhesions, Partial Omentectomy;  Surgeon: Colt Davis MD;  Location: [facility];  Service:     INSERT / REPLACE / REMOVE PACEMAKER Left 10/04/2021    medtronic-[id number]    NEUROBLASTOMA EXCISION      SKIN CANCER EXCISION      On nose    TONSILLECTOMY         Family History   Problem Relation Age of Onset    Heart disease Mother     Cancer Mother     Colon cancer Mother     Dementia Mother     Early death Father     Seizures Father     Cancer Sister         bone    Heart disease Sister     Diabetes Sister     Lupus Sister     Heart disease Brother     Cancer Brother         kidney    Diabetes Brother     Other Sister         covid    Stroke Neg Hx        Social History     Occupational History    Not on file   Tobacco Use    Smoking status: Former     Current packs/day: 0.00     Average packs/day: 1 pack/day for 20.0 years (20.0 ttl pk-yrs)     Types: Cigarettes     Start date:      Quit date:      Years since quittin.9     Passive exposure: Never    Smokeless tobacco: Never    Tobacco comments:     Started age 17   Vaping Use     Vaping status: Never Used   Substance and Sexual Activity    Alcohol use: No     Comment: none in 33 yrs    Drug use: No    Sexual activity: Yes         Current Outpatient Medications:     acetaminophen (TYLENOL) 325 mg tablet, Take 650 mg by mouth every 6 (six) hours as needed, Disp: , Rfl:     amiodarone 100 mg tablet, 200 mg (Patient not taking: Reported on 7/9/2024), Disp: , Rfl:     amiodarone 200 mg tablet, , Disp: , Rfl:     apixaban (ELIQUIS) 5 mg, Take 1 tablet (5 mg total) by mouth 2 (two) times a day, Disp: 180 tablet, Rfl: 3    atorvastatin (LIPITOR) 20 mg tablet, TAKE 1 TABLET DAILY, Disp: 90 tablet, Rfl: 1    Blood Glucose Monitoring Suppl (FREESTYLE LITE) GEOVANNY, , Disp: , Rfl:     carvedilol (COREG) 25 mg tablet, Take 12.5 mg by mouth 2 (two) times a day, Disp: , Rfl:     clotrimazole-betamethasone (LOTRISONE) 1-0.05 % cream, APPLY TO AFFECTED AREA(S) TWO TIMES A DAY, Disp: 45 g, Rfl: 1    esomeprazole (NexIUM) 40 MG capsule, Take 1 capsule (40 mg total) by mouth daily before breakfast (Patient not taking: Reported on 11/29/2024), Disp: 90 capsule, Rfl: 3    fluticasone (FLONASE) 50 mcg/act nasal spray, USE 2 SPRAYS IN EACH NOSTRIL DAILY (GENERIC FOR FLONASE), Disp: 48 g, Rfl: 3    FREESTYLE LITE test strip, , Disp: , Rfl:     gabapentin (NEURONTIN) 300 mg capsule, Take 1 capsule (300 mg total) by mouth daily at bedtime, Disp: 30 capsule, Rfl: 1    hydroCHLOROthiazide 25 mg tablet, TAKE ONE TABLET BY MOUTH EVERY DAY, Disp: 90 tablet, Rfl: 1    ketoconazole (NIZORAL) 2 % shampoo, , Disp: , Rfl:     Lancets (FREESTYLE) lancets, , Disp: , Rfl:     linaCLOtide (Linzess) 290 MCG CAPS, Take 1 capsule by mouth daily before breakfast, Disp: 90 capsule, Rfl: 1    linaCLOtide (Linzess) 290 MCG CAPS, Take 1 capsule by mouth daily before breakfast, Disp: 14 capsule, Rfl: 0    losartan (COZAAR) 100 MG tablet, 100 mg every morning , Disp: , Rfl:     metFORMIN (GLUCOPHAGE-XR) 500 mg 24 hr tablet, , Disp: , Rfl:      methylPREDNISolone 4 MG tablet therapy pack, Use as directed on package, Disp: 21 each, Rfl: 0    metroNIDAZOLE (METROCREAM) 0.75 % cream, , Disp: , Rfl:     Mounjaro 5 MG/0.5ML, Not taking until 04/08/2024, Disp: , Rfl:     ondansetron (ZOFRAN) 4 mg tablet, Take 1 tablet (4 mg total) by mouth every 6 (six) hours, Disp: 6 tablet, Rfl: 0    Ozempic, 0.25 or 0.5 MG/DOSE, 2 MG/1.5ML SOPN, once a week (Patient not taking: Reported on 3/7/2024), Disp: , Rfl:     pantoprazole (PROTONIX) 40 mg tablet, Take 40 mg by mouth, Disp: , Rfl:     pioglitazone-metFORMIN (ACTOPLUS MET)  MG per tablet, daily after dinner, Disp: , Rfl:     polyethylene glycol (Golytely) 4000 mL solution, Take 4,000 mL by mouth once for 1 dose Take 4000 mL by mouth once for 1 dose. Use as directed, Disp: 4000 mL, Rfl: 0    polyethylene glycol-propylene glycol (Systane) 0.4-0.3 %, every 3 (three) hours as needed   (Patient not taking: Reported on 4/15/2024), Disp: , Rfl:     promethazine-dextromethorphan (PHENERGAN-DM) 6.25-15 mg/5 mL oral syrup, Take 5 mL by mouth 4 (four) times a day as needed for cough, Disp: 180 mL, Rfl: 0    RESTASIS 0.05 % ophthalmic emulsion, Administer 1 drop to both eyes every 12 (twelve) hours, Disp: , Rfl:     tamsulosin (FLOMAX) 0.4 mg, TAKE 1 CAPSULE DAILY WITH DINNER, Disp: 100 capsule, Rfl: 1    tiZANidine (ZANAFLEX) 2 mg tablet, TAKE ONE TABLET BY MOUTH TWICE A DAY AS NEEDED FOR MUSCLE SPASMS, Disp: 20 tablet, Rfl: 0    traMADol (ULTRAM) 50 mg tablet, Take 50 mg by mouth once as needed for moderate pain He uses it rarely., Disp: , Rfl:     urea (CARMOL) 20 % cream, if needed (Patient not taking: Reported on 7/9/2024), Disp: , Rfl:     Allergies   Allergen Reactions    Codeine Other (See Comments) and GI Intolerance     Reaction Date: 27Dec2004; Annotation - [id number]: '' CRAZY ''  vomiting  headache    Demerol [Meperidine] Nausea Only, Other (See Comments), GI Intolerance and Vomiting     vomiting  Reaction Date:  "[id number];   headache    Morphine Nausea Only, GI Intolerance and Vomiting     Reaction Date: 23Feb2012;        Physical Exam:    /72   Pulse 69   Ht 5' 10\" (1.778 m)   Wt 95.3 kg (210 lb)   BMI 30.13 kg/m²     Constitutional:normal, well developed, well nourished, alert, in no distress and non-toxic and no overt pain behavior.  Eyes:anicteric  HEENT:grossly intact  Neck:supple, symmetric, trachea midline and no masses   Pulmonary:even and unlabored  Cardiovascular:No edema or pitting edema present  Skin:Normal without rashes or lesions and well hydrated  Psychiatric:Mood and affect appropriate  Neurologic:Cranial Nerves II-XII grossly intact  Musculoskeletal: Range of motion of the cervical spine limited with sidebending and rotation.  Tenderness cervical paraspinals on the right.  Positive facet loading.  Strength, sensation, reflexes in the upper limbs are normal and symmetric.      Imaging       MRI cervical spine wo contrast  Status: Final result     PACS Images     Show images for MRI cervical spine wo contrast  Study Result    Narrative & Impression   MRI CERVICAL SPINE WITHOUT CONTRAST     INDICATION: M54.12: Radiculopathy, cervical region  M54.2: Cervicalgia.     COMPARISON: 11/18/2024; 5/3/2022; 11/19/2024     TECHNIQUE:  Multiplanar, multisequence imaging of the cervical spine was performed. .        IMAGE QUALITY:  Diagnostic     FINDINGS:     ALIGNMENT: Trace grade 1 retrolisthesis of C5 on C6.     MARROW SIGNAL: Scattered degenerative endplate changes.  No focally suspicious marrow lesions.  No bone marrow edema or compression abnormality.     CERVICAL AND VISUALIZED THORACIC CORD:  Normal signal within the visualized cord.     PREVERTEBRAL AND PARASPINAL SOFT TISSUES: 3.1 x 2.7 cm heterogeneous mass in the right lobe of the thyroid gland series 8 image 126. Incidental discovery of one or more thyroid nodule(s) measuring more than 1.5 cm and without suspicious features is noted   in this " patient who is above 35 years old; according to guidelines published in the February 2015 white paper on incidental thyroid nodules in the Journal of the American College of Radiology (JACR), further characterization with thyroid ultrasound is   recommended.     VISUALIZED POSTERIOR FOSSA: Partially imaged prominent retrocerebellar arachnoid cyst seen on the prior MRI from 2022. No associated edema in the visualized cerebellum. No cerebellar tonsillar ectopia.     CERVICAL DISC SPACES:     C2-C3:  Normal.     C3-C4:  Normal.     C4-C5: There is loss of disc height and signal. There is a disc osteophyte complex with a superimposed right neural foraminal disc protrusion. Mild central canal narrowing. Moderate bilateral neural foraminal narrowing.     C5-C6: There is a central disc herniation, protrusion type. There is moderate central canal narrowing. Mild to moderate left neural foraminal narrowing. Mild right neural foraminal narrowing.     C6-C7: Small central disc herniation, protrusion type. Mild central canal narrowing. Neural foramina patent bilaterally.     C7-T1:  Normal.     UPPER THORACIC DISC SPACES:  Normal.     OTHER FINDINGS:  None.     IMPRESSION:        1. Scattered multilevel spondylosis most pronounced at C4-5 and C5-C6. No cord compression or cord signal abnormality.  2. 3.1 cm right-sided thyroid nodule/mass recently evaluated for attempted thyroid biopsy on 11/19/2024 which recommended surveillance ultrasound in 1 year's time.              Workstation performed: [id number]        Imaging    MRI cervical spine wo contrast (Order: [id number]) - 12/6/2024    Result History    MRI cervical spine wo contrast (Order #[id number]) on 12/9/2024 - Order Result History Report    Order Report     Order Details  Order Questions    Question Answer   Exam reason persistent neck pain   Note: Enter reason for exam   What is the patient's sedation requirement? No Sedation   Does the patient need medication for  Claustrophobia? If yes, order medication at this point. No   Does the patient wear a life vest, have an implanted cardiac device, a stimulation device, a sleep apnea stimulator, or a breast tissue expansion device? No   Note: i.e. - pacemaker, ICD, deep brain stimulator, bladder stimulator, spinal stimulator, vagus nerve stimulator   Release to patient through Mychart Immediate            Result Information    Status Priority Source   Final result (12/9/2024  9:19 AM) Routine      Reason for Exam    persistent neck pain   Dx: Radiculopathy, cervical region [M54.12 (ICD-10-CM)]; Myofascial neck pain [M54.2 (ICD-10-CM)]       All Reviewers List    Ho Dao MD on 12/9/2024  9:53 AM         MRI cervical spine wo contrast: Patient Communication     Add Comments   Seen       Signed by    Signed Date/Time  Phone Pager   LUIS ERICK GARCIA 12/09/2024 09:19 [phone]        Exam Information    Status Exam Begun  Exam Ended  Performing Tech   Final [99] 12/06/2024 12:52 12/06/2024 14:21 Anna Mckeon       Questionnaire          Question Answer Comment   1. Reason for Exam persistent neck pain    2. What is the patient's sedation requirement? No Sedation    3. Does the patient need medication for Claustrophobia? If yes, order medication at this point. No    4. Does the patient wear a life vest, have an implanted cardiac device, a stimulation device, a sleep apnea stimulator, or a breast tissue expansion device? No    5. Release to patient through Mychart Immediate          Begin Exam      IMAGING BEGIN MRI    Question Answer Comment   1. Have you reviewed the MRI Screening Form? Yes    2. Have you performed a Full Stop/Final Check before entering Zone 4? Yes         End Exam      PATIENT EDUCATION    Question Answer Comment   1. Was the patient educated? Yes    2. Why was the patient not educated?           IMAGING END MRI TECH NOTES    Question Answer Comment   1. Patient History: necka and  "shoulder pain    2. Has the patient had prior surgery on this body part? no    3. Any history of cancer? no    4. Add'l Imaging Notes: no    5. Outside images/report information: no    6. Implant clearance information: Medtronic Jazmin pacemaker cleared by [facility] and CXR cleared by Hemant Badillo. Placed into MRI safe mode by DEBBIE Stone and monitored throughout exam without any reported issues. Placed back into patients original settings before leaving.    7. LMP and ORAL contraceptives: n/a    8. Was jewelry removed from the patient? No    9. Jewelry removed:            Screening Form Questions     important suggestion  Questionnaires are displayed in the order they were answered.      Answer Comment   Has patient changed into the appropriate hospital gown? Yes    What are your symptoms? renal cyst    Do you have a cardiac pacemaker or internal defibrillator? Pacemaker    Please list /make/model: MEDTRONIC [id number]    Have you had any HEART surgery? Other    Please list make/model:     Heart surgery: If \"other\", please describe: Pacemaker    Have you had any BRAIN surgery? None    Please list  or describe implant or type \"NA\" if not applicable:     Brain surgery: If \"other\", please describe:     Have you had any EYE surgery? Other    Eye surgery: If \"other\", please describe: Lid repair    Have you ever injured your eyes with metal or metal fragments (grinding,metallic slivers)? No    Eye injury: Please describe:     Have you had any EAR surgery? None    Ear surgery: If \"other\", please describe:     Do you have an electronic \"stimulation\" device? None    Please provide  information:     Have you had any abdominal or pelvic surgery? Yes    Have you had any of the following abdominal or pelvic surgeries: Other    Abdominal/pelvic surgery: If \"other\", please describe: Hernia, colon surgery, Cholecystectomy appy    Do you have any ORTHOPEDIC implants/devices? None    Do you have the " "following: None    Do you have liver disease? None    Do you have kidney disease? High blood pressure, being treated for     Kidney disease (including tumor or single)    Have you had a problem with a previous MRI? No    Does the patient require pre-medication?     Do you have a personal history of cancer? Other    If \"other\", please specify:   skin cancer    Are you wearing medication patches?   No    Are you wearing any of the following: None    Did the patient provide this information? Yes    Who provided this information (if not the patient)?     Relationship to the patient:     Contact number:     MRI STAFF ONLY- Prior imaging reviewed in PACS (initials): an    MRI STAFF ONLY- Epic chart reviewed (initials): an    MRI STAFF ONLY: The patient was scheduled to receive MRI contrast and has received the Medication Guide. Yes        External Results Report    Open External Results Report      Encounter    View Encounter                     Sedation Documentation Timeline (12/6/2024 00:00 to 12/6/2024 14:22)    An end event has not been filed for the most recent intervention. Due to this intervention’s length, all data may not appear below. To see all data, file an end event.  12/6/2024 12/6/2024 Event By   11:50:05 Orders Placed ET   Imaging  - XR follow up         12:27:52 Order Performed    XR follow up  - ID: [id number]         12:30:45 XR follow up Resulted RI   Collected: 12/6/2024 12:27  Last updated: 12/6/2024 12:32  Status: Final result         12:32:35 Orders Placed AJ   Imaging  - MRI cervical spine wo contrast         12:50 Vital Signs ED   Other flowsheet entries   Pulse: 100   SpO2: 93 %   Capnography: No   O2 Device: None (Room air)   Heart Rate Source: Monitor   SpO2 Alarm Limit Low: 90   SpO2 Alarm Limit High: 100   Oximetry Probe Site Changed: Yes   Pulse Oximetry Type: Continuous   Patient Position - Orthostatic VS: Lying   SpO2 Activity: At Rest   12:54:45 Allergies Reviewed ED         12:55 Vital " Signs ED   Other flowsheet entries   Pulse: 100   SpO2: 94 %   Capnography: No   O2 Device: None (Room air)   Heart Rate Source: Monitor   SpO2 Alarm Limit Low: 90   SpO2 Alarm Limit High: 100   Pulse Oximetry Type: Continuous   Patient Position - Orthostatic VS: Lying   SpO2 Activity: At Rest   13:00 Vital Signs ED   Other flowsheet entries   Pulse: 100   SpO2: 94 %   Capnography: No   O2 Device: None (Room air)   Heart Rate Source: Monitor   SpO2 Alarm Limit Low: 90   SpO2 Alarm Limit High: 100   Pulse Oximetry Type: Continuous   Patient Position - Orthostatic VS: Lying   SpO2 Activity: At Rest   13:05 Vital Signs ED   Other flowsheet entries   Pulse: 100   SpO2: 93 %   Capnography: No   O2 Device: None (Room air)   Heart Rate Source: Monitor   SpO2 Alarm Limit Low: 90   SpO2 Alarm Limit High: 100   Pulse Oximetry Type: Continuous   Patient Position - Orthostatic VS: Lying   SpO2 Activity: At Rest     Pre-op Summary    Pre-op             Recovery Summary    Recovery                 Routing History    None     No orders to display         No orders of the defined types were placed in this encounter.

## 2024-12-13 ENCOUNTER — OFFICE VISIT (OUTPATIENT)
Dept: PHYSICAL THERAPY | Facility: CLINIC | Age: 79
End: 2024-12-13
Payer: MEDICARE

## 2024-12-13 DIAGNOSIS — M54.12 RADICULOPATHY, CERVICAL REGION: Primary | ICD-10-CM

## 2024-12-13 PROCEDURE — 97140 MANUAL THERAPY 1/> REGIONS: CPT | Performed by: PHYSICAL THERAPIST

## 2024-12-13 NOTE — PROGRESS NOTES
"Daily Note     Today's date: 2024  Patient name: Andrade Wise Jr.  : 1945  MRN: [id number]  Referring provider: Ho Dao*  Dx:   Encounter Diagnosis     ICD-10-CM    1. Radiculopathy, cervical region  M54.12                Subjective: Patient reports feeling much improved overall. Reports that he fell Wednesday forward and irritated his right shoulder.     Objective: See treatment diary below    Assessment: Pt reported feeling good post session without reporting of neck discomfort.   Pt verbally consented to dry needling treatment session. Risks/benefits/aftercare instructions reviewed. All questions/concerns addressed.  Pt in massage chair. Hand hygiene performed pre and post DN treatment session. Placement of needles in pathological tissue.   B/L cervical multifidus, B/L UT (needle location). With stim  Total treatment duration to include set up/break down/in situ: 35 minutes. Patient was supervised by clinician throughout entirety of treatment session to include when DN in situ; clinician next to patient. All needles counted upon insertion and removal-- 15 needles. All accounted for and disposed in appropriate sharp container.      No adverse reaction to treatment. Pt advised may experience soreness. Pt verbalized understanding.     Plan: Continue per plan of care.        Insurance Eval/ Re-eval POC expires Auth Status Total visits  Start date  Expiration date Misc   CMS 24 No auth req     Co-Pay $4.67               Date    Visit Number [phone]   Auth                Manual        Cervical mobs         STM  STM/TPR of UT and long medial boarder of scapula   STM/TPR of UT and long medial boarder of scapula       Neural tensioning R median nerve         Dry needling 14'  16'  15' w/stim   TherEx        TWU  Arm bike 3'/3'  Arm bike 3'/3'    PROM         Thoracic extension   10\"x10 seated over fulcrum   10'x10 over fulcrum    Thoracic rotation   " Sidelying open books 10x bilat  Sidelying open books 10x bilat    Serratus punches                                 Neuro Re-Ed        Cervical retractions   Throughout session 3x10   3x10    Scapular retractions   CC 15.5# 3x8   CC 15.5# 3x8     Scapular depression   CC 15.5# 3x8   CC 15.5# 3x8     Rows          Wall angels x15   Wall angels x15            TherAct        Pt ed                                   Precautions: STANDARD  Past Medical History:   Diagnosis Date    Allergic rhinitis     Anemia 10/23/2008    last assessed 9/9/13     Anxiety     Atrial fibrillation (HCC)     Bleeding     Chronic kidney disease     COVID-19 virus infection 12/2022    CPAP (continuous positive airway pressure) dependence     Diabetes mellitus (HCC)     Diarrhea     Diverticulitis     Eating disorder     GERD (gastroesophageal reflux disease)     Herpes zoster with complication     last assessed 7/3/17     Hiatal hernia     Hyperlipidemia     Hypertension     IBS (irritable bowel syndrome)     Incisional hernia     Increased urinary frequency     Irregular heart beat     Pyogenic granuloma 09/13/2006    last assessed 9/13/06    Carlos (subconjunctival hemorrhage), unspecified laterality 09/12/2005    last assessed 9/12/05    Sleep apnea     TMJ (dislocation of temporomandibular joint)     Ventral hernia     last assessed  4/6/17     Wears glasses      Access Code: [email or web address]  URL: [email or web address]  Date: 11/14/2024  Prepared by: Dyan Pritchett    Exercises  - Standing Shoulder Row with Anchored Resistance  - 1 x daily - 3 sets - 10 reps  - Shoulder extension with resistance - Neutral  - 1 x daily - 3 sets - 10 reps  - Seated Cervical Retraction and Extension  - 1 x daily - 3 sets - 10 reps  - Wall [place]  - 1 x daily - 3 sets - 10 reps

## 2024-12-16 ENCOUNTER — OFFICE VISIT (OUTPATIENT)
Dept: PHYSICAL THERAPY | Facility: CLINIC | Age: 79
End: 2024-12-16
Payer: MEDICARE

## 2024-12-16 DIAGNOSIS — M54.12 RADICULOPATHY, CERVICAL REGION: Primary | ICD-10-CM

## 2024-12-16 PROCEDURE — 97110 THERAPEUTIC EXERCISES: CPT

## 2024-12-16 PROCEDURE — 97112 NEUROMUSCULAR REEDUCATION: CPT

## 2024-12-16 PROCEDURE — 97530 THERAPEUTIC ACTIVITIES: CPT

## 2024-12-16 NOTE — PROGRESS NOTES
"                                                                    PT Re-Evaluation   Today's date: 2024  Patient name: Andrade Wise Jr.  : 1945  MRN: [id number]  Referring provider: Ho Dao*  Dx:   Encounter Diagnosis     ICD-10-CM    1. Radiculopathy, cervical region  M54.12               Assessment  Andrade Wise Jr. has been attending [facility]PP[facility] for 12 visits over the course of 5 weeks. During this time Andrade has made significant progress in shoulder mobility and strength. With these gains he is no longer limited when performing daily tasks including driving, cleaning, donning/doffing clothes, etc. Plan to finish out last 3 scheduled appointments to ensure Andrade' independence with a comprehensive HEP.  POC was discussed with Andrade and he verbally agreed with no additional questions and/or concerns at this time. Andrade Wise Jr. was provided with contact information in the event a question and/or concern were to arise post today's session. Thank you again for your referral and the opportunity to share in Andrade Wise Jr.'s care.         Plan  Finish scheduled appointments up until .        Goals  Short Term Goals (4 weeks):  ALL GOALS MET BY   - Patient will be independent in basic HEP 2-3 weeks  - Patient will report >50% reduction in pain  - Patient will demonstrate >1/3 improvement in MMT grade as applicable  - Patient will deny pain when utilizing computer for up to 30 minutes   - Patient will demonstrate improved posture when seated     Long Term Goals (6 weeks): ALL GOALS MET BY   - Patient will be independent in a comprehensive home exercise program  - Patient FOTO score will improve to 57/100  - Patient will self-report >70% improvement in function  - Patient will deny pain when walking his dog up to 1 mile   - Patient will demonstrate improved cervical mobility to at most \"minimally limited\" in all directions      Subjective    History of Present Illness  - \"I feel " "80% better\"     Pain  - Current pain ratin/10        Objective     Red Flag Screening  - age >50 years old     Postural Assessment  -Posture in Sitting: rounded shoulders, protruded cervical spine; increased thoracic kyphosis   -Posture in Standing: rounded shoulders, protruded cervical spine; increased thoracic kyphosis  -Postural Correction: improved symptoms     Sensation  - Light touch: slightly diminished at C4   - Reflexes:   Left: C5: 2+    Right: C5: 2+    - Upper Motor Neuron Signs: WNL     UE MMT   24    LEFT RIGHT LEFT RIGHT   Shoulder Shrug (C4) 5/5 4+/5 5/5 5/5   Shoulder Abduction (C5) 4+/5 4/5 5/5 4+/5   Elbow Flexion (C6) 5/5 4/5 5/5 5/5   Elbow Extension (C7) 5/5 4+/5 5/5 5/5   Thumb Extension (C8) 5/5 5/5 5/5 5/5   Finger Adduction (T1) 4+/5 4+/5 5/5 5/5       Cervical Spine Range of Motion   24   Flexion within normal limits within normal limits   Extension moderately limited, pain Minimally limited   Protraction within normal limits within normal limits   Retraction moderately limited, pain within normal limits   Side bending R moderately limited Minimally limited    Side bending L  moderately limited within normal limits   Rotation R minimally limited within normal limits   Rotation L minimally limited within normal limits   Thoracic rotation R minimally limited Minimally limited   Thoracic rotation L minimally limited Minimally limited      Shoulder range of motion       Mechanical Assessment  - Unable to fully assess secondary to increased irritability. Slight centralization with cervical retraction, however, he did not tolerate increased sensation to cervical spine at this time. Plan to continue assessment as symptom irritability decreases.    Joint Play     24   OA Normal Normal   AA Normal Normal   C2 Normal Normal   C3 Normal Normal   C4 Hypomobile and Pain Hypomobile   C5 Hypomobile and Pain Hypomobile   C6 Hypomobile and Pain Normal   C7 " "Hypomobile Normal   CTJ Hypomobile Normal   Mid-Thoracic Spine Normal and Hypomobile Normal   Lower Thoracic Spine Normal Normal       Palpation  (+) TTP of mid-lower cervical spine, right upper trap (hypertonicity)      Diagnostic Tests Performed  Positive: Spurling A and ULTT 1A (median nerve)  Negative: Alar Ligament and Sharp Ameena           Insurance Eval/ Re-eval POC expires Auth Status Total visits  Start date  Expiration date Misc   CMS 11/11/24 12/23/24 No auth req     Co-Pay $4.67               Date 12/4 12/6 12/9 12/13 12/16   Visit Number [phone] 11 12 RE   Auth                Manual        Cervical mobs         STM STM/TPR of UT and long medial boarder of scapula   STM/TPR of UT and long medial boarder of scapula       Neural tensioning R median nerve          Dry needling  16'  15' w/stim    TherEx        TWU Arm bike 3'/3'  Arm bike 3'/3'  Arm bike 3'/3'   PROM         Thoracic extension  10\"x10 seated over fulcrum   10'x10 over fulcrum  10'x10 seated over half foam roll fulcrum    Thoracic rotation  Sidelying open books 10x bilat  Sidelying open books 10x bilat     Serratus punches                                 Neuro Re-Ed        Cervical retractions  Throughout session 3x10   3x10     Scapular retractions  CC 15.5# 3x8   CC 15.5# 3x8   CC 17.5# 3x10   Scapular depression  CC 15.5# 3x8   CC 15.5# 3x8   CC 17.5# 3x10   Rows         Wall angels x15   Wall angels x15  Wall angels x15           TherAct        Pt ed     RE x12 min                                 Precautions: STANDARD  Past Medical History:   Diagnosis Date    Allergic rhinitis     Anemia 10/23/2008    last assessed 9/9/13     Anxiety     Atrial fibrillation (HCC)     Bleeding     Chronic kidney disease     COVID-19 virus infection 12/2022    CPAP (continuous positive airway pressure) dependence     Diabetes mellitus (HCC)     Diarrhea     Diverticulitis     Eating disorder     GERD (gastroesophageal reflux disease)     Herpes zoster " with complication     last assessed 7/3/17     Hiatal hernia     Hyperlipidemia     Hypertension     IBS (irritable bowel syndrome)     Incisional hernia     Increased urinary frequency     Irregular heart beat     Pyogenic granuloma 09/13/2006    last assessed 9/13/06    Carlos (subconjunctival hemorrhage), unspecified laterality 09/12/2005    last assessed 9/12/05    Sleep apnea     TMJ (dislocation of temporomandibular joint)     Ventral hernia     last assessed  4/6/17     Wears glasses

## 2024-12-16 NOTE — PROGRESS NOTES
Outpatient Cardiology Consult Note - Andrade Wise Jr. 79 y.o. male MRN: [id number]    @ Encounter: [id number]        Patient Active Problem List    Diagnosis Date Noted    Primary osteoarthritis of left knee 08/01/2024    History of ventral hernia repair 07/09/2024    Grade IV hemorrhoids 07/17/2023    Left upper extremity swelling 06/14/2023    Benign localized prostatic hyperplasia with lower urinary tract symptoms (LUTS) 03/31/2022    Complex renal cyst 03/31/2022    Positive depression screening 08/18/2021    Syncope 08/12/2021    Left groin pain 07/28/2021    Stage 2 chronic kidney disease 02/15/2021    Dyspnea on exertion 10/14/2020    Class 1 obesity 06/28/2019    Seasonal allergic rhinitis due to pollen 07/02/2018    Aortic root dilation (HCC) 04/13/2018    Type 2 diabetes mellitus with stage 3 chronic kidney disease, without long-term current use of insulin, unspecified whether stage 3a or 3b CKD (HCC)     Diabetic neuropathy (HCC) 10/09/2013    Chronic kidney disease (CKD), stage II (mild) 09/09/2013    Mixed hyperlipidemia 05/13/2013    Chronic rhinitis 01/10/2013    Atrial fibrillation (HCC) 09/04/2012    Hypertension, benign 09/04/2012    Colon, diverticulosis 09/04/2012    Esophageal reflux 09/04/2012    Obstructive sleep apnea 09/04/2012       Assessment:  # Afib  S/p afib ablation [facility] 11/26/24  Hx of ablation 12/9/14 after failing flecainide, then failed Tikosyn 2017. Cardioversion to SR on Multaq 12/3/18- developed rash. Then started on amiodarone.  AC: Eliquis 5 mg BID  Rate: coreg 12.5 mg BID  Rhythm: amiodarone 200 mg daily    Studies- personally reviewed by me   Echo 3/13/24:  LVEF: 55%  Rv: normal  PASP: 26 mmHg  Ascending aorta 4.4 cm    # MDT dual chamber PPM implant 2021 for syncope in setting of bifascicular block    # amiodarone therapy  LFTs 11/22/24 normal  TSH 10/14/24: normal  # aortic root dilation  CT Chest 10/1/24: 4.3 cm  # DM2  # HTN- HCTZ 25 mg daily, losartan 100 mg daily, coreg  12.5 mg BID  # hyperlipidemia- atorvastatin 20 mg   # ROBER- CPAP    Today's Plan:  Amio toxicity monitoring,- PFTs, TSH  Can stop losartan 100 mg as BP is low now  Random short of breath    HPI:       78 yo male has followed with [facility] cardiology for Afib. With regards to patient's atrial fibrillation history he underwent cryoablation 12/9/14 after failing flecainide. Subsequently failed Tikosyn 2017. Underwent DC CV to sinus rhythm and started on multaq 12/3/18. Developed rash. Multaq was discontinued. Was seen EP clinic in Feb 2019. Was in sinus rhythm. Started on amiodarone for AF prevention.    Pt has MDT dual chamber PPM for syncope in setting of bifascicular block. Most recent afib ablation 11/26/24.     He is unhappy with his care at [facility].     Past Medical History:   Diagnosis Date    Allergic rhinitis     Anemia 10/23/2008    last assessed 9/9/13     Anxiety     Atrial fibrillation (HCC)     Bleeding     Chronic kidney disease     COVID-19 virus infection 12/2022    CPAP (continuous positive airway pressure) dependence     Diabetes mellitus (HCC)     Diarrhea     Diverticulitis     Eating disorder     GERD (gastroesophageal reflux disease)     Herpes zoster with complication     last assessed 7/3/17     Hiatal hernia     Hyperlipidemia     Hypertension     IBS (irritable bowel syndrome)     Incisional hernia     Increased urinary frequency     Irregular heart beat     Pyogenic granuloma 09/13/2006    last assessed 9/13/06    Carlos (subconjunctival hemorrhage), unspecified laterality 09/12/2005    last assessed 9/12/05    Sleep apnea     TMJ (dislocation of temporomandibular joint)     Ventral hernia     last assessed  4/6/17     Wears glasses        Review of Systems   Constitutional:  Negative for activity change, appetite change, fatigue and unexpected weight change.   HENT:  Negative for congestion and nosebleeds.    Eyes: Negative.    Respiratory:  Negative for cough, chest tightness and shortness of breath.     Cardiovascular:  Negative for chest pain, palpitations and leg swelling.   Gastrointestinal:  Negative for abdominal distention.   Endocrine: Negative.    Genitourinary: Negative.    Musculoskeletal: Negative.    Skin: Negative.    Neurological:  Negative for dizziness, syncope and weakness.   Hematological: Negative.    Psychiatric/Behavioral: Negative.         Allergies   Allergen Reactions    Codeine Other (See Comments) and GI Intolerance     Reaction Date: 27Dec2004; Annotation - [id number]: '' CRAZY ''  vomiting  headache    Demerol [Meperidine] Nausea Only, Other (See Comments), GI Intolerance and Vomiting     vomiting  Reaction Date: 27Dec2004;   headache    Morphine Nausea Only, GI Intolerance and Vomiting     Reaction Date: 23Feb2012;      .    Current Outpatient Medications:     acetaminophen (TYLENOL) 325 mg tablet, Take 650 mg by mouth every 6 (six) hours as needed, Disp: , Rfl:     amiodarone 100 mg tablet, 200 mg (Patient not taking: Reported on 7/9/2024), Disp: , Rfl:     amiodarone 200 mg tablet, , Disp: , Rfl:     apixaban (ELIQUIS) 5 mg, Take 1 tablet (5 mg total) by mouth 2 (two) times a day, Disp: 180 tablet, Rfl: 3    atorvastatin (LIPITOR) 20 mg tablet, TAKE 1 TABLET DAILY, Disp: 90 tablet, Rfl: 1    Blood Glucose Monitoring Suppl (FREESTYLE LITE) GEOVANNY, , Disp: , Rfl:     carvedilol (COREG) 25 mg tablet, Take 12.5 mg by mouth 2 (two) times a day, Disp: , Rfl:     clotrimazole-betamethasone (LOTRISONE) 1-0.05 % cream, APPLY TO AFFECTED AREA(S) TWO TIMES A DAY, Disp: 45 g, Rfl: 1    esomeprazole (NexIUM) 40 MG capsule, Take 1 capsule (40 mg total) by mouth daily before breakfast (Patient not taking: Reported on 11/29/2024), Disp: 90 capsule, Rfl: 3    fluticasone (FLONASE) 50 mcg/act nasal spray, USE 2 SPRAYS IN EACH NOSTRIL DAILY (GENERIC FOR FLONASE), Disp: 48 g, Rfl: 3    FREESTYLE LITE test strip, , Disp: , Rfl:     gabapentin (NEURONTIN) 300 mg capsule, Take 1 capsule (300 mg total) by  mouth daily at bedtime, Disp: 30 capsule, Rfl: 1    hydroCHLOROthiazide 25 mg tablet, TAKE ONE TABLET BY MOUTH EVERY DAY, Disp: 90 tablet, Rfl: 1    ketoconazole (NIZORAL) 2 % shampoo, , Disp: , Rfl:     Lancets (FREESTYLE) lancets, , Disp: , Rfl:     linaCLOtide (Linzess) 290 MCG CAPS, Take 1 capsule by mouth daily before breakfast, Disp: 90 capsule, Rfl: 1    linaCLOtide (Linzess) 290 MCG CAPS, Take 1 capsule by mouth daily before breakfast, Disp: 14 capsule, Rfl: 0    losartan (COZAAR) 100 MG tablet, 100 mg every morning , Disp: , Rfl:     metFORMIN (GLUCOPHAGE-XR) 500 mg 24 hr tablet, , Disp: , Rfl:     methylPREDNISolone 4 MG tablet therapy pack, Use as directed on package, Disp: 21 each, Rfl: 0    metroNIDAZOLE (METROCREAM) 0.75 % cream, , Disp: , Rfl:     Mounjaro 5 MG/0.5ML, Not taking until 04/08/2024, Disp: , Rfl:     ondansetron (ZOFRAN) 4 mg tablet, Take 1 tablet (4 mg total) by mouth every 6 (six) hours, Disp: 6 tablet, Rfl: 0    Ozempic, 0.25 or 0.5 MG/DOSE, 2 MG/1.5ML SOPN, once a week (Patient not taking: Reported on 3/7/2024), Disp: , Rfl:     pantoprazole (PROTONIX) 40 mg tablet, Take 40 mg by mouth, Disp: , Rfl:     pioglitazone-metFORMIN (ACTOPLUS MET)  MG per tablet, daily after dinner, Disp: , Rfl:     polyethylene glycol (Golytely) 4000 mL solution, Take 4,000 mL by mouth once for 1 dose Take 4000 mL by mouth once for 1 dose. Use as directed, Disp: 4000 mL, Rfl: 0    polyethylene glycol-propylene glycol (Systane) 0.4-0.3 %, every 3 (three) hours as needed   (Patient not taking: Reported on 4/15/2024), Disp: , Rfl:     promethazine-dextromethorphan (PHENERGAN-DM) 6.25-15 mg/5 mL oral syrup, Take 5 mL by mouth 4 (four) times a day as needed for cough, Disp: 180 mL, Rfl: 0    RESTASIS 0.05 % ophthalmic emulsion, Administer 1 drop to both eyes every 12 (twelve) hours, Disp: , Rfl:     tamsulosin (FLOMAX) 0.4 mg, TAKE 1 CAPSULE DAILY WITH DINNER, Disp: 100 capsule, Rfl: 1    tiZANidine  (ZANAFLEX) 2 mg tablet, TAKE ONE TABLET BY MOUTH TWICE A DAY AS NEEDED FOR MUSCLE SPASMS, Disp: 20 tablet, Rfl: 0    traMADol (ULTRAM) 50 mg tablet, Take 50 mg by mouth once as needed for moderate pain He uses it rarely., Disp: , Rfl:     urea (CARMOL) 20 % cream, if needed (Patient not taking: Reported on 2024), Disp: , Rfl:     Social History     Socioeconomic History    Marital status: /Civil Union     Spouse name: Not on file    Number of children: Not on file    Years of education: Not on file    Highest education level: Not on file   Occupational History    Not on file   Tobacco Use    Smoking status: Former     Current packs/day: 0.00     Average packs/day: 1 pack/day for 20.0 years (20.0 ttl pk-yrs)     Types: Cigarettes     Start date:      Quit date:      Years since quittin.9     Passive exposure: Never    Smokeless tobacco: Never    Tobacco comments:     Started age 17   Vaping Use    Vaping status: Never Used   Substance and Sexual Activity    Alcohol use: No     Comment: none in 33 yrs    Drug use: No    Sexual activity: Yes   Other Topics Concern    Not on file   Social History Narrative    Single per [employer]script      Social Drivers of Health     Financial Resource Strain: Low Risk  (10/11/2023)    Overall Financial Resource Strain (CARDIA)     Difficulty of Paying Living Expenses: Not hard at all   Food Insecurity: Not on file   Transportation Needs: No Transportation Needs (10/11/2023)    PRAPARE - Transportation     Lack of Transportation (Medical): No     Lack of Transportation (Non-Medical): No   Physical Activity: Not on file   Stress: Not on file   Social Connections: Not on file   Intimate Partner Violence: Not At Risk (2024)    Received from [facility]    Humiliation, Afraid, Rape, and Kick questionnaire     Fear of Current or Ex-Partner: No     Emotionally Abused: No     Physically Abused: No     Sexually Abused: No   Housing Stability: Not on  file       Family History   Problem Relation Age of Onset    Heart disease Mother     Cancer Mother     Colon cancer Mother     Dementia Mother     Early death Father     Seizures Father     Cancer Sister         bone    Heart disease Sister     Diabetes Sister     Lupus Sister     Heart disease Brother     Cancer Brother         kidney    Diabetes Brother     Other Sister         covid    Stroke Neg Hx        Physical Exam:    Vitals: There were no vitals taken for this visit., There is no height or weight on file to calculate BMI.,   Wt Readings from Last 3 Encounters:   12/10/24 95.3 kg (210 lb)   11/29/24 95.3 kg (210 lb)   11/18/24 95.3 kg (210 lb)       Physical Exam:  There were no vitals filed for this visit.    Physical Exam  Constitutional:       Appearance: He is well-developed.   HENT:      Head: Normocephalic and atraumatic.   Eyes:      Pupils: Pupils are equal, round, and reactive to light.   Neck:      Vascular: No JVD.   Cardiovascular:      Rate and Rhythm: Normal rate and regular rhythm.      Heart sounds: No murmur heard.  Pulmonary:      Effort: Pulmonary effort is normal. No respiratory distress.      Breath sounds: Normal breath sounds.   Abdominal:      General: There is no distension.      Palpations: Abdomen is soft.      Tenderness: There is no abdominal tenderness.   Musculoskeletal:         General: Normal range of motion.      Cervical back: Normal range of motion.   Skin:     General: Skin is warm and dry.      Findings: No rash.   Neurological:      Mental Status: He is alert and oriented to person, place, and time.         Labs & Results:    Lab Results   Component Value Date    WBC 10.89 (H) 05/29/2024    HGB 12.5 05/29/2024    HCT 39.0 05/29/2024    MCV 92 05/29/2024     05/29/2024     Lab Results   Component Value Date    SODIUM 138 11/22/2024    K 4.0 11/22/2024     11/22/2024    CO2 30 11/22/2024    BUN 25 11/22/2024    CREATININE 1.00 11/22/2024    GLUC 232 (H)  "11/22/2024    CALCIUM 8.8 11/22/2024     No results found for: \"BNP\"   Lab Results   Component Value Date    CHOLESTEROL 107 10/25/2016     Lab Results   Component Value Date    HDL 45 10/25/2016    HDL 39 09/13/2013    HDL 41 05/15/2013     Lab Results   Component Value Date    TRIG 77 10/25/2016    TRIG 91 09/13/2013    TRIG 93 05/15/2013     No results found for: \"NONHDLC\"          EKG personally reviewed by Arnulfo Meyer DO.     Counseling / Coordination of Care  Time spent today 40 minutes.  Greater than 50% of total time was spent with the patient and / or family counseling and / or coordination of care. We discussed diagnoses, most recent studies and any changes in treatment  Thank you for the opportunity to participate in the care of this patient.    ARNULFO MEYER D.O.  DIRECTOR OF HEART FAILURE/ PULMONARY HYPERTENSION  MEDICAL DIRECTOR OF LVAD PROGRAM  [facility]  "

## 2024-12-17 ENCOUNTER — OFFICE VISIT (OUTPATIENT)
Dept: CARDIOLOGY CLINIC | Facility: CLINIC | Age: 79
End: 2024-12-17
Payer: MEDICARE

## 2024-12-17 VITALS
SYSTOLIC BLOOD PRESSURE: 92 MMHG | HEIGHT: 70 IN | DIASTOLIC BLOOD PRESSURE: 56 MMHG | BODY MASS INDEX: 29.2 KG/M2 | WEIGHT: 204 LBS | OXYGEN SATURATION: 98 % | HEART RATE: 75 BPM

## 2024-12-17 DIAGNOSIS — I10 HYPERTENSION, BENIGN: ICD-10-CM

## 2024-12-17 DIAGNOSIS — Z91.89 AT RISK FOR AMIODARONE TOXICITY WITH LONG TERM USE: ICD-10-CM

## 2024-12-17 DIAGNOSIS — I48.19 PERSISTENT ATRIAL FIBRILLATION (HCC): Primary | ICD-10-CM

## 2024-12-17 DIAGNOSIS — Z79.899 AT RISK FOR AMIODARONE TOXICITY WITH LONG TERM USE: ICD-10-CM

## 2024-12-17 DIAGNOSIS — G47.33 OBSTRUCTIVE SLEEP APNEA: ICD-10-CM

## 2024-12-17 DIAGNOSIS — I77.810 AORTIC ROOT DILATION (HCC): ICD-10-CM

## 2024-12-17 PROCEDURE — 99204 OFFICE O/P NEW MOD 45 MIN: CPT | Performed by: INTERNAL MEDICINE

## 2024-12-17 RX ORDER — TIRZEPATIDE 7.5 MG/.5ML
INJECTION, SOLUTION SUBCUTANEOUS
COMMUNITY
Start: 2024-12-09

## 2024-12-19 ENCOUNTER — OFFICE VISIT (OUTPATIENT)
Dept: FAMILY MEDICINE CLINIC | Facility: CLINIC | Age: 79
End: 2024-12-19
Payer: MEDICARE

## 2024-12-19 VITALS
DIASTOLIC BLOOD PRESSURE: 70 MMHG | BODY MASS INDEX: 29.06 KG/M2 | OXYGEN SATURATION: 98 % | HEIGHT: 70 IN | RESPIRATION RATE: 18 BRPM | HEART RATE: 71 BPM | TEMPERATURE: 97.2 F | WEIGHT: 203 LBS | SYSTOLIC BLOOD PRESSURE: 120 MMHG

## 2024-12-19 DIAGNOSIS — R06.00 DYSPNEA, UNSPECIFIED TYPE: Primary | ICD-10-CM

## 2024-12-19 DIAGNOSIS — R53.83 OTHER FATIGUE: ICD-10-CM

## 2024-12-19 DIAGNOSIS — E04.1 THYROID NODULE: ICD-10-CM

## 2024-12-19 DIAGNOSIS — R07.89 OTHER CHEST PAIN: ICD-10-CM

## 2024-12-19 PROCEDURE — G2211 COMPLEX E/M VISIT ADD ON: HCPCS | Performed by: INTERNAL MEDICINE

## 2024-12-19 PROCEDURE — 99213 OFFICE O/P EST LOW 20 MIN: CPT | Performed by: INTERNAL MEDICINE

## 2024-12-19 NOTE — PROGRESS NOTES
"Name: Andrade Wise Jr.      : 1945      MRN: [id number]  Encounter Provider: Mariah Benoit MD  Encounter Date: 2024   Encounter department: [facility]  :  Assessment & Plan  Dyspnea, unspecified type  Cardiology has ordered PFT and he is up to date with other cardiac studies.  Will check labs and XR.    Orders:  •  XR chest pa and lateral; Future  •  TSH, 3rd generation with Free T4 reflex; Future  •  CBC and differential; Future  •  Comprehensive metabolic panel; Future    Other chest pain    Orders:  •  XR chest pa and lateral; Future  •  TSH, 3rd generation with Free T4 reflex; Future  •  CBC and differential; Future  •  Comprehensive metabolic panel; Future    Other fatigue    Orders:  •  TSH, 3rd generation with Free T4 reflex; Future  •  CBC and differential; Future  •  Comprehensive metabolic panel; Future    Thyroid nodule    Orders:  •  TSH, 3rd generation with Free T4 reflex; Future  •  CBC and differential; Future  •  Comprehensive metabolic panel; Future           History of Present Illness     He had a neck and shoulder injury in November.  This kicked off his afib, he went for ablation in December.    Dr. Doe took him off beta blocker and scheduled him for PFT.  He has been having dyspnea since the event.  He feels fatigued.  He also had a fall last Wednesday, fell onto his chest. Would like chest xray and feels like he needs some labwork.  There is no chest pain.        Review of Systems   Constitutional: Negative.    Respiratory:  Positive for shortness of breath. Negative for cough and wheezing.    Cardiovascular: Negative.  Negative for chest pain, palpitations and leg swelling.       Objective   /70   Pulse 71   Temp (!) 97.2 °F (36.2 °C)   Resp 18   Ht 5' 10\" (1.778 m)   Wt 92.1 kg (203 lb)   SpO2 98%   BMI 29.13 kg/m²      Physical Exam  Constitutional:       Appearance: Normal appearance.   HENT:      Head: Normocephalic and atraumatic.      " Mouth/Throat:      Mouth: Mucous membranes are moist.   Eyes:      Pupils: Pupils are equal, round, and reactive to light.   Cardiovascular:      Rate and Rhythm: Normal rate and regular rhythm.      Heart sounds: No murmur heard.     No friction rub. No gallop.   Pulmonary:      Effort: Pulmonary effort is normal.      Breath sounds: Normal breath sounds. No wheezing, rhonchi or rales.   Abdominal:      General: Abdomen is flat. Bowel sounds are normal.      Palpations: Abdomen is soft.      Tenderness: There is no abdominal tenderness. There is no guarding.   Musculoskeletal:         General: No swelling.      Right lower leg: No edema.      Left lower leg: No edema.   Neurological:      Mental Status: He is alert.

## 2024-12-20 ENCOUNTER — APPOINTMENT (OUTPATIENT)
Dept: LAB | Facility: HOSPITAL | Age: 79
End: 2024-12-20
Attending: INTERNAL MEDICINE
Payer: MEDICARE

## 2024-12-20 ENCOUNTER — OFFICE VISIT (OUTPATIENT)
Dept: PHYSICAL THERAPY | Facility: CLINIC | Age: 79
End: 2024-12-20
Payer: MEDICARE

## 2024-12-20 DIAGNOSIS — M54.12 RADICULOPATHY, CERVICAL REGION: Primary | ICD-10-CM

## 2024-12-20 DIAGNOSIS — R53.83 OTHER FATIGUE: ICD-10-CM

## 2024-12-20 DIAGNOSIS — R07.89 OTHER CHEST PAIN: ICD-10-CM

## 2024-12-20 DIAGNOSIS — E04.1 THYROID NODULE: ICD-10-CM

## 2024-12-20 DIAGNOSIS — R06.00 DYSPNEA, UNSPECIFIED TYPE: ICD-10-CM

## 2024-12-20 LAB
ALBUMIN SERPL BCG-MCNC: 3.7 G/DL (ref 3.5–5)
ALP SERPL-CCNC: 58 U/L (ref 34–104)
ALT SERPL W P-5'-P-CCNC: 40 U/L (ref 7–52)
ANION GAP SERPL CALCULATED.3IONS-SCNC: 5 MMOL/L (ref 4–13)
AST SERPL W P-5'-P-CCNC: 20 U/L (ref 13–39)
BASOPHILS # BLD AUTO: 0.04 THOUSANDS/ΜL (ref 0–0.1)
BASOPHILS NFR BLD AUTO: 0 % (ref 0–1)
BILIRUB SERPL-MCNC: 0.49 MG/DL (ref 0.2–1)
BUN SERPL-MCNC: 20 MG/DL (ref 5–25)
CALCIUM SERPL-MCNC: 8.5 MG/DL (ref 8.4–10.2)
CHLORIDE SERPL-SCNC: 104 MMOL/L (ref 96–108)
CO2 SERPL-SCNC: 28 MMOL/L (ref 21–32)
CREAT SERPL-MCNC: 1.03 MG/DL (ref 0.6–1.3)
EOSINOPHIL # BLD AUTO: 0.07 THOUSAND/ΜL (ref 0–0.61)
EOSINOPHIL NFR BLD AUTO: 1 % (ref 0–6)
ERYTHROCYTE [DISTWIDTH] IN BLOOD BY AUTOMATED COUNT: 13.5 % (ref 11.6–15.1)
GFR SERPL CREATININE-BSD FRML MDRD: 68 ML/MIN/1.73SQ M
GLUCOSE SERPL-MCNC: 231 MG/DL (ref 65–140)
HCT VFR BLD AUTO: 36.1 % (ref 36.5–49.3)
HGB BLD-MCNC: 11.8 G/DL (ref 12–17)
IMM GRANULOCYTES # BLD AUTO: 0.06 THOUSAND/UL (ref 0–0.2)
IMM GRANULOCYTES NFR BLD AUTO: 1 % (ref 0–2)
LYMPHOCYTES # BLD AUTO: 5.04 THOUSANDS/ΜL (ref 0.6–4.47)
LYMPHOCYTES NFR BLD AUTO: 39 % (ref 14–44)
MCH RBC QN AUTO: 30.2 PG (ref 26.8–34.3)
MCHC RBC AUTO-ENTMCNC: 32.7 G/DL (ref 31.4–37.4)
MCV RBC AUTO: 92 FL (ref 82–98)
MONOCYTES # BLD AUTO: 0.63 THOUSAND/ΜL (ref 0.17–1.22)
MONOCYTES NFR BLD AUTO: 5 % (ref 4–12)
NEUTROPHILS # BLD AUTO: 7.01 THOUSANDS/ΜL (ref 1.85–7.62)
NEUTS SEG NFR BLD AUTO: 54 % (ref 43–75)
NRBC BLD AUTO-RTO: 0 /100 WBCS
PLATELET # BLD AUTO: 195 THOUSANDS/UL (ref 149–390)
PMV BLD AUTO: 10.6 FL (ref 8.9–12.7)
POTASSIUM SERPL-SCNC: 3.4 MMOL/L (ref 3.5–5.3)
PROT SERPL-MCNC: 6.1 G/DL (ref 6.4–8.4)
RBC # BLD AUTO: 3.91 MILLION/UL (ref 3.88–5.62)
SODIUM SERPL-SCNC: 137 MMOL/L (ref 135–147)
TSH SERPL DL<=0.05 MIU/L-ACNC: 1.32 UIU/ML (ref 0.45–4.5)
WBC # BLD AUTO: 12.85 THOUSAND/UL (ref 4.31–10.16)

## 2024-12-20 PROCEDURE — 80053 COMPREHEN METABOLIC PANEL: CPT

## 2024-12-20 PROCEDURE — 97110 THERAPEUTIC EXERCISES: CPT

## 2024-12-20 PROCEDURE — 84443 ASSAY THYROID STIM HORMONE: CPT

## 2024-12-20 PROCEDURE — 85025 COMPLETE CBC W/AUTO DIFF WBC: CPT

## 2024-12-20 PROCEDURE — 36415 COLL VENOUS BLD VENIPUNCTURE: CPT

## 2024-12-20 NOTE — PROGRESS NOTES
"Daily Note     Today's date: 2024  Patient name: Andrade Wise Jr.  : 1945  MRN: [id number]  Referring provider: Ho Dao*  Dx:   Encounter Diagnosis     ICD-10-CM    1. Radiculopathy, cervical region  M54.12                      Subjective: Patient states he is feeling good and requests to keep intensity light, because he does not want to \"mess it up\"      Objective: See treatment diary below      Assessment: Kept intensity to patient's preference. He tolerated session well. Noted some hypertonicity in right upper trap muscle, which improved with STM. Instructed patient on UT/LS stretch, which he favored. Patient demonstrated fatigue post treatment, exhibited good technique with therapeutic exercises, and would benefit from continued PT. Plan to potential d/c next visit given he continues to report no pain.       Plan: Potential discharge next visit.       Insurance Eval/ Re-eval POC expires Auth Status Total visits  Start date  Expiration date Misc   CMS 24 No auth req     Co-Pay $4.67               Date    Visit Number 9 10 11 12 RE 13   Auth                Manual        Cervical mobs         STM  STM/TPR of UT and long medial boarder of scapula    STM/TPR of UT and long medial boarder of scapula            Dry needling 16'  15' w/stim     TherEx        TWU  Arm bike 3'/3'  Arm bike 3'/3'    PROM         Thoracic extension   10'x10 over fulcrum  10'x10 seated over half foam roll fulcrum  10'x10 seated over half foam roll fulcrum    Thoracic rotation   Sidelying open books 10x bilat      Serratus punches              UT/LS stretch 5x 20\"                   Neuro Re-Ed        Cervical retractions   3x10      Scapular retractions   CC 15.5# 3x8   CC 17.5# 3x10 CC 17.5# 3x10    Scapular depression   CC 15.5# 3x8   CC 17.5# 3x10 CC 17.5# 3x10    Rows          Wall angels x15  Wall angels x15 Wall angels x15           TherAct        Pt ed    RE x12 min  "                                 Precautions: STANDARD  Past Medical History:   Diagnosis Date    Allergic rhinitis     Anemia 10/23/2008    last assessed 9/9/13     Anxiety     Atrial fibrillation (HCC)     Bleeding     Chronic kidney disease     COVID-19 virus infection 12/2022    CPAP (continuous positive airway pressure) dependence     Diabetes mellitus (HCC)     Diarrhea     Diverticulitis     Eating disorder     GERD (gastroesophageal reflux disease)     Herpes zoster with complication     last assessed 7/3/17     Hiatal hernia     Hyperlipidemia     Hypertension     IBS (irritable bowel syndrome)     Incisional hernia     Increased urinary frequency     Irregular heart beat     Pyogenic granuloma 09/13/2006    last assessed 9/13/06    Carlos (subconjunctival hemorrhage), unspecified laterality 09/12/2005    last assessed 9/12/05    Sleep apnea     TMJ (dislocation of temporomandibular joint)     Ventral hernia     last assessed  4/6/17     Wears glasses

## 2024-12-23 ENCOUNTER — RESULTS FOLLOW-UP (OUTPATIENT)
Dept: FAMILY MEDICINE CLINIC | Facility: CLINIC | Age: 79
End: 2024-12-23

## 2024-12-23 ENCOUNTER — APPOINTMENT (OUTPATIENT)
Dept: PHYSICAL THERAPY | Facility: CLINIC | Age: 79
End: 2024-12-23
Payer: MEDICARE

## 2024-12-26 ENCOUNTER — HOSPITAL ENCOUNTER (OUTPATIENT)
Dept: PULMONOLOGY | Facility: HOSPITAL | Age: 79
End: 2024-12-26
Attending: INTERNAL MEDICINE
Payer: MEDICARE

## 2024-12-26 ENCOUNTER — TELEPHONE (OUTPATIENT)
Age: 79
End: 2024-12-26

## 2024-12-26 DIAGNOSIS — Z91.89 AT RISK FOR AMIODARONE TOXICITY WITH LONG TERM USE: ICD-10-CM

## 2024-12-26 DIAGNOSIS — I48.19 PERSISTENT ATRIAL FIBRILLATION (HCC): ICD-10-CM

## 2024-12-26 DIAGNOSIS — Z79.899 AT RISK FOR AMIODARONE TOXICITY WITH LONG TERM USE: ICD-10-CM

## 2024-12-26 PROCEDURE — 94729 DIFFUSING CAPACITY: CPT

## 2024-12-26 PROCEDURE — 94010 BREATHING CAPACITY TEST: CPT

## 2024-12-26 PROCEDURE — 94729 DIFFUSING CAPACITY: CPT | Performed by: INTERNAL MEDICINE

## 2024-12-26 PROCEDURE — 94760 N-INVAS EAR/PLS OXIMETRY 1: CPT

## 2024-12-26 PROCEDURE — 94010 BREATHING CAPACITY TEST: CPT | Performed by: INTERNAL MEDICINE

## 2024-12-26 NOTE — TELEPHONE ENCOUNTER
Patient called in stating he woke up this morning with a dime size lump on the back of his neck and wanted it to be checked out either later today or tomorrow if possible. Attempted to call clerical twice and was unsuccessful. Please advise. Thank you.

## 2024-12-27 ENCOUNTER — APPOINTMENT (OUTPATIENT)
Dept: PHYSICAL THERAPY | Facility: CLINIC | Age: 79
End: 2024-12-27
Payer: MEDICARE

## 2024-12-27 ENCOUNTER — OFFICE VISIT (OUTPATIENT)
Dept: FAMILY MEDICINE CLINIC | Facility: CLINIC | Age: 79
End: 2024-12-27
Payer: MEDICARE

## 2024-12-27 VITALS
WEIGHT: 204 LBS | DIASTOLIC BLOOD PRESSURE: 70 MMHG | RESPIRATION RATE: 18 BRPM | OXYGEN SATURATION: 98 % | HEIGHT: 70 IN | SYSTOLIC BLOOD PRESSURE: 124 MMHG | TEMPERATURE: 97.6 F | BODY MASS INDEX: 29.2 KG/M2 | HEART RATE: 63 BPM

## 2024-12-27 DIAGNOSIS — R22.0 LUMP OF SCALP: Primary | ICD-10-CM

## 2024-12-27 PROCEDURE — 99214 OFFICE O/P EST MOD 30 MIN: CPT | Performed by: FAMILY MEDICINE

## 2024-12-27 PROCEDURE — G2211 COMPLEX E/M VISIT ADD ON: HCPCS | Performed by: FAMILY MEDICINE

## 2024-12-27 RX ORDER — CEPHALEXIN 500 MG/1
500 CAPSULE ORAL 2 TIMES DAILY
Qty: 14 CAPSULE | Refills: 0 | Status: SHIPPED | OUTPATIENT
Start: 2024-12-27 | End: 2025-01-03

## 2024-12-27 NOTE — PROGRESS NOTES
"Name: Andrade Wise Jr.      : 1945      MRN: [id number]  Encounter Provider: Kiesha Mahan MD  Encounter Date: 2024   Encounter department: [facility]  :  Assessment & Plan  Lump of scalp  It appears to be a possible bug bite vs ingrown hair. It has been improving on its own. Just some mild erythema and tenderness at this time. Will cover for possible infection with keflex.   Orders:    cephalexin (KEFLEX) 500 mg capsule; Take 1 capsule (500 mg total) by mouth 2 (two) times a day for 7 days           History of Present Illness     HPI  Yesterday morning woke up and noticed a lump on the right side of his scalp that was red and tender. No fevers, chills, drainage, warmth to touch. Just noticed it when he woke up in the morning. He put ice on the area with improvement. Today has some tenderness and redness, but improvement of the lump.   Review of Systems   Constitutional: Negative.    HENT: Negative.     Eyes: Negative.    Respiratory: Negative.     Cardiovascular: Negative.    Gastrointestinal: Negative.    Endocrine: Negative.    Genitourinary: Negative.    Musculoskeletal: Negative.    Neurological: Negative.    Hematological: Negative.    Psychiatric/Behavioral: Negative.         Objective   /70   Pulse 63   Temp 97.6 °F (36.4 °C)   Resp 18   Ht 5' 10\" (1.778 m)   Wt 92.5 kg (204 lb)   SpO2 98%   BMI 29.27 kg/m²      Physical Exam  Constitutional:       Appearance: Normal appearance.   HENT:      Head: Normocephalic and atraumatic.   Pulmonary:      Effort: Pulmonary effort is normal.   Musculoskeletal:         General: Normal range of motion.   Skin:     Findings: Erythema (mild area of erythema and tenderness with no significant lump noted on right side of scalp. No drainage or excessive warmth.) present.   Neurological:      Mental Status: He is alert.   Psychiatric:         Mood and Affect: Mood normal.         Behavior: Behavior normal.         Thought Content: Thought " content normal.         Judgment: Judgment normal.

## 2024-12-29 DIAGNOSIS — B35.9 TINEA: ICD-10-CM

## 2025-01-02 RX ORDER — CLOTRIMAZOLE AND BETAMETHASONE DIPROPIONATE 10; .64 MG/G; MG/G
1 CREAM TOPICAL 2 TIMES DAILY
Qty: 45 G | Refills: 1 | Status: SHIPPED | OUTPATIENT
Start: 2025-01-02

## 2025-01-03 DIAGNOSIS — N40.1 BENIGN LOCALIZED PROSTATIC HYPERPLASIA WITH LOWER URINARY TRACT SYMPTOMS (LUTS): ICD-10-CM

## 2025-01-03 RX ORDER — TAMSULOSIN HYDROCHLORIDE 0.4 MG/1
CAPSULE ORAL
Qty: 90 CAPSULE | Refills: 1 | Status: SHIPPED | OUTPATIENT
Start: 2025-01-03

## 2025-01-06 ENCOUNTER — HOSPITAL ENCOUNTER (OUTPATIENT)
Dept: RADIOLOGY | Facility: HOSPITAL | Age: 80
Discharge: HOME | End: 2025-01-06
Payer: MEDICARE

## 2025-01-06 ENCOUNTER — RESULTS FOLLOW-UP (OUTPATIENT)
Dept: CARDIOLOGY CLINIC | Facility: CLINIC | Age: 80
End: 2025-01-06

## 2025-01-06 ENCOUNTER — APPOINTMENT (OUTPATIENT)
Dept: LAB | Facility: HOSPITAL | Age: 80
End: 2025-01-06
Payer: MEDICARE

## 2025-01-06 ENCOUNTER — NURSE TRIAGE (OUTPATIENT)
Age: 80
End: 2025-01-06

## 2025-01-06 ENCOUNTER — IN-CLINIC DEVICE VISIT (OUTPATIENT)
Dept: CARDIOLOGY CLINIC | Facility: CLINIC | Age: 80
End: 2025-01-06
Payer: MEDICARE

## 2025-01-06 DIAGNOSIS — E11.65 INADEQUATELY CONTROLLED DIABETES MELLITUS (HCC): ICD-10-CM

## 2025-01-06 DIAGNOSIS — Z95.0 CARDIAC PACEMAKER IN SITU: Primary | ICD-10-CM

## 2025-01-06 DIAGNOSIS — E04.1 NONTOXIC SINGLE THYROID NODULE: ICD-10-CM

## 2025-01-06 LAB
CREAT UR-MCNC: 119.4 MG/DL
MICROALBUMIN UR-MCNC: 10.6 MG/L
MICROALBUMIN/CREAT 24H UR: 9 MG/G CREATININE (ref 0–30)
TSH SERPL DL<=0.05 MIU/L-ACNC: 2.24 UIU/ML (ref 0.45–4.5)

## 2025-01-06 PROCEDURE — 93280 PM DEVICE PROGR EVAL DUAL: CPT | Performed by: INTERNAL MEDICINE

## 2025-01-06 PROCEDURE — 10005 FNA BX W/US GDN 1ST LES: CPT

## 2025-01-06 PROCEDURE — 82043 UR ALBUMIN QUANTITATIVE: CPT

## 2025-01-06 PROCEDURE — 88173 CYTOPATH EVAL FNA REPORT: CPT | Performed by: STUDENT IN AN ORGANIZED HEALTH CARE EDUCATION/TRAINING PROGRAM

## 2025-01-06 PROCEDURE — 36415 COLL VENOUS BLD VENIPUNCTURE: CPT

## 2025-01-06 PROCEDURE — 84443 ASSAY THYROID STIM HORMONE: CPT

## 2025-01-06 PROCEDURE — 82570 ASSAY OF URINE CREATININE: CPT

## 2025-01-06 RX ORDER — LIDOCAINE HYDROCHLORIDE 10 MG/ML
5 INJECTION, SOLUTION EPIDURAL; INFILTRATION; INTRACAUDAL; PERINEURAL ONCE
Status: COMPLETED | OUTPATIENT
Start: 2025-01-06 | End: 2025-01-06

## 2025-01-06 RX ADMIN — LIDOCAINE HYDROCHLORIDE 2 ML: 10 INJECTION, SOLUTION EPIDURAL; INFILTRATION; INTRACAUDAL; PERINEURAL at 09:08

## 2025-01-06 NOTE — PROGRESS NOTES
Results for orders placed or performed in visit on 01/06/25   Cardiac EP device report    Narrative    MDT DC/PM  NP/EST CARE-DEVICE INTERROGATED IN THE [place] OFFICE: BATTERY VOLTAGE ADEQUATE-11 YRS. AP 14%  0%. ALL AVAILABLE LEAD PARAMETERS WITHIN NORMAL LIMITS. 1 AT/AF NOTED; 0% BURDEN; PT ON AMIO, ELIQUIS, & COREG. AVAIL EGRAM SHOWING AF. NO PROGRAMMING CHANGES MADE TO DEVICE PARAMETERS. NORMAL DEVICE FUNCTION. NC

## 2025-01-06 NOTE — TELEPHONE ENCOUNTER
Last OV 7/8/24 Dr. Frankel IBS C GERD  Continue Linzess but increase to 290 mcg daily to help with constipation  Drink at least 8 cups of water per day  Take MiraLAX daily or as needed  Daily Metamucil or benefiber  Colonoscopy scheduled 1/30/25  Next OV 2/24/25    Pt reports no BM in 4 days. Taking Linzess 290 mcg and fiber gummies. Admits to not hydrating enough. Also on Mounjaro. Has tried Dulcolax only.    Advised hydration, Metamucil, Miralax BID and stool softener. Pt verbalized understanding. Will call if no improvement.      Additional Information   Affirmative: The patient has had no BM <3 days or mild abdominal pain    Answer Assessment - Initial Assessment Questions  1. When was your last bowel movement? What is the consistency, volume and how frequently are you going or when was the last time you went?  4 days ago  8. Do you feel like you have completely emptied your bowels?   No  9. Are you passing any black or bloody stools?   NO  10. Are you taking any over the counter (OTC) or prescribed medications? If so, names, dosage, frequency?   Linzess and Fiber gummies    Protocols used: GI-IBS (irritable bowel syndrome)-ADULT-[place]

## 2025-01-09 PROCEDURE — 88173 CYTOPATH EVAL FNA REPORT: CPT | Performed by: STUDENT IN AN ORGANIZED HEALTH CARE EDUCATION/TRAINING PROGRAM

## 2025-01-14 ENCOUNTER — TELEPHONE (OUTPATIENT)
Age: 80
End: 2025-01-14

## 2025-01-14 NOTE — TELEPHONE ENCOUNTER
Spoke with pt and confirming upcoming procedure with Dr. Frankel on 01/30 at [facility] location, Pt is aware to hold the eliquis 2 days before the procedure and mounjaro 1 week before.

## 2025-01-14 NOTE — TELEPHONE ENCOUNTER
Our mutual patient, Andrade Wise JrGabby, is scheduled for the following   procedure: Colonoscopy   On: 01/30/2025   With: Dr. Jostin Wise Jr. is taking the following blood thinner: Eliquis       Can this be stopped 2 days prior to the procedure?         Thank you,  Carloz FERNANDEZ  [facility]

## 2025-01-15 ENCOUNTER — NURSE TRIAGE (OUTPATIENT)
Age: 80
End: 2025-01-15

## 2025-01-15 NOTE — TELEPHONE ENCOUNTER
"Reason for Conversation: Patient called about Amiodarone. He asked if he should continue taking Amiodarone 200 mg daily since he had the ablation at  on 11/26.  He was hoping the ablation would allow him to get off the amiodarone.     Upcoming Office Visit: 4/21/25 with Dr Doe    Last Office Visit: 12/17/24   .        Reason for Disposition  • Caller has NON-URGENT medicine question about med that PCP or specialist prescribed and triager unable to answer question    Answer Assessment - Initial Assessment Questions  1. NAME of MEDICINE: \"What medicine(s) are you calling about?\"      Amiodarone 200 mg daily  2. QUESTION: \"What is your question?\" (e.g., double dose of medicine, side effect)      Patient asked if he can discontinue the med since he had an ablation on 11/26/24  3. PRESCRIBER: \"Who prescribed the medicine?\" Reason: if prescribed by specialist, call should be referred to that group.      Has been taking for 10 years for A fib.    Protocols used: Medication Question Call-Adult-[place]    "

## 2025-01-17 ENCOUNTER — TELEPHONE (OUTPATIENT)
Age: 80
End: 2025-01-17

## 2025-01-17 NOTE — TELEPHONE ENCOUNTER
Arnulfo Doe, DO  You; Cardiology [place] ClinicalJust now (9:19 AM)       Ok to hold eliquis 2 days for dental procedure

## 2025-01-17 NOTE — TELEPHONE ENCOUNTER
Pt called stating he was at oral surgery office yesterday and they faxed form to Dr. Doe requesting Eliquis hold for dental extractions.    Advised pt I do not see form in chart yet however I will send message to Dr. Doe asking permission to hold Eliquis. Per pt they are requesting 2 day hold, extractions will not be scheduled until they receive permission to hold.     [facility]  Ph: [phone], Fax: [phone]    Pt would like call back once this has been approved and faxed to oral surgeon.

## 2025-01-22 DIAGNOSIS — Z12.11 COLON CANCER SCREENING: Primary | ICD-10-CM

## 2025-01-24 ENCOUNTER — OFFICE VISIT (OUTPATIENT)
Dept: OBGYN CLINIC | Facility: CLINIC | Age: 80
End: 2025-01-24
Payer: MEDICARE

## 2025-01-24 VITALS — BODY MASS INDEX: 28.35 KG/M2 | WEIGHT: 198 LBS | HEIGHT: 70 IN

## 2025-01-24 DIAGNOSIS — M19.011 OSTEOARTHRITIS OF GLENOHUMERAL JOINT, RIGHT: ICD-10-CM

## 2025-01-24 DIAGNOSIS — M75.101 TEAR OF RIGHT ROTATOR CUFF, UNSPECIFIED TEAR EXTENT, UNSPECIFIED WHETHER TRAUMATIC: Primary | ICD-10-CM

## 2025-01-24 DIAGNOSIS — M54.12 CERVICAL RADICULOPATHY: ICD-10-CM

## 2025-01-24 PROCEDURE — 99213 OFFICE O/P EST LOW 20 MIN: CPT | Performed by: ORTHOPAEDIC SURGERY

## 2025-01-24 RX ORDER — PIOGLITAZONE 15 MG/1
TABLET ORAL
COMMUNITY

## 2025-01-24 RX ORDER — TORSEMIDE 10 MG/1
TABLET ORAL
COMMUNITY

## 2025-01-24 RX ORDER — AMOXICILLIN 875 MG/1
TABLET, COATED ORAL
COMMUNITY
Start: 2025-01-21

## 2025-01-24 NOTE — PROGRESS NOTES
Assessment/Plan:  1. Tear of right rotator cuff, unspecified tear extent, unspecified whether traumatic        2. Osteoarthritis of glenohumeral joint, right        3. Cervical radiculopathy          Scribe Attestation      I,:  Jarret Gilmore MA am acting as a scribe while in the presence of the attending physician.:       I,:  Ho Dao MD personally performed the services described in this documentation    as scribed in my presence.:               Andrade is an 80-year-old male returning today for acute shoulder pain with a known history of rotator cuff tear and cervical radiculopathy.  Andrade and I discussed his current condition.  It is likely that he aggravated his rotator cuff tear and may have experienced even more tearing.  This is consistent with my evaluation today as he is quite weak to shoulder abduction.  Due to other comorbidities I do not feel he is a surgical candidate.  Should surgery be considered in the future I feel he would be a better candidate for a reverse total shoulder versus rotator cuff tear due to his advanced age.  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  He recently had a steroid injection back in November which did cause his blood sugar to rise.  This still remains an option but this was only performed 2 months ago and I prefer to wait 3-4.  Andrade was in agreement.  To assist with his recovery I recommend he return to physical therapy of which he was amenable to.  If he is not showing improvement over the next 6 to 8 weeks asked that he return to see me and we can reconsider steroid injection.  All of his questions were answered to his satisfaction.    Subjective:   Andrade Wise Jr. is a 80 y.o. male who presents for evaluation of his right shoulder.  Andrade states that he began with right shoulder pain yesterday.  He is not quite sure of an actual injury though admits to walking his dog and is curious if the dog  may have pulled the shoulder awkwardly.  He developed a fairly persistent and moderate pain in the anterior aspect of the right shoulder that will radiate to the right biceps region and into the right hand.  Range of motion will exacerbate his symptoms.  He has no complaints of numbness or tingling today.  He did take a muscle relaxer last night which provided some relief.  Andrade has a known history of rotator cuff tear from a MRI back in September 2023.  He chose to treat this nonoperatively.  He also has a history of cervical radiculopathy of which he attended physical therapy for and he states resolved 80% of his pain.  He has been seen by spine and pain management as well.  I did see him back in November for his right shoulder and provided a subacromial injection.  This caused his blood sugars to rise and states that it did take quite some time for them to decrease.  Near or around the same time he developed atrial fibrillation and has undergone a cardiac ablation.      Review of Systems   Constitutional:  Negative for chills, fever and unexpected weight change.   HENT:  Negative for hearing loss, nosebleeds and sore throat.    Eyes:  Negative for pain, redness and visual disturbance.   Respiratory:  Negative for cough, shortness of breath and wheezing.    Cardiovascular:  Negative for chest pain, palpitations and leg swelling.   Gastrointestinal:  Negative for abdominal pain, nausea and vomiting.   Endocrine: Negative for polyphagia and polyuria.   Genitourinary:  Negative for dysuria and hematuria.   Musculoskeletal:         See HPI   Skin:  Negative for rash and wound.   Neurological:  Negative for dizziness, numbness and headaches.   Psychiatric/Behavioral:  Negative for decreased concentration and suicidal ideas. The patient is not nervous/anxious.          Past Medical History:   Diagnosis Date    Allergic rhinitis     Anemia 10/23/2008    last assessed 9/9/13     Anxiety     Atrial fibrillation (HCC)      Bleeding     Chronic kidney disease     COVID-19 virus infection 12/2022    CPAP (continuous positive airway pressure) dependence     Diabetes mellitus (HCC)     Diarrhea     Diverticulitis     Eating disorder     GERD (gastroesophageal reflux disease)     Herpes zoster with complication     last assessed 7/3/17     Hiatal hernia     Hyperlipidemia     Hypertension     IBS (irritable bowel syndrome)     Incisional hernia     Increased urinary frequency     Irregular heart beat     Pyogenic granuloma 09/13/2006    last assessed 9/13/06    Carlos (subconjunctival hemorrhage), unspecified laterality 09/12/2005    last assessed 9/12/05    Sleep apnea     TMJ (dislocation of temporomandibular joint)     Ventral hernia     last assessed  4/6/17     Wears glasses        Past Surgical History:   Procedure Laterality Date    APPENDECTOMY      ATRIAL ABLATION SURGERY      2014    ATRIAL ABLATION SURGERY      catheter ablation atrial fibrillation / last assessed 2/17/16     CARDIOVERSION  05/30/2024    CHOLECYSTECTOMY      lap    COLON SURGERY  1990    Hartmans procedure    COLON SURGERY      reversal 6 weeks later    COLONOSCOPY N/A 01/26/2017    Procedure: COLONOSCOPY;  Surgeon: Portillo Schumacher MD;  Location: [facility];  Service:     ESOPHAGOGASTRODUODENOSCOPY N/A 01/26/2017    Procedure: ESOPHAGOGASTRODUODENOSCOPY (EGD);  Surgeon: Portillo Schumacher MD;  Location: [facility] GI [facility];  Service:     EYE SURGERY Bilateral 2015    lid repair    HERNIA REPAIR Bilateral     inguinal 2014 & 2013    INCISIONAL HERNIA REPAIR  2011    INCISIONAL HERNIA REPAIR N/A 03/24/2017    Procedure: REPAIR OF INCARCERATED INCISIONAL HERNIA WITH MESH, Lysis of Adhesions, Partial Omentectomy;  Surgeon: Colt Davis MD;  Location: [facility];  Service:     INSERT / REPLACE / REMOVE PACEMAKER Left 10/04/2021    medtronic-[id number]    NEUROBLASTOMA EXCISION      SKIN CANCER EXCISION      On nose    TONSILLECTOMY      US GUIDED THYROID BIOPSY  1/6/2025        Family History   Problem Relation Age of Onset    Heart disease Mother     Cancer Mother     Colon cancer Mother     Dementia Mother     Early death Father     Seizures Father     Cancer Sister         bone    Heart disease Sister     Diabetes Sister     Lupus Sister     Heart disease Brother     Cancer Brother         kidney    Diabetes Brother     Other Sister         covid    Stroke Neg Hx        Social History     Occupational History    Not on file   Tobacco Use    Smoking status: Former     Current packs/day: 0.00     Average packs/day: 1 pack/day for 20.0 years (20.0 ttl pk-yrs)     Types: Cigarettes     Start date:      Quit date:      Years since quittin.0     Passive exposure: Never    Smokeless tobacco: Never    Tobacco comments:     Started age 17   Vaping Use    Vaping status: Never Used   Substance and Sexual Activity    Alcohol use: No     Comment: none in 33 yrs    Drug use: No    Sexual activity: Yes         Current Outpatient Medications:     acetaminophen (TYLENOL) 325 mg tablet, Take 650 mg by mouth every 6 (six) hours as needed, Disp: , Rfl:     amiodarone 200 mg tablet, , Disp: , Rfl:     amoxicillin (AMOXIL) 875 mg tablet, , Disp: , Rfl:     apixaban (ELIQUIS) 5 mg, Take 1 tablet (5 mg total) by mouth 2 (two) times a day, Disp: 180 tablet, Rfl: 3    atorvastatin (LIPITOR) 20 mg tablet, TAKE 1 TABLET DAILY, Disp: 90 tablet, Rfl: 1    Blood Glucose Monitoring Suppl (FREESTYLE LITE) GEOVANNY, , Disp: , Rfl:     carvedilol (COREG) 25 mg tablet, Take 12.5 mg by mouth 2 (two) times a day, Disp: , Rfl:     clotrimazole-betamethasone (LOTRISONE) 1-0.05 % cream, APPLY TO AFFECTED AREA(S) TWO TIMES A DAY, Disp: 45 g, Rfl: 1    fluticasone (FLONASE) 50 mcg/act nasal spray, USE 2 SPRAYS IN EACH NOSTRIL DAILY (GENERIC FOR FLONASE), Disp: 48 g, Rfl: 3    FREESTYLE LITE test strip, , Disp: , Rfl:     hydroCHLOROthiazide 25 mg tablet, TAKE ONE TABLET BY MOUTH EVERY DAY, Disp: 90 tablet, Rfl:  1    ketoconazole (NIZORAL) 2 % shampoo, , Disp: , Rfl:     Lancets (FREESTYLE) lancets, , Disp: , Rfl:     metroNIDAZOLE (METROCREAM) 0.75 % cream, , Disp: , Rfl:     Mounjaro 7.5 MG/0.5ML SOAJ, , Disp: , Rfl:     Multiple Vitamin (MULTIVITAMIN ADULT PO), Take by mouth, Disp: , Rfl:     ondansetron (ZOFRAN) 4 mg tablet, Take 1 tablet (4 mg total) by mouth every 6 (six) hours (Patient taking differently: Take 4 mg by mouth if needed for nausea), Disp: 6 tablet, Rfl: 0    RESTASIS 0.05 % ophthalmic emulsion, Administer 1 drop to both eyes every 12 (twelve) hours, Disp: , Rfl:     tamsulosin (FLOMAX) 0.4 mg, TAKE 1 CAPSULE DAILY WITH DINNER, Disp: 90 capsule, Rfl: 1    torsemide (DEMADEX) 10 mg tablet, Take by mouth, Disp: , Rfl:     traMADol (ULTRAM) 50 mg tablet, Take 50 mg by mouth once as needed for moderate pain He uses it rarely., Disp: , Rfl:     pantoprazole (PROTONIX) 40 mg tablet, Take 40 mg by mouth, Disp: , Rfl:     pioglitazone (ACTOS) 15 mg tablet, Take by mouth (Patient not taking: Reported on 1/24/2025), Disp: , Rfl:     pioglitazone-metFORMIN (ACTOPLUS MET)  MG per tablet, daily after dinner (Patient not taking: Reported on 1/24/2025), Disp: , Rfl:     polyethylene glycol (GOLYTELY) 4000 mL solution, Take 4,000 mL by mouth once for 1 dose, Disp: 4000 mL, Rfl: 0    Polyvinyl Alcohol-Povidone PF 1.4-0.6 % SOLN, Apply to eye (Patient not taking: Reported on 1/24/2025), Disp: , Rfl:     tiZANidine (ZANAFLEX) 2 mg tablet, TAKE ONE TABLET BY MOUTH TWICE A DAY AS NEEDED FOR MUSCLE SPASMS, Disp: 20 tablet, Rfl: 0    urea (CARMOL) 20 % cream, if needed (Patient not taking: Reported on 1/24/2025), Disp: , Rfl:     Allergies   Allergen Reactions    Codeine Other (See Comments) and GI Intolerance     Reaction Date: 27Dec2004; Annotation - [id number]: '' CRAZY ''  vomiting  headache    Demerol [Meperidine] Nausea Only, Other (See Comments), GI Intolerance and Vomiting     vomiting  Reaction Date: 27Dec2004;    headache    Morphine Nausea Only, GI Intolerance and Vomiting     Reaction Date: 23Feb2012;        Objective:  There were no vitals filed for this visit.    Right Shoulder Exam     Range of Motion   Active abduction:  130   External rotation:  60   Forward flexion:  130     Muscle Strength   Abduction: 3/5   Internal rotation: 5/5   External rotation: 5/5   Supraspinatus: 3/5   Subscapularis: 5/5   Biceps: 4/5     Other   Erythema: absent  Scars: absent  Sensation: normal    Comments:  No ecchymosis  No deformity            Physical Exam  Vitals reviewed.   Constitutional:       Appearance: He is well-developed.   HENT:      Head: Normocephalic and atraumatic.   Eyes:      General:         Right eye: No discharge.         Left eye: No discharge.      Conjunctiva/sclera: Conjunctivae normal.   Cardiovascular:      Rate and Rhythm: Regular rhythm.   Pulmonary:      Effort: Pulmonary effort is normal. No respiratory distress.   Musculoskeletal:      Cervical back: Normal range of motion and neck supple.      Comments: As noted in the HPI.   Skin:     General: Skin is warm and dry.   Neurological:      Mental Status: He is alert and oriented to person, place, and time.   Psychiatric:         Behavior: Behavior normal.               This document was created using speech voice recognition software.   Grammatical errors, random word insertions, pronoun errors, and incomplete sentences are an occasional consequence of this system due to software limitations, ambient noise, and hardware issues.   Any formal questions or concerns about content, text, or information contained within the body of this dictation should be directly addressed to the provider for clarification.

## 2025-01-27 ENCOUNTER — NURSE TRIAGE (OUTPATIENT)
Age: 80
End: 2025-01-27

## 2025-01-27 DIAGNOSIS — K59.04 CHRONIC IDIOPATHIC CONSTIPATION: Primary | ICD-10-CM

## 2025-01-27 RX ORDER — LINACLOTIDE 290 UG/1
290 CAPSULE, GELATIN COATED ORAL DAILY
COMMUNITY
End: 2025-01-27 | Stop reason: SDUPTHER

## 2025-01-27 RX ORDER — LINACLOTIDE 290 UG/1
290 CAPSULE, GELATIN COATED ORAL DAILY
Qty: 30 CAPSULE | Refills: 1 | Status: SHIPPED | OUTPATIENT
Start: 2025-01-27

## 2025-01-27 NOTE — TELEPHONE ENCOUNTER
"Last OV:7/8/24 Constipation, IBS-C, GERD  Last Colonoscopy: 11/10/21; sched 1/30/25    Last EGD: 11/10/21    Next OV:2/24/25    Please advise:    Pt calling in with c/o no BM x 5 days. Reports that he ran out of his Linzess 290mcg and hasn't requested refill yet. Has tried 3 Dulcolax last night and 1 capful Miralax this morning. Pt admits that he only drinks diet iced tea, advised pt to cut back on tea and increase water intake, verbalized understanding. Pt also reports he recently increased his Monjaro to 7.5mg and has not taken since last week in preparation of his upcoming colonoscopy. Recommended pt increase his Miralax to 1 capful BID and to try Magnesium Citrate - starting with 1/2 bottle and if no BM in a couple hours, drink other half. Pt verbalized understanding. Also advised that if he feels he's fully evacuated his bowels and stools are becoming on the looser side, to skip 2nd dose of Miralax today and start the BID tomorrow. Pt verbalized understanding re: all recommendations. Refill request for Linzess sent. ER precautions reviewed with pt and understanding verbalized.    Reason for Disposition   Constipation is a recurrent ongoing problem (i.e., < 3 BMs / week or straining > 25% of the time)    Answer Assessment - Initial Assessment Questions  1. STOOL PATTERN OR FREQUENCY: \"How often do you have a bowel movement (BM)?\"  (Normal range: 3 times a day to every 3 days)  \"When was your last BM?\"        LBM 5 days, BM every 2 days  2. STRAINING: \"Do you have to strain to have a BM?\"       Yes   3. ONSET: \"When did the constipation begin?\"      ongoing  4. RECTAL PAIN: \"Does your rectum hurt when the stool comes out?\" If Yes, ask: \"Do you have hemorrhoids? How bad is the pain?\"  (Scale 1-10; or mild, moderate, severe)      denies  5. BM COMPOSITION: \"Are the stools hard?\"       yes  6. BLOOD ON STOOLS: \"Has there been any blood on the toilet tissue or on the surface of the BM?\" If Yes, ask: \"When was the " "last time?\"      denies  7. CHRONIC CONSTIPATION: \"Is this a new problem for you?\"  If No, ask: \"How long have you had this problem?\" (days, weeks, months)       chronic  8. CHANGES IN DIET OR HYDRATION: \"Have there been any recent changes in your diet?\" \"How much fluids are you drinking on a daily basis?\"  \"How much have you had to drink today?\"      denies  9. MEDICINES: \"Have you been taking any new medicines?\" \"Are you taking any narcotic pain medicines?\" (e.g., Dilaudid, morphine, Percocet, Vicodin)      Increased Monjaro to 7.5   10. LAXATIVES: \"Have you been using any stool softeners, laxatives, or enemas?\"  If Yes, ask \"What, how often, and when was the last time?\"        Dulcolax 3 last night night, miralax today   11. ACTIVITY:  \"How much walking do you do every day?\"  \"Has your activity level decreased in the past week?\"         yes  12. CAUSE: \"What do you think is causing the constipation?\"         IBD-C  13. MEDICAL HISTORY: \"Do you have a history of hemorrhoids, rectal fissures, rectal surgery, or rectal abscess?\"          hemorrhoids  14. OTHER SYMPTOMS: \"Do you have any other symptoms?\" (e.g., abdomen pain, bloating, fever, vomiting)        denies    Protocols used: Constipation-Adult-[place]    "

## 2025-01-28 ENCOUNTER — TELEPHONE (OUTPATIENT)
Age: 80
End: 2025-01-28

## 2025-01-29 ENCOUNTER — TELEPHONE (OUTPATIENT)
Age: 80
End: 2025-01-29

## 2025-01-29 ENCOUNTER — ANESTHESIA (OUTPATIENT)
Dept: ANESTHESIOLOGY | Facility: HOSPITAL | Age: 80
End: 2025-01-29

## 2025-01-29 ENCOUNTER — ANESTHESIA EVENT (OUTPATIENT)
Dept: ANESTHESIOLOGY | Facility: HOSPITAL | Age: 80
End: 2025-01-29

## 2025-01-29 NOTE — ANESTHESIA PREPROCEDURE EVALUATION
Procedure:  PRE-OP ONLY    Relevant Problems   CARDIO   (+) Aortic root dilation (HCC)   (+) Atrial fibrillation (HCC)   (+) Dyspnea on exertion   (+) Grade IV hemorrhoids   (+) Hypertension, benign   (+) Mixed hyperlipidemia      ENDO   (+) Type 2 diabetes mellitus with stage 3 chronic kidney disease, without long-term current use of insulin, unspecified whether stage 3a or 3b CKD (HCC)      GI/HEPATIC   (+) Esophageal reflux      /RENAL   (+) Benign localized prostatic hyperplasia with lower urinary tract symptoms (LUTS)   (+) Chronic kidney disease (CKD), stage II (mild)   (+) Complex renal cyst   (+) Stage 2 chronic kidney disease      MUSCULOSKELETAL   (+) Primary osteoarthritis of left knee      NEURO/PSYCH   (+) Diabetic neuropathy (HCC)      PULMONARY   (+) Dyspnea on exertion   (+) Obstructive sleep apnea             Left ventricle is normal in size. Wall thickness is normal. Systolic   function is normal with an ejection fraction of 55-60%. Wall motion is   within normal limits. There is grade I (mild) diastolic dysfunction.     Right ventricle cavity is normal. Systolic function is normal.     No significant valve abnormalities.     Ascending Aorta: The ascending aorta is moderately dilated (4.4cm).     Pulmonic Valve: The estimated pulmonary artery systolic pressure is   26.2 mmHg. Normal pulmonary artery pressure.              NPO Status:  No vitals data found for the desired time range.

## 2025-01-30 ENCOUNTER — HOSPITAL ENCOUNTER (OUTPATIENT)
Dept: GASTROENTEROLOGY | Facility: HOSPITAL | Age: 80
Setting detail: OUTPATIENT SURGERY
End: 2025-01-30
Attending: INTERNAL MEDICINE
Payer: MEDICARE

## 2025-01-30 ENCOUNTER — ANESTHESIA EVENT (OUTPATIENT)
Dept: GASTROENTEROLOGY | Facility: HOSPITAL | Age: 80
End: 2025-01-30
Payer: MEDICARE

## 2025-01-30 ENCOUNTER — ANESTHESIA (OUTPATIENT)
Dept: GASTROENTEROLOGY | Facility: HOSPITAL | Age: 80
End: 2025-01-30
Payer: MEDICARE

## 2025-01-30 VITALS
HEART RATE: 61 BPM | OXYGEN SATURATION: 100 % | BODY MASS INDEX: 28.35 KG/M2 | TEMPERATURE: 96.6 F | SYSTOLIC BLOOD PRESSURE: 121 MMHG | WEIGHT: 198 LBS | HEIGHT: 70 IN | DIASTOLIC BLOOD PRESSURE: 64 MMHG | RESPIRATION RATE: 16 BRPM

## 2025-01-30 DIAGNOSIS — K59.00 CONSTIPATION, UNSPECIFIED CONSTIPATION TYPE: ICD-10-CM

## 2025-01-30 DIAGNOSIS — Z86.0100 HISTORY OF COLONIC POLYPS: ICD-10-CM

## 2025-01-30 LAB — GLUCOSE SERPL-MCNC: 127 MG/DL (ref 65–140)

## 2025-01-30 PROCEDURE — 45381 COLONOSCOPY SUBMUCOUS NJX: CPT | Performed by: INTERNAL MEDICINE

## 2025-01-30 PROCEDURE — 82948 REAGENT STRIP/BLOOD GLUCOSE: CPT

## 2025-01-30 PROCEDURE — 88305 TISSUE EXAM BY PATHOLOGIST: CPT | Performed by: STUDENT IN AN ORGANIZED HEALTH CARE EDUCATION/TRAINING PROGRAM

## 2025-01-30 PROCEDURE — 45385 COLONOSCOPY W/LESION REMOVAL: CPT | Performed by: INTERNAL MEDICINE

## 2025-01-30 RX ORDER — LIDOCAINE HYDROCHLORIDE 10 MG/ML
INJECTION, SOLUTION EPIDURAL; INFILTRATION; INTRACAUDAL; PERINEURAL AS NEEDED
Status: DISCONTINUED | OUTPATIENT
Start: 2025-01-30 | End: 2025-01-30

## 2025-01-30 RX ORDER — PROPOFOL 10 MG/ML
INJECTION, EMULSION INTRAVENOUS AS NEEDED
Status: DISCONTINUED | OUTPATIENT
Start: 2025-01-30 | End: 2025-01-30

## 2025-01-30 RX ORDER — SODIUM CHLORIDE 9 MG/ML
INJECTION, SOLUTION INTRAVENOUS CONTINUOUS PRN
Status: DISCONTINUED | OUTPATIENT
Start: 2025-01-30 | End: 2025-01-30

## 2025-01-30 RX ORDER — PROPOFOL 10 MG/ML
INJECTION, EMULSION INTRAVENOUS CONTINUOUS PRN
Status: DISCONTINUED | OUTPATIENT
Start: 2025-01-30 | End: 2025-01-30

## 2025-01-30 RX ADMIN — LIDOCAINE HYDROCHLORIDE 50 MG: 10 INJECTION, SOLUTION EPIDURAL; INFILTRATION; INTRACAUDAL; PERINEURAL at 09:08

## 2025-01-30 RX ADMIN — PROPOFOL 30 MG: 10 INJECTION, EMULSION INTRAVENOUS at 09:53

## 2025-01-30 RX ADMIN — PROPOFOL 100 MCG/KG/MIN: 10 INJECTION, EMULSION INTRAVENOUS at 09:08

## 2025-01-30 RX ADMIN — SODIUM CHLORIDE: 9 INJECTION, SOLUTION INTRAVENOUS at 09:00

## 2025-01-30 RX ADMIN — PROPOFOL 60 MG: 10 INJECTION, EMULSION INTRAVENOUS at 09:08

## 2025-01-30 RX ADMIN — PROPOFOL 30 MG: 10 INJECTION, EMULSION INTRAVENOUS at 09:37

## 2025-01-30 RX ADMIN — PROPOFOL 20 MG: 10 INJECTION, EMULSION INTRAVENOUS at 09:54

## 2025-01-30 NOTE — ANESTHESIA POSTPROCEDURE EVALUATION
Post-Op Assessment Note    CV Status:  Stable  Pain Score: 0    Pain management: adequate       Mental Status:  Alert and awake   Hydration Status:  Euvolemic   PONV Controlled:  Controlled   Airway Patency:  Patent     Post Op Vitals Reviewed: Yes    No anethesia notable event occurred.    Staff: CRNA           Last Filed PACU Vitals:  Vitals Value Taken Time   Temp 96.6 °F (35.9 °C) 01/30/25 1017   Pulse 62 01/30/25 1017   /58 01/30/25 1017   Resp 16 01/30/25 1017   SpO2 97 % 01/30/25 1017       Modified Nolberto:     Vitals Value Taken Time   Activity 2 01/30/25 1018   Respiration 2 01/30/25 1018   Circulation 2 01/30/25 1018   Consciousness 1 01/30/25 1018   Oxygen Saturation 2 01/30/25 1018     Modified Nolberto Score: 9

## 2025-01-30 NOTE — ANESTHESIA PREPROCEDURE EVALUATION
Procedure:  COLONOSCOPY    Relevant Problems   CARDIO   (+) Aortic root dilation (HCC)   (+) Atrial fibrillation (HCC)   (+) Dyspnea on exertion   (+) Grade IV hemorrhoids   (+) Hypertension, benign   (+) Mixed hyperlipidemia      ENDO   (+) Type 2 diabetes mellitus with stage 3 chronic kidney disease, without long-term current use of insulin, unspecified whether stage 3a or 3b CKD (HCC)      GI/HEPATIC   (+) Esophageal reflux      /RENAL   (+) Benign localized prostatic hyperplasia with lower urinary tract symptoms (LUTS)   (+) Chronic kidney disease (CKD), stage II (mild)   (+) Complex renal cyst   (+) Stage 2 chronic kidney disease      MUSCULOSKELETAL   (+) Primary osteoarthritis of left knee      NEURO/PSYCH   (+) Diabetic neuropathy (HCC)      PULMONARY   (+) Dyspnea on exertion   (+) Obstructive sleep apnea      Pacemaker    Physical Exam    Airway    Mallampati score: II  TM Distance: >3 FB  Neck ROM: full     Dental   No notable dental hx     Cardiovascular  Cardiovascular exam normal    Pulmonary  Pulmonary exam normal     Other Findings        Left ventricle is normal in size. Wall thickness is normal. Systolic   function is normal with an ejection fraction of 55-60%. Wall motion is   within normal limits. There is grade I (mild) diastolic dysfunction.     Right ventricle cavity is normal. Systolic function is normal.     No significant valve abnormalities.     Ascending Aorta: The ascending aorta is moderately dilated (4.4cm).     Pulmonic Valve: The estimated pulmonary artery systolic pressure is   26.2 mmHg. Normal pulmonary artery pressure.         Anesthesia Plan  ASA Score- 3     Anesthesia Type- IV sedation with anesthesia with ASA Monitors.         Additional Monitors:     Airway Plan:            Plan Factors-Exercise tolerance (METS): >4 METS.    Chart reviewed. EKG reviewed. Imaging results reviewed. Existing labs reviewed. Patient summary reviewed.    Patient is not a current smoker.       Obstructive sleep apnea risk education given perioperatively.        Induction- intravenous.    Postoperative Plan-     Perioperative Resuscitation Plan - Level 1 - Full Code.       Informed Consent- Anesthetic plan and risks discussed with patient.  I personally reviewed this patient with the CRNA. Discussed and agreed on the Anesthesia Plan with the CRNA..      NPO Status:  Vitals Value Taken Time   Date of last liquid 01/29/25 01/30/25 0744   Time of last liquid 2200 01/30/25 0744   Date of last solid 01/29/25 01/30/25 0744   Time of last solid 1200 01/30/25 0744

## 2025-01-30 NOTE — H&P
History and Physical -  [facility]  Andrade Wise Jr. 80 y.o. male MRN: [id number]                  HPI: Andrade Wise Jr. is a 80 y.o. year old male who presents for personal history of colon polyps      REVIEW OF SYSTEMS: Per the HPI, and otherwise unremarkable.    Historical Information   Past Medical History:   Diagnosis Date    Allergic rhinitis     Anemia 10/23/2008    last assessed 9/9/13     Anxiety     Atrial fibrillation (HCC)     Bleeding     Chronic kidney disease     COVID-19 virus infection 12/2022    CPAP (continuous positive airway pressure) dependence     Diabetes mellitus (HCC)     Diarrhea     Diverticulitis     Eating disorder     GERD (gastroesophageal reflux disease)     Herpes zoster with complication     last assessed 7/3/17     Hiatal hernia     Hyperlipidemia     Hypertension     IBS (irritable bowel syndrome)     Incisional hernia     Increased urinary frequency     Irregular heart beat     Pyogenic granuloma 09/13/2006    last assessed 9/13/06    Carlos (subconjunctival hemorrhage), unspecified laterality 09/12/2005    last assessed 9/12/05    Sleep apnea     TMJ (dislocation of temporomandibular joint)     Ventral hernia     last assessed  4/6/17     Wears glasses      Past Surgical History:   Procedure Laterality Date    APPENDECTOMY      ATRIAL ABLATION SURGERY      2014    ATRIAL ABLATION SURGERY      catheter ablation atrial fibrillation / last assessed 2/17/16     ATRIAL ABLATION SURGERY N/A     heart    CARDIOVERSION  05/30/2024    CHOLECYSTECTOMY      lap    COLON SURGERY  1990    Hartmans procedure    COLON SURGERY      reversal 6 weeks later    COLONOSCOPY N/A 01/26/2017    Procedure: COLONOSCOPY;  Surgeon: Portillo Schumacher MD;  Location: [facility];  Service:     ESOPHAGOGASTRODUODENOSCOPY N/A 01/26/2017    Procedure: ESOPHAGOGASTRODUODENOSCOPY (EGD);  Surgeon: Portillo Schumacher MD;  Location: [facility];  Service:     EYE SURGERY Bilateral 2015    lid repair     HERNIA REPAIR Bilateral     inguinal  &     INCISIONAL HERNIA REPAIR      INCISIONAL HERNIA REPAIR N/A 2017    Procedure: REPAIR OF INCARCERATED INCISIONAL HERNIA WITH MESH, Lysis of Adhesions, Partial Omentectomy;  Surgeon: Colt Davis MD;  Location: [facility];  Service:     INSERT / REPLACE / REMOVE PACEMAKER Left 10/04/2021    [id number]    NEUROBLASTOMA EXCISION      SKIN CANCER EXCISION      On nose    TONSILLECTOMY      US GUIDED THYROID BIOPSY  2025     Social History   Social History     Substance and Sexual Activity   Alcohol Use No    Comment: none in 33 yrs     Social History     Substance and Sexual Activity   Drug Use No     Social History     Tobacco Use   Smoking Status Former    Current packs/day: 0.00    Average packs/day: 1 pack/day for 20.0 years (20.0 ttl pk-yrs)    Types: Cigarettes    Start date:     Quit date:     Years since quittin.1    Passive exposure: Never   Smokeless Tobacco Never   Tobacco Comments    Started age 17     Family History   Problem Relation Age of Onset    Heart disease Mother     Cancer Mother     Colon cancer Mother     Dementia Mother     Early death Father     Seizures Father     Cancer Sister         bone    Heart disease Sister     Diabetes Sister     Lupus Sister     Heart disease Brother     Cancer Brother         kidney    Diabetes Brother     Other Sister         covid    Stroke Neg Hx        Meds/Allergies     Not in a hospital admission.    Allergies   Allergen Reactions    Codeine Other (See Comments) and GI Intolerance     Reaction Date: 2004; Annotation - 2012: '' CRAZY ''  vomiting  headache    Demerol [Meperidine] Nausea Only, Other (See Comments), GI Intolerance and Vomiting     vomiting  Reaction Date: 2004;   headache    Morphine Nausea Only, GI Intolerance and Vomiting     Reaction Date: 2012;        Objective     There were no vitals taken for this visit.      PHYSICAL  EXAMINATION:    General Appearance:   Alert, cooperative, no distress   HEENT:  Normocephalic, atraumatic, anicteric. Neck supple, symmetrical, trachea midline.   Lungs:   Equal chest rise and unlabored breathing, normal effort, no coughing.   Cardiovascular:   No visualized JVD.   Abdomen:   No abdominal distension.   Skin:   No jaundice, rashes, or lesions.    Musculoskeletal:   Normal range of motion visualized.   Psych:  Normal affect and normal insight.   Neuro:  Alert and appropriate.           ASSESSMENT/PLAN:  This is a 80 y.o. year old male here for colonoscopy, and he is stable and optimized for his procedure.

## 2025-01-31 DIAGNOSIS — E78.2 MIXED HYPERLIPIDEMIA: ICD-10-CM

## 2025-01-31 RX ORDER — ATORVASTATIN CALCIUM 20 MG/1
20 TABLET, FILM COATED ORAL DAILY
Qty: 90 TABLET | Refills: 1 | Status: SHIPPED | OUTPATIENT
Start: 2025-01-31

## 2025-02-03 ENCOUNTER — EVALUATION (OUTPATIENT)
Dept: PHYSICAL THERAPY | Facility: CLINIC | Age: 80
End: 2025-02-03
Payer: MEDICARE

## 2025-02-03 ENCOUNTER — RESULTS FOLLOW-UP (OUTPATIENT)
Age: 80
End: 2025-02-03

## 2025-02-03 DIAGNOSIS — M54.12 CERVICAL RADICULOPATHY: ICD-10-CM

## 2025-02-03 DIAGNOSIS — M19.011 OSTEOARTHRITIS OF GLENOHUMERAL JOINT, RIGHT: ICD-10-CM

## 2025-02-03 DIAGNOSIS — M75.101 TEAR OF RIGHT ROTATOR CUFF, UNSPECIFIED TEAR EXTENT, UNSPECIFIED WHETHER TRAUMATIC: Primary | ICD-10-CM

## 2025-02-03 PROCEDURE — 88305 TISSUE EXAM BY PATHOLOGIST: CPT | Performed by: STUDENT IN AN ORGANIZED HEALTH CARE EDUCATION/TRAINING PROGRAM

## 2025-02-03 PROCEDURE — 97161 PT EVAL LOW COMPLEX 20 MIN: CPT | Performed by: PHYSICAL THERAPIST

## 2025-02-03 NOTE — PROGRESS NOTES
PT Evaluation     Today's date: 2/3/2025  Patient name: Andrade Wise Jr.  : 1945  MRN: [id number]  Referring provider: Ho Dao*  Dx:   Encounter Diagnosis     ICD-10-CM    1. Tear of right rotator cuff, unspecified tear extent, unspecified whether traumatic  M75.101 Ambulatory Referral to Physical Therapy      2. Osteoarthritis of glenohumeral joint, right  M19.011 Ambulatory Referral to Physical Therapy      3. Cervical radiculopathy  M54.12 Ambulatory Referral to Physical Therapy                     Assessment  Impairments: abnormal muscle firing, abnormal muscle tone, abnormal or restricted ROM, activity intolerance, impaired physical strength, lacks appropriate home exercise program, pain with function, scapular dyskinesis, poor posture  and poor body mechanics    Assessment details: Andrade Wise Jr. is a 80 y.o. male who presents to physical therapy with pain, decreased UE range of motion, decreased UE strength, impaired function, decreased activity tolerance and poor posture. Patient's clinical presentation is consistent with their referring diagnosis of Tear of right rotator cuff, unspecified tear extent, unspecified whether traumatic  (primary encounter diagnosis)  Osteoarthritis of glenohumeral joint, right  Cervical radiculopathy. The pt presents with functional limitations of ADLs, recreational activities, self-care and reaching. Pt would benefit from physical therapy services to address these limitations and maximize function. Pt was instructed and educated on good sitting posture today and demonstrates understanding.       Understanding of Dx/Px/POC: good     Prognosis: good    Goals  Short term goals:  (3 weeks)  1. Patient will have pain level 2/10 right  shoulder at rest  2. Patient will report a 50% improvement in symptoms with ADL's  3. Patient will demonstrate appropriate scapulohumeral rhythm with reaching shoulder height and below  4. Patient will have improved right  shoulder ROM by 20 degrees    Long term goals: (6 weeks)  1. Patient will report 85 % improvement improvement in symptoms with ADL's  2. Patient will have pain level 2/10 right shoulder with ADL's   3. Patient will demonstrate appropriate scapulohumeral rhythm with reaching overhead  4. Patient will be independent in a comprehensive home exercise program      Plan  Patient would benefit from: PT eval and skilled physical therapy  Planned modality interventions: cryotherapy and thermotherapy: hydrocollator packs    Planned therapy interventions: joint mobilization, manual therapy, massage, neuromuscular re-education, patient education, postural training, strengthening, stretching, therapeutic activities, ADL training, functional ROM exercises, home exercise program, abdominal trunk stabilization and therapeutic exercise    Frequency: 2x week  Duration in weeks: 6  Treatment plan discussed with: patient      Subjective Evaluation    History of Present Illness  Mechanism of injury: He reports that his right arm began hurting 2 weeks ago.  He feels that maybe his dog pulled on the leash which hurt his arm.  He followed up with Dr. Dao.  He was then referred to PT.   Patient Goals  Patient goals for therapy: decreased pain and return to sport/leisure activities  Patient's goals regarding treatment: Golf again.  Pain  Current pain ratin  At best pain ratin  At worst pain rating: 10  Location: Right shld and into lateral arm to elbow  Quality: sharp and dull ache  Relieving factors: ice, rest and medications  Aggravating factors: lifting (Using right arm)    Social Support    Employment status: not working  Hand dominance: right  Exercise history: Golf        Objective     Postural Observations  Seated posture: fair    Additional Postural Observation Details  He is slouched in sitting.  Right arm is held in a guarded position    Palpation     Right   Hypertonic in the middle trapezius, pectoralis minor,  subclavius and upper trapezius.   Tenderness of the middle trapezius, pectoralis minor, subclavius and upper trapezius.     Passive Range of Motion   Left Shoulder   Flexion: 165 degrees   Abduction: 122 degrees   External rotation 0°: 75 degrees   Internal rotation 0°: 77 degrees     Right Shoulder   Flexion: 151 degrees   Abduction: 100 degrees   External rotation 0°: 54 degrees   Internal rotation 0°: 68 degrees     Scapular Mobility     Right Shoulder   Scapular mobility: fair    Scapular Mobility beyond 90° FF   Scapular elevation: moderate    Strength/Myotome Testing     Left Shoulder     Planes of Motion   Flexion: 5   Abduction: 5   External rotation at 0°: 5   Internal rotation at 0°: 5     Right Shoulder     Planes of Motion   Flexion: 4-   Abduction: 4-   External rotation at 0°: 4   Internal rotation at 0°: 4              Precautions: Hypertension, DM      Specialty Daily Treatment Diary         Specialty Daily Treatment Diary         Insurance Eval/ Re-eval POC expires Auth Status Total visits  Start date  Expiration date Misc   MED  6 weeks - 3/17/2025      Co-Insurance                 Date 2/3/25        Visit Number 1        Auth                  Manual         STJ retract         P to A GHJ mobs         STM UT                           TherEx         Wand flex         Rows         Ext                                                      Neuro Re-Ed         S/L ER 20        S/L rotation 10                                                              TherAct                                                      Gait Training

## 2025-02-07 ENCOUNTER — APPOINTMENT (OUTPATIENT)
Dept: PHYSICAL THERAPY | Facility: CLINIC | Age: 80
End: 2025-02-07
Payer: MEDICARE

## 2025-02-10 ENCOUNTER — APPOINTMENT (OUTPATIENT)
Dept: PHYSICAL THERAPY | Facility: CLINIC | Age: 80
End: 2025-02-10
Payer: MEDICARE

## 2025-02-10 ENCOUNTER — TELEPHONE (OUTPATIENT)
Age: 80
End: 2025-02-10

## 2025-02-10 NOTE — TELEPHONE ENCOUNTER
Caller: Pt    Doctor: Dr. Mays    Reason for call: Pt states he is experiencing terrible neck and shoulder pain and would like to know if he can come in for injections at this point or need to be seen again.    Please assist.     Call back#: [phone]

## 2025-02-10 NOTE — TELEPHONE ENCOUNTER
S/w pt, she is experiencing increased R neck, shoulder, and arm pain. Pt said he would like to be evaluated by RS. RN made pt aware he is OOO this week, RN gave pt an appt for 2/17. Pt asked to be transferred to Ortho to discuss shoulder pain. RN transferred.

## 2025-02-11 ENCOUNTER — OFFICE VISIT (OUTPATIENT)
Dept: OBGYN CLINIC | Facility: CLINIC | Age: 80
End: 2025-02-11
Payer: MEDICARE

## 2025-02-11 VITALS — WEIGHT: 203 LBS | HEIGHT: 70 IN | BODY MASS INDEX: 29.06 KG/M2

## 2025-02-11 DIAGNOSIS — M75.101 TEAR OF RIGHT ROTATOR CUFF, UNSPECIFIED TEAR EXTENT, UNSPECIFIED WHETHER TRAUMATIC: Primary | ICD-10-CM

## 2025-02-11 DIAGNOSIS — M19.011 OSTEOARTHRITIS OF GLENOHUMERAL JOINT, RIGHT: ICD-10-CM

## 2025-02-11 DIAGNOSIS — M54.12 CERVICAL RADICULOPATHY: ICD-10-CM

## 2025-02-11 PROCEDURE — 20610 DRAIN/INJ JOINT/BURSA W/O US: CPT | Performed by: ORTHOPAEDIC SURGERY

## 2025-02-11 PROCEDURE — 99214 OFFICE O/P EST MOD 30 MIN: CPT | Performed by: ORTHOPAEDIC SURGERY

## 2025-02-11 RX ADMIN — BUPIVACAINE HYDROCHLORIDE 4 ML: 5 INJECTION, SOLUTION PERINEURAL at 09:30

## 2025-02-11 RX ADMIN — TRIAMCINOLONE ACETONIDE 40 MG: 40 INJECTION, SUSPENSION INTRA-ARTICULAR; INTRAMUSCULAR at 09:30

## 2025-02-11 NOTE — PROGRESS NOTES
Assessment/Plan:  1. Tear of right rotator cuff, unspecified tear extent, unspecified whether traumatic  Large joint arthrocentesis: R glenohumeral      2. Osteoarthritis of glenohumeral joint, right  Large joint arthrocentesis: R glenohumeral      3. Cervical radiculopathy            Scribe Attestation      I,:  Danyelle Da Silva PA-C am acting as a scribe while in the presence of the attending physician.:       I,:  Ho Dao MD personally performed the services described in this documentation    as scribed in my presence.:               Andrade is an 80-year-old male returning today for right shoulder pain with a known history of rotator cuff tear and cervical radiculopathy. Andrade and I discussed his current condition.  It is likely that he reaggravated his rotator cuff tear while walking his dog recently. He continues to have weakness with rotator cuff testing.  Again we discussed that I feel his only surgical option would be reverse total shoulder arthroplasty.  At this time he is not a great candidate for surgery due to his medical issues.  He also states he has to take care of his wife who is not interested in surgery right now.  However, should surgery be considered in the future, I feel he would be a better candidate for a reverse total shoulder versus rotator cuff tear due to his advanced age.  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 We discussed performing a glenohumeral CSI today to ideally provide pain relief. The steroid injection performed in November 2024 did cause his blood sugar levels to rise, so we discussed monitoring his glucose levels closely and making appropriate dietary changes over the next 2 days to combat increases. Due to the patient being on Eliquis, I advised against performing a Toradol injection. I do not believe an injection today places him at risk of going into A fib, but he will monitor symptoms accordingly.  After discussing the risks, benefits, and alternatives to injections, the patient consented for and underwent the procedure without any acute complications or difficulties. Post-injection instructions were reviewed. I also recommend the patient continue attending formal PT and completing home exercises as well.     Additionally, I believe his cervical radiculopathy continues to be a simultaneous reason for pain and he localizes his pain to the upper back/neck muscles today. The patient is scheduled to follow up with Dr. Mays next week and will discuss other treatment options as necessary. We will plan to follow up with Andrade as needed.     Subjective:   Andrade Wise Jr. is a 80 y.o. male who presents for re-evaluation of his right shoulder following a session of formal PT so far. Andrade reports his shoulder pain was flared within the past week after his dog pulled him on the leash. He notes pain along the lateral shoulder and sleep disturbances as he experienced in the past. Today, he notes pain is primarily in the upper back/neck muscles and can feel muscle knots. The patient has limited neck rotation secondary to pain. He notes radiation of pain down to the elbow without any numbness/tingling. Andrade has follow up with [facility] next week to further discuss options. He has not been taking the Gabapentin as prescribed by Dr. Mays due to potential side effects. The patient cannot recall how long he obtained benefit from the subacromial CSI provided in November 2024. However, this caused his blood sugars to rise and states that it did take quite some time for them to decrease and he developed atrial fibrillation requiring cardiac ablation. The patient is on Eliquis at baseline. Andrade is the primary caretaker for his wife.       Review of Systems   Constitutional:  Negative for chills, fever and unexpected weight change.   HENT:  Negative for hearing loss, nosebleeds and sore throat.    Eyes:  Negative for pain,  redness and visual disturbance.   Respiratory:  Negative for cough, shortness of breath and wheezing.    Cardiovascular:  Negative for chest pain, palpitations and leg swelling.   Gastrointestinal:  Negative for abdominal pain, nausea and vomiting.   Endocrine: Negative for polyphagia and polyuria.   Genitourinary:  Negative for dysuria and hematuria.   Musculoskeletal:         See HPI   Skin:  Negative for rash and wound.   Neurological:  Negative for dizziness, numbness and headaches.   Psychiatric/Behavioral:  Negative for decreased concentration and suicidal ideas. The patient is not nervous/anxious.          Past Medical History:   Diagnosis Date    Allergic rhinitis     Anemia 10/23/2008    last assessed 9/9/13     Anxiety     Atrial fibrillation (HCC)     Bleeding     Chronic kidney disease     COVID-19 virus infection 12/2022    CPAP (continuous positive airway pressure) dependence     Diabetes mellitus (HCC)     Diarrhea     Diverticulitis     Eating disorder     GERD (gastroesophageal reflux disease)     Herpes zoster with complication     last assessed 7/3/17     Hiatal hernia     Hyperlipidemia     Hypertension     IBS (irritable bowel syndrome)     Incisional hernia     Increased urinary frequency     Irregular heart beat     Pyogenic granuloma 09/13/2006    last assessed 9/13/06    Carlos (subconjunctival hemorrhage), unspecified laterality 09/12/2005    last assessed 9/12/05    Sleep apnea     TMJ (dislocation of temporomandibular joint)     Ventral hernia     last assessed  4/6/17     Wears glasses        Past Surgical History:   Procedure Laterality Date    APPENDECTOMY      ATRIAL ABLATION SURGERY      2014    ATRIAL ABLATION SURGERY      catheter ablation atrial fibrillation / last assessed 2/17/16     ATRIAL ABLATION SURGERY N/A     heart    CARDIOVERSION  05/30/2024    CHOLECYSTECTOMY      lap    COLON SURGERY  1990    Hartmans procedure    COLON SURGERY      reversal 6 weeks later    COLONOSCOPY  N/A 2017    Procedure: COLONOSCOPY;  Surgeon: Portillo Schumacher MD;  Location: [facility];  Service:     ESOPHAGOGASTRODUODENOSCOPY N/A 2017    Procedure: ESOPHAGOGASTRODUODENOSCOPY (EGD);  Surgeon: Portillo Schumacher MD;  Location: [facility];  Service:     EYE SURGERY Bilateral 2015    lid repair    HERNIA REPAIR Bilateral     inguinal  &     INCISIONAL HERNIA REPAIR      INCISIONAL HERNIA REPAIR N/A 2017    Procedure: REPAIR OF INCARCERATED INCISIONAL HERNIA WITH MESH, Lysis of Adhesions, Partial Omentectomy;  Surgeon: Colt Davis MD;  Location: [facility];  Service:     INSERT / REPLACE / REMOVE PACEMAKER Left 10/04/2021    [id number]    NEUROBLASTOMA EXCISION      SKIN CANCER EXCISION      On nose    TONSILLECTOMY      US GUIDED THYROID BIOPSY  2025       Family History   Problem Relation Age of Onset    Heart disease Mother     Cancer Mother     Colon cancer Mother     Dementia Mother     Early death Father     Seizures Father     Cancer Sister         bone    Heart disease Sister     Diabetes Sister     Lupus Sister     Heart disease Brother     Cancer Brother         kidney    Diabetes Brother     Other Sister         covid    Stroke Neg Hx        Social History     Occupational History    Not on file   Tobacco Use    Smoking status: Former     Current packs/day: 0.00     Average packs/day: 1 pack/day for 20.0 years (20.0 ttl pk-yrs)     Types: Cigarettes     Start date:      Quit date:      Years since quittin.1     Passive exposure: Never    Smokeless tobacco: Never    Tobacco comments:     Started age 17   Vaping Use    Vaping status: Never Used   Substance and Sexual Activity    Alcohol use: No     Comment: none in 33 yrs    Drug use: No    Sexual activity: Yes         Current Outpatient Medications:     acetaminophen (TYLENOL) 325 mg tablet, Take 650 mg by mouth every 6 (six) hours as needed, Disp: , Rfl:     amiodarone 200 mg tablet, ,  Disp: , Rfl:     amoxicillin (AMOXIL) 875 mg tablet, , Disp: , Rfl:     apixaban (ELIQUIS) 5 mg, Take 1 tablet (5 mg total) by mouth 2 (two) times a day, Disp: 180 tablet, Rfl: 3    atorvastatin (LIPITOR) 20 mg tablet, TAKE 1 TABLET DAILY, Disp: 90 tablet, Rfl: 1    Blood Glucose Monitoring Suppl (FREESTYLE LITE) GEOVANNY, , Disp: , Rfl:     carvedilol (COREG) 25 mg tablet, Take 12.5 mg by mouth 2 (two) times a day, Disp: , Rfl:     clotrimazole-betamethasone (LOTRISONE) 1-0.05 % cream, APPLY TO AFFECTED AREA(S) TWO TIMES A DAY, Disp: 45 g, Rfl: 1    fluticasone (FLONASE) 50 mcg/act nasal spray, USE 2 SPRAYS IN EACH NOSTRIL DAILY (GENERIC FOR FLONASE), Disp: 48 g, Rfl: 3    FREESTYLE LITE test strip, , Disp: , Rfl:     hydroCHLOROthiazide 25 mg tablet, TAKE ONE TABLET BY MOUTH EVERY DAY, Disp: 90 tablet, Rfl: 1    ketoconazole (NIZORAL) 2 % shampoo, , Disp: , Rfl:     Lancets (FREESTYLE) lancets, , Disp: , Rfl:     linaCLOtide (Linzess) 290 MCG CAPS, Take 1 capsule by mouth in the morning, Disp: 30 capsule, Rfl: 1    metroNIDAZOLE (METROCREAM) 0.75 % cream, , Disp: , Rfl:     Mounjaro 7.5 MG/0.5ML SOAJ, , Disp: , Rfl:     Multiple Vitamin (MULTIVITAMIN ADULT PO), Take by mouth, Disp: , Rfl:     ondansetron (ZOFRAN) 4 mg tablet, Take 1 tablet (4 mg total) by mouth every 6 (six) hours (Patient taking differently: Take 4 mg by mouth if needed for nausea), Disp: 6 tablet, Rfl: 0    RESTASIS 0.05 % ophthalmic emulsion, Administer 1 drop to both eyes every 12 (twelve) hours, Disp: , Rfl:     tamsulosin (FLOMAX) 0.4 mg, TAKE 1 CAPSULE DAILY WITH DINNER, Disp: 90 capsule, Rfl: 1    tiZANidine (ZANAFLEX) 2 mg tablet, TAKE ONE TABLET BY MOUTH TWICE A DAY AS NEEDED FOR MUSCLE SPASMS, Disp: 20 tablet, Rfl: 0    torsemide (DEMADEX) 10 mg tablet, Take by mouth, Disp: , Rfl:     pantoprazole (PROTONIX) 40 mg tablet, Take 40 mg by mouth, Disp: , Rfl:     pioglitazone (ACTOS) 15 mg tablet, Take by mouth (Patient not taking: Reported on  2/11/2025), Disp: , Rfl:     pioglitazone-metFORMIN (ACTOPLUS MET)  MG per tablet, daily after dinner (Patient not taking: Reported on 1/24/2025), Disp: , Rfl:     polyethylene glycol (GOLYTELY) 4000 mL solution, Take 4,000 mL by mouth once for 1 dose, Disp: 4000 mL, Rfl: 0    Polyvinyl Alcohol-Povidone PF 1.4-0.6 % SOLN, Apply to eye (Patient not taking: Reported on 2/11/2025), Disp: , Rfl:     traMADol (ULTRAM) 50 mg tablet, Take 50 mg by mouth once as needed for moderate pain He uses it rarely. (Patient not taking: Reported on 1/30/2025), Disp: , Rfl:     urea (CARMOL) 20 % cream, if needed (Patient not taking: Reported on 1/24/2025), Disp: , Rfl:     Allergies   Allergen Reactions    Codeine Other (See Comments) and GI Intolerance     Reaction Date: 27Dec2004; Annotation - 04Sep2012: '' CRAZY ''  vomiting  headache    Demerol [Meperidine] Nausea Only, Other (See Comments), GI Intolerance and Vomiting     vomiting  Reaction Date: 27Dec2004;   headache    Morphine Nausea Only, GI Intolerance and Vomiting     Reaction Date: 23Feb2012;        Objective:  There were no vitals filed for this visit.    Shoulder Exam:     Inspection: No ecchymosis, edema, or deformity. No visualized wounds or skin lesions   Palpation: no cervical midline or AC joint tenderness. Moderate upper trapezius pain and palpable knots. Moderate bicipital groove tenderness  Active Motion:       FF: 100 with pain       ER: 45 with pain       IR: T12  Strength: 4/5 empty can, 4/5 ER,  5/5 IR   Sensory - SILT in the Radial / Ulnar / Median / Axillary nerve distributions  Motor - AIN / PIN / Radial / Ulnar / Median / Axillary motor nerves in tact  Palpable Radial pulse  Cap refill <2secs in all digits       Physical Exam  Vitals reviewed.   Constitutional:       Appearance: He is well-developed.   HENT:      Head: Normocephalic and atraumatic.   Eyes:      General:         Right eye: No discharge.         Left eye: No discharge.       Conjunctiva/sclera: Conjunctivae normal.   Cardiovascular:      Rate and Rhythm: Regular rhythm.   Pulmonary:      Effort: Pulmonary effort is normal. No respiratory distress.   Musculoskeletal:      Cervical back: Normal range of motion and neck supple.      Comments: As noted in the HPI.   Skin:     General: Skin is warm and dry.   Neurological:      Mental Status: He is alert and oriented to person, place, and time.   Psychiatric:         Behavior: Behavior normal.       Procedure  Large joint arthrocentesis: R glenohumeral  Mobile Protocol:  Consent: Verbal consent obtained.  Risks and benefits: risks, benefits and alternatives were discussed  Consent given by: patient  Timeout called at: 2/11/2025 9:40 AM.  Patient understanding: patient states understanding of the procedure being performed  Patient consent: the patient's understanding of the procedure matches consent given  Site marked: the operative site was marked  Radiology Images displayed and confirmed. If images not available, report reviewed: imaging studies available  Patient identity confirmed: verbally with patient  Supporting Documentation  Indications: pain   Procedure Details  Location: shoulder - R glenohumeral  Preparation: Patient was prepped and draped in the usual sterile fashion  Needle size: 22 G  Approach: posterior  Medications administered: 40 mg triamcinolone acetonide 40 mg/mL; 4 mL bupivacaine 0.5 %    Patient tolerance: patient tolerated the procedure well with no immediate complications  Dressing:  Sterile dressing applied

## 2025-02-12 ENCOUNTER — TELEPHONE (OUTPATIENT)
Age: 80
End: 2025-02-12

## 2025-02-12 NOTE — TELEPHONE ENCOUNTER
Received call from patient regarding his amiodarone. He wants to know when he can stop taking this medication. Please advise.

## 2025-02-14 ENCOUNTER — OFFICE VISIT (OUTPATIENT)
Dept: PHYSICAL THERAPY | Facility: CLINIC | Age: 80
End: 2025-02-14
Payer: MEDICARE

## 2025-02-14 DIAGNOSIS — M19.011 OSTEOARTHRITIS OF GLENOHUMERAL JOINT, RIGHT: ICD-10-CM

## 2025-02-14 DIAGNOSIS — M75.101 TEAR OF RIGHT ROTATOR CUFF, UNSPECIFIED TEAR EXTENT, UNSPECIFIED WHETHER TRAUMATIC: Primary | ICD-10-CM

## 2025-02-14 DIAGNOSIS — M54.12 CERVICAL RADICULOPATHY: ICD-10-CM

## 2025-02-14 PROCEDURE — 97110 THERAPEUTIC EXERCISES: CPT | Performed by: PHYSICAL THERAPIST

## 2025-02-14 PROCEDURE — 97112 NEUROMUSCULAR REEDUCATION: CPT | Performed by: PHYSICAL THERAPIST

## 2025-02-14 RX ORDER — TRIAMCINOLONE ACETONIDE 40 MG/ML
40 INJECTION, SUSPENSION INTRA-ARTICULAR; INTRAMUSCULAR
Status: COMPLETED | OUTPATIENT
Start: 2025-02-11 | End: 2025-02-11

## 2025-02-14 RX ORDER — BUPIVACAINE HYDROCHLORIDE 5 MG/ML
4 INJECTION, SOLUTION PERINEURAL
Status: COMPLETED | OUTPATIENT
Start: 2025-02-11 | End: 2025-02-11

## 2025-02-14 NOTE — PROGRESS NOTES
Daily Note     Today's date: 2025  Patient name: Andrade Wise Jr.  : 1945  MRN: [id number]  Referring provider: Ho Dao*  Dx:   Encounter Diagnosis     ICD-10-CM    1. Tear of right rotator cuff, unspecified tear extent, unspecified whether traumatic  M75.101       2. Osteoarthritis of glenohumeral joint, right  M19.011       3. Cervical radiculopathy  M54.12                      Subjective: e notes right shld and neck pain today 5/10.      Objective: See treatment diary below      Assessment: Tolerated treatment well. Patient would benefit from continued PT.  Additions as noted to exercises today.  His right arm felt fatigue following today's session with assisted ROM.      Plan: Continue per plan of care.      Precautions: Hypertension, DM      Specialty Daily Treatment Diary         Specialty Daily Treatment Diary         Insurance Eval/ Re-eval POC expires Auth Status Total visits  Start date  Expiration date Misc   MED  6 weeks - 3/17/2025      Co-Insurance                 Date 2/3/25 2/14/25       Visit Number 1 2       Auth                  Manual         STJ retract         P to A GHJ mobs  3 min       STM UT  3 min       PROM R shld  4 min                TherEx         NuStep  5 min       Pulleys  20       Wand flex  20       Rows  30 orange       Ext  30 orange                                                    Neuro Re-Ed         S/L ER 20 20       S/L rotation 10 10 pec stretch supine                                                             TherAct                                                      Gait Training

## 2025-02-15 ENCOUNTER — NURSE TRIAGE (OUTPATIENT)
Dept: OTHER | Facility: OTHER | Age: 80
End: 2025-02-15

## 2025-02-15 ENCOUNTER — HOSPITAL ENCOUNTER (EMERGENCY)
Facility: HOSPITAL | Age: 80
Discharge: HOME | End: 2025-02-15
Payer: MEDICARE

## 2025-02-15 VITALS
DIASTOLIC BLOOD PRESSURE: 67 MMHG | HEART RATE: 72 BPM | OXYGEN SATURATION: 97 % | TEMPERATURE: 98.8 F | RESPIRATION RATE: 22 BRPM | SYSTOLIC BLOOD PRESSURE: 110 MMHG

## 2025-02-15 DIAGNOSIS — I48.91 ATRIAL FIBRILLATION (HCC): Primary | ICD-10-CM

## 2025-02-15 LAB
ANION GAP SERPL CALCULATED.3IONS-SCNC: 13 MMOL/L (ref 4–13)
BASOPHILS # BLD AUTO: 0.04 THOUSANDS/ΜL (ref 0–0.1)
BASOPHILS NFR BLD AUTO: 0 % (ref 0–1)
BUN SERPL-MCNC: 29 MG/DL (ref 5–25)
CALCIUM SERPL-MCNC: 8.9 MG/DL (ref 8.4–10.2)
CARDIAC TROPONIN I PNL SERPL HS: 9 NG/L (ref ?–50)
CHLORIDE SERPL-SCNC: 100 MMOL/L (ref 96–108)
CO2 SERPL-SCNC: 21 MMOL/L (ref 21–32)
CREAT SERPL-MCNC: 0.98 MG/DL (ref 0.6–1.3)
EOSINOPHIL # BLD AUTO: 0.02 THOUSAND/ΜL (ref 0–0.61)
EOSINOPHIL NFR BLD AUTO: 0 % (ref 0–6)
ERYTHROCYTE [DISTWIDTH] IN BLOOD BY AUTOMATED COUNT: 13.7 % (ref 11.6–15.1)
GFR SERPL CREATININE-BSD FRML MDRD: 72 ML/MIN/1.73SQ M
GLUCOSE SERPL-MCNC: 255 MG/DL (ref 65–140)
HCT VFR BLD AUTO: 36.9 % (ref 36.5–49.3)
HGB BLD-MCNC: 12 G/DL (ref 12–17)
IMM GRANULOCYTES # BLD AUTO: 0.09 THOUSAND/UL (ref 0–0.2)
IMM GRANULOCYTES NFR BLD AUTO: 1 % (ref 0–2)
LYMPHOCYTES # BLD AUTO: 4.92 THOUSANDS/ΜL (ref 0.6–4.47)
LYMPHOCYTES NFR BLD AUTO: 33 % (ref 14–44)
MCH RBC QN AUTO: 30 PG (ref 26.8–34.3)
MCHC RBC AUTO-ENTMCNC: 32.5 G/DL (ref 31.4–37.4)
MCV RBC AUTO: 92 FL (ref 82–98)
MONOCYTES # BLD AUTO: 0.91 THOUSAND/ΜL (ref 0.17–1.22)
MONOCYTES NFR BLD AUTO: 6 % (ref 4–12)
NEUTROPHILS # BLD AUTO: 9.17 THOUSANDS/ΜL (ref 1.85–7.62)
NEUTS SEG NFR BLD AUTO: 60 % (ref 43–75)
NRBC BLD AUTO-RTO: 0 /100 WBCS
PLATELET # BLD AUTO: 210 THOUSANDS/UL (ref 149–390)
PMV BLD AUTO: 9.9 FL (ref 8.9–12.7)
POTASSIUM SERPL-SCNC: 4 MMOL/L (ref 3.5–5.3)
RBC # BLD AUTO: 4 MILLION/UL (ref 3.88–5.62)
SODIUM SERPL-SCNC: 134 MMOL/L (ref 135–147)
WBC # BLD AUTO: 15.15 THOUSAND/UL (ref 4.31–10.16)

## 2025-02-15 PROCEDURE — 36415 COLL VENOUS BLD VENIPUNCTURE: CPT

## 2025-02-15 PROCEDURE — 99285 EMERGENCY DEPT VISIT HI MDM: CPT

## 2025-02-15 PROCEDURE — 84484 ASSAY OF TROPONIN QUANT: CPT

## 2025-02-15 PROCEDURE — 85025 COMPLETE CBC W/AUTO DIFF WBC: CPT

## 2025-02-15 PROCEDURE — 93005 ELECTROCARDIOGRAM TRACING: CPT

## 2025-02-15 PROCEDURE — 96365 THER/PROPH/DIAG IV INF INIT: CPT

## 2025-02-15 PROCEDURE — 80048 BASIC METABOLIC PNL TOTAL CA: CPT

## 2025-02-15 RX ORDER — MAGNESIUM SULFATE HEPTAHYDRATE 40 MG/ML
2 INJECTION, SOLUTION INTRAVENOUS ONCE
Status: COMPLETED | OUTPATIENT
Start: 2025-02-15 | End: 2025-02-15

## 2025-02-15 RX ADMIN — MAGNESIUM SULFATE HEPTAHYDRATE 2 G: 40 INJECTION, SOLUTION INTRAVENOUS at 14:27

## 2025-02-15 NOTE — TELEPHONE ENCOUNTER
"Regarding: afib / blood sugar 228  ----- Message from Melissa JOHNSON sent at 2/15/2025  9:53 AM EST -----  \"I think I went into afib last night at 12, I had an ablation in November so I thought that would take care of things but I had a steroid shot this week on Tuesday and my blood sugars went through the roof. The endocrinologist just said to hydrate well and keep an eye on it--today it's still high at 228 and my heart rate is higher in the 80's but my bp seems normal\"    "

## 2025-02-15 NOTE — ED PROVIDER NOTES
Time reflects when diagnosis was documented in both MDM as applicable and the Disposition within this note       Time User Action Codes Description Comment    2/15/2025  3:08 PM Bret Ramirez Add [I48.91] Atrial fibrillation (HCC)           ED Disposition       ED Disposition   Discharge    Condition   Stable    Date/Time   Sat Feb 15, 2025  3:08 PM    Comment   Andrade Wise Jr. discharge to home/self care.                   Assessment & Plan       Medical Decision Making  Amount and/or Complexity of Data Reviewed  Labs: ordered. Decision-making details documented in ED Course.  ECG/medicine tests:  Decision-making details documented in ED Course.    Risk  Prescription drug management.      81 y/o M with hx of a fib s/p ablation and pacemaker, on eliquis presents for chest discomfort and concern for being in a fib. Pt reports recently having a cortisone injection which has raised his blood sugars and once previously put him in a fib. Feels like he is back in it given his brief chest discomfort last night. Denies lightheadedness, syncope, sob, n/v/d.     VSS  Pulse irregularly irregular, EKG confirms rate controlled a fib  ACS w/u negative    D/w pt that he is already anticoagulated, on beta blocker, and has stable vitals. There is no urgency in rhythm conversion as pt is tolerating well. Recommend he discuss with cardiologist for further management. Strict RTED precautions given.       ED Course as of 02/15/25 2229   Sat Feb 15, 2025   1415 ECG 12 lead  Procedure Note: EKG  Date/Time: 02/15/25 2:15 PM   Interpreted by: Bret Ramirez MD  Indications / Diagnosis: a fib  ECG reviewed by me, the ED Provider: yes   The EKG demonstrates:  Rhythm: a fib  Intervals: normal intervals  Axis: normal axis  QRS/Blocks: wide QRS, bifascicular block  ST Changes: No acute ST Changes, no STD/ARNULFO.     1438 WBC(!): 15.15  Recent steroid injection   1501 hs TnI 0hr: 9  CP episode at midnight, do not feel this represents ACS. Pt  does not want to wait for 2 hour troponin which seems reasonable. Vitals are stable. Pt in rate controlled a fib on AC already. Can f/u cardiology       Medications   magnesium sulfate 2 g/50 mL IVPB (premix) 2 g (0 g Intravenous Stopped 2/15/25 1527)       ED Risk Strat Scores                                                History of Present Illness       Chief Complaint   Patient presents with    Irregular Heart Beat     States last night started with a pulling sensation in chest. Thought he was back in a fib. Hx of ablation in nov       Past Medical History:   Diagnosis Date    Allergic rhinitis     Anemia 10/23/2008    last assessed 9/9/13     Anxiety     Atrial fibrillation (HCC)     Bleeding     Chronic kidney disease     COVID-19 virus infection 12/2022    CPAP (continuous positive airway pressure) dependence     Diabetes mellitus (HCC)     Diarrhea     Diverticulitis     Eating disorder     GERD (gastroesophageal reflux disease)     Herpes zoster with complication     last assessed 7/3/17     Hiatal hernia     Hyperlipidemia     Hypertension     IBS (irritable bowel syndrome)     Incisional hernia     Increased urinary frequency     Irregular heart beat     Pyogenic granuloma 09/13/2006    last assessed 9/13/06    Carlos (subconjunctival hemorrhage), unspecified laterality 09/12/2005    last assessed 9/12/05    Sleep apnea     TMJ (dislocation of temporomandibular joint)     Ventral hernia     last assessed  4/6/17     Wears glasses       Past Surgical History:   Procedure Laterality Date    APPENDECTOMY      ATRIAL ABLATION SURGERY      2014    ATRIAL ABLATION SURGERY      catheter ablation atrial fibrillation / last assessed 2/17/16     ATRIAL ABLATION SURGERY N/A     heart    CARDIOVERSION  05/30/2024    CHOLECYSTECTOMY      lap    COLON SURGERY  1990    Hartmans procedure    COLON SURGERY      reversal 6 weeks later    COLONOSCOPY N/A 01/26/2017    Procedure: COLONOSCOPY;  Surgeon: Portillo Schumacher MD;   Location: [facility];  Service:     ESOPHAGOGASTRODUODENOSCOPY N/A 2017    Procedure: ESOPHAGOGASTRODUODENOSCOPY (EGD);  Surgeon: Portillo Schumacher MD;  Location: [facility];  Service:     EYE SURGERY Bilateral 2015    lid repair    HERNIA REPAIR Bilateral     inguinal  &     INCISIONAL HERNIA REPAIR      INCISIONAL HERNIA REPAIR N/A 2017    Procedure: REPAIR OF INCARCERATED INCISIONAL HERNIA WITH MESH, Lysis of Adhesions, Partial Omentectomy;  Surgeon: Colt Davis MD;  Location: [facility];  Service:     INSERT / REPLACE / REMOVE PACEMAKER Left 10/04/2021    medtronic-[id number]    NEUROBLASTOMA EXCISION      SKIN CANCER EXCISION      On nose    TONSILLECTOMY      US GUIDED THYROID BIOPSY  2025      Family History   Problem Relation Age of Onset    Heart disease Mother     Cancer Mother     Colon cancer Mother     Dementia Mother     Early death Father     Seizures Father     Cancer Sister         bone    Heart disease Sister     Diabetes Sister     Lupus Sister     Heart disease Brother     Cancer Brother         kidney    Diabetes Brother     Other Sister         covid    Stroke Neg Hx       Social History     Tobacco Use    Smoking status: Former     Current packs/day: 0.00     Average packs/day: 1 pack/day for 20.0 years (20.0 ttl pk-yrs)     Types: Cigarettes     Start date:      Quit date:      Years since quittin.1     Passive exposure: Never    Smokeless tobacco: Never    Tobacco comments:     Started age 17   Vaping Use    Vaping status: Never Used   Substance Use Topics    Alcohol use: No     Comment: none in 33 yrs    Drug use: No      E-Cigarette/Vaping    E-Cigarette Use Never User       E-Cigarette/Vaping Substances    Nicotine No     THC No     CBD No     Flavoring No     Other No     Unknown No       I have reviewed and agree with the history as documented.     HPI    Review of Systems   Constitutional:  Negative for chills and fever.   HENT:   Negative for ear pain and sore throat.    Eyes:  Negative for pain and visual disturbance.   Respiratory:  Negative for cough and shortness of breath.    Cardiovascular:  Positive for chest pain and palpitations.   Gastrointestinal:  Negative for abdominal pain and vomiting.   Genitourinary:  Negative for dysuria and hematuria.   Musculoskeletal:  Negative for arthralgias and back pain.   Skin:  Negative for color change and rash.   Neurological:  Negative for seizures and syncope.   All other systems reviewed and are negative.          Objective       ED Triage Vitals [02/15/25 1355]   Temperature Pulse Blood Pressure Respirations SpO2 Patient Position - Orthostatic VS   98.8 °F (37.1 °C) 80 106/63 (!) 24 97 % Sitting      Temp Source Heart Rate Source BP Location FiO2 (%) Pain Score    Tympanic Monitor Right arm -- No Pain      Vitals      Date and Time Temp Pulse SpO2 Resp BP Pain Score FACES Pain Rating User   02/15/25 1500 -- 72 97 % 22 110/67 -- -- JH   02/15/25 1425 -- 76 98 % 23 128/75 -- -- RG   02/15/25 1355 98.8 °F (37.1 °C) 80 97 % 24 106/63 No Pain -- LS            Physical Exam  Vitals and nursing note reviewed.   Constitutional:       General: He is not in acute distress.     Appearance: He is well-developed. He is not toxic-appearing.   HENT:      Head: Normocephalic and atraumatic.      Right Ear: External ear normal.      Left Ear: External ear normal.      Nose: Nose normal.      Mouth/Throat:      Pharynx: Oropharynx is clear. No oropharyngeal exudate or posterior oropharyngeal erythema.   Eyes:      Extraocular Movements: Extraocular movements intact.      Conjunctiva/sclera: Conjunctivae normal.      Pupils: Pupils are equal, round, and reactive to light.   Cardiovascular:      Rate and Rhythm: Normal rate. Rhythm irregular.      Pulses: Normal pulses.      Heart sounds: Normal heart sounds. No murmur heard.     No friction rub. No gallop.   Pulmonary:      Effort: Pulmonary effort is normal.  No respiratory distress.      Breath sounds: Normal breath sounds. No wheezing, rhonchi or rales.   Abdominal:      General: Abdomen is flat.      Palpations: Abdomen is soft.      Tenderness: There is no abdominal tenderness. There is no guarding or rebound.   Musculoskeletal:         General: Normal range of motion.      Cervical back: Normal range of motion. No rigidity.      Right lower leg: No edema.      Left lower leg: No edema.   Skin:     General: Skin is warm and dry.      Capillary Refill: Capillary refill takes less than 2 seconds.   Neurological:      General: No focal deficit present.      Mental Status: He is alert.   Psychiatric:         Mood and Affect: Mood normal.         Results Reviewed       Procedure Component Value Units Date/Time    HS Troponin 0hr (reflex protocol) [[id number]]  (Normal) Collected: 02/15/25 1425    Lab Status: Final result Specimen: Blood from Arm, Right Updated: 02/15/25 1459     hs TnI 0hr 9 ng/L     Basic metabolic panel [[id number]]  (Abnormal) Collected: 02/15/25 1425    Lab Status: Final result Specimen: Blood from Arm, Right Updated: 02/15/25 1451     Sodium 134 mmol/L      Potassium 4.0 mmol/L      Chloride 100 mmol/L      CO2 21 mmol/L      ANION GAP 13 mmol/L      BUN 29 mg/dL      Creatinine 0.98 mg/dL      Glucose 255 mg/dL      Calcium 8.9 mg/dL      eGFR 72 ml/min/1.73sq m     Narrative:      National Kid[employer] Disease Foundation guidelines for Chronic Kidney Disease (CKD):     Stage 1 with normal or high GFR (GFR > 90 mL/min/1.73 square meters)    Stage 2 Mild CKD (GFR = 60-89 mL/min/1.73 square meters)    Stage 3A Moderate CKD (GFR = 45-59 mL/min/1.73 square meters)    Stage 3B Moderate CKD (GFR = 30-44 mL/min/1.73 square meters)    Stage 4 Severe CKD (GFR = 15-29 mL/min/1.73 square meters)    Stage 5 End Stage CKD (GFR <15 mL/min/1.73 square meters)  Note: GFR calculation is accurate only with a steady state creatinine    CBC and differential [[id number]]   (Abnormal) Collected: 02/15/25 1425    Lab Status: Final result Specimen: Blood from Arm, Right Updated: 02/15/25 1436     WBC 15.15 Thousand/uL      RBC 4.00 Million/uL      Hemoglobin 12.0 g/dL      Hematocrit 36.9 %      MCV 92 fL      MCH 30.0 pg      MCHC 32.5 g/dL      RDW 13.7 %      MPV 9.9 fL      Platelets 210 Thousands/uL      nRBC 0 /100 WBCs      Segmented % 60 %      Immature Grans % 1 %      Lymphocytes % 33 %      Monocytes % 6 %      Eosinophils Relative 0 %      Basophils Relative 0 %      Absolute Neutrophils 9.17 Thousands/µL      Absolute Immature Grans 0.09 Thousand/uL      Absolute Lymphocytes 4.92 Thousands/µL      Absolute Monocytes 0.91 Thousand/µL      Eosinophils Absolute 0.02 Thousand/µL      Basophils Absolute 0.04 Thousands/µL             No orders to display       Procedures    ED Medication and Procedure Management   Prior to Admission Medications   Prescriptions Last Dose Informant Patient Reported? Taking?   Blood Glucose Monitoring Suppl (FREESTYLE LITE) GEOVANNY  Self Yes No   FREESTYLE LITE test strip  Self Yes No   Lancets (FREESTYLE) lancets  Self Yes No   Mounjaro 7.5 MG/0.5ML SOAJ   Yes No   Multiple Vitamin (MULTIVITAMIN ADULT PO)   Yes No   Sig: Take by mouth   Polyvinyl Alcohol-Povidone PF 1.4-0.6 % SOLN   Yes No   Sig: Apply to eye   Patient not taking: Reported on 2/11/2025   RESTASIS 0.05 % ophthalmic emulsion  Self Yes No   Sig: Administer 1 drop to both eyes every 12 (twelve) hours   acetaminophen (TYLENOL) 325 mg tablet  Self Yes No   Sig: Take 650 mg by mouth every 6 (six) hours as needed   amiodarone 200 mg tablet   Yes No   amoxicillin (AMOXIL) 875 mg tablet   Yes No   apixaban (ELIQUIS) 5 mg   No No   Sig: Take 1 tablet (5 mg total) by mouth 2 (two) times a day   atorvastatin (LIPITOR) 20 mg tablet   No No   Sig: TAKE 1 TABLET DAILY   carvedilol (COREG) 25 mg tablet  Self Yes No   Sig: Take 12.5 mg by mouth 2 (two) times a day   clotrimazole-betamethasone  (LOTRISONE) 1-0.05 % cream   No No   Sig: APPLY TO AFFECTED AREA(S) TWO TIMES A DAY   fluticasone (FLONASE) 50 mcg/act nasal spray   No No   Sig: USE 2 SPRAYS IN EACH NOSTRIL DAILY (GENERIC FOR FLONASE)   hydroCHLOROthiazide 25 mg tablet   No No   Sig: TAKE ONE TABLET BY MOUTH EVERY DAY   ketoconazole (NIZORAL) 2 % shampoo  Self Yes No   linaCLOtide (Linzess) 290 MCG CAPS   No No   Sig: Take 1 capsule by mouth in the morning   metroNIDAZOLE (METROCREAM) 0.75 % cream  Self Yes No   ondansetron (ZOFRAN) 4 mg tablet   No No   Sig: Take 1 tablet (4 mg total) by mouth every 6 (six) hours   Patient taking differently: Take 4 mg by mouth if needed for nausea   pantoprazole (PROTONIX) 40 mg tablet   Yes No   Sig: Take 40 mg by mouth   pioglitazone (ACTOS) 15 mg tablet   Yes No   Sig: Take by mouth   Patient not taking: Reported on 2/11/2025   pioglitazone-metFORMIN (ACTOPLUS MET)  MG per tablet  Self Yes No   Sig: daily after dinner   Patient not taking: Reported on 1/24/2025   polyethylene glycol (GOLYTELY) 4000 mL solution   No No   Sig: Take 4,000 mL by mouth once for 1 dose   tamsulosin (FLOMAX) 0.4 mg   No No   Sig: TAKE 1 CAPSULE DAILY WITH DINNER   tiZANidine (ZANAFLEX) 2 mg tablet   No No   Sig: TAKE ONE TABLET BY MOUTH TWICE A DAY AS NEEDED FOR MUSCLE SPASMS   torsemide (DEMADEX) 10 mg tablet   Yes No   Sig: Take by mouth   traMADol (ULTRAM) 50 mg tablet  Self Yes No   Sig: Take 50 mg by mouth once as needed for moderate pain He uses it rarely.   Patient not taking: Reported on 1/30/2025   urea (CARMOL) 20 % cream  Self Yes No   Sig: if needed   Patient not taking: Reported on 1/24/2025      Facility-Administered Medications: None     Discharge Medication List as of 2/15/2025  3:09 PM        CONTINUE these medications which have NOT CHANGED    Details   acetaminophen (TYLENOL) 325 mg tablet Take 650 mg by mouth every 6 (six) hours as needed, Historical Med      amiodarone 200 mg tablet Historical Med       amoxicillin (AMOXIL) 875 mg tablet Historical Med      apixaban (ELIQUIS) 5 mg Take 1 tablet (5 mg total) by mouth 2 (two) times a day, Starting Mon 4/15/2024, Normal      atorvastatin (LIPITOR) 20 mg tablet TAKE 1 TABLET DAILY, Starting Fri 1/31/2025, Normal      Blood Glucose Monitoring Suppl (FREESTYLE LITE) GEOVANNY Starting Wed 5/1/2019, Historical Med      carvedilol (COREG) 25 mg tablet Take 12.5 mg by mouth 2 (two) times a day, Historical Med      clotrimazole-betamethasone (LOTRISONE) 1-0.05 % cream APPLY TO AFFECTED AREA(S) TWO TIMES A DAY, Starting Thu 1/2/2025, Normal      fluticasone (FLONASE) 50 mcg/act nasal spray USE 2 SPRAYS IN EACH NOSTRIL DAILY (GENERIC FOR FLONASE), Normal      FREESTYLE LITE test strip Historical Med      hydroCHLOROthiazide 25 mg tablet TAKE ONE TABLET BY MOUTH EVERY DAY, Starting Wed 8/28/2024, Normal      ketoconazole (NIZORAL) 2 % shampoo Starting Thu 1/12/2023, Historical Med      Lancets (FREESTYLE) lancets Historical Med      linaCLOtide (Linzess) 290 MCG CAPS Take 1 capsule by mouth in the morning, Starting Mon 1/27/2025, Normal      metroNIDAZOLE (METROCREAM) 0.75 % cream Historical Med      Mounjaro 7.5 MG/0.5ML SOAJ Historical Med      Multiple Vitamin (MULTIVITAMIN ADULT PO) Take by mouth, Historical Med      ondansetron (ZOFRAN) 4 mg tablet Take 1 tablet (4 mg total) by mouth every 6 (six) hours, Starting Sun 11/17/2024, Normal      pantoprazole (PROTONIX) 40 mg tablet Take 40 mg by mouth, Starting Wed 11/27/2024, Until Fri 12/27/2024 at 2359, Historical Med      pioglitazone (ACTOS) 15 mg tablet Take by mouth, Historical Med      pioglitazone-metFORMIN (ACTOPLUS MET)  MG per tablet daily after dinner, Starting Wed 10/20/2021, Historical Med      polyethylene glycol (GOLYTELY) 4000 mL solution Take 4,000 mL by mouth once for 1 dose, Starting Wed 1/22/2025, Normal      Polyvinyl Alcohol-Povidone PF 1.4-0.6 % SOLN Apply to eye, Historical Med      RESTASIS 0.05 %  ophthalmic emulsion Administer 1 drop to both eyes every 12 (twelve) hours, Starting Mon 8/6/2018, Historical Med      tamsulosin (FLOMAX) 0.4 mg TAKE 1 CAPSULE DAILY WITH DINNER, Normal      tiZANidine (ZANAFLEX) 2 mg tablet TAKE ONE TABLET BY MOUTH TWICE A DAY AS NEEDED FOR MUSCLE SPASMS, Normal      torsemide (DEMADEX) 10 mg tablet Take by mouth, Historical Med      traMADol (ULTRAM) 50 mg tablet Take 50 mg by mouth once as needed for moderate pain He uses it rarely., Historical Med      urea (CARMOL) 20 % cream if needed, Starting Wed 9/27/2023, Historical Med           No discharge procedures on file.  ED SEPSIS DOCUMENTATION   Time reflects when diagnosis was documented in both MDM as applicable and the Disposition within this note       Time User Action Codes Description Comment    2/15/2025  3:08 PM Bret Ramirez Add [I48.91] Atrial fibrillation (HCC)                  Bret Ramirez MD  02/15/25 7228

## 2025-02-15 NOTE — TELEPHONE ENCOUNTER
"Reason for Disposition  • Patient sounds very sick or weak to the triager    Answer Assessment - Initial Assessment Questions  1. DESCRIPTION: \"Please describe your heart rate or heartbeat that you are having\" (e.g., fast/slow, regular/irregular, skipped or extra beats, \"palpitations\")      Pt states he was laying down to go to bed lastnight and his chest tightened up in center of chest    2. ONSET: \"When did it start?\" (e.g., minutes, hours, days)       Lastnight    3. DURATION: \"How long does it last\" (e.g., seconds, minutes, hours)      Almost 15 minutes    4. PATTERN \"Does it come and go, or has it been constant since it started?\"  \"Does it get worse with exertion?\"   \"Are you feeling it now?\"      Constant    6. HEART RATE: \"Can you tell me your heart rate?\" \"How many beats in 15 seconds?\"  Note: Not all patients can do this.        Pt states he is unsure of heart rate at this time but feels steady with \"a skip\" every 10th beat    7. RECURRENT SYMPTOM: \"Have you ever had this before?\" If Yes, ask: \"When was the last time?\" and \"What happened that time?\"       Yes, ablation in November 8. CAUSE: \"What do you think is causing the palpitations?\"      Afib    9. CARDIAC HISTORY: \"Do you have any history of heart disease?\" (e.g., heart attack, angina, bypass surgery, angioplasty, arrhythmia)       Afib, pt has a pacemaker, HTN    10. OTHER SYMPTOMS: \"Do you have any other symptoms?\" (e.g., dizziness, chest pain, sweating, difficulty breathing)        Hyerglycemia due to steriod shot    Protocols used: Heart Rate and Heartbeat Questions-Adult-AH    "

## 2025-02-16 LAB
ATRIAL RATE: 102 BPM
QRS AXIS: -63 DEGREES
QRSD INTERVAL: 158 MS
QT INTERVAL: 420 MS
QTC INTERVAL: 499 MS
T WAVE AXIS: 20 DEGREES
VENTRICULAR RATE: 85 BPM

## 2025-02-16 PROCEDURE — 93010 ELECTROCARDIOGRAM REPORT: CPT | Performed by: INTERNAL MEDICINE

## 2025-02-17 ENCOUNTER — OFFICE VISIT (OUTPATIENT)
Age: 80
End: 2025-02-17
Payer: MEDICARE

## 2025-02-17 ENCOUNTER — APPOINTMENT (OUTPATIENT)
Dept: PHYSICAL THERAPY | Facility: CLINIC | Age: 80
End: 2025-02-17
Payer: MEDICARE

## 2025-02-17 VITALS — BODY MASS INDEX: 29.06 KG/M2 | WEIGHT: 203 LBS | HEIGHT: 70 IN

## 2025-02-17 DIAGNOSIS — M54.12 RADICULOPATHY, CERVICAL REGION: ICD-10-CM

## 2025-02-17 DIAGNOSIS — I48.0 PAF (PAROXYSMAL ATRIAL FIBRILLATION) (HCC): Primary | ICD-10-CM

## 2025-02-17 DIAGNOSIS — M47.812 CERVICAL SPONDYLOSIS: Primary | ICD-10-CM

## 2025-02-17 DIAGNOSIS — M79.18 MYOFASCIAL PAIN SYNDROME: ICD-10-CM

## 2025-02-17 PROCEDURE — 99214 OFFICE O/P EST MOD 30 MIN: CPT | Performed by: STUDENT IN AN ORGANIZED HEALTH CARE EDUCATION/TRAINING PROGRAM

## 2025-02-17 PROCEDURE — 20552 NJX 1/MLT TRIGGER POINT 1/2: CPT | Performed by: STUDENT IN AN ORGANIZED HEALTH CARE EDUCATION/TRAINING PROGRAM

## 2025-02-17 RX ORDER — TIZANIDINE 2 MG/1
4 TABLET ORAL 2 TIMES DAILY
Qty: 60 TABLET | Refills: 0 | Status: SHIPPED | OUTPATIENT
Start: 2025-02-17

## 2025-02-17 RX ORDER — BUPIVACAINE HYDROCHLORIDE 5 MG/ML
4 INJECTION, SOLUTION PERINEURAL ONCE
Status: COMPLETED | OUTPATIENT
Start: 2025-02-17 | End: 2025-02-17

## 2025-02-17 RX ORDER — GABAPENTIN 300 MG/1
300 CAPSULE ORAL 2 TIMES DAILY
COMMUNITY
Start: 2025-02-11 | End: 2025-02-17 | Stop reason: SDUPTHER

## 2025-02-17 RX ORDER — GABAPENTIN 300 MG/1
300 CAPSULE ORAL 2 TIMES DAILY
Qty: 60 CAPSULE | Refills: 1 | Status: SHIPPED | OUTPATIENT
Start: 2025-02-17

## 2025-02-17 RX ADMIN — BUPIVACAINE HYDROCHLORIDE 4 ML: 5 INJECTION, SOLUTION PERINEURAL at 16:00

## 2025-02-17 NOTE — PROGRESS NOTES
Pain Medicine Follow-Up Note    Assessment:  1. Cervical spondylosis    2. Radiculopathy, cervical region    3. Myofascial pain syndrome        Plan:  Orders Placed This Encounter   Procedures    Inj Trigger Point Single/Multiple     This order was created via procedure documentation       New Medications Ordered This Visit   Medications    gabapentin (NEURONTIN) 300 mg capsule     Sig: Take 1 capsule (300 mg total) by mouth 2 (two) times a day     Dispense:  60 capsule     Refill:  1    tiZANidine (ZANAFLEX) 2 mg tablet     Sig: Take 2 tablets (4 mg total) by mouth 2 (two) times a day     Dispense:  60 tablet     Refill:  0    bupivacaine (MARCAINE) 0.5 % injection 4 mL       My impressions and treatment recommendations were discussed in detail with the patient who verbalized understanding and had no further questions.      Andrade is a very pleasant 80-year-old male who presents today for follow-up regarding neck and right shoulder pain.  He does have multifactorial pain that is mechanical and arthritic in nature however there is also myofascial pain component.  Unfortunately he has had limited benefit from his right shoulder injection 6 days ago.  I did reassure him that they can take up to 2 weeks to notice the full effects.  Given that he is having myofascial trigger points in his upper trapezius on the right, I did offer him trigger point injections with local anesthetic only in the office today.    Based on his history and physical examination, his cervical pain is likely facet agenic in nature.  However given his recurrent atrial fibrillation and his still on oral anticoagulation, he would need cardiac clearance to be able to hold his medication for 3 days for any cervical procedures.  He would still likely be a candidate for cervical medial branch block and therapeutic radiofrequency ablation.  We will await his cardiology visit to see if he would be able to hold his anticoagulation for 3 days.  I will have  "him follow-up in 4 weeks to see how he is progressing.    .  Discharge instructions were provided. I personally saw and examined the patient and I agree with the above discussed plan of care.    I have spent a total time of 35 minutes in caring for this patient on the day of the visit/encounter including Prognosis, Risks and benefits of tx options, Instructions for management, Patient and family education, Counseling / Coordination of care, Documenting in the medical record, and Reviewing/placing orders in the medical record (including tests, medications, and/or procedures).      Universal Protocol:  procedure performed by consultantConsent: Verbal consent obtained. Written consent obtained.  Risks and benefits: risks, benefits and alternatives were discussed  Consent given by: patient  Time out: Immediately prior to procedure a \"time out\" was called to verify the correct patient, procedure, equipment, support staff and site/side marked as required.  Patient understanding: patient states understanding of the procedure being performed  Patient consent: the patient's understanding of the procedure matches consent given  Procedure consent: procedure consent matches procedure scheduled  Relevant documents: relevant documents present and verified  Site marked: the operative site was marked  Patient identity confirmed: verbally with patient  Supporting Documentation  Indications: pain   Trigger Point Injections: single/multiple trigger point(s): 1-2 muscle groups    Injection site identified by: palpation  Procedure Details  Location(s):    Neck/Upper Back: R upper trapezius and R supraspinatus     Prep: patient was prepped and draped in usual sterile fashion  Needle size: 25 G  Patient tolerance: patient tolerated the procedure well with no immediate complications          History of Present Illness:    Andrade Wise Jr. is a 80 y.o. male who presents to [facility] for interval re-evaluation of " the above stated pain complaints. The patient has a past medical and chronic pain history as outlined in the assessment section. He was last seen on 12/10/2024.    Andrade presents today regarding his neck and right shoulder pain.  Since his last visit his pain is worse.  His current pain is 7 out of 10.  His pain is intermittent associated with dull aching and throbbing.  He reports his pain is interfering with his activities of daily living.  He is continued with his exercises learned at physical therapy.  He was doing well for a while with conservative measures however his pain has returned.  His pain is located in the right upper trapezius area.  He did undergo glenohumeral joint injection however the steroid raise his blood sugar and send him back in the atrial fibrillation.  He does have a follow-up with cardiology coming up.  He reports that his pain is still significant in the shoulder.    Other than as stated above, the patient denies any interval changes in medications, medical condition, mental condition, symptoms, or allergies since the last office visit.         Review of Systems:    Review of Systems   Respiratory:  Negative for shortness of breath.    Cardiovascular:  Negative for chest pain.   Gastrointestinal:  Negative for constipation, diarrhea, nausea and vomiting.   Musculoskeletal:  Positive for neck pain and neck stiffness. Negative for arthralgias, back pain, gait problem, joint swelling and myalgias.   Skin:  Negative for rash.   Neurological:  Positive for weakness and numbness. Negative for dizziness and seizures.   Psychiatric/Behavioral:  Positive for sleep disturbance. Negative for dysphoric mood. The patient is not nervous/anxious.    All other systems reviewed and are negative.        Past Medical History:   Diagnosis Date    Allergic rhinitis     Anemia 10/23/2008    last assessed 9/9/13     Anxiety     Atrial fibrillation (HCC)     Bleeding     Chronic kidney disease     COVID-19  virus infection 12/2022    CPAP (continuous positive airway pressure) dependence     Diabetes mellitus (HCC)     Diarrhea     Diverticulitis     Eating disorder     GERD (gastroesophageal reflux disease)     Herpes zoster with complication     last assessed 7/3/17     Hiatal hernia     Hyperlipidemia     Hypertension     IBS (irritable bowel syndrome)     Incisional hernia     Increased urinary frequency     Irregular heart beat     Pyogenic granuloma 09/13/2006    last assessed 9/13/06    Carlos (subconjunctival hemorrhage), unspecified laterality 09/12/2005    last assessed 9/12/05    Sleep apnea     TMJ (dislocation of temporomandibular joint)     Ventral hernia     last assessed  4/6/17     Wears glasses        Past Surgical History:   Procedure Laterality Date    APPENDECTOMY      ATRIAL ABLATION SURGERY      2014    ATRIAL ABLATION SURGERY      catheter ablation atrial fibrillation / last assessed 2/17/16     ATRIAL ABLATION SURGERY N/A     heart    CARDIOVERSION  05/30/2024    CHOLECYSTECTOMY      lap    COLON SURGERY  1990    Hartmans procedure    COLON SURGERY      reversal 6 weeks later    COLONOSCOPY N/A 01/26/2017    Procedure: COLONOSCOPY;  Surgeon: Portillo Schumacher MD;  Location: [facility];  Service:     ESOPHAGOGASTRODUODENOSCOPY N/A 01/26/2017    Procedure: ESOPHAGOGASTRODUODENOSCOPY (EGD);  Surgeon: Portillo Schumacher MD;  Location: [facility] GI [facility];  Service:     EYE SURGERY Bilateral 2015    lid repair    HERNIA REPAIR Bilateral     inguinal 2014 & 2013    INCISIONAL HERNIA REPAIR  2011    INCISIONAL HERNIA REPAIR N/A 03/24/2017    Procedure: REPAIR OF INCARCERATED INCISIONAL HERNIA WITH MESH, Lysis of Adhesions, Partial Omentectomy;  Surgeon: Colt Davis MD;  Location: [facility];  Service:     INSERT / REPLACE / REMOVE PACEMAKER Left 10/04/2021    [id number]    NEUROBLASTOMA EXCISION      SKIN CANCER EXCISION      On nose    TONSILLECTOMY      US GUIDED THYROID BIOPSY  01/06/2025        Family History   Problem Relation Age of Onset    Heart disease Mother     Cancer Mother     Colon cancer Mother     Dementia Mother     Early death Father     Seizures Father     Cancer Sister         bone    Heart disease Sister     Diabetes Sister     Lupus Sister     Heart disease Brother     Cancer Brother         kidney    Diabetes Brother     Other Sister         covid    Stroke Neg Hx        Social History     Occupational History    Not on file   Tobacco Use    Smoking status: Former     Current packs/day: 0.00     Average packs/day: 1 pack/day for 20.0 years (20.0 ttl pk-yrs)     Types: Cigarettes     Start date:      Quit date:      Years since quittin.1     Passive exposure: Never    Smokeless tobacco: Never    Tobacco comments:     Started age 17   Vaping Use    Vaping status: Never Used   Substance and Sexual Activity    Alcohol use: No     Comment: none in 33 yrs    Drug use: No    Sexual activity: Yes         Current Outpatient Medications:     acetaminophen (TYLENOL) 325 mg tablet, Take 650 mg by mouth every 6 (six) hours as needed, Disp: , Rfl:     amiodarone 200 mg tablet, , Disp: , Rfl:     apixaban (ELIQUIS) 5 mg, Take 1 tablet (5 mg total) by mouth 2 (two) times a day, Disp: 180 tablet, Rfl: 3    atorvastatin (LIPITOR) 20 mg tablet, TAKE 1 TABLET DAILY, Disp: 90 tablet, Rfl: 1    Blood Glucose Monitoring Suppl (FREESTYLE LITE) GEOVANNY, , Disp: , Rfl:     carvedilol (COREG) 25 mg tablet, Take 12.5 mg by mouth 2 (two) times a day, Disp: , Rfl:     clotrimazole-betamethasone (LOTRISONE) 1-0.05 % cream, APPLY TO AFFECTED AREA(S) TWO TIMES A DAY, Disp: 45 g, Rfl: 1    fluticasone (FLONASE) 50 mcg/act nasal spray, USE 2 SPRAYS IN EACH NOSTRIL DAILY (GENERIC FOR FLONASE), Disp: 48 g, Rfl: 3    FREESTYLE LITE test strip, , Disp: , Rfl:     gabapentin (NEURONTIN) 300 mg capsule, Take 1 capsule (300 mg total) by mouth 2 (two) times a day, Disp: 60 capsule, Rfl: 1    hydroCHLOROthiazide 25  mg tablet, TAKE ONE TABLET BY MOUTH EVERY DAY, Disp: 90 tablet, Rfl: 1    ketoconazole (NIZORAL) 2 % shampoo, , Disp: , Rfl:     Lancets (FREESTYLE) lancets, , Disp: , Rfl:     linaCLOtide (Linzess) 290 MCG CAPS, Take 1 capsule by mouth in the morning, Disp: 30 capsule, Rfl: 1    metroNIDAZOLE (METROCREAM) 0.75 % cream, , Disp: , Rfl:     Mounjaro 7.5 MG/0.5ML SOAJ, , Disp: , Rfl:     Multiple Vitamin (MULTIVITAMIN ADULT PO), Take by mouth, Disp: , Rfl:     ondansetron (ZOFRAN) 4 mg tablet, Take 1 tablet (4 mg total) by mouth every 6 (six) hours (Patient taking differently: Take 4 mg by mouth if needed for nausea), Disp: 6 tablet, Rfl: 0    RESTASIS 0.05 % ophthalmic emulsion, Administer 1 drop to both eyes every 12 (twelve) hours, Disp: , Rfl:     tamsulosin (FLOMAX) 0.4 mg, TAKE 1 CAPSULE DAILY WITH DINNER, Disp: 90 capsule, Rfl: 1    tiZANidine (ZANAFLEX) 2 mg tablet, Take 2 tablets (4 mg total) by mouth 2 (two) times a day, Disp: 60 tablet, Rfl: 0    torsemide (DEMADEX) 10 mg tablet, Take by mouth, Disp: , Rfl:     amoxicillin (AMOXIL) 875 mg tablet, , Disp: , Rfl:     pantoprazole (PROTONIX) 40 mg tablet, Take 40 mg by mouth, Disp: , Rfl:     pioglitazone (ACTOS) 15 mg tablet, Take by mouth (Patient not taking: Reported on 2/11/2025), Disp: , Rfl:     pioglitazone-metFORMIN (ACTOPLUS MET)  MG per tablet, daily after dinner (Patient not taking: Reported on 1/24/2025), Disp: , Rfl:     polyethylene glycol (GOLYTELY) 4000 mL solution, Take 4,000 mL by mouth once for 1 dose, Disp: 4000 mL, Rfl: 0    Polyvinyl Alcohol-Povidone PF 1.4-0.6 % SOLN, Apply to eye (Patient not taking: Reported on 1/24/2025), Disp: , Rfl:     traMADol (ULTRAM) 50 mg tablet, Take 50 mg by mouth once as needed for moderate pain He uses it rarely. (Patient not taking: Reported on 1/30/2025), Disp: , Rfl:     urea (CARMOL) 20 % cream, if needed (Patient not taking: Reported on 1/24/2025), Disp: , Rfl:   No current facility-administered  "medications for this visit.    Allergies   Allergen Reactions    Codeine Other (See Comments) and GI Intolerance     Reaction Date: 27Dec2004; Annotation - 04Sep2012: '' CRAZY ''  vomiting  headache    Demerol [Meperidine] Nausea Only, Other (See Comments), GI Intolerance and Vomiting     vomiting  Reaction Date: 27Dec2004;   headache    Morphine Nausea Only, GI Intolerance and Vomiting     Reaction Date: 23Feb2012;        Physical Exam:    Ht 5' 10\" (1.778 m)   Wt 92.1 kg (203 lb)   BMI 29.13 kg/m²     Constitutional:normal, well developed, well nourished, alert, in no distress and non-toxic and no overt pain behavior.  Eyes:anicteric  HEENT:grossly intact  Neck:supple, symmetric, trachea midline and no masses   Pulmonary:even and unlabored  Cardiovascular:No edema or pitting edema present  Skin:Normal without rashes or lesions and well hydrated  Psychiatric:Mood and affect appropriate  Neurologic:Cranial Nerves II-XII grossly intact  Musculoskeletal: Range of motion in the cervical spine is limited secondary to pain.  Tenderness in the right upper trapezius with taut myofascial bands.  Strength preserved in the upper limbs.      Imaging  No orders to display         Orders Placed This Encounter   Procedures    Inj Trigger Point Single/Multiple            "

## 2025-02-18 ENCOUNTER — HOSPITAL ENCOUNTER (OUTPATIENT)
Dept: RADIOLOGY | Facility: HOSPITAL | Age: 80
Discharge: HOME | End: 2025-02-18
Payer: MEDICARE

## 2025-02-18 ENCOUNTER — OFFICE VISIT (OUTPATIENT)
Dept: PHYSICAL THERAPY | Facility: CLINIC | Age: 80
End: 2025-02-18
Payer: MEDICARE

## 2025-02-18 DIAGNOSIS — M19.011 OSTEOARTHRITIS OF GLENOHUMERAL JOINT, RIGHT: ICD-10-CM

## 2025-02-18 DIAGNOSIS — E04.2 NONTOXIC MULTINODULAR GOITER: ICD-10-CM

## 2025-02-18 DIAGNOSIS — M54.12 CERVICAL RADICULOPATHY: ICD-10-CM

## 2025-02-18 DIAGNOSIS — M75.101 TEAR OF RIGHT ROTATOR CUFF, UNSPECIFIED TEAR EXTENT, UNSPECIFIED WHETHER TRAUMATIC: Primary | ICD-10-CM

## 2025-02-18 PROCEDURE — 97110 THERAPEUTIC EXERCISES: CPT | Performed by: PHYSICAL THERAPIST

## 2025-02-18 PROCEDURE — 76536 US EXAM OF HEAD AND NECK: CPT

## 2025-02-18 NOTE — PROGRESS NOTES
Outpatient Cardiology Note - Andrade Wise Jr. 80 y.o. male MRN: [id number]    @ Encounter: [id number]        Patient Active Problem List    Diagnosis Date Noted    Primary osteoarthritis of left knee 08/01/2024    History of ventral hernia repair 07/09/2024    Grade IV hemorrhoids 07/17/2023    Left upper extremity swelling 06/14/2023    Benign localized prostatic hyperplasia with lower urinary tract symptoms (LUTS) 03/31/2022    Complex renal cyst 03/31/2022    Positive depression screening 08/18/2021    Syncope 08/12/2021    Left groin pain 07/28/2021    Stage 2 chronic kidney disease 02/15/2021    Dyspnea on exertion 10/14/2020    Class 1 obesity 06/28/2019    Seasonal allergic rhinitis due to pollen 07/02/2018    Aortic root dilation (HCC) 04/13/2018    Type 2 diabetes mellitus with stage 3 chronic kidney disease, without long-term current use of insulin, unspecified whether stage 3a or 3b CKD (HCC)     Diabetic neuropathy (HCC) 10/09/2013    Chronic kidney disease (CKD), stage II (mild) 09/09/2013    Mixed hyperlipidemia 05/13/2013    Chronic rhinitis 01/10/2013    Atrial fibrillation (HCC) 09/04/2012    Hypertension, benign 09/04/2012    Colon, diverticulosis 09/04/2012    Esophageal reflux 09/04/2012    Obstructive sleep apnea 09/04/2012       Assessment:  # Afib  S/p afib ablation [facility] 11/26/24  Hx of ablation 12/9/14 after failing flecainide, then failed Tikosyn 2017. Cardioversion to SR on Multaq 12/3/18- developed rash. Then started on amiodarone.  AC: Eliquis 5 mg BID  Rate: coreg 12.5 mg BID  Rhythm: amiodarone 200 mg daily    Studies- personally reviewed by me   Echo 3/13/24:  LVEF: 55%  Rv: normal  PASP: 26 mmHg  Ascending aorta 4.4 cm    # MDT dual chamber PPM implant 2021 for syncope in setting of bifascicular block  Interrogation 1/6/25:  0%, afib event    # amiodarone therapy  LFTs 11/22/24 normal  TSH 10/14/24: normal  # aortic root dilation  CT Chest 10/1/24: 4.3 cm  # DM2  # HTN- HCTZ 25 mg  daily, losartan 100 mg daily, coreg 12.5 mg BID  # hyperlipidemia- atorvastatin 20 mg   # ROBER- CPAP    Today's Plan:  Try for cardioversion, wont be able to get thyroid surgery for a month after for AC  Wants to come off amio  If goes back into afib after cardioversion, may just let him be in afib and come off amiodarone  Amio toxicity monitoring,- PFTs, TSH    HPI:       81 yo male has followed with [facility] cardiology for Afib. With regards to patient's atrial fibrillation history he underwent cryoablation 12/9/14 after failing flecainide. Subsequently failed Tikosyn 2017. Underwent DC CV to sinus rhythm and started on multaq 12/3/18. Developed rash. Multaq was discontinued. Was seen EP clinic in Feb 2019. Was in sinus rhythm. Started on amiodarone for AF prevention.    Pt has MDT dual chamber PPM for syncope in setting of bifascicular block. Most recent afib ablation 11/26/24.     He is unhappy with his care at [facility].     Interim:  He had called wanting to come off amiodarone- I said ok to stop  Having afib- in ED 2/15- had cortisone injection  Referred to EP    Interrogation 1/6/25:  0%, afib    In afib    Needs thyroid surgery   Past Medical History:   Diagnosis Date    Allergic rhinitis     Anemia 10/23/2008    last assessed 9/9/13     Anxiety     Atrial fibrillation (HCC)     Bleeding     Chronic kidney disease     COVID-19 virus infection 12/2022    CPAP (continuous positive airway pressure) dependence     Diabetes mellitus (HCC)     Diarrhea     Diverticulitis     Eating disorder     GERD (gastroesophageal reflux disease)     Herpes zoster with complication     last assessed 7/3/17     Hiatal hernia     Hyperlipidemia     Hypertension     IBS (irritable bowel syndrome)     Incisional hernia     Increased urinary frequency     Irregular heart beat     Pyogenic granuloma 09/13/2006    last assessed 9/13/06    Carlos (subconjunctival hemorrhage), unspecified laterality 09/12/2005    last assessed 9/12/05    Sleep  apnea     TMJ (dislocation of temporomandibular joint)     Ventral hernia     last assessed  4/6/17     Wears glasses        Review of Systems   Constitutional:  Negative for activity change, appetite change, fatigue and unexpected weight change.   HENT:  Negative for congestion and nosebleeds.    Eyes: Negative.    Respiratory:  Negative for cough, chest tightness and shortness of breath.    Cardiovascular:  Negative for chest pain, palpitations and leg swelling.   Gastrointestinal:  Negative for abdominal distention.   Endocrine: Negative.    Genitourinary: Negative.    Musculoskeletal: Negative.    Skin: Negative.    Neurological:  Negative for dizziness, syncope and weakness.   Hematological: Negative.    Psychiatric/Behavioral: Negative.         Allergies   Allergen Reactions    Codeine Other (See Comments) and GI Intolerance     Reaction Date: 27Dec2004; Annotation - 04Sep2012: '' CRAZY ''  vomiting  headache    Demerol [Meperidine] Nausea Only, Other (See Comments), GI Intolerance and Vomiting     vomiting  Reaction Date: 27Dec2004;   headache    Morphine Nausea Only, GI Intolerance and Vomiting     Reaction Date: 23Feb2012;      .    Current Outpatient Medications:     acetaminophen (TYLENOL) 325 mg tablet, Take 650 mg by mouth every 6 (six) hours as needed, Disp: , Rfl:     amiodarone 200 mg tablet, , Disp: , Rfl:     amoxicillin (AMOXIL) 875 mg tablet, , Disp: , Rfl:     apixaban (ELIQUIS) 5 mg, Take 1 tablet (5 mg total) by mouth 2 (two) times a day, Disp: 180 tablet, Rfl: 3    atorvastatin (LIPITOR) 20 mg tablet, TAKE 1 TABLET DAILY, Disp: 90 tablet, Rfl: 1    Blood Glucose Monitoring Suppl (FREESTYLE LITE) GEOVANNY, , Disp: , Rfl:     carvedilol (COREG) 25 mg tablet, Take 12.5 mg by mouth 2 (two) times a day, Disp: , Rfl:     clotrimazole-betamethasone (LOTRISONE) 1-0.05 % cream, APPLY TO AFFECTED AREA(S) TWO TIMES A DAY, Disp: 45 g, Rfl: 1    fluticasone (FLONASE) 50 mcg/act nasal spray, USE 2 SPRAYS IN  EACH NOSTRIL DAILY (GENERIC FOR FLONASE), Disp: 48 g, Rfl: 3    FREESTYLE LITE test strip, , Disp: , Rfl:     gabapentin (NEURONTIN) 300 mg capsule, Take 1 capsule (300 mg total) by mouth 2 (two) times a day, Disp: 60 capsule, Rfl: 1    hydroCHLOROthiazide 25 mg tablet, TAKE ONE TABLET BY MOUTH EVERY DAY, Disp: 90 tablet, Rfl: 1    ketoconazole (NIZORAL) 2 % shampoo, , Disp: , Rfl:     Lancets (FREESTYLE) lancets, , Disp: , Rfl:     linaCLOtide (Linzess) 290 MCG CAPS, Take 1 capsule by mouth in the morning, Disp: 30 capsule, Rfl: 1    metroNIDAZOLE (METROCREAM) 0.75 % cream, , Disp: , Rfl:     Mounjaro 7.5 MG/0.5ML SOAJ, , Disp: , Rfl:     Multiple Vitamin (MULTIVITAMIN ADULT PO), Take by mouth, Disp: , Rfl:     ondansetron (ZOFRAN) 4 mg tablet, Take 1 tablet (4 mg total) by mouth every 6 (six) hours (Patient taking differently: Take 4 mg by mouth if needed for nausea), Disp: 6 tablet, Rfl: 0    pantoprazole (PROTONIX) 40 mg tablet, Take 40 mg by mouth, Disp: , Rfl:     pioglitazone (ACTOS) 15 mg tablet, Take by mouth (Patient not taking: Reported on 2/11/2025), Disp: , Rfl:     pioglitazone-metFORMIN (ACTOPLUS MET)  MG per tablet, daily after dinner (Patient not taking: Reported on 1/24/2025), Disp: , Rfl:     polyethylene glycol (GOLYTELY) 4000 mL solution, Take 4,000 mL by mouth once for 1 dose, Disp: 4000 mL, Rfl: 0    Polyvinyl Alcohol-Povidone PF 1.4-0.6 % SOLN, Apply to eye (Patient not taking: Reported on 1/24/2025), Disp: , Rfl:     RESTASIS 0.05 % ophthalmic emulsion, Administer 1 drop to both eyes every 12 (twelve) hours, Disp: , Rfl:     tamsulosin (FLOMAX) 0.4 mg, TAKE 1 CAPSULE DAILY WITH DINNER, Disp: 90 capsule, Rfl: 1    tiZANidine (ZANAFLEX) 2 mg tablet, Take 2 tablets (4 mg total) by mouth 2 (two) times a day, Disp: 60 tablet, Rfl: 0    torsemide (DEMADEX) 10 mg tablet, Take by mouth, Disp: , Rfl:     traMADol (ULTRAM) 50 mg tablet, Take 50 mg by mouth once as needed for moderate pain He  uses it rarely. (Patient not taking: Reported on 2025), Disp: , Rfl:     urea (CARMOL) 20 % cream, if needed (Patient not taking: Reported on 2025), Disp: , Rfl:   No current facility-administered medications for this visit.    Social History     Socioeconomic History    Marital status: /Civil Union     Spouse name: Not on file    Number of children: Not on file    Years of education: Not on file    Highest education level: Not on file   Occupational History    Not on file   Tobacco Use    Smoking status: Former     Current packs/day: 0.00     Average packs/day: 1 pack/day for 20.0 years (20.0 ttl pk-yrs)     Types: Cigarettes     Start date:      Quit date:      Years since quittin.1     Passive exposure: Never    Smokeless tobacco: Never    Tobacco comments:     Started age 17   Vaping Use    Vaping status: Never Used   Substance and Sexual Activity    Alcohol use: No     Comment: none in 33 yrs    Drug use: No    Sexual activity: Yes   Other Topics Concern    Not on file   Social History Narrative    Single per allscript      Social Drivers of Health     Financial Resource Strain: Low Risk  (10/11/2023)    Overall Financial Resource Strain (CARDIA)     Difficulty of Paying Living Expenses: Not hard at all   Food Insecurity: Not on file   Transportation Needs: No Transportation Needs (10/11/2023)    PRAPARE - Transportation     Lack of Transportation (Medical): No     Lack of Transportation (Non-Medical): No   Physical Activity: Not on file   Stress: Not on file   Social Connections: Not on file   Intimate Partner Violence: Not At Risk (2024)    [facility], [facility]    Humiliation, Afraid, Rape, and Kick questionnaire     Fear of Current or Ex-Partner: No     Emotionally Abused: No     Physically Abused: No     Sexually Abused: No   Housing Stability: Not on file       Family History   Problem Relation Age of Onset    Heart  disease Mother     Cancer Mother     Colon cancer Mother     Dementia Mother     Early death Father     Seizures Father     Cancer Sister         bone    Heart disease Sister     Diabetes Sister     Lupus Sister     Heart disease Brother     Cancer Brother         kidney    Diabetes Brother     Other Sister         covid    Stroke Neg Hx        Physical Exam:    Vitals: There were no vitals taken for this visit., There is no height or weight on file to calculate BMI.,   Wt Readings from Last 3 Encounters:   02/17/25 92.1 kg (203 lb)   02/11/25 92.1 kg (203 lb)   01/30/25 89.8 kg (198 lb)       Physical Exam:  There were no vitals filed for this visit.    Physical Exam  Constitutional:       Appearance: He is well-developed.   HENT:      Head: Normocephalic and atraumatic.   Eyes:      Pupils: Pupils are equal, round, and reactive to light.   Neck:      Vascular: No JVD.   Cardiovascular:      Rate and Rhythm: Normal rate and regular rhythm.      Heart sounds: No murmur heard.  Pulmonary:      Effort: Pulmonary effort is normal. No respiratory distress.      Breath sounds: Normal breath sounds.   Abdominal:      General: There is no distension.      Palpations: Abdomen is soft.      Tenderness: There is no abdominal tenderness.   Musculoskeletal:         General: Normal range of motion.      Cervical back: Normal range of motion.   Skin:     General: Skin is warm and dry.      Findings: No rash.   Neurological:      Mental Status: He is alert and oriented to person, place, and time.         Labs & Results:    Lab Results   Component Value Date    WBC 15.15 (H) 02/15/2025    HGB 12.0 02/15/2025    HCT 36.9 02/15/2025    MCV 92 02/15/2025     02/15/2025     Lab Results   Component Value Date    SODIUM 134 (L) 02/15/2025    K 4.0 02/15/2025     02/15/2025    CO2 21 02/15/2025    BUN 29 (H) 02/15/2025    CREATININE 0.98 02/15/2025    GLUC 255 (H) 02/15/2025    CALCIUM 8.9 02/15/2025     No results found for:  "\"BNP\"   Lab Results   Component Value Date    CHOLESTEROL 107 10/25/2016     Lab Results   Component Value Date    HDL 45 10/25/2016    HDL 39 09/13/2013    HDL 41 05/15/2013     Lab Results   Component Value Date    TRIG 77 10/25/2016    TRIG 91 09/13/2013    TRIG 93 05/15/2013     No results found for: \"NONHDLC\"          EKG personally reviewed by Arnulfo Meyer DO.     Counseling / Coordination of Care  Time spent today 40 minutes.  Greater than 50% of total time was spent with the patient and / or family counseling and / or coordination of care. We discussed diagnoses, most recent studies and any changes in treatment  Thank you for the opportunity to participate in the care of this patient.    ARNULFO MEYER D.O.  DIRECTOR OF HEART FAILURE/ PULMONARY HYPERTENSION  MEDICAL DIRECTOR OF LVAD PROGRAM  [facility]  "

## 2025-02-18 NOTE — PROGRESS NOTES
Daily Note     Today's date: 2025  Patient name: Andrade Wise Jr.  : 1945  MRN: [id number]  Referring provider: Ho Dao*  Dx:   Encounter Diagnosis     ICD-10-CM    1. Tear of right rotator cuff, unspecified tear extent, unspecified whether traumatic  M75.101       2. Osteoarthritis of glenohumeral joint, right  M19.011       3. Cervical radiculopathy  M54.12                      Subjective: He had injections yesterday.  He feels increased pain throughout right upper quarter today.      Objective: See treatment diary below      Assessment: Tolerated treatment well. Patient would benefit from continued PT.  Modified exercises to be more tolerable for patient.  Performed mostly manual treatment today.       Plan: Continue per plan of care.      Precautions: Hypertension, DM      Specialty Daily Treatment Diary         Specialty Daily Treatment Diary         Insurance Eval/ Re-eval POC expires Auth Status Total visits  Start date  Expiration date Misc   MED  6 weeks - 3/17/2025      Co-Insurance                 Date 2/3/25 2/14/25 2/18/25      Visit Number 1 2 3      Auth                  Manual         STJ retract         P to A GHJ mobs  3 min 3 min      STM UT  3 min 5 min      PROM R shld  4 min 4 min      C-S distraction   3 min               TherEx         NuStep  5 min       Pulleys  20 20      Wand flex  20 20 hands clasped      Rows  30 orange       Ext  30 orange                                                    Neuro Re-Ed         S/L ER 20 20       S/L rotation 10 10 pec stretch supine                                                             TherAct                                                      Gait Training

## 2025-02-19 ENCOUNTER — APPOINTMENT (OUTPATIENT)
Dept: PHYSICAL THERAPY | Facility: CLINIC | Age: 80
End: 2025-02-19
Payer: MEDICARE

## 2025-02-19 ENCOUNTER — OFFICE VISIT (OUTPATIENT)
Dept: CARDIOLOGY CLINIC | Facility: CLINIC | Age: 80
End: 2025-02-19
Payer: MEDICARE

## 2025-02-19 VITALS
DIASTOLIC BLOOD PRESSURE: 80 MMHG | HEIGHT: 70 IN | WEIGHT: 204 LBS | BODY MASS INDEX: 29.2 KG/M2 | SYSTOLIC BLOOD PRESSURE: 124 MMHG | HEART RATE: 91 BPM | OXYGEN SATURATION: 99 %

## 2025-02-19 DIAGNOSIS — I10 HYPERTENSION, BENIGN: ICD-10-CM

## 2025-02-19 DIAGNOSIS — N18.30 TYPE 2 DIABETES MELLITUS WITH STAGE 3 CHRONIC KIDNEY DISEASE, WITHOUT LONG-TERM CURRENT USE OF INSULIN, UNSPECIFIED WHETHER STAGE 3A OR 3B CKD (HCC): ICD-10-CM

## 2025-02-19 DIAGNOSIS — G47.33 OBSTRUCTIVE SLEEP APNEA: ICD-10-CM

## 2025-02-19 DIAGNOSIS — I48.0 PAROXYSMAL ATRIAL FIBRILLATION (HCC): Primary | ICD-10-CM

## 2025-02-19 DIAGNOSIS — E11.22 TYPE 2 DIABETES MELLITUS WITH STAGE 3 CHRONIC KIDNEY DISEASE, WITHOUT LONG-TERM CURRENT USE OF INSULIN, UNSPECIFIED WHETHER STAGE 3A OR 3B CKD (HCC): ICD-10-CM

## 2025-02-19 DIAGNOSIS — I77.810 AORTIC ROOT DILATION (HCC): ICD-10-CM

## 2025-02-19 PROCEDURE — 99214 OFFICE O/P EST MOD 30 MIN: CPT | Performed by: INTERNAL MEDICINE

## 2025-02-20 ENCOUNTER — TELEPHONE (OUTPATIENT)
Dept: PAIN MEDICINE | Facility: CLINIC | Age: 80
End: 2025-02-20

## 2025-02-20 NOTE — TELEPHONE ENCOUNTER
Would you take over this Pts care? Approve/Deny JACQUELYN?   Please advise- Thank you!     Patient is requesting a JACQUELYN to Sourav Mitchell MD ([facility]) DUE TO PTs PREFERENCE OF [facility] & PTs WIFE IS A CURRENT PT OF DR. MITCHELL; current treatment plan by Anuj Mays DO ([facility]) noted below. (Per Pt: Pt requesting JACQUELYN from Dr. Mays to Dr. Mitchell as Pt's wife is an established Pt of Dr. Mitchell)    1. Pt was last seen on 2/17/2025 by Anuj Mays DO ([facility]) for Dx of Cervical spondylosis, Radiculopathy, cervical region, & Myofascial pain syndrome.    2. Per Anuj Mays DO ([facility]) notes on 2/17/2025: 80-year-old male who presents today for follow-up regarding neck and right shoulder pain.  He does have multifactorial pain that is mechanical and arthritic in nature however there is also myofascial pain component.  Unfortunately he has had limited benefit from his right shoulder injection 6 days ago.  I did reassure him that they can take up to 2 weeks to notice the full effects. Based on his history and physical examination, his cervical pain is likely facet agenic in nature.  However given his recurrent atrial fibrillation and his still on oral anticoagulation, he would need cardiac clearance to be able to hold his medication for 3 days for any cervical procedures.    -SUGGESTIONS/RECOMMENDATIONS: Given that he is having myofascial trigger points in his upper trapezius on the right, I did offer him trigger point injections with local anesthetic only in the office today. He would still likely be a candidate for cervical medial branch block and therapeutic radiofrequency ablation. We will await his cardiology visit to see if he would be able to hold his anticoagulation for 3 days.  I will have him follow-up in 4 weeks to see how he is progressing.    3. MAKE NOTE: Pt is CURRENTLY SCHEDULED for an OV on 3/18/2025 with Anuj Mays DO ([facility]).      4. MAKE NOTE: Pts last Hgb A1C was 8.2 on 1/6/2025 & 6.9 on  10/14/2024. (Trending Higher)    5. Pts PRO Hx Includes:   -R upper trapezius and R supraspinatus TPIs on 2/17/2025.   -Large joint arthrocentesis: R glenohumeral on 2/11/2025.   -Large joint arthrocentesis: R subacromial bursa on 11/18/2024.   -Pacemaker dc new [employer] on 10/4/2021.     6. Pt had MRI of Cervical Spine on 12/6/2024. (Report & imaging available for viewing)     7. Per PDMP: x1 opioid script within past year written on 11/17/2024.

## 2025-02-20 NOTE — TELEPHONE ENCOUNTER
Pt was in [facility] office today with wife for her injection procedure. Pt requesting JACQUELYN from Dr. Mays to Dr. Chen as Pt's wife is an established Pt of Dr. Chen. Please advise.

## 2025-02-21 ENCOUNTER — OFFICE VISIT (OUTPATIENT)
Dept: PHYSICAL THERAPY | Facility: CLINIC | Age: 80
End: 2025-02-21
Payer: MEDICARE

## 2025-02-21 DIAGNOSIS — M75.101 TEAR OF RIGHT ROTATOR CUFF, UNSPECIFIED TEAR EXTENT, UNSPECIFIED WHETHER TRAUMATIC: Primary | ICD-10-CM

## 2025-02-21 DIAGNOSIS — M54.12 CERVICAL RADICULOPATHY: ICD-10-CM

## 2025-02-21 DIAGNOSIS — M19.011 OSTEOARTHRITIS OF GLENOHUMERAL JOINT, RIGHT: ICD-10-CM

## 2025-02-21 PROCEDURE — 97110 THERAPEUTIC EXERCISES: CPT | Performed by: PHYSICAL THERAPIST

## 2025-02-21 NOTE — TELEPHONE ENCOUNTER
Please make Pt aware of below recommendations & schedule accordingly. Thank You! (Please CANCEL currently scheduled OV with [facility] prior to re-scheduling a JACQUELYN OV) (SEE # 2 BELOW)    Regarding Pts Request for JACQUELYN from Anuj Mays DO ([facility]) to Sourav Chen MD ([facility]): Pt is APPROVED for JACQUELYN. (COURTESY NOTIFICATION: This RN also making RS aware of this recommendation) (FQ was consulted & recommendation for APPROVAL of JACQUELYN)    1. Per Sourav Chen MD ([facility]) on 2/20/2025: Ok. (Regrading JACQUELYN)    2. MAKE NOTE: Pt is CURRENTLY SCHEDULED for an OV on 3/18/2025 with Anuj Mays DO ([facility]).

## 2025-02-21 NOTE — PROGRESS NOTES
Daily Note     Today's date: 2025  Patient name: Andrade Wise Jr.  : 1945  MRN: [id number]  Referring provider: Ho Dao*  Dx:   Encounter Diagnosis     ICD-10-CM    1. Tear of right rotator cuff, unspecified tear extent, unspecified whether traumatic  M75.101       2. Osteoarthritis of glenohumeral joint, right  M19.011       3. Cervical radiculopathy  M54.12                      Subjective: His pain is not a s bad as last session      Objective: See treatment diary below      Assessment: Tolerated treatment well. Patient would benefit from continued PT.  Taught him abduction, flexion, and ER AROM.  He agreed to add these to HEP.  ROM is normalizing well.      Plan: Continue per plan of care.      Precautions: Hypertension, DM      Specialty Daily Treatment Diary         Specialty Daily Treatment Diary         Insurance Eval/ Re-eval POC expires Auth Status Total visits  Start date  Expiration date Misc   MED  6 weeks - 3/17/2025      Co-Insurance                 Date 2/3/25 2/14/25 2/18/25 2/21/25     Visit Number 1 2 3 4     Auth                  Manual         STJ retract         P to A GHJ mobs  3 min 3 min 3'     STM UT  3 min 5 min 5'     PROM R shld  4 min 4 min 5'     C-S distraction   3 min               TherEx         NuStep  5 min       Pulleys  20 20 20     Wand flex  20 20 hands clasped      Rows  30 orange  30  GTB     Ext  30 orange  30  GTB                                                  Neuro Re-Ed         S/L ER 20 20  20     S/L rotation 10 10 pec stretch supine                                                             TherAct                                                      Gait Training                                            Access Code: [email or web address]  URL: [email or web address]  Date: 2025  Prepared by: Zeke Lamb    Exercises  - Standing Bilateral Low Shoulder Row with Anchored Resistance  - 1 x daily - 7 x weekly - 2 sets - 10 reps  -  Shoulder extension with resistance - Neutral  - 1 x daily - 7 x weekly - 2 sets - 10 reps  - Sidelying Shoulder External Rotation  - 1 x daily - 7 x weekly - 2 sets - 10 reps  - Standing Shoulder Flexion to 90 Degrees  - 1 x daily - 7 x weekly - 2 sets - 10 reps  - Standing Shoulder Horizontal Abduction with Resistance  - 1 x daily - 7 x weekly - 2 sets - 10 reps

## 2025-02-24 ENCOUNTER — OFFICE VISIT (OUTPATIENT)
Dept: GASTROENTEROLOGY | Facility: AMBULARY SURGERY CENTER | Age: 80
End: 2025-02-24
Payer: MEDICARE

## 2025-02-24 ENCOUNTER — OFFICE VISIT (OUTPATIENT)
Dept: PHYSICAL THERAPY | Facility: CLINIC | Age: 80
End: 2025-02-24
Payer: MEDICARE

## 2025-02-24 VITALS
HEART RATE: 82 BPM | OXYGEN SATURATION: 98 % | SYSTOLIC BLOOD PRESSURE: 138 MMHG | HEIGHT: 70 IN | BODY MASS INDEX: 29.27 KG/M2 | DIASTOLIC BLOOD PRESSURE: 80 MMHG

## 2025-02-24 DIAGNOSIS — Z86.0100 PERSONAL HISTORY OF COLON POLYPS, UNSPECIFIED: ICD-10-CM

## 2025-02-24 DIAGNOSIS — K86.89 PANCREATIC INSUFFICIENCY: ICD-10-CM

## 2025-02-24 DIAGNOSIS — M19.011 OSTEOARTHRITIS OF GLENOHUMERAL JOINT, RIGHT: ICD-10-CM

## 2025-02-24 DIAGNOSIS — M54.12 CERVICAL RADICULOPATHY: ICD-10-CM

## 2025-02-24 DIAGNOSIS — N40.1 BENIGN LOCALIZED PROSTATIC HYPERPLASIA WITH LOWER URINARY TRACT SYMPTOMS (LUTS): ICD-10-CM

## 2025-02-24 DIAGNOSIS — K21.9 GASTROESOPHAGEAL REFLUX DISEASE WITHOUT ESOPHAGITIS: ICD-10-CM

## 2025-02-24 DIAGNOSIS — D50.8 OTHER IRON DEFICIENCY ANEMIA: ICD-10-CM

## 2025-02-24 DIAGNOSIS — K58.1 IRRITABLE BOWEL SYNDROME WITH CONSTIPATION: ICD-10-CM

## 2025-02-24 DIAGNOSIS — K59.00 CONSTIPATION, UNSPECIFIED CONSTIPATION TYPE: Primary | ICD-10-CM

## 2025-02-24 DIAGNOSIS — M75.101 TEAR OF RIGHT ROTATOR CUFF, UNSPECIFIED TEAR EXTENT, UNSPECIFIED WHETHER TRAUMATIC: Primary | ICD-10-CM

## 2025-02-24 DIAGNOSIS — K86.2 PANCREATIC CYST: ICD-10-CM

## 2025-02-24 DIAGNOSIS — K57.30 COLON, DIVERTICULOSIS: ICD-10-CM

## 2025-02-24 DIAGNOSIS — K59.04 CHRONIC IDIOPATHIC CONSTIPATION: ICD-10-CM

## 2025-02-24 PROBLEM — D64.9 ABSOLUTE ANEMIA: Status: ACTIVE | Noted: 2025-02-24

## 2025-02-24 PROCEDURE — G2211 COMPLEX E/M VISIT ADD ON: HCPCS | Performed by: INTERNAL MEDICINE

## 2025-02-24 PROCEDURE — 97110 THERAPEUTIC EXERCISES: CPT | Performed by: PHYSICAL THERAPIST

## 2025-02-24 PROCEDURE — 99214 OFFICE O/P EST MOD 30 MIN: CPT | Performed by: INTERNAL MEDICINE

## 2025-02-24 RX ORDER — TENAPANOR HYDROCHLORIDE 53.2 MG/1
50 TABLET ORAL 2 TIMES DAILY WITH MEALS
Qty: 60 TABLET | Refills: 3 | Status: SHIPPED | OUTPATIENT
Start: 2025-02-24 | End: 2025-03-03 | Stop reason: SDUPTHER

## 2025-02-24 NOTE — ASSESSMENT & PLAN NOTE
High fiber diet        [Time Spent: ___ minutes] : I have spent [unfilled] minutes of time on the encounter. [>50% of the face to face encounter time was spent on counseling and/or coordination of care for ___] : Greater than 50% of the face to face encounter time was spent on counseling and/or coordination of care for [unfilled]

## 2025-02-24 NOTE — PROGRESS NOTES
Daily Note     Today's date: 2025  Patient name: Andrade Wise Jr.  : 1945  MRN: [id number]  Referring provider: Ho Dao*  Dx:   Encounter Diagnosis     ICD-10-CM    1. Tear of right rotator cuff, unspecified tear extent, unspecified whether traumatic  M75.101       2. Osteoarthritis of glenohumeral joint, right  M19.011       3. Cervical radiculopathy  M54.12                      Subjective: He notes having a good amount of pain over the weekend right shoulder.      Objective: See treatment diary below      Assessment: Tolerated treatment well. Patient would benefit from continued PT.  His shoulder ROM remains full and pain free with PROM.       Plan: Continue per plan of care.  Cervical ext and retraction manually       Precautions: Hypertension, DM      Specialty Daily Treatment Diary         Specialty Daily Treatment Diary         Insurance Eval/ Re-eval POC expires Auth Status Total visits  Start date  Expiration date Misc   MED  6 weeks - 3/17/2025      Co-Insurance                 Date 2/3/25 2/14/25 2/18/25 2/21/25 2/24/25    Visit Number 1 2 3 4 5    Auth                  Manual         STJ retract         P to A GHJ mobs  3 min 3 min 3' 3'    STM UT  3 min 5 min 5' 4'    PROM R shld  4 min 4 min 5' 4'    C-S distraction   3 min               TherEx         NuStep  5 min       Pulleys  20 20 20 20    Wand flex  20 20 hands clasped  20    Rows  30 orange  30  GTB 30  GTB    Ext  30 orange  30  GTB 30  GTB                                                 Neuro Re-Ed         S/L ER 20 20  20 20    S/L rotation 10 10 pec stretch supine                                                             TherAct                                                      Gait Training                                            Access Code: [email or web address]  URL: [email or web address]  Date: 2025  Prepared by: Zeke Lamb    Exercises  - Standing Bilateral Low Shoulder Row with Anchored  Resistance  - 1 x daily - 7 x weekly - 2 sets - 10 reps  - Shoulder extension with resistance - Neutral  - 1 x daily - 7 x weekly - 2 sets - 10 reps  - Sidelying Shoulder External Rotation  - 1 x daily - 7 x weekly - 2 sets - 10 reps  - Standing Shoulder Flexion to 90 Degrees  - 1 x daily - 7 x weekly - 2 sets - 10 reps  - Standing Shoulder Horizontal Abduction with Resistance  - 1 x daily - 7 x weekly - 2 sets - 10 reps

## 2025-02-24 NOTE — PROGRESS NOTES
Name: Andrade Wise Jr.      : 1945      MRN: [id number]  Encounter Provider: Peter Frankel MD  Encounter Date: 2025   Encounter department: [facility]  :  Assessment & Plan  Constipation, unspecified constipation type  Andrade Wise Jr. is an 80 y.o. male with irritable bowel syndrome predominantly with constipation/mixed subtype, reflux, pancreatic cyst who presents for follow-up.  Still with some constipation.      Endoscopy and colonoscopy from 2021 with gastric polyp which was removed and clip placed, colonic polyps, diverticulosis, internal hemorrhoids.  Biopsy showed inflammatory pseudopolyp in the stomach as well as sessile serrated lesion and tubular adenoma in the cecum.    Colonoscopy from 2025 with several polyps removed including some which required left and placement of clips.  Biopsies with tubular adenoma and component with high-grade dysplasia and tubulovillous adenoma.     Echocardiogram from 2024 with EF of 55 to 60% and grade 1 diastolic dysfunction.     EKG from February with defibrillation and QTc interval of 499 and a right bundle branch block.    MRI of the abdomen 2022 with exophytic cyst in the right lower pole with decreased in size and multiple pancreatic cyst measuring up to 1.3 cm without high risk stigmata.     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     Recent BMP with low sodium and elevated BUN and glucose.  Most recent CMP with elevated glucose and relatively normal LFTs.  CBC with elevated white blood cell count but normal hemoglobin, MCV, platelets.  Hemoglobin A1c elevated 8.2.  TSH normal.  Prior celiac serologies normal.       Stop linzess and trial of Ibsrela  Drink at least 8 cups of water per  day  Take MiraLAX daily or as needed  Daily Metamucil or benefiber  Colonoscopy in 6 months    Zofran as needed for nausea     Follow-up with PCP for repeat imaging for right renal lower pole cyst.  Next MRI/MRCP for pancreatic cyst in 2026         Orders:    Tenapanor HCl (Ibsrela) 50 MG TABS; Take 50 mg by mouth 2 (two) times a day with meals    Gastroesophageal reflux disease without esophagitis  Continue pantoprazole 40mg daily  Gaviscon or Tums as needed for breakthrough symptoms  Things that can help improved reflux include: avoidance of trigger foods (potential foods include coffee, caffeine, chocolate, mint, tomato-based products, spicy foods, fatty foods), avoid tight fitting clothing, elevated head of bed 30 degrees, avoid eating 2-3 hours prior to bedtime, weight loss, avoid alcohol, avoid tobacco use.         Other iron deficiency anemia  Continue to monitor       Pancreatic cyst  Repeat MRI for this in 2026       Colon, diverticulosis  High fiber diet       Personal history of colon polyps, unspecified  Repeat colonoscopy August 2025       Pancreatic insufficiency  Consider Creon       Chronic idiopathic constipation             History of Present Illness     Andrade Wise  is a 80 y.o. male who presents for follow-up.     He has family history of colon cancer.   Awaiting thyroid surgery for cancer  He is on Linzess and it has been better but still with constipation.   Also takes fiber gummies      HPI    Review of Systems A complete review of systems is negative other than that noted above in the HPI.      Current Outpatient Medications   Medication Sig Dispense Refill    acetaminophen (TYLENOL) 325 mg tablet Take 650 mg by mouth every 6 (six) hours as needed      amiodarone 200 mg tablet       apixaban (ELIQUIS) 5 mg Take 1 tablet (5 mg total) by mouth 2 (two) times a day 180 tablet 3    atorvastatin (LIPITOR) 20 mg tablet TAKE 1 TABLET DAILY 90 tablet 1    Blood Glucose Monitoring Suppl (FREESTYLE  LITE) GEOVANNY       carvedilol (COREG) 25 mg tablet Take 12.5 mg by mouth 2 (two) times a day      clotrimazole-betamethasone (LOTRISONE) 1-0.05 % cream APPLY TO AFFECTED AREA(S) TWO TIMES A DAY 45 g 1    fluticasone (FLONASE) 50 mcg/act nasal spray USE 2 SPRAYS IN EACH NOSTRIL DAILY (GENERIC FOR FLONASE) 48 g 3    FREESTYLE LITE test strip       gabapentin (NEURONTIN) 300 mg capsule Take 1 capsule (300 mg total) by mouth 2 (two) times a day 60 capsule 1    hydroCHLOROthiazide 25 mg tablet TAKE ONE TABLET BY MOUTH EVERY DAY 90 tablet 1    ketoconazole (NIZORAL) 2 % shampoo       Lancets (FREESTYLE) lancets       linaCLOtide (Linzess) 290 MCG CAPS Take 1 capsule by mouth in the morning 30 capsule 1    metroNIDAZOLE (METROCREAM) 0.75 % cream       Mounjaro 7.5 MG/0.5ML SOAJ       Multiple Vitamin (MULTIVITAMIN ADULT PO) Take by mouth      ondansetron (ZOFRAN) 4 mg tablet Take 1 tablet (4 mg total) by mouth every 6 (six) hours 6 tablet 0    RESTASIS 0.05 % ophthalmic emulsion Administer 1 drop to both eyes every 12 (twelve) hours      tamsulosin (FLOMAX) 0.4 mg TAKE 1 CAPSULE DAILY WITH DINNER 90 capsule 1    Tenapanor HCl (Ibsrela) 50 MG TABS Take 50 mg by mouth 2 (two) times a day with meals 60 tablet 3    tiZANidine (ZANAFLEX) 2 mg tablet Take 2 tablets (4 mg total) by mouth 2 (two) times a day 60 tablet 0    torsemide (DEMADEX) 10 mg tablet Take by mouth      amoxicillin (AMOXIL) 875 mg tablet  (Patient not taking: Reported on 2/17/2025)      pantoprazole (PROTONIX) 40 mg tablet Take 40 mg by mouth      pioglitazone (ACTOS) 15 mg tablet Take by mouth (Patient not taking: Reported on 2/24/2025)      pioglitazone-metFORMIN (ACTOPLUS MET)  MG per tablet daily after dinner (Patient not taking: Reported on 1/24/2025)      polyethylene glycol (GOLYTELY) 4000 mL solution Take 4,000 mL by mouth once for 1 dose 4000 mL 0    Polyvinyl Alcohol-Povidone PF 1.4-0.6 % SOLN Apply to eye (Patient not taking: Reported on  "1/24/2025)      traMADol (ULTRAM) 50 mg tablet Take 50 mg by mouth once as needed for moderate pain He uses it rarely. (Patient not taking: Reported on 1/30/2025)      urea (CARMOL) 20 % cream if needed (Patient not taking: Reported on 1/24/2025)       No current facility-administered medications for this visit.     Objective   /80 (BP Location: Left arm, Patient Position: Sitting, Cuff Size: Standard)   Pulse 82   Ht 5' 10\" (1.778 m)   SpO2 98%   BMI 29.27 kg/m²     PHYSICAL EXAMINATION:    General Appearance:   Alert, cooperative, no distress   HEENT:  Normocephalic, atraumatic, anicteric. Neck supple, symmetrical, trachea midline.   Lungs:   Equal chest rise and unlabored breathing, normal effort, no coughing.   Cardiovascular:   No visualized JVD.   Abdomen:   No abdominal distension.   Skin:   No jaundice, rashes, or lesions.    Musculoskeletal:   Normal range of motion visualized.   Psych:  Normal affect and normal insight.   Neuro:  Alert and appropriate.         Lab Results: I personally reviewed relevant lab results.       Results for orders placed during the hospital encounter of 01/30/25    Colonoscopy    Impression  2 subcentimeter polyps were removed with cold snare; placed 1 clip successfully  Three polyps measuring from 6 mm up to 15 mm in the transverse colon; lift performed; performed cold snare removal; performed hot snare with piecemeal removal; placed 2 clips successfully  Diverticulosis in the descending colon and sigmoid colon  Hemorrhoids        RECOMMENDATION:  Repeat colonoscopy in 6 months, due: 7/29/2025  Inadequate bowel preparation    Await pathology results            Peter Frankel MD        "

## 2025-02-24 NOTE — ASSESSMENT & PLAN NOTE
Continue pantoprazole 40mg daily  Gaviscon or Tums as needed for breakthrough symptoms  Things that can help improved reflux include: avoidance of trigger foods (potential foods include coffee, caffeine, chocolate, mint, tomato-based products, spicy foods, fatty foods), avoid tight fitting clothing, elevated head of bed 30 degrees, avoid eating 2-3 hours prior to bedtime, weight loss, avoid alcohol, avoid tobacco use.

## 2025-02-24 NOTE — ASSESSMENT & PLAN NOTE
Andrade Wise Jr. is an 80 y.o. male with irritable bowel syndrome predominantly with constipation/mixed subtype, reflux, pancreatic cyst who presents for follow-up.  Still with some constipation.      Endoscopy and colonoscopy from November 2021 with gastric polyp which was removed and clip placed, colonic polyps, diverticulosis, internal hemorrhoids.  Biopsy showed inflammatory pseudopolyp in the stomach as well as sessile serrated lesion and tubular adenoma in the cecum.    Colonoscopy from January 2025 with several polyps removed including some which required left and placement of clips.  Biopsies with tubular adenoma and component with high-grade dysplasia and tubulovillous adenoma.     Echocardiogram from March 2024 with EF of 55 to 60% and grade 1 diastolic dysfunction.     EKG from February with defibrillation and QTc interval of 499 and a right bundle branch block.    MRI of the abdomen March 2022 with exophytic cyst in the right lower pole with decreased in size and multiple pancreatic cyst measuring up to 1.3 cm without high risk stigmata.     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     Recent BMP with low sodium and elevated BUN and glucose.  Most recent CMP with elevated glucose and relatively normal LFTs.  CBC with elevated white blood cell count but normal hemoglobin, MCV, platelets.  Hemoglobin A1c elevated 8.2.  TSH normal.  Prior celiac serologies normal.       Stop linzess and trial of Ibsrela  Drink at least 8 cups of water per day  Take MiraLAX daily or as needed  Daily Metamucil or benefiber  Colonoscopy in 6 months    Zofran as needed for nausea     Follow-up with PCP for repeat imaging for right renal lower pole cyst.  Next MRI/MRCP for pancreatic cyst in 2026         Orders:    Tenapanor HCl  (Ibsrela) 50 MG TABS; Take 50 mg by mouth 2 (two) times a day with meals

## 2025-02-26 ENCOUNTER — OFFICE VISIT (OUTPATIENT)
Dept: PHYSICAL THERAPY | Facility: CLINIC | Age: 80
End: 2025-02-26
Payer: MEDICARE

## 2025-02-26 ENCOUNTER — OFFICE VISIT (OUTPATIENT)
Dept: FAMILY MEDICINE CLINIC | Facility: CLINIC | Age: 80
End: 2025-02-26
Payer: MEDICARE

## 2025-02-26 VITALS
BODY MASS INDEX: 29.2 KG/M2 | HEIGHT: 70 IN | SYSTOLIC BLOOD PRESSURE: 124 MMHG | RESPIRATION RATE: 16 BRPM | TEMPERATURE: 98.5 F | WEIGHT: 204 LBS | DIASTOLIC BLOOD PRESSURE: 78 MMHG | HEART RATE: 70 BPM

## 2025-02-26 DIAGNOSIS — M19.011 OSTEOARTHRITIS OF GLENOHUMERAL JOINT, RIGHT: ICD-10-CM

## 2025-02-26 DIAGNOSIS — M70.22 OLECRANON BURSITIS OF LEFT ELBOW: Primary | ICD-10-CM

## 2025-02-26 DIAGNOSIS — M54.12 CERVICAL RADICULOPATHY: ICD-10-CM

## 2025-02-26 DIAGNOSIS — M75.101 TEAR OF RIGHT ROTATOR CUFF, UNSPECIFIED TEAR EXTENT, UNSPECIFIED WHETHER TRAUMATIC: Primary | ICD-10-CM

## 2025-02-26 PROCEDURE — 97110 THERAPEUTIC EXERCISES: CPT | Performed by: PHYSICAL THERAPIST

## 2025-02-26 PROCEDURE — 20605 DRAIN/INJ JOINT/BURSA W/O US: CPT | Performed by: FAMILY MEDICINE

## 2025-02-26 PROCEDURE — 99214 OFFICE O/P EST MOD 30 MIN: CPT | Performed by: FAMILY MEDICINE

## 2025-02-26 PROCEDURE — 87205 SMEAR GRAM STAIN: CPT | Performed by: FAMILY MEDICINE

## 2025-02-26 PROCEDURE — 87070 CULTURE OTHR SPECIMN AEROBIC: CPT | Performed by: FAMILY MEDICINE

## 2025-02-26 RX ORDER — CEPHALEXIN 500 MG/1
500 CAPSULE ORAL 2 TIMES DAILY
Qty: 14 CAPSULE | Refills: 0 | Status: SHIPPED | OUTPATIENT
Start: 2025-02-26 | End: 2025-03-05

## 2025-02-26 RX ORDER — LIDOCAINE HYDROCHLORIDE 10 MG/ML
1 INJECTION, SOLUTION INFILTRATION; PERINEURAL
Status: COMPLETED | OUTPATIENT
Start: 2025-02-26 | End: 2025-02-26

## 2025-02-26 RX ADMIN — LIDOCAINE HYDROCHLORIDE 1 ML: 10 INJECTION, SOLUTION INFILTRATION; PERINEURAL at 13:00

## 2025-02-26 NOTE — PROGRESS NOTES
Name: Andrade Wise Jr.      : 1945      MRN: [id number]  Encounter Provider: Kiesha Mahan MD  Encounter Date: 2025   Encounter department: [facility]  :  Assessment & Plan  Olecranon bursitis of left elbow    Left olecranon bursa drained in office today - 9 ml of blood tinged serous fluid. Will send fluid to lab for analysis. For now cover for infection with Keflex. He does not want any steroids due to diabetes.     Orders:    cephalexin (KEFLEX) 500 mg capsule; Take 1 capsule (500 mg total) by mouth 2 (two) times a day for 7 days    Body fluid culture and Gram stain    Medium joint arthrocentesis: L olecranon bursa  Universal Protocol:  procedure performed by consultantConsent: Verbal consent obtained.  Consent given by: patient  Patient understanding: patient states understanding of the procedure being performed  Patient identity confirmed: verbally with patient  Supporting Documentation  Indications: pain and joint swelling   Procedure Details  Location: elbow - L olecranon bursa  Preparation: Patient was prepped and draped in the usual sterile fashion  Needle size: 22 G  Ultrasound guidance: no  Medications administered: 1 mL lidocaine 1 %    Aspirate: yellow and blood-tinged  Analysis: fluid sample sent for laboratory analysis  Patient tolerance: patient tolerated the procedure well with no immediate complications  Dressing:  Sterile dressing applied                 History of Present Illness   HPI  Bursitis  Patient complains of bursitis. Patient has a 1 day history of pain, swelling, erythema, warmth, tenderness in the area of the left elbow. There was no known injury to the affected area.  Onset was sudden. The symptoms are of moderate severity. They are made worse by: nothing. They are helped by nothing. Previous treatments include none. Limitation on activities include none. Associated symptoms include none. Patient denies associated none.  Review of Systems   Constitutional:  "Negative.    HENT: Negative.     Eyes: Negative.    Respiratory: Negative.     Cardiovascular: Negative.    Gastrointestinal: Negative.    Endocrine: Negative.    Genitourinary: Negative.    Musculoskeletal: Negative.    Neurological: Negative.    Hematological: Negative.    Psychiatric/Behavioral: Negative.         Objective   /78   Pulse 70   Temp 98.5 °F (36.9 °C)   Resp 16   Ht 5' 10\" (1.778 m)   Wt 92.5 kg (204 lb)   BMI 29.27 kg/m²      Physical Exam  Constitutional:       Appearance: Normal appearance.   HENT:      Head: Normocephalic and atraumatic.   Pulmonary:      Effort: Pulmonary effort is normal.   Musculoskeletal:         General: Normal range of motion.      Right elbow: Normal.      Left elbow: Swelling and effusion present. No deformity or lacerations. Normal range of motion. Tenderness present.   Neurological:      Mental Status: He is alert.   Psychiatric:         Mood and Affect: Mood normal.         Behavior: Behavior normal.         Thought Content: Thought content normal.         Judgment: Judgment normal.         "

## 2025-02-26 NOTE — PROGRESS NOTES
Daily Note     Today's date: 2025  Patient name: Andrade Wise Jr.  : 1945  MRN: [id number]  Referring provider: Ho Dao*  Dx:   Encounter Diagnosis     ICD-10-CM    1. Tear of right rotator cuff, unspecified tear extent, unspecified whether traumatic  M75.101       2. Osteoarthritis of glenohumeral joint, right  M19.011       3. Cervical radiculopathy  M54.12                      Subjective: He notes having a good amount of pain over the weekend right shoulder.      Objective: See treatment diary below      Assessment: Tolerated treatment well. Patient would benefit from continued PT.  Added new strengthening this session with focus on scapular control.  He had no increase in his pain following these activities.      Plan: Continue per plan of care.  Cervical ext and retraction manually       Precautions: Hypertension, DM      Specialty Daily Treatment Diary         Specialty Daily Treatment Diary         Insurance Eval/ Re-eval POC expires Auth Status Total visits  Start date  Expiration date Misc   MED  6 weeks - 3/17/2025      Co-Insurance                 Date 2/3/25 2/14/25 2/18/25 2/21/25 2/24/25 2/26/25   Visit Number 1 2 3 4 5 6   Auth                  Manual         STJ retract         P to A GHJ mobs  3 min 3 min 3' 3' 3'   STM UT  3 min 5 min 5' 4' 2'   PROM R shld  4 min 4 min 5' 4' 4'   C-S distraction   3 min   2' MFR pec minor            TherEx         NuStep  5 min       Pulleys  20 20 20 20 20   Wand flex  20 20 hands clasped  20 20   Rows  30 orange  30  GTB 30  GTB 30   GTB   Ext  30 orange  30  GTB 30  GTB 30   GTB   OH press      20                                       Neuro Re-Ed         S/L ER 20 20  20 20 20   S/L rotation 10 10 pec stretch supine       AROM flex to 90      20   AROM abd to 90      20                                       TherAct                                                      Gait Training                                            Access  Code: [email or web address]  URL: [email or web address]  Date: 02/21/2025  Prepared by: Zeke Lamb    Exercises  - Standing Bilateral Low Shoulder Row with Anchored Resistance  - 1 x daily - 7 x weekly - 2 sets - 10 reps  - Shoulder extension with resistance - Neutral  - 1 x daily - 7 x weekly - 2 sets - 10 reps  - Sidelying Shoulder External Rotation  - 1 x daily - 7 x weekly - 2 sets - 10 reps  - Standing Shoulder Flexion to 90 Degrees  - 1 x daily - 7 x weekly - 2 sets - 10 reps  - Standing Shoulder Horizontal Abduction with Resistance  - 1 x daily - 7 x weekly - 2 sets - 10 reps

## 2025-02-27 ENCOUNTER — OFFICE VISIT (OUTPATIENT)
Dept: OBGYN CLINIC | Facility: CLINIC | Age: 80
End: 2025-02-27
Payer: MEDICARE

## 2025-02-27 VITALS — BODY MASS INDEX: 29.2 KG/M2 | WEIGHT: 204 LBS | HEIGHT: 70 IN

## 2025-02-27 DIAGNOSIS — M25.522 PAIN IN LEFT ELBOW: ICD-10-CM

## 2025-02-27 DIAGNOSIS — M70.22 OLECRANON BURSITIS OF LEFT ELBOW: Primary | ICD-10-CM

## 2025-02-27 PROCEDURE — 99213 OFFICE O/P EST LOW 20 MIN: CPT | Performed by: ORTHOPAEDIC SURGERY

## 2025-02-27 NOTE — ASSESSMENT & PLAN NOTE
Does have some ongoing redness and swelling today after aspiration yesterday.  It is feeling better already though.  I recommend he complete antibiotic course as prescribed by PCP.  I recommended adding compressing wrap to help swelling resolved.  He would also benefit from purchasing over-the-counter elbow compression sleeve with a pad over the olecranon.  I reassured the patient that if this responds well to the antibiotics that I do not recommend any surgery or additional treatment besides compression and the antibiotics.  He is davian follow-up with me in the future if the symptoms fail to resolve.

## 2025-02-27 NOTE — PROGRESS NOTES
Patient Name: Andrade Wise Jr.      : 1945       MRN: [id number]   Encounter Provider: Ho Dao MD   Encounter Date: 25  Encounter department: [facility]         Assessment & Plan  Olecranon bursitis of left elbow  Does have some ongoing redness and swelling today after aspiration yesterday.  It is feeling better already though.  I recommend he complete antibiotic course as prescribed by PCP.  I recommended adding compressing wrap to help swelling resolved.  He would also benefit from purchasing over-the-counter elbow compression sleeve with a pad over the olecranon.  I reassured the patient that if this responds well to the antibiotics that I do not recommend any surgery or additional treatment besides compression and the antibiotics.  He is davian follow-up with me in the future if the symptoms fail to resolve.       Pain in left elbow    Orders:    XR elbow 3+ vw left; Future         Follow-up:  Return if symptoms worsen or fail to improve.     _____________________________________________________  CHIEF COMPLAINT:  Chief Complaint   Patient presents with    Left Elbow - Pain, Swelling         SUBJECTIVE:  Andrade Wise Jr. is a 80 y.o. male who presents for initial evaluation of left elbow. Patient was seen by PCP yesterday and diagnosed with olecranon bursitis. An aspiration was performed in clinic, patient presents with out compression to his elbow. Symptoms started yesterday, denies injury or trauma. Denies history of gout. Patient was prescribed by keflex by PCP.     Surgical History:    PAST MEDICAL HISTORY:  Past Medical History:   Diagnosis Date    Allergic rhinitis     Anemia 10/23/2008    last assessed 13     Anxiety     Atrial fibrillation (HCC)     Bleeding     Chronic kidney disease     COVID-19 virus infection 2022    CPAP (continuous positive airway pressure) dependence     Diabetes mellitus (HCC)     Diarrhea     Diverticulitis      Eating disorder     GERD (gastroesophageal reflux disease)     Herpes zoster with complication     last assessed 7/3/17     Hiatal hernia     Hyperlipidemia     Hypertension     IBS (irritable bowel syndrome)     Incisional hernia     Increased urinary frequency     Irregular heart beat     Pyogenic granuloma 09/13/2006    last assessed 9/13/06    Carlos (subconjunctival hemorrhage), unspecified laterality 09/12/2005    last assessed 9/12/05    Sleep apnea     TMJ (dislocation of temporomandibular joint)     Ventral hernia     last assessed  4/6/17     Wears glasses        PAST SURGICAL HISTORY:  Past Surgical History:   Procedure Laterality Date    APPENDECTOMY      ATRIAL ABLATION SURGERY      2014    ATRIAL ABLATION SURGERY      catheter ablation atrial fibrillation / last assessed 2/17/16     ATRIAL ABLATION SURGERY N/A 11/26/2024    heart    CARDIOVERSION  05/30/2024    CHOLECYSTECTOMY      lap    COLON SURGERY  1990    Hartmans procedure    COLON SURGERY      reversal 6 weeks later    COLONOSCOPY N/A 01/26/2017    Procedure: COLONOSCOPY;  Surgeon: Portillo Schumacher MD;  Location: [facility];  Service:     ESOPHAGOGASTRODUODENOSCOPY N/A 01/26/2017    Procedure: ESOPHAGOGASTRODUODENOSCOPY (EGD);  Surgeon: Portillo Schumacher MD;  Location: [facility];  Service:     EYE SURGERY Bilateral 2015    lid repair    HERNIA REPAIR Bilateral     inguinal 2014 & 2013    INCISIONAL HERNIA REPAIR  2011    INCISIONAL HERNIA REPAIR N/A 03/24/2017    Procedure: REPAIR OF INCARCERATED INCISIONAL HERNIA WITH MESH, Lysis of Adhesions, Partial Omentectomy;  Surgeon: Colt Davis MD;  Location: [facility];  Service:     INSERT / REPLACE / REMOVE PACEMAKER Left 10/04/2021    medtronic-M#[id number]    NEUROBLASTOMA EXCISION      SKIN CANCER EXCISION      On nose    TONSILLECTOMY      US GUIDED THYROID BIOPSY  01/06/2025       FAMILY HISTORY:  Family History   Problem Relation Age of Onset    Heart disease Mother     Cancer Mother      Colon cancer Mother     Dementia Mother     Early death Father     Seizures Father     Cancer Sister         bone    Heart disease Sister     Diabetes Sister     Lupus Sister     Heart disease Brother     Cancer Brother         kidney    Diabetes Brother     Other Sister         covid    Stroke Neg Hx        SOCIAL HISTORY:  Social History     Tobacco Use    Smoking status: Former     Current packs/day: 0.00     Average packs/day: 1 pack/day for 20.0 years (20.0 ttl pk-yrs)     Types: Cigarettes     Start date:      Quit date:      Years since quittin.1     Passive exposure: Never    Smokeless tobacco: Never    Tobacco comments:     Started age 17   Vaping Use    Vaping status: Never Used   Substance Use Topics    Alcohol use: No     Comment: none in 33 yrs    Drug use: No       MEDICATIONS:    Current Outpatient Medications:     acetaminophen (TYLENOL) 325 mg tablet, Take 650 mg by mouth every 6 (six) hours as needed, Disp: , Rfl:     amiodarone 200 mg tablet, , Disp: , Rfl:     apixaban (ELIQUIS) 5 mg, Take 1 tablet (5 mg total) by mouth 2 (two) times a day, Disp: 180 tablet, Rfl: 3    atorvastatin (LIPITOR) 20 mg tablet, TAKE 1 TABLET DAILY, Disp: 90 tablet, Rfl: 1    Blood Glucose Monitoring Suppl (FREESTYLE LITE) GEOVANNY, , Disp: , Rfl:     carvedilol (COREG) 25 mg tablet, Take 12.5 mg by mouth 2 (two) times a day, Disp: , Rfl:     cephalexin (KEFLEX) 500 mg capsule, Take 1 capsule (500 mg total) by mouth 2 (two) times a day for 7 days, Disp: 14 capsule, Rfl: 0    clotrimazole-betamethasone (LOTRISONE) 1-0.05 % cream, APPLY TO AFFECTED AREA(S) TWO TIMES A DAY, Disp: 45 g, Rfl: 1    fluticasone (FLONASE) 50 mcg/act nasal spray, USE 2 SPRAYS IN EACH NOSTRIL DAILY (GENERIC FOR FLONASE), Disp: 48 g, Rfl: 3    FREESTYLE LITE test strip, , Disp: , Rfl:     gabapentin (NEURONTIN) 300 mg capsule, Take 1 capsule (300 mg total) by mouth 2 (two) times a day, Disp: 60 capsule, Rfl: 1    hydroCHLOROthiazide 25  mg tablet, TAKE ONE TABLET BY MOUTH EVERY DAY, Disp: 90 tablet, Rfl: 1    ketoconazole (NIZORAL) 2 % shampoo, , Disp: , Rfl:     Lancets (FREESTYLE) lancets, , Disp: , Rfl:     linaCLOtide (Linzess) 290 MCG CAPS, Take 1 capsule by mouth in the morning, Disp: 30 capsule, Rfl: 1    metroNIDAZOLE (METROCREAM) 0.75 % cream, , Disp: , Rfl:     Mounjaro 7.5 MG/0.5ML SOAJ, , Disp: , Rfl:     Multiple Vitamin (MULTIVITAMIN ADULT PO), Take by mouth, Disp: , Rfl:     ondansetron (ZOFRAN) 4 mg tablet, Take 1 tablet (4 mg total) by mouth every 6 (six) hours, Disp: 6 tablet, Rfl: 0    RESTASIS 0.05 % ophthalmic emulsion, Administer 1 drop to both eyes every 12 (twelve) hours, Disp: , Rfl:     tamsulosin (FLOMAX) 0.4 mg, TAKE 1 CAPSULE DAILY WITH DINNER, Disp: 90 capsule, Rfl: 1    Tenapanor HCl (Ibsrela) 50 MG TABS, Take 50 mg by mouth 2 (two) times a day with meals, Disp: 60 tablet, Rfl: 3    tiZANidine (ZANAFLEX) 2 mg tablet, Take 2 tablets (4 mg total) by mouth 2 (two) times a day, Disp: 60 tablet, Rfl: 0    torsemide (DEMADEX) 10 mg tablet, Take by mouth, Disp: , Rfl:     pantoprazole (PROTONIX) 40 mg tablet, Take 40 mg by mouth, Disp: , Rfl:     pioglitazone (ACTOS) 15 mg tablet, Take by mouth (Patient not taking: Reported on 2/27/2025), Disp: , Rfl:     pioglitazone-metFORMIN (ACTOPLUS MET)  MG per tablet, daily after dinner (Patient not taking: Reported on 1/24/2025), Disp: , Rfl:     polyethylene glycol (GOLYTELY) 4000 mL solution, Take 4,000 mL by mouth once for 1 dose, Disp: 4000 mL, Rfl: 0    Polyvinyl Alcohol-Povidone PF 1.4-0.6 % SOLN, Apply to eye (Patient not taking: Reported on 1/24/2025), Disp: , Rfl:     traMADol (ULTRAM) 50 mg tablet, Take 50 mg by mouth once as needed for moderate pain He uses it rarely. (Patient not taking: Reported on 1/30/2025), Disp: , Rfl:     urea (CARMOL) 20 % cream, if needed (Patient not taking: Reported on 1/24/2025), Disp: , Rfl:   No current facility-administered medications  "for this visit.    ALLERGIES:  Allergies   Allergen Reactions    Codeine Other (See Comments) and GI Intolerance     Reaction Date: 27Dec2004; Annotation - [id number]: '' CRAZY ''  vomiting  headache    Demerol [Meperidine] Nausea Only, Other (See Comments), GI Intolerance and Vomiting     vomiting  Reaction Date: 27Dec2004;   headache    Morphine Nausea Only, GI Intolerance and Vomiting     Reaction Date: 23Feb2012;        LABS:  HgA1c:   Lab Results   Component Value Date    HGBA1C 8.2 (H) 01/06/2025     BMP:   Lab Results   Component Value Date    CALCIUM 8.9 02/15/2025    K 4.0 02/15/2025    CO2 21 02/15/2025     02/15/2025    BUN 29 (H) 02/15/2025    CREATININE 0.98 02/15/2025     CBC: No components found for: \"CBC\"    _____________________________________________________  Review of systems: ROS is negative other than that noted in the HPI.  Constitutional: Negative for fatigue and fever.   HENT: Negative for sore throat.    Respiratory: Negative for shortness of breath.    Cardiovascular: Negative for chest pain.   Gastrointestinal: Negative for abdominal pain.   Endocrine: Negative for cold intolerance and heat intolerance.   Genitourinary: Negative for flank pain.   Musculoskeletal: Negative for back pain.   Skin: Negative for rash.   Allergic/Immunologic: Negative for immunocompromised state.   Neurological: Negative for dizziness.   Psychiatric/Behavioral: Negative for agitation.     left Elbow -     Mild erythema over olecranon with no extension   Minimal warmth  TTP olecranon process  Swelling to bursa   ROM: Full  MMT: deferred  (-) varus laxity, (-) valgus laxity  Demonstrates normal shoulder, wrist, and finger motion  no radial head instability  no ulnar nerve subluxation  2+ distal radial pulse with brisk capillary refill to the fingers  Radial, median, and ulnar motor and sensory distrubution intact  Sensation to light touch intact distally       Physical exam:  General/Constitutional: NAD, " well developed, well nourished  HENT: Normocephalic, atraumatic  CV: Intact distal pulses, regular rate  Resp: No respiratory distress or labored breathing  Abdomen: soft, nondistended   Lymphatic: No lymphadenopathy palpated  Neuro: Alert and Oriented x 3, no focal deficits  Psych: Normal mood, normal affect  Skin: Warm, dry, no rashes, no erythema  _____________________________________________________  STUDIES REVIEWED:  X-rays of the left elbow reviewed and interpreted today. These show no acute osseous abnormalities.       PROCEDURES PERFORMED:  No Procedures performed today    Scribe Attestation      I,:  Nara Mercedes am acting as a scribe while in the presence of the attending physician.:       I,:  Ho Dao MD personally performed the services described in this documentation    as scribed in my presence.:

## 2025-02-28 ENCOUNTER — TELEPHONE (OUTPATIENT)
Dept: UROLOGY | Facility: CLINIC | Age: 80
End: 2025-02-28

## 2025-02-28 ENCOUNTER — TELEPHONE (OUTPATIENT)
Age: 80
End: 2025-02-28

## 2025-02-28 RX ORDER — TADALAFIL 5 MG/1
5 TABLET ORAL DAILY PRN
Qty: 90 TABLET | Refills: 1 | Status: SHIPPED | OUTPATIENT
Start: 2025-02-28

## 2025-02-28 NOTE — TELEPHONE ENCOUNTER
PA for TADALAFIL 5 MG  APPROVED     Date(s) approved January 29, 2025 to February 28, 2026         Patient advised by          [x][employer] Message  []Phone call   [x]LMOM  []L/M to call office as no active Communication consent on file  []Unable to leave detailed message as VM not approved on Communication consent       Pharmacy advised by    [x]Fax  []Phone call         Approval letter scanned into Media No WILL SCAN UPON RECEIPT

## 2025-02-28 NOTE — TELEPHONE ENCOUNTER
Called and LVM for pt that needs appt for refill review and to call office # to schedule that appt.

## 2025-02-28 NOTE — TELEPHONE ENCOUNTER
PA for TADALAFIL 5 MG SUBMITTED to [facility]    via      [x][employer]-Case ID # [id number]       [x]PA sent as URGENT    All office notes, labs and other pertaining documents and studies sent. Clinical questions answered. Awaiting determination from insurance company.     Turnaround time for your insurance to make a decision on your Prior Authorization can take 7-21 business days.

## 2025-03-01 LAB
BACTERIA SPEC BFLD CULT: NO GROWTH
GRAM STN SPEC: NORMAL

## 2025-03-03 ENCOUNTER — TELEPHONE (OUTPATIENT)
Dept: NON INVASIVE DIAGNOSTICS | Facility: HOSPITAL | Age: 80
End: 2025-03-03

## 2025-03-03 ENCOUNTER — RESULTS FOLLOW-UP (OUTPATIENT)
Dept: FAMILY MEDICINE CLINIC | Facility: CLINIC | Age: 80
End: 2025-03-03

## 2025-03-03 ENCOUNTER — TELEPHONE (OUTPATIENT)
Age: 80
End: 2025-03-03

## 2025-03-03 DIAGNOSIS — K59.00 CONSTIPATION, UNSPECIFIED CONSTIPATION TYPE: ICD-10-CM

## 2025-03-03 DIAGNOSIS — M70.22 OLECRANON BURSITIS OF LEFT ELBOW: Primary | ICD-10-CM

## 2025-03-03 RX ORDER — SULFAMETHOXAZOLE AND TRIMETHOPRIM 800; 160 MG/1; MG/1
1 TABLET ORAL EVERY 12 HOURS SCHEDULED
Qty: 20 TABLET | Refills: 0 | Status: SHIPPED | OUTPATIENT
Start: 2025-03-03 | End: 2025-03-13

## 2025-03-03 RX ORDER — TENAPANOR HYDROCHLORIDE 53.2 MG/1
50 TABLET ORAL 2 TIMES DAILY WITH MEALS
Qty: 60 TABLET | Refills: 3 | Status: SHIPPED | OUTPATIENT
Start: 2025-03-03

## 2025-03-03 NOTE — TELEPHONE ENCOUNTER
Caller: samantha    Doctor: Dr. aggarwal    Reason for call: patient states his elbow is not any better but worse. It is sore, red, and sensitive to touch. Patient would like to know what else can be done.  Please advise   Thank you      Call back#: [phone]

## 2025-03-03 NOTE — TELEPHONE ENCOUNTER
Attempted to call patient to reschedule his SUJEY. Patients mailbox is full unable to leave a voicemail

## 2025-03-03 NOTE — TELEPHONE ENCOUNTER
PA for IBSRELA APPROVED     Date(s) approved February 1, 2025 to March 3, 2026     Case #    Patient advised by          [][employer] Message  [x]Phone call   []LMOM  []L/M to call office as no active Communication consent on file  []Unable to leave detailed message as VM not approved on Communication consent       Pharmacy advised by    [x]Fax  []Phone call    Specialty Pharmacy    []     Approval letter scanned into Media No

## 2025-03-03 NOTE — TELEPHONE ENCOUNTER
PA for Ibsrela 50 mg    SUBMITTED to [facility]    via    [x][employer]-Case ID #     [id number]    Payer: [employer]   Phone: [phone]   Fax: [phone]       All office notes, labs and other pertaining documents and studies sent. Clinical questions answered. Awaiting determination from insurance company.     Turnaround time for your insurance to make a decision on your Prior Authorization can take 7-21 business days.

## 2025-03-04 ENCOUNTER — OFFICE VISIT (OUTPATIENT)
Dept: PHYSICAL THERAPY | Facility: CLINIC | Age: 80
End: 2025-03-04
Payer: MEDICARE

## 2025-03-04 DIAGNOSIS — R60.0 EDEMA OF BOTH LOWER LEGS: ICD-10-CM

## 2025-03-04 DIAGNOSIS — M75.101 TEAR OF RIGHT ROTATOR CUFF, UNSPECIFIED TEAR EXTENT, UNSPECIFIED WHETHER TRAUMATIC: Primary | ICD-10-CM

## 2025-03-04 DIAGNOSIS — M54.12 CERVICAL RADICULOPATHY: ICD-10-CM

## 2025-03-04 DIAGNOSIS — M19.011 OSTEOARTHRITIS OF GLENOHUMERAL JOINT, RIGHT: ICD-10-CM

## 2025-03-04 PROCEDURE — 97110 THERAPEUTIC EXERCISES: CPT | Performed by: PHYSICAL THERAPIST

## 2025-03-04 NOTE — PROGRESS NOTES
Daily Note     Today's date: 3/4/2025  Patient name: Andrade Wise Jr.  : 1945  MRN: [id number]  Referring provider: Ho Dao*  Dx:   Encounter Diagnosis     ICD-10-CM    1. Tear of right rotator cuff, unspecified tear extent, unspecified whether traumatic  M75.101       2. Osteoarthritis of glenohumeral joint, right  M19.011       3. Cervical radiculopathy  M54.12                      Subjective: Pt reports he believes his shoulder is actually starting to feel better. Pt reports he isn't having much pain during today's therex, most noticeable during AROM to 90 flexion, noticed in anterior portion of R shoulder joint.       Objective: See treatment diary below      Assessment: Tolerated treatment well. Patient does demo ongoing need for cues for techniques through therex.       Plan: Continue per plan of care.      Precautions: Hypertension, DM      Specialty Daily Treatment Diary         Specialty Daily Treatment Diary         Insurance Eval/ Re-eval POC expires Auth Status Total visits  Start date  Expiration date Misc   MED  6 weeks - 3/17/2025      Co-Insurance                 Date 2/18/25 2/21/25 2/24/25 2/26/25 3/4/25   Visit Number 3 4 5 6 7   Auth                Manual        STJ retract        P to A GHJ mobs 3 min 3' 3' 3' 3'   STM UT 5 min 5' 4' 2'    PROM R shld 4 min 5' 4' 4' 8'   C-S distraction 3 min   2' MFR pec minor            TherEx        NuStep        Pulleys 20 20 20 20 x25   Wand flex 20 hands clasped  20 20 x20   Rows  30  GTB 30  GTB 30   GTB 3x10 Green tubing   Ext  30  GTB 30  GTB 30   GTB 3x10 Green tubing   OH press    20                                    Neuro Re-Ed        S/L ER  20 20 20 x20   S/L rotation        AROM flex to 90    20 x20   AROM abd to 90    20 x20                                   TherAct                                                Gait Training                                        Access Code: [email or web address]  URL:  [email or web address]  Date: 02/21/2025  Prepared by: Zeke Lamb    Exercises  - Standing Bilateral Low Shoulder Row with Anchored Resistance  - 1 x daily - 7 x weekly - 2 sets - 10 reps  - Shoulder extension with resistance - Neutral  - 1 x daily - 7 x weekly - 2 sets - 10 reps  - Sidelying Shoulder External Rotation  - 1 x daily - 7 x weekly - 2 sets - 10 reps  - Standing Shoulder Flexion to 90 Degrees  - 1 x daily - 7 x weekly - 2 sets - 10 reps  - Standing Shoulder Horizontal Abduction with Resistance  - 1 x daily - 7 x weekly - 2 sets - 10 reps

## 2025-03-04 NOTE — TELEPHONE ENCOUNTER
Spoke with patient. Pt stated that he has finished the antibiotics prescribed by his PCP, and his elbow is not getting better. Advised Pt new Rx has been sent to his pharmacy.

## 2025-03-05 RX ORDER — HYDROCHLOROTHIAZIDE 25 MG/1
25 TABLET ORAL DAILY
Qty: 90 TABLET | Refills: 1 | Status: SHIPPED | OUTPATIENT
Start: 2025-03-05

## 2025-03-06 ENCOUNTER — TELEPHONE (OUTPATIENT)
Dept: RADIOLOGY | Facility: MEDICAL CENTER | Age: 80
End: 2025-03-06

## 2025-03-06 ENCOUNTER — OFFICE VISIT (OUTPATIENT)
Dept: PHYSICAL THERAPY | Facility: CLINIC | Age: 80
End: 2025-03-06
Payer: MEDICARE

## 2025-03-06 DIAGNOSIS — M75.101 TEAR OF RIGHT ROTATOR CUFF, UNSPECIFIED TEAR EXTENT, UNSPECIFIED WHETHER TRAUMATIC: Primary | ICD-10-CM

## 2025-03-06 DIAGNOSIS — M54.12 CERVICAL RADICULOPATHY: ICD-10-CM

## 2025-03-06 DIAGNOSIS — M19.011 OSTEOARTHRITIS OF GLENOHUMERAL JOINT, RIGHT: ICD-10-CM

## 2025-03-06 PROCEDURE — 97110 THERAPEUTIC EXERCISES: CPT | Performed by: PHYSICAL THERAPIST

## 2025-03-06 NOTE — TELEPHONE ENCOUNTER
I was speaking to the patient's wife regarding her injection. She said that she spoke to Dr. Chen about switching her husbands spine and pain care over to Dr. Chen. She would like a call back regarding this.

## 2025-03-06 NOTE — PROGRESS NOTES
Daily Note     Today's date: 3/6/2025  Patient name: Andrade Wise Jr.  : 1945  MRN: [id number]  Referring provider: Ho Dao*  Dx:   Encounter Diagnosis     ICD-10-CM    1. Tear of right rotator cuff, unspecified tear extent, unspecified whether traumatic  M75.101       2. Osteoarthritis of glenohumeral joint, right  M19.011       3. Cervical radiculopathy  M54.12                      Subjective: Chris reports that his right shoulder feels better over the last few days.        Objective: See treatment diary below      Assessment: Tolerated treatment well.  Chris had full ROM right shoulder.  He would benefit from strengthening to be able to achieve goals or reaching over head and swinging a golf club.      Plan: Continue per plan of care. Banded ER, Resisted flex and abd       Precautions: Hypertension, DM      Specialty Daily Treatment Diary         Specialty Daily Treatment Diary         Insurance Eval/ Re-eval POC expires Auth Status Total visits  Start date  Expiration date Misc   MED  6 weeks - 3/17/2025      Co-Insurance                 Date 2/21/25 2/24/25 2/26/25 3/4/25 3/6/25   Visit Number 4 5 6 7 8   Auth                Manual        STJ retract        P to A GHJ mobs 3' 3' 3' 3' 3'   STM UT 5' 4' 2'     PROM R shld 5' 4' 4' 8' 8'   C-S distraction   2' MFR pec minor             TherEx        NuStep        Pulleys 20 20 20 x25 25   Wand flex  20 20 x20 20   Rows 30  GTB 30  GTB 30   GTB 3x10 Green tubing 3x10  Green   Ext 30  GTB 30  GTB 30   GTB 3x10 Green tubing 3x10  Green   OH press   20                                     Neuro Re-Ed        S/L ER 20 20 20 x20 20   S/L rotation        AROM flex to 90   20 x20 20   AROM abd to 90   20 x20 20                                   TherAct                                                Gait Training                                        Access Code: [email or web address]  URL: [email or web address]  Date: 2025  Prepared by: Zeke  Lamb    Exercises  - Standing Bilateral Low Shoulder Row with Anchored Resistance  - 1 x daily - 7 x weekly - 2 sets - 10 reps  - Shoulder extension with resistance - Neutral  - 1 x daily - 7 x weekly - 2 sets - 10 reps  - Sidelying Shoulder External Rotation  - 1 x daily - 7 x weekly - 2 sets - 10 reps  - Standing Shoulder Flexion to 90 Degrees  - 1 x daily - 7 x weekly - 2 sets - 10 reps  - Standing Shoulder Horizontal Abduction with Resistance  - 1 x daily - 7 x weekly - 2 sets - 10 reps

## 2025-03-06 NOTE — TELEPHONE ENCOUNTER
This pt can be scheduled now with Dr. Chen, I confirmed this with Leny who handles JACQU[facility] and she confirmed pt can be scheduled with TOSHIA.

## 2025-03-10 ENCOUNTER — HOSPITAL ENCOUNTER (OUTPATIENT)
Dept: NON INVASIVE DIAGNOSTICS | Facility: HOSPITAL | Age: 80
End: 2025-03-10
Attending: INTERNAL MEDICINE
Payer: MEDICARE

## 2025-03-10 ENCOUNTER — HOSPITAL ENCOUNTER (OUTPATIENT)
Dept: NON INVASIVE DIAGNOSTICS | Facility: HOSPITAL | Age: 80
Discharge: HOME | End: 2025-03-10
Payer: MEDICARE

## 2025-03-10 DIAGNOSIS — I48.0 PAROXYSMAL ATRIAL FIBRILLATION (HCC): ICD-10-CM

## 2025-03-10 LAB
ATRIAL RATE: 65 BPM
P AXIS: 58 DEGREES
PR INTERVAL: 206 MS
QRS AXIS: -53 DEGREES
QRSD INTERVAL: 160 MS
QT INTERVAL: 450 MS
QTC INTERVAL: 468 MS
T WAVE AXIS: -8 DEGREES
VENTRICULAR RATE: 65 BPM

## 2025-03-10 PROCEDURE — 93005 ELECTROCARDIOGRAM TRACING: CPT

## 2025-03-10 PROCEDURE — 93010 ELECTROCARDIOGRAM REPORT: CPT | Performed by: INTERNAL MEDICINE

## 2025-03-10 RX ORDER — SODIUM CHLORIDE, SODIUM LACTATE, POTASSIUM CHLORIDE, CALCIUM CHLORIDE 600; 310; 30; 20 MG/100ML; MG/100ML; MG/100ML; MG/100ML
125 INJECTION, SOLUTION INTRAVENOUS CONTINUOUS
OUTPATIENT
Start: 2025-03-10

## 2025-03-10 RX ORDER — LIDOCAINE HYDROCHLORIDE 10 MG/ML
0.5 INJECTION, SOLUTION EPIDURAL; INFILTRATION; INTRACAUDAL; PERINEURAL ONCE AS NEEDED
OUTPATIENT
Start: 2025-03-10

## 2025-03-11 ENCOUNTER — OFFICE VISIT (OUTPATIENT)
Dept: PHYSICAL THERAPY | Facility: CLINIC | Age: 80
End: 2025-03-11
Payer: MEDICARE

## 2025-03-11 DIAGNOSIS — M19.011 OSTEOARTHRITIS OF GLENOHUMERAL JOINT, RIGHT: ICD-10-CM

## 2025-03-11 DIAGNOSIS — M75.101 TEAR OF RIGHT ROTATOR CUFF, UNSPECIFIED TEAR EXTENT, UNSPECIFIED WHETHER TRAUMATIC: Primary | ICD-10-CM

## 2025-03-11 DIAGNOSIS — M54.12 CERVICAL RADICULOPATHY: ICD-10-CM

## 2025-03-11 PROCEDURE — 97110 THERAPEUTIC EXERCISES: CPT | Performed by: PHYSICAL THERAPIST

## 2025-03-11 NOTE — PROGRESS NOTES
Daily Note     Today's date: 3/11/2025  Patient name: Andrade Wise Jr.  : 1945  MRN: [id number]  Referring provider: Ho Dao*  Dx:   Encounter Diagnosis     ICD-10-CM    1. Tear of right rotator cuff, unspecified tear extent, unspecified whether traumatic  M75.101       2. Osteoarthritis of glenohumeral joint, right  M19.011       3. Cervical radiculopathy  M54.12                      Subjective: Chris reports overall improvement.       Objective: See treatment diary below      Assessment: Tolerated treatment well.  Chris had full ROM right shoulder.  He feels that his strength to function over head and to carry items is still limited but his symptoms are resolving well.       Plan: Continue per plan of care.         Precautions: Hypertension, DM      Specialty Daily Treatment Diary         Specialty Daily Treatment Diary         Insurance Eval/ Re-eval POC expires Auth Status Total visits  Start date  Expiration date Misc   MED  6 weeks - 3/17/2025      Co-Insurance                 Date 2/24/25 2/26/25 3/4/25 3/6/25 3/11/25   Visit Number 5 6 7 8 9   Auth                Manual        STJ retract        P to A GHJ mobs 3' 3' 3' 3' 3'   STM UT 4' 2'      PROM R shld 4' 4' 8' 8' 7'   C-S distraction  2' MFR pec minor              TherEx        NuStep        Pulleys 20 20 x25 25 20   Wand flex 20 20 x20 20    Rows 30  GTB 30   GTB 3x10 Green tubing 3x10  Green 30  Grenn   Ext 30  GTB 30   GTB 3x10 Green tubing 3x10  Green 30  Green   OH press  20   20   TB ER     [place]  20x  25% ROM with iso hold                           Neuro Re-Ed        S/L ER 20 20 x20 20 20   S/L rotation        AROM flex to 90  20 x20 20 20   AROM abd to 90  20 x20 20 20   S/L abd     20                           TherAct                                                Gait Training                                        Access Code: [email or web address]  URL: [email or web address]  Date: 2025  Prepared by: Zeke  Lamb    Exercises  - Standing Bilateral Low Shoulder Row with Anchored Resistance  - 1 x daily - 7 x weekly - 2 sets - 10 reps  - Shoulder extension with resistance - Neutral  - 1 x daily - 7 x weekly - 2 sets - 10 reps  - Sidelying Shoulder External Rotation  - 1 x daily - 7 x weekly - 2 sets - 10 reps  - Standing Shoulder Flexion to 90 Degrees  - 1 x daily - 7 x weekly - 2 sets - 10 reps  - Standing Shoulder Horizontal Abduction with Resistance  - 1 x daily - 7 x weekly - 2 sets - 10 reps

## 2025-03-13 ENCOUNTER — OFFICE VISIT (OUTPATIENT)
Dept: PHYSICAL THERAPY | Facility: CLINIC | Age: 80
End: 2025-03-13
Payer: MEDICARE

## 2025-03-13 DIAGNOSIS — M54.12 CERVICAL RADICULOPATHY: ICD-10-CM

## 2025-03-13 DIAGNOSIS — M19.011 OSTEOARTHRITIS OF GLENOHUMERAL JOINT, RIGHT: ICD-10-CM

## 2025-03-13 DIAGNOSIS — M75.101 TEAR OF RIGHT ROTATOR CUFF, UNSPECIFIED TEAR EXTENT, UNSPECIFIED WHETHER TRAUMATIC: Primary | ICD-10-CM

## 2025-03-13 PROCEDURE — 97110 THERAPEUTIC EXERCISES: CPT | Performed by: PHYSICAL THERAPIST

## 2025-03-13 NOTE — PROGRESS NOTES
Daily Note     Today's date: 3/13/2025  Patient name: Andrade Wise Jr.  : 1945  MRN: [id number]  Referring provider: Ho Dao*  Dx:   Encounter Diagnosis     ICD-10-CM    1. Tear of right rotator cuff, unspecified tear extent, unspecified whether traumatic  M75.101       2. Osteoarthritis of glenohumeral joint, right  M19.011       3. Cervical radiculopathy  M54.12                      Subjective: Chris reports overall improvement.       Objective: See treatment diary below      Assessment: Tolerated treatment well.  Chris had full PROM right shoulder.  Right STJ mobility into upward and downward rotation remains poor.        Plan: Continue per plan of care.   Add scapular clock in left side lying       Precautions: Hypertension, DM      Specialty Daily Treatment Diary         Specialty Daily Treatment Diary         Insurance Eval/ Re-eval POC expires Auth Status Total visits  Start date  Expiration date Misc   MED  6 weeks - 3/17/2025      Co-Insurance                 Date 2/26/25 3/4/25 3/6/25 3/11/25 3/13/25   Visit Number 6 7 8 9 10   Auth                Manual        STJ retract        P to A GHJ mobs 3' 3' 3' 3' 3'   STM UT 2'       PROM R shld 4' 8' 8' 7' 7'   C-S distraction 2' MFR pec minor               TherEx        NuStep        Pulleys 20 x25 25 20 20   Wand flex 20 x20 20     Rows 30   GTB 3x10 Green tubing 3x10  Green 30  Grenn 30  GR   Ext 30   GTB 3x10 Green tubing 3x10  Green 30  Green 30  GR   OH press 20   20    TB ER    Door  20x  25% ROM with iso hold                            Neuro Re-Ed        S/L ER 20 x20 20 20 20   S/L rotation        AROM flex to 90 20 x20 20 20 20   AROM abd to 90 20 x20 20 20 20   S/L abd    20 20                           TherAct                                                Gait Training                                        Access Code: [email or web address]  URL: [email or web address]  Date: 2025  Prepared by: Zeke  Lamb    Exercises  - Standing Bilateral Low Shoulder Row with Anchored Resistance  - 1 x daily - 7 x weekly - 2 sets - 10 reps  - Shoulder extension with resistance - Neutral  - 1 x daily - 7 x weekly - 2 sets - 10 reps  - Sidelying Shoulder External Rotation  - 1 x daily - 7 x weekly - 2 sets - 10 reps  - Standing Shoulder Flexion to 90 Degrees  - 1 x daily - 7 x weekly - 2 sets - 10 reps  - Standing Shoulder Horizontal Abduction with Resistance  - 1 x daily - 7 x weekly - 2 sets - 10 reps

## 2025-03-14 ENCOUNTER — OFFICE VISIT (OUTPATIENT)
Dept: OBGYN CLINIC | Facility: CLINIC | Age: 80
End: 2025-03-14
Payer: MEDICARE

## 2025-03-14 VITALS — BODY MASS INDEX: 28.77 KG/M2 | HEIGHT: 70 IN | WEIGHT: 201 LBS

## 2025-03-14 DIAGNOSIS — M70.22 OLECRANON BURSITIS OF LEFT ELBOW: Primary | ICD-10-CM

## 2025-03-14 PROCEDURE — 99213 OFFICE O/P EST LOW 20 MIN: CPT | Performed by: ORTHOPAEDIC SURGERY

## 2025-03-14 RX ORDER — SULFAMETHOXAZOLE AND TRIMETHOPRIM 800; 160 MG/1; MG/1
1 TABLET ORAL EVERY 12 HOURS SCHEDULED
Qty: 10 TABLET | Refills: 0 | Status: SHIPPED | OUTPATIENT
Start: 2025-03-14 | End: 2025-03-19

## 2025-03-17 ENCOUNTER — OFFICE VISIT (OUTPATIENT)
Dept: PHYSICAL THERAPY | Facility: CLINIC | Age: 80
End: 2025-03-17
Payer: MEDICARE

## 2025-03-17 DIAGNOSIS — M54.12 CERVICAL RADICULOPATHY: ICD-10-CM

## 2025-03-17 DIAGNOSIS — M19.011 OSTEOARTHRITIS OF GLENOHUMERAL JOINT, RIGHT: ICD-10-CM

## 2025-03-17 DIAGNOSIS — M75.101 TEAR OF RIGHT ROTATOR CUFF, UNSPECIFIED TEAR EXTENT, UNSPECIFIED WHETHER TRAUMATIC: Primary | ICD-10-CM

## 2025-03-17 PROCEDURE — 97110 THERAPEUTIC EXERCISES: CPT | Performed by: PHYSICAL THERAPIST

## 2025-03-17 NOTE — ASSESSMENT & PLAN NOTE
He does remain swollen today but erythema is significantly improved.  He is much less tender and there is no warmth today.  I do feel that Bactrim is doing well and helping resolve the acute infection.  I recommended extending the course a few more days.  I do not recommend further intervention at this point given his clinical improvement.  He is going to call me back if it does not continue to improve.  Orders:    sulfamethoxazole-trimethoprim (BACTRIM DS) 800-160 mg per tablet; Take 1 tablet by mouth every 12 (twelve) hours for 5 days

## 2025-03-17 NOTE — PROGRESS NOTES
Patient Name: Andrade Wise Jr.      : 1945       MRN: [id number]   Encounter Provider: Ho Dao MD   Encounter Date: 25  Encounter department: [facility]         Assessment & Plan  Olecranon bursitis of left elbow  He does remain swollen today but erythema is significantly improved.  He is much less tender and there is no warmth today.  I do feel that Bactrim is doing well and helping resolve the acute infection.  I recommended extending the course a few more days.  I do not recommend further intervention at this point given his clinical improvement.  He is going to call me back if it does not continue to improve.  Orders:    sulfamethoxazole-trimethoprim (BACTRIM DS) 800-160 mg per tablet; Take 1 tablet by mouth every 12 (twelve) hours for 5 days         Follow-up:  No follow-ups on file.     _____________________________________________________  CHIEF COMPLAINT:  Chief Complaint   Patient presents with    Left Elbow - Follow-up         SUBJECTIVE:  Andrade Wise Jr. is a 80 y.o. male who returns for evaluation of his olecranon bursitis.  States it still swollen but feels less painful and less red.    Surgical History:    PAST MEDICAL HISTORY:  Past Medical History:   Diagnosis Date    Allergic rhinitis     Anemia 10/23/2008    last assessed 13     Anxiety     Atrial fibrillation (HCC)     Bleeding     Chronic kidney disease     COVID-19 virus infection 2022    CPAP (continuous positive airway pressure) dependence     Diabetes mellitus (HCC)     Diarrhea     Diverticulitis     Eating disorder     GERD (gastroesophageal reflux disease)     Herpes zoster with complication     last assessed 7/3/17     Hiatal hernia     Hyperlipidemia     Hypertension     IBS (irritable bowel syndrome)     Incisional hernia     Increased urinary frequency     Irregular heart beat     Pyogenic granuloma 2006    last assessed 06    Carlos (subconjunctival  hemorrhage), unspecified laterality 09/12/2005    last assessed 9/12/05    Sleep apnea     TMJ (dislocation of temporomandibular joint)     Ventral hernia     last assessed  4/6/17     Wears glasses        PAST SURGICAL HISTORY:  Past Surgical History:   Procedure Laterality Date    APPENDECTOMY      ATRIAL ABLATION SURGERY      2014    ATRIAL ABLATION SURGERY      catheter ablation atrial fibrillation / last assessed 2/17/16     ATRIAL ABLATION SURGERY N/A 11/26/2024    heart    CARDIOVERSION  05/30/2024    CHOLECYSTECTOMY      lap    COLON SURGERY  1990    Hartmans procedure    COLON SURGERY      reversal 6 weeks later    COLONOSCOPY N/A 01/26/2017    Procedure: COLONOSCOPY;  Surgeon: Portillo Schumacher MD;  Location: [facility] GI [facility];  Service:     ESOPHAGOGASTRODUODENOSCOPY N/A 01/26/2017    Procedure: ESOPHAGOGASTRODUODENOSCOPY (EGD);  Surgeon: Portillo Schumacher MD;  Location: [facility] GI [facility];  Service:     EYE SURGERY Bilateral 2015    lid repair    HERNIA REPAIR Bilateral     inguinal 2014 & 2013    INCISIONAL HERNIA REPAIR  2011    INCISIONAL HERNIA REPAIR N/A 03/24/2017    Procedure: REPAIR OF INCARCERATED INCISIONAL HERNIA WITH MESH, Lysis of Adhesions, Partial Omentectomy;  Surgeon: Colt Davis MD;  Location: [facility];  Service:     INSERT / REPLACE / REMOVE PACEMAKER Left 10/04/2021    [id number]    NEUROBLASTOMA EXCISION      SKIN CANCER EXCISION      On nose    TONSILLECTOMY      US GUIDED THYROID BIOPSY  01/06/2025       FAMILY HISTORY:  Family History   Problem Relation Age of Onset    Heart disease Mother     Cancer Mother     Colon cancer Mother     Dementia Mother     Early death Father     Seizures Father     Cancer Sister         bone    Heart disease Sister     Diabetes Sister     Lupus Sister     Heart disease Brother     Cancer Brother         kidney    Diabetes Brother     Other Sister         covid    Stroke Neg Hx        SOCIAL HISTORY:  Social History     Tobacco Use    Smoking  status: Former     Current packs/day: 0.00     Average packs/day: 1 pack/day for 20.0 years (20.0 ttl pk-yrs)     Types: Cigarettes     Start date:      Quit date:      Years since quittin.2     Passive exposure: Never    Smokeless tobacco: Never    Tobacco comments:     Started age 17   Vaping Use    Vaping status: Never Used   Substance Use Topics    Alcohol use: No     Comment: none in 33 yrs    Drug use: No       MEDICATIONS:    Current Outpatient Medications:     acetaminophen (TYLENOL) 325 mg tablet, Take 650 mg by mouth every 6 (six) hours as needed, Disp: , Rfl:     amiodarone 200 mg tablet, , Disp: , Rfl:     apixaban (ELIQUIS) 5 mg, Take 1 tablet (5 mg total) by mouth 2 (two) times a day, Disp: 180 tablet, Rfl: 3    atorvastatin (LIPITOR) 20 mg tablet, TAKE 1 TABLET DAILY, Disp: 90 tablet, Rfl: 1    Blood Glucose Monitoring Suppl (FREESTYLE LITE) GEOVANNY, , Disp: , Rfl:     carvedilol (COREG) 25 mg tablet, Take 12.5 mg by mouth 2 (two) times a day, Disp: , Rfl:     clotrimazole-betamethasone (LOTRISONE) 1-0.05 % cream, APPLY TO AFFECTED AREA(S) TWO TIMES A DAY, Disp: 45 g, Rfl: 1    fluticasone (FLONASE) 50 mcg/act nasal spray, USE 2 SPRAYS IN EACH NOSTRIL DAILY (GENERIC FOR FLONASE), Disp: 48 g, Rfl: 3    FREESTYLE LITE test strip, , Disp: , Rfl:     gabapentin (NEURONTIN) 300 mg capsule, Take 1 capsule (300 mg total) by mouth 2 (two) times a day, Disp: 60 capsule, Rfl: 1    hydroCHLOROthiazide 25 mg tablet, TAKE ONE TABLET BY MOUTH EVERY DAY, Disp: 90 tablet, Rfl: 1    ketoconazole (NIZORAL) 2 % shampoo, , Disp: , Rfl:     Lancets (FREESTYLE) lancets, , Disp: , Rfl:     metroNIDAZOLE (METROCREAM) 0.75 % cream, , Disp: , Rfl:     Mounjaro 7.5 MG/0.5ML SOAJ, , Disp: , Rfl:     Multiple Vitamin (MULTIVITAMIN ADULT PO), Take by mouth, Disp: , Rfl:     ondansetron (ZOFRAN) 4 mg tablet, Take 1 tablet (4 mg total) by mouth every 6 (six) hours, Disp: 6 tablet, Rfl: 0    RESTASIS 0.05 % ophthalmic  emulsion, Administer 1 drop to both eyes every 12 (twelve) hours, Disp: , Rfl:     sulfamethoxazole-trimethoprim (BACTRIM DS) 800-160 mg per tablet, Take 1 tablet by mouth every 12 (twelve) hours for 5 days, Disp: 10 tablet, Rfl: 0    tadalafil (CIALIS) 5 MG tablet, TAKE ONE TABLET BY MOUTH EVERY DAY AS NEEDED FOR ERECTILE DYSFUNCTION, Disp: 90 tablet, Rfl: 1    tamsulosin (FLOMAX) 0.4 mg, TAKE 1 CAPSULE DAILY WITH DINNER, Disp: 90 capsule, Rfl: 1    Tenapanor HCl (Ibsrela) 50 MG TABS, Take 50 mg by mouth 2 (two) times a day with meals, Disp: 60 tablet, Rfl: 3    tiZANidine (ZANAFLEX) 2 mg tablet, Take 2 tablets (4 mg total) by mouth 2 (two) times a day, Disp: 60 tablet, Rfl: 0    torsemide (DEMADEX) 10 mg tablet, Take by mouth, Disp: , Rfl:     pantoprazole (PROTONIX) 40 mg tablet, Take 40 mg by mouth, Disp: , Rfl:     pioglitazone (ACTOS) 15 mg tablet, Take by mouth (Patient not taking: Reported on 1/24/2025), Disp: , Rfl:     pioglitazone-metFORMIN (ACTOPLUS MET)  MG per tablet, daily after dinner (Patient not taking: Reported on 1/24/2025), Disp: , Rfl:     polyethylene glycol (GOLYTELY) 4000 mL solution, Take 4,000 mL by mouth once for 1 dose, Disp: 4000 mL, Rfl: 0    Polyvinyl Alcohol-Povidone PF 1.4-0.6 % SOLN, Apply to eye (Patient not taking: Reported on 3/14/2025), Disp: , Rfl:     traMADol (ULTRAM) 50 mg tablet, Take 50 mg by mouth once as needed for moderate pain He uses it rarely. (Patient not taking: Reported on 1/30/2025), Disp: , Rfl:     urea (CARMOL) 20 % cream, if needed (Patient not taking: Reported on 1/24/2025), Disp: , Rfl:     ALLERGIES:  Allergies   Allergen Reactions    Codeine Other (See Comments) and GI Intolerance     Reaction Date: 27Dec2004; Annotation - 04Sep2012: '' CRAZY ''  vomiting  headache    Demerol [Meperidine] Nausea Only, Other (See Comments), GI Intolerance and Vomiting     vomiting  Reaction Date: 27Dec2004;   headache    Morphine Nausea Only, GI Intolerance and  "Vomiting     Reaction Date: 23Feb2012;        LABS:  HgA1c:   Lab Results   Component Value Date    HGBA1C 8.2 (H) 01/06/2025     BMP:   Lab Results   Component Value Date    CALCIUM 8.9 02/15/2025    K 4.0 02/15/2025    CO2 21 02/15/2025     02/15/2025    BUN 29 (H) 02/15/2025    CREATININE 0.98 02/15/2025     CBC: No components found for: \"CBC\"    _____________________________________________________  Review of systems: ROS is negative other than that noted in the HPI.  Constitutional: Negative for fatigue and fever.   HENT: Negative for sore throat.    Respiratory: Negative for shortness of breath.    Cardiovascular: Negative for chest pain.   Gastrointestinal: Negative for abdominal pain.   Endocrine: Negative for cold intolerance and heat intolerance.   Genitourinary: Negative for flank pain.   Musculoskeletal: Negative for back pain.   Skin: Negative for rash.   Allergic/Immunologic: Negative for immunocompromised state.   Neurological: Negative for dizziness.   Psychiatric/Behavioral: Negative for agitation.     left Elbow -     Erythema improved.  Still present over the central aspect of the olecranon with a small area of blanching  No warmth  Much less tender  Swelling to bursa   ROM: Full  MMT: deferred  (-) varus laxity, (-) valgus laxity  Demonstrates normal shoulder, wrist, and finger motion  no radial head instability  no ulnar nerve subluxation  2+ distal radial pulse with brisk capillary refill to the fingers  Radial, median, and ulnar motor and sensory distrubution intact  Sensation to light touch intact distally       Physical exam:  General/Constitutional: NAD, well developed, well nourished  HENT: Normocephalic, atraumatic  CV: Intact distal pulses, regular rate  Resp: No respiratory distress or labored breathing  Abdomen: soft, nondistended   Lymphatic: No lymphadenopathy palpated  Neuro: Alert and Oriented x 3, no focal deficits  Psych: Normal mood, normal affect  Skin: Warm, dry, no " naeem, no erythema  _____________________________________________________  STUDIES REVIEWED:  X-rays of the left elbow reviewed and interpreted today. These show no acute osseous abnormalities.       PROCEDURES PERFORMED:  No Procedures performed today    Scribe Attestation      I,:   am acting as a scribe while in the presence of the attending physician.:       I,:   personally performed the services described in this documentation    as scribed in my presence.:

## 2025-03-17 NOTE — PROGRESS NOTES
Daily Note     Today's date: 3/17/2025  Patient name: Andrade Wise Jr.  : 1945  MRN: [id number]  Referring provider: Ho Dao*  Dx:   Encounter Diagnosis     ICD-10-CM    1. Tear of right rotator cuff, unspecified tear extent, unspecified whether traumatic  M75.101       2. Osteoarthritis of glenohumeral joint, right  M19.011       3. Cervical radiculopathy  M54.12                      Subjective: Chris reports having a bad weekend with his symptoms.  He also reports neck pain today.        Objective: See treatment diary below      Assessment: Tolerated treatment well.  Added C-S distraction and T-S P to A glides.  He felt some improvement in his symptoms with C-S retraction. He agreed to do this at home.      Plan: Continue per plan of care.   Add scapular clock in left side lying       Precautions: Hypertension, DM      Specialty Daily Treatment Diary         Specialty Daily Treatment Diary         Insurance Eval/ Re-eval POC expires Auth Status Total visits  Start date  Expiration date Misc   MED  6 weeks - 3/17/2025      Co-Insurance                 Date 3/4/25 3/6/25 3/11/25 3/13/25 3/17/25   Visit Number [phone]   Auth                Manual        STJ retract        P to A GHJ mobs 3' 3' 3' 3' 3'   STM UT        PROM R shld 8' 8' 7' 7' 5'   C-S distraction     2'   T-S P to A     2'   TherEx        NuStep        Pulleys x25 25 20 20 20   Wand flex x20 20      Rows 3x10 Green tubing 3x10  Green 30  Grenn 30  GR 30 gr   Ext 3x10 Green tubing 3x10  Green 30  Green 30  GR 30  gr   OH press   20  20   TB ER   [place]  20x  25% ROM with iso hold                             Neuro Re-Ed        S/L ER x20 20 20 20 20   S/L rotation        AROM flex to 90 x20 20 20 20    AROM abd to 90 x20 20 20 20    S/L abd   20 20 20   C-S retraction     10   C-S extension     10           TherAct                                                Gait Training                                        Access Code:  [email or web address]  URL: [email or web address]  Date: 02/21/2025  Prepared by: Zeke Lamb    Exercises  - Standing Bilateral Low Shoulder Row with Anchored Resistance  - 1 x daily - 7 x weekly - 2 sets - 10 reps  - Shoulder extension with resistance - Neutral  - 1 x daily - 7 x weekly - 2 sets - 10 reps  - Sidelying Shoulder External Rotation  - 1 x daily - 7 x weekly - 2 sets - 10 reps  - Standing Shoulder Flexion to 90 Degrees  - 1 x daily - 7 x weekly - 2 sets - 10 reps  - Standing Shoulder Horizontal Abduction with Resistance  - 1 x daily - 7 x weekly - 2 sets - 10 reps

## 2025-03-20 ENCOUNTER — OFFICE VISIT (OUTPATIENT)
Dept: PHYSICAL THERAPY | Facility: CLINIC | Age: 80
End: 2025-03-20
Payer: MEDICARE

## 2025-03-20 ENCOUNTER — TELEPHONE (OUTPATIENT)
Dept: CARDIOLOGY CLINIC | Facility: CLINIC | Age: 80
End: 2025-03-20

## 2025-03-20 DIAGNOSIS — M54.12 CERVICAL RADICULOPATHY: ICD-10-CM

## 2025-03-20 DIAGNOSIS — M75.101 TEAR OF RIGHT ROTATOR CUFF, UNSPECIFIED TEAR EXTENT, UNSPECIFIED WHETHER TRAUMATIC: Primary | ICD-10-CM

## 2025-03-20 DIAGNOSIS — M19.011 OSTEOARTHRITIS OF GLENOHUMERAL JOINT, RIGHT: ICD-10-CM

## 2025-03-20 DIAGNOSIS — I48.19 PERSISTENT ATRIAL FIBRILLATION (HCC): Primary | ICD-10-CM

## 2025-03-20 PROCEDURE — 97110 THERAPEUTIC EXERCISES: CPT | Performed by: PHYSICAL THERAPIST

## 2025-03-20 NOTE — TELEPHONE ENCOUNTER
Spoke with pt. He does not take the amiodarone and coreg 25 mg qd until night. He is having some CP.    Advised he needs to be doing the Coreg 12.5 mg twice a day.  He will go ahead and take Coreg and amiodarone now. He will also start with taking the Coreg 12.5 mg bid.    He has seen 2 [facility]- EP doctors for ablation and it has not worked. He would like you to set him up with a  EP appt.     Please advise

## 2025-03-20 NOTE — TELEPHONE ENCOUNTER
This pt is requesting a sooner appt to see anyone in EP. He has a surgery on 4/15/25 and has had failed ablation.    He does not wan to have surgery until the A-fib is under control.    Do you have any sooner appts?      Please advise

## 2025-03-20 NOTE — TELEPHONE ENCOUNTER
3/20/25 Pt called for assistance w/manual transmission, pt feels like he is in AFIB again. Pt was at Physical Therapy and felt like  he was in AFIB again also feels chest tightness across the top of his chest.  BMc

## 2025-03-20 NOTE — PROGRESS NOTES
Daily Note     Today's date: 3/20/2025  Patient name: Andrade Wise Jr.  : 1945  MRN: [id number]  Referring provider: Ho Dao*  Dx:   Encounter Diagnosis     ICD-10-CM    1. Tear of right rotator cuff, unspecified tear extent, unspecified whether traumatic  M75.101       2. Osteoarthritis of glenohumeral joint, right  M19.011       3. Cervical radiculopathy  M54.12                      Subjective: Pain is not bad today      Objective: See treatment diary below      Assessment: Tolerated treatment well.  Strengthening exercise for right shoulder completed with no adverse effects.  ROM right shld full this session.      Plan: Continue per plan of care.   Add scapular clock in left side lying       Precautions: Hypertension, DM      Specialty Daily Treatment Diary         Specialty Daily Treatment Diary         Insurance Eval/ Re-eval POC expires Auth Status Total visits  Start date  Expiration date Misc   MED  6 weeks - 3/17/2025      Co-Insurance                 Date 3/6/25 3/11/25 3/13/25 3/17/25 3/20/25   Visit Number [phone]   Auth                Manual        STJ retract        P to A GHJ mobs 3' 3' 3' 3' 3 min   STM UT        PROM R shld 8' 7' 7' 5' 5 min   C-S distraction    2'    T-S P to A    2'    TherEx        NuStep        Pulleys 25 20 20 20 20   Wand flex 20       Rows 3x10  Green 30  Grenn 30  GR 30 gr 20  gr   Ext 3x10  Green 30  Green 30  GR 30  gr 30  gr   OH press  20  20 20   TB ER  [place]  20x  25% ROM with iso hold                              Neuro Re-Ed        S/L ER 20 20 20 20 20   S/L rotation        AROM flex to 90 20 20 20     AROM abd to 90 20 20 20     S/L abd  20 20 20    C-S retraction    10    C-S extension    10            TherAct                                                Gait Training                                        Access Code: [email or web address]  URL: [email or web address]  Date: 2025  Prepared by: Zeke Lamb    Exercises  -  Standing Bilateral Low Shoulder Row with Anchored Resistance  - 1 x daily - 7 x weekly - 2 sets - 10 reps  - Shoulder extension with resistance - Neutral  - 1 x daily - 7 x weekly - 2 sets - 10 reps  - Sidelying Shoulder External Rotation  - 1 x daily - 7 x weekly - 2 sets - 10 reps  - Standing Shoulder Flexion to 90 Degrees  - 1 x daily - 7 x weekly - 2 sets - 10 reps  - Standing Shoulder Horizontal Abduction with Resistance  - 1 x daily - 7 x weekly - 2 sets - 10 reps

## 2025-03-20 NOTE — TELEPHONE ENCOUNTER
INPATIENT WOUND CARE NOTES    REASON FOR HOSPITAL ADMISSION:  Chief Complaint   Patient presents with   • Syncope     ADMISSION DIAGNOSIS:  Hyperkalemia [E87.5]  Syncope, unspecified syncope type [R55]    REASON FOR WOUND CARE VISIT:  Multiple wounds.    ASSESSMENT TYPE:  Initial assessment    SUBJECTIVE:  Patient in bed, awake and oriented, no discomfort when visited.  Joint visit with Lindsay RDZ.  Patient managed by Dr. Beach (Podiatrist) for his wounds and underwent amputation on 1/27/23.     Latest Cuong Scale Score recorded: 19 (02/07/23 0903)  The following are in use during this visit:  • Pressure redistribution mattress    • Mepilex border to sacrum intact  • Clear aid skin protectant to buttocks  • Absorbent breathable underpad   • Heels off mattress with pillow   •    NUTRITION:    Dietitian:      Following x Consulted     Dietary Orders (From admission, onward)     Start     Ordered    02/07/23 1406  Daily w Dinner; Nepro with Carbsteady/Renal Supplement Oral Nutrition Supplement  As Directed        Question Answer Comment   Frequency Daily w Dinner    Oral Supplement Nepro with Carbsteady/Renal Supplement        02/07/23 1405    02/07/23 1406  2 Times/Day w Breakfast & Dinner; Dimitry/Tissue Building, Orange (sub fruit punch or any available flavor it out of stock) Oral Nutrition Supplement  As Directed        Question Answer Comment   Frequency 2 Times/Day w Breakfast & Dinner    Oral Supplement Dimitry/Tissue Building, Otoe sub fruit punch or any available flavor it out of stock       02/07/23 1405    02/06/23 1838  Renal (2400mg Na+, 60mEq K+, 1000mg P), Cardiac Diet  DIET EFFECTIVE NOW        Question Answer Comment   Diet Modifiers Renal (2400mg Na+, 60mEq K+, 1000mg P)    Diet Modifiers Cardiac        02/06/23 1837               ALLERGIES:  Allergies as of 02/06/2023   • (No Known Allergies)     WOUND DOCUMENTATION & TREATMENT:      02/07/23 1620   Wound Heel Right Pressure Injury   Date First  Pt in Afib and symptomatic.   Transmission in for your view.    Assessed/Time First Assessed: 01/21/23 0111   Present on Hospital Admission: Yes  Location: Heel  Laterality: Right  Level of Skin Injury: Full Thickness  Primary Wound Type: Pressure Injury  Initial Pressure Injury Stage: Unstageable   Pressure Injury Stage U   Dressing Assessment Clean;Intact;Dry   Dressing Activity Changed   Dressing Changed On   02/07/23   Wound Exudate None;No odor   Cleansing Agent Normal saline   Wound Bed/Tissue Type Black;Necrotic tissue, eschar   Periwound Condition Normal   Wound Edge Attached to wound bed;Well defined   Wound Status Unchanged   Topical Agent Povidone iodine   Wound Last Measured 02/07/23   Wound Length (cm) 5 cm   Wound Width (cm) 6 cm   Wound Surface Area (cm^2) 30 cm^2   Wound Foot Left Amputation Incision   Date First Assessed: 01/27/23   Present on Hospital Admission: Yes  Location: Foot  Laterality: Left  Modifier: Amputation  Level of Skin Injury: Full Thickness  Primary Wound Type: Incision   Dressing Assessment Intact;Drainage present   Dressing Activity Changed   Dressing Changed On   02/07/23   Wound Exudate Small;Serosanguineous;Serous;No odor   Cleansing Agent Normal saline   Wound Bed/Tissue Type Black;Purple;Gray  (black over incision line and broken macerated blister with red purple gray base noted)   Periwound Condition Edema;Macerated;Moist  (3 close vertical incisions with intact sutures noted to dorsal foot.)   Wound Status Deteriorating   Topical Agent Povidone iodine  (painted with betadine, covered with ABD pad, kerlix wrapped, taped)   Wound Last Measured 02/07/23   Wound Length (cm) 5 cm   Wound Width (cm) 15 cm   Wound Surface Area (cm^2) 75 cm^2   Wound Leg Right Anterior   No Date First Assessed or Time First Assessed found.   Present on Hospital Admission: Yes  Location: Leg  Laterality: Right  Modifier: Anterior  Level of Skin Injury: Full Thickness   Wound Exudate None;No odor   Cleansing Agent Normal saline   Wound Bed/Tissue Type  Yellow;Epithelialized   Wound Edge Poorly defined   Topical Agent Povidone iodine  (painted with betadine, open to air.  If drainage noted, cover with dry gauze, kerlix wrap, tape)   Wound Last Measured 02/07/23   Wound Length (cm) 2 cm   Wound Width (cm) 1 cm   Wound Surface Area (cm^2) 2 cm^2   Wound Leg Left Medial   No Date First Assessed or Time First Assessed found.   Present on Hospital Admission: Yes  Location: Leg  Laterality: Left  Modifier: Medial  Level of Skin Injury: Full Thickness   Wound Exudate None   Cleansing Agent Normal saline   Wound Bed/Tissue Type Yellow;Epithelialized   Periwound Condition Normal   Wound Edge Poorly defined   Topical Agent Povidone iodine  (painted with betadine, open to air.  If drainage noted, cover with dry gauze, kerlix wrap, tape)   Wound Last Measured 02/07/23   Wound Length (cm) 6 cm   Wound Width (cm) 2 cm   Wound Depth (cm) 0.3 cm   Wound Surface Area (cm^2) 12 cm^2   Wound Volume (cm^3) 3.6 cm^3     L heel:  Thin scab area, 1x2cm, no drainage, no pain. Left open to air.    L achilles:  3 sutured areas noted, no drainage, wnl periwound, left open to air.    CARE COORDINATION:  2/7/23 • Dr. Davis was notified of the wounds and Podiatry consultation recommendation, local treatment order entered            • Discussed current treatment and plan of care with RN  • Wound care RN will continue to follow     RECOMMENDATIONS - SKIN CARE & PRESSURE INJURY PREVENTION:  • Turn patient frequently at least every 1-2 hours in bed and hourly when up in chair  • Continue mepilex border application over bony prominences to reduce direct friction  • Pressure redistribution mattress in use  • Manage moisture, keep patient clean and dry, continue skin barrier application to buttocks post incontinent care  • Apply lotion to dry skin during routine care  • Do not use diaper or plastic blue chux in bed  • Provide waffle seat cushion when up in chair  • Protect skin from medical  device  • Continue heel elevation, floated with pillow, please start Truvue heel protector, no need to apply distal strap      PLANNED DISCHARGE DESTINATION:  See / notes for the plan

## 2025-03-21 NOTE — TELEPHONE ENCOUNTER
Does patient need sooner appointment with HF or with EP? Or just reschedule SUJEY/CV? If the SUJEY/CV.  Who schedule those?

## 2025-03-21 NOTE — TELEPHONE ENCOUNTER
Pt called back today wishing to reschedule his SUJEY and cardioversion since he is back in Afib and is scheduled for thyroid surgery on 4/15/25.  Pt was originally schedule for SUJEY and cardioversion on 3/10/25 however the procedure was cancelled because the pt was in NSR at the time.  Please review and advise, thank you.

## 2025-03-22 ENCOUNTER — RESULTS FOLLOW-UP (OUTPATIENT)
Dept: NON INVASIVE DIAGNOSTICS | Facility: HOSPITAL | Age: 80
End: 2025-03-22

## 2025-03-23 DIAGNOSIS — K59.04 CHRONIC IDIOPATHIC CONSTIPATION: ICD-10-CM

## 2025-03-23 DIAGNOSIS — M54.12 RADICULOPATHY, CERVICAL REGION: ICD-10-CM

## 2025-03-23 RX ORDER — TIZANIDINE 2 MG/1
4 TABLET ORAL 2 TIMES DAILY
Qty: 60 TABLET | Refills: 0 | Status: SHIPPED | OUTPATIENT
Start: 2025-03-23 | End: 2025-03-24 | Stop reason: SDUPTHER

## 2025-03-24 ENCOUNTER — OFFICE VISIT (OUTPATIENT)
Dept: PAIN MEDICINE | Facility: CLINIC | Age: 80
End: 2025-03-24
Payer: MEDICARE

## 2025-03-24 ENCOUNTER — TELEPHONE (OUTPATIENT)
Dept: PAIN MEDICINE | Facility: CLINIC | Age: 80
End: 2025-03-24

## 2025-03-24 VITALS — WEIGHT: 201 LBS | HEIGHT: 70 IN | BODY MASS INDEX: 28.77 KG/M2

## 2025-03-24 DIAGNOSIS — M48.02 CERVICAL SPINAL STENOSIS: ICD-10-CM

## 2025-03-24 DIAGNOSIS — M47.812 CERVICAL SPONDYLOSIS: ICD-10-CM

## 2025-03-24 DIAGNOSIS — M54.12 RADICULOPATHY, CERVICAL REGION: Primary | ICD-10-CM

## 2025-03-24 PROCEDURE — G2211 COMPLEX E/M VISIT ADD ON: HCPCS | Performed by: ANESTHESIOLOGY

## 2025-03-24 PROCEDURE — 99214 OFFICE O/P EST MOD 30 MIN: CPT | Performed by: ANESTHESIOLOGY

## 2025-03-24 RX ORDER — TIZANIDINE 2 MG/1
4 TABLET ORAL 2 TIMES DAILY
Qty: 60 TABLET | Refills: 5 | Status: SHIPPED | OUTPATIENT
Start: 2025-03-24

## 2025-03-24 RX ORDER — LINACLOTIDE 290 UG/1
CAPSULE, GELATIN COATED ORAL EVERY MORNING
Qty: 30 CAPSULE | Refills: 1 | OUTPATIENT
Start: 2025-03-24

## 2025-03-24 NOTE — TELEPHONE ENCOUNTER
Please confirm if patient is taking ibsrela which was approved on 3/3. This was to replace the linzess.

## 2025-03-24 NOTE — PROGRESS NOTES
Assessment:  1. Radiculopathy, cervical region    2. Cervical spinal stenosis    3. Cervical spondylosis        Plan:  The patient is experiencing right-sided radicular symptoms secondary to his cervical spinal stenosis.  At this time, discussed proceeding with a cervical epidural injection to help reduce swelling/inflammation which is leading to the pain symptoms.  He would like to proceed.  Most recent hemoglobin A1c was 8.2 but he reports that his sugars are much better controlled now under 120 daily.  Will get permission to have his Eliquis held and then have him scheduled for next week as he will be undergoing thyroid surgery on 4/15/2025.    My impressions and treatment recommendations were discussed in detail with the patient who verbalized understanding and had no further questions.  Discharge instructions were provided. I personally saw and examined the patient and I agree with the above discussed plan of care.    Orders Placed This Encounter   Procedures   • [place] spine and pain procedure     Standing Status:   Future     Expected Date:   3/24/2025     Expiration Date:   3/24/2029     Reason for Exam::   VANESA     Anticoagulant hold needed?:   Yes-Eliquis     New Medications Ordered This Visit   Medications   • tiZANidine (ZANAFLEX) 2 mg tablet     Sig: Take 2 tablets (4 mg total) by mouth 2 (two) times a day     Dispense:  60 tablet     Refill:  5       History of Present Illness:  Andrade Wise Jr. is a 80 y.o. male who presents for a follow up office visit in regards to Neck Pain and Shoulder Pain (Rt ).   The patient was previously seeing Dr. Mays and care is now being transferred to me.  To review, he has chronic right shoulder pain secondary to rotator cuff tear as well as tendinitis.  He has arthritis and spinal stenosis and was planning on medial branch blocks in anticipation of radiofrequency ablation.  However, he has been having worsening right arm symptoms with numbness and tingling aggravated  turning his head and looking upwards.  Symptoms are moderate to severe rated 8/10 numeric rating scale.  He has difficulty with using the mouse for his computer.    I have personally reviewed and/or updated the patient's past medical history, past surgical history, family history, social history, current medications, allergies, and vital signs today.     Review of Systems   Respiratory:  Negative for shortness of breath.    Cardiovascular:  Negative for chest pain.   Gastrointestinal:  Negative for constipation, diarrhea, nausea and vomiting.   Musculoskeletal:  Positive for neck pain. Negative for arthralgias, gait problem, joint swelling and myalgias.   Skin:  Negative for rash.   Neurological:  Positive for weakness and numbness. Negative for dizziness and seizures.   All other systems reviewed and are negative.      Patient Active Problem List   Diagnosis   • Atrial fibrillation (HCC)   • Hypertension, benign   • Chronic kidney disease (CKD), stage II (mild)   • Chronic rhinitis   • Colon, diverticulosis   • Type 2 diabetes mellitus with stage 3 chronic kidney disease, without long-term current use of insulin, unspecified whether stage 3a or 3b CKD (HCC)   • Diabetic neuropathy (HCC)   • Esophageal reflux   • Mixed hyperlipidemia   • Obstructive sleep apnea   • Aortic root dilation (HCC)   • Seasonal allergic rhinitis due to pollen   • Class 1 obesity   • Dyspnea on exertion   • Stage 2 chronic kidney disease   • Left groin pain   • Syncope   • Positive depression screening   • Benign localized prostatic hyperplasia with lower urinary tract symptoms (LUTS)   • Complex renal cyst   • Left upper extremity swelling   • Grade IV hemorrhoids   • History of ventral hernia repair   • Primary osteoarthritis of left knee   • Irritable bowel syndrome with constipation   • Absolute anemia   • Pancreatic cyst   • Personal history of colon polyps, unspecified   • Pancreatic insufficiency   • Olecranon bursitis of left elbow        Past Medical History:   Diagnosis Date   • Allergic rhinitis    • Anemia 10/23/2008    last assessed 9/9/13    • Anxiety    • Atrial fibrillation (HCC)    • Bleeding    • Chronic kidney disease    • COVID-19 virus infection 12/2022   • CPAP (continuous positive airway pressure) dependence    • Diabetes mellitus (HCC)    • Diarrhea    • Diverticulitis    • Eating disorder    • GERD (gastroesophageal reflux disease)    • Herpes zoster with complication     last assessed 7/3/17    • Hiatal hernia    • Hyperlipidemia    • Hypertension    • IBS (irritable bowel syndrome)    • Incisional hernia    • Increased urinary frequency    • Irregular heart beat    • Pyogenic granuloma 09/13/2006    last assessed 9/13/06   • Carlos (subconjunctival hemorrhage), unspecified laterality 09/12/2005    last assessed 9/12/05   • Sleep apnea    • TMJ (dislocation of temporomandibular joint)    • Ventral hernia     last assessed  4/6/17    • Wears glasses        Past Surgical History:   Procedure Laterality Date   • APPENDECTOMY     • ATRIAL ABLATION SURGERY      2014   • ATRIAL ABLATION SURGERY      catheter ablation atrial fibrillation / last assessed 2/17/16    • ATRIAL ABLATION SURGERY N/A 11/26/2024    heart   • CARDIOVERSION  05/30/2024   • CHOLECYSTECTOMY      lap   • COLON SURGERY  1990    Hartmans procedure   • COLON SURGERY      reversal 6 weeks later   • COLONOSCOPY N/A 01/26/2017    Procedure: COLONOSCOPY;  Surgeon: Portillo Schumacher MD;  Location: [facility];  Service:    • ESOPHAGOGASTRODUODENOSCOPY N/A 01/26/2017    Procedure: ESOPHAGOGASTRODUODENOSCOPY (EGD);  Surgeon: Portillo Schumacher MD;  Location: [facility];  Service:    • EYE SURGERY Bilateral 2015    lid repair   • HERNIA REPAIR Bilateral     inguinal 2014 & 2013   • INCISIONAL HERNIA REPAIR  2011   • INCISIONAL HERNIA REPAIR N/A 03/24/2017    Procedure: REPAIR OF INCARCERATED INCISIONAL HERNIA WITH MESH, Lysis of Adhesions, Partial Omentectomy;  Surgeon: Colt Davis  MD;  Location: [facility];  Service:    • INSERT / REPLACE / REMOVE PACEMAKER Left 10/04/2021    [id number]   • NEUROBLASTOMA EXCISION     • SKIN CANCER EXCISION      On nose   • TONSILLECTOMY     • US GUIDED THYROID BIOPSY  2025       Family History   Problem Relation Age of Onset   • Heart disease Mother    • Cancer Mother    • Colon cancer Mother    • Dementia Mother    • Early death Father    • Seizures Father    • Cancer Sister         bone   • Heart disease Sister    • Diabetes Sister    • Lupus Sister    • Heart disease Brother    • Cancer Brother         kidney   • Diabetes Brother    • Other Sister         covid   • Stroke Neg Hx        Social History     Occupational History   • Not on file   Tobacco Use   • Smoking status: Former     Current packs/day: 0.00     Average packs/day: 1 pack/day for 20.0 years (20.0 ttl pk-yrs)     Types: Cigarettes     Start date:      Quit date:      Years since quittin.2     Passive exposure: Never   • Smokeless tobacco: Never   • Tobacco comments:     Started age 17   Vaping Use   • Vaping status: Never Used   Substance and Sexual Activity   • Alcohol use: No     Comment: none in 33 yrs   • Drug use: No   • Sexual activity: Yes       Current Outpatient Medications on File Prior to Visit   Medication Sig   • acetaminophen (TYLENOL) 325 mg tablet Take 650 mg by mouth every 6 (six) hours as needed   • amiodarone 200 mg tablet    • apixaban (ELIQUIS) 5 mg Take 1 tablet (5 mg total) by mouth 2 (two) times a day   • atorvastatin (LIPITOR) 20 mg tablet TAKE 1 TABLET DAILY   • Blood Glucose Monitoring Suppl (FREESTYLE LITE) GEOVANNY    • carvedilol (COREG) 25 mg tablet Take 12.5 mg by mouth 2 (two) times a day   • clotrimazole-betamethasone (LOTRISONE) 1-0.05 % cream APPLY TO AFFECTED AREA(S) TWO TIMES A DAY   • fluticasone (FLONASE) 50 mcg/act nasal spray USE 2 SPRAYS IN EACH NOSTRIL DAILY (GENERIC FOR FLONASE)   • FREESTYLE LITE test strip     • gabapentin (NEURONTIN) 300 mg capsule Take 1 capsule (300 mg total) by mouth 2 (two) times a day   • hydroCHLOROthiazide 25 mg tablet TAKE ONE TABLET BY MOUTH EVERY DAY   • ketoconazole (NIZORAL) 2 % shampoo    • Lancets (FREESTYLE) lancets    • metroNIDAZOLE (METROCREAM) 0.75 % cream    • Mounjaro 7.5 MG/0.5ML SOAJ    • Multiple Vitamin (MULTIVITAMIN ADULT PO) Take by mouth   • ondansetron (ZOFRAN) 4 mg tablet Take 1 tablet (4 mg total) by mouth every 6 (six) hours   • RESTASIS 0.05 % ophthalmic emulsion Administer 1 drop to both eyes every 12 (twelve) hours   • tadalafil (CIALIS) 5 MG tablet TAKE ONE TABLET BY MOUTH EVERY DAY AS NEEDED FOR ERECTILE DYSFUNCTION   • tamsulosin (FLOMAX) 0.4 mg TAKE 1 CAPSULE DAILY WITH DINNER   • [DISCONTINUED] tiZANidine (ZANAFLEX) 2 mg tablet TAKE TWO TABLETS BY MOUTH TWICE A DAY   • pantoprazole (PROTONIX) 40 mg tablet Take 40 mg by mouth (Patient not taking: Reported on 3/24/2025)   • pioglitazone (ACTOS) 15 mg tablet Take by mouth (Patient not taking: Reported on 3/24/2025)   • pioglitazone-metFORMIN (ACTOPLUS MET)  MG per tablet daily after dinner (Patient not taking: Reported on 1/24/2025)   • polyethylene glycol (GOLYTELY) 4000 mL solution Take 4,000 mL by mouth once for 1 dose   • Polyvinyl Alcohol-Povidone PF 1.4-0.6 % SOLN Apply to eye (Patient not taking: Reported on 3/14/2025)   • Tenapanor HCl (Ibsrela) 50 MG TABS Take 50 mg by mouth 2 (two) times a day with meals   • torsemide (DEMADEX) 10 mg tablet Take by mouth (Patient not taking: Reported on 3/24/2025)   • traMADol (ULTRAM) 50 mg tablet Take 50 mg by mouth once as needed for moderate pain He uses it rarely. (Patient not taking: Reported on 1/30/2025)   • urea (CARMOL) 20 % cream if needed (Patient not taking: Reported on 1/24/2025)   • [DISCONTINUED] tiZANidine (ZANAFLEX) 2 mg tablet Take 2 tablets (4 mg total) by mouth 2 (two) times a day     No current facility-administered medications on file prior to  "visit.       Allergies   Allergen Reactions   • Codeine Other (See Comments) and GI Intolerance     Reaction Date: 27Dec2004; Annotation - 04Sep2012: '' CRAZY ''  vomiting  headache   • Demerol [Meperidine] Nausea Only, Other (See Comments), GI Intolerance and Vomiting     vomiting  Reaction Date: 27Dec2004;   headache   • Morphine Nausea Only, GI Intolerance and Vomiting     Reaction Date: 23Feb2012;        Physical Exam:    Ht 5' 10\" (1.778 m)   Wt 91.2 kg (201 lb)   BMI 28.84 kg/m²     Constitutional:normal, well developed, well nourished, alert, in no distress and non-toxic and no overt pain behavior.  Eyes:anicteric  HEENT:grossly intact  Neck:supple, symmetric, trachea midline and no masses   Pulmonary:even and unlabored  Cardiovascular:No edema or pitting edema present  Skin:Normal without rashes or lesions and well hydrated  Psychiatric:Mood and affect appropriate  Neurologic:Cranial Nerves II-XII grossly intact  Musculoskeletal:normal    Cervical Spine Exam  Appearance:  Normal lordosis  Palpation/Tenderness:  right cervical paraspinal tenderness  Range of Motion:  Flexion:  Minimally limited  with pain  Extension:  Moderately limited  with pain  Rotation - Left:  Minimally limited  without pain  Rotation - Right:  Moderately limited  with pain  Motor Strength:  Left Arm Flexion  5/5  Left Arm Extension  5/5  Right Arm Flexion  5/5  Right Arm Extension  5/5  Left Wrist Flexion  5/5  Left Wrist Extension  5/5  Right Wrist Flexion  5/5  Right Wrist Extension  5/5  Left Finger Abduction  5/5  Right Finger Abduction  5/5  Left Pincer Grasp  5/5  Right Pincer Grasp  5/5    MRI CERVICAL SPINE WITHOUT CONTRAST (12/6/2024)     INDICATION: M54.12: Radiculopathy, cervical region  M54.2: Cervicalgia.     COMPARISON: 11/18/2024; 5/3/2022; 11/19/2024     TECHNIQUE:  Multiplanar, multisequence imaging of the cervical spine was performed. .        IMAGE QUALITY:  Diagnostic     FINDINGS:     ALIGNMENT: Trace grade 1 " retrolisthesis of C5 on C6.     MARROW SIGNAL: Scattered degenerative endplate changes.  No focally suspicious marrow lesions.  No bone marrow edema or compression abnormality.     CERVICAL AND VISUALIZED THORACIC CORD:  Normal signal within the visualized cord.     PREVERTEBRAL AND PARASPINAL SOFT TISSUES: 3.1 x 2.7 cm heterogeneous mass in the right lobe of the thyroid gland series 8 image 126. Incidental discovery of one or more thyroid nodule(s) measuring more than 1.5 cm and without suspicious features is noted   in this patient who is above 35 years old; according to guidelines published in the February 2015 white paper on incidental thyroid nodules in the Journal of the American College of Radiology (JACR), further characterization with thyroid ultrasound is   recommended.     VISUALIZED POSTERIOR FOSSA: Partially imaged prominent retrocerebellar arachnoid cyst seen on the prior MRI from 2022. No associated edema in the visualized cerebellum. No cerebellar tonsillar ectopia.     CERVICAL DISC SPACES:     C2-C3:  Normal.     C3-C4:  Normal.     C4-C5: There is loss of disc height and signal. There is a disc osteophyte complex with a superimposed right neural foraminal disc protrusion. Mild central canal narrowing. Moderate bilateral neural foraminal narrowing.     C5-C6: There is a central disc herniation, protrusion type. There is moderate central canal narrowing. Mild to moderate left neural foraminal narrowing. Mild right neural foraminal narrowing.     C6-C7: Small central disc herniation, protrusion type. Mild central canal narrowing. Neural foramina patent bilaterally.     C7-T1:  Normal.     UPPER THORACIC DISC SPACES:  Normal.     OTHER FINDINGS:  None.     IMPRESSION:        1. Scattered multilevel spondylosis most pronounced at C4-5 and C5-C6. No cord compression or cord signal abnormality.  2. 3.1 cm right-sided thyroid nodule/mass recently evaluated for attempted thyroid biopsy on 11/19/2024 which  recommended surveillance ultrasound in 1 year's time.     Imaging

## 2025-03-24 NOTE — TELEPHONE ENCOUNTER
This patient is being considered for a neuraxial injection for the management of their pain. However, the patient is currently on an anticoagulant per your orders. The American Society of Regional Anesthesia Guideline on neuraxial procedures and anti-coagulation indicates that medication should be held as follows: Eliquis 3 days prior to injection.   Please advise if you ok the hold of this medication.    Thank you,    Ehsan Rodrigues  Procedure   [facility]

## 2025-03-25 ENCOUNTER — OFFICE VISIT (OUTPATIENT)
Dept: PHYSICAL THERAPY | Facility: CLINIC | Age: 80
End: 2025-03-25
Payer: MEDICARE

## 2025-03-25 DIAGNOSIS — M75.101 TEAR OF RIGHT ROTATOR CUFF, UNSPECIFIED TEAR EXTENT, UNSPECIFIED WHETHER TRAUMATIC: Primary | ICD-10-CM

## 2025-03-25 DIAGNOSIS — M19.011 OSTEOARTHRITIS OF GLENOHUMERAL JOINT, RIGHT: ICD-10-CM

## 2025-03-25 PROCEDURE — 97110 THERAPEUTIC EXERCISES: CPT | Performed by: PHYSICAL THERAPIST

## 2025-03-25 NOTE — TELEPHONE ENCOUNTER
S/w Pt. Pt is scheduled for VANESA on 04/01. Pt instructed last dose of Eliquis on 03/28 per SPA 3 day hold guidelines.   Pt denies taking other anticoagulants and NSAIDs. Pt verbalized understanding of preoperative instructions.

## 2025-03-25 NOTE — TELEPHONE ENCOUNTER
Patient has been scheduled for their procedure, please see [facility] message for additional details.

## 2025-03-25 NOTE — PROGRESS NOTES
"Daily Note     Today's date: 3/25/2025  Patient name: Andrade Wise Jr.  : 1945  MRN: [id number]  Referring provider: Ho Dao*  Dx:   Encounter Diagnosis     ICD-10-CM    1. Tear of right rotator cuff, unspecified tear extent, unspecified whether traumatic  M75.101       2. Osteoarthritis of glenohumeral joint, right  M19.011                      Subjective: Pt has concerns about his cardiac status since last week. He would like to \"take it easier\" today. Pt is going to get an injection in shoulder on Tuesday.       Objective: See treatment diary below. Modified therex today      Assessment: Tolerated treatment well. Patient demo nil adverse effects from today's session but pt has apprehension with exercise due to his ongoing Afib, cardiac MD is managing and aware of his status.       Plan: Continue per plan of care.      Precautions: Hypertension, DM      Specialty Daily Treatment Diary         Specialty Daily Treatment Diary         Insurance Eval/ Re-eval POC expires Auth Status Total visits  Start date  Expiration date Misc   MED  6 weeks - 3/17/2025      Co-Insurance                 Date 3/11/25 3/13/25 3/17/25 3/20/25 3/25/25   Visit Number [phone] 13   Auth                Manual        STJ retract        P to A GHJ mobs 3' 3' 3' 3 min 3'   STM UT     3'   PROM R shld 7' 7' 5' 5 min 10'   C-S distraction   2'     T-S P to A   2'     TherEx        NuStep        Pulleys 20 20 20 20    Wand flex        Rows 30  Grenn 30  GR 30 gr 20  gr 30x green   Ext 30  Green 30  GR 30  gr 30  gr 30x green   OH press 20  20 20    TB ER [place]  20x  25% ROM with iso hold                               Neuro Re-Ed        S/L ER 20 20 20 20    S/L rotation        AROM flex to 90 20 20      AROM abd to 90 20 20      S/L abd 20 20 20     C-S retraction   10     C-S extension   10             TherAct                                                Gait Training                                        Access " Code: [email or web address]  URL: [email or web address]  Date: 02/21/2025  Prepared by: Zeke Lamb    Exercises  - Standing Bilateral Low Shoulder Row with Anchored Resistance  - 1 x daily - 7 x weekly - 2 sets - 10 reps  - Shoulder extension with resistance - Neutral  - 1 x daily - 7 x weekly - 2 sets - 10 reps  - Sidelying Shoulder External Rotation  - 1 x daily - 7 x weekly - 2 sets - 10 reps  - Standing Shoulder Flexion to 90 Degrees  - 1 x daily - 7 x weekly - 2 sets - 10 reps  - Standing Shoulder Horizontal Abduction with Resistance  - 1 x daily - 7 x weekly - 2 sets - 10 reps

## 2025-03-25 NOTE — TELEPHONE ENCOUNTER
Patient has been scheduled with Dr. Chen for VANESA on 4/1/25.  Patient is currently on Eliquis, hold approval was obtained and can be found in the patient's chart.  Patient is aware that our clinical department will be contacting them with further instructions on hold dates.    Thank you,  Ehsan

## 2025-03-28 ENCOUNTER — OFFICE VISIT (OUTPATIENT)
Dept: PHYSICAL THERAPY | Facility: CLINIC | Age: 80
End: 2025-03-28
Payer: MEDICARE

## 2025-03-28 DIAGNOSIS — M54.12 CERVICAL RADICULOPATHY: ICD-10-CM

## 2025-03-28 DIAGNOSIS — K21.9 GASTROESOPHAGEAL REFLUX DISEASE WITHOUT ESOPHAGITIS: ICD-10-CM

## 2025-03-28 DIAGNOSIS — M75.101 TEAR OF RIGHT ROTATOR CUFF, UNSPECIFIED TEAR EXTENT, UNSPECIFIED WHETHER TRAUMATIC: Primary | ICD-10-CM

## 2025-03-28 DIAGNOSIS — M19.011 OSTEOARTHRITIS OF GLENOHUMERAL JOINT, RIGHT: ICD-10-CM

## 2025-03-28 PROCEDURE — 97110 THERAPEUTIC EXERCISES: CPT | Performed by: PHYSICAL THERAPIST

## 2025-03-28 NOTE — PROGRESS NOTES
Daily Note     Today's date: 3/28/2025  Patient name: Andrade Wise Jr.  : 1945  MRN: [id number]  Referring provider: Ho Dao*  Dx:   Encounter Diagnosis     ICD-10-CM    1. Tear of right rotator cuff, unspecified tear extent, unspecified whether traumatic  M75.101       2. Osteoarthritis of glenohumeral joint, right  M19.011       3. Cervical radiculopathy  M54.12                      Subjective:  Pain is not bad today      Objective: See treatment diary below. Modified therex today      Assessment: Tolerated treatment well. He attempted quarter golf swings and had no discomfort with this.      Plan: Continue per plan of care.      Precautions: Hypertension, DM      Specialty Daily Treatment Diary         Specialty Daily Treatment Diary         Insurance Eval/ Re-eval POC expires Auth Status Total visits  Start date  Expiration date Misc   MED  6 weeks - 3/17/2025      Co-Insurance                 Date 3/13/25 3/17/25 3/20/25 3/25/25 3/28/25   Visit Number [phone]   Auth                Manual        STJ retract        P to A GHJ mobs 3' 3' 3 min 3' 3'   STM UT    3' 3'   PROM R shld 7' 5' 5 min 10' 8'   C-S distraction  2'      T-S P to A  2'      TherEx        NuStep        Pulleys 20 20 20  20   Wand flex        Rows 30  GR 30 gr 20  gr 30x green 30   gr   Ext 30  GR 30  gr 30  gr 30x green 30   gr   OH press  20 20  20   TB ER     20  YTB                           Neuro Re-Ed        S/L ER 20 20 20     S/L rotation        AROM flex to 90 20       AROM abd to 90 20       S/L abd 20 20      C-S retraction  10      C-S extension  10              TherAct        Golf swings     20  1/ swings                                   Gait Training                                        Access Code: [email or web address]  URL: [email or web address]  Date: 2025  Prepared by: Zeke Lamb    Exercises  - Standing Bilateral Low Shoulder Row with Anchored Resistance  - 1 x daily - 7 x weekly -  2 sets - 10 reps  - Shoulder extension with resistance - Neutral  - 1 x daily - 7 x weekly - 2 sets - 10 reps  - Sidelying Shoulder External Rotation  - 1 x daily - 7 x weekly - 2 sets - 10 reps  - Standing Shoulder Flexion to 90 Degrees  - 1 x daily - 7 x weekly - 2 sets - 10 reps  - Standing Shoulder Horizontal Abduction with Resistance  - 1 x daily - 7 x weekly - 2 sets - 10 reps

## 2025-03-31 NOTE — PROGRESS NOTES
HEART AND VASCULAR  CARDIAC ELECTROPHYSIOLOGY   HEART RHYTHM CENTER  [facility]    Outpatient New Consult   for assessment and management of persistent atrial fibrillation  Today's Date: 04/01/25        Patient name: Andrade Wise Jr.  YOB: 1945  Sex: male         Chief Complaint: As above      ASSESSMENT:  Problem List Items Addressed This Visit       Atrial fibrillation (HCC) - Primary    Relevant Medications    carvedilol (COREG) 25 mg tablet    Other Relevant Orders    POCT ECG    Hypertension, benign    Relevant Medications    carvedilol (COREG) 25 mg tablet    Mixed hyperlipidemia    Obstructive sleep apnea     Other Visit Diagnoses         Cardiac pacemaker in situ          PAF (paroxysmal atrial fibrillation) (HCC)        Relevant Medications    carvedilol (COREG) 25 mg tablet                  PLAN:  Persistent atrial fibrillation  -Continue apixaban 5 mg p.o. twice daily  -Continue carvedilol 25mg twice daily  -Continue amiodarone 200 mg daily  -Patient does not want repeat ablation    Thyroid cancer  -Plan for thyroidectomy at the end of this month    Hypertension  -Blood pressure today  -Continue carvedilol 25 mg p.o. twice daily  -Continue hydrochlorothiazide 25 mg daily    Hyperlipidemia  -Continue atorvastatin 20 mg daily    History of syncope, bifascicular block, status post implant of dual-chamber pacemaker  -Last check revealed A-fib burden 25% without RVR  -Device functioning appropriately    ROBER  -CPAP    Follow up in: 4 to 6 months    Orders Placed This Encounter   Procedures    POCT ECG     Medications Discontinued During This Encounter   Medication Reason    carvedilol (COREG) 25 mg tablet Adjust Sig         .............................................................................................    HPI/Subjective:     Mr. Andrade Wise Jr. is an 80-year-old gentleman with past medical history of syncope/bifascicular block status post pacemaker, atrial  fibrillation, hypertension, and who is status post 2 ablations for his atrial fibrillation (1 cryoablation, 1 recent PFA for reconnection of right pulmonary vein and posterior wall ablation).  Despite the after mentioned interventions, patient remains in atrial fibrillation.  He is anticoagulated with Eliquis, on carvedilol 12.5 mg twice daily, and amiodarone.  He was referred to electrophysiology for further evaluation.    Today, he states he feels well overall.  He is in normal sinus rhythm.  He notes he self increased his carvedilol to 25 mg twice daily.  He questions why despite 2 ablations and amiodarone use he continues to go in and out of atrial fibrillation.  It was explained that atrial fibrillation is often paroxysmal in nature, and furthermore that all interventions for atrial fibrillation are treatment not cure.  There is a 30 to 35% chance that regardless of what interventions are taken atrial fibrillation will persist. He stated repeatedly that he had significant groin pain after the last ablation and that he would not undergo an additional ablation.     We discussed options for management.  He is essentially failed flecainide as well as Tikosyn with regards to alternative antiarrhythmics.  He is currently on amiodarone.  He has been on amiodarone for 10 years.  He recently was discovered to have thyroid cancer for which she is scheduled for thyroidectomy at the end of the month.    Given his pending thyroidectomy, we will make no changes to his medications.  I will see him back in 4 to 6 months.  At that time we will make decisions on whether or not to continue amiodarone based on recurrence of atrial fibrillation.  In the end, if he continues to have atrial fibrillation and is not willing to proceed with additional ablation, the decision may be made to abort future attempts at rhythm control.      Complete 12 point ROS reviewed and otherwise non pertinent or negative except as per HPI pertinent  positives in Cardiovascular and Respiratory emphasized. Please see paper chart for outpatient clinic patients where the patient completed the 12 point ROS survey.           Past Medical History:   Diagnosis Date    Allergic rhinitis     Anemia 10/23/2008    last assessed 9/9/13     Anxiety     Atrial fibrillation (HCC)     Bleeding     Chronic kidney disease     COVID-19 virus infection 12/2022    CPAP (continuous positive airway pressure) dependence     Diabetes mellitus (HCC)     Diarrhea     Diverticulitis     Eating disorder     GERD (gastroesophageal reflux disease)     Herpes zoster with complication     last assessed 7/3/17     Hiatal hernia     Hyperlipidemia     Hypertension     IBS (irritable bowel syndrome)     Incisional hernia     Increased urinary frequency     Irregular heart beat     Pyogenic granuloma 09/13/2006    last assessed 9/13/06    Carlos (subconjunctival hemorrhage), unspecified laterality 09/12/2005    last assessed 9/12/05    Sleep apnea     TMJ (dislocation of temporomandibular joint)     Ventral hernia     last assessed  4/6/17     Wears glasses        Allergies   Allergen Reactions    Codeine Other (See Comments) and GI Intolerance     Reaction Date: 27Dec2004; Annotation - 04Sep2012: '' CRAZY ''  vomiting  headache    Demerol [Meperidine] Nausea Only, Other (See Comments), GI Intolerance and Vomiting     vomiting  Reaction Date: 27Dec2004;   headache    Morphine Nausea Only, GI Intolerance and Vomiting     Reaction Date: 23Feb2012;      I reviewed the Home Medication list and Allergies in the chart.   Scheduled Meds:  Current Outpatient Medications   Medication Sig Dispense Refill    acetaminophen (TYLENOL) 325 mg tablet Take 650 mg by mouth every 6 (six) hours as needed      amiodarone 200 mg tablet       apixaban (ELIQUIS) 5 mg Take 1 tablet (5 mg total) by mouth 2 (two) times a day 180 tablet 3    atorvastatin (LIPITOR) 20 mg tablet TAKE 1 TABLET DAILY 90 tablet 1    Blood Glucose  Monitoring Suppl (FREESTYLE LITE) GEOVANNY       carvedilol (COREG) 25 mg tablet Take 1 tablet (25 mg total) by mouth 2 (two) times a day 60 tablet 3    clotrimazole-betamethasone (LOTRISONE) 1-0.05 % cream APPLY TO AFFECTED AREA(S) TWO TIMES A DAY 45 g 1    fluticasone (FLONASE) 50 mcg/act nasal spray USE 2 SPRAYS IN EACH NOSTRIL DAILY (GENERIC FOR FLONASE) 48 g 3    FREESTYLE LITE test strip       gabapentin (NEURONTIN) 300 mg capsule Take 1 capsule (300 mg total) by mouth 2 (two) times a day 60 capsule 1    hydroCHLOROthiazide 25 mg tablet TAKE ONE TABLET BY MOUTH EVERY DAY 90 tablet 1    ketoconazole (NIZORAL) 2 % shampoo       Lancets (FREESTYLE) lancets       metroNIDAZOLE (METROCREAM) 0.75 % cream       Mounjaro 7.5 MG/0.5ML SOAJ       Multiple Vitamin (MULTIVITAMIN ADULT PO) Take by mouth      ondansetron (ZOFRAN) 4 mg tablet Take 1 tablet (4 mg total) by mouth every 6 (six) hours 6 tablet 0    polyethylene glycol (GOLYTELY) 4000 mL solution Take 4,000 mL by mouth once for 1 dose 4000 mL 0    RESTASIS 0.05 % ophthalmic emulsion Administer 1 drop to both eyes every 12 (twelve) hours      tadalafil (CIALIS) 5 MG tablet TAKE ONE TABLET BY MOUTH EVERY DAY AS NEEDED FOR ERECTILE DYSFUNCTION 90 tablet 1    tamsulosin (FLOMAX) 0.4 mg TAKE 1 CAPSULE DAILY WITH DINNER 90 capsule 1    Tenapanor HCl (Ibsrela) 50 MG TABS Take 50 mg by mouth 2 (two) times a day with meals 60 tablet 3    tiZANidine (ZANAFLEX) 2 mg tablet Take 2 tablets (4 mg total) by mouth 2 (two) times a day 60 tablet 5    pantoprazole (PROTONIX) 40 mg tablet Take 40 mg by mouth (Patient not taking: Reported on 4/1/2025)      pioglitazone (ACTOS) 15 mg tablet Take by mouth (Patient not taking: Reported on 1/24/2025)      pioglitazone-metFORMIN (ACTOPLUS MET)  MG per tablet daily after dinner (Patient not taking: Reported on 1/24/2025)      Polyvinyl Alcohol-Povidone PF 1.4-0.6 % SOLN Apply to eye (Patient not taking: Reported on 1/24/2025)       torsemide (DEMADEX) 10 mg tablet Take by mouth (Patient not taking: Reported on 3/24/2025)      traMADol (ULTRAM) 50 mg tablet Take 50 mg by mouth once as needed for moderate pain He uses it rarely. (Patient not taking: Reported on 2025)      urea (CARMOL) 20 % cream if needed (Patient not taking: Reported on 2025)       No current facility-administered medications for this visit.     PRN Meds:.        Family History   Problem Relation Age of Onset    Heart disease Mother     Cancer Mother     Colon cancer Mother     Dementia Mother     Early death Father     Seizures Father     Cancer Sister         bone    Heart disease Sister     Diabetes Sister     Lupus Sister     Heart disease Brother     Cancer Brother         kidney    Diabetes Brother     Other Sister         covid    Stroke Neg Hx        Social History     Socioeconomic History    Marital status: /Civil Union     Spouse name: Not on file    Number of children: Not on file    Years of education: Not on file    Highest education level: Not on file   Occupational History    Not on file   Tobacco Use    Smoking status: Former     Current packs/day: 0.00     Average packs/day: 1 pack/day for 20.0 years (20.0 ttl pk-yrs)     Types: Cigarettes     Start date:      Quit date:      Years since quittin.2     Passive exposure: Never    Smokeless tobacco: Never    Tobacco comments:     Started age 17   Vaping Use    Vaping status: Never Used   Substance and Sexual Activity    Alcohol use: No     Comment: none in 33 yrs    Drug use: No    Sexual activity: Yes   Other Topics Concern    Not on file   Social History Narrative    Single per allscript      Social Drivers of Health     Financial Resource Strain: Low Risk  (10/11/2023)    Overall Financial Resource Strain (CARDIA)     Difficulty of Paying Living Expenses: Not hard at all   Food Insecurity: Not on file   Transportation Needs: No Transportation Needs (10/11/2023)    PRAPARE -  "Transportation     Lack of Transportation (Medical): No     Lack of Transportation (Non-Medical): No   Physical Activity: Not on file   Stress: Not on file   Social Connections: Not on file   Intimate Partner Violence: Not At Risk (11/26/2024)    [facility]    Humiliation, Afraid, Rape, and Kick questionnaire     Fear of Current or Ex-Partner: No     Emotionally Abused: No     Physically Abused: No     Sexually Abused: No   Housing Stability: Not on file         OBJECTIVE:    /62 (BP Location: Left arm, Patient Position: Sitting, Cuff Size: Standard)   Pulse 64   Ht 5' 10\" (1.778 m)   Wt 100 kg (220 lb 12.8 oz)   BMI 31.68 kg/m²   Vitals:    04/01/25 1259   Weight: 100 kg (220 lb 12.8 oz)     GEN: No acute distress, Alert and oriented, well appearing  HEENT:External ears normal, oral pharynx clear, mucous membranes moist  CARDIOVASCULAR:  RRR, No murmur, rub, gallops S1,S2  LUNGS: Clear To auscultation bilaterally, normal effort, no rales, rhonchi, crackles   ABDOMEN:  nondistended,  without obvious organomegaly or ascites  EXTREMITIES/VASCULAR:  No edema. warm an well perfused.  PSYCH: Normal Affect,  linear speech pattern without evidence of psychosis.   NEURO: Grossly intact, moving all extremiteis equal, face symmetric, alert and responsive, no obvious focal defecits   GAIT:  Ambulates normally without difficulty  HEME: No bleeding, bruising, petechia, purpura   SKIN: No significant rashes on visibile skin, warm, no diaphoresis or pallor.     Lab Results:       LABS:      Chemistry        Component Value Date/Time    K 4.0 02/15/2025 1425    K 4.0 11/22/2024 1428     02/15/2025 1425     11/22/2024 1428    CO2 21 02/15/2025 1425    CO2 30 11/22/2024 1428    BUN 29 (H) 02/15/2025 1425    BUN 25 11/22/2024 1428    CREATININE 0.98 02/15/2025 1425    CREATININE 1.00 11/22/2024 1428        Component Value Date/Time    CALCIUM 8.9 " "02/15/2025 1425    CALCIUM 8.8 11/22/2024 1428    ALKPHOS 63 01/06/2025 0950    ALKPHOS 50 11/22/2024 1428    AST 21 01/06/2025 0950    AST 13 11/22/2024 1428    ALT 35 01/06/2025 0950    ALT 47 11/22/2024 1428            Lab Results   Component Value Date    CHOL 108 09/13/2013    CHOL 121 05/15/2013     Lab Results   Component Value Date    HDL 45 10/25/2016    HDL 39 09/13/2013    HDL 41 05/15/2013     Lab Results   Component Value Date    LDLCALC 47 10/25/2016     Lab Results   Component Value Date    TRIG 77 10/25/2016    TRIG 91 09/13/2013    TRIG 93 05/15/2013     No results found for: \"CHOLHDL\"    IMAGING: Cardiac EP device report  Result Date: 3/20/2025  Narrative: MDT DC/PM NB/TELE NOTE-CARELINK TRANSMISSION: PT PRESENTING IN AF VS@ 76 BPM AND SYMPTOMATIC. 25% BURDEN. PT ON AMIO, COREG, & ELIQUIS. BATTERY STATUS \"11 YRS.\" AP 11%  10%. ALL AVAILABLE LEAD PARAMETERS WITHIN NORMAL LIMITS. NORMAL DEVICE FUNCTION. NC        Cardiac testing:   No results found for this or any previous visit.    No results found for this or any previous visit.    No results found for this or any previous visit.    No results found for this or any previous visit.          I reviewed and interpreted the following LABS/EKG/TELE/IMAGING and below is summary of my interpretation (if data available):    LABS:    Current EKG sinus rhythm, PACs, right bundle branch block, left anterior fascicular block    Dual chamber pacemaker interrogation 3/20/25  NB/TELE NOTE-CARELINK TRANSMISSION: PT PRESENTING IN AF VS@ 76 BPM AND SYMPTOMATIC. 25% BURDEN. PT ON AMIO, COREG, & ELIQUIS. BATTERY STATUS \"11 YRS.\" AP 11%  10%. ALL AVAILABLE LEAD PARAMETERS WITHIN NORMAL LIMITS. NORMAL DEVICE FUNCTION. NC     EP study/ablation performed at [facility] 11/26/24  -- Pre-Ablation LA voltage showed RSPV was electrically connected. The   other three pulmonary veins had absence of electrical activity from prior   ablation.   -- AF ablation that included " re-isolation of the pulmonary veins and   posterior LA wall isolation using pulsed field ablation (PFA)     ECHO peformed at [facility]  3/13/24    Left ventricle is normal in size. Wall thickness is normal. Systolic   function is normal with an ejection fraction of 55-60%. Wall motion is   within normal limits. There is grade I (mild) diastolic dysfunction.     Right ventricle cavity is normal. Systolic function is normal.     No significant valve abnormalities.     Ascending Aorta: The ascending aorta is moderately dilated (4.4cm).     Pulmonic Valve: The estimated pulmonary artery systolic pressure is   26.2 mmHg. Normal pulmonary artery pressure.

## 2025-04-01 ENCOUNTER — HOSPITAL ENCOUNTER (OUTPATIENT)
Dept: RADIOLOGY | Facility: CLINIC | Age: 80
Discharge: HOME | End: 2025-04-01
Payer: MEDICARE

## 2025-04-01 ENCOUNTER — TELEPHONE (OUTPATIENT)
Dept: FAMILY MEDICINE CLINIC | Facility: CLINIC | Age: 80
End: 2025-04-01

## 2025-04-01 ENCOUNTER — OFFICE VISIT (OUTPATIENT)
Dept: CARDIOLOGY CLINIC | Facility: CLINIC | Age: 80
End: 2025-04-01
Payer: MEDICARE

## 2025-04-01 VITALS
HEART RATE: 64 BPM | BODY MASS INDEX: 31.61 KG/M2 | HEIGHT: 70 IN | DIASTOLIC BLOOD PRESSURE: 62 MMHG | WEIGHT: 220.8 LBS | SYSTOLIC BLOOD PRESSURE: 140 MMHG

## 2025-04-01 VITALS
HEART RATE: 63 BPM | OXYGEN SATURATION: 97 % | SYSTOLIC BLOOD PRESSURE: 142 MMHG | RESPIRATION RATE: 20 BRPM | TEMPERATURE: 98.7 F | DIASTOLIC BLOOD PRESSURE: 69 MMHG

## 2025-04-01 DIAGNOSIS — G47.33 OBSTRUCTIVE SLEEP APNEA: ICD-10-CM

## 2025-04-01 DIAGNOSIS — Z95.0 CARDIAC PACEMAKER IN SITU: ICD-10-CM

## 2025-04-01 DIAGNOSIS — M54.12 RADICULOPATHY, CERVICAL REGION: ICD-10-CM

## 2025-04-01 DIAGNOSIS — I48.19 PERSISTENT ATRIAL FIBRILLATION (HCC): Primary | ICD-10-CM

## 2025-04-01 DIAGNOSIS — I10 HYPERTENSION, BENIGN: ICD-10-CM

## 2025-04-01 DIAGNOSIS — I48.0 PAF (PAROXYSMAL ATRIAL FIBRILLATION) (HCC): ICD-10-CM

## 2025-04-01 DIAGNOSIS — E78.2 MIXED HYPERLIPIDEMIA: ICD-10-CM

## 2025-04-01 PROCEDURE — 93000 ELECTROCARDIOGRAM COMPLETE: CPT | Performed by: STUDENT IN AN ORGANIZED HEALTH CARE EDUCATION/TRAINING PROGRAM

## 2025-04-01 PROCEDURE — 62321 NJX INTERLAMINAR CRV/THRC: CPT | Performed by: ANESTHESIOLOGY

## 2025-04-01 PROCEDURE — 99214 OFFICE O/P EST MOD 30 MIN: CPT | Performed by: STUDENT IN AN ORGANIZED HEALTH CARE EDUCATION/TRAINING PROGRAM

## 2025-04-01 RX ORDER — METHYLPREDNISOLONE ACETATE 80 MG/ML
80 INJECTION, SUSPENSION INTRA-ARTICULAR; INTRALESIONAL; INTRAMUSCULAR; PARENTERAL; SOFT TISSUE ONCE
Status: COMPLETED | OUTPATIENT
Start: 2025-04-01 | End: 2025-04-01

## 2025-04-01 RX ORDER — CARVEDILOL 25 MG/1
25 TABLET ORAL 2 TIMES DAILY
Qty: 60 TABLET | Refills: 3 | Status: SHIPPED | OUTPATIENT
Start: 2025-04-01

## 2025-04-01 RX ADMIN — METHYLPREDNISOLONE ACETATE 80 MG: 80 INJECTION, SUSPENSION INTRA-ARTICULAR; INTRALESIONAL; INTRAMUSCULAR; SOFT TISSUE at 09:30

## 2025-04-01 RX ADMIN — IOHEXOL 1 ML: 300 INJECTION, SOLUTION INTRAVENOUS at 09:30

## 2025-04-01 NOTE — DISCHARGE INSTR - LAB
Epidural Steroid Injection   *Resume Eliquis this evening*    WHAT YOU NEED TO KNOW:   An epidural steroid injection (MORENITA) is a procedure to inject steroid medicine into the epidural space. The epidural space is between your spinal cord and vertebrae. Steroids reduce inflammation and fluid buildup in your spine that may be causing pain. You may be given pain medicine along with the steroids.          ACTIVITY  Do not drive or operate machinery today.  No strenuous activity today - bending, lifting, etc.  You may resume normal activites starting tomorrow - start slowly and as tolerated.  You may shower today, but no tub baths or hot tubs.  You may have numbness for several hours from the local anesthetic. Please use caution and common sense, especially with weight-bearing activities.    CARE OF THE INJECTION SITE  If you have soreness or pain, apply ice to the area today (20 minutes on/20 minutes off).  Starting tomorrow, you may use warm, moist heat or ice if needed.  You may have an increase or change in your discomfort for 36-48 hours after your treatment.  Apply ice and continue with any pain medication you have been prescribed.  Notify the [facility] if you have any of the following: redness, drainage, swelling, headache, stiff neck or fever above 100°F.    SPECIAL INSTRUCTIONS  Our office will contact you in approximately 14 days for a progress report.    MEDICATIONS  Continue to take all routine medications.  Our office may have instructed you to hold some medications.    As no general anesthesia was used in today's procedure, you should not experience any side effects related to anesthesia.     If you are diabetic, the steroids used in today's injection may temporarily increase your blood sugar levels after the first few days after your injection. Please keep a close eye on your sugars and alert the doctor who manages your diabetes if your sugars are significantly high from your baseline or you are  symptomatic.     If you have a problem specifically related to your procedure, please call our office at [phone].  Problems not related to your procedure should be directed to your primary care physician.

## 2025-04-01 NOTE — H&P
History of Present Illness: The patient is a 80 y.o. male who presents with complaints of neck and arm pain and is here today for cervical epidural steroid injection    Past Medical History:   Diagnosis Date    Allergic rhinitis     Anemia 10/23/2008    last assessed 9/9/13     Anxiety     Atrial fibrillation (HCC)     Bleeding     Chronic kidney disease     COVID-19 virus infection 12/2022    CPAP (continuous positive airway pressure) dependence     Diabetes mellitus (HCC)     Diarrhea     Diverticulitis     Eating disorder     GERD (gastroesophageal reflux disease)     Herpes zoster with complication     last assessed 7/3/17     Hiatal hernia     Hyperlipidemia     Hypertension     IBS (irritable bowel syndrome)     Incisional hernia     Increased urinary frequency     Irregular heart beat     Pyogenic granuloma 09/13/2006    last assessed 9/13/06    Carlos (subconjunctival hemorrhage), unspecified laterality 09/12/2005    last assessed 9/12/05    Sleep apnea     TMJ (dislocation of temporomandibular joint)     Ventral hernia     last assessed  4/6/17     Wears glasses        Past Surgical History:   Procedure Laterality Date    APPENDECTOMY      ATRIAL ABLATION SURGERY      2014    ATRIAL ABLATION SURGERY      catheter ablation atrial fibrillation / last assessed 2/17/16     ATRIAL ABLATION SURGERY N/A 11/26/2024    heart    CARDIOVERSION  05/30/2024    CHOLECYSTECTOMY      lap    COLON SURGERY  1990    Hartmans procedure    COLON SURGERY      reversal 6 weeks later    COLONOSCOPY N/A 01/26/2017    Procedure: COLONOSCOPY;  Surgeon: Portillo Schumacher MD;  Location: [facility];  Service:     ESOPHAGOGASTRODUODENOSCOPY N/A 01/26/2017    Procedure: ESOPHAGOGASTRODUODENOSCOPY (EGD);  Surgeon: Portillo Schumacher MD;  Location: [facility];  Service:     EYE SURGERY Bilateral 2015    lid repair    HERNIA REPAIR Bilateral     inguinal 2014 & 2013    INCISIONAL HERNIA REPAIR  2011    INCISIONAL HERNIA REPAIR N/A 03/24/2017     Procedure: REPAIR OF INCARCERATED INCISIONAL HERNIA WITH MESH, Lysis of Adhesions, Partial Omentectomy;  Surgeon: Colt Davis MD;  Location: [facility];  Service:     INSERT / REPLACE / REMOVE PACEMAKER Left 10/04/2021    medtronic-[id number]    NEUROBLASTOMA EXCISION      SKIN CANCER EXCISION      On nose    TONSILLECTOMY      US GUIDED THYROID BIOPSY  01/06/2025         Current Outpatient Medications:     acetaminophen (TYLENOL) 325 mg tablet, Take 650 mg by mouth every 6 (six) hours as needed, Disp: , Rfl:     amiodarone 200 mg tablet, , Disp: , Rfl:     apixaban (ELIQUIS) 5 mg, Take 1 tablet (5 mg total) by mouth 2 (two) times a day, Disp: 180 tablet, Rfl: 3    atorvastatin (LIPITOR) 20 mg tablet, TAKE 1 TABLET DAILY, Disp: 90 tablet, Rfl: 1    Blood Glucose Monitoring Suppl (FREESTYLE LITE) GEOVANNY, , Disp: , Rfl:     carvedilol (COREG) 25 mg tablet, Take 12.5 mg by mouth 2 (two) times a day, Disp: , Rfl:     clotrimazole-betamethasone (LOTRISONE) 1-0.05 % cream, APPLY TO AFFECTED AREA(S) TWO TIMES A DAY, Disp: 45 g, Rfl: 1    fluticasone (FLONASE) 50 mcg/act nasal spray, USE 2 SPRAYS IN EACH NOSTRIL DAILY (GENERIC FOR FLONASE), Disp: 48 g, Rfl: 3    FREESTYLE LITE test strip, , Disp: , Rfl:     gabapentin (NEURONTIN) 300 mg capsule, Take 1 capsule (300 mg total) by mouth 2 (two) times a day, Disp: 60 capsule, Rfl: 1    hydroCHLOROthiazide 25 mg tablet, TAKE ONE TABLET BY MOUTH EVERY DAY, Disp: 90 tablet, Rfl: 1    ketoconazole (NIZORAL) 2 % shampoo, , Disp: , Rfl:     Lancets (FREESTYLE) lancets, , Disp: , Rfl:     metroNIDAZOLE (METROCREAM) 0.75 % cream, , Disp: , Rfl:     Mounjaro 7.5 MG/0.5ML SOAJ, , Disp: , Rfl:     Multiple Vitamin (MULTIVITAMIN ADULT PO), Take by mouth, Disp: , Rfl:     ondansetron (ZOFRAN) 4 mg tablet, Take 1 tablet (4 mg total) by mouth every 6 (six) hours, Disp: 6 tablet, Rfl: 0    pantoprazole (PROTONIX) 40 mg tablet, Take 40 mg by mouth (Patient not taking: Reported on  3/24/2025), Disp: , Rfl:     pioglitazone (ACTOS) 15 mg tablet, Take by mouth (Patient not taking: Reported on 3/24/2025), Disp: , Rfl:     pioglitazone-metFORMIN (ACTOPLUS MET)  MG per tablet, daily after dinner (Patient not taking: Reported on 1/24/2025), Disp: , Rfl:     polyethylene glycol (GOLYTELY) 4000 mL solution, Take 4,000 mL by mouth once for 1 dose, Disp: 4000 mL, Rfl: 0    Polyvinyl Alcohol-Povidone PF 1.4-0.6 % SOLN, Apply to eye (Patient not taking: Reported on 3/14/2025), Disp: , Rfl:     RESTASIS 0.05 % ophthalmic emulsion, Administer 1 drop to both eyes every 12 (twelve) hours, Disp: , Rfl:     tadalafil (CIALIS) 5 MG tablet, TAKE ONE TABLET BY MOUTH EVERY DAY AS NEEDED FOR ERECTILE DYSFUNCTION, Disp: 90 tablet, Rfl: 1    tamsulosin (FLOMAX) 0.4 mg, TAKE 1 CAPSULE DAILY WITH DINNER, Disp: 90 capsule, Rfl: 1    Tenapanor HCl (Ibsrela) 50 MG TABS, Take 50 mg by mouth 2 (two) times a day with meals, Disp: 60 tablet, Rfl: 3    tiZANidine (ZANAFLEX) 2 mg tablet, Take 2 tablets (4 mg total) by mouth 2 (two) times a day, Disp: 60 tablet, Rfl: 5    torsemide (DEMADEX) 10 mg tablet, Take by mouth (Patient not taking: Reported on 3/24/2025), Disp: , Rfl:     traMADol (ULTRAM) 50 mg tablet, Take 50 mg by mouth once as needed for moderate pain He uses it rarely. (Patient not taking: Reported on 1/30/2025), Disp: , Rfl:     urea (CARMOL) 20 % cream, if needed (Patient not taking: Reported on 1/24/2025), Disp: , Rfl:     Current Facility-Administered Medications:     iohexol (OMNIPAQUE) 300 mg/mL injection 1 mL, 1 mL, Epidural, Once, Sourav Chen MD    methylPREDNISolone acetate (DEPO-MEDROL) injection 80 mg, 80 mg, Epidural, Once, Sourav Chen MD    Allergies   Allergen Reactions    Codeine Other (See Comments) and GI Intolerance     Reaction Date: 27Dec2004; Annotation - 04Sep2012: '' CRAZY ''  vomiting  headache    Demerol [Meperidine] Nausea Only, Other (See Comments), GI Intolerance and Vomiting      vomiting  Reaction Date: 27Dec2004;   headache    Morphine Nausea Only, GI Intolerance and Vomiting     Reaction Date: 23Feb2012;        Physical Exam:   Vitals:    04/01/25 0910   BP: 145/81   Pulse: 65   Resp: 20   Temp: 98.7 °F (37.1 °C)   SpO2: 98%     General: Awake, Alert, Oriented x 3, Mood and affect appropriate  Respiratory: Respirations even and unlabored  Cardiovascular: Peripheral pulses intact; no edema  Musculoskeletal Exam: Arm pain    ASA Score: 3    Patient/Chart Verification  Patient ID Verified: Verbal  Consents Confirmed: To be obtained in the Procedural area  Interval H&P(within 24 hr) Complete (required for Outpatients and Surgery Admit only): To be obtained in the Procedural area  Allergies Reviewed: Yes  Anticoag/NSAID held?: Yes (stopped Eliquis on 3/29/25)  Currently on antibiotics?: No    Assessment:   1. Radiculopathy, cervical region        Plan: VANESA

## 2025-04-02 ENCOUNTER — TELEPHONE (OUTPATIENT)
Dept: NON INVASIVE DIAGNOSTICS | Facility: CLINIC | Age: 80
End: 2025-04-02

## 2025-04-02 NOTE — TELEPHONE ENCOUNTER
Patient called cause he is having surgery tomorrow and was just seen yesterday by Dr. Trujillo and needs a letter sent to his surgeon stating that he is ok to proceed with surgery while being in AFib.    Patient states he is now in AFib. He was in SR yesterday but had a steroid injection and went into AFib.      Patient is on Amio 200 mg Daily, Carvedilol 25 mg BID and Eliquis    He wants to know if he can increase Amio to get back to regular rhythm.     Letter to be fax to Dr. Houston's Office at [phone]

## 2025-04-02 NOTE — TELEPHONE ENCOUNTER
Amiodarone stays the same dose.  Someone will need to generate the letter and send it to me.  I don't have the means to print/fax while doing cases.     I told him there is nothing that will keep him out of afib long term.  He has had 2 ablations and is on amiodarone.  Afib is paroxysmal and will continue to occur.     It is OK to proceed with surgery while in atrial fibrillation. We will never be able to guarantee no afib.

## 2025-04-03 NOTE — TELEPHONE ENCOUNTER
Dina called needing further clearance for surgery. New letter will be created and faxed to their office.       INFO needed: Ablations dates and cardioversion dates.

## 2025-04-04 ENCOUNTER — TELEPHONE (OUTPATIENT)
Age: 80
End: 2025-04-04

## 2025-04-04 ENCOUNTER — OFFICE VISIT (OUTPATIENT)
Dept: PHYSICAL THERAPY | Facility: CLINIC | Age: 80
End: 2025-04-04
Payer: MEDICARE

## 2025-04-04 DIAGNOSIS — M75.101 TEAR OF RIGHT ROTATOR CUFF, UNSPECIFIED TEAR EXTENT, UNSPECIFIED WHETHER TRAUMATIC: Primary | ICD-10-CM

## 2025-04-04 DIAGNOSIS — M19.011 OSTEOARTHRITIS OF GLENOHUMERAL JOINT, RIGHT: ICD-10-CM

## 2025-04-04 DIAGNOSIS — M54.12 CERVICAL RADICULOPATHY: ICD-10-CM

## 2025-04-04 PROCEDURE — 97110 THERAPEUTIC EXERCISES: CPT | Performed by: PHYSICAL THERAPIST

## 2025-04-04 NOTE — TELEPHONE ENCOUNTER
"Caller: Jordyn ([facility] Pre-Admission Testing)    Doctor: Dr. Trujillo    Reason for call: Jordyn called stating they received pre-op clearance letter on 4/3. However, they require an \"office note\" stating if patient has had a stress test, echo, etc. Please call back Jordyn to discuss. Jordyn also said you can fax the note to Fax# [phone].    Call back#: [phone]  "

## 2025-04-04 NOTE — PROGRESS NOTES
"        Daily Note         Today's date: 2025  Patient name: Andrade Wise Jr.  : 1945  MRN: [id number]  Referring provider: Ho Dao*  Dx:   Encounter Diagnosis     ICD-10-CM    1. Tear of right rotator cuff, unspecified tear extent, unspecified whether traumatic  M75.101       2. Osteoarthritis of glenohumeral joint, right  M19.011       3. Cervical radiculopathy  M54.12           Subjective: Andrade Wise Jr. reports he is having thyroid removed, dx thyroid cancer.  Had an injection on Tuesday, states he feels \"pretty good so far\" last night resumed the gabapentin and muscle relaxor due to discomfort.  So today will be last day until after surgery.         Objective: See treatment diary below    Assessment:   PLOC discussed with primary PT. Patient with good mobiity for PROM, minimal to no pain with stretching .       Plan:  hold PT due to patient having surgery as per patient request.          Precautions: Hypertension, DM      Specialty Daily Treatment Diary         Specialty Daily Treatment Diary         Insurance Eval/ Re-eval POC expires Auth Status Total visits  Start date  Expiration date Misc   MED  6 weeks - 3/17/2025      Co-Insurance                 Date 3/13/25 3/17/25 3/20/25 3/25/25 3/28/25 4/4/25   Visit Number 10 11 12 13 14 15   Auth                  Manual         STJ retract         P to A GHJ mobs 3' 3' 3 min 3' 3' performed   STM UT    3' 3' --   PROM R shld 7' 5' 5 min 10' 8' performed   C-S distraction  2'       T-S P to A  2'       TherEx         NuStep         Pulleys 20 20 20  20 20x    Wand flex         Rows 30  GR 30 gr 20  gr 30x green 30   gr Grn 30 x   Ext 30  GR 30  gr 30  gr 30x green 30   gr Grn 30 x    OH press  20 20  20    TB ER     20  YTB Yellow: 20 x                               Neuro Re-Ed         S/L ER 20 20 20      S/L rotation         AROM flex to 90 20        AROM abd to 90 20        S/L abd 20 20       C-S retraction  10       C-S extension  " 10                TherAct         Golf swings     20 1/4 swings                                        Gait Training                                            Access Code: [email or web address]  URL: [email or web address]  Date: 02/21/2025  Prepared by: Zeke Lamb    Exercises  - Standing Bilateral Low Shoulder Row with Anchored Resistance  - 1 x daily - 7 x weekly - 2 sets - 10 reps  - Shoulder extension with resistance - Neutral  - 1 x daily - 7 x weekly - 2 sets - 10 reps  - Sidelying Shoulder External Rotation  - 1 x daily - 7 x weekly - 2 sets - 10 reps  - Standing Shoulder Flexion to 90 Degrees  - 1 x daily - 7 x weekly - 2 sets - 10 reps  - Standing Shoulder Horizontal Abduction with Resistance  - 1 x daily - 7 x weekly - 2 sets - 10 reps

## 2025-04-07 ENCOUNTER — TELEPHONE (OUTPATIENT)
Age: 80
End: 2025-04-07

## 2025-04-07 NOTE — TELEPHONE ENCOUNTER
Caller: Lou - [facility]     Doctor: Dr. Trujillo    Reason for call: Lou from [facility] called requesting we fax over the patient's last office visit note. Please fax to [phone] ATTN: Lou    Call back#: [phone]

## 2025-04-10 ENCOUNTER — REMOTE DEVICE CLINIC VISIT (OUTPATIENT)
Dept: CARDIOLOGY CLINIC | Facility: CLINIC | Age: 80
End: 2025-04-10
Payer: MEDICARE

## 2025-04-10 DIAGNOSIS — Z95.0 PRESENCE OF PERMANENT CARDIAC PACEMAKER: Primary | ICD-10-CM

## 2025-04-10 PROCEDURE — 93294 REM INTERROG EVL PM/LDLS PM: CPT | Performed by: INTERNAL MEDICINE

## 2025-04-10 PROCEDURE — 93296 REM INTERROG EVL PM/IDS: CPT | Performed by: INTERNAL MEDICINE

## 2025-04-10 NOTE — PROGRESS NOTES
Results for orders placed or performed in visit on 04/10/25   Cardiac EP device report    Narrative    MDT DC/PM  CARELINK TRANSMISSION: BATTERY VOLTAGE ADEQUATE. (11.6 YRS) AP 13%  8%. ALL AVAILABLE LEAD PARAMETERS WITHIN NORMAL LIMITS. NO SIGNIFICANT HIGH RATE EPISODES. 4 AT/AF EPISODES DETECTED (15.3% OF TIME) LONGEST <55 HOURS. PATIENT IS ON ELIQUIS, AMIO AND CARVEDILOL. NORMAL DEVICE FUNCTION.---AVINA

## 2025-04-15 ENCOUNTER — TELEPHONE (OUTPATIENT)
Dept: OBGYN CLINIC | Facility: MEDICAL CENTER | Age: 80
End: 2025-04-15

## 2025-04-15 NOTE — TELEPHONE ENCOUNTER
Patient Reports     75-80    %     improvement post injection    Pain Level    2-3/10   Some tingling that goes down to the elbow

## 2025-04-20 ENCOUNTER — RESULTS FOLLOW-UP (OUTPATIENT)
Dept: CARDIOLOGY CLINIC | Facility: CLINIC | Age: 80
End: 2025-04-20

## 2025-04-22 ENCOUNTER — VBI (OUTPATIENT)
Dept: ADMINISTRATIVE | Facility: OTHER | Age: 80
End: 2025-04-22

## 2025-04-22 NOTE — TELEPHONE ENCOUNTER
Patient contacted to schedule Annual Wellness Visit.   A message was left for the patient to return the call.    Thank you.  Elizabeth Valdez  [facility]

## 2025-04-23 ENCOUNTER — OFFICE VISIT (OUTPATIENT)
Dept: PHYSICAL THERAPY | Facility: CLINIC | Age: 80
End: 2025-04-23
Attending: ORTHOPAEDIC SURGERY
Payer: MEDICARE

## 2025-04-23 DIAGNOSIS — M54.12 CERVICAL RADICULOPATHY: ICD-10-CM

## 2025-04-23 DIAGNOSIS — M19.011 OSTEOARTHRITIS OF GLENOHUMERAL JOINT, RIGHT: ICD-10-CM

## 2025-04-23 DIAGNOSIS — M75.101 TEAR OF RIGHT ROTATOR CUFF, UNSPECIFIED TEAR EXTENT, UNSPECIFIED WHETHER TRAUMATIC: Primary | ICD-10-CM

## 2025-04-23 PROCEDURE — 97110 THERAPEUTIC EXERCISES: CPT | Performed by: PHYSICAL THERAPIST

## 2025-04-23 NOTE — PROGRESS NOTES
Daily Note         Today's date: 2025  Patient name: Andrade Wise Jr.  : 1945  MRN: [id number]  Referring provider: Ho Dao*  Dx:   Encounter Diagnosis     ICD-10-CM    1. Tear of right rotator cuff, unspecified tear extent, unspecified whether traumatic  M75.101       2. Osteoarthritis of glenohumeral joint, right  M19.011       3. Cervical radiculopathy  M54.12           Subjective: Andrade Wise Jr. reports he had thyroid resection last week.  He states that his doctor feels they removed all the cancerous cells.       Objective: See treatment diary below      Assessment:   ROM is normalizing well.  Pain has reduced significantly.      Plan:   Possible discharge next session.          Precautions: Hypertension, DM      Specialty Daily Treatment Diary       Insurance Eval/ Re-eval POC expires Auth Status Total visits  Start date  Expiration date Misc   MED  6 weeks - 3/17/2025      Co-Insurance                 Date 3/20/25 3/25/25 3/28/25 4/4/25 4/23/25   Visit Number 12 13 14 15 16   Auth                Manual        STJ retract        P to A GHJ mobs 3 min 3' 3' performed    STM UT  3' 3' --    PROM R shld 5 min 10' 8' performed    C-S distraction        T-S P to A        TherEx        NuStep        Pulleys 20  20 20x  20   Wand flex        Rows 20  gr 30x green 30   gr Grn 30 x Purple  30   Ext 30  gr 30x green 30   gr Grn 30 x  Purple  30   OH press 20  20     TB ER   20  YTB Yellow: 20 x  AROM ER  30                           Neuro Re-Ed        S/L ER 20       S/L rotation        AROM flex to 90     30   AROM abd to 90        S/L abd        C-S retraction        C-S extension                TherAct        Golf swings   20  1/4 swings                                     Gait Training                                        Access Code: [email or web address]  URL: [email or web address]  Date: 2025  Prepared by: Zeke Lamb    Exercises  - Standing Bilateral Low Shoulder  Row with Anchored Resistance  - 1 x daily - 7 x weekly - 2 sets - 10 reps  - Shoulder extension with resistance - Neutral  - 1 x daily - 7 x weekly - 2 sets - 10 reps  - Sidelying Shoulder External Rotation  - 1 x daily - 7 x weekly - 2 sets - 10 reps  - Standing Shoulder Flexion to 90 Degrees  - 1 x daily - 7 x weekly - 2 sets - 10 reps  - Standing Shoulder Horizontal Abduction with Resistance  - 1 x daily - 7 x weekly - 2 sets - 10 reps

## 2025-04-25 ENCOUNTER — OFFICE VISIT (OUTPATIENT)
Dept: PHYSICAL THERAPY | Facility: CLINIC | Age: 80
End: 2025-04-25
Payer: MEDICARE

## 2025-04-25 DIAGNOSIS — M75.101 TEAR OF RIGHT ROTATOR CUFF, UNSPECIFIED TEAR EXTENT, UNSPECIFIED WHETHER TRAUMATIC: Primary | ICD-10-CM

## 2025-04-25 DIAGNOSIS — M54.12 CERVICAL RADICULOPATHY: ICD-10-CM

## 2025-04-25 DIAGNOSIS — M19.011 OSTEOARTHRITIS OF GLENOHUMERAL JOINT, RIGHT: ICD-10-CM

## 2025-04-25 PROCEDURE — 97110 THERAPEUTIC EXERCISES: CPT | Performed by: PHYSICAL THERAPIST

## 2025-04-25 NOTE — PROGRESS NOTES
Daily Note         Today's date: 2025  Patient name: Andrade Wise Jr.  : 1945  MRN: [id number]  Referring provider: Ho Dao*  Dx:   Encounter Diagnosis     ICD-10-CM    1. Tear of right rotator cuff, unspecified tear extent, unspecified whether traumatic  M75.101       2. Osteoarthritis of glenohumeral joint, right  M19.011       3. Cervical radiculopathy  M54.12           Subjective: Andrade Wise Jr. Has no complaints of pain       Objective: See treatment diary below      Assessment:   Right shld ROM has normalized well.  He has achieved all goals at this time.  He feels independent with HEP.      Plan:   Discharge at this time.          Precautions: Hypertension, DM      Specialty Daily Treatment Diary       Insurance Eval/ Re-eval POC expires Auth Status Total visits  Start date  Expiration date Misc   MED  6 weeks - 3/17/2025      Co-Insurance                 Date 3/25/25 3/28/25 4/4/25 4/23/25 4/25/25   Visit Number 13 14 15 16 17   Auth                Manual        STJ retract        P to A GHJ mobs 3' 3' performed  AF  3 min   STM UT 3' 3' --  AF  2 min   PROM R shld 10' 8' performed  AF  5 min   C-S distraction        T-S P to A        TherEx        NuStep        Pulleys  20 20x  20 20   Wand flex        Rows 30x green 30   gr Grn 30 x Purple  30 30  purp   Ext 30x green 30   gr Grn 30 x  Purple  30 30  purp   OH press  20      TB ER  20  YTB Yellow: 20 x  AROM ER  30         HABD  GTB  30                   Neuro Re-Ed        S/L ER        S/L rotation        AROM flex to 90    30    AROM abd to 90        S/L abd        C-S retraction        C-S extension                TherAct        Golf swings  20  1/4 swings                                      Gait Training                                        Access Code: [email or web address]  URL: [email or web address]  Date: 2025  Prepared by: Zeke Lamb    Exercises  - Standing Bilateral Low Shoulder Row with  Anchored Resistance  - 1 x daily - 7 x weekly - 2 sets - 10 reps  - Shoulder extension with resistance - Neutral  - 1 x daily - 7 x weekly - 2 sets - 10 reps  - Sidelying Shoulder External Rotation  - 1 x daily - 7 x weekly - 2 sets - 10 reps  - Standing Shoulder Flexion to 90 Degrees  - 1 x daily - 7 x weekly - 2 sets - 10 reps  - Standing Shoulder Horizontal Abduction with Resistance  - 1 x daily - 7 x weekly - 2 sets - 10 reps

## 2025-04-25 NOTE — PROGRESS NOTES
PT Discharge    Patient has done well with PT.  Patient has achieved all goals at this time and will continue to perform HEP independently.  D/C at this time.

## 2025-04-28 DIAGNOSIS — M47.812 CERVICAL SPONDYLOSIS: ICD-10-CM

## 2025-04-28 RX ORDER — GABAPENTIN 300 MG/1
300 CAPSULE ORAL 2 TIMES DAILY
Qty: 60 CAPSULE | Refills: 1 | Status: SHIPPED | OUTPATIENT
Start: 2025-04-28

## 2025-04-29 ENCOUNTER — NURSE TRIAGE (OUTPATIENT)
Age: 80
End: 2025-04-29

## 2025-04-29 NOTE — TELEPHONE ENCOUNTER
"FOLLOW UP: Please call w/provider recommendations. Also advised pt to contact Dr. Houston-ENT who performed his thyroidectomy and PCP as well. Patient is agreeable.     REASON FOR CONVERSATION: Migraine    SYMPTOMS: Head pain on left side by temple and eye. Nausea.     OTHER: Pt is s/p thyroidectomy on 4/15/25. He is taking Unithroid 137mcg daily.     Pt has not had any migraines until this migraine started appx 3-4 days ago.   Per 3/7/24 OV note:  He has h/o schwannoma many years ago. 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 He had repeat study in May 2022 and it showed stable 0.6cm      DISPOSITION: No disposition on file.    [phone] - okay to leave detailed msg.    Dr. Beltran - please advise. Thank you.    Answer Assessment - Initial Assessment Questions  1. LOCATION: \"Where does it hurt?\"       Left side of head down to side of eye    2. ONSET: \"When did the headache start?\" (e.g., minutes, hours, days)       3-4 days ago. Increasingly worsening.    3. PATTERN: \"Does the pain come and go, or has it been constant since it started?\"      Constant today.    4. SEVERITY: \"How bad is the pain?\" and \"What does it keep you from doing?\"  (e.g., Scale 1-10; mild, moderate, or severe)      6-7/10 at it's worst    5. RECURRENT SYMPTOM: \"Have you ever had headaches before?\" If Yes, ask: \"When was the last time?\" and \"What happened that time?\"       Yes. This headache is different.     6. CAUSE: \"What do you think is causing the headache?\"      Possibly thyroidectomy.    7. MIGRAINE: \"Have you been diagnosed with migraine headaches?\" If Yes, ask: \"Is this headache similar?\"       No.    8. HEAD INJURY: \"Has there been any recent injury to the head?\"       No.   9. OTHER SYMPTOMS: \"Do you have any other symptoms?\" (e.g., fever, stiff neck,   Nausea.    Protocols used: Headache-Adult-OH    "

## 2025-04-30 ENCOUNTER — OFFICE VISIT (OUTPATIENT)
Dept: FAMILY MEDICINE CLINIC | Facility: CLINIC | Age: 80
End: 2025-04-30
Payer: MEDICARE

## 2025-04-30 VITALS
OXYGEN SATURATION: 98 % | TEMPERATURE: 97.9 F | HEART RATE: 67 BPM | SYSTOLIC BLOOD PRESSURE: 132 MMHG | HEIGHT: 70 IN | DIASTOLIC BLOOD PRESSURE: 70 MMHG | BODY MASS INDEX: 31.64 KG/M2 | WEIGHT: 221 LBS | RESPIRATION RATE: 14 BRPM

## 2025-04-30 DIAGNOSIS — K21.9 GASTROESOPHAGEAL REFLUX DISEASE WITHOUT ESOPHAGITIS: ICD-10-CM

## 2025-04-30 DIAGNOSIS — R11.0 NAUSEA: ICD-10-CM

## 2025-04-30 DIAGNOSIS — R51.9 LEFT TEMPORAL HEADACHE: Primary | ICD-10-CM

## 2025-04-30 DIAGNOSIS — B35.9 TINEA: ICD-10-CM

## 2025-04-30 PROCEDURE — 99213 OFFICE O/P EST LOW 20 MIN: CPT | Performed by: INTERNAL MEDICINE

## 2025-04-30 PROCEDURE — G2211 COMPLEX E/M VISIT ADD ON: HCPCS | Performed by: INTERNAL MEDICINE

## 2025-04-30 RX ORDER — ESOMEPRAZOLE MAGNESIUM 40 MG/1
40 CAPSULE, DELAYED RELEASE ORAL DAILY
Qty: 90 CAPSULE | Refills: 1 | Status: SHIPPED | OUTPATIENT
Start: 2025-04-30

## 2025-04-30 RX ORDER — CLOTRIMAZOLE AND BETAMETHASONE DIPROPIONATE 10; .64 MG/G; MG/G
1 CREAM TOPICAL 2 TIMES DAILY
Qty: 45 G | Refills: 1 | Status: SHIPPED | OUTPATIENT
Start: 2025-04-30

## 2025-04-30 NOTE — PROGRESS NOTES
"Name: Andrade Wise Jr.      : 1945      MRN: [id number]  Encounter Provider: Mariah Benoit MD  Encounter Date: 2025   Encounter department: [facility]  :  Assessment & Plan  Left temporal headache  Ddx includes temporal arteritis vs. Trigeminal neuralgia vs. Evolving shingles.  There is no evidence of a rash and things are improving.  He will check labs to r/o temporal arteritis and call if any rash or further symptoms develop.  Orders:  •  Sedimentation rate, automated; Future  •  C-reactive protein; Future    Nausea  With associated malaise and diarrhea, possible viral syndrome.  Continue symptomatic measures and call for worsening symptoms.        Tinea  Refills given.  Orders:  •  clotrimazole-betamethasone (LOTRISONE) 1-0.05 % cream; Apply 1 Application topically 2 (two) times a day    Gastroesophageal reflux disease without esophagitis  Refills given.  Orders:  •  esomeprazole (NexIUM) 40 MG capsule; Take 1 capsule (40 mg total) by mouth in the morning           History of Present Illness   Had headache left side of head and face for 3 days. Yesterday his left cheek was a little numb. Today things are better.  There is no rash. No pain when he amador his hair.  He has not noticed any swelling.    Yesterday and today was a little nauseous, took zofran and it helped.  Had his thyroid out 3 weeks ago and is now on a new medication but was told this should not cause surgery and the surgeon did not think that the surgery had anything to do with this.   Denies fever, has no appetite.  Had diarrhea today but started dulcolax yesterday for constipation.        Review of Systems   Constitutional: Negative.    Respiratory: Negative.     Cardiovascular: Negative.    Gastrointestinal:  Positive for diarrhea and nausea. Negative for vomiting.   Neurological:  Positive for headaches.       Objective   /70   Pulse 67   Temp 97.9 °F (36.6 °C)   Resp 14   Ht 5' 10\" (1.778 m)   Wt 100 kg " (221 lb)   SpO2 98%   BMI 31.71 kg/m²      Physical Exam  Constitutional:       Appearance: Normal appearance.   HENT:      Head: Normocephalic and atraumatic.      Mouth/Throat:      Mouth: Mucous membranes are moist.   Eyes:      Pupils: Pupils are equal, round, and reactive to light.   Cardiovascular:      Rate and Rhythm: Normal rate and regular rhythm.      Heart sounds: No murmur heard.     No friction rub. No gallop.   Pulmonary:      Effort: Pulmonary effort is normal.      Breath sounds: Normal breath sounds. No wheezing, rhonchi or rales.   Abdominal:      General: Abdomen is flat. Bowel sounds are normal.      Palpations: Abdomen is soft.      Tenderness: There is no abdominal tenderness. There is no guarding.   Musculoskeletal:         General: No swelling.      Comments: No scalp tenderness or temporal artery beading or thickening noted.  No tenderness over trigeminal nerve   Neurological:      Mental Status: He is alert.

## 2025-04-30 NOTE — ASSESSMENT & PLAN NOTE
Refills given.  Orders:  •  esomeprazole (NexIUM) 40 MG capsule; Take 1 capsule (40 mg total) by mouth in the morning

## 2025-05-01 ENCOUNTER — APPOINTMENT (OUTPATIENT)
Dept: LAB | Facility: CLINIC | Age: 80
End: 2025-05-01
Attending: INTERNAL MEDICINE
Payer: MEDICARE

## 2025-05-01 ENCOUNTER — TRANSCRIBE ORDERS (OUTPATIENT)
Dept: LAB | Facility: CLINIC | Age: 80
End: 2025-05-01

## 2025-05-01 DIAGNOSIS — R51.9 LEFT TEMPORAL HEADACHE: ICD-10-CM

## 2025-05-01 DIAGNOSIS — E11.65 INADEQUATELY CONTROLLED DIABETES MELLITUS (HCC): Primary | ICD-10-CM

## 2025-05-01 LAB
CRP SERPL QL: 1.8 MG/L
ERYTHROCYTE [SEDIMENTATION RATE] IN BLOOD: 29 MM/HOUR (ref 0–19)
T4 FREE SERPL-MCNC: 1.27 NG/DL (ref 0.61–1.12)
TSH SERPL DL<=0.05 MIU/L-ACNC: 4.57 UIU/ML (ref 0.45–4.5)

## 2025-05-01 PROCEDURE — 84443 ASSAY THYROID STIM HORMONE: CPT

## 2025-05-01 PROCEDURE — 86140 C-REACTIVE PROTEIN: CPT

## 2025-05-01 PROCEDURE — 85652 RBC SED RATE AUTOMATED: CPT

## 2025-05-01 PROCEDURE — 36415 COLL VENOUS BLD VENIPUNCTURE: CPT

## 2025-05-01 PROCEDURE — 84439 ASSAY OF FREE THYROXINE: CPT

## 2025-05-05 ENCOUNTER — RESULTS FOLLOW-UP (OUTPATIENT)
Dept: FAMILY MEDICINE CLINIC | Facility: CLINIC | Age: 80
End: 2025-05-05

## 2025-05-05 ENCOUNTER — OFFICE VISIT (OUTPATIENT)
Dept: FAMILY MEDICINE CLINIC | Facility: CLINIC | Age: 80
End: 2025-05-05
Payer: MEDICARE

## 2025-05-05 VITALS
WEIGHT: 206 LBS | HEART RATE: 63 BPM | TEMPERATURE: 97.8 F | DIASTOLIC BLOOD PRESSURE: 68 MMHG | HEIGHT: 70 IN | RESPIRATION RATE: 16 BRPM | BODY MASS INDEX: 29.49 KG/M2 | SYSTOLIC BLOOD PRESSURE: 104 MMHG

## 2025-05-05 DIAGNOSIS — R58 MULTIPLE ECCHYMOSES OF BOTH UPPER ARMS: Primary | ICD-10-CM

## 2025-05-05 PROCEDURE — 99213 OFFICE O/P EST LOW 20 MIN: CPT | Performed by: INTERNAL MEDICINE

## 2025-05-05 PROCEDURE — G2211 COMPLEX E/M VISIT ADD ON: HCPCS | Performed by: INTERNAL MEDICINE

## 2025-05-05 RX ORDER — LEVOTHYROXINE SODIUM 137 UG/1
TABLET ORAL
COMMUNITY
Start: 2025-04-03

## 2025-05-05 NOTE — PROGRESS NOTES
"Name: Andrade Wise Jr.      : 1945      MRN: [id number]  Encounter Provider: Mariah Benoit MD  Encounter Date: 2025   Encounter department: [facility]  :  Assessment & Plan  Multiple ecchymoses of both upper arms  Likely from bumping arms and due to eliquis use.  Patient reassured.  Recent labwork reviewed and is normal.              History of Present Illness   Here for evaluation of a \"rash.\"  He is on eliquis 5 mg bid and has several spots of ecchymoses on bilateral arms, but on the left it is travelling up his arm. He does not recall any injury.  Denies constitutional  symptoms.  We reviewed recent labwork.        Review of Systems   Constitutional: Negative.    Respiratory: Negative.     Cardiovascular: Negative.    Skin:  Positive for rash.   Hematological:  Bruises/bleeds easily.       Objective   /68   Pulse 63   Temp 97.8 °F (36.6 °C)   Resp 16   Ht 5' 10\" (1.778 m)   Wt 93.4 kg (206 lb) Comment: patient reported  BMI 29.56 kg/m²      Physical Exam  Constitutional:       Appearance: Normal appearance.   HENT:      Head: Normocephalic and atraumatic.      Mouth/Throat:      Mouth: Mucous membranes are moist.   Eyes:      Pupils: Pupils are equal, round, and reactive to light.   Cardiovascular:      Rate and Rhythm: Normal rate and regular rhythm.      Heart sounds: No murmur heard.     No friction rub. No gallop.   Pulmonary:      Effort: Pulmonary effort is normal.      Breath sounds: Normal breath sounds. No wheezing, rhonchi or rales.   Skin:     General: Skin is warm and dry.      Findings: Bruising present.      Comments: Multiple superficial ecchymoses L > R arm.   Neurological:      Mental Status: He is alert.         " Subjective: Ara Melo is 2 weeks postpartum after a vaginal delivery of a baby girl. See L&D delivery summary for birth statistics. She is without concerns today. Bleeding is decreasing and not experiencing any excessive pain. Breastfeeding successfully and reports infant is experiencing adequate weight gain. She denies any signs/symptoms of postpartum depression and has adequate family support. Has been having some anxiety since delivery but feels it is manageable and she is aware of it. Denies any abuse. Contraceptive plan: still considering options    Review of Systems   Constitutional: Negative for chills and fever. Eyes: Negative for blurred vision. Respiratory: Negative for cough, shortness of breath and wheezing. Cardiovascular: Negative for chest pain and palpitations. Gastrointestinal: Negative for diarrhea, nausea and vomiting. Genitourinary: Negative for dysuria and frequency. Neurological: Negative for dizziness and headaches. Psychiatric/Behavioral: Negative for depression and suicidal ideas. Objective: There were no vitals filed for this visit. Wt Readings from Last 6 Encounters:  03/30/23 : 148 lb (67.1 kg)  03/14/23 : 160 lb (72.6 kg)  03/08/23 : 160 lb (72.6 kg)  02/27/23 : 162 lb (73.5 kg)  02/21/23 : 158 lb (71.7 kg)  02/06/23 : 157 lb (71.2 kg)      Physical Exam  Constitutional:       General: She is not in acute distress. Appearance: Normal appearance. She is not ill-appearing. HENT:      Head: Normocephalic and atraumatic. Cardiovascular:      Rate and Rhythm: Normal rate. Pulmonary:      Effort: Pulmonary effort is normal. No respiratory distress. Neurological:      Mental Status: She is alert and oriented to person, place, and time. Psychiatric:         Mood and Affect: Mood normal.         Behavior: Behavior normal.         Thought Content: Thought content normal.         Judgment: Judgment normal.   Vitals reviewed.           Assessment/Plan:   2 weeks postpartum, stable. Breastfeeding without difficulty  Contraception: still deciding  Today's Plan: continue nifedipine and checking BP at home until 6w visit. Discussed postpartum anxiety. Will reassess at next visit. Return to clinic: 4 weeks    Counseling included:   Signs/symptoms of postpartum depression  Postpartum danger signs  Lactation support and troubleshooting  Maternal and family adaption  Sleep/rest strategies  Sexuality  Infant safety and care  Parenting concerns  Occupational concerns  Contraception    Tony Carrillo verbalized understanding of plan of care. All questions answered. No barriers to learning identified.

## 2025-05-06 ENCOUNTER — OFFICE VISIT (OUTPATIENT)
Dept: PULMONOLOGY | Facility: MEDICAL CENTER | Age: 80
End: 2025-05-06
Payer: MEDICARE

## 2025-05-06 ENCOUNTER — PREP FOR PROCEDURE (OUTPATIENT)
Age: 80
End: 2025-05-06

## 2025-05-06 ENCOUNTER — TELEPHONE (OUTPATIENT)
Age: 80
End: 2025-05-06

## 2025-05-06 VITALS
RESPIRATION RATE: 12 BRPM | HEIGHT: 70 IN | OXYGEN SATURATION: 98 % | TEMPERATURE: 97.6 F | HEART RATE: 66 BPM | DIASTOLIC BLOOD PRESSURE: 58 MMHG | WEIGHT: 206 LBS | SYSTOLIC BLOOD PRESSURE: 102 MMHG | BODY MASS INDEX: 29.49 KG/M2

## 2025-05-06 DIAGNOSIS — I48.19 PERSISTENT ATRIAL FIBRILLATION (HCC): ICD-10-CM

## 2025-05-06 DIAGNOSIS — G47.33 OBSTRUCTIVE SLEEP APNEA: Primary | ICD-10-CM

## 2025-05-06 PROCEDURE — G2211 COMPLEX E/M VISIT ADD ON: HCPCS | Performed by: NURSE PRACTITIONER

## 2025-05-06 PROCEDURE — 99214 OFFICE O/P EST MOD 30 MIN: CPT | Performed by: NURSE PRACTITIONER

## 2025-05-06 NOTE — TELEPHONE ENCOUNTER
Patients GI provider:  Dr. Frankel / [facility]    Number to return call: [phone]    Reason for call: Pt calling stating he is due for a 6 month repeat colonoscopy and would like Dr. Menezes to do this.  Patient has A fib and is on Eliquis, Also notes he has a pacemaker. Please call him to schedule OV or colonoscopy    Scheduled procedure/appointment date if applicable: 1/30/25

## 2025-05-06 NOTE — ASSESSMENT & PLAN NOTE
Compliance data was reviewed for his CPAP set to 10 cm water.  He has been on CPAP for more than 25 years and I do not have records of his old sleep study available to me today.  His AHI with use of his CPAP at 10 is 0.4 and he has no significant leak.  He averages 5 hours and 55 minutes nightly.  He will continue with nightly use and is deriving medical benefit.  He is aware of proper use and maintenance of his machine.  I ordered resupply for him today.  Orders:    PAP DME Resupply/Reorder

## 2025-05-06 NOTE — PROGRESS NOTES
Follow-up  Visit - Pulmonary Medicine   Name: Andrade Wise Jr.      : 1945      MRN: [id number]  Encounter Provider: SANDRITA Weathers  Encounter Date: 2025   Encounter department: [facility]  :  Assessment & Plan  Obstructive sleep apnea  Compliance data was reviewed for his CPAP set to 10 cm water.  He has been on CPAP for more than 25 years and I do not have records of his old sleep study available to me today.  His AHI with use of his CPAP at 10 is 0.4 and he has no significant leak.  He averages 5 hours and 55 minutes nightly.  He will continue with nightly use and is deriving medical benefit.  He is aware of proper use and maintenance of his machine.  I ordered resupply for him today.  Orders:    PAP DME Resupply/Reorder    Persistent atrial fibrillation (HCC)  He follows with EP/cardiology.  He is on amiodarone.  Last PFTs reviewed.         Return in about 1 year (around 2026), or if symptoms worsen or fail to improve.    All of Andrade' questions were answered prior to leaving the office today.  He will follow-up as listed above or sooner should the need arise, and is aware to call the office with any further questions or concerns.      History of Present Illness   Andrade Wise Jr. is a 80 y.o. male who presents for follow-up of obstructive sleep apnea.  He reports that he continues to wear his CPAP nightly for the entirety of his sleep.  He averages 5 to 7 hours of sleep nightly and while he feels well rested, he has noted that he sleeps less then in years past.  He recently had a microscopic thyroid surgery for thyroid cancer at [facility].  He is also following closely with EP/cardiology for atrial fibrillation.  Overall, he denies any shortness of breath and had PFTs in October due to chronic amiodarone use.    Review of Systems   All other systems reviewed and are negative.  Aside from what is mentioned in the HPI, ROS is otherwise  "negative.     Medical History Reviewed by provider this encounter:  Tobacco  Allergies  Meds  Problems  Med Hx  Surg Hx  Fam Hx     .    Objective   /58 (BP Location: Right arm, Patient Position: Sitting, Cuff Size: Extra-Large)   Pulse 66   Temp 97.6 °F (36.4 °C) (Temporal)   Resp 12   Ht 5' 10\" (1.778 m)   Wt 93.4 kg (206 lb)   SpO2 98%   BMI 29.56 kg/m²     Physical Exam  Vitals reviewed.   Constitutional:       General: He is not in acute distress.     Appearance: He is well-developed. He is not toxic-appearing or diaphoretic.   HENT:      Head: Normocephalic and atraumatic.   Eyes:      General: No scleral icterus.  Neck:      Trachea: No tracheal deviation.   Cardiovascular:      Rate and Rhythm: Normal rate and regular rhythm.      Heart sounds: S1 normal and S2 normal. No murmur heard.     No friction rub. No gallop.   Pulmonary:      Effort: Pulmonary effort is normal. No tachypnea, accessory muscle usage or respiratory distress.      Breath sounds: Normal breath sounds. No stridor. No decreased breath sounds, wheezing, rhonchi or rales.   Chest:      Chest wall: No tenderness.   Abdominal:      General: Bowel sounds are normal. There is no distension.      Palpations: Abdomen is soft.      Tenderness: There is no abdominal tenderness.   Musculoskeletal:         General: No tenderness.      Cervical back: Neck supple.      Right lower leg: No edema.      Left lower leg: No edema.   Skin:     General: Skin is warm and dry.      Findings: No rash.   Neurological:      Mental Status: He is alert and oriented to person, place, and time.      GCS: GCS eye subscore is 4. GCS verbal subscore is 5. GCS motor subscore is 6.   Psychiatric:         Speech: Speech normal.         Behavior: Behavior normal. Behavior is cooperative.       Diagnostic Data:  PFTs from December 2024 showed normal spirometry with normal corrected diffusing capacity and normal flow-volume loop.      Malina Benson, " CRNP

## 2025-05-07 ENCOUNTER — TELEPHONE (OUTPATIENT)
Dept: GASTROENTEROLOGY | Facility: CLINIC | Age: 80
End: 2025-05-07

## 2025-05-07 NOTE — TELEPHONE ENCOUNTER
Our mutual patient is scheduled for procedure:  COLONOSCOPY     On:07/25/25     With: DR PEDERSON     Our office is requesting CARDIAC  clearance for this procedure.     Physician Approving clearance: _______________________

## 2025-05-07 NOTE — TELEPHONE ENCOUNTER
Our mutual patient is scheduled for procedure: COLONOSCOPY    On: 07/25/25     With: Dr. THOMSON________    He/She is taking the following blood thinner:  ELIQUIS        Can this be stopped ___2___ days prior to the procedure?      Physician Approving clearance: ________________________

## 2025-05-07 NOTE — TELEPHONE ENCOUNTER
Anahi Beltran MD to Neurology Concussion & Migraine Team 5       4/30/25  4:34 PM   Last visit was a year ago.  He needs to see any of us before he can be treated. Can try Dr. Maverick Justice's schedule as well.    5/1/25  7:52 AM  You routed this conversation to Neurology Pod Clerical   Balbina Conte to Me  Neurology Pod Clerical   PW    5/5/25  7:00 PM   Hello,    Called / Spoke with patient in regards of the below request. Per review of all schedule, offered soonest open slot on 9/22 at 1130 am with Dr Temple. Advised of wait list. Patient declined stating he will just find a another neurologist and hung up.      Next open slots for all were:    Dr Temple 9/22 at 1130am    Vinh's next open slot on 11/14 at 8am    Dr Mcarthur's next open slot is on 12/23 at 930am    Thank you

## 2025-05-07 NOTE — TELEPHONE ENCOUNTER
Scheduled date of colonoscopy (as of today):07.25.25  Physician performing colonoscopy:DR PEDERSON  Location of colonoscopy:[facility]  Bowel prep reviewed with patient:DUL/MATTHIAS  Instructions reviewed with patient by:MAILED  Clearances: ELIQUIS, CARDIAC

## 2025-05-09 LAB

## 2025-05-12 LAB

## 2025-05-19 ENCOUNTER — TELEPHONE (OUTPATIENT)
Age: 80
End: 2025-05-19

## 2025-05-19 DIAGNOSIS — I48.0 PAROXYSMAL ATRIAL FIBRILLATION (HCC): Primary | ICD-10-CM

## 2025-05-19 RX ORDER — CARVEDILOL 25 MG/1
25 TABLET ORAL 2 TIMES DAILY
Qty: 60 TABLET | Refills: 5 | Status: SHIPPED | OUTPATIENT
Start: 2025-05-19

## 2025-05-19 NOTE — TELEPHONE ENCOUNTER
"Reason for call:   [x] Refill   [] Prior Auth  [x] Other: Not a duplicate. Pharmacy never received. Was set to \"fill later.\"    Office:   [] PCP/Provider -   [x] Specialty/Provider -     Medication: carvedilol (COREG) 25 mg tablet     Dose/Frequency: Take 1 tablet (25 mg total) by mouth 2 (two) times a day     Quantity: 30    Pharmacy: [facility] in [facility]   Does the patient have enough for 3 days?   [] Yes   [x] No - Send as HP to POD      "

## 2025-05-19 NOTE — TELEPHONE ENCOUNTER
Omer from [facility] called regarding needed a script for coreg 25mg BID. Advised that a script is being worked on at the moment.

## 2025-05-22 ENCOUNTER — NURSE TRIAGE (OUTPATIENT)
Age: 80
End: 2025-05-22

## 2025-05-22 ENCOUNTER — PREP FOR PROCEDURE (OUTPATIENT)
Dept: GASTROENTEROLOGY | Facility: CLINIC | Age: 80
End: 2025-05-22

## 2025-05-22 ENCOUNTER — TELEPHONE (OUTPATIENT)
Age: 80
End: 2025-05-22

## 2025-05-22 DIAGNOSIS — K21.9 GASTROESOPHAGEAL REFLUX DISEASE WITHOUT ESOPHAGITIS: Primary | ICD-10-CM

## 2025-05-22 RX ORDER — SODIUM CHLORIDE, SODIUM LACTATE, POTASSIUM CHLORIDE, CALCIUM CHLORIDE 600; 310; 30; 20 MG/100ML; MG/100ML; MG/100ML; MG/100ML
125 INJECTION, SOLUTION INTRAVENOUS CONTINUOUS
OUTPATIENT
Start: 2025-05-22

## 2025-05-22 NOTE — TELEPHONE ENCOUNTER
Patients GI provider:  Dr. Menezes    Number to return call: [phone]    Reason for call: Pt calling asking if we can add an EGD to his procedure appt on 07.25.25? He says its been a while since her had one (2021 according to chart) and he is having some Nausea and abdominal pain (triaged to nurses for symptoms) so he thinks having both scopes would be better. Please advise?     Scheduled procedure/appointment date if applicable:07.25.25

## 2025-05-22 NOTE — TELEPHONE ENCOUNTER
Regarding: sever nausea with abdominal pain  ----- Message from Keny MANDEL sent at 5/22/2025  1:20 PM EDT -----  Pt for an appt with  before his colonoscopy appt on 07.25.25; He was a Pt with  and he is having sever nausea with abdominal pain. He wants to GO to the [place] office but I did explain  is primarily out of CV. I do not have anything with him before October and I have nothing with a PA before the 07.25.25 date. Please advise?

## 2025-05-22 NOTE — TELEPHONE ENCOUNTER
"FOLLOW UP: n/a    REASON FOR CONVERSATION: Abdominal Pain    SYMPTOMS: Pt c/o lower generalized abdominal pain, last bm today, hard stool. Nausea-taking Zofran with relief.     OTHER: Previously took Ibsrela, stopped about a week ago due to making him nauseous. Pt will try miralax 1-2 times daily for 1-2 weeks and call back if he would like different medication prescribed    DISPOSITION: Home Care      Reason for Disposition   Mild abdominal pain    Answer Assessment - Initial Assessment Questions  1. LOCATION: \"Where does it hurt?\"       Generalized abdominal pain/lower  2. RADIATION: \"Does the pain shoot anywhere else?\" (e.g., chest, back)      denies  3. ONSET: \"When did the pain begin?\" (Minutes, hours or days ago)       Had a virus previously   4. SUDDEN: \"Gradual or sudden onset?\"      gradual  5. PATTERN \"Does the pain come and go, or is it constant?\"      intermittent  6. SEVERITY: \"How bad is the pain?\"  (e.g., Scale 1-10; mild, moderate, or severe)      moderate  7. RECURRENT SYMPTOM: \"Have you ever had this type of stomach pain before?\" If Yes, ask: \"When was the last time?\" and \"What happened that time?\"       denies  8. CAUSE: \"What do you think is causing the stomach pain?\"      unsure  9. RELIEVING/AGGRAVATING FACTORS: \"What makes it better or worse?\" (e.g., antacids, bending or twisting motion, bowel movement)      Bm helps  10. OTHER SYMPTOMS: \"Do you have any other symptoms?\" (e.g., back pain, diarrhea, fever, urination pain, vomiting)        denies    Protocols used: Abdominal Pain - Male-Adult-[place]    "

## 2025-05-27 ENCOUNTER — NURSE TRIAGE (OUTPATIENT)
Age: 80
End: 2025-05-27

## 2025-05-27 ENCOUNTER — APPOINTMENT (OUTPATIENT)
Dept: LAB | Facility: CLINIC | Age: 80
End: 2025-05-27
Payer: MEDICARE

## 2025-05-27 ENCOUNTER — TRANSCRIBE ORDERS (OUTPATIENT)
Dept: LAB | Facility: CLINIC | Age: 80
End: 2025-05-27

## 2025-05-27 DIAGNOSIS — E89.0 POSTSURGICAL HYPOTHYROIDISM: ICD-10-CM

## 2025-05-27 DIAGNOSIS — I10 ESSENTIAL HYPERTENSION, MALIGNANT: ICD-10-CM

## 2025-05-27 DIAGNOSIS — C73 MALIGNANT NEOPLASM OF THYROID GLAND (HCC): ICD-10-CM

## 2025-05-27 DIAGNOSIS — E10.49 OTHER DIABETIC NEUROLOGICAL COMPLICATION ASSOCIATED WITH TYPE 1 DIABETES MELLITUS (HCC): ICD-10-CM

## 2025-05-27 DIAGNOSIS — E11.65 INADEQUATELY CONTROLLED DIABETES MELLITUS (HCC): Primary | ICD-10-CM

## 2025-05-27 DIAGNOSIS — E11.65 INADEQUATELY CONTROLLED DIABETES MELLITUS (HCC): ICD-10-CM

## 2025-05-27 LAB
ALBUMIN SERPL BCG-MCNC: 4.2 G/DL (ref 3.5–5)
ALP SERPL-CCNC: 65 U/L (ref 34–104)
ALT SERPL W P-5'-P-CCNC: 39 U/L (ref 7–52)
ANION GAP SERPL CALCULATED.3IONS-SCNC: 9 MMOL/L (ref 4–13)
AST SERPL W P-5'-P-CCNC: 27 U/L (ref 13–39)
BILIRUB SERPL-MCNC: 0.91 MG/DL (ref 0.2–1)
BUN SERPL-MCNC: 19 MG/DL (ref 5–25)
CALCIUM SERPL-MCNC: 9.9 MG/DL (ref 8.4–10.2)
CHLORIDE SERPL-SCNC: 99 MMOL/L (ref 96–108)
CO2 SERPL-SCNC: 32 MMOL/L (ref 21–32)
CREAT SERPL-MCNC: 1.13 MG/DL (ref 0.6–1.3)
CREAT UR-MCNC: 128.9 MG/DL
EST. AVERAGE GLUCOSE BLD GHB EST-MCNC: 151 MG/DL
GFR SERPL CREATININE-BSD FRML MDRD: 61 ML/MIN/1.73SQ M
GLUCOSE SERPL-MCNC: 111 MG/DL (ref 65–140)
HBA1C MFR BLD: 6.9 %
MICROALBUMIN UR-MCNC: 13.2 MG/L
MICROALBUMIN/CREAT 24H UR: 10 MG/G CREATININE (ref 0–30)
POTASSIUM SERPL-SCNC: 3.7 MMOL/L (ref 3.5–5.3)
PROT SERPL-MCNC: 6.5 G/DL (ref 6.4–8.4)
SODIUM SERPL-SCNC: 140 MMOL/L (ref 135–147)
TSH SERPL DL<=0.05 MIU/L-ACNC: 1.96 UIU/ML (ref 0.45–4.5)

## 2025-05-27 PROCEDURE — 86800 THYROGLOBULIN ANTIBODY: CPT

## 2025-05-27 PROCEDURE — 80053 COMPREHEN METABOLIC PANEL: CPT

## 2025-05-27 PROCEDURE — 84443 ASSAY THYROID STIM HORMONE: CPT

## 2025-05-27 PROCEDURE — 82043 UR ALBUMIN QUANTITATIVE: CPT

## 2025-05-27 PROCEDURE — 36415 COLL VENOUS BLD VENIPUNCTURE: CPT

## 2025-05-27 PROCEDURE — 82570 ASSAY OF URINE CREATININE: CPT

## 2025-05-27 PROCEDURE — 83036 HEMOGLOBIN GLYCOSYLATED A1C: CPT

## 2025-05-27 NOTE — TELEPHONE ENCOUNTER
"  Reason for Disposition   Last bowel movement (BM) > 4 days ago    Answer Assessment - Initial Assessment Questions  1. STOOL PATTERN OR FREQUENCY: \"How often do you have a bowel movement (BM)?\"  (Normal range: 3 times a day to every 3 days)  \"When was your last BM?\"           Last BM 6 days ago.  Abdominal pain in center intermittent but worsening.  Unable  to eat.  Using zofran for N/V. Used a Fleets enema, miralx and dulcolax.     Abdominal pain 10 days ago intermittent pit of stomach    Dulcolax miralax last 2 days took miralax    2. STRAINING: \"Do you have to strain to have a BM?\"           Yes     3. ONSET: \"When did the constipation begin?\"         1 week       4. RECTAL PAIN: \"Does your rectum hurt when the stool comes out?\" If Yes, ask: \"Do you have hemorrhoids? How bad is the pain?\"  (Scale 1-10; or mild, moderate, severe)        Unsure     5. BM COMPOSITION: \"Are the stools hard?\"          Unsure    6. BLOOD ON STOOLS: \"Has there been any blood on the toilet tissue or on the surface of the BM?\" If Yes, ask: \"When was the last time?\"         Has not had a BM in 1 week      7. CHRONIC CONSTIPATION: \"Is this a new problem for you?\"  If No, ask: \"How long have you had this problem?\" (days, weeks, months)           nO       8. CHANGES IN DIET OR HYDRATION: \"Have there been any recent changes in your diet?\" \"How much fluids are you drinking on a daily basis?\"  \"How much have you had to drink today?\"        Denies    9. MEDICINES: \"Have you been taking any new medicines?\" \"Are you taking any narcotic pain medicines?\" (e.g., Dilaudid, morphine, Percocet, Vicodin)       Denies    10. LAXATIVES: \"Have you been using any stool softeners, laxatives, or enemas?\"  If Yes, ask \"What, how often, and when was the last time?\"            Yes       12. CAUSE: \"What do you think is causing the constipation?\"             Unsure       13. MEDICAL HISTORY: \"Do you have a history of hemorrhoids, rectal fissures, rectal surgery, " "or rectal abscess?\"              Gastro    14. OTHER SYMPTOMS: \"Do you have any other symptoms?\" (e.g., abdomen pain, bloating, fever, vomiting)           Yes pain nausea and vomiting    Protocols used: Constipation-Adult-[place]      REASON FOR CONVERSATION: Constipation    SYMPTOMS: abdominal pain/nausea    OTHER HEALTH INFORMATION: Patient calls complaining of no BM times 1 week now.  Patient very uncomfortable. Abdominal pain is in the center of the abdomen. Patient has nausea  and episode of vomiting. Patient is taking Zofran for this symptoms.  Unable to eat, trying to drink but does not feel well enough.  He has tried over the counter laxatives without relief including a fleets enema.    Denies fever, chills or rectal bleeding.    I recommended ER evaluation. Patient agreed.     PROTOCOL DISPOSITION: No disposition on file.    CARE ADVICE PROVIDED:  see note above    PRACTICE FOLLOW-UP:         "

## 2025-05-29 ENCOUNTER — OFFICE VISIT (OUTPATIENT)
Dept: FAMILY MEDICINE CLINIC | Facility: CLINIC | Age: 80
End: 2025-05-29
Payer: MEDICARE

## 2025-05-29 VITALS
OXYGEN SATURATION: 97 % | RESPIRATION RATE: 12 BRPM | SYSTOLIC BLOOD PRESSURE: 128 MMHG | HEART RATE: 67 BPM | WEIGHT: 203 LBS | BODY MASS INDEX: 29.13 KG/M2 | TEMPERATURE: 96.6 F | DIASTOLIC BLOOD PRESSURE: 78 MMHG

## 2025-05-29 DIAGNOSIS — K59.00 CONSTIPATION, UNSPECIFIED CONSTIPATION TYPE: ICD-10-CM

## 2025-05-29 DIAGNOSIS — R10.84 GENERALIZED ABDOMINAL PAIN: Primary | ICD-10-CM

## 2025-05-29 LAB — THYROGLOB AB SERPL-ACNC: 6.9 IU/ML (ref 0–0.9)

## 2025-05-29 PROCEDURE — G2211 COMPLEX E/M VISIT ADD ON: HCPCS | Performed by: INTERNAL MEDICINE

## 2025-05-29 PROCEDURE — 99213 OFFICE O/P EST LOW 20 MIN: CPT | Performed by: INTERNAL MEDICINE

## 2025-05-29 NOTE — PROGRESS NOTES
Name: Andrade Wise Jr.      : 1945      MRN: [id number]  Encounter Provider: Mariah Benoit MD  Encounter Date: 2025   Encounter department: [facility]  :  Assessment & Plan  Generalized abdominal pain  Due to his symptoms, will check CT scan.  Continue fiber twice a day and add miralax if ineffective, make sure to drink plenty of water.  He has colonoscopy and EGD scheduled.  He will speak to his endocrinologist about possibly decreasing his GLP-1 agent to see if this helps.   Orders:  •  CT abdomen pelvis w contrast; Future    Constipation, unspecified constipation type    Orders:  •  CT abdomen pelvis w contrast; Future           History of Present Illness   He has been having severe constipation.  This time he had to take 2 dulcolax one day, 3 the next, and use a fleets to have a BM after 6 days. He has nausea, was taking his wife's zofran.  BM yesterday relieved his pain and nausea.  He has EGD and colonoscopy scheduled for  with Dr. Menezes.  He has been on mounjaro through his endocrinologist. Previously on ozempic but had diarrhea with this. Labs were done within the past week and TSH is within goal       Review of Systems   Constitutional: Negative.  Negative for fatigue and fever.   Respiratory: Negative.     Cardiovascular: Negative.    Gastrointestinal:  Positive for abdominal pain, constipation and nausea.   Endocrine: Negative for cold intolerance and heat intolerance.       Objective   /78   Pulse 67   Temp (!) 96.6 °F (35.9 °C)   Resp 12   Wt 92.1 kg (203 lb)   SpO2 97%   BMI 29.13 kg/m²      Physical Exam  Constitutional:       Appearance: Normal appearance.   HENT:      Head: Normocephalic and atraumatic.      Mouth/Throat:      Mouth: Mucous membranes are moist.     Eyes:      Pupils: Pupils are equal, round, and reactive to light.       Cardiovascular:      Rate and Rhythm: Normal rate and regular rhythm.      Heart sounds: No murmur heard.     No friction  rub. No gallop.   Pulmonary:      Effort: Pulmonary effort is normal.      Breath sounds: Normal breath sounds. No wheezing, rhonchi or rales.   Abdominal:      General: Abdomen is flat. Bowel sounds are normal.      Palpations: Abdomen is soft.      Tenderness: There is no abdominal tenderness. There is no guarding.     Musculoskeletal:         General: No swelling.     Neurological:      Mental Status: He is alert.

## 2025-06-04 ENCOUNTER — HOSPITAL ENCOUNTER (OUTPATIENT)
Dept: RADIOLOGY | Facility: HOSPITAL | Age: 80
Discharge: HOME | End: 2025-06-04
Attending: INTERNAL MEDICINE
Payer: MEDICARE

## 2025-06-04 ENCOUNTER — TELEPHONE (OUTPATIENT)
Age: 80
End: 2025-06-04

## 2025-06-04 DIAGNOSIS — K59.00 CONSTIPATION, UNSPECIFIED CONSTIPATION TYPE: ICD-10-CM

## 2025-06-04 DIAGNOSIS — R10.84 GENERALIZED ABDOMINAL PAIN: ICD-10-CM

## 2025-06-04 PROCEDURE — 74177 CT ABD & PELVIS W/CONTRAST: CPT

## 2025-06-04 RX ADMIN — IOHEXOL 100 ML: 350 INJECTION, SOLUTION INTRAVENOUS at 08:19

## 2025-06-04 NOTE — TELEPHONE ENCOUNTER
SPOKE WITH PT, PREVIOUS DR. ANTON PT, REQUESTING OFFICE VISIT PRIOR TO EGD/COLONOSCOPY ON 7/25/25,  DUE TO ABDOMINAL PAIN, NAUSEA, CONSTIPATION. PT PREFERS [place] OFFICE. URGENT APPOINTMENT SCHEDULED FOR 6/13/25.

## 2025-06-05 ENCOUNTER — TELEPHONE (OUTPATIENT)
Age: 80
End: 2025-06-05

## 2025-06-05 NOTE — TELEPHONE ENCOUNTER
Patient called in in post CT abdomen pelvis w contrast was completed yesteday 6/4 and requesting results. RN advised patient radiologist has not released the report. Patient reports he is not feeling well, stomach problems and he has emergency OV scheduled with gastroenterology on Fri 6/13. Patient will call back tomorrow afternoon in anticipation of results being available.

## 2025-06-06 DIAGNOSIS — R93.5 ABNORMAL CT OF THE ABDOMEN: Primary | ICD-10-CM

## 2025-06-06 NOTE — TELEPHONE ENCOUNTER
Patient calling back to follow up on CT scan results. Reviewed previous comments and is aware. Requesting once provider does have a chance to review them to give him a call.

## 2025-06-09 ENCOUNTER — RESULTS FOLLOW-UP (OUTPATIENT)
Dept: FAMILY MEDICINE CLINIC | Facility: CLINIC | Age: 80
End: 2025-06-09

## 2025-06-09 NOTE — TELEPHONE ENCOUNTER
Patient called back. There is no Primary Care Provider note to advise the patient his result of CT scan. Triage RN unsuccessfully attempted to reach out to the office. Please follow up.

## 2025-06-11 RX ORDER — INSULIN DEGLUDEC 100 U/ML
INJECTION, SOLUTION SUBCUTANEOUS
COMMUNITY
Start: 2025-05-21

## 2025-06-12 NOTE — RESULT ENCOUNTER NOTE
In the process of scheduling his Mri he's currently on a wait list because of the his pace maker that needs to be remove before the Mri, called Chidi from the pacemaker team left a voicemail, will follow up when she calls back- [facility] DENISA

## 2025-06-13 ENCOUNTER — OFFICE VISIT (OUTPATIENT)
Dept: GASTROENTEROLOGY | Facility: CLINIC | Age: 80
End: 2025-06-13
Payer: MEDICARE

## 2025-06-13 ENCOUNTER — PATIENT MESSAGE (OUTPATIENT)
Dept: FAMILY MEDICINE CLINIC | Facility: CLINIC | Age: 80
End: 2025-06-13

## 2025-06-13 VITALS — BODY MASS INDEX: 28.77 KG/M2 | TEMPERATURE: 98 F | WEIGHT: 201 LBS | HEIGHT: 70 IN

## 2025-06-13 DIAGNOSIS — K86.89 PANCREATIC INSUFFICIENCY: ICD-10-CM

## 2025-06-13 DIAGNOSIS — K58.1 IRRITABLE BOWEL SYNDROME WITH CONSTIPATION: Primary | ICD-10-CM

## 2025-06-13 DIAGNOSIS — Z80.0 FAMILY HISTORY OF COLON CANCER: ICD-10-CM

## 2025-06-13 DIAGNOSIS — Z12.11 COLON CANCER SCREENING: ICD-10-CM

## 2025-06-13 DIAGNOSIS — Z86.0100 HISTORY OF COLON POLYPS: ICD-10-CM

## 2025-06-13 DIAGNOSIS — K86.2 PANCREATIC CYST: ICD-10-CM

## 2025-06-13 DIAGNOSIS — K21.9 GASTROESOPHAGEAL REFLUX DISEASE, UNSPECIFIED WHETHER ESOPHAGITIS PRESENT: ICD-10-CM

## 2025-06-13 PROCEDURE — 99214 OFFICE O/P EST MOD 30 MIN: CPT | Performed by: PHYSICIAN ASSISTANT

## 2025-06-13 NOTE — ASSESSMENT & PLAN NOTE
Ibsrela was sent in at the time of his last office visit.  He says this did not help so the higher dose of the Linzess was sent in.  He said that this was actually helping his constipation and he was not really having any issues with abdominal pain or nausea when his constipation was controlled.  He says that over the last month or so he has not had any Linzess and has noticed that his constipation, nausea and generalized abdominal pain have returned.  He said he can go up to 4 days without moving his bowels.  He admits to drinking very little water on a daily basis.  -Please increase water intake to at least six 4 ounce of water a day  -Please restart Linzess 290 micrograms daily: I asked him to take this about an hour after his thyroid medication and about an hour before breakfast  -If Linzess fails he should be tried on Amitiza

## 2025-06-13 NOTE — PROGRESS NOTES
Name: Andrade Wise Jr.      : 1945      MRN: [id number]  Encounter Provider: Evelia East PA-C  Encounter Date: 2025   Encounter department: [facility] [place]  :  Assessment & Plan  Irritable bowel syndrome with constipation  Ibsrelscar was sent in at the time of his last office visit.  He says this did not help so the higher dose of the Linzess was sent in.  He said that this was actually helping his constipation and he was not really having any issues with abdominal pain or nausea when his constipation was controlled.  He says that over the last month or so he has not had any Linzess and has noticed that his constipation, nausea and generalized abdominal pain have returned.  He said he can go up to 4 days without moving his bowels.  He admits to drinking very little water on a daily basis.  -Please increase water intake to at least six 4 ounce of water a day  -Please restart Linzess 290 micrograms daily: I asked him to take this about an hour after his thyroid medication and about an hour before breakfast  -If Linzess fails he should be tried on Amitiza       Gastroesophageal reflux disease, unspecified whether esophagitis present  Scheduled for EGD in July. Pt is on nexium 40 mg daily.   -continue nexium 40 mg daily       Pancreatic cyst  CT scan earlier this month noted multiple pancreatic hypodense lesions measuring up to 1.9 cm.  He does routinely get MRI/MRCP with his last 1 done last year that noted stability.  He is technically due due for repeat MRI in .  It appears his PCP did order an MRI.  -I explained to the patient that his CT scan does not show any urgent or abnormal changes from his MRCP last year  (as the one last year also noted scattered pancreatic cysts with the largest around 1.6 cm ) however it is reasonable for him to undergo the MRI that was ordered by his family doctor to ensure stability       History of colon polyps  Scheduled for  colonoscopy in July.       Pancreatic insufficiency  Fecal elastase was mildly low at 188 in 2022.  It does not appear this was ever repeated.  -Would recommend repeat a fecal elastase before any initiation of pancreatic enzymes if patient starts to haveTypical symptoms of pancreatic malabsorption like consistent diarrhea       Colon cancer screening    Orders:    polyethylene glycol (GOLYTELY) 4000 mL solution; Take 4,000 mL by mouth once for 1 dose    Family history of colon cancer  In his mother who was dx in her 80s. Colonoscopy scheduled for July.   -pt requests GOLYTELY prep           History of Present Illness   HPI  Andrade Wise Jr. is a 80 y.o. male who presents for follow-up. Ibsrela was sent in at the time of his last office visit.  He says this did not help so the higher dose of the Linzess was sent in.  He said that this was actually helping his constipation and he was not really having any issues with abdominal pain or nausea when his constipation was controlled.  He says that over the last month or so he has not had any Linzess and has noticed that his constipation, nausea and generalized abdominal pain have returned.  He said he can go up to 4 days without moving his bowels.  He admits to drinking very little water on a daily basis.  He says that when he finally moves his bowels this does alleviate his pain and nausea.  He denies any further heartburn, vomiting, diarrhea, unintentional weight loss, fevers, chills, yellowing of skin or eyes, bloody or black bowel movements.  Patient says his mother was diagnosed with colon cancer in her 80s and there are several people from that side of the family who had colon cancer.  History obtained from: patient and patient's Significant Other    Review of Systems   Constitutional:  Negative for appetite change, chills, diaphoresis, fatigue, fever and unexpected weight change.   HENT:  Negative for trouble swallowing.    Gastrointestinal:  Positive for abdominal  pain, constipation and nausea. Negative for abdominal distention, anal bleeding, blood in stool, diarrhea, rectal pain and vomiting.     Medical History Reviewed by provider this encounter:     .  Past Medical History   Past Medical History[1]  Past Surgical History[2]  Family History[3]   reports that he quit smoking about 40 years ago. His smoking use included cigarettes. He started smoking about 60 years ago. He has a 20 pack-year smoking history. He has never been exposed to tobacco smoke. He has never used smokeless tobacco. He reports that he does not drink alcohol and does not use drugs.  Current Outpatient Medications   Medication Instructions    acetaminophen (TYLENOL) 650 mg, Every 6 hours PRN    amiodarone 200 mg tablet     apixaban (ELIQUIS) 5 mg, Oral, 2 times daily    atorvastatin (LIPITOR) 20 mg, Oral, Daily    Blood Glucose Monitoring Suppl (FREESTYLE LITE) GEOVANNY No dose, route, or frequency recorded.    carvedilol (COREG) 25 mg, Oral, 2 times daily    clotrimazole-betamethasone (LOTRISONE) 1-0.05 % cream 1 Application, Topical, 2 times daily    esomeprazole (NEXIUM) 40 mg, Oral, Daily    fluticasone (FLONASE) 50 mcg/act nasal spray USE 2 SPRAYS IN EACH NOSTRIL DAILY (GENERIC FOR FLONASE)    FREESTYLE LITE test strip No dose, route, or frequency recorded.    gabapentin (NEURONTIN) 300 mg, Oral, 2 times daily    hydroCHLOROthiazide 25 mg, Oral, Daily    ketoconazole (NIZORAL) 2 % shampoo No dose, route, or frequency recorded.    Lancets (FREESTYLE) lancets No dose, route, or frequency recorded.    levothyroxine 137 mcg tablet     linaCLOtide 290 MCG CAPS 1 capsule, Oral, Daily before breakfast, 60 minutes before breakfast    metroNIDAZOLE (METROCREAM) 0.75 % cream     Mounjaro 7.5 MG/0.5ML SOAJ     Multiple Vitamin (MULTIVITAMIN ADULT PO) Take by mouth    ondansetron (ZOFRAN) 4 mg, Oral, Every 6 hours    polyethylene glycol (GOLYTELY) 4000 mL solution 4,000 mL, Oral, Once    RESTASIS 0.05 % ophthalmic  emulsion 1 drop, Every 12 hours    tadalafil (CIALIS) 5 mg, Oral, Daily PRN    tamsulosin (FLOMAX) 0.4 mg TAKE 1 CAPSULE DAILY WITH DINNER    Tresiba FlexTouch 100 units/mL injection pen    Allergies[4]   Medications Ordered Prior to Encounter[5]   Social History[6]     Objective   There were no vitals taken for this visit.     Physical Exam  Constitutional:       Appearance: Normal appearance.   Abdominal:      General: Abdomen is flat. Bowel sounds are normal. There is no distension.      Palpations: Abdomen is soft. There is no mass.      Tenderness: There is no abdominal tenderness. There is no right CVA tenderness, left CVA tenderness, guarding or rebound.      Hernia: No hernia is present.     Neurological:      Mental Status: He is alert.         Administrative Statements   I have spent a total time of 30 minutes in caring for this patient on the day of the visit/encounter including Diagnostic results, Prognosis, Risks and benefits of tx options, Instructions for management, Patient and family education, Importance of tx compliance, Risk factor reductions, Impressions, Counseling / Coordination of care, Documenting in the medical record, Reviewing/placing orders in the medical record (including tests, medications, and/or procedures), Obtaining or reviewing history  , and Communicating with other healthcare professionals .       [1]   Past Medical History:  Diagnosis Date    Allergic rhinitis     Anemia 10/23/2008    last assessed 9/9/13     Anxiety     Atrial fibrillation (HCC)     Bleeding     Chronic kidney disease     COVID-19 virus infection 12/2022    CPAP (continuous positive airway pressure) dependence     Diabetes mellitus (HCC)     Diarrhea     Diverticulitis     Eating disorder     GERD (gastroesophageal reflux disease)     Herpes zoster with complication     last assessed 7/3/17     Hiatal hernia     Hyperlipidemia     Hypertension     IBS (irritable bowel syndrome)     Incisional hernia      Increased urinary frequency     Irregular heart beat     Pyogenic granuloma 09/13/2006    last assessed 9/13/06    Carlos (subconjunctival hemorrhage), unspecified laterality 09/12/2005    last assessed 9/12/05    Sleep apnea     TMJ (dislocation of temporomandibular joint)     Ventral hernia     last assessed  4/6/17     Wears glasses    [2]   Past Surgical History:  Procedure Laterality Date    APPENDECTOMY      ATRIAL ABLATION SURGERY      2014    ATRIAL ABLATION SURGERY      catheter ablation atrial fibrillation / last assessed 2/17/16     ATRIAL ABLATION SURGERY N/A 11/26/2024    heart    CARDIOVERSION  05/30/2024    CHOLECYSTECTOMY      lap    COLON SURGERY  1990    Hartmans procedure    COLON SURGERY      reversal 6 weeks later    COLONOSCOPY N/A 01/26/2017    Procedure: COLONOSCOPY;  Surgeon: Portillo Schumacher MD;  Location: [facility];  Service:     ESOPHAGOGASTRODUODENOSCOPY N/A 01/26/2017    Procedure: ESOPHAGOGASTRODUODENOSCOPY (EGD);  Surgeon: Portillo Schumacher MD;  Location: [facility];  Service:     EYE SURGERY Bilateral 2015    lid repair    HERNIA REPAIR Bilateral     inguinal 2014 & 2013    INCISIONAL HERNIA REPAIR  2011    INCISIONAL HERNIA REPAIR N/A 03/24/2017    Procedure: REPAIR OF INCARCERATED INCISIONAL HERNIA WITH MESH, Lysis of Adhesions, Partial Omentectomy;  Surgeon: Colt Davis MD;  Location: [facility];  Service:     INSERT / REPLACE / REMOVE PACEMAKER Left 10/04/2021    medtronic-M#[id number]    NEUROBLASTOMA EXCISION      SKIN CANCER EXCISION      On nose    TONSILLECTOMY      US GUIDED THYROID BIOPSY  01/06/2025   [3]   Family History  Problem Relation Name Age of Onset    Heart disease Mother      Cancer Mother      Colon cancer Mother      Dementia Mother      Early death Father      Seizures Father      Cancer Sister          bone    Heart disease Sister      Diabetes Sister      Lupus Sister      Heart disease Brother      Cancer Brother          kidney    Diabetes Brother       Other Sister          covid    Stroke Neg Hx     [4]   Allergies  Allergen Reactions    Codeine Other (See Comments) and GI Intolerance     Reaction Date: 27Dec2004; Annotation - 04Sep2012: '' CRAZY ''  vomiting  headache    Demerol [Meperidine] Nausea Only, Other (See Comments), GI Intolerance and Vomiting     vomiting  Reaction Date: 27Dec2004;   headache    Morphine Nausea Only, GI Intolerance and Vomiting     Reaction Date: 23Feb2012;    [5]   Current Outpatient Medications on File Prior to Visit   Medication Sig Dispense Refill    acetaminophen (TYLENOL) 325 mg tablet Take 650 mg by mouth every 6 (six) hours as needed      amiodarone 200 mg tablet       apixaban (ELIQUIS) 5 mg Take 1 tablet (5 mg total) by mouth 2 (two) times a day (Patient taking differently: Take 2.5 mg by mouth in the morning and 2.5 mg in the evening.) 180 tablet 3    atorvastatin (LIPITOR) 20 mg tablet TAKE 1 TABLET DAILY 90 tablet 1    Blood Glucose Monitoring Suppl (FREESTYLE LITE) GEOVANNY       carvedilol (COREG) 25 mg tablet Take 1 tablet (25 mg total) by mouth 2 (two) times a day 60 tablet 5    clotrimazole-betamethasone (LOTRISONE) 1-0.05 % cream Apply 1 Application topically 2 (two) times a day 45 g 1    esomeprazole (NexIUM) 40 MG capsule Take 1 capsule (40 mg total) by mouth in the morning 90 capsule 1    FREESTYLE LITE test strip       hydroCHLOROthiazide 25 mg tablet TAKE ONE TABLET BY MOUTH EVERY DAY 90 tablet 1    ketoconazole (NIZORAL) 2 % shampoo       Lancets (FREESTYLE) lancets       levothyroxine 137 mcg tablet       metroNIDAZOLE (METROCREAM) 0.75 % cream       Multiple Vitamin (MULTIVITAMIN ADULT PO) Take by mouth      ondansetron (ZOFRAN) 4 mg tablet Take 1 tablet (4 mg total) by mouth every 6 (six) hours 6 tablet 0    RESTASIS 0.05 % ophthalmic emulsion Administer 1 drop to both eyes every 12 (twelve) hours      tadalafil (CIALIS) 5 MG tablet TAKE ONE TABLET BY MOUTH EVERY DAY AS NEEDED FOR ERECTILE DYSFUNCTION 90  tablet 1    tamsulosin (FLOMAX) 0.4 mg TAKE 1 CAPSULE DAILY WITH DINNER 90 capsule 1    Tresiba FlexTouch 100 units/mL injection pen       [DISCONTINUED] Tenapanor HCl (Ibsrela) 50 MG TABS Take 50 mg by mouth 2 (two) times a day with meals 60 tablet 3    fluticasone (FLONASE) 50 mcg/act nasal spray USE 2 SPRAYS IN EACH NOSTRIL DAILY (GENERIC FOR FLONASE) 48 g 3    gabapentin (NEURONTIN) 300 mg capsule TAKE ONE CAPSULE BY MOUTH TWICE A DAY (Patient not taking: Reported on 2025) 60 capsule 1    Mounjaro 7.5 MG/0.5ML SOAJ  (Patient not taking: Reported on 2025)      [DISCONTINUED] polyethylene glycol (GOLYTELY) 4000 mL solution Take 4,000 mL by mouth once for 1 dose 4000 mL 0     No current facility-administered medications on file prior to visit.   [6]   Social History  Tobacco Use    Smoking status: Former     Current packs/day: 0.00     Average packs/day: 1 pack/day for 20.0 years (20.0 ttl pk-yrs)     Types: Cigarettes     Start date:      Quit date:      Years since quittin.4     Passive exposure: Never    Smokeless tobacco: Never    Tobacco comments:     Started age 17   Vaping Use    Vaping status: Never Used   Substance and Sexual Activity    Alcohol use: No     Comment: none in 33 yrs    Drug use: No    Sexual activity: Yes

## 2025-06-13 NOTE — PATIENT INSTRUCTIONS
Please try to make sure you are drinking at least 8 cups of water/day  One hour after taking the thyroid medicine, take the linzess, then after another hour, you can eat breakfast

## 2025-06-13 NOTE — ASSESSMENT & PLAN NOTE
Fecal elastase was mildly low at 188 in 2022.  It does not appear this was ever repeated.  -Would recommend repeat a fecal elastase before any initiation of pancreatic enzymes if patient starts to haveTypical symptoms of pancreatic malabsorption like consistent diarrhea

## 2025-06-13 NOTE — ASSESSMENT & PLAN NOTE
CT scan earlier this month noted multiple pancreatic hypodense lesions measuring up to 1.9 cm.  He does routinely get MRI/MRCP with his last 1 done last year that noted stability.  He is technically due due for repeat MRI in 2026.  It appears his PCP did order an MRI.  -I explained to the patient that his CT scan does not show any urgent or abnormal changes from his MRCP last year  (as the one last year also noted scattered pancreatic cysts with the largest around 1.6 cm ) however it is reasonable for him to undergo the MRI that was ordered by his family doctor to ensure stability

## 2025-06-16 NOTE — QUICK NOTE
Investigation Report    Device investigated:   Make/model: Pacemaker - Medtronic [id number] [place] XT  MRI.   Leads:  Atrial Lead - Medtronic 5076 CapSureFix Novus MRI SureScan   Leads:   *bipolar Right Ventricular Lead - Medtronic 3830 SelectSecure MRI SureScan      Method investigated   imaging report  vendor contacted     [facility]   website used   MRI Verify  [email or web address]    Time spent investigating in minutes: 15    Investigation findings: conditional    Risk vs Benefit performed.  If so, list physician and outcome: N/A

## 2025-06-24 NOTE — PROGRESS NOTES
Outpatient Cardiology Note - Andrade Wise JrGabby 80 y.o. male MRN: [id number]    @ Encounter: [id number]        Patient Active Problem List    Diagnosis Date Noted    Radiculopathy, cervical region 04/01/2025    Olecranon bursitis of left elbow 02/27/2025    Irritable bowel syndrome with constipation 02/24/2025    Absolute anemia 02/24/2025    Pancreatic cyst 02/24/2025    Personal history of colon polyps, unspecified 02/24/2025    Pancreatic insufficiency 02/24/2025    Primary osteoarthritis of left knee 08/01/2024    History of ventral hernia repair 07/09/2024    Grade IV hemorrhoids 07/17/2023    Left upper extremity swelling 06/14/2023    Benign localized prostatic hyperplasia with lower urinary tract symptoms (LUTS) 03/31/2022    Complex renal cyst 03/31/2022    Positive depression screening 08/18/2021    Syncope 08/12/2021    Left groin pain 07/28/2021    Stage 2 chronic kidney disease 02/15/2021    Dyspnea on exertion 10/14/2020    Class 1 obesity 06/28/2019    Seasonal allergic rhinitis due to pollen 07/02/2018    Aortic root dilation (HCC) 04/13/2018    Type 2 diabetes mellitus with stage 3 chronic kidney disease, without long-term current use of insulin, unspecified whether stage 3a or 3b CKD (HCC)     Diabetic neuropathy (HCC) 10/09/2013    Chronic kidney disease (CKD), stage II (mild) 09/09/2013    Mixed hyperlipidemia 05/13/2013    Chronic rhinitis 01/10/2013    Atrial fibrillation (HCC) 09/04/2012    Hypertension, benign 09/04/2012    Colon, diverticulosis 09/04/2012    Esophageal reflux 09/04/2012    Obstructive sleep apnea 09/04/2012       Assessment:  # Persistent Afib  S/p afib ablation [facility] 11/26/24  Hx of ablation 12/9/14 after failing flecainide, then failed Tikosyn 2017. Cardioversion to SR on Multaq 12/3/18- developed rash. Then started on amiodarone.  AC: Eliquis 5 mg BID  Rate: coreg 25 mg BID  Rhythm: off amio    Studies- personally reviewed by me   Echo 3/13/24:  LVEF: 55%  Rv: normal  PASP:  26 mmHg  Ascending aorta 4.4 cm    # MDT dual chamber PPM implant 2021 for syncope in setting of bifascicular block  Interrogation 4/10/25:  8%, no events  Interrogation 1/6/25:  0%, afib event    # amiodarone therapy  LFTs 11/22/24 normal  TSH 10/14/24: normal  # aortic root dilation  CT Chest 10/1/24: 4.3 cm  # DM2, HgA1C 5/27/25: 6.9%  # HTN- HCTZ 25 mg daily, losartan 100 mg daily, coreg 12.5 mg BID  # hyperlipidemia- atorvastatin 20 mg   # ROBER- CPAP    Today's Plan:  Pt is acceptable risk for colonscopy  Continue coreg 25 mg BID for Persistent afib  Amio toxicity monitoring,- PFTs, TSH  Wants Watchman but has not had bleeding episode  Pt refusing full dose eliquis, wants only 2.5 mg BID  Asking to come off amio- still having events on device interrogation- would re-eval on next device interrogation    HPI:       81 yo male has followed with [facility] cardiology for Afib. With regards to patient's atrial fibrillation history he underwent cryoablation 12/9/14 after failing flecainide. Subsequently failed Tikosyn 2017. Underwent DC CV to sinus rhythm and started on multaq 12/3/18. Developed rash. Multaq was discontinued. Was seen EP clinic in Feb 2019. Was in sinus rhythm. Started on amiodarone for AF prevention.    Pt has MDT dual chamber PPM for syncope in setting of bifascicular block. Most recent afib ablation 11/26/24.     He is unhappy with his care at [facility].     Interim:  Saw Dr Trujillo for persistent afib, did not want repeat ablation  Interrogation 4/10/25:  8%, 4 AF episode 15% time    Needs clearance for colonoscopy    Thyroidectomy recently [facility]  Past Medical History:   Diagnosis Date    Allergic rhinitis     Anemia 10/23/2008    last assessed 9/9/13     Anxiety     Atrial fibrillation (HCC)     Bleeding     Chronic kidney disease     COVID-19 virus infection 12/2022    CPAP (continuous positive airway pressure) dependence     Diabetes mellitus (HCC)     Diarrhea     Diverticulitis      Eating disorder     GERD (gastroesophageal reflux disease)     Herpes zoster with complication     last assessed 7/3/17     Hiatal hernia     Hyperlipidemia     Hypertension     IBS (irritable bowel syndrome)     Incisional hernia     Increased urinary frequency     Irregular heart beat     Pyogenic granuloma 09/13/2006    last assessed 9/13/06    Carlos (subconjunctival hemorrhage), unspecified laterality 09/12/2005    last assessed 9/12/05    Sleep apnea     TMJ (dislocation of temporomandibular joint)     Ventral hernia     last assessed  4/6/17     Wears glasses        Review of Systems   Constitutional:  Negative for activity change, appetite change, fatigue and unexpected weight change.   HENT:  Negative for congestion and nosebleeds.    Eyes: Negative.    Respiratory:  Negative for cough, chest tightness and shortness of breath.    Cardiovascular:  Negative for chest pain, palpitations and leg swelling.   Gastrointestinal:  Negative for abdominal distention.   Endocrine: Negative.    Genitourinary: Negative.    Musculoskeletal: Negative.    Skin: Negative.    Neurological:  Negative for dizziness, syncope and weakness.   Hematological: Negative.    Psychiatric/Behavioral: Negative.         Allergies   Allergen Reactions    Codeine Other (See Comments) and GI Intolerance     Reaction Date: 27Dec2004; Annotation - 04Sep2012: '' CRAZY ''  vomiting  headache    Demerol [Meperidine] Nausea Only, Other (See Comments), GI Intolerance and Vomiting     vomiting  Reaction Date: 27Dec2004;   headache    Morphine Nausea Only, GI Intolerance and Vomiting     Reaction Date: 23Feb2012;      .    Current Outpatient Medications:     acetaminophen (TYLENOL) 325 mg tablet, Take 650 mg by mouth every 6 (six) hours as needed, Disp: , Rfl:     amiodarone 200 mg tablet, , Disp: , Rfl:     apixaban (ELIQUIS) 5 mg, Take 1 tablet (5 mg total) by mouth 2 (two) times a day (Patient taking differently: Take 2.5 mg by mouth in the morning  and 2.5 mg in the evening.), Disp: 180 tablet, Rfl: 3    atorvastatin (LIPITOR) 20 mg tablet, TAKE 1 TABLET DAILY, Disp: 90 tablet, Rfl: 1    Blood Glucose Monitoring Suppl (FREESTYLE LITE) GEOVANNY, , Disp: , Rfl:     carvedilol (COREG) 25 mg tablet, Take 1 tablet (25 mg total) by mouth 2 (two) times a day, Disp: 60 tablet, Rfl: 5    clotrimazole-betamethasone (LOTRISONE) 1-0.05 % cream, Apply 1 Application topically 2 (two) times a day, Disp: 45 g, Rfl: 1    esomeprazole (NexIUM) 40 MG capsule, Take 1 capsule (40 mg total) by mouth in the morning, Disp: 90 capsule, Rfl: 1    fluticasone (FLONASE) 50 mcg/act nasal spray, USE 2 SPRAYS IN EACH NOSTRIL DAILY (GENERIC FOR FLONASE), Disp: 48 g, Rfl: 3    FREESTYLE LITE test strip, , Disp: , Rfl:     gabapentin (NEURONTIN) 300 mg capsule, TAKE ONE CAPSULE BY MOUTH TWICE A DAY (Patient not taking: Reported on 6/13/2025), Disp: 60 capsule, Rfl: 1    hydroCHLOROthiazide 25 mg tablet, TAKE ONE TABLET BY MOUTH EVERY DAY, Disp: 90 tablet, Rfl: 1    ketoconazole (NIZORAL) 2 % shampoo, , Disp: , Rfl:     Lancets (FREESTYLE) lancets, , Disp: , Rfl:     levothyroxine 137 mcg tablet, , Disp: , Rfl:     linaCLOtide 290 MCG CAPS, Take 1 capsule by mouth daily before breakfast 60 minutes before breakfast, Disp: 30 capsule, Rfl: 3    metroNIDAZOLE (METROCREAM) 0.75 % cream, , Disp: , Rfl:     Mounjaro 7.5 MG/0.5ML SOAJ, , Disp: , Rfl:     Multiple Vitamin (MULTIVITAMIN ADULT PO), Take by mouth, Disp: , Rfl:     ondansetron (ZOFRAN) 4 mg tablet, Take 1 tablet (4 mg total) by mouth every 6 (six) hours, Disp: 6 tablet, Rfl: 0    polyethylene glycol (GOLYTELY) 4000 mL solution, Take 4,000 mL by mouth once for 1 dose, Disp: 4000 mL, Rfl: 0    RESTASIS 0.05 % ophthalmic emulsion, Administer 1 drop to both eyes every 12 (twelve) hours, Disp: , Rfl:     tadalafil (CIALIS) 5 MG tablet, TAKE ONE TABLET BY MOUTH EVERY DAY AS NEEDED FOR ERECTILE DYSFUNCTION, Disp: 90 tablet, Rfl: 1    tamsulosin  (FLOMAX) 0.4 mg, TAKE 1 CAPSULE DAILY WITH DINNER, Disp: 90 capsule, Rfl: 1    Tresiba FlexTouch 100 units/mL injection pen, , Disp: , Rfl:     Social History     Socioeconomic History    Marital status: /Civil Union     Spouse name: Not on file    Number of children: Not on file    Years of education: Not on file    Highest education level: Not on file   Occupational History    Not on file   Tobacco Use    Smoking status: Former     Current packs/day: 0.00     Average packs/day: 1 pack/day for 20.0 years (20.0 ttl pk-yrs)     Types: Cigarettes     Start date:      Quit date:      Years since quittin.5     Passive exposure: Never    Smokeless tobacco: Never    Tobacco comments:     Started age 17   Vaping Use    Vaping status: Never Used   Substance and Sexual Activity    Alcohol use: No     Comment: none in 33 yrs    Drug use: No    Sexual activity: Yes   Other Topics Concern    Not on file   Social History Narrative    Single per allscript      Social Drivers of Health     Financial Resource Strain: Low Risk  (10/11/2023)    Overall Financial Resource Strain (CARDIA)     Difficulty of Paying Living Expenses: Not hard at all   Food Insecurity: Not on file   Transportation Needs: No Transportation Needs (10/11/2023)    PRAPARE - Transportation     Lack of Transportation (Medical): No     Lack of Transportation (Non-Medical): No   Physical Activity: Not on file   Stress: Not on file   Social Connections: Not on file   Intimate Partner Violence: Not At Risk (2024)    Received from [facility]    Humiliation, Afraid, Rape, and Kick questionnaire     Within the last year, have you been afraid of your partner or ex-partner?: No     Within the last year, have you been humiliated or emotionally abused in other ways by your partner or ex-partner?: No     Within the last year, have you been kicked, hit, slapped, or otherwise physically hurt by your partner or ex-partner?: No      Within the last year, have you been raped or forced to have any kind of sexual activity by your partner or ex-partner?: No   Housing Stability: Not on file       Family History   Problem Relation Name Age of Onset    Heart disease Mother      Cancer Mother      Colon cancer Mother      Dementia Mother      Early death Father      Seizures Father      Cancer Sister          bone    Heart disease Sister      Diabetes Sister      Lupus Sister      Heart disease Brother      Cancer Brother          kidney    Diabetes Brother      Other Sister          covid    Stroke Neg Hx         Physical Exam:    Vitals: There were no vitals taken for this visit., There is no height or weight on file to calculate BMI.,   Wt Readings from Last 3 Encounters:   06/13/25 91.2 kg (201 lb)   05/29/25 92.1 kg (203 lb)   05/06/25 93.4 kg (206 lb)       Physical Exam:  There were no vitals filed for this visit.    Physical Exam  Constitutional:       Appearance: He is well-developed.   HENT:      Head: Normocephalic and atraumatic.     Eyes:      Pupils: Pupils are equal, round, and reactive to light.     Neck:      Vascular: No JVD.     Cardiovascular:      Rate and Rhythm: Normal rate and regular rhythm.      Heart sounds: No murmur heard.  Pulmonary:      Effort: Pulmonary effort is normal. No respiratory distress.      Breath sounds: Normal breath sounds.   Abdominal:      General: There is no distension.      Palpations: Abdomen is soft.      Tenderness: There is no abdominal tenderness.     Musculoskeletal:         General: Normal range of motion.      Cervical back: Normal range of motion.     Skin:     General: Skin is warm and dry.      Findings: No rash.     Neurological:      Mental Status: He is alert and oriented to person, place, and time.         Labs & Results:    Lab Results   Component Value Date    WBC 15.15 (H) 02/15/2025    HGB 12.0 02/15/2025    HCT 36.9 02/15/2025    MCV 92 02/15/2025     02/15/2025     Lab  "Results   Component Value Date    SODIUM 140 05/27/2025    K 3.7 05/27/2025    CL 99 05/27/2025    CO2 32 05/27/2025    BUN 19 05/27/2025    CREATININE 1.13 05/27/2025    GLUC 111 05/27/2025    CALCIUM 9.9 05/27/2025     No results found for: \"BNP\"   Lab Results   Component Value Date    CHOLESTEROL 107 10/25/2016     Lab Results   Component Value Date    HDL 45 10/25/2016    HDL 39 09/13/2013    HDL 41 05/15/2013     Lab Results   Component Value Date    TRIG 77 10/25/2016    TRIG 91 09/13/2013    TRIG 93 05/15/2013     No results found for: \"NONHDLC\"          EKG personally reviewed by Arnulfo Meyer DO.     Counseling / Coordination of Care  Time spent today 40 minutes.  Greater than 50% of total time was spent with the patient and / or family counseling and / or coordination of care. We discussed diagnoses, most recent studies and any changes in treatment  Thank you for the opportunity to participate in the care of this patient.    ARNULFO MEYER D.O.  DIRECTOR OF HEART FAILURE/ PULMONARY HYPERTENSION  MEDICAL DIRECTOR OF LVAD PROGRAM  [facility]  "

## 2025-06-25 ENCOUNTER — OFFICE VISIT (OUTPATIENT)
Dept: CARDIOLOGY CLINIC | Facility: CLINIC | Age: 80
End: 2025-06-25
Payer: MEDICARE

## 2025-06-25 VITALS
BODY MASS INDEX: 29.63 KG/M2 | OXYGEN SATURATION: 98 % | SYSTOLIC BLOOD PRESSURE: 90 MMHG | WEIGHT: 207 LBS | DIASTOLIC BLOOD PRESSURE: 52 MMHG | HEART RATE: 60 BPM | HEIGHT: 70 IN

## 2025-06-25 DIAGNOSIS — I48.91 ATRIAL FIBRILLATION, UNSPECIFIED TYPE (HCC): ICD-10-CM

## 2025-06-25 DIAGNOSIS — I10 HYPERTENSION, BENIGN: ICD-10-CM

## 2025-06-25 DIAGNOSIS — I77.810 AORTIC ROOT DILATION (HCC): ICD-10-CM

## 2025-06-25 DIAGNOSIS — G47.33 OBSTRUCTIVE SLEEP APNEA: ICD-10-CM

## 2025-06-25 DIAGNOSIS — I48.19 PERSISTENT ATRIAL FIBRILLATION (HCC): Primary | ICD-10-CM

## 2025-06-25 PROCEDURE — 99214 OFFICE O/P EST MOD 30 MIN: CPT | Performed by: INTERNAL MEDICINE

## 2025-07-02 ENCOUNTER — OFFICE VISIT (OUTPATIENT)
Dept: FAMILY MEDICINE CLINIC | Facility: CLINIC | Age: 80
End: 2025-07-02
Payer: MEDICARE

## 2025-07-02 VITALS
BODY MASS INDEX: 29.7 KG/M2 | SYSTOLIC BLOOD PRESSURE: 130 MMHG | HEART RATE: 64 BPM | TEMPERATURE: 98.5 F | DIASTOLIC BLOOD PRESSURE: 64 MMHG | HEIGHT: 70 IN

## 2025-07-02 DIAGNOSIS — N40.1 BENIGN LOCALIZED PROSTATIC HYPERPLASIA WITH LOWER URINARY TRACT SYMPTOMS (LUTS): ICD-10-CM

## 2025-07-02 DIAGNOSIS — S41.112A LACERATION OF LEFT UPPER EXTREMITY, INITIAL ENCOUNTER: Primary | ICD-10-CM

## 2025-07-02 PROCEDURE — G2211 COMPLEX E/M VISIT ADD ON: HCPCS | Performed by: NURSE PRACTITIONER

## 2025-07-02 PROCEDURE — 99213 OFFICE O/P EST LOW 20 MIN: CPT | Performed by: NURSE PRACTITIONER

## 2025-07-02 RX ORDER — TAMSULOSIN HYDROCHLORIDE 0.4 MG/1
CAPSULE ORAL
Qty: 90 CAPSULE | Refills: 1 | Status: SHIPPED | OUTPATIENT
Start: 2025-07-02

## 2025-07-02 RX ORDER — PEN NEEDLE, DIABETIC 32GX 5/32"
NEEDLE, DISPOSABLE MISCELLANEOUS
COMMUNITY
Start: 2025-06-30

## 2025-07-02 RX ORDER — CEPHALEXIN 500 MG/1
500 CAPSULE ORAL EVERY 12 HOURS SCHEDULED
Qty: 14 CAPSULE | Refills: 0 | Status: SHIPPED | OUTPATIENT
Start: 2025-07-02 | End: 2025-07-09

## 2025-07-02 NOTE — TELEPHONE ENCOUNTER
Refill must be reviewed and completed by the office or provider. The refill is unable to be approved or denied by the medication management team.    Patient not seen > 1 year

## 2025-07-02 NOTE — PROGRESS NOTES
"Name: Andrade Wise Jr.      : 1945      MRN: [id number]  Encounter Provider: SANDRITA Herrera  Encounter Date: 2025   Encounter department: [facility]  :  Assessment & Plan  Laceration of left upper extremity, initial encounter  Wounds appear to be healing, however will cover with Keflex given soft tissue swelling and erythema.   Monitor for any worsening symptoms.   Refusing tetanus booster, states the door was clean/newly installed and he does not feel its needed  Orders:    cephalexin (KEFLEX) 500 mg capsule; Take 1 capsule (500 mg total) by mouth every 12 (twelve) hours for 7 days           History of Present Illness   Here today for wound evaluation.  He cut his left forearm on a door at [employer] last week.  He is on Eliquis, so he always bruises and bleeds easily.  He has been keeping the wounds clean with antibiotic ointment.  There is some redness around the site that he would like to have looked at      Review of Systems   Constitutional:  Negative for chills and fever.   Skin:  Positive for wound.   All other systems reviewed and are negative.      Objective   /64   Pulse 64   Temp 98.5 °F (36.9 °C)   Ht 5' 10\" (1.778 m)   BMI 29.70 kg/m²      Physical Exam  Vitals and nursing note reviewed.   Constitutional:       General: He is not in acute distress.     Appearance: Normal appearance. He is well-developed. He is not diaphoretic.     Eyes:      Conjunctiva/sclera: Conjunctivae normal.     Pulmonary:      Effort: Pulmonary effort is normal. No respiratory distress.     Skin:         Neurological:      Mental Status: He is alert.     Psychiatric:         Mood and Affect: Mood normal.         Behavior: Behavior normal.         "

## 2025-07-08 ENCOUNTER — HOSPITAL ENCOUNTER (OUTPATIENT)
Dept: RADIOLOGY | Facility: HOSPITAL | Age: 80
Discharge: HOME | End: 2025-07-08
Payer: MEDICARE

## 2025-07-08 VITALS — HEART RATE: 85 BPM | OXYGEN SATURATION: 95 %

## 2025-07-08 DIAGNOSIS — R93.5 ABNORMAL CT OF THE ABDOMEN: ICD-10-CM

## 2025-07-08 PROCEDURE — 74183 MRI ABD W/O CNTR FLWD CNTR: CPT

## 2025-07-08 PROCEDURE — A9585 GADOBUTROL INJECTION: HCPCS | Performed by: FAMILY MEDICINE

## 2025-07-08 PROCEDURE — 76376 3D RENDER W/INTRP POSTPROCES: CPT

## 2025-07-08 RX ORDER — GADOBUTROL 604.72 MG/ML
9 INJECTION INTRAVENOUS
Status: COMPLETED | OUTPATIENT
Start: 2025-07-08 | End: 2025-07-08

## 2025-07-08 RX ADMIN — GADOBUTROL 9 ML: 604.72 INJECTION INTRAVENOUS at 14:14

## 2025-07-08 NOTE — PROGRESS NOTES
Patient arrived to MRI appointment for MRI abdomen/ MRCP. This is a recommended follow-up from a previously abnormal CT exam patient completed earlier this year on 06/04/2025. Ricarda Rdz PA-C from Cardiology department has signed general consent for imaging and care interventions as related to today's study 07/08/2025. Patient does have a Medtronic [place] pacemaker in place, and as such is being monitored for the duration of today's imaging study.     Remote device evaluation for Medtronic Yvette Pacemaker was completed pre-procedure by myself Bertha Basurto RN with assistance of Medtronic Tablet using pre-programmed cardiac agorithm software. Device settings adjusted for MRI safe compatibility/ MRI Sure-Scan mode. Baseline settings of AAI-DDD (low threshold 60 bpm, and high threshold 120 bpm) changed to DOO with programmed rate of 85 bpm.     Patient tolerated imaging study without incidence. (Please see procedural section of patient's chart to view vital signs which were continuously monitored & documented at q5min intervals for duration of patient's imaging appointment). Device again remotely interrogated by myself Bertha Basurto RN with assistance of Medtronic Tablet pre-programmed cardiac algorithm software. MRI Sure Scan mode turned off, and patient's device returned to baseline settings of AAI-DDD (low threshold 60 bpm, and high threshold 120 bpm) at completion of the MRI imaging study. Normal device function confirmed prior to discharge from the MRI suite.

## 2025-07-14 DIAGNOSIS — M54.12 RADICULOPATHY, CERVICAL REGION: ICD-10-CM

## 2025-07-14 RX ORDER — TIZANIDINE 2 MG/1
4 TABLET ORAL 2 TIMES DAILY
Qty: 60 TABLET | Refills: 0 | Status: SHIPPED | OUTPATIENT
Start: 2025-07-14

## 2025-07-17 ENCOUNTER — REMOTE DEVICE CLINIC VISIT (OUTPATIENT)
Dept: CARDIOLOGY CLINIC | Facility: CLINIC | Age: 80
End: 2025-07-17
Payer: MEDICARE

## 2025-07-17 DIAGNOSIS — Z95.0 PRESENCE OF PERMANENT CARDIAC PACEMAKER: Primary | ICD-10-CM

## 2025-07-17 PROCEDURE — 93294 REM INTERROG EVL PM/LDLS PM: CPT | Performed by: INTERNAL MEDICINE

## 2025-07-17 PROCEDURE — 93296 REM INTERROG EVL PM/IDS: CPT | Performed by: INTERNAL MEDICINE

## 2025-07-17 NOTE — PROGRESS NOTES
Results for orders placed or performed in visit on 07/17/25   Cardiac EP device report    Narrative    MDT DC/PM  CARELINK TRANSMISSION: BATTERY VOLTAGE ADEQUATE. (11.3 YRS) AP 89%  <1%. ALL AVAILABLE LEAD PARAMETERS WITHIN NORMAL LIMITS. NO SIGNIFICANT HIGH RATE EPISODES. 1 AT/AF EPISODES DETECTED <2MINS. PATIENT IS ON ELIQUIS, AMIO AND CARVEDILOL. NORMAL DEVICE FUNCTION.---AVINA

## 2025-07-23 ENCOUNTER — OFFICE VISIT (OUTPATIENT)
Dept: FAMILY MEDICINE CLINIC | Facility: CLINIC | Age: 80
End: 2025-07-23
Payer: MEDICARE

## 2025-07-23 VITALS
SYSTOLIC BLOOD PRESSURE: 130 MMHG | BODY MASS INDEX: 30.35 KG/M2 | HEIGHT: 70 IN | WEIGHT: 212 LBS | RESPIRATION RATE: 18 BRPM | OXYGEN SATURATION: 99 % | DIASTOLIC BLOOD PRESSURE: 80 MMHG | HEART RATE: 81 BPM | TEMPERATURE: 97.8 F

## 2025-07-23 DIAGNOSIS — K86.2 PANCREATIC CYST: ICD-10-CM

## 2025-07-23 DIAGNOSIS — R93.5 ABNORMAL CT OF THE ABDOMEN: Primary | ICD-10-CM

## 2025-07-23 PROCEDURE — G2211 COMPLEX E/M VISIT ADD ON: HCPCS | Performed by: FAMILY MEDICINE

## 2025-07-23 PROCEDURE — 99214 OFFICE O/P EST MOD 30 MIN: CPT | Performed by: FAMILY MEDICINE

## 2025-07-23 RX ORDER — FLUOCINONIDE TOPICAL SOLUTION USP, 0.05% 0.5 MG/ML
SOLUTION TOPICAL
COMMUNITY
Start: 2025-07-09

## 2025-07-23 NOTE — PROGRESS NOTES
"Name: Andrade Wise Jr.      : 1945      MRN: [id number]  Encounter Provider: Kiesha Mahan MD  Encounter Date: 2025   Encounter department: Merged with [facility]  :  Assessment & Plan  Abnormal CT of the abdomen    Orders:    MRI abdomen w wo contrast and mrcp; Future    Pancreatic cyst    Orders:    MRI abdomen w wo contrast and mrcp; Future           History of Present Illness   HPI  Andrade is here for follow up of MRI abdomen that he had on 25. This was done to follow up abnormal finding on CT abdomen/pelvis done on 25 of multiple pancreatic hypodense lesions.   MRI abdomen showed interval enlargement of the pancreatic tail cyst, now measuring 19 mm (previously 9 mm). A follow-up gadolinium enhanced MRI/MRCP in 6 months is recommended. The dominant 23 mm pancreatic cyst within the head and neck are unchanged, without   suspicious features. 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    Mr. Wise reports no acute issues/concerns today. Denies abdominal pain, fevers/chills, weight loss, night sweats, weakness.     Review of Systems   Constitutional: Negative.    HENT: Negative.     Eyes: Negative.    Respiratory: Negative.     Cardiovascular: Negative.    Gastrointestinal: Negative.    Endocrine: Negative.    Genitourinary: Negative.    Musculoskeletal: Negative.    Neurological: Negative.    Hematological: Negative.    Psychiatric/Behavioral: Negative.         Objective   /80   Pulse 81   Temp 97.8 °F (36.6 °C)   Resp 18   Ht 5' 10\" (1.778 m)   Wt 96.2 kg (212 lb)   SpO2 99%   BMI 30.42 kg/m²      Physical Exam  Vitals and nursing note reviewed.   Constitutional:       General: He is not in acute distress.     Appearance: Normal appearance. He is well-developed.   HENT:      Head: Normocephalic and atraumatic.     Eyes:      Conjunctiva/sclera: Conjunctivae normal.       Cardiovascular:      Rate and Rhythm: Normal rate and regular rhythm.     "  Pulses: Normal pulses.      Heart sounds: Normal heart sounds. No murmur heard.  Pulmonary:      Effort: Pulmonary effort is normal. No respiratory distress.      Breath sounds: Normal breath sounds.   Abdominal:      General: There is no distension.      Palpations: Abdomen is soft. There is no mass.      Tenderness: There is no abdominal tenderness.      Hernia: No hernia is present.     Musculoskeletal:         General: No swelling.      Cervical back: Neck supple.     Skin:     General: Skin is warm and dry.      Capillary Refill: Capillary refill takes less than 2 seconds.     Neurological:      Mental Status: He is alert.     Psychiatric:         Mood and Affect: Mood normal.

## 2025-07-25 ENCOUNTER — ANESTHESIA EVENT (OUTPATIENT)
Dept: GASTROENTEROLOGY | Facility: HOSPITAL | Age: 80
End: 2025-07-25
Payer: MEDICARE

## 2025-07-25 ENCOUNTER — ANESTHESIA (OUTPATIENT)
Dept: GASTROENTEROLOGY | Facility: HOSPITAL | Age: 80
End: 2025-07-25
Payer: MEDICARE

## 2025-07-25 ENCOUNTER — HOSPITAL ENCOUNTER (OUTPATIENT)
Dept: GASTROENTEROLOGY | Facility: HOSPITAL | Age: 80
Setting detail: OUTPATIENT SURGERY
End: 2025-07-25
Attending: INTERNAL MEDICINE
Payer: MEDICARE

## 2025-07-25 VITALS
OXYGEN SATURATION: 98 % | DIASTOLIC BLOOD PRESSURE: 64 MMHG | TEMPERATURE: 97.7 F | SYSTOLIC BLOOD PRESSURE: 109 MMHG | RESPIRATION RATE: 20 BRPM | HEART RATE: 71 BPM

## 2025-07-25 DIAGNOSIS — Z86.0100 HX OF COLONIC POLYPS: ICD-10-CM

## 2025-07-25 DIAGNOSIS — K21.9 GASTROESOPHAGEAL REFLUX DISEASE WITHOUT ESOPHAGITIS: ICD-10-CM

## 2025-07-25 LAB — GLUCOSE SERPL-MCNC: 100 MG/DL (ref 65–140)

## 2025-07-25 PROCEDURE — 82948 REAGENT STRIP/BLOOD GLUCOSE: CPT

## 2025-07-25 PROCEDURE — 88342 IMHCHEM/IMCYTCHM 1ST ANTB: CPT | Performed by: PATHOLOGY

## 2025-07-25 PROCEDURE — 88341 IMHCHEM/IMCYTCHM EA ADD ANTB: CPT | Performed by: PATHOLOGY

## 2025-07-25 PROCEDURE — 88305 TISSUE EXAM BY PATHOLOGIST: CPT | Performed by: PATHOLOGY

## 2025-07-25 RX ORDER — PROPOFOL 10 MG/ML
INJECTION, EMULSION INTRAVENOUS AS NEEDED
Status: DISCONTINUED | OUTPATIENT
Start: 2025-07-25 | End: 2025-07-25

## 2025-07-25 RX ORDER — ONDANSETRON 2 MG/ML
4 INJECTION INTRAMUSCULAR; INTRAVENOUS ONCE
Status: COMPLETED | OUTPATIENT
Start: 2025-07-25 | End: 2025-07-25

## 2025-07-25 RX ORDER — PHENYLEPHRINE HCL IN 0.9% NACL 1 MG/10 ML
SYRINGE (ML) INTRAVENOUS AS NEEDED
Status: DISCONTINUED | OUTPATIENT
Start: 2025-07-25 | End: 2025-07-25

## 2025-07-25 RX ORDER — SODIUM CHLORIDE 9 MG/ML
INJECTION, SOLUTION INTRAVENOUS CONTINUOUS PRN
Status: DISCONTINUED | OUTPATIENT
Start: 2025-07-25 | End: 2025-07-25

## 2025-07-25 RX ORDER — LIDOCAINE HYDROCHLORIDE 10 MG/ML
0.5 INJECTION, SOLUTION EPIDURAL; INFILTRATION; INTRACAUDAL; PERINEURAL ONCE AS NEEDED
Status: ACTIVE | OUTPATIENT
Start: 2025-07-25

## 2025-07-25 RX ORDER — EPHEDRINE SULFATE 50 MG/ML
INJECTION INTRAVENOUS AS NEEDED
Status: DISCONTINUED | OUTPATIENT
Start: 2025-07-25 | End: 2025-07-25

## 2025-07-25 RX ADMIN — ONDANSETRON 4 MG: 2 INJECTION, SOLUTION INTRAMUSCULAR; INTRAVENOUS at 09:12

## 2025-07-25 RX ADMIN — Medication 200 MCG: at 09:41

## 2025-07-25 RX ADMIN — PROPOFOL 130 MCG/KG/MIN: 10 INJECTION, EMULSION INTRAVENOUS at 09:37

## 2025-07-25 RX ADMIN — SODIUM CHLORIDE: 9 INJECTION, SOLUTION INTRAVENOUS at 09:31

## 2025-07-25 RX ADMIN — EPHEDRINE SULFATE 10 MG: 50 INJECTION INTRAVENOUS at 09:43

## 2025-07-25 RX ADMIN — SIMETHICONE 50 MG: 20 SUSPENSION/ DROPS ORAL at 09:38

## 2025-07-25 RX ADMIN — Medication 100 MCG: at 09:43

## 2025-07-25 RX ADMIN — PROPOFOL 130 MG: 10 INJECTION, EMULSION INTRAVENOUS at 09:36

## 2025-07-25 RX ADMIN — SODIUM CHLORIDE: 9 INJECTION, SOLUTION INTRAVENOUS at 10:03

## 2025-07-25 NOTE — ANESTHESIA PREPROCEDURE EVALUATION
Procedure:  COLONOSCOPY  EGD    Relevant Problems   CARDIO   (+) Aortic root dilation (HCC)   (+) Atrial fibrillation (HCC)   (+) Dyspnea on exertion   (+) Grade IV hemorrhoids   (+) Hypertension, benign   (+) Mixed hyperlipidemia      ENDO   (+) Type 2 diabetes mellitus with stage 3 chronic kidney disease, without long-term current use of insulin, unspecified whether stage 3a or 3b CKD (HCC)      GI/HEPATIC   (+) Esophageal reflux   (+) Pancreatic cyst   (+) Pancreatic insufficiency      /RENAL   (+) Benign localized prostatic hyperplasia with lower urinary tract symptoms (LUTS)   (+) Chronic kidney disease (CKD), stage II (mild)   (+) Complex renal cyst   (+) Stage 2 chronic kidney disease      HEMATOLOGY   (+) Absolute anemia      MUSCULOSKELETAL   (+) Primary osteoarthritis of left knee      NEURO/PSYCH   (+) Diabetic neuropathy (HCC)      PULMONARY   (+) Dyspnea on exertion   (+) Obstructive sleep apnea        Physical Exam    Airway     Mallampati score: II  TM Distance: >3 FB  Neck ROM: full      Cardiovascular  Rhythm: regular, Rate: normalCardiovascular exam normal    Dental   No notable dental hx     Pulmonary  Pulmonary exam normal Breath sounds clear to auscultation    Neurological      Other Findings        Anesthesia Plan  ASA Score- 3     Anesthesia Type- IV sedation with anesthesia with ASA Monitors.         Additional Monitors:     Airway Plan:     Comment: Discussed risks/benefits, including medication reactions, awareness, aspiration, and serious/life threatening complications. Plan to maintain native airway with IVGA, monitored with EtCO2.       Plan Factors-Exercise tolerance (METS): >4 METS.    Chart reviewed.    Patient summary reviewed.      Patient instructed to abstain from smoking on day of procedure. Patient did not smoke on day of surgery.            Induction- intravenous.    Postoperative Plan-         Informed Consent- Anesthetic plan and risks discussed with patient.  I personally  reviewed this patient with the CRNA. Discussed and agreed on the Anesthesia Plan with the CRNA..      NPO Status:  No vitals data found for the desired time range.

## 2025-07-25 NOTE — H&P
History and Physical -  [facility]  Andrade Wise Jr. 80 y.o. male MRN: [id number]    HPI: Andrade Wise Jr. is a 80 y.o. year old male who presents for EGD and colonoscopy.    IBS-C on linzess 290, GERD on esomeprazole 40    11/10/21 EGD/colon: 10mm body inflammatory pseudopolyp s/p snare and 1x clip. 1x 3mm transverse TA, 1x cecal SSL s/p 1x clip, L-sided tics, small IH    01/30/25 colon: 1x subcm ascending TVA, 1x 6mm transverse TA w/ HGD and 15mm SSA w/ HGD s/p EMR, 1x descending TA , L-sided tics, EH + IH    REVIEW OF SYSTEMS: Per the HPI, and otherwise unremarkable.    HISTORICAL INFO  Past Medical History[1]  Past Surgical History[2]  Social History[3]  Current Medications[4]    Allergies[5]    OBJECTIVE  There were no vitals taken for this visit.    PHYSICAL EXAM  Gen: NAD  Head: NCAT  CV: RRR  CHEST: CTAB  ABD: soft, NT/ND  EXT: no edema    ASSESSMENT/PLAN:   This is a 80 y.o. year old male here for EGD and colonoscopy, and he is stable and optimized for his procedure.             [1]   Past Medical History:  Diagnosis Date    Allergic rhinitis     Anemia 10/23/2008    last assessed 9/9/13     Anxiety     Atrial fibrillation (HCC)     Bleeding     Chronic kidney disease     COVID-19 virus infection 12/2022    CPAP (continuous positive airway pressure) dependence     Diabetes mellitus (HCC)     Diarrhea     Diverticulitis     Eating disorder     GERD (gastroesophageal reflux disease)     Herpes zoster with complication     last assessed 7/3/17     Hiatal hernia     Hyperlipidemia     Hypertension     IBS (irritable bowel syndrome)     Incisional hernia     Increased urinary frequency     Irregular heart beat     Pyogenic granuloma 09/13/2006    last assessed 9/13/06    Carlos (subconjunctival hemorrhage), unspecified laterality 09/12/2005    last assessed 9/12/05    Sleep apnea     TMJ (dislocation of temporomandibular joint)     Ventral hernia     last assessed  4/6/17     Wears glasses     [2]   Past Surgical History:  Procedure Laterality Date    APPENDECTOMY      ATRIAL ABLATION SURGERY          ATRIAL ABLATION SURGERY      catheter ablation atrial fibrillation / last assessed 16     ATRIAL ABLATION SURGERY N/A 2024    heart    CARDIOVERSION  2024    CHOLECYSTECTOMY      lap    COLON SURGERY      Hartmans procedure    COLON SURGERY      reversal 6 weeks later    COLONOSCOPY N/A 2017    Procedure: COLONOSCOPY;  Surgeon: Portillo Schumacher MD;  Location: [facility];  Service:     ESOPHAGOGASTRODUODENOSCOPY N/A 2017    Procedure: ESOPHAGOGASTRODUODENOSCOPY (EGD);  Surgeon: Portillo Schumacher MD;  Location: [facility];  Service:     EYE SURGERY Bilateral 2015    lid repair    HERNIA REPAIR Bilateral     inguinal  &     INCISIONAL HERNIA REPAIR      INCISIONAL HERNIA REPAIR N/A 2017    Procedure: REPAIR OF INCARCERATED INCISIONAL HERNIA WITH MESH, Lysis of Adhesions, Partial Omentectomy;  Surgeon: Colt Davis MD;  Location: [facility];  Service:     INSERT / REPLACE / REMOVE PACEMAKER Left 10/04/2021    medtronic-[id number]    NEUROBLASTOMA EXCISION      SKIN CANCER EXCISION      On nose    TONSILLECTOMY      US GUIDED THYROID BIOPSY  2025   [3]   Social History  Tobacco Use    Smoking status: Former     Current packs/day: 0.00     Average packs/day: 1 pack/day for 20.0 years (20.0 ttl pk-yrs)     Types: Cigarettes     Start date:      Quit date:      Years since quittin.5     Passive exposure: Never    Smokeless tobacco: Never    Tobacco comments:     Started age 17   Vaping Use    Vaping status: Never Used   Substance Use Topics    Alcohol use: No     Comment: none in 33 yrs    Drug use: No   [4]   Current Outpatient Medications:     acetaminophen (TYLENOL) 325 mg tablet    amiodarone 200 mg tablet    apixaban (ELIQUIS) 2.5 mg    atorvastatin (LIPITOR) 20 mg tablet    Blood Glucose Monitoring Suppl (FREESTYLE LITE) GEOVANNY     carvedilol (COREG) 25 mg tablet    clotrimazole-betamethasone (LOTRISONE) 1-0.05 % cream    esomeprazole (NexIUM) 40 MG capsule    fluocinonide (LIDEX) 0.05 % external solution    fluticasone (FLONASE) 50 mcg/act nasal spray    FREESTYLE LITE test strip    gabapentin (NEURONTIN) 300 mg capsule    hydroCHLOROthiazide 25 mg tablet    ketoconazole (NIZORAL) 2 % shampoo    Lancets (FREESTYLE) lancets    levothyroxine 137 mcg tablet    linaCLOtide 290 MCG CAPS    metroNIDAZOLE (METROCREAM) 0.75 % cream    Mounjaro 7.5 MG/0.5ML SOAJ    Multiple Vitamin (MULTIVITAMIN ADULT PO)    ondansetron (ZOFRAN) 4 mg tablet    polyethylene glycol (GOLYTELY) 4000 mL solution    RESTASIS 0.05 % ophthalmic emulsion    Sure Comfort Pen Needles 32G X 4 MM MISC    tadalafil (CIALIS) 5 MG tablet    tamsulosin (FLOMAX) 0.4 mg    tiZANidine (ZANAFLEX) 2 mg tablet    Tresiba FlexTouch 100 units/mL injection pen  [5]   Allergies  Allergen Reactions    Codeine Other (See Comments) and GI Intolerance     Reaction Date: 27Dec2004; Annotation - 04Sep2012: '' CRAZY ''  vomiting  headache    Demerol [Meperidine] Nausea Only, Other (See Comments), GI Intolerance and Vomiting     vomiting  Reaction Date: 27Dec2004;   headache    Morphine Nausea Only, GI Intolerance and Vomiting     Reaction Date: 23Feb2012;

## 2025-07-25 NOTE — ANESTHESIA POSTPROCEDURE EVALUATION
Post-Op Assessment Note    CV Status:  Stable  Pain Score: 0    Pain management: adequate       Mental Status:  Alert and awake   Hydration Status:  Euvolemic   PONV Controlled:  Controlled   Airway Patency:  Patent     Post Op Vitals Reviewed: Yes    No anethesia notable event occurred.    Staff: CRNA           Last Filed PACU Vitals:  Vitals Value Taken Time   Temp 97.7 °F (36.5 °C) 07/25/25 10:35   Pulse 66 07/25/25 10:35   /59 07/25/25 10:35   Resp 18 07/25/25 10:35   SpO2 100 % 07/25/25 10:35       Modified Nolberto:     Vitals Value Taken Time   Activity 2 07/25/25 10:35   Respiration 2 07/25/25 10:35   Circulation 2 07/25/25 10:35   Consciousness 2 07/25/25 10:35   Oxygen Saturation 1 07/25/25 10:35     Modified Nolberto Score: 9

## 2025-07-29 DIAGNOSIS — R60.0 EDEMA OF BOTH LOWER LEGS: ICD-10-CM

## 2025-07-30 DIAGNOSIS — E78.2 MIXED HYPERLIPIDEMIA: ICD-10-CM

## 2025-07-30 RX ORDER — HYDROCHLOROTHIAZIDE 25 MG/1
25 TABLET ORAL DAILY
Qty: 90 TABLET | Refills: 1 | Status: SHIPPED | OUTPATIENT
Start: 2025-07-30

## 2025-07-30 RX ORDER — ATORVASTATIN CALCIUM 20 MG/1
20 TABLET, FILM COATED ORAL DAILY
Qty: 90 TABLET | Refills: 0 | Status: SHIPPED | OUTPATIENT
Start: 2025-07-30

## 2025-08-04 PROCEDURE — 88342 IMHCHEM/IMCYTCHM 1ST ANTB: CPT | Performed by: PATHOLOGY

## 2025-08-04 PROCEDURE — 88305 TISSUE EXAM BY PATHOLOGIST: CPT | Performed by: PATHOLOGY

## 2025-08-04 PROCEDURE — 88341 IMHCHEM/IMCYTCHM EA ADD ANTB: CPT | Performed by: PATHOLOGY

## 2025-08-12 ENCOUNTER — PREP FOR PROCEDURE (OUTPATIENT)
Dept: GASTROENTEROLOGY | Facility: CLINIC | Age: 80
End: 2025-08-12

## 2025-08-12 ENCOUNTER — TELEPHONE (OUTPATIENT)
Dept: GASTROENTEROLOGY | Facility: CLINIC | Age: 80
End: 2025-08-12

## 2025-08-18 ENCOUNTER — OFFICE VISIT (OUTPATIENT)
Dept: FAMILY MEDICINE CLINIC | Facility: CLINIC | Age: 80
End: 2025-08-18
Payer: MEDICARE

## 2025-08-18 VITALS
BODY MASS INDEX: 30.92 KG/M2 | HEART RATE: 65 BPM | HEIGHT: 70 IN | WEIGHT: 216 LBS | SYSTOLIC BLOOD PRESSURE: 116 MMHG | TEMPERATURE: 97.9 F | RESPIRATION RATE: 18 BRPM | DIASTOLIC BLOOD PRESSURE: 84 MMHG

## 2025-08-18 DIAGNOSIS — Z00.00 MEDICARE ANNUAL WELLNESS VISIT, SUBSEQUENT: Primary | ICD-10-CM

## 2025-08-18 PROCEDURE — G0439 PPPS, SUBSEQ VISIT: HCPCS | Performed by: INTERNAL MEDICINE

## (undated) DEVICE — SUT CHROMIC 0 18 IN SG14T

## (undated) DEVICE — "MB-142 MOUTHPIECE": Brand: MOUTHPIECE

## (undated) DEVICE — AIRLIFE™  ADULT CUSHION NASAL CANNULA WITH 7 FOOT (2.1 M) CRUSH-RESISTANT OXYGEN TUBING, AND U/CONNECT-IT ADAPTER: Brand: AIRLIFE™

## (undated) DEVICE — GLOVE INDICATOR PI UNDERGLOVE SZ 7 BLUE

## (undated) DEVICE — SUT PLAIN 3-0 CT-1 27 IN 842H

## (undated) DEVICE — NEEDLE 25G X 1 1/2

## (undated) DEVICE — SUT PDS II 4-0 PS-2 R/C 18 IN Z513G

## (undated) DEVICE — PACK GENERAL LF

## (undated) DEVICE — TUBING BUBBLE CLEAR 5MM X 100 FT NS

## (undated) DEVICE — "MH-438 A/W VLVE F/140 EVIS-140": Brand: AIR/WATER VALVE

## (undated) DEVICE — BITE BLOCK ENDO 60FR ADLT MAXI  DISP W/STRAP

## (undated) DEVICE — 1200CC GUARDIAN II: Brand: GUARDIAN

## (undated) DEVICE — GROUNDING PAD UNIVERSAL SLW

## (undated) DEVICE — SUT PROLENE 0 CT 30 IN 8434H

## (undated) DEVICE — LUBRICANT SURGILUBE TUBE 4 OZ  FLIP TOP

## (undated) DEVICE — SUT PLAIN 3-0 PS-1 27 IN 1640H

## (undated) DEVICE — GAUZE SPONGES,16 PLY: Brand: CURITY

## (undated) DEVICE — 3M™ STERI-STRIP™ REINFORCED ADHESIVE SKIN CLOSURES, R1542, 1/4 IN X 1-1/2 IN (6 MM X 38 MM), 6 STRIPS/ENVELOPE: Brand: 3M™ STERI-STRIP™

## (undated) DEVICE — 3M™ TEGADERM™ TRANSPARENT FILM DRESSING FRAME STYLE, 1626W, 4 IN X 4-3/4 IN (10 CM X 12 CM), 50/CT 4CT/CASE: Brand: 3M™ TEGADERM™

## (undated) DEVICE — BASIC DOUBLE BASIN 2-LF: Brand: MEDLINE INDUSTRIES, INC.

## (undated) DEVICE — BINDER ABDOMINAL 63-74 IN

## (undated) DEVICE — DISPOSABLE BIOPSY VALVE MAJ-1555: Brand: SINGLE USE BIOPSY VALVE (STERILE)

## (undated) DEVICE — SPONGE GAUZE 4 X 9

## (undated) DEVICE — TIBURON LAPAROTOMY DRAPE: Brand: CONVERTORS

## (undated) DEVICE — GLOVE EXAM NON-STRL NTRL PLUS LRG PF

## (undated) DEVICE — GLOVE INDICATOR PI UNDERGLOVE SZ 8.5 BLUE

## (undated) DEVICE — SCD SEQUENTIAL COMPRESSION COMFORT SLEEVE MEDIUM KNEE LENGTH: Brand: KENDALL SCD

## (undated) DEVICE — TUBING AUX CHANNEL

## (undated) DEVICE — ASTOUND IMPERVIOUS SURGICAL GOWN: Brand: CONVERTORS

## (undated) DEVICE — GLOVE SRG BIOGEL 7

## (undated) DEVICE — GLOVE SRG BIOGEL 8

## (undated) DEVICE — SOLIDIFIER FLUID WASTE CONTROL 1500ML

## (undated) DEVICE — "MH-443 SUCTION VALVE F/EVIS140 EVIS160": Brand: SUCTION VALVE

## (undated) DEVICE — 3M™ STERI-STRIP™ REINFORCED ADHESIVE SKIN CLOSURES, R1546, 1/4 IN X 4 IN (6 MM X 100 MM), 10 STRIPS/ENVELOPE: Brand: 3M™ STERI-STRIP™

## (undated) DEVICE — SINGLE-USE BIOPSY FORCEPS: Brand: RADIAL JAW 4

## (undated) DEVICE — SYRINGE 10ML LL CONTROL TOP

## (undated) DEVICE — SINGLE-USE POLYPECTOMY SNARE: Brand: SENSATION SHORT THROW

## (undated) DEVICE — SUT CHROMIC 2-0 18 IN SG13T

## (undated) DEVICE — "MAJ-901 WATER CONTAINER SET CV-160/140": Brand: WATER CONTAINER

## (undated) DEVICE — CHLORAPREP HI-LITE 26ML ORANGE

## (undated) DEVICE — TRAP POLY

## (undated) DEVICE — SUT PROLENE 0 CT-2 30 IN 8412H

## (undated) DEVICE — MEDI-VAC YANKAUER SUCTION HANDLE: Brand: CARDINAL HEALTH

## (undated) DEVICE — LABEL STERILE (RSC) (2-SENSOR CAINE 2-LIDOCAINE 2-HEPARIN)

## (undated) DEVICE — SUT CHROMIC 2-0 CT-1 27 IN 811H

## (undated) DEVICE — BRUSH ENDO CLEANING DBL-HEADER